# Patient Record
Sex: MALE | NOT HISPANIC OR LATINO | Employment: OTHER | URBAN - METROPOLITAN AREA
[De-identification: names, ages, dates, MRNs, and addresses within clinical notes are randomized per-mention and may not be internally consistent; named-entity substitution may affect disease eponyms.]

---

## 2017-01-02 ENCOUNTER — OFFICE VISIT (OUTPATIENT)
Dept: ORTHOPEDICS | Facility: OTHER | Age: 77
End: 2017-01-02
Payer: COMMERCIAL

## 2017-01-02 ENCOUNTER — RADIANT APPOINTMENT (OUTPATIENT)
Dept: GENERAL RADIOLOGY | Facility: OTHER | Age: 77
End: 2017-01-02
Attending: PHYSICAL MEDICINE & REHABILITATION
Payer: COMMERCIAL

## 2017-01-02 VITALS
SYSTOLIC BLOOD PRESSURE: 110 MMHG | RESPIRATION RATE: 20 BRPM | WEIGHT: 206 LBS | DIASTOLIC BLOOD PRESSURE: 60 MMHG | BODY MASS INDEX: 28.15 KG/M2

## 2017-01-02 DIAGNOSIS — M25.511 CHRONIC PAIN OF BOTH SHOULDERS: ICD-10-CM

## 2017-01-02 DIAGNOSIS — M25.512 CHRONIC PAIN OF BOTH SHOULDERS: ICD-10-CM

## 2017-01-02 DIAGNOSIS — M75.81 TENDONITIS OF BOTH ROTATOR CUFFS: Primary | ICD-10-CM

## 2017-01-02 DIAGNOSIS — G89.29 CHRONIC PAIN OF BOTH SHOULDERS: ICD-10-CM

## 2017-01-02 DIAGNOSIS — M19.012 PRIMARY OSTEOARTHRITIS OF BOTH SHOULDERS: ICD-10-CM

## 2017-01-02 DIAGNOSIS — M53.3 COCCYDYNIA: ICD-10-CM

## 2017-01-02 DIAGNOSIS — M19.011 PRIMARY OSTEOARTHRITIS OF BOTH SHOULDERS: ICD-10-CM

## 2017-01-02 DIAGNOSIS — M75.82 TENDONITIS OF BOTH ROTATOR CUFFS: Primary | ICD-10-CM

## 2017-01-02 PROCEDURE — 20610 DRAIN/INJ JOINT/BURSA W/O US: CPT | Mod: LT | Performed by: PHYSICAL MEDICINE & REHABILITATION

## 2017-01-02 PROCEDURE — 99204 OFFICE O/P NEW MOD 45 MIN: CPT | Mod: 25 | Performed by: PHYSICAL MEDICINE & REHABILITATION

## 2017-01-02 PROCEDURE — 73030 X-RAY EXAM OF SHOULDER: CPT | Mod: LT

## 2017-01-02 RX ORDER — TRIAMCINOLONE ACETONIDE 40 MG/ML
40 INJECTION, SUSPENSION INTRA-ARTICULAR; INTRAMUSCULAR ONCE
Qty: 1 ML | Refills: 0 | OUTPATIENT
Start: 2017-01-02 | End: 2017-01-02

## 2017-01-02 NOTE — PATIENT INSTRUCTIONS
Today's Plan of Care:  -Rehab: Physical Therapy: [facility] - [phone]. Please do 5-6 days of exercises per week between formal sessions and the home exercises they provide.  -Steroid injection of the left shoulder: subacromial space was performed today in clinic.  Take it easy over the next few days. Keep in mind that the steroid may take up to 3 days to start working and up to 2 weeks to reach maximal effect.  Ice 15-20 minutes as needed for soreness.  Ibuprofen and/or Tylenol as needed for pain relief.  Recommend donut cushion, can be obtained at the pharmacy.    -Follow Up: Schedule follow up  6 - 8 weeks or sooner if symptoms fail to improve or worsen

## 2017-01-02 NOTE — MR AVS SNAPSHOT
After Visit Summary   1/2/2017    Deandre Moore    MRN: [id number]           Patient Information     Date Of Birth          1940        Visit Information        Provider Department      1/2/2017 1:20 PM Mera Xiao MD [facility]        Today's Diagnoses     Tendonitis of both rotator cuffs    -  1     Chronic pain of both shoulders         Primary osteoarthritis of both shoulders         Coccydynia           Care Instructions    Today's Plan of Care:  -Rehab: Physical Therapy: [facility] - [phone]. Please do 5-6 days of exercises per week between formal sessions and the home exercises they provide.  -Steroid injection of the left shoulder: subacromial space was performed today in clinic.  Take it easy over the next few days. Keep in mind that the steroid may take up to 3 days to start working and up to 2 weeks to reach maximal effect.  Ice 15-20 minutes as needed for soreness.  Ibuprofen and/or Tylenol as needed for pain relief.  Recommend donut cushion, can be obtained at the pharmacy.    -Follow Up: Schedule follow up  6 - 8 weeks or sooner if symptoms fail to improve or worsen          Follow-ups after your visit        Additional Services     АННА PT, HAND, AND CHIROPRACTIC REFERRAL       **This order will print in the [facility] Scheduling Office**    Physical Therapy, Hand Therapy and Chiropractic Care are available through:    *[facility]  *[facility]  *[facility]    Call one number to schedule at any of the above locations: [phone].    Your provider has referred you to: Physical Therapy at [facility] or [facility]    Indication/Reason for Referral: Shoulder Pain  Onset of Illness: years  Therapy Orders: Evaluate and Treat  Special Programs: None  Special Request: None    Madelyn Joshi      Additional Comments for the Therapist or Chiropractor:     Please be aware that coverage of  "these services is subject to the terms and limitations of your health insurance plan.  Call member services at your health plan with any benefit or coverage questions.      Please bring the following to your appointment:    *Your personal calendar for scheduling future appointments  *Comfortable clothing                  Your next 10 appointments already scheduled     Jan 26, 2017  3:15 PM   (Arrive by 3:00 PM)   Return Visit with Miguel Gray MD   [facility] ([facility])    [address]  [address] [facility] [id number] [phone]            Jun 19, 2017 11:00 AM   Return Visit with Davis Whyte MD, [facility] NURSE   [facility]    [address] [place] [id number] [phone]              Who to contact     If you have questions or need follow up information about today's clinic visit or your schedule please contact [facility] directly at [phone].  Normal or non-critical lab and imaging results will be communicated to you by [employer], letter or phone within 4 business days after the clinic has received the results. If you do not hear from us within 7 days, please contact the clinic through [employer] or phone. If you have a critical or abnormal lab result, we will notify you by phone as soon as possible.  Submit refill requests through [employer] or call your pharmacy and they will forward the refill request to us. Please allow 3 business days for your refill to be completed.          Additional Information About Your Visit        [employer] Information     [employer] lets you send messages to your doctor, view your test results, renew your prescriptions, schedule appointments and more. To sign up, go to [email or web address] . Click on \"Log in\" on the left side of the screen, which will take you to the Welcome page. Then click on \"Sign up Now\" on the right side of the page.     You " will be asked to enter the access code listed below, as well as some personal information. Please follow the directions to create your username and password.     Your access code is: [email or web address]  Expires: 3/20/2017  2:26 PM     Your access code will  in 90 days. If you need help or a new code, please call your [facility] or [phone].        Your Vitals Were     Respirations                   20            Blood Pressure from Last 3 Encounters:   17 110/60   16 105/61   16 118/68    Weight from Last 3 Encounters:   17 206 lb (93.441 kg)   16 206 lb 12.8 oz (93.804 kg)   16 215 lb (97.523 kg)              We Performed the Following     АННА PT, HAND, AND CHIROPRACTIC REFERRAL        Primary Care Provider Office Phone # Fax #    Denilson Thomas [phone]       [address] [address]        Thank you!     Thank you for choosing [facility]  for your care. Our goal is always to provide you with excellent care. Hearing back from our patients is one way we can continue to improve our services. Please take a few minutes to complete the written survey that you may receive in the mail after your visit with us. Thank you!             Your Updated Medication List - Protect others around you: Learn how to safely use, store and throw away your medicines at www.disposemymeds.org.          This list is accurate as of: 17  2:16 PM.  Always use your most recent med list.                   Brand Name Dispense Instructions for use    ammonium lactate 12 % lotion    LAC-HYDRIN    500 g    Apply topically 2 times daily Apply twice daily to feet       aspirin 81 MG tablet      Take 81 mg by mouth daily.       ATIVAN PO      Take 2.5 mg by mouth At Bedtime.       AVAPRO PO      Take 150 mg by mouth daily.       Blood Glucose Monitor System W/DEVICE Kit     1 kit    Test 4 times daily with meter covered by  insurance        * blood glucose monitoring lancets     2 Box    Use to test blood sugar 2 times daily or as directed.       * blood glucose monitoring lancets     4 Box    Use to test blood sugar 4 times daily with meter/ strips/ lancets covered by insurance       blood glucose monitoring test strip    no brand specified    360 each    Use to test blood sugar 4 times daily  With meter/ strips/ lancets covered by insurance       capsaicin 0.075 % cream    ZOSTRIX    60 g    Apply topically 3 times daily To areas of itch on back.  OK to dilute with cetaphil cream if too burning.       * clobetasol 0.05 % ointment    TEMOVATE    60 g    For flaring spots around ankles, apply at bedtime and cover with saran wrap       * clobetasol 0.05 % ointment    TEMOVATE    60 g    Apply to areas of itch on back twice daily, if capsaicin not helping       COLCHICINE PO      Take 0.6 mg by mouth daily.       folic acid 1 MG tablet    FOLVITE    100 tablet    One pill daily except the day you take methotrexate       glimepiride 4 MG tablet    AMARYL    21 tablet    take 1 and 1/2 tabs daily (6 mg)       GNP GINGKO BILOBA EXTRACT PO      Take  by mouth.       indapamide 2.5 MG tablet    LOZOL     Take 2.5 mg by mouth every morning.       LIPITOR PO      Take 40 mg by mouth daily       metFORMIN 1000 MG tablet    GLUCOPHAGE    120 tablet    TAKE 1 TABLET TWICE DAILY WITH MEALS       methotrexate sodium 2.5 MG Tabs     40 tablet    Start 5 pills one day per week       mirtazapine 15 MG tablet    REMERON    30 tablet    Take 1 tablet (15 mg) by mouth At Bedtime       Multi-vitamin Tabs tablet      Take 1 tablet by mouth daily.       nitroglycerin 0.4 MG sublingual tablet    NITROSTAT     Place under the tongue as needed       PLAVIX PO      Take 75 mg by mouth daily.       potassium chloride SA 20 MEQ CR tablet    K-DUR/KLOR-CON M    270 tablet    TAKE 1 TABLET THREE TIMES DAILY       torsemide 10 MG tablet    DEMADEX     Take 10 mg by mouth daily        * triamcinolone 0.1 % ointment    KENALOG    80 g    Mix entire tube with moisturizing cream as directed, and apply at bedtime       * triamcinolone 0.1 % ointment    KENALOG    454 g    Apply topically 2 times daily       VITAMIN B12 PO      Take 1 tablet by mouth daily       vitamin D 2000 UNITS Caps      Take 1 tablet by mouth daily       * Notice:  This list has 6 medication(s) that are the same as other medications prescribed for you. Read the directions carefully, and ask your doctor or other care provider to review them with you.

## 2017-01-02 NOTE — PROGRESS NOTES
Sports Medicine Clinic Visit    PCP: Denilson Thomas    CC: Patient presents with:  Shoulder left  Shoulder right  Tailbone Pain      HPI:  Deandre Moore is a 76 year old male who is seen as a self referral.   He notes an injury 4 years ago when he fell.  Pain is located on the bilateral shoulders.  He rates the pain at a  8/10 at its worst and a 0/10 currently.  Symptoms are relieved with rest and keeping arms at side and worsened by raising arms, sleeping. He endorses weakness and denies swelling, bruising, popping, grinding, catching, locking, instability, numbness, tingling, pain in other joints and fever, chills.  He has not had any physical therapy for his shoulders. He notes difficulty with daily activities, including lifting, or raising arms up.      He notes a second issue with his tailbone. Notes pain began 3 months ago with sitting. No pain otherwise.      Review of Systems:  Musculoskeletal: as above  Remainder of review of systems is negative including constitutional, CV, pulmonary, GI, Skin and Neurologic except as noted in HPI or medical history.      Patient's current problem list, past medical and surgical history, and family history were reviewed.    Past Medical History   Diagnosis Date     Diabetes mellitus (H)      Hypertension      Gout attack      Heart attack (H) 1984     Past Surgical History   Procedure Laterality Date     Cholecystectomy       Colonoscopy       Cardiac surgery  1984     4 vessel bypass     Phacoemulsification with standard intraocular lens implant  1/21/2013     Procedure: PHACOEMULSIFICATION WITH STANDARD INTRAOCULAR LENS IMPLANT;  Phacoemulsification with standard intraocular lens implant, right eye;  Surgeon: Jay Rodriges MD;  Location: [facility] OR     No history of surgery  3/31/14     derm     Family History   Problem Relation Age of Onset     CANCER No family hx of      no skin cancer     Skin Cancer No family hx of      Social History     Social History      Marital Status:      Spouse Name: N/A     Number of Children: N/A     Years of Education: N/A     Occupational History     Not on file.     Social History Main Topics     Smoking status: Former Smoker     Quit date: 10/15/1984     Smokeless tobacco: Never Used     Alcohol Use: Yes      Comment: occasional-once a year     Drug Use: No     Sexual Activity: Not on file     Other Topics Concern     Not on file     Social History Narrative       Current Outpatient Prescriptions   Medication     potassium chloride SA (K-DUR/KLOR-CON M) 20 MEQ CR tablet     metFORMIN (GLUCOPHAGE) 1000 MG tablet     methotrexate sodium 2.5 MG TABS     folic acid (FOLVITE) 1 MG tablet     capsaicin (ZOSTRIX) 0.075 % topical cream     clobetasol (TEMOVATE) 0.05 % ointment     clobetasol (TEMOVATE) 0.05 % ointment     triamcinolone (KENALOG) 0.1 % ointment     triamcinolone (KENALOG) 0.1 % ointment     glimepiride (AMARYL) 4 MG tablet     Blood Glucose Monitoring Suppl (BLOOD GLUCOSE MONITOR SYSTEM) W/DEVICE KIT     blood glucose monitoring (NO BRAND SPECIFIED) test strip     blood glucose monitoring (ULTRA THIN 30G) lancets     ammonium lactate (LAC-HYDRIN) 12 % lotion     blood glucose monitoring (ACCU-CHEK FASTCLIX) lancets     mirtazapine (REMERON) 15 MG tablet     Cholecalciferol (VITAMIN D) 2000 UNITS CAPS     Cyanocobalamin (VITAMIN B12 PO)     nitroglycerin (NITROSTAT) 0.4 MG SL tablet     torsemide (DEMADEX) 10 MG tablet     aspirin 81 MG tablet     LORazepam (ATIVAN PO)     Irbesartan (AVAPRO PO)     Atorvastatin Calcium (LIPITOR PO)     Clopidogrel Bisulfate (PLAVIX PO)     indapamide (LOZOL) 2.5 MG tablet     COLCHICINE PO     multivitamin, therapeutic with minerals (MULTI-VITAMIN) TABS     Ginkgo Biloba (GNP GINGKO BILOBA EXTRACT PO)     No current facility-administered medications for this visit.     Allergies   Allergen Reactions     Beta Adrenergic Blockers Unknown     Latex Dermatitis     Tape [Adhesive Tape]  Dermatitis     Electrode patches         Objective:  /60 mmHg  Resp 20  Wt 206 lb (93.441 kg)    General: healthy, alert and in no distress    Head: Normocephalic, atraumatic  Eyes: no scleral icterus or conjunctival erythema   Oropharynx:  Mucous membranes moist  Skin: no erythema, ecchymosis, petechiae, or jaundice  CV: regular rhythm by palpation, 2+ distal pulses, no pedal edema    Resp: normal respiratory effort without conversational dyspnea   Psych: normal mood and affect    Gait: Non-antalgic, appropriate coordination and balance   Neuro: normal light touch sensory exam of the extremities. Motor strength as noted below    Musculoskeletal:    Bilateral Shoulder and Low back exam    Inspection and Posture:  Holding shoulders anteriorly     Skin:        no visible deformities    Palpation:  Right proximal biceps tendon origin tender.  Remainder of shoulder exam is nontender.  Coccyx tender to palpation.      ROM:   -Right shoulder abduction active 160, passive 170, flexion active 160, passive 170.  Left shoulder abduction active 90, passive 170, flexion active 160, passive 170.  Bilateral active internal/external rotation are also decreased (left worse than right), passive motion is maintained.      Painful motions:  Bilateral shoulder abduction, flexion, internal rotation, and external rotation are painful.      Strength:        abduction 4+/5 bilaterally       internal rotation 5-/5 bilaterally       external rotation 5-/5 bilaterally       elbow flexion 5/5 bilaterally       elbow extension 5/5 bilaterally       forearm pronation 5/5 bilaterally       forearm supination 5/5 bilaterally       wrist flexion 5/5 bilaterally       wrist extension 5/5 bilaterally        strength 5/5 bilaterally       finger abduction 5/5 bilaterally    Impingement testing:       positive (+) Neer left       positive (+) Oliveira left       positive (+) empty can bilaterally (weak bilaterally, painful left)       neg  (-) crossover bilaterally       neg (-) O'nolvia bilaterally    Stability testing:       neg (-) sulcus sign bilaterally    Sensation:        normal sensation over shoulder and upper extremity     Radiology:  Independent visualization of images performed.    Recent Results (from the past 744 hour(s))   XR Shoulder Bilateral G/E 2 Views    Narrative    SHOULDER BILATERAL TWO OR MORE VIEWS 1/2/2017 1:51 PM     HISTORY: Pain in left shoulder. Other chronic pain. Pain in right  shoulder.    COMPARISON: None.      Impression    IMPRESSION:     Right shoulder: Moderate hypertrophic degenerative change at the  acromioclavicular joint. Probable mild degenerative change at the  glenohumeral joint. No acute bony abnormalities. Distal acromial  morphology is type II. Glenohumeral distance appears narrowed which  could indicate rotator cuff disease.    Left shoulder: Mild-to-moderate hypertrophic degenerative change  acromioclavicular joint. No acute bony abnormalities. Distal acromial  morphology is type I or II and humeral acromial distance appears  adequate.     Procedure:  Discussed steroid injection, including risks, potential benefits, and alternatives.  The patient expressed understanding.  Obtained verbal and written consent and the patient elected to proceed.  Procedure: A steroid injection was performed under aseptic technique at the left subacromial bursa using 2 mL of 1% plain Lidocaine, 2 mL of 0.5% Marcaine, and 1 mL of 40 mg/mL Kenalog.  Bandage applied. This was well tolerated.    Assessment:  1. Tendonitis of both rotator cuffs    2. Chronic pain of both shoulders    3. Primary osteoarthritis of both shoulders    4. Coccydynia        Plan:  Discussed the assessment with the patient. We discussed the following treatment options: symptom treatment, activity modification/rest, imaging and rehab. Following discussion, plan:  -Will try conservative measures of physical therapy and steroid injection first.  May  ultimately need advanced imaging if these are ineffective.    -Rehab: Physical Therapy: [facility] - [phone]. Please do 5-6 days of exercises per week between formal sessions and the home exercises they provide.  -Steroid injection of the left shoulder: subacromial space was performed today in clinic.  Take it easy over the next few days. Keep in mind that the steroid may take up to 3 days to start working and up to 2 weeks to reach maximal effect.  Ice 15-20 minutes as needed for soreness.  Ibuprofen and/or Tylenol as needed for pain relief.  -Recommend donut cushion for coccygeal pain, can be obtained at the pharmacy.  -Follow Up: Schedule follow up  6 - 8 weeks or sooner if symptoms fail to improve or worsen      Adri Xiao MD, CAQ  [facility]

## 2017-01-11 ENCOUNTER — THERAPY VISIT (OUTPATIENT)
Dept: PHYSICAL THERAPY | Facility: CLINIC | Age: 77
End: 2017-01-11
Payer: COMMERCIAL

## 2017-01-11 DIAGNOSIS — M25.511 CHRONIC PAIN OF BOTH SHOULDERS: Primary | ICD-10-CM

## 2017-01-11 DIAGNOSIS — G89.29 CHRONIC PAIN OF BOTH SHOULDERS: Primary | ICD-10-CM

## 2017-01-11 DIAGNOSIS — M25.512 CHRONIC PAIN OF BOTH SHOULDERS: Primary | ICD-10-CM

## 2017-01-11 PROCEDURE — 97161 PT EVAL LOW COMPLEX 20 MIN: CPT | Mod: GP | Performed by: PHYSICAL THERAPIST

## 2017-01-11 PROCEDURE — 97110 THERAPEUTIC EXERCISES: CPT | Mod: GP | Performed by: PHYSICAL THERAPIST

## 2017-01-11 NOTE — PROGRESS NOTES
[facility] Initial Evaluation      Subjective:    Deandre Moore is a 76 year old male with a bilateral shoulders condition.  Condition occurred with:  A fall.  Condition occurred: at home.  This is a chronic condition  Fell about 3 years ago. 1/2/17 date of MD visit.    Patient reports pain:  In the joint.  Radiates to:  Upper arm.  Pain is described as sharp and is intermittent and reported as 5/10.  Associated symptoms:  Loss of strength and loss of motion/stiffness. Pain is the same all the time.  Symptoms are exacerbated by using arm overhead, using arm at shoulder level, using arm behind back and lifting and relieved by rest.  Since onset symptoms are unchanged.  Special tests:  X-ray (moderate degenerative changes).  Previous treatment: coritsone: so far no effect on symptoms.    General health as reported by patient is good.  Pertinent medical history includes:  Diabetes, high blood pressure, heart problems and sleep disorder/apnea.  Medical allergies: yes (latex).  Other surgeries include:  Heart surgery (bypass).  Medication history: cholesterol.  Current occupation is retired.  Patient is working in normal job without restrictions.  Primary job tasks include:  Driving and prolonged sitting (household tasks).    Barriers include:  None as reported by the patient.    Red flags:  None as reported by the patient.                      Objective:    System                   Shoulder Evaluation:  ROM:  AROM:    Flexion:  Left:  65    Right:  90    Abduction:  Left: 60 with pain   Right:  155 no pain      External Rotation:  Left:  60    Right:  76            Extension/Internal Rotation:  Left:  L5    Right:  S1    PROM:    Flexion:  Left:  165    Right: 165      Abduction:  Left:  170    Right:  133    Internal Rotation:  Left:  90    Right:  88                      Strength:    Flexion: Left:3+/5 Weak/painful    Pain:    Right: 5/5  Strong/pain free     Pain:   Extension:  Left: 4/5   Weak/painful    Pain:    Right: 5/5    Strong/pain free  Pain:  Abduction:  Left: 3+/5  Weak/painful  Pain:    Right: 4/5   Weak/painful    Pain:  Adduction:  Left: 5/5   Strong/pain free   Pain:    Right: 5/5   Strong/painful    Pain:  Internal Rotation:  Left:4/5   Weak/painful    Pain:    Right: 5/5   Strong/painful    Pain:  External Rotation:   Left:4/5   Weak/painful    Pain:   Right:5/5   Strong/pain free    Pain:        Elbow Flexion:  Left:5/5   Strong/pain free    Pain:    Right:5/5   Strong/pain free    Pain:  Elbow Extension:  Left:5/5   Strong/pain free    Pain:    Right:5/5   Strong/pain free    Pain:  Stability Testing:  normal      Special Tests:    Left shoulder positive for the following special tests:  Impingement and Rotator cuff tear  Right shoulder positive for the following special tests:Impingement  Right shoulder negative for the following special tests:Rotator cuff tear  Palpation:    Left shoulder tenderness present at:  Bicipital Groove  Left shoulder tenderness not present at: Supraspinatus; Infraspinatus or Subscapularis    Right shoulder tenderness not present at:Supraspinatus; Infraspinatus; Subscapularis or Bicipital Groove  Mobility Tests:  normal                                                   General     ROS    Assessment/Plan:      Patient is a 76 year old male with both sides shoulder complaints.    Patient has the following significant findings with corresponding treatment plan.                Diagnosis 1:  Bilateral shoulder pain / rotator cuff tendonpathy  Pain -  hot/cold therapy, manual therapy, self management, education and home program  Decreased ROM/flexibility - manual therapy, therapeutic exercise, therapeutic activity and home program  Decreased strength - therapeutic exercise, therapeutic activities and home program  Decreased function - therapeutic activities and home program  Impaired posture - neuro re-education, therapeutic activities and home  program    Therapy Evaluation Codes:   1) History comprised of:   Personal factors that impact the plan of care:      Age and Time since onset of symptoms.    Comorbidity factors that impact the plan of care are:      None.     Medications impacting care: None.  2) Examination of Body Systems comprised of:   Body structures and functions that impact the plan of care:      Shoulder.   Activity limitations that impact the plan of care are:      Lifting and reaching.  3) Clinical presentation characteristics are:   Stable/Uncomplicated.  4) Decision-Making    Moderate complexity using standardized patient assessment instrument and/or measureable assessment of functional outcome.  Cumulative Therapy Evaluation is: Low complexity.    Previous and current functional limitations:  (See Goal Flow Sheet for this information)    Short term and Long term goals: (See Goal Flow Sheet for this information)     Communication ability:  Patient appears to be able to clearly communicate and understand verbal and written communication and follow directions correctly.  Treatment Explanation - The following has been discussed with the patient:   RX ordered/plan of care  Anticipated outcomes  Possible risks and side effects  This patient would benefit from PT intervention to resume normal activities.   Rehab potential is good.    Frequency:  1 X week, once daily  Duration:  for 8 weeks  Discharge Plan:  Achieve all LTG.  Independent in home treatment program.  Reach maximal therapeutic benefit.    Please refer to the daily flowsheet for treatment today, total treatment time and time spent performing 1:1 timed codes.

## 2017-01-11 NOTE — Clinical Note
[facility]  [address]  [facility] [address] [phone]    2017    Re: Deandre Moore   :   1940  MRN:  [id number]   REFERRING PHYSICIAN:   Mera Xiao    [facility] ATHLETIC [facility]    Date of Initial Evaluation:  2017  Visits:  Rxs Used: 1  Reason for Referral:  Chronic pain of both shoulders    EVALUATION SUMMARY    [facility]    Subjective:  Deandre Moore is a 76 year old male with a bilateral shoulders condition.  Condition occurred with:  A fall.  Condition occurred: at home.  This is a chronic condition  Fell about 3 years ago. 17 date of MD visit.    Patient reports pain:  In the joint.  Radiates to:  Upper arm.  Pain is described as sharp and is intermittent and reported as 5/10.  Associated symptoms:  Loss of strength and loss of motion/stiffness. Pain is the same all the time.  Symptoms are exacerbated by using arm overhead, using arm at shoulder level, using arm behind back and lifting and relieved by rest.  Since onset symptoms are unchanged.  Special tests:  X-ray (moderate degenerative changes).  Previous treatment: coritsone: so far no effect on symptoms.    General health as reported by patient is good.  Pertinent medical history includes:  Diabetes, high blood pressure, heart problems and sleep disorder/apnea.  Medical allergies: yes (latex).  Other surgeries include:  Heart surgery (bypass).  Medication history: cholesterol.  Current occupation is retired.  Patient is working in normal job without restrictions.  Primary job tasks include:  Driving and prolonged sitting (household tasks). Barriers include:  None as reported by the patient.  Red flags:  None as reported by the patient.    Objective:  Shoulder Evaluation:  ROM:  AROM:    Flexion:  Left:  65    Right:  90  Abduction:  Left: 60 with pain    Right:  155 no pain  External Rotation:  Left:  60    Right:  76  Extension/Internal Rotation:  Left:  L5    Right:  S1    PROM:    Flexion:  Left:  165    Right: 165    Abduction:  Left:  170    Right:  133  Internal Rotation:  Left:  90    Right:  88  Strength:    Flexion: Left:3+/5 Weak/painful    Pain:    Right: 5/5  Strong/pain free     Pain:   Extension:  Left: 4/5  Weak/painful    Pain:    Right: 5/5    Strong/pain free  Pain:  Abduction:  Left: 3+/5  Weak/painful  Pain:    Right: 4/5   Weak/painful    Pain:  Adduction:  Left: 5/5   Strong/pain free   Pain:    Right: 5/5   Strong/painful    Pain:  Internal Rotation:  Left:4/5   Weak/painful    Pain:    Right: 5/5   Strong/painful    Pain:  External Rotation:   Left:4/5   Weak/painful    Pain:   Right:5/5   Strong/pain free    Pain:    Elbow Flexion:  Left:5/5   Strong/pain free    Pain:    Right:5/5   Strong/pain free    Pain:  Elbow Extension:  Left:5/5   Strong/pain free    Pain:    Right:5/5   Strong/pain free    Pain:  Stability Testing:  normal  Special Tests:    Left shoulder positive for the following special tests:  Impingement and Rotator cuff tear  Right shoulder positive for the following special tests:Impingement  Right shoulder negative for the following special tests:Rotator cuff tear  Palpation:    Left shoulder tenderness present at:  Bicipital Groove  Left shoulder tenderness not present at: Supraspinatus; Infraspinatus or Subscapularis  Right shoulder tenderness not present at:Supraspinatus; Infraspinatus; Subscapularis or Bicipital Groove  Mobility Tests:  normal    Assessment/Plan:    Patient is a 76 year old male with both sides shoulder complaints.    Patient has the following significant findings with corresponding treatment plan.                Diagnosis 1:  Bilateral shoulder pain / rotator cuff tendonpathy  Pain -  hot/cold therapy, manual therapy, self management, education and home program  Decreased ROM/flexibility - manual  therapy, therapeutic exercise, therapeutic activity and home program  Decreased strength - therapeutic exercise, therapeutic activities and home program  Decreased function - therapeutic activities and home program  Impaired posture - neuro re-education, therapeutic activities and home program  Therapy Evaluation Codes:   1) History comprised of:   Personal factors that impact the plan of care:      Age and Time since onset of symptoms.    Comorbidity factors that impact the plan of care are:      None.     Medications impacting care: None.  2) Examination of Body Systems comprised of:   Body structures and functions that impact the plan of care:      Shoulder.   Activity limitations that impact the plan of care are:      Lifting and reaching.  3) Clinical presentation characteristics are:   Stable/Uncomplicated.  4) Decision-Making    Moderate complexity using standardized patient assessment instrument and/or measureable assessment of functional outcome.  Cumulative Therapy Evaluation is: Low complexity.  Previous and current functional limitations:  (See Goal Flow Sheet for this information)    Short term and Long term goals: (See Goal Flow Sheet for this information)   Communication ability:  Patient appears to be able to clearly communicate and understand verbal and written communication and follow directions correctly.  Treatment Explanation - The following has been discussed with the patient:   RX ordered/plan of care  Anticipated outcomes  Possible risks and side effects  This patient would benefit from PT intervention to resume normal activities.   Rehab potential is good.  Frequency:  1 X week, once daily  Duration:  for 8 weeks  Discharge Plan:  Achieve all LTG.  Independent in home treatment program.  Reach maximal therapeutic benefit.    Thank you for your referral.      INQUIRIES  Therapist: Zachary Barreto, PT  [facility]  [address]  [place]  [place] [address]  Phone: [phone]  Fax: [phone]

## 2017-01-18 ENCOUNTER — THERAPY VISIT (OUTPATIENT)
Dept: PHYSICAL THERAPY | Facility: CLINIC | Age: 77
End: 2017-01-18
Payer: COMMERCIAL

## 2017-01-18 DIAGNOSIS — M25.512 CHRONIC PAIN OF BOTH SHOULDERS: Primary | ICD-10-CM

## 2017-01-18 DIAGNOSIS — G89.29 CHRONIC PAIN OF BOTH SHOULDERS: Primary | ICD-10-CM

## 2017-01-18 DIAGNOSIS — M25.511 CHRONIC PAIN OF BOTH SHOULDERS: Primary | ICD-10-CM

## 2017-01-18 PROCEDURE — 97110 THERAPEUTIC EXERCISES: CPT | Mod: GP | Performed by: PHYSICAL THERAPIST

## 2017-01-25 ENCOUNTER — THERAPY VISIT (OUTPATIENT)
Dept: PHYSICAL THERAPY | Facility: CLINIC | Age: 77
End: 2017-01-25
Payer: COMMERCIAL

## 2017-01-25 DIAGNOSIS — M25.511 CHRONIC PAIN OF BOTH SHOULDERS: Primary | ICD-10-CM

## 2017-01-25 DIAGNOSIS — M25.512 CHRONIC PAIN OF BOTH SHOULDERS: Primary | ICD-10-CM

## 2017-01-25 DIAGNOSIS — G89.29 CHRONIC PAIN OF BOTH SHOULDERS: Primary | ICD-10-CM

## 2017-01-25 PROCEDURE — 97110 THERAPEUTIC EXERCISES: CPT | Mod: GP | Performed by: PHYSICAL THERAPIST

## 2017-01-25 PROCEDURE — 97140 MANUAL THERAPY 1/> REGIONS: CPT | Mod: GP | Performed by: PHYSICAL THERAPIST

## 2017-01-31 DIAGNOSIS — L30.8 OTHER ECZEMA: Primary | ICD-10-CM

## 2017-01-31 RX ORDER — METHOTREXATE 2.5 MG/1
TABLET ORAL
Qty: 5 TABLET | Refills: 0 | Status: SHIPPED | OUTPATIENT
Start: 2017-01-31 | End: 2017-02-13

## 2017-01-31 NOTE — TELEPHONE ENCOUNTER
Last visit 10/2016     Expand All Collapse All    Finally was able to get ahold of patient today. He is still taking MTX 12.5mg weekly for his dermatitis, started about 1 month ago. Denies side effects, including oral ulcers, GI issues, infections. Reports skin is improved, but still has some itching.     I see labs in our system, which are baseline labs from October. Pt states he had labs performed 1 week after taking the first dose of the medication - these were drawn with his PCP, Dr. Pate. I do not see records of these in our system.    He has an appointment with Dr. Gray scheduled for 1/27/16. He missed his November appointment because he forgot about it. I think it is okay if he continues his MTX, but would like another set of labs for monitoring. He prefers to have Dr. Pate check these, rather than drive here. I asked him to have a CBC with diff and liver panel drawn. He will have Dr. Pate fax the records here. He does not currently need a refill of MTX (has a 1 month supply left).    I will also ask scheduling to send him a reminder in the mail about his next appointment, per his request. I have cc'ed Dr. Gray on this note.    Ana Carbajal MD MS  PGY4 Dermatology Resident  P: [phone]

## 2017-01-31 NOTE — TELEPHONE ENCOUNTER
Chart reviewed and called patient to discuss refill. Patient started on methotrexate in October 2016 for severe atopic dermatitis. Requested refill. He has not since had repeat lab testing and missed his appointment with Dr. Gray on 1/26/17. He has 2-weeks of remaining medication. I discussed that he must not miss appointments while on this medication given side effects to blood, liver, and kidneys. He has difficulty getting to the [facility] for appointment, but states his wife has a scheduled dentist appointment on 2/13/17 and he would be able to come in then. He can be added onto the [facility] then for a check-up, for lab monitoring, and adjust dose of methotrexate as needed. I gave him an additional 1-week refill of his methotrexate so he would have an adequate supply until that appointment. I also contacted our scheduling coordinators to arrange this. Patient expressed understanding and agreed to plan.     Edu Rivera MD   PGY-2 Dermatology Resident

## 2017-02-01 ENCOUNTER — THERAPY VISIT (OUTPATIENT)
Dept: PHYSICAL THERAPY | Facility: CLINIC | Age: 77
End: 2017-02-01
Payer: COMMERCIAL

## 2017-02-01 DIAGNOSIS — M25.511 CHRONIC PAIN OF BOTH SHOULDERS: Primary | ICD-10-CM

## 2017-02-01 DIAGNOSIS — G89.29 CHRONIC PAIN OF BOTH SHOULDERS: Primary | ICD-10-CM

## 2017-02-01 DIAGNOSIS — M25.512 CHRONIC PAIN OF BOTH SHOULDERS: Primary | ICD-10-CM

## 2017-02-01 PROCEDURE — 97110 THERAPEUTIC EXERCISES: CPT | Mod: GP | Performed by: PHYSICAL THERAPIST

## 2017-02-01 PROCEDURE — 97112 NEUROMUSCULAR REEDUCATION: CPT | Mod: GP | Performed by: PHYSICAL THERAPIST

## 2017-02-08 ENCOUNTER — THERAPY VISIT (OUTPATIENT)
Dept: PHYSICAL THERAPY | Facility: CLINIC | Age: 77
End: 2017-02-08
Payer: COMMERCIAL

## 2017-02-08 DIAGNOSIS — G89.29 CHRONIC PAIN OF BOTH SHOULDERS: Primary | ICD-10-CM

## 2017-02-08 DIAGNOSIS — M25.512 CHRONIC PAIN OF BOTH SHOULDERS: Primary | ICD-10-CM

## 2017-02-08 DIAGNOSIS — M25.511 CHRONIC PAIN OF BOTH SHOULDERS: Primary | ICD-10-CM

## 2017-02-08 PROCEDURE — 97110 THERAPEUTIC EXERCISES: CPT | Mod: GP | Performed by: PHYSICAL THERAPIST

## 2017-02-08 PROCEDURE — 97112 NEUROMUSCULAR REEDUCATION: CPT | Mod: GP | Performed by: PHYSICAL THERAPIST

## 2017-02-13 ENCOUNTER — OFFICE VISIT (OUTPATIENT)
Dept: DERMATOLOGY | Facility: CLINIC | Age: 77
End: 2017-02-13

## 2017-02-13 DIAGNOSIS — L30.9 DERMATITIS: ICD-10-CM

## 2017-02-13 DIAGNOSIS — L30.8 OTHER ECZEMA: ICD-10-CM

## 2017-02-13 DIAGNOSIS — L20.84 INTRINSIC ATOPIC DERMATITIS: Primary | ICD-10-CM

## 2017-02-13 DIAGNOSIS — Z79.899 ENCOUNTER FOR LONG-TERM (CURRENT) USE OF HIGH-RISK MEDICATION: ICD-10-CM

## 2017-02-13 DIAGNOSIS — R21 RASH: ICD-10-CM

## 2017-02-13 DIAGNOSIS — L20.84 INTRINSIC ATOPIC DERMATITIS: ICD-10-CM

## 2017-02-13 LAB
ALBUMIN SERPL-MCNC: 3.3 G/DL (ref 3.4–5)
ALP SERPL-CCNC: 85 U/L (ref 40–150)
ALT SERPL W P-5'-P-CCNC: 36 U/L (ref 0–70)
AST SERPL W P-5'-P-CCNC: 25 U/L (ref 0–45)
BILIRUB DIRECT SERPL-MCNC: 0.1 MG/DL (ref 0–0.2)
BILIRUB SERPL-MCNC: 0.6 MG/DL (ref 0.2–1.3)
PROT SERPL-MCNC: 7.3 G/DL (ref 6.8–8.8)

## 2017-02-13 RX ORDER — CLOBETASOL PROPIONATE 0.5 MG/G
OINTMENT TOPICAL
Qty: 60 G | Refills: 1 | Status: SHIPPED | OUTPATIENT
Start: 2017-02-13 | End: 2018-06-04

## 2017-02-13 RX ORDER — METHOTREXATE 2.5 MG/1
TABLET ORAL
Qty: 20 TABLET | Refills: 1 | Status: SHIPPED | OUTPATIENT
Start: 2017-02-13 | End: 2017-03-25

## 2017-02-13 NOTE — NURSING NOTE
"Dermatology Rooming Note    Deandre Moore's goals for this visit include:   Chief Complaint   Patient presents with     Derm Problem     Deandre is here today for dermatitis. He states \" I have been using Methotrexate and I think the medication is working but I still have active areas\"           Ирина Bailey, RMA    "

## 2017-02-13 NOTE — MR AVS SNAPSHOT
After Visit Summary   2/13/2017    Deandre Moore    MRN: [id number]           Patient Information     Date Of Birth          1940        Visit Information        Provider Department      2/13/2017 1:30 PM Jesusita Castro MD [facility]        Today's Diagnoses     Intrinsic atopic dermatitis    -  1    Encounter for long-term (current) use of high-risk medication        Other eczema        Dermatitis        Rash           Follow-ups after your visit        Follow-up notes from your care team     Return in about 6 weeks (around 3/27/2017).      Your next 10 appointments already scheduled     Feb 13, 2017  2:45 PM CST   LAB with  LAB   [facility] ([facility])    [address] [address] [phone]           Patient must bring picture ID.  Patient should be prepared to give a urine specimen  Please do not eat 10-12 hours before your appointment if you are coming in fasting for labs on lipids, cholesterol, or glucose (sugar).  Pregnant women should follow their Care Team instructions. Water with medications is okay. Do not drink coffee or other fluids.   If you have concerns about taking  your medications, please ask at office or if scheduling via [employer], send a message by clicking on Secure Messaging, Message Your Care Team.            Feb 22, 2017  2:00 PM CST   АННА Extremity with Zachary Barreto PT   [facility] ([facility]  )    [address]  [facility] [address]   [phone]            Mar 01, 2017  2:00 PM CST   АННА Extremity with Zachary Barreto PT   [facility] ([facility] [employer] [place]  )    [address]  [facility] [address]   [phone]            Mar 30, 2017 12:45 PM CDT   (Arrive by 12:30 PM)   Return Visit with Miguel Gray MD   [facility] ([facility])    [address] [facility]  [address] [facility] [id number] [phone]            2017 11:00 AM CDT   Return Visit with Davis Whyte MD, MG ENDO NURSE   [facility] ([facility])    [address] [place] [address] [phone]              Future tests that were ordered for you today     Open Future Orders        Priority Expected Expires Ordered    Hepatic panel Routine  2018            Who to contact     Please call your clinic at [phone] to:    Ask questions about your health    Make or cancel appointments    Discuss your medicines    Learn about your test results    Speak to your doctor   If you have compliments or concerns about an experience at your clinic, or if you wish to file a complaint, please contact [facility] Patient Relations at [phone] or email us at [email or web address]         Additional Information About Your Visit        [employer] Information     [employer] is an electronic gateway that provides easy, online access to your medical records. With [employer], you can request a clinic appointment, read your test results, renew a prescription or communicate with your care team.     To sign up for [employer] visit the website at [email or web address]   You will be asked to enter the access code listed below, as well as some personal information. Please follow the directions to create your username and password.     Your access code is: [email or web address]  Expires: 3/20/2017  2:26 PM     Your access code will  in 90 days. If you need help or a new code, please contact your [facility] or call [phone] for assistance.        Care EveryWhere ID     This is your Care EveryWhere ID. This could be used by other organizations to access your [facility] medical records  [id number]         Blood Pressure from Last 3 Encounters:    01/02/17 110/60   06/07/16 105/61   02/26/16 118/68    Weight from Last 3 Encounters:   01/02/17 93.4 kg (206 lb)   06/07/16 93.8 kg (206 lb 12.8 oz)   02/26/16 97.5 kg (215 lb)                 Today's Medication Changes          These changes are accurate as of: 2/13/17  2:26 PM.  If you have any questions, ask your nurse or doctor.               These medicines have changed or have updated prescriptions.        Dose/Directions    * clobetasol 0.05 % ointment   Commonly known as:  TEMOVATE   This may have changed:  Another medication with the same name was changed. Make sure you understand how and when to take each.   Used for:  Dermatitis   Changed by:  Miguel Gray MD        For flaring spots around ankles, apply at bedtime and cover with saran wrap   Quantity:  60 g   Refills:  2       * clobetasol 0.05 % ointment   Commonly known as:  TEMOVATE   This may have changed:  additional instructions   Used for:  Rash   Changed by:  Jesusita Castro MD        Apply to itchy areas twice daily as needed   Quantity:  60 g   Refills:  1       * Notice:  This list has 2 medication(s) that are the same as other medications prescribed for you. Read the directions carefully, and ask your doctor or other care provider to review them with you.         Where to get your medicines      These medications were sent to [facility] - [place], [place] - [address]  [address] [address]     Phone:  [phone]     clobetasol 0.05 % ointment    methotrexate sodium 2.5 MG Tabs                Primary Care Provider Office Phone # Fax #    Denilson Thomas [phone]       [address] [address]        Thank you!     Thank you for choosing [facility]  for your care. Our goal is always to provide you with excellent care. Hearing back from our patients is one way we can continue to improve our services. Please take a few  minutes to complete the written survey that you may receive in the mail after your visit with us. Thank you!             Your Updated Medication List - Protect others around you: Learn how to safely use, store and throw away your medicines at www.disposemymeds.org.          This list is accurate as of: 2/13/17  2:26 PM.  Always use your most recent med list.                   Brand Name Dispense Instructions for use    ammonium lactate 12 % lotion    LAC-HYDRIN    500 g    Apply topically 2 times daily Apply twice daily to feet       aspirin 81 MG tablet      Take 81 mg by mouth daily.       ATIVAN PO      Take 2.5 mg by mouth At Bedtime.       AVAPRO PO      Take 150 mg by mouth daily.       Blood Glucose Monitor System W/DEVICE Kit     1 kit    Test 4 times daily with meter covered by  insurance       * blood glucose monitoring lancets     2 Box    Use to test blood sugar 2 times daily or as directed.       * blood glucose monitoring lancets     4 Box    Use to test blood sugar 4 times daily with meter/ strips/ lancets covered by insurance       blood glucose monitoring test strip    no brand specified    360 each    Use to test blood sugar 4 times daily  With meter/ strips/ lancets covered by insurance       capsaicin 0.075 % cream    ZOSTRIX    60 g    Apply topically 3 times daily To areas of itch on back.  OK to dilute with cetaphil cream if too burning.       * clobetasol 0.05 % ointment    TEMOVATE    60 g    For flaring spots around ankles, apply at bedtime and cover with saran wrap       * clobetasol 0.05 % ointment    TEMOVATE    60 g    Apply to itchy areas twice daily as needed       COLCHICINE PO      Take 0.6 mg by mouth daily.       folic acid 1 MG tablet    FOLVITE    100 tablet    One pill daily except the day you take methotrexate       glimepiride 4 MG tablet    AMARYL    21 tablet    take 1 and 1/2 tabs daily (6 mg)       GNP GINGKO BILOBA EXTRACT PO      Take  by mouth.       indapamide 2.5 MG  tablet    LOZOL     Take 2.5 mg by mouth every morning.       LIPITOR PO      Take 40 mg by mouth daily       metFORMIN 1000 MG tablet    GLUCOPHAGE    120 tablet    TAKE 1 TABLET TWICE DAILY WITH MEALS       methotrexate sodium 2.5 MG Tabs     20 tablet    Start 5 pills one day per week       mirtazapine 15 MG tablet    REMERON    30 tablet    Take 1 tablet (15 mg) by mouth At Bedtime       Multi-vitamin Tabs tablet      Take 1 tablet by mouth daily.       nitroglycerin 0.4 MG sublingual tablet    NITROSTAT     Place under the tongue as needed       PLAVIX PO      Take 75 mg by mouth daily.       potassium chloride SA 20 MEQ CR tablet    K-DUR/KLOR-CON M    270 tablet    TAKE 1 TABLET THREE TIMES DAILY       torsemide 10 MG tablet    DEMADEX     Take 10 mg by mouth daily       * triamcinolone 0.1 % ointment    KENALOG    80 g    Mix entire tube with moisturizing cream as directed, and apply at bedtime       * triamcinolone 0.1 % ointment    KENALOG    454 g    Apply topically 2 times daily       VITAMIN B12 PO      Take 1 tablet by mouth daily       vitamin D 2000 UNITS Caps      Take 1 tablet by mouth daily       * Notice:  This list has 6 medication(s) that are the same as other medications prescribed for you. Read the directions carefully, and ask your doctor or other care provider to review them with you.

## 2017-02-13 NOTE — LETTER
2/13/2017       RE: Deandre Moore  [address] [facility] [address]     Dear Colleague,    Thank you for referring your patient, Deandre Moore, to the [facility] DERMATOLOGY at [facility]. Please see a copy of my visit note below.    .tabby  [facility] Dermatology Note      Dermatology Problem List:  1. Macular amyloidosis   2. Atopic dermatitis  3. Venous statis dermatitis    Encounter Date: Feb 13, 2017    CC:  No chief complaint on file.        History of Present Illness:  Mr. Deandre Moore is a 76 year old male who presents as a follow-up for atopic dermatitis. Last seen 10/2016, at which time he had failed NBUVB and topical steroids and he was started on MTX 12.5mg q week. He states he has been on this consistently since October. The itching is improved, and the rash on the R shoulder has improved. He uses Amlactin for residual itching on the shoulders but this is not helping. He has not tried clobetasol.  For the past week, he has felt feverish but not had an elevated temperature, and has had muscle aches but no cough or diarrhea.  Denies oral ulcers, infections, or GI upset.    Past Medical History:   Patient Active Problem List   Diagnosis     Itching     AD (atopic dermatitis)     Dermatitis     Hyperglycemia     Eczema, unspecified eczema     Intrinsic atopic dermatitis     Other eczema     Notalgia paresthetica     Rash     Pseudophakia of both eyes     Diabetes mellitus (H)     Presbyopia     Chronic pain of both shoulders     Past Medical History   Diagnosis Date     Diabetes mellitus (H)      Gout attack      Heart attack (H) 1984     Hypertension      Past Surgical History   Procedure Laterality Date     Cholecystectomy       Colonoscopy       Cardiac surgery  1984     4 vessel bypass     Phacoemulsification with standard intraocular lens implant  1/21/2013     Procedure: PHACOEMULSIFICATION WITH STANDARD INTRAOCULAR  LENS IMPLANT;  Phacoemulsification with standard intraocular lens implant, right eye;  Surgeon: Jay Rodriges MD;  Location: [facility] OR     No history of surgery  3/31/14     derm     Medications:  Current Outpatient Prescriptions   Medication Sig Dispense Refill     methotrexate sodium 2.5 MG TABS Start 5 pills one day per week 5 tablet 0     potassium chloride SA (K-DUR/KLOR-CON M) 20 MEQ CR tablet TAKE 1 TABLET THREE TIMES DAILY 270 tablet 1     metFORMIN (GLUCOPHAGE) 1000 MG tablet TAKE 1 TABLET TWICE DAILY WITH MEALS 120 tablet 3     folic acid (FOLVITE) 1 MG tablet One pill daily except the day you take methotrexate 100 tablet 3     capsaicin (ZOSTRIX) 0.075 % topical cream Apply topically 3 times daily To areas of itch on back.  OK to dilute with cetaphil cream if too burning. 60 g 3     clobetasol (TEMOVATE) 0.05 % ointment Apply to areas of itch on back twice daily, if capsaicin not helping 60 g 1     clobetasol (TEMOVATE) 0.05 % ointment For flaring spots around ankles, apply at bedtime and cover with saran wrap 60 g 2     triamcinolone (KENALOG) 0.1 % ointment Apply topically 2 times daily 454 g 0     triamcinolone (KENALOG) 0.1 % ointment Mix entire tube with moisturizing cream as directed, and apply at bedtime 80 g 3     glimepiride (AMARYL) 4 MG tablet take 1 and 1/2 tabs daily (6 mg) 21 tablet 1     Blood Glucose Monitoring Suppl (BLOOD GLUCOSE MONITOR SYSTEM) W/DEVICE KIT Test 4 times daily with meter covered by  insurance 1 kit 1     blood glucose monitoring (NO BRAND SPECIFIED) test strip Use to test blood sugar 4 times daily  With meter/ strips/ lancets covered by insurance 360 each 3     blood glucose monitoring (ULTRA THIN 30G) lancets Use to test blood sugar 4 times daily with meter/ strips/ lancets covered by insurance 4 Box 3     ammonium lactate (LAC-HYDRIN) 12 % lotion Apply topically 2 times daily Apply twice daily to feet 500 g 1     blood glucose monitoring (ACCU-CHEK FASTCLIX)  lancets Use to test blood sugar 2 times daily or as directed. 2 Box 1     mirtazapine (REMERON) 15 MG tablet Take 1 tablet (15 mg) by mouth At Bedtime 30 tablet 1     Cholecalciferol (VITAMIN D) 2000 UNITS CAPS Take 1 tablet by mouth daily       Cyanocobalamin (VITAMIN B12 PO) Take 1 tablet by mouth daily       nitroglycerin (NITROSTAT) 0.4 MG SL tablet Place under the tongue as needed       torsemide (DEMADEX) 10 MG tablet Take 10 mg by mouth daily       aspirin 81 MG tablet Take 81 mg by mouth daily.       LORazepam (ATIVAN PO) Take 2.5 mg by mouth At Bedtime.       Irbesartan (AVAPRO PO) Take 150 mg by mouth daily.       Atorvastatin Calcium (LIPITOR PO) Take 40 mg by mouth daily        Clopidogrel Bisulfate (PLAVIX PO) Take 75 mg by mouth daily.       indapamide (LOZOL) 2.5 MG tablet Take 2.5 mg by mouth every morning.       COLCHICINE PO Take 0.6 mg by mouth daily.       multivitamin, therapeutic with minerals (MULTI-VITAMIN) TABS Take 1 tablet by mouth daily.       Ginkgo Biloba (GNP GINGKO BILOBA EXTRACT PO) Take  by mouth.       Allergies   Allergen Reactions     Beta Adrenergic Blockers Unknown     Latex Dermatitis     Tape [Adhesive Tape] Dermatitis     Electrode patches         Review of Systems:  -See HPI.    Physical exam:  Vitals: There were no vitals taken for this visit.  GEN: This is a well developed, well-nourished male in no acute distress, in a pleasant mood.    SKIN: Focused exam of upper body and legs:  Hyperpigmented patches on back with rippled appearance  Erythematous hyperpigmented patches b/l lateral shoulders with overlying hyperpigmented 1mm papules  Xerosis, hyperpigmentation, and erythema of both legs, especially at the lateral aspects. There is some erythema of the L medial leg, and pitting edema b/l.    Impression/Plan:  1. Atopic dermatitis. Partial response to MTX 12.5mg weekly initiated 10/2017. Suboptimal topical therapy; reminded about use of Amlactin today. Reminded not to  donate blood or share medication.    Continue methotrexate 12.5 mg qw; CBC/kidney function checked 2/8/17 with PCP and normal; checking LFTs today.    Continue folic acid 1 mg on the 6 days when not taking methotrexate    Reminded to use clobetasol 0.05% ointment BID prn to residual areas of involvement    Defer any dose adjustments to Dr. Gray at next visit      Follow-up in 6 weeks with Dr. Gray, earlier for new or changing lesions.     Staff Involved:  Resident/Staff    .I was present for key portions of the history and exam.  See resident note for pertinent history, exam, and treatment plan.  Jesusita Castro MD    Again, thank you for allowing me to participate in the care of your patient.      Sincerely,    Jesusita Castro MD

## 2017-02-13 NOTE — PROGRESS NOTES
.[facility] Dermatology Note      Dermatology Problem List:  1. Macular amyloidosis   2. Atopic dermatitis  3. Venous statis dermatitis    Encounter Date: Feb 13, 2017    CC:  No chief complaint on file.        History of Present Illness:  Mr. Deandre Moore is a 76 year old male who presents as a follow-up for atopic dermatitis. Last seen 10/2016, at which time he had failed NBUVB and topical steroids and he was started on MTX 12.5mg q week. He states he has been on this consistently since October. The itching is improved, and the rash on the R shoulder has improved. He uses Amlactin for residual itching on the shoulders but this is not helping. He has not tried clobetasol.  For the past week, he has felt feverish but not had an elevated temperature, and has had muscle aches but no cough or diarrhea.  Denies oral ulcers, infections, or GI upset.    Past Medical History:   Patient Active Problem List   Diagnosis     Itching     AD (atopic dermatitis)     Dermatitis     Hyperglycemia     Eczema, unspecified eczema     Intrinsic atopic dermatitis     Other eczema     Notalgia paresthetica     Rash     Pseudophakia of both eyes     Diabetes mellitus (H)     Presbyopia     Chronic pain of both shoulders     Past Medical History   Diagnosis Date     Diabetes mellitus (H)      Gout attack      Heart attack (H) 1984     Hypertension      Past Surgical History   Procedure Laterality Date     Cholecystectomy       Colonoscopy       Cardiac surgery  1984     4 vessel bypass     Phacoemulsification with standard intraocular lens implant  1/21/2013     Procedure: PHACOEMULSIFICATION WITH STANDARD INTRAOCULAR LENS IMPLANT;  Phacoemulsification with standard intraocular lens implant, right eye;  Surgeon: Jay Rodriges MD;  Location: [facility] OR     No history of surgery  3/31/14     derm     Medications:  Current Outpatient Prescriptions   Medication Sig Dispense Refill     methotrexate sodium  2.5 MG TABS Start 5 pills one day per week 5 tablet 0     potassium chloride SA (K-DUR/KLOR-CON M) 20 MEQ CR tablet TAKE 1 TABLET THREE TIMES DAILY 270 tablet 1     metFORMIN (GLUCOPHAGE) 1000 MG tablet TAKE 1 TABLET TWICE DAILY WITH MEALS 120 tablet 3     folic acid (FOLVITE) 1 MG tablet One pill daily except the day you take methotrexate 100 tablet 3     capsaicin (ZOSTRIX) 0.075 % topical cream Apply topically 3 times daily To areas of itch on back.  OK to dilute with cetaphil cream if too burning. 60 g 3     clobetasol (TEMOVATE) 0.05 % ointment Apply to areas of itch on back twice daily, if capsaicin not helping 60 g 1     clobetasol (TEMOVATE) 0.05 % ointment For flaring spots around ankles, apply at bedtime and cover with saran wrap 60 g 2     triamcinolone (KENALOG) 0.1 % ointment Apply topically 2 times daily 454 g 0     triamcinolone (KENALOG) 0.1 % ointment Mix entire tube with moisturizing cream as directed, and apply at bedtime 80 g 3     glimepiride (AMARYL) 4 MG tablet take 1 and 1/2 tabs daily (6 mg) 21 tablet 1     Blood Glucose Monitoring Suppl (BLOOD GLUCOSE MONITOR SYSTEM) W/DEVICE KIT Test 4 times daily with meter covered by  insurance 1 kit 1     blood glucose monitoring (NO BRAND SPECIFIED) test strip Use to test blood sugar 4 times daily  With meter/ strips/ lancets covered by insurance 360 each 3     blood glucose monitoring (ULTRA THIN 30G) lancets Use to test blood sugar 4 times daily with meter/ strips/ lancets covered by insurance 4 Box 3     ammonium lactate (LAC-HYDRIN) 12 % lotion Apply topically 2 times daily Apply twice daily to feet 500 g 1     blood glucose monitoring (ACCU-CHEK FASTCLIX) lancets Use to test blood sugar 2 times daily or as directed. 2 Box 1     mirtazapine (REMERON) 15 MG tablet Take 1 tablet (15 mg) by mouth At Bedtime 30 tablet 1     Cholecalciferol (VITAMIN D) 2000 UNITS CAPS Take 1 tablet by mouth daily       Cyanocobalamin (VITAMIN B12 PO) Take 1 tablet by  mouth daily       nitroglycerin (NITROSTAT) 0.4 MG SL tablet Place under the tongue as needed       torsemide (DEMADEX) 10 MG tablet Take 10 mg by mouth daily       aspirin 81 MG tablet Take 81 mg by mouth daily.       LORazepam (ATIVAN PO) Take 2.5 mg by mouth At Bedtime.       Irbesartan (AVAPRO PO) Take 150 mg by mouth daily.       Atorvastatin Calcium (LIPITOR PO) Take 40 mg by mouth daily        Clopidogrel Bisulfate (PLAVIX PO) Take 75 mg by mouth daily.       indapamide (LOZOL) 2.5 MG tablet Take 2.5 mg by mouth every morning.       COLCHICINE PO Take 0.6 mg by mouth daily.       multivitamin, therapeutic with minerals (MULTI-VITAMIN) TABS Take 1 tablet by mouth daily.       Ginkgo Biloba (GNP GINGKO BILOBA EXTRACT PO) Take  by mouth.       Allergies   Allergen Reactions     Beta Adrenergic Blockers Unknown     Latex Dermatitis     Tape [Adhesive Tape] Dermatitis     Electrode patches         Review of Systems:  -See HPI.    Physical exam:  Vitals: There were no vitals taken for this visit.  GEN: This is a well developed, well-nourished male in no acute distress, in a pleasant mood.    SKIN: Focused exam of upper body and legs:  Hyperpigmented patches on back with rippled appearance  Erythematous hyperpigmented patches b/l lateral shoulders with overlying hyperpigmented 1mm papules  Xerosis, hyperpigmentation, and erythema of both legs, especially at the lateral aspects. There is some erythema of the L medial leg, and pitting edema b/l.    Impression/Plan:  1. Atopic dermatitis. Partial response to MTX 12.5mg weekly initiated 10/2017. Suboptimal topical therapy; reminded about use of Amlactin today. Reminded not to donate blood or share medication.    Continue methotrexate 12.5 mg qw; CBC/kidney function checked 2/8/17 with PCP and normal; checking LFTs today.    Continue folic acid 1 mg on the 6 days when not taking methotrexate    Reminded to use clobetasol 0.05% ointment BID prn to residual areas of  involvement    Defer any dose adjustments to Dr. Gray at next visit      Follow-up in 6 weeks with Dr. Gray, earlier for new or changing lesions.     Staff Involved:  Resident/Staff    .I was present for key portions of the history and exam.  See resident note for pertinent history, exam, and treatment plan.  Jesusita Castro MD

## 2017-02-22 ENCOUNTER — THERAPY VISIT (OUTPATIENT)
Dept: PHYSICAL THERAPY | Facility: CLINIC | Age: 77
End: 2017-02-22
Payer: COMMERCIAL

## 2017-02-22 DIAGNOSIS — M25.511 CHRONIC PAIN OF BOTH SHOULDERS: ICD-10-CM

## 2017-02-22 DIAGNOSIS — M25.512 CHRONIC PAIN OF BOTH SHOULDERS: ICD-10-CM

## 2017-02-22 DIAGNOSIS — G89.29 CHRONIC PAIN OF BOTH SHOULDERS: ICD-10-CM

## 2017-02-22 PROCEDURE — 97112 NEUROMUSCULAR REEDUCATION: CPT | Mod: GP | Performed by: PHYSICAL THERAPIST

## 2017-02-22 PROCEDURE — 97110 THERAPEUTIC EXERCISES: CPT | Mod: GP | Performed by: PHYSICAL THERAPIST

## 2017-02-22 NOTE — MR AVS SNAPSHOT
After Visit Summary   2/22/2017    Deandre Moore    MRN: [id number]           Patient Information     Date Of Birth          1940        Visit Information        Provider Department      2/22/2017 2:00 PM Zachary Barreto PT [facility]        Today's Diagnoses     Chronic pain of both shoulders           Follow-ups after your visit        Your next 10 appointments already scheduled     Mar 01, 2017  2:00 PM CST   АННА Extremity with Zachary Barreto PT   [facility] (АННА [place]  )    [address]  [facility] [address]   [phone]            Mar 30, 2017 12:45 PM CDT   (Arrive by 12:30 PM)   Return Visit with Miguel Gray MD   [facility] Dermatology ([facility])    [address] [place] [id number] [phone]            Jun 19, 2017 11:00 AM CDT   Return Visit with Davis Whyte MD, [facility] ENDO NURSE   [facility] ([facility])    [address] [place] [address] [phone]              Who to contact     If you have questions or need follow up information about today's clinic visit or your schedule please contact [facility] PHYSICAL THERAPY directly at [phone].  Normal or non-critical lab and imaging results will be communicated to you by MyChart, letter or phone within 4 business days after the clinic has received the results. If you do not hear from us within 7 days, please contact the clinic through MyChart or phone. If you have a critical or abnormal lab result, we will notify you by phone as soon as possible.  Submit refill requests through reMail or call your pharmacy and they will forward the refill request to us. Please allow 3 business days for your refill to be completed.          Additional Information About Your  "Visit        [employer] Information     [employer] lets you send messages to your doctor, view your test results, renew your prescriptions, schedule appointments and more. To sign up, go to [email or web address] . Click on \"Log in\" on the left side of the screen, which will take you to the Welcome page. Then click on \"Sign up Now\" on the right side of the page.     You will be asked to enter the access code listed below, as well as some personal information. Please follow the directions to create your username and password.     Your access code is: [email or web address]  Expires: 3/20/2017  2:26 PM     Your access code will  in 90 days. If you need help or a new code, please call your [facility] clinic or [phone].        Care EveryWhere ID     This is your Care EveryWhere ID. This could be used by other organizations to access your [facility] medical records  [id number]         Blood Pressure from Last 3 Encounters:   17 110/60   16 105/61   16 118/68    Weight from Last 3 Encounters:   17 93.4 kg (206 lb)   16 93.8 kg (206 lb 12.8 oz)   16 97.5 kg (215 lb)              We Performed the Following     АННА PROGRESS NOTES REPORT     NEUROMUSCULAR RE-EDUCATION     THERAPEUTIC EXERCISES        Primary Care Provider Office Phone # Fax #    Denilson Thomas [phone]       [address] [address]        Thank you!     Thank you for choosing [facility] PHYSICAL THERAP[facility]  for your care. Our goal is always to provide you with excellent care. Hearing back from our patients is one way we can continue to improve our services. Please take a few minutes to complete the written survey that you may receive in the mail after your visit with us. Thank you!             Your Updated Medication List - Protect others around you: Learn how to safely use, store and throw away your medicines at www.disposemymeds.org.        "   This list is accurate as of: 2/22/17  2:45 PM.  Always use your most recent med list.                   Brand Name Dispense Instructions for use    ammonium lactate 12 % lotion    LAC-HYDRIN    500 g    Apply topically 2 times daily Apply twice daily to feet       aspirin 81 MG tablet      Take 81 mg by mouth daily.       ATIVAN PO      Take 2.5 mg by mouth At Bedtime.       AVAPRO PO      Take 150 mg by mouth daily.       Blood Glucose Monitor System W/DEVICE Kit     1 kit    Test 4 times daily with meter covered by  insurance       * blood glucose monitoring lancets     2 Box    Use to test blood sugar 2 times daily or as directed.       * blood glucose monitoring lancets     4 Box    Use to test blood sugar 4 times daily with meter/ strips/ lancets covered by insurance       blood glucose monitoring test strip    no brand specified    360 each    Use to test blood sugar 4 times daily  With meter/ strips/ lancets covered by insurance       capsaicin 0.075 % cream    ZOSTRIX    60 g    Apply topically 3 times daily To areas of itch on back.  OK to dilute with cetaphil cream if too burning.       * clobetasol 0.05 % ointment    TEMOVATE    60 g    For flaring spots around ankles, apply at bedtime and cover with saran wrap       * clobetasol 0.05 % ointment    TEMOVATE    60 g    Apply to itchy areas twice daily as needed       COLCHICINE PO      Take 0.6 mg by mouth daily.       folic acid 1 MG tablet    FOLVITE    100 tablet    One pill daily except the day you take methotrexate       glimepiride 4 MG tablet    AMARYL    21 tablet    take 1 and 1/2 tabs daily (6 mg)       GNP GINGKO BILOBA EXTRACT PO      Take  by mouth.       indapamide 2.5 MG tablet    LOZOL     Take 2.5 mg by mouth every morning.       LIPITOR PO      Take 40 mg by mouth daily       metFORMIN 1000 MG tablet    GLUCOPHAGE    120 tablet    TAKE 1 TABLET TWICE DAILY WITH MEALS       methotrexate sodium 2.5 MG Tabs     20 tablet    Start 5 pills  one day per week       mirtazapine 15 MG tablet    REMERON    30 tablet    Take 1 tablet (15 mg) by mouth At Bedtime       Multi-vitamin Tabs tablet      Take 1 tablet by mouth daily.       nitroglycerin 0.4 MG sublingual tablet    NITROSTAT     Place under the tongue as needed       PLAVIX PO      Take 75 mg by mouth daily.       potassium chloride SA 20 MEQ CR tablet    K-DUR/KLOR-CON M    270 tablet    TAKE 1 TABLET THREE TIMES DAILY       torsemide 10 MG tablet    DEMADEX     Take 10 mg by mouth daily       * triamcinolone 0.1 % ointment    KENALOG    80 g    Mix entire tube with moisturizing cream as directed, and apply at bedtime       * triamcinolone 0.1 % ointment    KENALOG    454 g    Apply topically 2 times daily       VITAMIN B12 PO      Take 1 tablet by mouth daily       vitamin D 2000 UNITS Caps      Take 1 tablet by mouth daily       * Notice:  This list has 6 medication(s) that are the same as other medications prescribed for you. Read the directions carefully, and ask your doctor or other care provider to review them with you.

## 2017-02-22 NOTE — LETTER
[facility]  [address]  [facility] [id number] [phone]    2017    Re: Deandre Moore   :   1940  MRN:  [id number]   REFERRING PHYSICIAN:   Mera Xiao    [facility]  Date of Initial Evaluation:  17  Visits:  Rxs Used: 6  Reason for Referral:  Chronic pain of both shoulders    PROGRESS  REPORT  Progress reporting period is from 17 to 17.       SUBJECTIVE  Patient reports exercises are going well. going out to the side is the only exercise he really struggles with.     Current Pain level: 1/10.     Initial Pain level: /10.   Changes in function:  Yes (See Goal flowsheet attached for changes in current functional level)  Adverse reaction to treatment or activity: Yes, Patient lifting over 3lbs on the left irritates shoulder    OBJECTIVE  Right shoulder AROM WNL painfree, flexion WNL, abduction 110 deg loss ROM since last visit, PROM abduction full pain free, ER and Ext/IR WNL. Right shoulder strength 5/5 ER, IR . 4/5 flexion and abduction. Left shoulder strength IR 4/5, ER 4+/5 no pain, flexion 3+/5, abduction 3/5. Continued therpay for left shoulder strengthening.      ASSESSMENT/PLAN  Updated problem list and treatment plan: Diagnosis 1:  Bilateral shoulder pain / weakness  Pain -  manual therapy, education and home program  Decreased ROM/flexibility - manual therapy, therapeutic exercise, therapeutic activity and home program  Decreased strength - therapeutic exercise, therapeutic activities and home program  Decreased function - therapeutic activities and home program  STG/LTGs have been met or progress has been made towards goals:  Yes (See Goal flow sheet completed today.)  Assessment of Progress: The patient's condition is improving.  Self Management Plans:  Patient is independent in a home treatment program.  I have re-evaluated this  patient and find that the nature, scope, duration and intensity of the therapy is appropriate for the medical condition of the patient.  Deandre continues to require the following intervention to meet STG and LTG's:  PT    Recommendations:  This patient would benefit from continued therapy.     Frequency:  1 X week, once daily  Duration:  for 4 weeks    Thank you for your referral.    INQUIRIES  Therapist: Zachary Barreto DPT  [facility]  [address] [place] [address] [address]  [facility] [address]  Phone: [phone]  Fax: [phone]

## 2017-02-22 NOTE — PROGRESS NOTES
Subjective:    HPI                    Objective:    System    Physical Exam    General     ROS    Assessment/Plan:      PROGRESS  REPORT    Progress reporting period is from 1/11/17 to 2/22/17.       SUBJECTIVE  Patient reports exercises are going well. going out to the side is the only exercise he really struggles with.     Current Pain level: 1/10.     Initial Pain level: 5/10.   Changes in function:  Yes (See Goal flowsheet attached for changes in current functional level)  Adverse reaction to treatment or activity: Yes, Patient lifting over 3lbs on the left irritates shoulder    OBJECTIVE  Right shoulder AROM WNL painfree, flexion WNL, abduction 110 deg loss ROM since last visit, PROM abduction full pain free, ER and Ext/IR WNL. Right shoulder strength 5/5 ER, IR . 4/5 flexion and abduction. Left shoulder strength IR 4/5, ER 4+/5 no pain, flexion 3+/5, abduction 3/5. Continued therpay for left shoulder strengthening.      ASSESSMENT/PLAN  Updated problem list and treatment plan: Diagnosis 1:  Bilateral shoulder pain / weakness  Pain -  manual therapy, education and home program  Decreased ROM/flexibility - manual therapy, therapeutic exercise, therapeutic activity and home program  Decreased strength - therapeutic exercise, therapeutic activities and home program  Decreased function - therapeutic activities and home program  STG/LTGs have been met or progress has been made towards goals:  Yes (See Goal flow sheet completed today.)  Assessment of Progress: The patient's condition is improving.  Self Management Plans:  Patient is independent in a home treatment program.  I have re-evaluated this patient and find that the nature, scope, duration and intensity of the therapy is appropriate for the medical condition of the patient.  Deandre continues to require the following intervention to meet STG and LTG's:  PT    Recommendations:  This patient would benefit from continued therapy.     Frequency:  1 X week, once  daily  Duration:  for 4 weeks        Please refer to the daily flowsheet for treatment today, total treatment time and time spent performing 1:1 timed codes.

## 2017-03-01 ENCOUNTER — THERAPY VISIT (OUTPATIENT)
Dept: PHYSICAL THERAPY | Facility: CLINIC | Age: 77
End: 2017-03-01
Payer: COMMERCIAL

## 2017-03-01 DIAGNOSIS — G89.29 CHRONIC PAIN OF BOTH SHOULDERS: ICD-10-CM

## 2017-03-01 DIAGNOSIS — M25.512 CHRONIC PAIN OF BOTH SHOULDERS: ICD-10-CM

## 2017-03-01 DIAGNOSIS — M25.511 CHRONIC PAIN OF BOTH SHOULDERS: ICD-10-CM

## 2017-03-01 PROCEDURE — 97110 THERAPEUTIC EXERCISES: CPT | Mod: GP | Performed by: PHYSICAL THERAPIST

## 2017-03-01 PROCEDURE — 97140 MANUAL THERAPY 1/> REGIONS: CPT | Mod: GP | Performed by: PHYSICAL THERAPIST

## 2017-03-06 DIAGNOSIS — E10.9 DIABETES MELLITUS TYPE 1 (H): ICD-10-CM

## 2017-03-08 NOTE — TELEPHONE ENCOUNTER
metFORMIN (GLUCOPHAGE) 1000 MG tablet      Last Written Prescription Date:  10-26-16  Last Fill Quantity: 120,   # refills: 2  Last Office Visit : 6-7-16  Future Office visit:  6-19-17      Kathleen M Doege RN

## 2017-03-15 ENCOUNTER — THERAPY VISIT (OUTPATIENT)
Dept: PHYSICAL THERAPY | Facility: CLINIC | Age: 77
End: 2017-03-15
Payer: COMMERCIAL

## 2017-03-15 DIAGNOSIS — M25.511 CHRONIC PAIN OF BOTH SHOULDERS: ICD-10-CM

## 2017-03-15 DIAGNOSIS — G89.29 CHRONIC PAIN OF BOTH SHOULDERS: ICD-10-CM

## 2017-03-15 DIAGNOSIS — M25.512 CHRONIC PAIN OF BOTH SHOULDERS: ICD-10-CM

## 2017-03-15 PROCEDURE — 97110 THERAPEUTIC EXERCISES: CPT | Mod: GP | Performed by: PHYSICAL THERAPIST

## 2017-03-20 DIAGNOSIS — E11.9 TYPE 2 DIABETES MELLITUS WITHOUT COMPLICATION (H): ICD-10-CM

## 2017-03-21 RX ORDER — GLIMEPIRIDE 4 MG/1
TABLET ORAL
Qty: 135 TABLET | Refills: 0 | Status: SHIPPED | OUTPATIENT
Start: 2017-03-21 | End: 2017-05-31

## 2017-03-21 NOTE — TELEPHONE ENCOUNTER
glimepiride (AMARYL) 4 MG      Last Written Prescription Date:  6/21/16  Last Fill Quantity: 21,   # refills: 1  Last Office Visit : 2/26/16  Future Office visit:  9/19/17  RF UNTIL APPT.

## 2017-03-25 DIAGNOSIS — L30.8 OTHER ECZEMA: ICD-10-CM

## 2017-03-28 NOTE — TELEPHONE ENCOUNTER
Received a refill request for methotrexate as the resident on call. Patient was last seen 2/13/17 by Dr. Castro for intrinsic atopic dermatitis, at which time his liver function was normal. After reviewing the patient's chart and the assessment and plan, refill request was accepted.     Polly Richards MD  PGY-3 Dermatology  Pager: [phone]

## 2017-03-28 NOTE — TELEPHONE ENCOUNTER
Last seen 2/13/17: F/u on 3/30/17  1. Atopic dermatitis. Partial response to MTX 12.5mg weekly initiated 10/2017. Suboptimal topical therapy; reminded about use of Amlactin today. Reminded not to donate blood or share medication.    Continue methotrexate 12.5 mg qw; CBC/kidney function checked 2/8/17 with PCP and normal; checking LFTs today.    Continue folic acid 1 mg on the 6 days when not taking methotrexate    Reminded to use clobetasol 0.05% ointment BID prn to residual areas of involvement    Defer any dose adjustments to Dr. Gray at next visit        Follow-up in 6 weeks with Dr. Gray, earlier for new or changing lesions.

## 2017-04-05 ENCOUNTER — THERAPY VISIT (OUTPATIENT)
Dept: PHYSICAL THERAPY | Facility: CLINIC | Age: 77
End: 2017-04-05
Payer: COMMERCIAL

## 2017-04-05 DIAGNOSIS — M25.512 CHRONIC PAIN OF BOTH SHOULDERS: ICD-10-CM

## 2017-04-05 DIAGNOSIS — M25.511 CHRONIC PAIN OF BOTH SHOULDERS: ICD-10-CM

## 2017-04-05 DIAGNOSIS — G89.29 CHRONIC PAIN OF BOTH SHOULDERS: ICD-10-CM

## 2017-04-05 PROCEDURE — 97110 THERAPEUTIC EXERCISES: CPT | Mod: GP | Performed by: PHYSICAL THERAPIST

## 2017-04-05 PROCEDURE — 97112 NEUROMUSCULAR REEDUCATION: CPT | Mod: GP | Performed by: PHYSICAL THERAPIST

## 2017-04-05 NOTE — MR AVS SNAPSHOT
"              After Visit Summary   4/5/2017    Deandre Moore    MRN: [id number]           Patient Information     Date Of Birth          1940        Visit Information        Provider Department      4/5/2017 1:20 PM Zachary Barreto, ELBERT [facility] Physical Therapy        Today's Diagnoses     Chronic pain of both shoulders           Follow-ups after your visit        Your next 10 appointments already scheduled     Apr 19, 2017 12:10 PM CDT   АННА Extremity with Zachary Barreto PT   [facility] Physical Therapy (АННА [place]  )    [address]  [facility] [id number] [phone]            Jun 19, 2017 11:00 AM CDT   Return Visit with Davis Whyte MD, MG ENDO NURSE   [facility] ([facility])    [id number] [place] [id number] [phone]              Who to contact     If you have questions or need follow up information about today's clinic visit or your schedule please contact [facility] PHYSICAL THERAPY directly at [phone].  Normal or non-critical lab and imaging results will be communicated to you by MyChart, letter or phone within 4 business days after the clinic has received the results. If you do not hear from us within 7 days, please contact the clinic through [employer] or phone. If you have a critical or abnormal lab result, we will notify you by phone as soon as possible.  Submit refill requests through [email or web address] or call your pharmacy and they will forward the refill request to us. Please allow 3 business days for your refill to be completed.          Additional Information About Your Visit        MyChart Information     [email or web address] lets you send messages to your doctor, view your test results, renew your prescriptions, schedule appointments and more. To sign up, go to [email or web address] . Click on \"Log in\" on the left side " "of the screen, which will take you to the Welcome page. Then click on \"Sign up Now\" on the right side of the page.     You will be asked to enter the access code listed below, as well as some personal information. Please follow the directions to create your username and password.     Your access code is: [email or web address]  Expires: 2017  6:55 PM     Your access code will  in 90 days. If you need help or a new code, please call your [facility] clinic or [phone].        Care EveryWhere ID     This is your Care EveryWhere ID. This could be used by other organizations to access your [facility] medical records  [id number]         Blood Pressure from Last 3 Encounters:   17 110/60   16 105/61   16 118/68    Weight from Last 3 Encounters:   17 93.4 kg (206 lb)   16 93.8 kg (206 lb 12.8 oz)   16 97.5 kg (215 lb)              We Performed the Following     NEUROMUSCULAR RE-EDUCATION     THERAPEUTIC EXERCISES        Primary Care Provider Office Phone # Fax #    Denilson Thomas [phone]       [address] [address]        Thank you!     Thank you for [facility] PH[facility]ER[facility]  for your care. Our goal is always to provide you with excellent care. Hearing back from our patients is one way we can continue to improve our services. Please take a few minutes to complete the written survey that you may receive in the mail after your visit with us. Thank you!             Your Updated Medication List - Protect others around you: Learn how to safely use, store and throw away your medicines at www.disposemymeds.org.          This list is accurate as of: 17  6:55 PM.  Always use your most recent med list.                   Brand Name Dispense Instructions for use    ammonium lactate 12 % lotion    LAC-HYDRIN    500 g    Apply topically 2 times daily Apply twice daily to feet       aspirin 81 MG " tablet      Take 81 mg by mouth daily.       ATIVAN PO      Take 2.5 mg by mouth At Bedtime.       AVAPRO PO      Take 150 mg by mouth daily.       Blood Glucose Monitor System W/DEVICE Kit     1 kit    Test 4 times daily with meter covered by  insurance       * blood glucose monitoring lancets     2 Box    Use to test blood sugar 2 times daily or as directed.       * blood glucose monitoring lancets     4 Box    Use to test blood sugar 4 times daily with meter/ strips/ lancets covered by insurance       blood glucose monitoring test strip    no brand specified    360 each    Use to test blood sugar 4 times daily  With meter/ strips/ lancets covered by insurance       capsaicin 0.075 % cream    ZOSTRIX    60 g    Apply topically 3 times daily To areas of itch on back.  OK to dilute with cetaphil cream if too burning.       * clobetasol 0.05 % ointment    TEMOVATE    60 g    For flaring spots around ankles, apply at bedtime and cover with saran wrap       * clobetasol 0.05 % ointment    TEMOVATE    60 g    Apply to itchy areas twice daily as needed       COLCHICINE PO      Take 0.6 mg by mouth daily.       folic acid 1 MG tablet    FOLVITE    100 tablet    One pill daily except the day you take methotrexate       glimepiride 4 MG tablet    AMARYL    135 tablet    TAKE 1 AND 1/2 TABS/6MG  EVERY DAY *RFs WITH  6/19/17 MD APPT.       GNP GINGKO BILOBA EXTRACT PO      Take  by mouth.       indapamide 2.5 MG tablet    LOZOL     Take 2.5 mg by mouth every morning.       LIPITOR PO      Take 40 mg by mouth daily       metFORMIN 1000 MG tablet    GLUCOPHAGE    180 tablet    Take 1 tablet (1,000 mg) by mouth 2 times daily (with meals)       methotrexate 2.5 MG tablet CHEMO     20 tablet    TAKE 5 TABLETS ONE DAY PER WEEK       mirtazapine 15 MG tablet    REMERON    30 tablet    Take 1 tablet (15 mg) by mouth At Bedtime       Multi-vitamin Tabs tablet      Take 1 tablet by mouth daily.       nitroglycerin 0.4 MG sublingual  tablet    NITROSTAT     Place under the tongue as needed       PLAVIX PO      Take 75 mg by mouth daily.       potassium chloride SA 20 MEQ CR tablet    K-DUR/KLOR-CON M    270 tablet    TAKE 1 TABLET THREE TIMES DAILY       torsemide 10 MG tablet    DEMADEX     Take 10 mg by mouth daily       * triamcinolone 0.1 % ointment    KENALOG    80 g    Mix entire tube with moisturizing cream as directed, and apply at bedtime       * triamcinolone 0.1 % ointment    KENALOG    454 g    Apply topically 2 times daily       VITAMIN B12 PO      Take 1 tablet by mouth daily       vitamin D 2000 UNITS Caps      Take 1 tablet by mouth daily       * Notice:  This list has 6 medication(s) that are the same as other medications prescribed for you. Read the directions carefully, and ask your doctor or other care provider to review them with you.

## 2017-05-08 DIAGNOSIS — L30.8 OTHER ECZEMA: ICD-10-CM

## 2017-05-08 NOTE — TELEPHONE ENCOUNTER
Last seen:2/13/17  Next appt 6/6/17  1. Atopic dermatitis. Partial response to MTX 12.5mg weekly initiated 10/2017. Suboptimal topical therapy; reminded about use of Amlactin today. Reminded not to donate blood or share medication.    Continue methotrexate 12.5 mg qw; CBC/kidney function checked 2/8/17 with PCP and normal; checking LFTs today.    Continue folic acid 1 mg on the 6 days when not taking methotrexate    Reminded to use clobetasol 0.05% ointment BID prn to residual areas of involvement    Defer any dose adjustments to Dr. Gray at next visit

## 2017-05-08 NOTE — TELEPHONE ENCOUNTER
Approved methotrexate refill request (12.5 mg weekly) to last until 6/6/17 follow-up with Dr. Gray.

## 2017-05-22 DIAGNOSIS — E11.9 DIABETES MELLITUS, TYPE 2 (H): ICD-10-CM

## 2017-05-23 RX ORDER — POTASSIUM CHLORIDE 1500 MG/1
TABLET, EXTENDED RELEASE ORAL
Qty: 270 TABLET | Refills: 1 | Status: SHIPPED | OUTPATIENT
Start: 2017-05-23 | End: 2017-07-31

## 2017-05-23 NOTE — TELEPHONE ENCOUNTER
Wife notified. Encouraged to return clinic call with questions.    Milla Barnett LPN  Adult Endocrinology  [facility]      Per Isabell Message:      I have refilled, but should go to PCP in future. Not clear to me who started this medication   thanks

## 2017-05-23 NOTE — TELEPHONE ENCOUNTER
Last office visit: 6/7/16  Future: 6/19/17    Medication not on Endocrine Refill Protocol. Will route to Dr. Whyte for review.    Milla Barnett LPN  Adult Endocrinology  [facility]

## 2017-05-31 DIAGNOSIS — E11.9 TYPE 2 DIABETES MELLITUS WITHOUT COMPLICATION (H): ICD-10-CM

## 2017-06-01 RX ORDER — GLIMEPIRIDE 4 MG/1
TABLET ORAL
Qty: 135 TABLET | Refills: 0 | Status: SHIPPED | OUTPATIENT
Start: 2017-06-01 | End: 2017-07-21

## 2017-06-06 ENCOUNTER — OFFICE VISIT (OUTPATIENT)
Dept: DERMATOLOGY | Facility: CLINIC | Age: 77
End: 2017-06-06

## 2017-06-06 DIAGNOSIS — L20.89 OTHER ATOPIC DERMATITIS: Primary | ICD-10-CM

## 2017-06-06 DIAGNOSIS — Z79.899 MEDICATION MANAGEMENT: ICD-10-CM

## 2017-06-06 DIAGNOSIS — Z79.899 ENCOUNTER FOR LONG-TERM CURRENT USE OF HIGH RISK MEDICATION: ICD-10-CM

## 2017-06-06 DIAGNOSIS — L20.89 OTHER ATOPIC DERMATITIS: ICD-10-CM

## 2017-06-06 LAB
ALBUMIN SERPL-MCNC: 3.7 G/DL (ref 3.4–5)
ALP SERPL-CCNC: 82 U/L (ref 40–150)
ALT SERPL W P-5'-P-CCNC: 64 U/L (ref 0–70)
ANION GAP SERPL CALCULATED.3IONS-SCNC: 8 MMOL/L (ref 3–14)
AST SERPL W P-5'-P-CCNC: 39 U/L (ref 0–45)
BASOPHILS # BLD AUTO: 0.1 10E9/L (ref 0–0.2)
BASOPHILS NFR BLD AUTO: 0.8 %
BILIRUB SERPL-MCNC: 0.9 MG/DL (ref 0.2–1.3)
BUN SERPL-MCNC: 25 MG/DL (ref 7–30)
CALCIUM SERPL-MCNC: 9.4 MG/DL (ref 8.5–10.1)
CHLORIDE SERPL-SCNC: 101 MMOL/L (ref 94–109)
CO2 SERPL-SCNC: 30 MMOL/L (ref 20–32)
CREAT SERPL-MCNC: 1.36 MG/DL (ref 0.66–1.25)
DIFFERENTIAL METHOD BLD: NORMAL
EOSINOPHIL # BLD AUTO: 0.4 10E9/L (ref 0–0.7)
EOSINOPHIL NFR BLD AUTO: 5.6 %
ERYTHROCYTE [DISTWIDTH] IN BLOOD BY AUTOMATED COUNT: 13.7 % (ref 10–15)
GFR SERPL CREATININE-BSD FRML MDRD: 51 ML/MIN/1.7M2
GLUCOSE SERPL-MCNC: 118 MG/DL (ref 70–99)
HCT VFR BLD AUTO: 40.5 % (ref 40–53)
HGB BLD-MCNC: 14.4 G/DL (ref 13.3–17.7)
IMM GRANULOCYTES # BLD: 0 10E9/L (ref 0–0.4)
IMM GRANULOCYTES NFR BLD: 0.3 %
LYMPHOCYTES # BLD AUTO: 1.7 10E9/L (ref 0.8–5.3)
LYMPHOCYTES NFR BLD AUTO: 23.1 %
MCH RBC QN AUTO: 32.1 PG (ref 26.5–33)
MCHC RBC AUTO-ENTMCNC: 35.6 G/DL (ref 31.5–36.5)
MCV RBC AUTO: 90 FL (ref 78–100)
MONOCYTES # BLD AUTO: 0.6 10E9/L (ref 0–1.3)
MONOCYTES NFR BLD AUTO: 8 %
NEUTROPHILS # BLD AUTO: 4.5 10E9/L (ref 1.6–8.3)
NEUTROPHILS NFR BLD AUTO: 62.2 %
NRBC # BLD AUTO: 0 10*3/UL
NRBC BLD AUTO-RTO: 0 /100
PLATELET # BLD AUTO: 210 10E9/L (ref 150–450)
POTASSIUM SERPL-SCNC: 3.7 MMOL/L (ref 3.4–5.3)
PROT SERPL-MCNC: 7.6 G/DL (ref 6.8–8.8)
RBC # BLD AUTO: 4.49 10E12/L (ref 4.4–5.9)
SODIUM SERPL-SCNC: 139 MMOL/L (ref 133–144)
WBC # BLD AUTO: 7.3 10E9/L (ref 4–11)

## 2017-06-06 ASSESSMENT — PAIN SCALES - GENERAL: PAINLEVEL: NO PAIN (0)

## 2017-06-06 NOTE — MR AVS SNAPSHOT
After Visit Summary   6/6/2017    Deandre Moore    MRN: [id number]           Patient Information     Date Of Birth          1940        Visit Information        Provider Department      6/6/2017 11:15 AM Miguel Gray MD [facility] Dermatology        Today's Diagnoses     Other atopic dermatitis    -  1    Medication management          Care Instructions    Recommendations for dry skin and dermatitis   1. Bathe or shower daily in lukewarm water  2. Use a gentle non-soap detergent cleanser  - Soaps are alkaline (which can irritate sensitive skin) and remove natural moisturizing factors   - Recommended products, in no particular order, include:   - Bars:    - Aveeno Moisturizing Bar    - Cetaphil Gentle Cleansing Bar    - Dove Sensitive Skin Unscented Beauty Bar    - Olay Ultra Moisture Bar   - Liquid Cleansers:    - Aquanil Cleanser    - CeraVe Hydrating Cleanser    - Cetaphil Gentle Skin Cleanser  - Avoid scented soaps or bath additives unless your doctor tells you otherwise  - Focus on washing the face, underarms, and underwear areas; other sites usually do not need frequent washing  3. Rinse off thoroughly, then pat dry until skin is slightly damp  4. Apply moisturizer to damp skin within 3-5 minutes of exiting the bath/shower  - Recommended products, in no particular order, include:   - Lotions (thinner/lighter, but may be less effective)    - AmLactin Cerapeutic Restoring Body Lotion    - CeraVe Facial Moisturizing Lotion (AM and/or PM)    - Lubriderm Advanced Therapy Lotion   - Creams (thicker, likely the best balance of effectiveness and feel)    - AmLactin Ultra Hydrating Body Cream    - Aveeno Eczema Therapy Moisturizing Cream    - Aveeno Eczema Therapy Itch Relief Balm    - CeraVe Itch Relief Moisturizing Cream   - Ointments (thickest)    - Vaseline  5. If prescribed a topical steroid medication, this may be applied before or after the moisturizer (whichever order you prefer)  6.  Reapply moisturizer one or two additional times throughout the day when dry skin is present; once this improves, reduce to daily or every other day as needed to prevent recurrence  7. If dry skin or dermatitis is present on the hands, keep moisturizer near the sink and apply after washing and drying your hands  8. A humidifier may be helpful during the winter months (when ambient humidity is very low)            Follow-ups after your visit        Follow-up notes from your care team     Return in about 3 months (around 9/6/2017).      Your next 10 appointments already scheduled     Jun 06, 2017 12:45 PM CDT   LAB with  LAB   [facility] ([facility])    [address] [place] [id number] [phone]           Patient must bring picture ID.  Patient should be prepared to give a urine specimen  Please do not eat 10-12 hours before your appointment if you are coming in fasting for labs on lipids, cholesterol, or glucose (sugar).  Pregnant women should follow their Care Team instructions. Water with medications is okay. Do not drink coffee or other fluids.   If you have concerns about taking  your medications, please ask at office or if scheduling via [employer], send a message by clicking on Secure Messaging, Message Your Care Team.            Jun 19, 2017 11:00 AM CDT   Return Visit with Davis Whyte MD, MG ENDO NURSE   [facility] ([facility])    [address] [place] [id number]   [phone]            Sep 07, 2017  2:15 PM CDT   (Arrive by 2:00 PM)   Return Visit with Miguel Gray MD   [facility] Dermatology ([facility])    [address] [place] [id number] [phone]              Future tests that were ordered for you today     Open Future Orders        Priority Expected Expires Ordered    CBC with platelets differential Routine  6/6/2018 6/6/2017     Comprehensive metabolic panel Routine  6/6/2018 6/6/2017            Who to contact     Please call your clinic at [phone] to:    Ask questions about your health    Make or cancel appointments    Discuss your medicines    Learn about your test results    Speak to your doctor   If you have compliments or concerns about an experience at your clinic, or if you wish to file a complaint, please contact [facility] Patient Relations at [phone] or email us at [email or web address]         Additional Information About Your Visit        [employer] Information     [employer] gives you secure access to your electronic health record. If you see a primary care provider, you can also send messages to your care team and make appointments. If you have questions, please call your primary care clinic.  If you do not have a primary care provider, please call [phone] and they will assist you.      [employer] is an electronic gateway that provides easy, online access to your medical records. With [employer], you can request a clinic appointment, read your test results, renew a prescription or communicate with your care team.     To access your existing account, please contact your [facility] or call [phone] for assistance.        Care EveryWhere ID     This is your Care EveryWhere ID. This could be used by other organizations to access your [facility] medical records  [id number]         Blood Pressure from Last 3 Encounters:   01/02/17 110/60   06/07/16 105/61   02/26/16 118/68    Weight from Last 3 Encounters:   01/02/17 93.4 kg (206 lb)   06/07/16 93.8 kg (206 lb 12.8 oz)   02/26/16 97.5 kg (215 lb)               Primary Care Provider Office Phone # Fax #    Denilson William [phone]       [address] [address]        Thank you!     Thank you for choosing [facility]  for your care. Our goal is always  to provide you with excellent care. Hearing back from our patients is one way we can continue to improve our services. Please take a few minutes to complete the written survey that you may receive in the mail after your visit with us. Thank you!             Your Updated Medication List - Protect others around you: Learn how to safely use, store and throw away your medicines at www.disposemymeds.org.          This list is accurate as of: 6/6/17 12:35 PM.  Always use your most recent med list.                   Brand Name Dispense Instructions for use    ammonium lactate 12 % lotion    LAC-HYDRIN    500 g    Apply topically 2 times daily Apply twice daily to feet       aspirin 81 MG tablet      Take 81 mg by mouth daily.       ATIVAN PO      Take 2.5 mg by mouth At Bedtime.       AVAPRO PO      Take 150 mg by mouth daily.       Blood Glucose Monitor System W/DEVICE Kit     1 kit    Test 4 times daily with meter covered by  insurance       * blood glucose monitoring lancets     2 Box    Use to test blood sugar 2 times daily or as directed.       * blood glucose monitoring lancets     4 Box    Use to test blood sugar 4 times daily with meter/ strips/ lancets covered by insurance       blood glucose monitoring test strip    no brand specified    360 each    Use to test blood sugar 4 times daily  With meter/ strips/ lancets covered by insurance       capsaicin 0.075 % cream    ZOSTRIX    60 g    Apply topically 3 times daily To areas of itch on back.  OK to dilute with cetaphil cream if too burning.       * clobetasol 0.05 % ointment    TEMOVATE    60 g    For flaring spots around ankles, apply at bedtime and cover with saran wrap       * clobetasol 0.05 % ointment    TEMOVATE    60 g    Apply to itchy areas twice daily as needed       COLCHICINE PO      Take 0.6 mg by mouth daily.       folic acid 1 MG tablet    FOLVITE    100 tablet    One pill daily except the day you take methotrexate       glimepiride 4 MG tablet     AMARYL    135 tablet    TAKE 1 AND 1/2 TABLETS (6MG)  EVERY DAY (REFILLS WITH  6/19/17 MD APPOINTMENT)       GNP GINGKO BILOBA EXTRACT PO      Take  by mouth.       indapamide 2.5 MG tablet    LOZOL     Take 2.5 mg by mouth every morning.       LIPITOR PO      Take 40 mg by mouth daily       metFORMIN 1000 MG tablet    GLUCOPHAGE    180 tablet    Take 1 tablet (1,000 mg) by mouth 2 times daily (with meals)       methotrexate 2.5 MG tablet CHEMO     30 tablet    TAKE 5 TABLETS ONE TIME WEEKLY       mirtazapine 15 MG tablet    REMERON    30 tablet    Take 1 tablet (15 mg) by mouth At Bedtime       Multi-vitamin Tabs tablet      Take 1 tablet by mouth daily.       nitroglycerin 0.4 MG sublingual tablet    NITROSTAT     Place under the tongue as needed       PLAVIX PO      Take 75 mg by mouth daily.       potassium chloride SA 20 MEQ CR tablet    K-DUR/KLOR-CON M    270 tablet    TAKE 1 TABLET THREE TIMES DAILY       torsemide 10 MG tablet    DEMADEX     Take 10 mg by mouth daily       * triamcinolone 0.1 % ointment    KENALOG    80 g    Mix entire tube with moisturizing cream as directed, and apply at bedtime       * triamcinolone 0.1 % ointment    KENALOG    454 g    Apply topically 2 times daily       VITAMIN B12 PO      Take 1 tablet by mouth daily       vitamin D 2000 UNITS Caps      Take 1 tablet by mouth daily       * Notice:  This list has 6 medication(s) that are the same as other medications prescribed for you. Read the directions carefully, and ask your doctor or other care provider to review them with you.

## 2017-06-06 NOTE — LETTER
"6/6/2017       RE: Deandre Moore  [address] [facility] [address]     Dear Colleague,    Thank you for referring your patient, Deandre Moore, to the [facility] DERMATOLOGY at [facility]. Please see a copy of my visit note below.    [facility] Dermatology Note      Dermatology Problem List:  1. Macular amyloidosis   2. Atopic dermatitis  - on MTX 12.5 mg weekly since 10/16  - prior tx- nbUVB  3. Venous statis dermatitis    Encounter Date: Jun 6, 2017    CC:  Chief Complaint   Patient presents with     Derm Problem     Dermatitis, Deandre states \" itching is under control.\"         History of Present Illness:  Mr. Deandre Moore is a 76 year old male who presents as a follow-up for atopic dermatitis. Last seen 2/2017, at which time he was continued on MTX 12.5mg q week. He states he has been on this consistently since October. The itching is resolved and he only has residual dark spots. He uses Amlactin as needed for thick skin on his feet. He is using clobetasol ointment once a day on his back. Endorses some constipation. Itching is pretty much resolved. He is not taking folic acid due to side effects of giddiness and joint pain.   Denies oral ulcers, infections, or GI upset. Notes some issues with generalized sweating that he thinks contributes to his itching. Otherwise feeling well, denies other skin concerns.     Past Medical History:   Patient Active Problem List   Diagnosis     Itching     AD (atopic dermatitis)     Dermatitis     Hyperglycemia     Eczema, unspecified eczema     Intrinsic atopic dermatitis     Other eczema     Notalgia paresthetica     Rash     Pseudophakia of both eyes     Diabetes mellitus (H)     Presbyopia     Chronic pain of both shoulders     Past Medical History:   Diagnosis Date     Diabetes mellitus (H)      Gout attack      Heart attack (H) 1984     Hypertension      Past Surgical History:   Procedure " Laterality Date     CARDIAC SURGERY  1984    4 vessel bypass     CHOLECYSTECTOMY       COLONOSCOPY       NO HISTORY OF SURGERY  3/31/14    derm     PHACOEMULSIFICATION WITH STANDARD INTRAOCULAR LENS IMPLANT  1/21/2013    Procedure: PHACOEMULSIFICATION WITH STANDARD INTRAOCULAR LENS IMPLANT;  Phacoemulsification with standard intraocular lens implant, right eye;  Surgeon: Jay Rodriges MD;  Location: [facility] OR     Medications:  Current Outpatient Prescriptions   Medication Sig Dispense Refill     glimepiride (AMARYL) 4 MG tablet TAKE 1 AND 1/2 TABLETS (6MG)  EVERY DAY (REFILLS WITH  6/19/17 MD APPOINTMENT) 135 tablet 0     potassium chloride SA (K-DUR/KLOR-CON M) 20 MEQ CR tablet TAKE 1 TABLET THREE TIMES DAILY 270 tablet 1     methotrexate 2.5 MG tablet CHEMO TAKE 5 TABLETS ONE TIME WEEKLY 30 tablet 0     metFORMIN (GLUCOPHAGE) 1000 MG tablet Take 1 tablet (1,000 mg) by mouth 2 times daily (with meals) 180 tablet 1     clobetasol (TEMOVATE) 0.05 % ointment Apply to itchy areas twice daily as needed 60 g 1     folic acid (FOLVITE) 1 MG tablet One pill daily except the day you take methotrexate 100 tablet 3     capsaicin (ZOSTRIX) 0.075 % topical cream Apply topically 3 times daily To areas of itch on back.  OK to dilute with cetaphil cream if too burning. 60 g 3     clobetasol (TEMOVATE) 0.05 % ointment For flaring spots around ankles, apply at bedtime and cover with saran wrap 60 g 2     triamcinolone (KENALOG) 0.1 % ointment Apply topically 2 times daily 454 g 0     triamcinolone (KENALOG) 0.1 % ointment Mix entire tube with moisturizing cream as directed, and apply at bedtime 80 g 3     Blood Glucose Monitoring Suppl (BLOOD GLUCOSE MONITOR SYSTEM) W/DEVICE KIT Test 4 times daily with meter covered by  insurance 1 kit 1     blood glucose monitoring (NO BRAND SPECIFIED) test strip Use to test blood sugar 4 times daily  With meter/ strips/ lancets covered by insurance 360 each 3     blood glucose monitoring  (ULTRA THIN 30G) lancets Use to test blood sugar 4 times daily with meter/ strips/ lancets covered by insurance 4 Box 3     ammonium lactate (LAC-HYDRIN) 12 % lotion Apply topically 2 times daily Apply twice daily to feet 500 g 1     blood glucose monitoring (ACCU-CHEK FASTCLIX) lancets Use to test blood sugar 2 times daily or as directed. 2 Box 1     mirtazapine (REMERON) 15 MG tablet Take 1 tablet (15 mg) by mouth At Bedtime 30 tablet 1     Cholecalciferol (VITAMIN D) 2000 UNITS CAPS Take 1 tablet by mouth daily       Cyanocobalamin (VITAMIN B12 PO) Take 1 tablet by mouth daily       nitroglycerin (NITROSTAT) 0.4 MG SL tablet Place under the tongue as needed       torsemide (DEMADEX) 10 MG tablet Take 10 mg by mouth daily       aspirin 81 MG tablet Take 81 mg by mouth daily.       LORazepam (ATIVAN PO) Take 2.5 mg by mouth At Bedtime.       Irbesartan (AVAPRO PO) Take 150 mg by mouth daily.       Atorvastatin Calcium (LIPITOR PO) Take 40 mg by mouth daily        Clopidogrel Bisulfate (PLAVIX PO) Take 75 mg by mouth daily.       indapamide (LOZOL) 2.5 MG tablet Take 2.5 mg by mouth every morning.       COLCHICINE PO Take 0.6 mg by mouth daily.       multivitamin, therapeutic with minerals (MULTI-VITAMIN) TABS Take 1 tablet by mouth daily.       Ginkgo Biloba (GNP GINGKO BILOBA EXTRACT PO) Take  by mouth.       Allergies   Allergen Reactions     Beta Adrenergic Blockers Unknown     Latex Dermatitis     Tape [Adhesive Tape] Dermatitis     Electrode patches         Review of Systems:  -See HPI.  Otherwise 10 point ros negative.    Physical exam:  Vitals: There were no vitals taken for this visit.  GEN: This is a well developed, well-nourished male in no acute distress, in a pleasant mood.    SKIN: Focused exam of upper body and legs:  Hyperpigmented patches on back with rippled appearance  Erythematous hyperpigmented patches b/l lateral shoulders with overlying hyperpigmented 1mm papules  Xerosis, hyperpigmentation,  and erythema of both legs, especially at the lateral aspects.   No lesions of concern today    Impression/Plan:  1. Atopic dermatitis. Good response to MTX 12.5mg weekly initiated 10/2017. Current cumulative dose 400 mg. Suboptimal topical therapy; reminded about use of Amlactin today and general gentle skin care.  Reminded not to donate blood or share medication.    Continue methotrexate 12.5 mg qw; safety labs today inc CBC, CMP ordered today    Patient is not taking the folic acid due to side effects of giddiness and joint pain    Reminded to use clobetasol 0.05% ointment BID prn to residual areas of involvement    Encouraged gentle skin care, emollient use      Follow-up in 3 months with Dr. Gray, gato for new or changing lesions.     Staff Involved:  Resident (Ching Lal) /Staff (as above)    I have seen and examined this patient and agree with the assessment and plan as documented in the resident's note    Miguel Gray MD  Dermatology Attending

## 2017-06-06 NOTE — PROGRESS NOTES
"[facility] Dermatology Note      Dermatology Problem List:  1. Macular amyloidosis   2. Atopic dermatitis  - on MTX 12.5 mg weekly since 10/16  - prior tx- nbUVB  3. Venous statis dermatitis    Encounter Date: Jun 6, 2017    CC:  Chief Complaint   Patient presents with     Derm Problem     DermatitisDeandre states \" itching is under control.\"         History of Present Illness:  Mr. Deandre Moore is a 76 year old male who presents as a follow-up for atopic dermatitis. Last seen 2/2017, at which time he was continued on MTX 12.5mg q week. He states he has been on this consistently since October. The itching is resolved and he only has residual dark spots. He uses Amlactin as needed for thick skin on his feet. He is using clobetasol ointment once a day on his back. Endorses some constipation. Itching is pretty much resolved. He is not taking folic acid due to side effects of giddiness and joint pain.   Denies oral ulcers, infections, or GI upset. Notes some issues with generalized sweating that he thinks contributes to his itching. Otherwise feeling well, denies other skin concerns.     Past Medical History:   Patient Active Problem List   Diagnosis     Itching     AD (atopic dermatitis)     Dermatitis     Hyperglycemia     Eczema, unspecified eczema     Intrinsic atopic dermatitis     Other eczema     Notalgia paresthetica     Rash     Pseudophakia of both eyes     Diabetes mellitus (H)     Presbyopia     Chronic pain of both shoulders     Past Medical History:   Diagnosis Date     Diabetes mellitus (H)      Gout attack      Heart attack (H) 1984     Hypertension      Past Surgical History:   Procedure Laterality Date     CARDIAC SURGERY  1984    4 vessel bypass     CHOLECYSTECTOMY       COLONOSCOPY       NO HISTORY OF SURGERY  3/31/14    derm     PHACOEMULSIFICATION WITH STANDARD INTRAOCULAR LENS IMPLANT  1/21/2013    Procedure: PHACOEMULSIFICATION WITH STANDARD INTRAOCULAR LENS IMPLANT;  " Phacoemulsification with standard intraocular lens implant, right eye;  Surgeon: Jay Rodriges MD;  Location: [facility] OR     Medications:  Current Outpatient Prescriptions   Medication Sig Dispense Refill     glimepiride (AMARYL) 4 MG tablet TAKE 1 AND 1/2 TABLETS (6MG)  EVERY DAY (REFILLS WITH  6/19/17 MD APPOINTMENT) 135 tablet 0     potassium chloride SA (K-DUR/KLOR-CON M) 20 MEQ CR tablet TAKE 1 TABLET THREE TIMES DAILY 270 tablet 1     methotrexate 2.5 MG tablet CHEMO TAKE 5 TABLETS ONE TIME WEEKLY 30 tablet 0     metFORMIN (GLUCOPHAGE) 1000 MG tablet Take 1 tablet (1,000 mg) by mouth 2 times daily (with meals) 180 tablet 1     clobetasol (TEMOVATE) 0.05 % ointment Apply to itchy areas twice daily as needed 60 g 1     folic acid (FOLVITE) 1 MG tablet One pill daily except the day you take methotrexate 100 tablet 3     capsaicin (ZOSTRIX) 0.075 % topical cream Apply topically 3 times daily To areas of itch on back.  OK to dilute with cetaphil cream if too burning. 60 g 3     clobetasol (TEMOVATE) 0.05 % ointment For flaring spots around ankles, apply at bedtime and cover with saran wrap 60 g 2     triamcinolone (KENALOG) 0.1 % ointment Apply topically 2 times daily 454 g 0     triamcinolone (KENALOG) 0.1 % ointment Mix entire tube with moisturizing cream as directed, and apply at bedtime 80 g 3     Blood Glucose Monitoring Suppl (BLOOD GLUCOSE MONITOR SYSTEM) W/DEVICE KIT Test 4 times daily with meter covered by  insurance 1 kit 1     blood glucose monitoring (NO BRAND SPECIFIED) test strip Use to test blood sugar 4 times daily  With meter/ strips/ lancets covered by insurance 360 each 3     blood glucose monitoring (ULTRA THIN 30G) lancets Use to test blood sugar 4 times daily with meter/ strips/ lancets covered by insurance 4 Box 3     ammonium lactate (LAC-HYDRIN) 12 % lotion Apply topically 2 times daily Apply twice daily to feet 500 g 1     blood glucose monitoring (ACCU-CHEK FASTCLIX) lancets Use to  test blood sugar 2 times daily or as directed. 2 Box 1     mirtazapine (REMERON) 15 MG tablet Take 1 tablet (15 mg) by mouth At Bedtime 30 tablet 1     Cholecalciferol (VITAMIN D) 2000 UNITS CAPS Take 1 tablet by mouth daily       Cyanocobalamin (VITAMIN B12 PO) Take 1 tablet by mouth daily       nitroglycerin (NITROSTAT) 0.4 MG SL tablet Place under the tongue as needed       torsemide (DEMADEX) 10 MG tablet Take 10 mg by mouth daily       aspirin 81 MG tablet Take 81 mg by mouth daily.       LORazepam (ATIVAN PO) Take 2.5 mg by mouth At Bedtime.       Irbesartan (AVAPRO PO) Take 150 mg by mouth daily.       Atorvastatin Calcium (LIPITOR PO) Take 40 mg by mouth daily        Clopidogrel Bisulfate (PLAVIX PO) Take 75 mg by mouth daily.       indapamide (LOZOL) 2.5 MG tablet Take 2.5 mg by mouth every morning.       COLCHICINE PO Take 0.6 mg by mouth daily.       multivitamin, therapeutic with minerals (MULTI-VITAMIN) TABS Take 1 tablet by mouth daily.       Ginkgo Biloba (GNP GINGKO BILOBA EXTRACT PO) Take  by mouth.       Allergies   Allergen Reactions     Beta Adrenergic Blockers Unknown     Latex Dermatitis     Tape [Adhesive Tape] Dermatitis     Electrode patches         Review of Systems:  -See HPI.  Otherwise 10 point ros negative.    Physical exam:  Vitals: There were no vitals taken for this visit.  GEN: This is a well developed, well-nourished male in no acute distress, in a pleasant mood.    SKIN: Focused exam of upper body and legs:  Hyperpigmented patches on back with rippled appearance  Erythematous hyperpigmented patches b/l lateral shoulders with overlying hyperpigmented 1mm papules  Xerosis, hyperpigmentation, and erythema of both legs, especially at the lateral aspects.   No lesions of concern today    Impression/Plan:  1. Atopic dermatitis. Good response to MTX 12.5mg weekly initiated 10/2017. Current cumulative dose 400 mg. Suboptimal topical therapy; reminded about use of Amlactin today and  general gentle skin care.  Reminded not to donate blood or share medication.    Continue methotrexate 12.5 mg qw; safety labs today inc CBC, CMP ordered today    Patient is not taking the folic acid due to side effects of giddiness and joint pain    Reminded to use clobetasol 0.05% ointment BID prn to residual areas of involvement    Encouraged gentle skin care, emollient use      Follow-up in 3 months with Dr. Gray, gato for new or changing lesions.     Staff Involved:  Resident (Ching Lal) /Staff (as above)    I have seen and examined this patient and agree with the assessment and plan as documented in the resident's note    Miguel Gray MD  Dermatology Attending

## 2017-06-06 NOTE — NURSING NOTE
"Dermatology Rooming Note    Deandre Moore's goals for this visit include:   Chief Complaint   Patient presents with     Derm Problem     Dermatitis, Deandre states \" itching is under control.\"     Mercedes Solorzano LPN  "

## 2017-06-09 DIAGNOSIS — L30.8 OTHER ECZEMA: ICD-10-CM

## 2017-06-09 NOTE — TELEPHONE ENCOUNTER
Last seen 6/6/17:  1. Atopic dermatitis. Good response to MTX 12.5mg weekly initiated 10/2017. Current cumulative dose 400 mg. Suboptimal topical therapy; reminded about use of Amlactin today and general gentle skin care.  Reminded not to donate blood or share medication.    Continue methotrexate 12.5 mg qw; CBC, CMP ordered today    Patient is not taking the folic acid due to side effects of giddiness and joint pain    Reminded to use clobetasol 0.05% ointment BID prn to residual areas of involvement    Encouraged gentle skin care, emollient use        Follow-up in 3 months with Dr. Gray, earlier for new or changing lesions.

## 2017-06-10 ENCOUNTER — NURSE TRIAGE (OUTPATIENT)
Dept: NURSING | Facility: CLINIC | Age: 77
End: 2017-06-10

## 2017-06-10 NOTE — TELEPHONE ENCOUNTER
Clinic Action Needed:None  Reason for Call:Patient calling stating he had a message from the clinic. Reviewed notes from Dr Gray from 6/8/17 lab results. Patient verbalized understanding.  Stat ing he has clinic appointment scheduled for 6/19/17.    Ching Ibarra RN  [facility]

## 2017-06-12 DIAGNOSIS — L30.8 OTHER ECZEMA: ICD-10-CM

## 2017-06-19 ENCOUNTER — OFFICE VISIT (OUTPATIENT)
Dept: ENDOCRINOLOGY | Facility: CLINIC | Age: 77
End: 2017-06-19
Payer: COMMERCIAL

## 2017-06-19 VITALS
DIASTOLIC BLOOD PRESSURE: 73 MMHG | HEART RATE: 86 BPM | BODY MASS INDEX: 27.22 KG/M2 | HEIGHT: 72 IN | SYSTOLIC BLOOD PRESSURE: 121 MMHG | WEIGHT: 201 LBS

## 2017-06-19 DIAGNOSIS — N18.30 CKD (CHRONIC KIDNEY DISEASE) STAGE 3, GFR 30-59 ML/MIN (H): Primary | ICD-10-CM

## 2017-06-19 DIAGNOSIS — E11.9 TYPE 2 DIABETES MELLITUS WITHOUT COMPLICATION, WITHOUT LONG-TERM CURRENT USE OF INSULIN (H): ICD-10-CM

## 2017-06-19 LAB
ALBUMIN UR-MCNC: NEGATIVE MG/DL
ANION GAP SERPL CALCULATED.3IONS-SCNC: 8 MMOL/L (ref 3–14)
APPEARANCE UR: CLEAR
BILIRUB UR QL STRIP: NEGATIVE
BUN SERPL-MCNC: 13 MG/DL (ref 7–30)
CHLORIDE SERPL-SCNC: 103 MMOL/L (ref 94–109)
CHOLEST SERPL-MCNC: 105 MG/DL
CO2 SERPL-SCNC: 30 MMOL/L (ref 20–32)
COLOR UR AUTO: ABNORMAL
CREAT SERPL-MCNC: 1.23 MG/DL (ref 0.66–1.25)
CREAT UR-MCNC: 27 MG/DL
GFR SERPL CREATININE-BSD FRML MDRD: 57 ML/MIN/1.7M2
GLUCOSE UR STRIP-MCNC: 70 MG/DL
HBA1C MFR BLD: 5.9 % (ref 0–5.7)
HDLC SERPL-MCNC: 48 MG/DL
HGB UR QL STRIP: NEGATIVE
KETONES UR STRIP-MCNC: NEGATIVE MG/DL
LDLC SERPL CALC-MCNC: 40 MG/DL
LEUKOCYTE ESTERASE UR QL STRIP: NEGATIVE
MICROALBUMIN UR-MCNC: <5 MG/L
MICROALBUMIN/CREAT UR: NORMAL MG/G CR (ref 0–17)
NITRATE UR QL: NEGATIVE
NONHDLC SERPL-MCNC: 57 MG/DL
PH UR STRIP: 6 PH (ref 5–7)
POTASSIUM SERPL-SCNC: 3.5 MMOL/L (ref 3.4–5.3)
RBC #/AREA URNS AUTO: ABNORMAL /HPF (ref 0–2)
SODIUM SERPL-SCNC: 141 MMOL/L (ref 133–144)
SP GR UR STRIP: 1 (ref 1–1.03)
TRIGL SERPL-MCNC: 83 MG/DL
TSH SERPL DL<=0.05 MIU/L-ACNC: 1.39 MU/L (ref 0.4–4)
URN SPEC COLLECT METH UR: ABNORMAL
UROBILINOGEN UR STRIP-MCNC: NORMAL MG/DL (ref 0–2)
WBC #/AREA URNS AUTO: ABNORMAL /HPF (ref 0–2)

## 2017-06-19 PROCEDURE — 80061 LIPID PANEL: CPT | Performed by: INTERNAL MEDICINE

## 2017-06-19 PROCEDURE — 80051 ELECTROLYTE PANEL: CPT | Performed by: INTERNAL MEDICINE

## 2017-06-19 PROCEDURE — 81001 URINALYSIS AUTO W/SCOPE: CPT | Performed by: INTERNAL MEDICINE

## 2017-06-19 PROCEDURE — 36415 COLL VENOUS BLD VENIPUNCTURE: CPT | Performed by: INTERNAL MEDICINE

## 2017-06-19 PROCEDURE — 84443 ASSAY THYROID STIM HORMONE: CPT | Performed by: INTERNAL MEDICINE

## 2017-06-19 PROCEDURE — 99214 OFFICE O/P EST MOD 30 MIN: CPT | Performed by: INTERNAL MEDICINE

## 2017-06-19 PROCEDURE — 83036 HEMOGLOBIN GLYCOSYLATED A1C: CPT | Performed by: INTERNAL MEDICINE

## 2017-06-19 PROCEDURE — 82043 UR ALBUMIN QUANTITATIVE: CPT | Performed by: INTERNAL MEDICINE

## 2017-06-19 PROCEDURE — 84520 ASSAY OF UREA NITROGEN: CPT | Performed by: INTERNAL MEDICINE

## 2017-06-19 PROCEDURE — 82565 ASSAY OF CREATININE: CPT | Performed by: INTERNAL MEDICINE

## 2017-06-19 NOTE — LETTER
6/19/2017      RE: Deandre Moore  [address] [facility] [address]     Dear Colleague,    Thank you for referring your patient, Deandre Moore, to the [facility]. Please see a copy of my visit note below.    Deandre is a 76 year old male presents today for Diabetes    HPI    Deandre is here for follow up of type 2 diabetes    Diagnosed with T2DM around age 68 .  He has never been on insulin.  He has never been hospitalized for diabetes, or hyperglycemia. per patient report, he had a myocardial infarction roughly 30 years ago (prior to diagnosis of diabetes) disease.   He reports a very strong history of diabetes in his family.    Currently on metformin 1000 mg twice times a day and Amaryl 4 + 2 mg daily    ROS related to diabetes  CV:  History of bypass surgery with myocardial infarction roughly 30 years ago, currently on statins.  LDL in January 2016 was 48  Neuro: very intermittent tingling of his feet, no numbness   Neph:  Negative in January 2016  Eye: negative per patient report, last evaluation in 2015    Denies any low BG  BG data reviewed. Ave Bg 129 SD 41    HTN-on Avapro     CAD- follows with cardiology once a year     concerned about increased Cr. No urinary symptoms .  C/o LE edema on and off    Past Medical History  Past Medical History:   Diagnosis Date     Diabetes mellitus (H)      Gout attack      Heart attack (H) 1984     Hypertension    gout  Patient Active Problem List   Diagnosis     Itching     AD (atopic dermatitis)     Dermatitis     Hyperglycemia     Eczema, unspecified eczema     Intrinsic atopic dermatitis     Other eczema     Notalgia paresthetica     Rash     Pseudophakia of both eyes     Diabetes mellitus (H)     Presbyopia     Chronic pain of both shoulders     Allergies  Allergies   Allergen Reactions     Beta Adrenergic Blockers Unknown     Latex Dermatitis     Tape [Adhesive Tape] Dermatitis     Electrode patches     Medications  Current  Outpatient Prescriptions   Medication Sig Dispense Refill     methotrexate 2.5 MG tablet CHEMO TAKE 5 TABLETS ONE TIME WEEKLY 30 tablet 1     glimepiride (AMARYL) 4 MG tablet TAKE 1 AND 1/2 TABLETS (6MG)  EVERY DAY (REFILLS WITH  6/19/17 MD APPOINTMENT) 135 tablet 0     potassium chloride SA (K-DUR/KLOR-CON M) 20 MEQ CR tablet TAKE 1 TABLET THREE TIMES DAILY 270 tablet 1     metFORMIN (GLUCOPHAGE) 1000 MG tablet Take 1 tablet (1,000 mg) by mouth 2 times daily (with meals) 180 tablet 1     clobetasol (TEMOVATE) 0.05 % ointment Apply to itchy areas twice daily as needed 60 g 1     capsaicin (ZOSTRIX) 0.075 % topical cream Apply topically 3 times daily To areas of itch on back.  OK to dilute with cetaphil cream if too burning. 60 g 3     clobetasol (TEMOVATE) 0.05 % ointment For flaring spots around ankles, apply at bedtime and cover with saran wrap 60 g 2     Blood Glucose Monitoring Suppl (BLOOD GLUCOSE MONITOR SYSTEM) W/DEVICE KIT Test 4 times daily with meter covered by  insurance 1 kit 1     blood glucose monitoring (NO BRAND SPECIFIED) test strip Use to test blood sugar 4 times daily  With meter/ strips/ lancets covered by insurance 360 each 3     blood glucose monitoring (ULTRA THIN 30G) lancets Use to test blood sugar 4 times daily with meter/ strips/ lancets covered by insurance 4 Box 3     ammonium lactate (LAC-HYDRIN) 12 % lotion Apply topically 2 times daily Apply twice daily to feet 500 g 1     blood glucose monitoring (ACCU-CHEK FASTCLIX) lancets Use to test blood sugar 2 times daily or as directed. 2 Box 1     mirtazapine (REMERON) 15 MG tablet Take 1 tablet (15 mg) by mouth At Bedtime 30 tablet 1     Cholecalciferol (VITAMIN D) 2000 UNITS CAPS Take 1 tablet by mouth daily       Cyanocobalamin (VITAMIN B12 PO) Take 1 tablet by mouth daily       nitroglycerin (NITROSTAT) 0.4 MG SL tablet Place under the tongue as needed       torsemide (DEMADEX) 10 MG tablet Take 10 mg by mouth daily       aspirin 81 MG  tablet Take 81 mg by mouth daily.       LORazepam (ATIVAN PO) Take 2.5 mg by mouth At Bedtime.       Irbesartan (AVAPRO PO) Take 150 mg by mouth daily.       Atorvastatin Calcium (LIPITOR PO) Take 40 mg by mouth daily        indapamide (LOZOL) 2.5 MG tablet Take 2.5 mg by mouth every morning.       COLCHICINE PO Take 0.6 mg by mouth daily.       multivitamin, therapeutic with minerals (MULTI-VITAMIN) TABS Take 1 tablet by mouth daily.       Ginkgo Biloba (GNP GINGKO BILOBA EXTRACT PO) Take  by mouth.       triamcinolone (KENALOG) 0.1 % ointment Apply topically 2 times daily 454 g 0     triamcinolone (KENALOG) 0.1 % ointment Mix entire tube with moisturizing cream as directed, and apply at bedtime 80 g 3     Family History  Significant for diabetes  family history is negative for CANCER and Skin Cancer.  Social History  No history of smoking or alcohol use currently  Social History     Social History     Marital status:      Spouse name: N/A     Number of children: N/A     Years of education: N/A     Occupational History     Not on file.     Social History Main Topics     Smoking status: Former Smoker     Quit date: 10/15/1984     Smokeless tobacco: Never Used     Alcohol use Yes      Comment: occasional-once a year     Drug use: No     Sexual activity: Not on file     Other Topics Concern     Not on file     Social History Narrative   retired, . Lives in [place]      ROS: 8 point ROS neg other than the symptoms noted above in the HPI.    Physical Exam  Ht 1.829 m (6')  Wt 91.2 kg (201 lb)  BMI 27.26 kg/m2  Body mass index is 27.26 kg/(m^2).    Exam:  GENERAL APPEARANCE: Alert and no distress  Neck: no goiter  Lungs: CTA b/l  Heart:RRR  Ext: b/l edema, dressing on left leg      RESULTS  Lab Results   Component Value Date    A1C 5.9 06/19/2017    A1C 5.4 06/07/2016       No results found for: TSH, T4    Creatinine   Date Value Ref Range Status   06/06/2017 1.36 (H) 0.66 - 1.25 mg/dL Final    10/18/2016 1.15 0.66 - 1.25 mg/dL Final   ]    Albumin   Date Value Ref Range Status   06/06/2017 3.7 3.4 - 5.0 g/dL Final   ]    ALT   Date Value Ref Range Status   06/06/2017 64 0 - 70 U/L Final   02/13/2017 36 0 - 70 U/L Final   ]    Recent Labs   Lab Test  01/22/16   1353   CHOL  138   HDL  50   LDL  48   TRIG  200*       No results found for: B12]    outside labs reviewed     ASSESSMENT:    Type 2 diabetes  Well controlled, A1C is 5.9 %.   Discussed targets of glycemic control  Reviewed risk of hypoglycemia with DOMINGO. He has had symptomatic hypoglycemia but it's rare. No severe low's. Reviewed if BG < 70,  stop evening Amaryl dose. Offered to stop Amaryl and start Januvia but he would like to continue current regimen.   check lipids   Elevated Cr- no microabuminuria, BP ok, not likely related to diabetes. ? Due to diuretics  Repeat today, along with UA. If remains high consider nephrology consult   schdeule eye visit    Orders Placed This Encounter   Procedures     Urea nitrogen     Creatinine     Electrolyte panel     Albumin Random Urine Quantitative     UA with Microscopic     Lipid Profile     TSH       He would like to be seen once a year      I spent 25 minutes with this patient face to face and explained the conditions and plans (more than 50% of time was counseling/coordination of care, diabetes care, hypoglycemia) . The patient understood and is satisfied with today's visit    JUAN Schafer    Again, thank you for allowing me to participate in the care of your patient.      Sincerely,    Davis Whyte MD

## 2017-06-19 NOTE — PATIENT INSTRUCTIONS
Thank you for choosing the [facility], Department of Endocrinology. It has been a pleasure servicing you today. Please contact us at [phone] with any questions. If you need to speak to an Endocrinologist and it is after 5:00 pm or on a weekend, please call the On-Call Endocrinologist at [phone].      Please allow 7-10 business days for your lab results. You will be notified by phone, letter, or My Chart after the provider has reviewed them.  Thank you.

## 2017-06-19 NOTE — NURSING NOTE
Deandre Moore's goals for this visit include: Follow up Diabetes  He requests these members of his care team be copied on today's visit information: YES    PCP: Denilson Thomas    Referring Provider:  No referring provider defined for this encounter.    Chief Complaint   Patient presents with     Diabetes       Initial Ht 1.829 m (6')  Wt 91.2 kg (201 lb)  BMI 27.26 kg/m2 Estimated body mass index is 27.26 kg/(m^2) as calculated from the following:    Height as of this encounter: 1.829 m (6').    Weight as of this encounter: 91.2 kg (201 lb).  Medication Reconciliation: complete    Do you need any medication refills at today's visit? NO

## 2017-06-19 NOTE — LETTER
Patient:  Deandre Moore  :   1940  MRN:     [id number]        Mr.Richard LELAND Moore  [address] [facility] [address]        2017    Dear ,    We are writing to inform you of your test results.    Resulted Orders   Hemoglobin A1c POCT   Result Value Ref Range    Hemoglobin A1C 5.9 %   Urea nitrogen   Result Value Ref Range    Urea Nitrogen 13 7 - 30 mg/dL   Creatinine   Result Value Ref Range    Creatinine 1.23 0.66 - 1.25 mg/dL    GFR Estimate 57 (L) >60 mL/min/1.7m2      Comment:      Non  GFR Calc    GFR Estimate If Black 69 >60 mL/min/1.7m2      Comment:      African American GFR Calc   Electrolyte panel   Result Value Ref Range    Sodium 141 133 - 144 mmol/L    Potassium 3.5 3.4 - 5.3 mmol/L    Chloride 103 94 - 109 mmol/L    Carbon Dioxide 30 20 - 32 mmol/L    Anion Gap 8 3 - 14 mmol/L   Albumin Random Urine Quantitative   Result Value Ref Range    Creatinine Urine 27 mg/dL    Albumin Urine mg/L <5 mg/L    Albumin Urine mg/g Cr Unable to calculate due to low value 0 - 17 mg/g Cr   Lipid Profile   Result Value Ref Range    Cholesterol 105 <200 mg/dL    Triglycerides 83 <150 mg/dL      Comment:      Non Fasting    HDL Cholesterol 48 >39 mg/dL    LDL Cholesterol Calculated 40 <100 mg/dL      Comment:      Desirable:       <100 mg/dl    Non HDL Cholesterol 57 <130 mg/dL   TSH   Result Value Ref Range    TSH 1.39 0.40 - 4.00 mU/L   UA with Microscopic reflex to Culture ([place]; [facility])   Result Value Ref Range    Color Urine Light Yellow     Appearance Urine Clear     Glucose Urine 70 (A) NEG mg/dL    Bilirubin Urine Negative NEG    Ketones Urine Negative NEG mg/dL    Specific Gravity Urine 1.004 1.003 - 1.035    Blood Urine Negative NEG    pH Urine 6.0 5.0 - 7.0 pH    Protein Albumin Urine Negative NEG mg/dL    Urobilinogen mg/dL Normal 0.0 - 2.0 mg/dL    Nitrite Urine Negative NEG    Leukocyte Esterase Urine Negative NEG    Source  Midstream Urine     WBC Urine O - 2 0 - 2 /HPF    RBC Urine O - 2 0 - 2 /HPF       Your test results fall within the expected range(s) or remain unchanged from previous results.  Please continue with current treatment plan.    Labs look ok, Creatinine has improved     Dr Whyte  mh

## 2017-06-19 NOTE — PROGRESS NOTES
Deandre is a 76 year old male presents today for Diabetes    HPI    Deandre is here for follow up of type 2 diabetes    Diagnosed with T2DM around age 68 .  He has never been on insulin.  He has never been hospitalized for diabetes, or hyperglycemia. per patient report, he had a myocardial infarction roughly 30 years ago (prior to diagnosis of diabetes) disease.   He reports a very strong history of diabetes in his family.    Currently on metformin 1000 mg twice times a day and Amaryl 4 + 2 mg daily    ROS related to diabetes  CV:  History of bypass surgery with myocardial infarction roughly 30 years ago, currently on statins.  LDL in January 2016 was 48  Neuro: very intermittent tingling of his feet, no numbness   Neph:  Negative in January 2016  Eye: negative per patient report, last evaluation in 2015    Denies any low BG  BG data reviewed. Ave Bg 129 SD 41    HTN-on Avapro     CAD- follows with cardiology once a year     concerned about increased Cr. No urinary symptoms .  C/o LE edema on and off    Past Medical History  Past Medical History:   Diagnosis Date     Diabetes mellitus (H)      Gout attack      Heart attack (H) 1984     Hypertension    gout  Patient Active Problem List   Diagnosis     Itching     AD (atopic dermatitis)     Dermatitis     Hyperglycemia     Eczema, unspecified eczema     Intrinsic atopic dermatitis     Other eczema     Notalgia paresthetica     Rash     Pseudophakia of both eyes     Diabetes mellitus (H)     Presbyopia     Chronic pain of both shoulders     Allergies  Allergies   Allergen Reactions     Beta Adrenergic Blockers Unknown     Latex Dermatitis     Tape [Adhesive Tape] Dermatitis     Electrode patches     Medications  Current Outpatient Prescriptions   Medication Sig Dispense Refill     methotrexate 2.5 MG tablet CHEMO TAKE 5 TABLETS ONE TIME WEEKLY 30 tablet 1     glimepiride (AMARYL) 4 MG tablet TAKE 1 AND 1/2 TABLETS (6MG)  EVERY DAY (REFILLS WITH  6/19/17 MD APPOINTMENT)  135 tablet 0     potassium chloride SA (K-DUR/KLOR-CON M) 20 MEQ CR tablet TAKE 1 TABLET THREE TIMES DAILY 270 tablet 1     metFORMIN (GLUCOPHAGE) 1000 MG tablet Take 1 tablet (1,000 mg) by mouth 2 times daily (with meals) 180 tablet 1     clobetasol (TEMOVATE) 0.05 % ointment Apply to itchy areas twice daily as needed 60 g 1     capsaicin (ZOSTRIX) 0.075 % topical cream Apply topically 3 times daily To areas of itch on back.  OK to dilute with cetaphil cream if too burning. 60 g 3     clobetasol (TEMOVATE) 0.05 % ointment For flaring spots around ankles, apply at bedtime and cover with saran wrap 60 g 2     Blood Glucose Monitoring Suppl (BLOOD GLUCOSE MONITOR SYSTEM) W/DEVICE KIT Test 4 times daily with meter covered by  insurance 1 kit 1     blood glucose monitoring (NO BRAND SPECIFIED) test strip Use to test blood sugar 4 times daily  With meter/ strips/ lancets covered by insurance 360 each 3     blood glucose monitoring (ULTRA THIN 30G) lancets Use to test blood sugar 4 times daily with meter/ strips/ lancets covered by insurance 4 Box 3     ammonium lactate (LAC-HYDRIN) 12 % lotion Apply topically 2 times daily Apply twice daily to feet 500 g 1     blood glucose monitoring (ACCU-CHEK FASTCLIX) lancets Use to test blood sugar 2 times daily or as directed. 2 Box 1     mirtazapine (REMERON) 15 MG tablet Take 1 tablet (15 mg) by mouth At Bedtime 30 tablet 1     Cholecalciferol (VITAMIN D) 2000 UNITS CAPS Take 1 tablet by mouth daily       Cyanocobalamin (VITAMIN B12 PO) Take 1 tablet by mouth daily       nitroglycerin (NITROSTAT) 0.4 MG SL tablet Place under the tongue as needed       torsemide (DEMADEX) 10 MG tablet Take 10 mg by mouth daily       aspirin 81 MG tablet Take 81 mg by mouth daily.       LORazepam (ATIVAN PO) Take 2.5 mg by mouth At Bedtime.       Irbesartan (AVAPRO PO) Take 150 mg by mouth daily.       Atorvastatin Calcium (LIPITOR PO) Take 40 mg by mouth daily        indapamide (LOZOL) 2.5 MG  tablet Take 2.5 mg by mouth every morning.       COLCHICINE PO Take 0.6 mg by mouth daily.       multivitamin, therapeutic with minerals (MULTI-VITAMIN) TABS Take 1 tablet by mouth daily.       Ginkgo Biloba (GNP GINGKO BILOBA EXTRACT PO) Take  by mouth.       triamcinolone (KENALOG) 0.1 % ointment Apply topically 2 times daily 454 g 0     triamcinolone (KENALOG) 0.1 % ointment Mix entire tube with moisturizing cream as directed, and apply at bedtime 80 g 3     Family History  Significant for diabetes  family history is negative for CANCER and Skin Cancer.  Social History  No history of smoking or alcohol use currently  Social History     Social History     Marital status:      Spouse name: N/A     Number of children: N/A     Years of education: N/A     Occupational History     Not on file.     Social History Main Topics     Smoking status: Former Smoker     Quit date: 10/15/1984     Smokeless tobacco: Never Used     Alcohol use Yes      Comment: occasional-once a year     Drug use: No     Sexual activity: Not on file     Other Topics Concern     Not on file     Social History Narrative   retired, . Lives in [place]      ROS: 8 point ROS neg other than the symptoms noted above in the HPI.    Physical Exam  Ht 1.829 m (6')  Wt 91.2 kg (201 lb)  BMI 27.26 kg/m2  Body mass index is 27.26 kg/(m^2).    Exam:  GENERAL APPEARANCE: Alert and no distress  Neck: no goiter  Lungs: CTA b/l  Heart:RRR  Ext: b/l edema, dressing on left leg      RESULTS  Lab Results   Component Value Date    A1C 5.9 06/19/2017    A1C 5.4 06/07/2016       No results found for: TSH, T4    Creatinine   Date Value Ref Range Status   06/06/2017 1.36 (H) 0.66 - 1.25 mg/dL Final   10/18/2016 1.15 0.66 - 1.25 mg/dL Final   ]    Albumin   Date Value Ref Range Status   06/06/2017 3.7 3.4 - 5.0 g/dL Final   ]    ALT   Date Value Ref Range Status   06/06/2017 64 0 - 70 U/L Final   02/13/2017 36 0 - 70 U/L Final   ]    Recent Labs    Lab Test  01/22/16   1353   CHOL  138   HDL  50   LDL  48   TRIG  200*       No results found for: B12]    outside labs reviewed     ASSESSMENT:    Type 2 diabetes  Well controlled, A1C is 5.9 %.   Discussed targets of glycemic control  Reviewed risk of hypoglycemia with DOMINGO. He has had symptomatic hypoglycemia but it's rare. No severe low's. Reviewed if BG < 70,  stop evening Amaryl dose. Offered to stop Amaryl and start Januvia but he would like to continue current regimen.   check lipids   Elevated Cr- no microabuminuria, BP ok, not likely related to diabetes. ? Due to diuretics  Repeat today, along with UA. If remains high consider nephrology consult   [facility] eye visit    Orders Placed This Encounter   Procedures     Urea nitrogen     Creatinine     Electrolyte panel     Albumin Random Urine Quantitative     UA with Microscopic     Lipid Profile     TSH       He would like to be seen once a year      I spent 25 minutes with this patient face to face and explained the conditions and plans (more than 50% of time was counseling/coordination of care, diabetes care, hypoglycemia) . The patient understood and is satisfied with today's visit    JUAN Schafer

## 2017-06-19 NOTE — MR AVS SNAPSHOT
After Visit Summary   6/19/2017    Deandre Moore    MRN: [id number]           Patient Information     Date Of Birth          1940        Visit Information        Provider Department      6/19/2017 11:00 AM Davis Whyte MD; MG ENDO NURSE [facility]        Today's Diagnoses     CKD (chronic kidney disease) stage 3, GFR 30-59 ml/min    -  1    Type 2 diabetes mellitus without complication, without long-term current use of insulin (H)          Care Instructions    Thank you for choosing the [facility] Department of Endocrinology. It has been a pleasure servicing you today. Please contact us at [phone] with any questions. If you need to speak to an Endocrinologist and it is after 5:00 pm or on a weekend, please call the On-Call Endocrinologist at [phone].      Please allow 7-10 business days for your lab results. You will be notified by phone, letter, or My Chart after the provider has reviewed them.  Thank you.             Follow-ups after your visit        Your next 10 appointments already scheduled     Jul 06, 2017 12:00 PM CDT   LAB with  LAB   [facility] ([facility])    [address] [place] [address] [phone]           Patient must bring picture ID.  Patient should be prepared to give a urine specimen  Please do not eat 10-12 hours before your appointment if you are coming in fasting for labs on lipids, cholesterol, or glucose (sugar).  Pregnant women should follow their Care Team instructions. Water with medications is okay. Do not drink coffee or other fluids.   If you have concerns about taking  your medications, please ask at office or if scheduling via [employer], send a message by clicking on Secure Messaging, Message Your Care Team.            Sep 07, 2017  2:15 PM CDT   (Arrive by 2:00 PM)   Return Visit with Miguel Gray MD   [facility] Dermatology ([facility])    [address] [facility] [address] [facility]  [address] [facility] [place] [address] [phone]            Jun 11, 2018 10:45 AM CDT   Return Visit with Davis Whyte MD, MG ENDO NURSE   [facility] ([facility])    [address] [place] [address] [phone]              Who to contact     If you have questions or need follow up information about today's clinic visit or your schedule please contact [facility] directly at [phone].  Normal or non-critical lab and imaging results will be communicated to you by [employer], letter or phone within 4 business days after the clinic has received the results. If you do not hear from us within 7 days, please contact the clinic through [employer] or phone. If you have a critical or abnormal lab result, we will notify you by phone as soon as possible.  Submit refill requests through [employer] or call your pharmacy and they will forward the refill request to us. Please allow 3 business days for your refill to be completed.          Additional Information About Your Visit        [employer] Information     [employer] gives you secure access to your electronic health record. If you see a primary care provider, you can also send messages to your care team and make appointments. If you have questions, please call your primary care clinic.  If you do not have a primary care provider, please call [phone] and they will assist you.      [employer] is an electronic gateway that provides easy, online access to your medical records. With [employer], you can request a clinic appointment, read your test results, renew a prescription or communicate with your care team.     To access your existing account, please contact your [facility] or call [phone] for assistance.        Care EveryWhere ID     This is your Care EveryWhere ID. This could be used by other organizations to access your [place]  medical records  [id number]        Your Vitals Were     Pulse Height BMI (Body Mass Index)             86 1.829 m (6') 27.26 kg/m2          Blood Pressure from Last 3 Encounters:   06/19/17 121/73   01/02/17 110/60   06/07/16 105/61    Weight from Last 3 Encounters:   06/19/17 91.2 kg (201 lb)   01/02/17 93.4 kg (206 lb)   06/07/16 93.8 kg (206 lb 12.8 oz)              We Performed the Following     Albumin Random Urine Quantitative     Creatinine     Electrolyte panel     Hemoglobin A1c POCT     Lipid Profile     TSH     UA with Microscopic     Urea nitrogen        Primary Care Provider Office Phone # Fax #    Denilson Thomas [phone]       [address] [address]        Thank you!     Thank you for choosing [facility]  for your care. Our goal is always to provide you with excellent care. Hearing back from our patients is one way we can continue to improve our services. Please take a few minutes to complete the written survey that you may receive in the mail after your visit with us. Thank you!             Your Updated Medication List - Protect others around you: Learn how to safely use, store and throw away your medicines at www.disposemymeds.org.          This list is accurate as of: 6/19/17 12:41 PM.  Always use your most recent med list.                   Brand Name Dispense Instructions for use    ammonium lactate 12 % lotion    LAC-HYDRIN    500 g    Apply topically 2 times daily Apply twice daily to feet       aspirin 81 MG tablet      Take 81 mg by mouth daily.       ATIVAN PO      Take 2.5 mg by mouth At Bedtime.       AVAPRO PO      Take 150 mg by mouth daily.       Blood Glucose Monitor System W/DEVICE Kit     1 kit    Test 4 times daily with meter covered by  insurance       * blood glucose monitoring lancets     2 Box    Use to test blood sugar 2 times daily or as directed.       * blood glucose monitoring lancets     4 Box    Use  to test blood sugar 4 times daily with meter/ strips/ lancets covered by insurance       blood glucose monitoring test strip    no brand specified    360 each    Use to test blood sugar 4 times daily  With meter/ strips/ lancets covered by insurance       capsaicin 0.075 % cream    ZOSTRIX    60 g    Apply topically 3 times daily To areas of itch on back.  OK to dilute with cetaphil cream if too burning.       * clobetasol 0.05 % ointment    TEMOVATE    60 g    For flaring spots around ankles, apply at bedtime and cover with saran wrap       * clobetasol 0.05 % ointment    TEMOVATE    60 g    Apply to itchy areas twice daily as needed       COLCHICINE PO      Take 0.6 mg by mouth daily.       glimepiride 4 MG tablet    AMARYL    135 tablet    TAKE 1 AND 1/2 TABLETS (6MG)  EVERY DAY (REFILLS WITH  6/19/17 MD APPOINTMENT)       GNP GINGKO BILOBA EXTRACT PO      Take  by mouth.       indapamide 2.5 MG tablet    LOZOL     Take 2.5 mg by mouth every morning.       LIPITOR PO      Take 40 mg by mouth daily       metFORMIN 1000 MG tablet    GLUCOPHAGE    180 tablet    Take 1 tablet (1,000 mg) by mouth 2 times daily (with meals)       methotrexate 2.5 MG tablet CHEMO     30 tablet    TAKE 5 TABLETS ONE TIME WEEKLY       mirtazapine 15 MG tablet    REMERON    30 tablet    Take 1 tablet (15 mg) by mouth At Bedtime       Multi-vitamin Tabs tablet      Take 1 tablet by mouth daily.       nitroglycerin 0.4 MG sublingual tablet    NITROSTAT     Place under the tongue as needed       potassium chloride SA 20 MEQ CR tablet    K-DUR/KLOR-CON M    270 tablet    TAKE 1 TABLET THREE TIMES DAILY       torsemide 10 MG tablet    DEMADEX     Take 10 mg by mouth daily       triamcinolone 0.1 % ointment    KENALOG    454 g    Apply topically 2 times daily       VITAMIN B12 PO      Take 1 tablet by mouth daily       vitamin D 2000 UNITS Caps      Take 1 tablet by mouth daily       * Notice:  This list has 4 medication(s) that are the same as  other medications prescribed for you. Read the directions carefully, and ask your doctor or other care provider to review them with you.

## 2017-06-26 ENCOUNTER — TELEPHONE (OUTPATIENT)
Dept: DERMATOLOGY | Facility: CLINIC | Age: 77
End: 2017-06-26

## 2017-06-26 NOTE — TELEPHONE ENCOUNTER
Santa Pierre [phone]- Care coordinator for patient.     Patient and Santa called in together. Informed patient that [employer] mailed out his Methotrexate on 06/12/17. Patient want to make sure his labs were being followed. He has a lab draw appointment next week. He wanted to know the side effects of Methotrexate. Reviewed common and long term side effects. Patient would like the below information mailed to him.     Maria Eugenia Boyce RN         Methotrexate     What is it?   Methotrexate is in a class of medications known as antimetabolites. It was approved by the U.S. Food and Drug Administration in the 1970s for use in patients with psoriasis. It is usually sold as a generic (without a brand name).   How does it work?     Methotrexate binds to and inhibits an enzyme involved in the rapid growth of cells. In individuals with psoriasis, the drug slows down the rate of skin cell growth, and suppresses symptoms of inflammation in the joints for people with psoriatic arthritis.     Who can take it?   Side effects   The most common associated with methotrexate are:     Nausea     Tiredness, difficulty sleeping     Lightheadedness     Mouth ulcers     Vomiting     Headache     Fever, chills     Hair loss     Methotrexate is indicated for use in adults with severe psoriasis and/or psoriatic arthritis.     Who should not take it?     People with alcoholism, alcoholic liver disease or other   chronic liver diseases such as cirrhosis     People with immunodeficiency syndromes     Pregnant or nursing mothers     Men or women attempting a pregnancy     People with active peptic ulcers     People with significant liver or kidney abnormalities     People with active infectious disease     People with pre-existing blood problems such as underdevelopment of bone marrow, low white blood cell count, low platelets or significant anemia     How is it used?   Methotrexate is taken once a week, either by mouth (pill or liquid) or  by injection. The drug can be taken in a single dose or three doses taken at 12-hour intervals over a period of 24 hours. If doing well, a person may be taken off methotrexate until symptoms return. However, some individuals must continue a maintenance dose to stay clear.   Can it be used with other treatments?     Methotrexate can be used with PUVA (the light-sensitizing drug psoralen with ultraviolet light A or ultraviolet light B. In some cases, Soriatane (acitretin), cyclosporine or some biologics have been used with methotrexate. Talk with your health care provider about any other medications, vitamins or supplements you may be taking to prevent potential drug interactions.     Effectiveness   Improvement from methotrexate usually begins within three to six weeks of starting this drug. Individuals may not see maximal improvement for up to six months.     Risks   The main risk of long-term methotrexate treatment is the risk of liver damage. An estimated one out of 200 people will develop reversible liver scarring. Some patients can develop irreversible cirrhosis. The risk of liver damage can increase if a person drinks alcohol, has abnormal kidney function, is obese, has diabetes or has had prior liver disease.   Individuals taking methotrexate need to have regular blood tests. This is to ensure the drug is being safely processed by the body. This is also to make sure the liver, blood or bone marrow is not negatively affected. Methotrexate can cause a reduced white blood cell count. This can make a person more at risk for infection.     Pregnancy should be avoided if either partner is taking methotrexate. Men should be off methotrexate for at least three months before a couple tries to conceive. Women should wait at least four months after stopping methotrexate to become pregnant.     Side effects are generally manageable with careful monitoring and education. However, severe nausea or sores in the mouth could  mean that the dose is too high. Rarely, some side effects may occur years after the drug is used. This includes certain types of cancer, such as lymphoma, and bone marrow toxicity.     Folic acid supplements can decrease the side effects of methotrexate during treatment. However, folic acid should not be taken on the same days that methotrexate is taken. There is evidence that taking folic acid, even if only on days between methotrexate doses, can reduce the effectiveness of methotrexate. Talk with your health care provider about the best schedule for taking folic acid supplements.     Potential Interactions can occur when taking methotrexate. Your health care provider should always be informed of any other medications, therapies or dietary supplements you are using. Medications for inflammation or pain (including aspirin and ibuprofen) may increase the effects of methotrexate, which could be harmful. Some oral antibiotics can interfere with the absorption of methotrexate. Penicillin can make it more difficult for the kidneys to clear the drug out of the body. Sulfa drugs, especially those containing trimethoprim (brand names Septra or Bactrim), should not be taken while on methotrexate due to a potentially fatal interaction. Talk with your health care provider before taking any of these drugs with methotrexate.   Individuals are advised not to drink alcohol while on a course of methotrexate because it increases the chance of liver damage. On rare occasions, sensitivity to light can occur even when methotrexate is taken several days after exposure to ultraviolet light. This is called a  sunburn recall.      For detailed information on side effects and safety, talk with your doctor   Financial assistance information   Methotrexate Tablets 2.5mg methotrexate injection 25mg/ml (Rheumatrex)   Rx Outreach Medications methotrexate tablets 2.5mg (Rheumatrex)   [phone] [employer] Patient Assistance Program, [phone]    www.rxoutreach.com www.[employer]     [address]   [place] [address]   [phone]   education@psoriasis.org   www.psoriasis.org   National Psoriasis Foundation educational materials are medically reviewed and are not intended to replace the  of a physician. The Psoriasis Foundation does not endorse any medications, products or treatments for psoriasis or psoriatic arthritis and advises you to consult a physician before initiating any treatment.     National Psor[employer] August 2015 - [place]       Pt has been on Methotrexate 8-9 months.

## 2017-06-26 NOTE — TELEPHONE ENCOUNTER
Pt called with questions about his medication refill.  States his insurance company, that the provider refused to fill his Methotrexate medication.  After review of his chart it was noted this medication was filled on (6/9/2017) Recommended pt to call pharmacy and find the error or not filling is coming from the insurance company.

## 2017-06-28 PROBLEM — G89.29 CHRONIC PAIN OF BOTH SHOULDERS: Status: RESOLVED | Noted: 2017-01-11 | Resolved: 2017-06-28

## 2017-06-28 PROBLEM — M25.512 CHRONIC PAIN OF BOTH SHOULDERS: Status: RESOLVED | Noted: 2017-01-11 | Resolved: 2017-06-28

## 2017-06-28 PROBLEM — M25.511 CHRONIC PAIN OF BOTH SHOULDERS: Status: RESOLVED | Noted: 2017-01-11 | Resolved: 2017-06-28

## 2017-06-28 NOTE — PROGRESS NOTES
Subjective:    HPI                    Objective:    System    Physical Exam    General     ROS    Assessment/Plan:      Discharge Note    Progress reporting period is from Feb 22, 2017 to Apr 5, 2017.     Deandre failed to return for next follow up visit and current status is unknown.  Please see information below for last relevant information on current status.  Patient seen for Rxs Used: 9 visits.  SUBJECTIVE  Subjective changes noted by patient:  Subjective: Patient reports he was remodeling the house and yesterday he was helping his wife stack the shelve and now his right shoulder is shot. Left hurts a little bit more than it use to. Hurts more when her raises arm in scapular abduction.   .  Current pain level is Current Pain level: 3/10.     Previous pain level was  Initial Pain level: 5/10.   Changes in function:  Yes (See Goal flowsheet attached for changes in current functional level)  Adverse reaction to treatment or activity: None    OBJECTIVE  Changes noted in objective findings: Objective: AROM full and WNL. ER 5/5 right left 4-/5, IR right 5/5, 3+/5 left. HEP with no lifting away from body.      ASSESSMENT/PLAN  Diagnosis: bialteral shoulder pain   DIAGP:  The encounter diagnosis was Chronic pain of both shoulders.  Updated problem list and treatment plan:   Decreased function - HEP  Decreased strength - HEP  STG/LTGs have been met or progress has been made towards goals:  Yes, please see goal flowsheet for most current information  Assessment of Progress: current status is unknown.  Last current status: Assessment of progress: Pt has experienced exacerbation of symptoms   Self Management Plans:  HEP  I have re-evaluated this patient and find that the nature, scope, duration and intensity of the therapy is appropriate for the medical condition of the patient.  Deandre continues to require the following intervention to meet STG and LTG's:  HEP.    Recommendations:  Discharge with current home program.   Patient to follow up with MD as needed.    Please refer to the daily flowsheet for treatment today, total treatment time and time spent performing 1:1 timed codes.

## 2017-07-03 DIAGNOSIS — L20.89 OTHER ATOPIC DERMATITIS: Primary | ICD-10-CM

## 2017-07-05 DIAGNOSIS — Z79.899 MEDICATION MANAGEMENT: ICD-10-CM

## 2017-07-05 LAB
CREAT SERPL-MCNC: 1.12 MG/DL (ref 0.66–1.25)
GFR SERPL CREATININE-BSD FRML MDRD: 63 ML/MIN/1.7M2

## 2017-07-05 RX ORDER — CLOBETASOL PROPIONATE 0.5 MG/G
OINTMENT TOPICAL
Qty: 60 G | Refills: 1 | Status: SHIPPED | OUTPATIENT
Start: 2017-07-05 | End: 2018-06-04

## 2017-07-05 NOTE — TELEPHONE ENCOUNTER
Last seen 6/6/17: Future appt for 9/7/17  1. Atopic dermatitis. Good response to MTX 12.5mg weekly initiated 10/2017. Current cumulative dose 400 mg. Suboptimal topical therapy; reminded about use of Amlactin today and general gentle skin care.  Reminded not to donate blood or share medication.    Continue methotrexate 12.5 mg qw; safety labs today inc CBC, CMP ordered today    Patient is not taking the folic acid due to side effects of giddiness and joint pain    Reminded to use clobetasol 0.05% ointment BID prn to residual areas of involvement    Encouraged gentle skin care, emollient use        Follow-up in 3 months with Dr. Gray, earlier for new or changing lesions.

## 2017-07-05 NOTE — TELEPHONE ENCOUNTER
Received refill request for clobetasol ointment as [facility]. Dr. Gray's notes reviewed. Refill appropriate, and accepted.    Nica Moeller MD  Medicine/Dermatology, PGY-5  [facility]

## 2017-07-21 DIAGNOSIS — E11.9 TYPE 2 DIABETES MELLITUS WITHOUT COMPLICATION (H): ICD-10-CM

## 2017-07-24 RX ORDER — GLIMEPIRIDE 4 MG/1
TABLET ORAL
Qty: 90 TABLET | Refills: 1 | Status: SHIPPED | OUTPATIENT
Start: 2017-07-24 | End: 2018-01-08

## 2017-07-28 DIAGNOSIS — E10.9 DIABETES MELLITUS TYPE 1 (H): ICD-10-CM

## 2017-08-18 ENCOUNTER — TELEPHONE (OUTPATIENT)
Dept: GASTROENTEROLOGY | Facility: CLINIC | Age: 77
End: 2017-08-18

## 2017-08-18 NOTE — TELEPHONE ENCOUNTER
[facility] nurse coordinator calling on behalf of pt. States that records were sent in June for referral to GI for chronic constipation. States she has called to inquire about the records twice now and pt is upset that he is still waiting to schedule an appt. She would like a call back Monday to discuss, can be reached at [phone]. Marcela HAMEED LPN

## 2017-08-21 ENCOUNTER — TELEPHONE (OUTPATIENT)
Dept: GASTROENTEROLOGY | Facility: CLINIC | Age: 77
End: 2017-08-21

## 2017-09-07 ENCOUNTER — OFFICE VISIT (OUTPATIENT)
Dept: DERMATOLOGY | Facility: CLINIC | Age: 77
End: 2017-09-07

## 2017-09-07 DIAGNOSIS — Z79.899 ENCOUNTER FOR LONG-TERM CURRENT USE OF HIGH RISK MEDICATION: Primary | ICD-10-CM

## 2017-09-07 DIAGNOSIS — L30.8 OTHER ECZEMA: ICD-10-CM

## 2017-09-07 ASSESSMENT — PAIN SCALES - GENERAL: PAINLEVEL: NO PAIN (0)

## 2017-09-07 NOTE — LETTER
"9/7/2017       RE: Deandre Moore  [address] [facility] [address]     Dear Colleague,    Thank you for referring your patient, Deandre Moore, to the [facility] DERMATOLOGY at [facility]. Please see a copy of my visit note below.    [facility] Dermatology Note      Dermatology Problem List:  1. Macular amyloidosis  2. Atopic dermatitis  -previous tx: nbUVB  -current tx: methotrexate 12.5 mg q/week, clobetasol 0.05% BID, triamcinolone 0.1% PRN  3. Venous stasis dermatitis    Encounter Date: Sep 7, 2017    CC:  Chief Complaint   Patient presents with     Derm Problem     Deandre is here for a atopic dermatitis follow up.  States it's been \"much better\" since his last visit.           History of Present Illness:  Mr. Deandre Moore is a 76 year old male who presents as a follow-up for atopic dermatitis. The patient was last seen 6/6/17 for atopic dermatitis follow up at which time his methotrexate was continued. The patient reports significant improvement with only occasional itching at his flanks. Mr. Moore states he's been using triamcinolone BID instead of clobetasol because he thinks it's worked better. He denies itchiness of his LE.     Past Medical History:   Patient Active Problem List   Diagnosis     Itching     AD (atopic dermatitis)     Dermatitis     Hyperglycemia     Eczema, unspecified eczema     Intrinsic atopic dermatitis     Other eczema     Notalgia paresthetica     Rash     Pseudophakia of both eyes     Diabetes mellitus (H)     Presbyopia     Type 2 diabetes mellitus without complication, without long-term current use of insulin (H)     Encounter for long-term current use of high risk medication     Past Medical History:   Diagnosis Date     Diabetes mellitus (H)      Gout attack      Heart attack (H) 1984     Hypertension      Past Surgical History:   Procedure Laterality Date     CARDIAC SURGERY  1984    4 vessel " bypass     CHOLECYSTECTOMY       COLONOSCOPY       NO HISTORY OF SURGERY  3/31/14    derm     PHACOEMULSIFICATION WITH STANDARD INTRAOCULAR LENS IMPLANT  1/21/2013    Procedure: PHACOEMULSIFICATION WITH STANDARD INTRAOCULAR LENS IMPLANT;  Phacoemulsification with standard intraocular lens implant, right eye;  Surgeon: Jay Rodriges MD;  Location:  OR       Medications:  Current Outpatient Prescriptions   Medication Sig Dispense Refill     methotrexate 2.5 MG tablet CHEMO TAKE 5 TABLETS ONE TIME WEEKLY 30 tablet 1     metFORMIN (GLUCOPHAGE) 1000 MG tablet TAKE 1 TABLET TWICE DAILY (WITH MEALS) 180 tablet 1     potassium chloride SA (K-DUR/KLOR-CON M) 20 MEQ CR tablet TAKE 1 TABLET THREE TIMES DAILY 270 tablet 1     glimepiride (AMARYL) 4 MG tablet TAKE 1 AND 1/2 TABLETS EVERY DAY  (6MG  DAILY) 90 tablet 1     clobetasol (TEMOVATE) 0.05 % ointment Apply to areas of dermatitis on the body 60 g 1     clobetasol (TEMOVATE) 0.05 % ointment Apply to itchy areas twice daily as needed 60 g 1     capsaicin (ZOSTRIX) 0.075 % topical cream Apply topically 3 times daily To areas of itch on back.  OK to dilute with cetaphil cream if too burning. 60 g 3     clobetasol (TEMOVATE) 0.05 % ointment For flaring spots around ankles, apply at bedtime and cover with saran wrap 60 g 2     triamcinolone (KENALOG) 0.1 % ointment Apply topically 2 times daily 454 g 0     Blood Glucose Monitoring Suppl (BLOOD GLUCOSE MONITOR SYSTEM) W/DEVICE KIT Test 4 times daily with meter covered by  insurance 1 kit 1     blood glucose monitoring (NO BRAND SPECIFIED) test strip Use to test blood sugar 4 times daily  With meter/ strips/ lancets covered by insurance 360 each 3     blood glucose monitoring (ULTRA THIN 30G) lancets Use to test blood sugar 4 times daily with meter/ strips/ lancets covered by insurance 4 Box 3     ammonium lactate (LAC-HYDRIN) 12 % lotion Apply topically 2 times daily Apply twice daily to feet 500 g 1     blood  glucose monitoring (ACCU-CHEK FASTCLIX) lancets Use to test blood sugar 2 times daily or as directed. 2 Box 1     mirtazapine (REMERON) 15 MG tablet Take 1 tablet (15 mg) by mouth At Bedtime 30 tablet 1     Cholecalciferol (VITAMIN D) 2000 UNITS CAPS Take 1 tablet by mouth daily       Cyanocobalamin (VITAMIN B12 PO) Take 1 tablet by mouth daily       nitroglycerin (NITROSTAT) 0.4 MG SL tablet Place under the tongue as needed       torsemide (DEMADEX) 10 MG tablet Take 10 mg by mouth daily       aspirin 81 MG tablet Take 81 mg by mouth daily.       LORazepam (ATIVAN PO) Take 2.5 mg by mouth At Bedtime.       Irbesartan (AVAPRO PO) Take 150 mg by mouth daily.       Atorvastatin Calcium (LIPITOR PO) Take 40 mg by mouth daily        indapamide (LOZOL) 2.5 MG tablet Take 2.5 mg by mouth every morning.       COLCHICINE PO Take 0.6 mg by mouth daily.       multivitamin, therapeutic with minerals (MULTI-VITAMIN) TABS Take 1 tablet by mouth daily.       Ginkgo Biloba (GNP GINGKO BILOBA EXTRACT PO) Take  by mouth.       [DISCONTINUED] methotrexate 2.5 MG tablet CHEMO TAKE 5 TABLETS ONE TIME WEEKLY 30 tablet 1     Allergies   Allergen Reactions     Beta Adrenergic Blockers Unknown     Latex Dermatitis     Tape [Adhesive Tape] Dermatitis     Electrode patches         Review of Systems:  -Constitutional: The patient denies fatigue, fevers, chills, unintended weight loss, and night sweats.  -HEENT: Patient denies nonhealing oral sores.  -Skin: As above in HPI. No additional skin concerns.  Otherwise 10 point ROS negative    Physical exam:  Vitals: There were no vitals taken for this visit.  GEN: This is a well developed, well-nourished male in no acute distress, in a pleasant mood.    SKIN: Focused examination of the back and LE was performed.  -Hyperpigmented patches over scapulae and lower back  -hyperpigmentation of anterior shins and ankles  -No other lesions of concern on areas examined.     Impression/Plan:  1. Atopic  dermatitis. Patient reports improved symptoms with methotrexate. Patient using triamcinolone 0.1% BID and clobetasol 0.05% PRN. Labs on 6/6/2017 showed increased Cr at 1.36 however most recent labs on 7/5 showed improved value of 1.12. However patient should have CBC, kidney and liver studies rechecked by PCP (patient lives several hours away and lab is closed here today).     Continue methotrexate 12.5 mg q/week, clobetasol 0.05% BID, triamcinolone 0.1% PRN    Obtain labs (CBC, liver, kidney) with PCP      CC Dr. Denilson Thomas on close of this encounter.  Follow-up in 3 months, earlier for new or changing lesions.     Staff Involved:  Scribed by Raya Anaya MS4 for Dr. Gray.      The medical student acted as a scribe with respect to this patient.  I have performed all the key elements of the history and physical.    Miguel Gray MD  Dermatology Attending

## 2017-09-07 NOTE — MR AVS SNAPSHOT
After Visit Summary   9/7/2017    Deandre Moore    MRN: [id number]           Patient Information     Date Of Birth          1940        Visit Information        Provider Department      9/7/2017 2:15 PM Miguel Gray MD [facility] Dermatology        Today's Diagnoses     Other eczema          Care Instructions    Ask your family doctor to please recheck:    CBC with platelets  LFTs  Creatinine          Follow-ups after your visit        Your next 10 appointments already scheduled     Dec 07, 2017  2:15 PM CST   (Arrive by 2:00 PM)   Return Visit with Miguel Gray MD   [facility] Dermatology ([facility])    [address] [place] [address] [phone]            Jun 11, 2018 10:45 AM CDT   Return Visit with Davis Whyte MD, MG ENDO NURSE   [facility] ([facility]    [address] [place] [address] [phone]              Who to contact     Please call your clinic at [phone] to:    Ask questions about your health    Make or cancel appointments    Discuss your medicines    Learn about your test results    Speak to your doctor   If you have compliments or concerns about an experience at your clinic, or if you wish to file a complaint, please contact [facility] Patient Relations at [phone] or email us at [email or web address]         Additional Information About Your Visit        MyChart Information     "[employer]t gives you secure access to your electronic health record. If you see a primary care provider, you can also send messages to your care team and make appointments. If you have questions, please call your primary care clinic.  If you do not have a primary care provider, please call [phone] and they will assist you.      [employer] is an electronic gateway that provides easy, online access to your medical records. With [employer]Stay,  you can request a clinic appointment, read your test results, renew a prescription or communicate with your care team.     To access your existing account, please contact your [facility] or call [phone] for assistance.        Care EveryWhere ID     This is your Care EveryWhere ID. This could be used by other organizations to access your [place] medical records  [id number]         Blood Pressure from Last 3 Encounters:   06/19/17 121/73   01/02/17 110/60   06/07/16 105/61    Weight from Last 3 Encounters:   06/19/17 91.2 kg (201 lb)   01/02/17 93.4 kg (206 lb)   06/07/16 93.8 kg (206 lb 12.8 oz)              Today, you had the following     No orders found for display         Today's Medication Changes          These changes are accurate as of: 9/7/17  5:40 PM.  If you have any questions, ask your nurse or doctor.               These medicines have changed or have updated prescriptions.        Dose/Directions    methotrexate 2.5 MG tablet CHEMO   This may have changed:  See the new instructions.   Used for:  Other eczema   Changed by:  Miguel Gray MD        TAKE 5 TABLETS ONE TIME WEEKLY   Quantity:  30 tablet   Refills:  1            Where to get your medicines      These medications were sent to [facility] [address] [place] [address]     Phone:  [phone]     methotrexate 2.5 MG tablet CHEMO                Primary Care Provider Office Phone # Fax #    Denilson Thomas [phone]       [place] [id number]        Equal Access to Services     ALEXANDRA ROBERTSON : Hadii karin workman hadasho Sodonna, waaxda luqadaha, qaybta kaalmada benoit, mason foster. So [facility] [phone].    ATENCIÓN: Si habla español, tiene a velazco disposición servicios gratuitos de asistencia lingüística. Llame al [phone].    We comply with applicable federal civil  rights laws and [place] laws. We do not discriminate on the basis of race, color, national origin, age, disability sex, sexual orientation or gender identity.            Thank you!     Thank you for choosing [facility] DERMATOLOGY  for your care. Our goal is always to provide you with excellent care. Hearing back from our patients is one way we can continue to improve our services. Please take a few minutes to complete the written survey that you may receive in the mail after your visit with us. Thank you!             Your Updated Medication List - Protect others around you: Learn how to safely use, store and throw away your medicines at www.disposemymeds.org.          This list is accurate as of: 9/7/17  5:40 PM.  Always use your most recent med list.                   Brand Name Dispense Instructions for use Diagnosis    ammonium lactate 12 % lotion    LAC-HYDRIN    500 g    Apply topically 2 times daily Apply twice daily to feet    Hyperglycemia       aspirin 81 MG tablet      Take 81 mg by mouth daily.        ATIVAN PO      Take 2.5 mg by mouth At Bedtime.        AVAPRO PO      Take 150 mg by mouth daily.        Blood Glucose Monitor System W/DEVICE Kit     1 kit    Test 4 times daily with meter covered by  insurance    Type 2 diabetes mellitus (H)       * blood glucose monitoring lancets     2 Box    Use to test blood sugar 2 times daily or as directed.    Hyperglycemia       * blood glucose monitoring lancets     4 Box    Use to test blood sugar 4 times daily with meter/ strips/ lancets covered by insurance    Type 2 diabetes mellitus (H)       blood glucose monitoring test strip    no brand specified    360 each    Use to test blood sugar 4 times daily  With meter/ strips/ lancets covered by insurance    Type 2 diabetes mellitus (H)       capsaicin 0.075 % cream    ZOSTRIX    60 g    Apply topically 3 times daily To areas of itch on back.  OK to dilute with cetaphil cream if too burning.    Notalgia  paresthetica       * clobetasol 0.05 % ointment    TEMOVATE    60 g    For flaring spots around ankles, apply at bedtime and cover with saran wrap    Dermatitis       * clobetasol 0.05 % ointment    TEMOVATE    60 g    Apply to itchy areas twice daily as needed    Rash       * clobetasol 0.05 % ointment    TEMOVATE    60 g    Apply to areas of dermatitis on the body    Other atopic dermatitis       COLCHICINE PO      Take 0.6 mg by mouth daily.        glimepiride 4 MG tablet    AMARYL    90 tablet    TAKE 1 AND 1/2 TABLETS EVERY DAY  (6MG  DAILY)    Type 2 diabetes mellitus without complication (H)       GNP GINGKO BILOBA EXTRACT PO      Take  by mouth.        indapamide 2.5 MG tablet    LOZOL     Take 2.5 mg by mouth every morning.        LIPITOR PO      Take 40 mg by mouth daily        metFORMIN 1000 MG tablet    GLUCOPHAGE    180 tablet    TAKE 1 TABLET TWICE DAILY (WITH MEALS)    Diabetes mellitus type 1 (H)       methotrexate 2.5 MG tablet CHEMO     30 tablet    TAKE 5 TABLETS ONE TIME WEEKLY    Other eczema       mirtazapine 15 MG tablet    REMERON    30 tablet    Take 1 tablet (15 mg) by mouth At Bedtime    Pruritus       Multi-vitamin Tabs tablet      Take 1 tablet by mouth daily.        nitroGLYcerin 0.4 MG sublingual tablet    NITROSTAT     Place under the tongue as needed        potassium chloride SA 20 MEQ CR tablet    K-DUR/KLOR-CON M    270 tablet    TAKE 1 TABLET THREE TIMES DAILY    Type 2 diabetes mellitus with hyperglycemia, unspecified long term insulin use status (H)       torsemide 10 MG tablet    DEMADEX     Take 10 mg by mouth daily        triamcinolone 0.1 % ointment    KENALOG    454 g    Apply topically 2 times daily    Dermatitis       VITAMIN B12 PO      Take 1 tablet by mouth daily        vitamin D 2000 UNITS Caps      Take 1 tablet by mouth daily        * Notice:  This list has 5 medication(s) that are the same as other medications prescribed for you. Read the directions carefully, and  ask your doctor or other care provider to review them with you.

## 2017-09-07 NOTE — PROGRESS NOTES
"[facility] Dermatology Note      Dermatology Problem List:  1. Macular amyloidosis  2. Atopic dermatitis  -previous tx: nbUVB  -current tx: methotrexate 12.5 mg q/week, clobetasol 0.05% BID, triamcinolone 0.1% PRN  3. Venous stasis dermatitis    Encounter Date: Sep 7, 2017    CC:  Chief Complaint   Patient presents with     Derm Problem     Deandre is here for a atopic dermatitis follow up.  States it's been \"much better\" since his last visit.           History of Present Illness:  Mr. Deandre Moore is a 76 year old male who presents as a follow-up for atopic dermatitis. The patient was last seen 6/6/17 for atopic dermatitis follow up at which time his methotrexate was continued. The patient reports significant improvement with only occasional itching at his flanks. Mr. Moore states he's been using triamcinolone BID instead of clobetasol because he thinks it's worked better. He denies itchiness of his LE.     Past Medical History:   Patient Active Problem List   Diagnosis     Itching     AD (atopic dermatitis)     Dermatitis     Hyperglycemia     Eczema, unspecified eczema     Intrinsic atopic dermatitis     Other eczema     Notalgia paresthetica     Rash     Pseudophakia of both eyes     Diabetes mellitus (H)     Presbyopia     Type 2 diabetes mellitus without complication, without long-term current use of insulin (H)     Encounter for long-term current use of high risk medication     Past Medical History:   Diagnosis Date     Diabetes mellitus (H)      Gout attack      Heart attack (H) 1984     Hypertension      Past Surgical History:   Procedure Laterality Date     CARDIAC SURGERY  1984    4 vessel bypass     CHOLECYSTECTOMY       COLONOSCOPY       NO HISTORY OF SURGERY  3/31/14    derm     PHACOEMULSIFICATION WITH STANDARD INTRAOCULAR LENS IMPLANT  1/21/2013    Procedure: PHACOEMULSIFICATION WITH STANDARD INTRAOCULAR LENS IMPLANT;  Phacoemulsification with standard intraocular lens " implant, right eye;  Surgeon: Jay Rodriges MD;  Location:  OR       Medications:  Current Outpatient Prescriptions   Medication Sig Dispense Refill     methotrexate 2.5 MG tablet CHEMO TAKE 5 TABLETS ONE TIME WEEKLY 30 tablet 1     metFORMIN (GLUCOPHAGE) 1000 MG tablet TAKE 1 TABLET TWICE DAILY (WITH MEALS) 180 tablet 1     potassium chloride SA (K-DUR/KLOR-CON M) 20 MEQ CR tablet TAKE 1 TABLET THREE TIMES DAILY 270 tablet 1     glimepiride (AMARYL) 4 MG tablet TAKE 1 AND 1/2 TABLETS EVERY DAY  (6MG  DAILY) 90 tablet 1     clobetasol (TEMOVATE) 0.05 % ointment Apply to areas of dermatitis on the body 60 g 1     clobetasol (TEMOVATE) 0.05 % ointment Apply to itchy areas twice daily as needed 60 g 1     capsaicin (ZOSTRIX) 0.075 % topical cream Apply topically 3 times daily To areas of itch on back.  OK to dilute with cetaphil cream if too burning. 60 g 3     clobetasol (TEMOVATE) 0.05 % ointment For flaring spots around ankles, apply at bedtime and cover with saran wrap 60 g 2     triamcinolone (KENALOG) 0.1 % ointment Apply topically 2 times daily 454 g 0     Blood Glucose Monitoring Suppl (BLOOD GLUCOSE MONITOR SYSTEM) W/DEVICE KIT Test 4 times daily with meter covered by  insurance 1 kit 1     blood glucose monitoring (NO BRAND SPECIFIED) test strip Use to test blood sugar 4 times daily  With meter/ strips/ lancets covered by insurance 360 each 3     blood glucose monitoring (ULTRA THIN 30G) lancets Use to test blood sugar 4 times daily with meter/ strips/ lancets covered by insurance 4 Box 3     ammonium lactate (LAC-HYDRIN) 12 % lotion Apply topically 2 times daily Apply twice daily to feet 500 g 1     blood glucose monitoring (ACCU-CHEK FASTCLIX) lancets Use to test blood sugar 2 times daily or as directed. 2 Box 1     mirtazapine (REMERON) 15 MG tablet Take 1 tablet (15 mg) by mouth At Bedtime 30 tablet 1     Cholecalciferol (VITAMIN D) 2000 UNITS CAPS Take 1 tablet by mouth daily        Cyanocobalamin (VITAMIN B12 PO) Take 1 tablet by mouth daily       nitroglycerin (NITROSTAT) 0.4 MG SL tablet Place under the tongue as needed       torsemide (DEMADEX) 10 MG tablet Take 10 mg by mouth daily       aspirin 81 MG tablet Take 81 mg by mouth daily.       LORazepam (ATIVAN PO) Take 2.5 mg by mouth At Bedtime.       Irbesartan (AVAPRO PO) Take 150 mg by mouth daily.       Atorvastatin Calcium (LIPITOR PO) Take 40 mg by mouth daily        indapamide (LOZOL) 2.5 MG tablet Take 2.5 mg by mouth every morning.       COLCHICINE PO Take 0.6 mg by mouth daily.       multivitamin, therapeutic with minerals (MULTI-VITAMIN) TABS Take 1 tablet by mouth daily.       Ginkgo Biloba (GNP GINGKO BILOBA EXTRACT PO) Take  by mouth.       [DISCONTINUED] methotrexate 2.5 MG tablet CHEMO TAKE 5 TABLETS ONE TIME WEEKLY 30 tablet 1     Allergies   Allergen Reactions     Beta Adrenergic Blockers Unknown     Latex Dermatitis     Tape [Adhesive Tape] Dermatitis     Electrode patches         Review of Systems:  -Constitutional: The patient denies fatigue, fevers, chills, unintended weight loss, and night sweats.  -HEENT: Patient denies nonhealing oral sores.  -Skin: As above in HPI. No additional skin concerns.  Otherwise 10 point ROS negative    Physical exam:  Vitals: There were no vitals taken for this visit.  GEN: This is a well developed, well-nourished male in no acute distress, in a pleasant mood.    SKIN: Focused examination of the back and LE was performed.  -Hyperpigmented patches over scapulae and lower back  -hyperpigmentation of anterior shins and ankles  -No other lesions of concern on areas examined.     Impression/Plan:  1. Atopic dermatitis. Patient reports improved symptoms with methotrexate. Patient using triamcinolone 0.1% BID and clobetasol 0.05% PRN. Labs on 6/6/2017 showed increased Cr at 1.36 however most recent labs on 7/5 showed improved value of 1.12. However patient should have CBC, kidney and liver  studies rechecked by PCP (patient lives several hours away and lab is closed here today).     Continue methotrexate 12.5 mg q/week, clobetasol 0.05% BID, triamcinolone 0.1% PRN    Obtain labs (CBC, liver, kidney) with PCP      CC Dr. Denilson Thomas on close of this encounter.  Follow-up in 3 months, earlier for new or changing lesions.     Staff Involved:  Scribed by Raya Anaya, MS4 for Dr. Gray.      The medical student acted as a scribe with respect to this patient.  I have performed all the key elements of the history and physical.    Miguel Gray MD  Dermatology Attending

## 2017-09-07 NOTE — NURSING NOTE
"Dermatology Rooming Note    Deandre Moore's goals for this visit include:   Chief Complaint   Patient presents with     Derm Problem     Deandre is here for a atopic dermatitis follow up.  States it's been \"much better\" since his last visit.         Neelam Galaviz LPN     "

## 2017-10-09 ENCOUNTER — DOCUMENTATION ONLY (OUTPATIENT)
Dept: ENDOCRINOLOGY | Facility: CLINIC | Age: 77
End: 2017-10-09

## 2017-10-09 NOTE — PROGRESS NOTES
Prescription order completed and faxed to [phone].    Milla Barnett LPN  Adult Endocrinology  [facility]

## 2017-11-01 DIAGNOSIS — L30.8 OTHER ECZEMA: ICD-10-CM

## 2017-11-02 NOTE — TELEPHONE ENCOUNTER
Pt with a refractory eczematous dermatitis is requesting a  Refill of MTX 12.5mg Qweek. Pt will see Dr. Gray in mid-September, so I have approved a refill until then.

## 2017-11-02 NOTE — TELEPHONE ENCOUNTER
Last seen:9/7/17  Next appt:12/7/17    1. Atopic dermatitis. Patient reports improved symptoms with methotrexate. Patient using triamcinolone 0.1% BID and clobetasol 0.05% PRN. Labs on 6/6/2017 showed increased Cr at 1.36 however most recent labs on 7/5 showed improved value of 1.12. However patient should have CBC, kidney and liver studies rechecked by PCP (patient lives several hours away and lab is closed here today).     Continue methotrexate 12.5 mg q/week, clobetasol 0.05% BID, triamcinolone 0.1% PRN    Obtain labs (CBC, liver, kidney) with PCP        CC Dr. Denilson Thomas on close of this encounter.  Follow-up in 3 months, earlier for new or changing lesions.

## 2017-11-13 ENCOUNTER — OFFICE VISIT (OUTPATIENT)
Dept: OPHTHALMOLOGY | Facility: CLINIC | Age: 77
End: 2017-11-13
Payer: COMMERCIAL

## 2017-11-13 DIAGNOSIS — Z96.1 PSEUDOPHAKIA OF BOTH EYES: ICD-10-CM

## 2017-11-13 DIAGNOSIS — E11.9 TYPE 2 DIABETES MELLITUS WITHOUT COMPLICATION, UNSPECIFIED LONG TERM INSULIN USE STATUS: Primary | ICD-10-CM

## 2017-11-13 PROCEDURE — 92014 COMPRE OPH EXAM EST PT 1/>: CPT | Performed by: OPHTHALMOLOGY

## 2017-11-13 ASSESSMENT — SLIT LAMP EXAM - LIDS
COMMENTS: NORMAL
COMMENTS: NORMAL

## 2017-11-13 ASSESSMENT — CONF VISUAL FIELD
OS_NORMAL: 1
OD_NORMAL: 1

## 2017-11-13 ASSESSMENT — REFRACTION_WEARINGRX
OS_ADD: +2.25
OS_SPHERE: -0.25
OD_ADD: +2.25
OD_SPHERE: PLANO
OD_CYLINDER: +1.00
OD_AXIS: 005
OS_AXIS: 166
OS_CYLINDER: +1.00
SPECS_TYPE: PAL

## 2017-11-13 ASSESSMENT — TONOMETRY
OD_IOP_MMHG: 11
OS_IOP_MMHG: 11
IOP_METHOD: TONOPEN

## 2017-11-13 ASSESSMENT — VISUAL ACUITY
OD_CC+: -1
OS_CC+: -2
OD_CC: 20/20
OD_CC: 20/20
OS_CC: 20/15
OS_CC: 20/25-
METHOD: SNELLEN - LINEAR
CORRECTION_TYPE: GLASSES

## 2017-11-13 ASSESSMENT — EXTERNAL EXAM - RIGHT EYE: OD_EXAM: NORMAL

## 2017-11-13 ASSESSMENT — CUP TO DISC RATIO
OS_RATIO: 0.5
OD_RATIO: 0.5

## 2017-11-13 ASSESSMENT — EXTERNAL EXAM - LEFT EYE: OS_EXAM: NORMAL

## 2017-11-13 ASSESSMENT — ENCOUNTER SYMPTOMS: JOINT SWELLING: 1

## 2017-11-13 NOTE — MR AVS SNAPSHOT
After Visit Summary   11/13/2017    Deandre Moore    MRN: [id number]           Patient Information     Date Of Birth          1940        Visit Information        Provider Department      11/13/2017 2:45 PM Jay Rodriges MD; MG OPHTH NURSE ONLY [facility]        Today's Diagnoses     Type 2 diabetes mellitus without complication, unspecified long term insulin use status (H)    -  1    Pseudophakia of both eyes           Follow-ups after your visit        Follow-up notes from your care team     Return in about 1 year (around 11/13/2018) for Diabetic eye exam.      Your next 10 appointments already scheduled     Jun 04, 2018 10:45 AM CDT   Return Visit with Davis Whyte MD, MG ENDO NURSE   [facility] ([facility])    [id number] [place] [id number] [phone]              Who to contact     If you have questions or need follow up information about today's clinic visit or your schedule please contact [facility] directly at [phone].  Normal or non-critical lab and imaging results will be communicated to you by [employer], letter or phone within 4 business days after the clinic has received the results. If you do not hear from us within 7 days, please contact the clinic through [employer] or phone. If you have a critical or abnormal lab result, we will notify you by phone as soon as possible.  Submit refill requests through [employer] or call your pharmacy and they will forward the refill request to us. Please allow 3 business days for your refill to be completed.          Additional Information About Your Visit        MyChart Information     [employer] gives you secure access to your electronic health record. If you see a primary care provider, you can also send messages to your care team and make appointments. If you have questions, please call your primary care clinic.  If you do not have a  primary care provider, please call [phone] and they will assist you.      [employer] is an electronic gateway that provides easy, online access to your medical records. With [employer], you can request a clinic appointment, read your test results, renew a prescription or communicate with your care team.     To access your existing account, please contact your [facility] or call [phone] for assistance.        Care EveryWhere ID     This is your Care EveryWhere ID. This could be used by other organizations to access your Jud medical records  [id number]         Blood Pressure from Last 3 Encounters:   06/19/17 121/73   01/02/17 110/60   06/07/16 105/61    Weight from Last 3 Encounters:   06/19/17 91.2 kg (201 lb)   01/02/17 93.4 kg (206 lb)   06/07/16 93.8 kg (206 lb 12.8 oz)              We Performed the Following     EYE EXAM, EST PATIENT,COMPREHESV        Primary Care Provider Office Phone # Fax #    Denilson Thomas [phone]       [address] [address]        Equal Access to Services     [facility]A [facility] : Hadii aad ku hadasho Soomaali, waaxda luqadaha, qaybta kaalmada adeegyada, waxay idiin hayaan adeeg kharaderrick laregi ah. So [facility] [phone].    ATENCIÓN: Si habla español, tiene a velazco disposición servicios gratuitos de asistencia lingüística. Llame al [phone].    We comply with applicable federal civil rights laws and [place] laws. We do not discriminate on the basis of race, color, national origin, age, disability, sex, sexual orientation, or gender identity.            Thank you!     Thank you for choosing [facility]  for your care. Our goal is always to provide you with excellent care. Hearing back from our patients is one way we can continue to improve our services. Please take a few minutes to complete the written survey that you may receive in the mail after your visit with us. Thank you!              Your Updated Medication List - Protect others around you: Learn how to safely use, store and throw away your medicines at www.disposemymeds.org.          This list is accurate as of: 11/13/17 11:59 PM.  Always use your most recent med list.                   Brand Name Dispense Instructions for use Diagnosis    ammonium lactate 12 % lotion    LAC-HYDRIN    500 g    Apply topically 2 times daily Apply twice daily to feet    Hyperglycemia       aspirin 81 MG tablet      Take 81 mg by mouth daily.        ATIVAN PO      Take 2.5 mg by mouth At Bedtime.        AVAPRO PO      Take 150 mg by mouth daily.        Blood Glucose Monitor System W/DEVICE Kit     1 kit    Test 4 times daily with meter covered by  insurance    Type 2 diabetes mellitus (H)       * blood glucose monitoring lancets     2 Box    Use to test blood sugar 2 times daily or as directed.    Hyperglycemia       * blood glucose monitoring lancets     4 Box    Use to test blood sugar 4 times daily with meter/ strips/ lancets covered by insurance    Type 2 diabetes mellitus (H)       blood glucose monitoring test strip    no brand specified    360 each    Use to test blood sugar 4 times daily  With meter/ strips/ lancets covered by insurance    Type 2 diabetes mellitus (H)       capsaicin 0.075 % cream    ZOSTRIX    60 g    Apply topically 3 times daily To areas of itch on back.  OK to dilute with cetaphil cream if too burning.    Notalgia paresthetica       * clobetasol 0.05 % ointment    TEMOVATE    60 g    For flaring spots around ankles, apply at bedtime and cover with saran wrap    Dermatitis       * clobetasol 0.05 % ointment    TEMOVATE    60 g    Apply to itchy areas twice daily as needed    Rash       * clobetasol 0.05 % ointment    TEMOVATE    60 g    Apply to areas of dermatitis on the body    Other atopic dermatitis       COLCHICINE PO      Take 0.6 mg by mouth daily.        glimepiride 4 MG tablet    AMARYL    90 tablet    TAKE 1 AND 1/2  TABLETS EVERY DAY  (6MG  DAILY)    Type 2 diabetes mellitus without complication (H)       GNP GINGKO BILOBA EXTRACT PO      Take  by mouth.        indapamide 2.5 MG tablet    LOZOL     Take 2.5 mg by mouth every morning.        LIPITOR PO      Take 40 mg by mouth daily        metFORMIN 1000 MG tablet    GLUCOPHAGE    180 tablet    TAKE 1 TABLET TWICE DAILY (WITH MEALS)    Diabetes mellitus type 1 (H)       methotrexate 2.5 MG tablet CHEMO     40 tablet    TAKE 5 TABLETS ONE DAY PER WEEK    Other eczema       mirtazapine 15 MG tablet    REMERON    30 tablet    Take 1 tablet (15 mg) by mouth At Bedtime    Pruritus       Multi-vitamin Tabs tablet      Take 1 tablet by mouth daily.        nitroGLYcerin 0.4 MG sublingual tablet    NITROSTAT     Place under the tongue as needed        potassium chloride SA 20 MEQ CR tablet    K-DUR/KLOR-CON M    270 tablet    TAKE 1 TABLET THREE TIMES DAILY    Type 2 diabetes mellitus with hyperglycemia, unspecified long term insulin use status (H)       torsemide 10 MG tablet    DEMADEX     Take 10 mg by mouth daily        triamcinolone 0.1 % ointment    KENALOG    454 g    Apply topically 2 times daily    Dermatitis       VITAMIN B12 PO      Take 1 tablet by mouth daily        vitamin D 2000 UNITS Caps      Take 1 tablet by mouth daily        * Notice:  This list has 5 medication(s) that are the same as other medications prescribed for you. Read the directions carefully, and ask your doctor or other care provider to review them with you.

## 2017-11-13 NOTE — NURSING NOTE
Patient presents with:  Diabetic Eye Exam: Type II DM,  this am, A1c 6/19/17, 5.9  COMPREHENSIVE EYE EXAM      Referring Provider:  No referring provider defined for this encounter.    HPI    Last Eye Exam:  10/10/16   Informant(s):  EMR   Symptoms:           Do you have eye pain now?:  No      Comments:  No VA complaints.

## 2017-11-13 NOTE — PROGRESS NOTES
Assessment & Plan   Deandre Moore is a 76 year old male who presents with:   Review of systems for the eyes was negative other than the pertinent positives and negatives noted in the HPI.  History is obtained from the patient;    Type 2 diabetes mellitus without complication, unspecified long term insulin use status (H)  - No signs of retinopathy  - Continue good BS control    Pseudophakia of both eyes  - stable    Return in 1 year for annual exam    Documentation for today's encounter was performed by Estela ASHLEY. OSC. Acting as a scribe in my presence. I have reviewed and verified that it is an accurate recording of today's encounter.    Attending Physician Attestation:  Complete documentation of historical and exam elements from today's encounter can be found in the full encounter summary report (not reduplicated in this progress note).  I personally obtained the chief complaint(s) and history of present illness.  I confirmed and edited as necessary the review of systems, past medical/surgical history, family history, social history, and examination findings as documented by others; and I examined the patient myself.  I personally reviewed the relevant tests, images, and reports as documented above.  I formulated and edited as necessary the assessment and plan and discussed the findings and management plan with the patient and family. - Jay Rodriges MD

## 2017-12-05 ENCOUNTER — TELEPHONE (OUTPATIENT)
Dept: DERMATOLOGY | Facility: CLINIC | Age: 77
End: 2017-12-05

## 2017-12-05 NOTE — TELEPHONE ENCOUNTER
A Pharmacy representative called and requested a refill medication on behalf of the pt. (Candesartan)  Medication was not noted on pt med list.  Pharmacy was going to call pt back to verify with pt name of medication and provider.

## 2017-12-12 DIAGNOSIS — L20.89 OTHER ATOPIC DERMATITIS: Primary | ICD-10-CM

## 2017-12-12 NOTE — TELEPHONE ENCOUNTER
Okay to send new RX per Dr. Gray - clobetasol is no longer covered. Please send betamethasone    Last seen 9/9/17:  1. Atopic dermatitis. Patient reports improved symptoms with methotrexate. Patient using triamcinolone 0.1% BID and clobetasol 0.05% PRN. Labs on 6/6/2017 showed increased Cr at 1.36 however most recent labs on 7/5 showed improved value of 1.12. However patient should have CBC, kidney and liver studies rechecked by PCP (patient lives several hours away and lab is closed here today).     Continue methotrexate 12.5 mg q/week, clobetasol 0.05% BID, triamcinolone 0.1% PRN    Obtain labs (CBC, liver, kidney) with PCP        CC Dr. Denilson Thomas on close of this encounter.  Follow-up in 3 months, earlier for new or changing lesions.

## 2018-01-08 DIAGNOSIS — L30.8 OTHER ECZEMA: ICD-10-CM

## 2018-01-09 NOTE — TELEPHONE ENCOUNTER
Last seen;  Next appt:    1. Atopic dermatitis. Patient reports improved symptoms with methotrexate. Patient using triamcinolone 0.1% BID and clobetasol 0.05% PRN. Labs on 6/6/2017 showed increased Cr at 1.36 however most recent labs on 7/5 showed improved value of 1.12. However patient should have CBC, kidney and liver studies rechecked by PCP (patient lives several hours away and lab is closed here today).     Continue methotrexate 12.5 mg q/week, clobetasol 0.05% BID, triamcinolone 0.1% PRN    Obtain labs (CBC, liver, kidney) with PCP        CC Dr. Denilson Thomas on close of this encounter.  Follow-up in 3 months, earlier for new or changing lesions.

## 2018-01-12 NOTE — TELEPHONE ENCOUNTER
Patient has not followed up in clinic in timeline recommended and no recent labs for MTX. Please schedule next available with Dr. Gray, non-urgent.

## 2018-02-16 ENCOUNTER — APPOINTMENT (OUTPATIENT)
Dept: LAB | Facility: CLINIC | Age: 78
End: 2018-02-16
Payer: COMMERCIAL

## 2018-02-16 ENCOUNTER — OFFICE VISIT (OUTPATIENT)
Dept: DERMATOLOGY | Facility: CLINIC | Age: 78
End: 2018-02-16
Payer: COMMERCIAL

## 2018-02-16 DIAGNOSIS — L30.9 DERMATITIS: ICD-10-CM

## 2018-02-16 DIAGNOSIS — L30.8 OTHER ECZEMA: Primary | ICD-10-CM

## 2018-02-16 DIAGNOSIS — L20.84 INTRINSIC ATOPIC DERMATITIS: ICD-10-CM

## 2018-02-16 DIAGNOSIS — Z79.899 ENCOUNTER FOR LONG-TERM CURRENT USE OF HIGH RISK MEDICATION: ICD-10-CM

## 2018-02-16 LAB
ALBUMIN SERPL-MCNC: 4.1 G/DL (ref 3.4–5)
ALP SERPL-CCNC: 96 U/L (ref 40–150)
ALT SERPL W P-5'-P-CCNC: 36 U/L (ref 0–70)
ANION GAP SERPL CALCULATED.3IONS-SCNC: 9 MMOL/L (ref 3–14)
AST SERPL W P-5'-P-CCNC: 26 U/L (ref 0–45)
BASOPHILS # BLD AUTO: 0.1 10E9/L (ref 0–0.2)
BASOPHILS NFR BLD AUTO: 0.9 %
BILIRUB SERPL-MCNC: 0.9 MG/DL (ref 0.2–1.3)
BUN SERPL-MCNC: 20 MG/DL (ref 7–30)
CALCIUM SERPL-MCNC: 9.5 MG/DL (ref 8.5–10.1)
CHLORIDE SERPL-SCNC: 94 MMOL/L (ref 94–109)
CO2 SERPL-SCNC: 33 MMOL/L (ref 20–32)
CREAT SERPL-MCNC: 1.02 MG/DL (ref 0.66–1.25)
DIFFERENTIAL METHOD BLD: NORMAL
EOSINOPHIL # BLD AUTO: 0.3 10E9/L (ref 0–0.7)
EOSINOPHIL NFR BLD AUTO: 4.4 %
ERYTHROCYTE [DISTWIDTH] IN BLOOD BY AUTOMATED COUNT: 13.3 % (ref 10–15)
GFR SERPL CREATININE-BSD FRML MDRD: 70 ML/MIN/1.7M2
GLUCOSE SERPL-MCNC: 195 MG/DL (ref 70–99)
HCT VFR BLD AUTO: 42 % (ref 40–53)
HGB BLD-MCNC: 14.8 G/DL (ref 13.3–17.7)
IMM GRANULOCYTES # BLD: 0 10E9/L (ref 0–0.4)
IMM GRANULOCYTES NFR BLD: 0.3 %
LYMPHOCYTES # BLD AUTO: 1.4 10E9/L (ref 0.8–5.3)
LYMPHOCYTES NFR BLD AUTO: 19.8 %
MCH RBC QN AUTO: 31.5 PG (ref 26.5–33)
MCHC RBC AUTO-ENTMCNC: 35.2 G/DL (ref 31.5–36.5)
MCV RBC AUTO: 89 FL (ref 78–100)
MONOCYTES # BLD AUTO: 0.6 10E9/L (ref 0–1.3)
MONOCYTES NFR BLD AUTO: 8.8 %
NEUTROPHILS # BLD AUTO: 4.6 10E9/L (ref 1.6–8.3)
NEUTROPHILS NFR BLD AUTO: 65.8 %
NRBC # BLD AUTO: 0 10*3/UL
NRBC BLD AUTO-RTO: 0 /100
PLATELET # BLD AUTO: 219 10E9/L (ref 150–450)
POTASSIUM SERPL-SCNC: 2.7 MMOL/L (ref 3.4–5.3)
PROT SERPL-MCNC: 7.9 G/DL (ref 6.8–8.8)
RBC # BLD AUTO: 4.7 10E12/L (ref 4.4–5.9)
SODIUM SERPL-SCNC: 136 MMOL/L (ref 133–144)
WBC # BLD AUTO: 7 10E9/L (ref 4–11)

## 2018-02-16 PROCEDURE — 86480 TB TEST CELL IMMUN MEASURE: CPT | Performed by: DERMATOLOGY

## 2018-02-16 RX ORDER — TRIAMCINOLONE ACETONIDE 1 MG/G
OINTMENT TOPICAL
Qty: 454 G | Refills: 2 | Status: SHIPPED | OUTPATIENT
Start: 2018-02-16 | End: 2018-05-17

## 2018-02-16 ASSESSMENT — PAIN SCALES - GENERAL: PAINLEVEL: NO PAIN (0)

## 2018-02-16 NOTE — LETTER
"2/16/2018       RE: Deandre Moore  [address] [facility] [place] [address]     Dear Colleague,    Thank you for referring your patient, Deandre Moore, to the [facility] DERMATOLOGY at [facility]. Please see a copy of my visit note below.    [facility] Dermatology Note      Dermatology Problem List:  1. Macular amyloidosis  2. Atopic dermatitis  -previous tx: nbUVB  -current tx: methotrexate 12.5 mg q/week, clobetasol 0.05% BID, triamcinolone 0.1% PRN  - Methotrexate note: not taking his folic acid due to allergy (palpitations)  3. Venous stasis dermatitis    Encounter Date: Feb 16, 2018    CC:  Chief Complaint   Patient presents with     Contact Dermatitis Follow Up     Deandre is here for dermatitis, reports it is much better then it used to be         History of Present Illness:  Mr. Deandre Moore is a 77 year old male who presents for 5-month follow-up of atopic dermatitis well-controlled on methotrexate therapy. He was last seen on 9/7/2017 when his current dosing of methotrexate (12.5mg QW) was continued.    Today, he reports that his eczema is extremely well-controlled. Still has a few small spots that bother him on his upper back and R leg, but overall is much more comfortable than he used to be. Continues to take his methotrexate weekly; no issues with GI upset or oral ulcers. He does mention that he is using the triamcinolone 0.1% ointment aver \"most of my body daily.\" He does not use a daily emollient.    He does report that he has some \"itching and discoloration\" of his R medial ankle. Has been using triamcinolone on the area with improvement.     Past Medical History:   Patient Active Problem List   Diagnosis     Itching     AD (atopic dermatitis)     Dermatitis     Hyperglycemia     Eczema, unspecified eczema     Intrinsic atopic dermatitis     Other eczema     Notalgia paresthetica     Rash     Pseudophakia of both eyes     " Diabetes mellitus (H)     Presbyopia     Type 2 diabetes mellitus without complication, without long-term current use of insulin (H)     Encounter for long-term current use of high risk medication     Past Medical History:   Diagnosis Date     Diabetes mellitus (H)      Gout attack      Heart attack 1984     Hypertension      Past Surgical History:   Procedure Laterality Date     CARDIAC SURGERY  1984    4 vessel bypass     CHOLECYSTECTOMY       COLONOSCOPY       NO HISTORY OF SURGERY  3/31/14    derm     PHACOEMULSIFICATION WITH STANDARD INTRAOCULAR LENS IMPLANT  1/21/2013    Procedure: PHACOEMULSIFICATION WITH STANDARD INTRAOCULAR LENS IMPLANT;  Phacoemulsification with standard intraocular lens implant, right eye;  Surgeon: Jay Rodriges MD;  Location:  OR       Social History:  The patient is a retired ; waste water treatment.    Family History:  Strong family history of diabetes.     Medications:  Current Outpatient Prescriptions   Medication Sig Dispense Refill     potassium chloride SA (K-DUR/KLOR-CON M) 20 MEQ CR tablet TAKE 1 TABLET THREE TIMES DAILY 270 tablet 1     glimepiride (AMARYL) 4 MG tablet 1 tablet in AM with meal and 1/2 tablet in PM with meal 135 tablet 1     betamethasone valerate (VALISONE) 0.1 % ointment Apply to itchy areas twice daily as needed 45 g 3     methotrexate 2.5 MG tablet CHEMO TAKE 5 TABLETS ONE DAY PER WEEK 40 tablet 0     metFORMIN (GLUCOPHAGE) 1000 MG tablet TAKE 1 TABLET TWICE DAILY (WITH MEALS) 180 tablet 1     clobetasol (TEMOVATE) 0.05 % ointment Apply to areas of dermatitis on the body 60 g 1     clobetasol (TEMOVATE) 0.05 % ointment Apply to itchy areas twice daily as needed 60 g 1     capsaicin (ZOSTRIX) 0.075 % topical cream Apply topically 3 times daily To areas of itch on back.  OK to dilute with cetaphil cream if too burning. 60 g 3     clobetasol (TEMOVATE) 0.05 % ointment For flaring spots around ankles, apply at bedtime and cover with  saran wrap 60 g 2     triamcinolone (KENALOG) 0.1 % ointment Apply topically 2 times daily 454 g 0     Blood Glucose Monitoring Suppl (BLOOD GLUCOSE MONITOR SYSTEM) W/DEVICE KIT Test 4 times daily with meter covered by  insurance 1 kit 1     blood glucose monitoring (NO BRAND SPECIFIED) test strip Use to test blood sugar 4 times daily  With meter/ strips/ lancets covered by insurance 360 each 3     blood glucose monitoring (ULTRA THIN 30G) lancets Use to test blood sugar 4 times daily with meter/ strips/ lancets covered by insurance 4 Box 3     ammonium lactate (LAC-HYDRIN) 12 % lotion Apply topically 2 times daily Apply twice daily to feet 500 g 1     blood glucose monitoring (ACCU-CHEK FASTCLIX) lancets Use to test blood sugar 2 times daily or as directed. 2 Box 1     mirtazapine (REMERON) 15 MG tablet Take 1 tablet (15 mg) by mouth At Bedtime 30 tablet 1     Cholecalciferol (VITAMIN D) 2000 UNITS CAPS Take 1 tablet by mouth daily       Cyanocobalamin (VITAMIN B12 PO) Take 1 tablet by mouth daily       nitroglycerin (NITROSTAT) 0.4 MG SL tablet Place under the tongue as needed       torsemide (DEMADEX) 10 MG tablet Take 10 mg by mouth daily       aspirin 81 MG tablet Take 81 mg by mouth daily.       LORazepam (ATIVAN PO) Take 2.5 mg by mouth At Bedtime.       Irbesartan (AVAPRO PO) Take 150 mg by mouth daily.       Atorvastatin Calcium (LIPITOR PO) Take 40 mg by mouth daily        indapamide (LOZOL) 2.5 MG tablet Take 2.5 mg by mouth every morning.       COLCHICINE PO Take 0.6 mg by mouth daily.       multivitamin, therapeutic with minerals (MULTI-VITAMIN) TABS Take 1 tablet by mouth daily.       Ginkgo Biloba (GNP GINGKO BILOBA EXTRACT PO) Take  by mouth.       Allergies   Allergen Reactions     Beta Adrenergic Blockers Unknown     Latex Dermatitis     Tape [Adhesive Tape] Dermatitis     Electrode patches         Review of Systems:  -Skin Establ Pt: The patient denies any new rash, pruritus, or lesions that are  "symptomatic, changing or bleeding, except as per HPI.  -Constitutional: The patient denies fatigue, fevers, chills, unintended weight loss, and night sweats.  -HEENT: Patient denies nonhealing oral sores.  -Skin: As above in HPI. No additional skin concerns.    Physical exam:  Vitals: There were no vitals taken for this visit.  GEN: This is a well developed, well-nourished male in no acute distress, in a pleasant mood.    SKIN: Focused examination of the back and legs and below the knee was performed.  - Patchy areas of pink-brown hyperpigmentation without lichenification across the patient's upper back  - Rare hyperpigmented papules with central excoriation/erosion across upper pack  - Scattered hyperpigmented papules along R anterior leg and R medial ankle  - No areas of significant dermal atrophy are appreciated on today's exam  - Bilateral shins with brown pink patches   - Slightly raised hyperpigmented plaque on the instep of the R foot  - No other lesions of concern on areas examined.     Impression/Plan:  1. Atopic dermatitis: Overall, Deandre's atopic dermatitis remains fairly well-controlled with his current regimen, though there are still multiple areas of resistant eczema.    Given that Deandre continues to have some patches of resistant dermatiltis, we agreed to increase his methotrexate dosing from 12.5mg to 15mg weekly. He has tolerated the 12.5mg dosing for several months without side effect.     Methotrexate labs today (CBC/CMP); Will also check quantiferon gold today as it does not appear that he has had this tested in the past. Last labs were normal in 6/2016.    Deandre may apply his clobetasol 0.05% ointment to the active thick area of eczema on his R ankle and leg BID until resolved.     We addressed Deandre's over use of his triamcinolone on unaffected areas: \"I apply it all over my body.\" Deandre was counseled regarding the risk for skin atrophy with overuse and to apply it sparingly to only " areas of eczema activity.    Deandre was also instructed to start using a daily emollient such as Aquaphor or Vaseline    Follow up in 3 months    2. Stasis dermatitis, non-active: Strong history of vascular disease and risk factors. Has previously had moderate to severe stasis dermatitis, most prominent in his L leg. Today, has appropriate perfusion and no evidence of dermatitis (aside from minimal atopic dermatitis)    3. Medication monitoring: We reviewed risks and benefits of oral therapy, and side effects of methotrexate including but not limited to deleterious effects on the liver and kidneys, fatigue, depression of blood counts, oral ulcers, and gastrointestinal upset.       Follow-up in 3 months, earlier for new or changing lesions.     Staff Involved:  Scribed by Farzad Howard, MS4 for Dr. Escalante.      Farzad Howard acted as a scribe for me today and accurately reflected my words and actions in the  History, Review of Systems, Physical exam and Plan.  I have reviewed the content of the documentation and have edited it as needed. I have personally performed the services documented here and the documentation accurately represents those services and the decisions I have made.     Milla Escalante MD  Dermatology Staff

## 2018-02-16 NOTE — PROGRESS NOTES
"[facility] Dermatology Note      Dermatology Problem List:  1. Macular amyloidosis  2. Atopic dermatitis  -previous tx: nbUVB  -current tx: methotrexate 12.5 mg q/week, clobetasol 0.05% BID, triamcinolone 0.1% PRN  - Methotrexate note: not taking his folic acid due to allergy (palpitations)  3. Venous stasis dermatitis    Encounter Date: Feb 16, 2018    CC:  Chief Complaint   Patient presents with     Contact Dermatitis Follow Up     Deandre is here for dermatitis, reports it is much better then it used to be         History of Present Illness:  Mr. Deandre Moore is a 77 year old male who presents for 5-month follow-up of atopic dermatitis well-controlled on methotrexate therapy. He was last seen on 9/7/2017 when his current dosing of methotrexate (12.5mg QW) was continued.    Today, he reports that his eczema is extremely well-controlled. Still has a few small spots that bother him on his upper back and R leg, but overall is much more comfortable than he used to be. Continues to take his methotrexate weekly; no issues with GI upset or oral ulcers. He does mention that he is using the triamcinolone 0.1% ointment aver \"most of my body daily.\" He does not use a daily emollient.    He does report that he has some \"itching and discoloration\" of his R medial ankle. Has been using triamcinolone on the area with improvement.     Past Medical History:   Patient Active Problem List   Diagnosis     Itching     AD (atopic dermatitis)     Dermatitis     Hyperglycemia     Eczema, unspecified eczema     Intrinsic atopic dermatitis     Other eczema     Notalgia paresthetica     Rash     Pseudophakia of both eyes     Diabetes mellitus (H)     Presbyopia     Type 2 diabetes mellitus without complication, without long-term current use of insulin (H)     Encounter for long-term current use of high risk medication     Past Medical History:   Diagnosis Date     Diabetes mellitus (H)      Gout attack      Heart " attack 1984     Hypertension      Past Surgical History:   Procedure Laterality Date     CARDIAC SURGERY  1984    4 vessel bypass     CHOLECYSTECTOMY       COLONOSCOPY       NO HISTORY OF SURGERY  3/31/14    derm     PHACOEMULSIFICATION WITH STANDARD INTRAOCULAR LENS IMPLANT  1/21/2013    Procedure: PHACOEMULSIFICATION WITH STANDARD INTRAOCULAR LENS IMPLANT;  Phacoemulsification with standard intraocular lens implant, right eye;  Surgeon: Jay Rodriges MD;  Location:  OR       Social History:  The patient is a retired ; waste water treatment.    Family History:  Strong family history of diabetes.     Medications:  Current Outpatient Prescriptions   Medication Sig Dispense Refill     potassium chloride SA (K-DUR/KLOR-CON M) 20 MEQ CR tablet TAKE 1 TABLET THREE TIMES DAILY 270 tablet 1     glimepiride (AMARYL) 4 MG tablet 1 tablet in AM with meal and 1/2 tablet in PM with meal 135 tablet 1     betamethasone valerate (VALISONE) 0.1 % ointment Apply to itchy areas twice daily as needed 45 g 3     methotrexate 2.5 MG tablet CHEMO TAKE 5 TABLETS ONE DAY PER WEEK 40 tablet 0     metFORMIN (GLUCOPHAGE) 1000 MG tablet TAKE 1 TABLET TWICE DAILY (WITH MEALS) 180 tablet 1     clobetasol (TEMOVATE) 0.05 % ointment Apply to areas of dermatitis on the body 60 g 1     clobetasol (TEMOVATE) 0.05 % ointment Apply to itchy areas twice daily as needed 60 g 1     capsaicin (ZOSTRIX) 0.075 % topical cream Apply topically 3 times daily To areas of itch on back.  OK to dilute with cetaphil cream if too burning. 60 g 3     clobetasol (TEMOVATE) 0.05 % ointment For flaring spots around ankles, apply at bedtime and cover with saran wrap 60 g 2     triamcinolone (KENALOG) 0.1 % ointment Apply topically 2 times daily 454 g 0     Blood Glucose Monitoring Suppl (BLOOD GLUCOSE MONITOR SYSTEM) W/DEVICE KIT Test 4 times daily with meter covered by  insurance 1 kit 1     blood glucose monitoring (NO BRAND SPECIFIED) test  strip Use to test blood sugar 4 times daily  With meter/ strips/ lancets covered by insurance 360 each 3     blood glucose monitoring (ULTRA THIN 30G) lancets Use to test blood sugar 4 times daily with meter/ strips/ lancets covered by insurance 4 Box 3     ammonium lactate (LAC-HYDRIN) 12 % lotion Apply topically 2 times daily Apply twice daily to feet 500 g 1     blood glucose monitoring (ACCU-CHEK FASTCLIX) lancets Use to test blood sugar 2 times daily or as directed. 2 Box 1     mirtazapine (REMERON) 15 MG tablet Take 1 tablet (15 mg) by mouth At Bedtime 30 tablet 1     Cholecalciferol (VITAMIN D) 2000 UNITS CAPS Take 1 tablet by mouth daily       Cyanocobalamin (VITAMIN B12 PO) Take 1 tablet by mouth daily       nitroglycerin (NITROSTAT) 0.4 MG SL tablet Place under the tongue as needed       torsemide (DEMADEX) 10 MG tablet Take 10 mg by mouth daily       aspirin 81 MG tablet Take 81 mg by mouth daily.       LORazepam (ATIVAN PO) Take 2.5 mg by mouth At Bedtime.       Irbesartan (AVAPRO PO) Take 150 mg by mouth daily.       Atorvastatin Calcium (LIPITOR PO) Take 40 mg by mouth daily        indapamide (LOZOL) 2.5 MG tablet Take 2.5 mg by mouth every morning.       COLCHICINE PO Take 0.6 mg by mouth daily.       multivitamin, therapeutic with minerals (MULTI-VITAMIN) TABS Take 1 tablet by mouth daily.       Ginkgo Biloba (GNP GINGKO BILOBA EXTRACT PO) Take  by mouth.       Allergies   Allergen Reactions     Beta Adrenergic Blockers Unknown     Latex Dermatitis     Tape [Adhesive Tape] Dermatitis     Electrode patches         Review of Systems:  -Skin Establ Pt: The patient denies any new rash, pruritus, or lesions that are symptomatic, changing or bleeding, except as per HPI.  -Constitutional: The patient denies fatigue, fevers, chills, unintended weight loss, and night sweats.  -HEENT: Patient denies nonhealing oral sores.  -Skin: As above in HPI. No additional skin concerns.    Physical exam:  Vitals: There  "were no vitals taken for this visit.  GEN: This is a well developed, well-nourished male in no acute distress, in a pleasant mood.    SKIN: Focused examination of the back and legs and below the knee was performed.  - Patchy areas of pink-brown hyperpigmentation without lichenification across the patient's upper back  - Rare hyperpigmented papules with central excoriation/erosion across upper pack  - Scattered hyperpigmented papules along R anterior leg and R medial ankle  - No areas of significant dermal atrophy are appreciated on today's exam  - Bilateral shins with brown pink patches   - Slightly raised hyperpigmented plaque on the instep of the R foot  - No other lesions of concern on areas examined.     Impression/Plan:  1. Atopic dermatitis: Overall, Deandre's atopic dermatitis remains fairly well-controlled with his current regimen, though there are still multiple areas of resistant eczema.    Given that Deandre continues to have some patches of resistant dermatiltis, we agreed to increase his methotrexate dosing from 12.5mg to 15mg weekly. He has tolerated the 12.5mg dosing for several months without side effect.     Methotrexate labs today (CBC/CMP); Will also check quantiferon gold today as it does not appear that he has had this tested in the past. Last labs were normal in 6/2016.    Deandre may apply his clobetasol 0.05% ointment to the active thick area of eczema on his R ankle and leg BID until resolved.     We addressed Deandre's over use of his triamcinolone on unaffected areas: \"I apply it all over my body.\" Deandre was counseled regarding the risk for skin atrophy with overuse and to apply it sparingly to only areas of eczema activity.    Deandre was also instructed to start using a daily emollient such as Aquaphor or Vaseline    Follow up in 3 months    2. Stasis dermatitis, non-active: Strong history of vascular disease and risk factors. Has previously had moderate to severe stasis dermatitis, " most prominent in his L leg. Today, has appropriate perfusion and no evidence of dermatitis (aside from minimal atopic dermatitis)    3. Medication monitoring: We reviewed risks and benefits of oral therapy, and side effects of methotrexate including but not limited to deleterious effects on the liver and kidneys, fatigue, depression of blood counts, oral ulcers, and gastrointestinal upset.       Follow-up in 3 months, earlier for new or changing lesions.     Staff Involved:  Scribed by Farzad Howard, MS4 for Dr. Escalante.      Farzad Howard acted as a scribe for me today and accurately reflected my words and actions in the  History, Review of Systems, Physical exam and Plan.  I have reviewed the content of the documentation and have edited it as needed. I have personally performed the services documented here and the documentation accurately represents those services and the decisions I have made.     Milla Escalante MD  Dermatology Staff

## 2018-02-16 NOTE — MR AVS SNAPSHOT
After Visit Summary   2/16/2018    Deandre Moore    MRN: [id number]           Patient Information     Date Of Birth          1940        Visit Information        Provider Department      2/16/2018 11:00 AM Milla Escalante MD [facility] Dermatology        Today's Diagnoses     At risk for infection due to chemotherapy    -  1    Other eczema        Dermatitis          Care Instructions    - For the area on the R ankle, you can use the clobetasol ointment on the area twice per day  - With the triamcinolone, just use this once daily only on areas that are raised and very irritated  - We recommend that you use a thick moisturizer like Vaseline or Aquaphor daily all over the body  - We will increase the methotrexate dose to 15mg (6 pills) per week          Follow-ups after your visit        Follow-up notes from your care team     Return in about 3 months (around 5/16/2018) for follow-up atopic dermatitis and methotrexate monitoring.      Your next 10 appointments already scheduled     Feb 16, 2018 12:00 PM CST   LAB with  LAB   [facility] Lab ([facility])    [address] [place] [id number] [phone]           Please do not eat 10-12 hours before your appointment if you are coming in fasting for labs on lipids, cholesterol, or glucose (sugar). This does not apply to pregnant women. Water, hot tea and black coffee (with nothing added) are okay. Do not drink other fluids, diet soda or chew gum.            Mar 01, 2018   Procedure with Daryn Medrano MD   [facility] Endoscopy ([facility])    [facility] [address]  [place] [address]   [phone]           [facility] is located at [address] [place] [address] JESICA Danielson            May 17, 2018  1:45 PM CDT   (Arrive by 1:30 PM)   Return Visit with Miguel Gray MD   [facility] Dermatology ([facility])    [address] [place] [address]  [facility] [id number] [phone]            Jun 04, 2018 10:45 AM CDT   Return Visit with Davis Whyte MD, MG ENDO NURSE   [facility] ([facility])    [address] [address] [place] [id number] [phone]              Who to contact     Please call your clinic at [phone] to:    Ask questions about your health    Make or cancel appointments    Discuss your medicines    Learn about your test results    Speak to your doctor            Additional Information About Your Visit        x[employer]opsharS[employer]Gard Information     [employer] gives you secure access to your electronic health record. If you see a primary care provider, you can also send messages to your care team and make appointments. If you have questions, please call your primary care clinic.  If you do not have a primary care provider, please call [phone] and they will assist you.      [employer] is an electronic gateway that provides easy, online access to your medical records. With [employer], you can request a clinic appointment, read your test results, renew a prescription or communicate with your care team.     To access your existing account, please contact your [facility] or call [phone] for assistance.        Care EveryWhere ID     This is your Care EveryWhere ID. This could be used by other organizations to access your [facility] medical records  [id number]         Blood Pressure from Last 3 Encounters:   06/19/17 121/73   01/02/17 110/60   06/07/16 105/61    Weight from Last 3 Encounters:   06/19/17 91.2 kg (201 lb)   01/02/17 93.4 kg (206 lb)   06/07/16 93.8 kg (206 lb 12.8 oz)              We Performed the Following     CBC with platelets differential     Comprehensive metabolic panel     M Tuberculosis by Quantiferon          Today's Medication Changes          These changes are accurate as of 2/16/18 11:56 AM.  If you have any questions, ask your nurse or doctor.                These medicines have changed or have updated prescriptions.        Dose/Directions    methotrexate 2.5 MG tablet CHEMO   This may have changed:  See the new instructions.   Used for:  Other eczema   Changed by:  Milla Escalante MD        Dose:  15 mg   Take 6 tablets (15 mg) by mouth once a week   Quantity:  40 tablet   Refills:  3       triamcinolone 0.1 % ointment   Commonly known as:  KENALOG   This may have changed:    - how to take this  - when to take this  - additional instructions   Used for:  Dermatitis   Changed by:  Milla Escalante MD        Twice daily to raised rash areas on the arms, legs, body as needed.   Quantity:  454 g   Refills:  2            Where to get your medicines      These medications were sent to [place] [facility] [address]     Phone:  [phone]     methotrexate 2.5 MG tablet CHEMO    triamcinolone 0.1 % ointment                Primary Care Provider Office Phone # Fax #    Denilson Thomas [phone]       [address] [address]        Equal Access to Services     [facility] Center[facility] : Hadii karin ku hadasho Soomaali, waaxda luqadaha, qaybta kaalmada adeegyada, mason christopher hayjames harley . So [facility] [phone].    ATENCIÓN: Si habla español, tiene a velazco disposición servicios gratuitos de asistencia lingüística. [facility] [phone].    We comply with applicable federal civil rights laws and [place] laws. We do not discriminate on the basis of race, color, national origin, age, disability, sex, sexual orientation, or gender identity.            Thank you!     Thank you for choosing [facility] DERMATOLOGY  for your care. Our goal is always to provide you with excellent care. Hearing back from our patients is one way we can continue to improve our services. Please take a few minutes to complete the written survey that you may receive in  the mail after your visit with us. Thank you!             Your Updated Medication List - Protect others around you: Learn how to safely use, store and throw away your medicines at www.disposemymeds.org.          This list is accurate as of 2/16/18 11:56 AM.  Always use your most recent med list.                   Brand Name Dispense Instructions for use Diagnosis    ammonium lactate 12 % lotion    LAC-HYDRIN    500 g    Apply topically 2 times daily Apply twice daily to feet    Hyperglycemia       aspirin 81 MG tablet      Take 81 mg by mouth daily.        ATIVAN PO      Take 2.5 mg by mouth At Bedtime.        AVAPRO PO      Take 150 mg by mouth daily.        betamethasone valerate 0.1 % ointment    VALISONE    45 g    Apply to itchy areas twice daily as needed    Other atopic dermatitis       Blood Glucose Monitor System W/DEVICE Kit     1 kit    Test 4 times daily with meter covered by  insurance    Type 2 diabetes mellitus (H)       * blood glucose monitoring lancets     2 Box    Use to test blood sugar 2 times daily or as directed.    Hyperglycemia       * blood glucose monitoring lancets     4 Box    Use to test blood sugar 4 times daily with meter/ strips/ lancets covered by insurance    Type 2 diabetes mellitus (H)       blood glucose monitoring test strip    no brand specified    360 each    Use to test blood sugar 4 times daily  With meter/ strips/ lancets covered by insurance    Type 2 diabetes mellitus (H)       capsaicin 0.075 % cream    ZOSTRIX    60 g    Apply topically 3 times daily To areas of itch on back.  OK to dilute with cetaphil cream if too burning.    Notalgia paresthetica       * clobetasol 0.05 % ointment    TEMOVATE    60 g    For flaring spots around ankles, apply at bedtime and cover with saran wrap    Dermatitis       * clobetasol 0.05 % ointment    TEMOVATE    60 g    Apply to itchy areas twice daily as needed    Rash       * clobetasol 0.05 % ointment    TEMOVATE    60 g    Apply to  areas of dermatitis on the body    Other atopic dermatitis       COLCHICINE PO      Take 0.6 mg by mouth daily.        glimepiride 4 MG tablet    AMARYL    135 tablet    1 tablet in AM with meal and 1/2 tablet in PM with meal    Type 2 diabetes mellitus without complication (H)       GNP GINGKO BILOBA EXTRACT PO      Take  by mouth.        indapamide 2.5 MG tablet    LOZOL     Take 2.5 mg by mouth every morning.        LIPITOR PO      Take 40 mg by mouth daily        metFORMIN 1000 MG tablet    GLUCOPHAGE    180 tablet    TAKE 1 TABLET TWICE DAILY (WITH MEALS)    Diabetes mellitus type 1 (H)       methotrexate 2.5 MG tablet CHEMO     40 tablet    Take 6 tablets (15 mg) by mouth once a week    Other eczema       mirtazapine 15 MG tablet    REMERON    30 tablet    Take 1 tablet (15 mg) by mouth At Bedtime    Pruritus       Multi-vitamin Tabs tablet      Take 1 tablet by mouth daily.        nitroGLYcerin 0.4 MG sublingual tablet    NITROSTAT     Place under the tongue as needed        potassium chloride SA 20 MEQ CR tablet    K-DUR/KLOR-CON M    270 tablet    TAKE 1 TABLET THREE TIMES DAILY    Type 2 diabetes mellitus with hyperglycemia, unspecified long term insulin use status (H)       torsemide 10 MG tablet    DEMADEX     Take 10 mg by mouth daily        triamcinolone 0.1 % ointment    KENALOG    454 g    Twice daily to raised rash areas on the arms, legs, body as needed.    Dermatitis       VITAMIN B12 PO      Take 1 tablet by mouth daily        vitamin D 2000 UNITS Caps      Take 1 tablet by mouth daily        * Notice:  This list has 5 medication(s) that are the same as other medications prescribed for you. Read the directions carefully, and ask your doctor or other care provider to review them with you.

## 2018-02-16 NOTE — PATIENT INSTRUCTIONS
- For the area on the R ankle, you can use the clobetasol ointment on the area twice per day  - With the triamcinolone, just use this once daily only on areas that are raised and very irritated  - We recommend that you use a thick moisturizer like Vaseline or Aquaphor daily all over the body  - We will increase the methotrexate dose to 15mg (6 pills) per week

## 2018-02-16 NOTE — NURSING NOTE
Dermatology Rooming Note    Deandre Moore's goals for this visit include:   Chief Complaint   Patient presents with     Contact Dermatitis Follow Up     Deandre is here for dermatitis, reports it is much better then it used to be     Neelam Merrill LPN

## 2018-02-17 ASSESSMENT — PATIENT HEALTH QUESTIONNAIRE - PHQ9: SUM OF ALL RESPONSES TO PHQ QUESTIONS 1-9: 1

## 2018-02-19 LAB
M TB TUBERC IFN-G BLD QL: NEGATIVE
M TB TUBERC IFN-G/MITOGEN IGNF BLD: 0.01 IU/ML

## 2018-03-01 ENCOUNTER — HOSPITAL ENCOUNTER (OUTPATIENT)
Facility: CLINIC | Age: 78
Discharge: HOME | End: 2018-03-01
Attending: INTERNAL MEDICINE | Admitting: INTERNAL MEDICINE
Payer: COMMERCIAL

## 2018-03-01 ENCOUNTER — SURGERY (OUTPATIENT)
Age: 78
End: 2018-03-01

## 2018-03-01 VITALS
SYSTOLIC BLOOD PRESSURE: 123 MMHG | DIASTOLIC BLOOD PRESSURE: 74 MMHG | RESPIRATION RATE: 15 BRPM | OXYGEN SATURATION: 97 % | HEIGHT: 73 IN

## 2018-03-01 LAB — UPPER GI ENDOSCOPY: NORMAL

## 2018-03-01 PROCEDURE — 25000128 H RX IP 250 OP 636: Performed by: INTERNAL MEDICINE

## 2018-03-01 PROCEDURE — 88305 TISSUE EXAM BY PATHOLOGIST: CPT | Mod: 26 | Performed by: INTERNAL MEDICINE

## 2018-03-01 PROCEDURE — 43239 EGD BIOPSY SINGLE/MULTIPLE: CPT | Performed by: INTERNAL MEDICINE

## 2018-03-01 PROCEDURE — 43249 ESOPH EGD DILATION <30 MM: CPT | Performed by: INTERNAL MEDICINE

## 2018-03-01 PROCEDURE — G0500 MOD SEDAT ENDO SERVICE >5YRS: HCPCS | Performed by: INTERNAL MEDICINE

## 2018-03-01 PROCEDURE — 88305 TISSUE EXAM BY PATHOLOGIST: CPT | Performed by: INTERNAL MEDICINE

## 2018-03-01 RX ORDER — LIDOCAINE 40 MG/G
CREAM TOPICAL
Status: DISCONTINUED | OUTPATIENT
Start: 2018-03-01 | End: 2018-03-01 | Stop reason: HOSPADM

## 2018-03-01 RX ORDER — FENTANYL CITRATE 50 UG/ML
INJECTION, SOLUTION INTRAMUSCULAR; INTRAVENOUS PRN
Status: DISCONTINUED | OUTPATIENT
Start: 2018-03-01 | End: 2018-03-01 | Stop reason: HOSPADM

## 2018-03-01 RX ORDER — ONDANSETRON 2 MG/ML
4 INJECTION INTRAMUSCULAR; INTRAVENOUS
Status: DISCONTINUED | OUTPATIENT
Start: 2018-03-01 | End: 2018-03-01 | Stop reason: HOSPADM

## 2018-03-01 RX ADMIN — FENTANYL CITRATE 50 MCG: 50 INJECTION, SOLUTION INTRAMUSCULAR; INTRAVENOUS at 10:05

## 2018-03-01 RX ADMIN — MIDAZOLAM 2 MG: 1 INJECTION INTRAMUSCULAR; INTRAVENOUS at 10:06

## 2018-03-02 LAB — COPATH REPORT: NORMAL

## 2018-03-08 DIAGNOSIS — L30.8 OTHER ECZEMA: ICD-10-CM

## 2018-03-08 NOTE — TELEPHONE ENCOUNTER
Chart reviewed. Patient on methotrexate 12.5 mg PO qweekly for eczema. Recent screening labs wnl 2/18. Refill aproved with 2 month supply. Has follow-up planned in 2 months with Dr. Escalante.     Edu Rivera MD   PGY-3 Dermatology Resident  Pager [phone]

## 2018-05-01 DIAGNOSIS — E10.9 DIABETES MELLITUS TYPE 1 (H): ICD-10-CM

## 2018-05-17 ENCOUNTER — OFFICE VISIT (OUTPATIENT)
Dept: DERMATOLOGY | Facility: CLINIC | Age: 78
End: 2018-05-17
Payer: COMMERCIAL

## 2018-05-17 DIAGNOSIS — L20.89 OTHER ATOPIC DERMATITIS: ICD-10-CM

## 2018-05-17 DIAGNOSIS — L30.9 DERMATITIS: ICD-10-CM

## 2018-05-17 DIAGNOSIS — L30.8 OTHER ECZEMA: ICD-10-CM

## 2018-05-17 DIAGNOSIS — Z51.81 THERAPEUTIC DRUG MONITORING: ICD-10-CM

## 2018-05-17 DIAGNOSIS — E85.4 AMYLOIDOSIS CUTIS (H): ICD-10-CM

## 2018-05-17 DIAGNOSIS — L29.9 CHRONIC PRURITUS: ICD-10-CM

## 2018-05-17 DIAGNOSIS — R20.2 NOTALGIA PARESTHETICA: ICD-10-CM

## 2018-05-17 DIAGNOSIS — L99 AMYLOIDOSIS CUTIS (H): ICD-10-CM

## 2018-05-17 DIAGNOSIS — L20.89 OTHER ATOPIC DERMATITIS: Primary | ICD-10-CM

## 2018-05-17 LAB
ALBUMIN SERPL-MCNC: 3.8 G/DL (ref 3.4–5)
ALP SERPL-CCNC: 85 U/L (ref 40–150)
ALT SERPL W P-5'-P-CCNC: 32 U/L (ref 0–70)
ANION GAP SERPL CALCULATED.3IONS-SCNC: 9 MMOL/L (ref 3–14)
AST SERPL W P-5'-P-CCNC: 22 U/L (ref 0–45)
BASOPHILS # BLD AUTO: 0.1 10E9/L (ref 0–0.2)
BASOPHILS NFR BLD AUTO: 0.7 %
BILIRUB SERPL-MCNC: 0.8 MG/DL (ref 0.2–1.3)
BUN SERPL-MCNC: 14 MG/DL (ref 7–30)
CALCIUM SERPL-MCNC: 9.3 MG/DL (ref 8.5–10.1)
CHLORIDE SERPL-SCNC: 99 MMOL/L (ref 94–109)
CO2 SERPL-SCNC: 30 MMOL/L (ref 20–32)
CREAT SERPL-MCNC: 1.06 MG/DL (ref 0.66–1.25)
DIFFERENTIAL METHOD BLD: ABNORMAL
EOSINOPHIL # BLD AUTO: 0.3 10E9/L (ref 0–0.7)
EOSINOPHIL NFR BLD AUTO: 4 %
ERYTHROCYTE [DISTWIDTH] IN BLOOD BY AUTOMATED COUNT: 14.6 % (ref 10–15)
GFR SERPL CREATININE-BSD FRML MDRD: 67 ML/MIN/1.7M2
GLUCOSE SERPL-MCNC: 121 MG/DL (ref 70–99)
HCT VFR BLD AUTO: 38.9 % (ref 40–53)
HGB BLD-MCNC: 13.7 G/DL (ref 13.3–17.7)
IMM GRANULOCYTES # BLD: 0 10E9/L (ref 0–0.4)
IMM GRANULOCYTES NFR BLD: 0.2 %
LYMPHOCYTES # BLD AUTO: 1.6 10E9/L (ref 0.8–5.3)
LYMPHOCYTES NFR BLD AUTO: 19.8 %
MCH RBC QN AUTO: 32.3 PG (ref 26.5–33)
MCHC RBC AUTO-ENTMCNC: 35.2 G/DL (ref 31.5–36.5)
MCV RBC AUTO: 92 FL (ref 78–100)
MONOCYTES # BLD AUTO: 0.9 10E9/L (ref 0–1.3)
MONOCYTES NFR BLD AUTO: 10.9 %
NEUTROPHILS # BLD AUTO: 5.2 10E9/L (ref 1.6–8.3)
NEUTROPHILS NFR BLD AUTO: 64.4 %
NRBC # BLD AUTO: 0 10*3/UL
NRBC BLD AUTO-RTO: 0 /100
PLATELET # BLD AUTO: 209 10E9/L (ref 150–450)
POTASSIUM SERPL-SCNC: 2.7 MMOL/L (ref 3.4–5.3)
PROT SERPL-MCNC: 7.2 G/DL (ref 6.8–8.8)
RBC # BLD AUTO: 4.24 10E12/L (ref 4.4–5.9)
SODIUM SERPL-SCNC: 138 MMOL/L (ref 133–144)
WBC # BLD AUTO: 8.2 10E9/L (ref 4–11)

## 2018-05-17 RX ORDER — CAPSAICIN 0.75 MG/G
CREAM TOPICAL 3 TIMES DAILY
Qty: 60 G | Refills: 3 | Status: SHIPPED | OUTPATIENT
Start: 2018-05-17 | End: 2022-05-03

## 2018-05-17 RX ORDER — TRIAMCINOLONE ACETONIDE 1 MG/G
OINTMENT TOPICAL
Qty: 454 G | Refills: 2 | Status: SHIPPED | OUTPATIENT
Start: 2018-05-17 | End: 2019-06-06

## 2018-05-17 RX ORDER — BETAMETHASONE DIPROPIONATE 0.5 MG/G
OINTMENT, AUGMENTED TOPICAL 2 TIMES DAILY
Qty: 50 G | Refills: 3 | Status: SHIPPED | OUTPATIENT
Start: 2018-05-17 | End: 2018-08-23

## 2018-05-17 NOTE — PROGRESS NOTES
"[facility] Dermatology Note      Dermatology Problem List:  1. Macular amyloidosis, atopic dermatitis  -previous tx: nbUVB  -current tx: methotrexate 15 mg weekly, triamcinolone 0.1% ointment, Diprolene 0.05% ointment and capsaicin 0.075% cream to the back  -does not take folic acid due to palpitations  -cumulative dose MTX (started 10/2016): 1030mg  3. Venous stasis dermatitis    Encounter Date: May 17, 2018    CC:  Chief Complaint   Patient presents with     Derm Problem     Sobia is here today for a dermatitis follow up. sobia notes\"  I still have some itching and dry skin\"        History of Present Illness:  Mr. Sobai Moore is a 77 year old male who presents for follow up of atopic dermatitis and macular amyloidosis. At his last visit in 2/2018 with Dr. Escalante, his methotrexate was increased to 15 mg weekly and he was continued on topical triamcinolone ointment. Today, he reports that his dermatitis is overall improved on this regimen, however he has persistent pruritus of the back. He is applying triamcinolone daily to this area without much improvement. Also applies triamcinolone to the arms, which occasionally itch. He doesn't use a regular moisturizer.   He is tolerating methotrexate well without side effects; denies any GI distress.   He is feeling a little \"wobbly\" on his feet over the past few months which he attributes to his diuretic; he also has chronically low potassium levels associated with diuretic use, for which he is on supplementation.     Past Medical History:   Patient Active Problem List   Diagnosis     Itching     AD (atopic dermatitis)     Dermatitis     Hyperglycemia     Eczema, unspecified eczema     Intrinsic atopic dermatitis     Other eczema     Notalgia paresthetica     Rash     Pseudophakia of both eyes     Diabetes mellitus (H)     Presbyopia     Type 2 diabetes mellitus without complication, without long-term current use of insulin (H)     Encounter " for long-term current use of high risk medication     Past Medical History:   Diagnosis Date     Diabetes mellitus (H)      Gout attack      Heart attack 1984     Hypertension      Past Surgical History:   Procedure Laterality Date     CARDIAC SURGERY  1984    4 vessel bypass     CHOLECYSTECTOMY       COLONOSCOPY       ESOPHAGOSCOPY, GASTROSCOPY, DUODENOSCOPY (EGD), COMBINED N/A 3/1/2018    Procedure: COMBINED ESOPHAGOSCOPY, GASTROSCOPY, DUODENOSCOPY (EGD), BIOPSY SINGLE OR MULTIPLE;  ESOPHAGOGASTRODUODENOSCOPY ;  Surgeon: Daryn Medrano MD;  Location:  GI     NO HISTORY OF SURGERY  3/31/14    derm     PHACOEMULSIFICATION WITH STANDARD INTRAOCULAR LENS IMPLANT  1/21/2013    Procedure: PHACOEMULSIFICATION WITH STANDARD INTRAOCULAR LENS IMPLANT;  Phacoemulsification with standard intraocular lens implant, right eye;  Surgeon: Jay Rodriges MD;  Location:  OR       Social History:  The patient is a retired ; waste water treatment. His wife is currently hospitalized for complications related to hip replacement.     Family History:  Strong family history of diabetes.     Medications:  Current Outpatient Prescriptions   Medication Sig Dispense Refill     ammonium lactate (LAC-HYDRIN) 12 % lotion Apply topically 2 times daily Apply twice daily to feet 500 g 1     aspirin 81 MG tablet Take 81 mg by mouth daily.       Atorvastatin Calcium (LIPITOR PO) Take 40 mg by mouth daily        betamethasone valerate (VALISONE) 0.1 % ointment Apply to itchy areas twice daily as needed 45 g 3     blood glucose monitoring (ACCU-CHEK FASTCLIX) lancets Use to test blood sugar 2 times daily or as directed. 2 Box 1     blood glucose monitoring (NO BRAND SPECIFIED) test strip Use to test blood sugar 4 times daily  With meter/ strips/ lancets covered by insurance 360 each 3     blood glucose monitoring (ULTRA THIN 30G) lancets Use to test blood sugar 4 times daily with meter/ strips/ lancets covered by  insurance 4 Box 3     Blood Glucose Monitoring Suppl (BLOOD GLUCOSE MONITOR SYSTEM) W/DEVICE KIT Test 4 times daily with meter covered by  insurance 1 kit 1     capsaicin (ZOSTRIX) 0.075 % topical cream Apply topically 3 times daily To areas of itch on back.  OK to dilute with cetaphil cream if too burning. 60 g 3     Cholecalciferol (VITAMIN D) 2000 UNITS CAPS Take 1 tablet by mouth daily       clobetasol (TEMOVATE) 0.05 % ointment For flaring spots around ankles, apply at bedtime and cover with saran wrap 60 g 2     clobetasol (TEMOVATE) 0.05 % ointment Apply to itchy areas twice daily as needed 60 g 1     clobetasol (TEMOVATE) 0.05 % ointment Apply to areas of dermatitis on the body 60 g 1     COLCHICINE PO Take 0.6 mg by mouth daily.       Cyanocobalamin (VITAMIN B12 PO) Take 1 tablet by mouth daily       Ginkgo Biloba (GNP GINGKO BILOBA EXTRACT PO) Take  by mouth.       glimepiride (AMARYL) 4 MG tablet 1 tablet in AM with meal and 1/2 tablet in PM with meal 135 tablet 1     indapamide (LOZOL) 2.5 MG tablet Take 2.5 mg by mouth every morning.       Irbesartan (AVAPRO PO) Take 150 mg by mouth daily.       LORazepam (ATIVAN PO) Take 2.5 mg by mouth At Bedtime.       metFORMIN (GLUCOPHAGE) 1000 MG tablet TAKE 1 TABLET TWICE DAILY (WITH MEALS) 180 tablet 0     methotrexate 2.5 MG tablet CHEMO TAKE 5 TABLETS ONE DAY WEEKLY 40 tablet 0     mirtazapine (REMERON) 15 MG tablet Take 1 tablet (15 mg) by mouth At Bedtime 30 tablet 1     multivitamin, therapeutic with minerals (MULTI-VITAMIN) TABS Take 1 tablet by mouth daily.       nitroglycerin (NITROSTAT) 0.4 MG SL tablet Place under the tongue as needed       potassium chloride SA (K-DUR/KLOR-CON M) 20 MEQ CR tablet TAKE 1 TABLET THREE TIMES DAILY 270 tablet 1     torsemide (DEMADEX) 10 MG tablet Take 10 mg by mouth daily       triamcinolone (KENALOG) 0.1 % ointment Twice daily to raised rash areas on the arms, legs, body as needed. 454 g 2     Allergies   Allergen  Reactions     Beta Adrenergic Blockers Unknown     Latex Dermatitis     Tape [Adhesive Tape] Dermatitis     Electrode patches       Review of Systems:  -Skin Establ Pt: The patient denies any new rash, pruritus, or lesions that are symptomatic, changing or bleeding, except as per HPI.  -Constitutional: The patient denies fatigue, fevers, chills, unintended weight loss, and night sweats.  -Skin: As above in HPI. No additional skin concerns.    Physical exam:  Vitals: There were no vitals taken for this visit.  GEN: This is a well developed, well-nourished male in no acute distress, in a pleasant mood.    SKIN: Focused examination of the back and bilateral arms was performed.  - Skin is well-hydrated.   - Upper and mid back with small round hyperpigmented macules coalescing into larger patches in a rippled pattern.   - No other lesions of concern on areas examined.     Impression/Plan:  1. Atopic dermatitis, macular amyloidosis  - Overall, methotrexate has resulted in significant improvement of his dermatitis, but he continues to have persistent pruritus mainly on the back.   - Continue methotrexate 15 mg weekly. Reviewed potential side effects; safety labs (CBC, CMP) due today.   - Continue triamcinolone 0.1% ointment BID prn. For the persistent pruritus on the back, add Diprolene 0.05% ointment BID prn, as well as capsaicin 0.075% cream TID prn (OK to dilute with a bland emollient if needed).  - Dry skin care measures reviewed.     Follow-up in 3 months, earlier for new or changing lesions.     Dr. Gray staffed the patient.     Staff Involved: Resident (Tasha Buchanan)/Staff (as above)    I have seen and examined this patient and agree with the assessment and plan as documented in the resident's note.    Miguel Gray MD  Dermatology Attending

## 2018-05-17 NOTE — LETTER
"5/17/2018       RE: Sobia Moore  [address] [facility] [address]     Dear Colleague,    Thank you for referring your patient, Sobia Moore, to the [facility] DERMATOLOGY at [facility]. Please see a copy of my visit note below.    [facility] Dermatology Note      Dermatology Problem List:  1. Macular amyloidosis, atopic dermatitis  -previous tx: nbUVB  -current tx: methotrexate 15 mg weekly, triamcinolone 0.1% ointment, Diprolene 0.05% ointment and capsaicin 0.075% cream to the back  -does not take folic acid due to palpitations  -cumulative dose MTX (started 10/2016): 1030mg  3. Venous stasis dermatitis    Encounter Date: May 17, 2018    CC:  Chief Complaint   Patient presents with     Derm Problem     Sobia is here today for a dermatitis follow up. sobia notes\"  I still have some itching and dry skin\"        History of Present Illness:  Mr. Sobia Moore is a 77 year old male who presents for follow up of atopic dermatitis and macular amyloidosis. At his last visit in 2/2018 with Dr. Escalante, his methotrexate was increased to 15 mg weekly and he was continued on topical triamcinolone ointment. Today, he reports that his dermatitis is overall improved on this regimen, however he has persistent pruritus of the back. He is applying triamcinolone daily to this area without much improvement. Also applies triamcinolone to the arms, which occasionally itch. He doesn't use a regular moisturizer.   He is tolerating methotrexate well without side effects; denies any GI distress.   He is feeling a little \"wobbly\" on his feet over the past few months which he attributes to his diuretic; he also has chronically low potassium levels associated with diuretic use, for which he is on supplementation.     Past Medical History:   Patient Active Problem List   Diagnosis     Itching     AD (atopic dermatitis)     Dermatitis     Hyperglycemia     " Eczema, unspecified eczema     Intrinsic atopic dermatitis     Other eczema     Notalgia paresthetica     Rash     Pseudophakia of both eyes     Diabetes mellitus (H)     Presbyopia     Type 2 diabetes mellitus without complication, without long-term current use of insulin (H)     Encounter for long-term current use of high risk medication     Past Medical History:   Diagnosis Date     Diabetes mellitus (H)      Gout attack      Heart attack 1984     Hypertension      Past Surgical History:   Procedure Laterality Date     CARDIAC SURGERY  1984    4 vessel bypass     CHOLECYSTECTOMY       COLONOSCOPY       ESOPHAGOSCOPY, GASTROSCOPY, DUODENOSCOPY (EGD), COMBINED N/A 3/1/2018    Procedure: COMBINED ESOPHAGOSCOPY, GASTROSCOPY, DUODENOSCOPY (EGD), BIOPSY SINGLE OR MULTIPLE;  ESOPHAGOGASTRODUODENOSCOPY ;  Surgeon: Daryn Medrano MD;  Location: [facility]     NO HISTORY OF SURGERY  3/31/14    derm     PHACOEMULSIFICATION WITH STANDARD INTRAOCULAR LENS IMPLANT  1/21/2013    Procedure: PHACOEMULSIFICATION WITH STANDARD INTRAOCULAR LENS IMPLANT;  Phacoemulsification with standard intraocular lens implant, right eye;  Surgeon: Jay Rodriges MD;  Location:  OR       Social History:  The patient is a retired ; waste water treatment. His wife is currently hospitalized for complications related to hip replacement.     Family History:  Strong family history of diabetes.     Medications:  Current Outpatient Prescriptions   Medication Sig Dispense Refill     ammonium lactate (LAC-HYDRIN) 12 % lotion Apply topically 2 times daily Apply twice daily to feet 500 g 1     aspirin 81 MG tablet Take 81 mg by mouth daily.       Atorvastatin Calcium (LIPITOR PO) Take 40 mg by mouth daily        betamethasone valerate (VALISONE) 0.1 % ointment Apply to itchy areas twice daily as needed 45 g 3     blood glucose monitoring (ACCU-CHEK FASTCLIX) lancets Use to test blood sugar 2 times daily or as directed. 2 Box 1      blood glucose monitoring (NO BRAND SPECIFIED) test strip Use to test blood sugar 4 times daily  With meter/ strips/ lancets covered by insurance 360 each 3     blood glucose monitoring (ULTRA THIN 30G) lancets Use to test blood sugar 4 times daily with meter/ strips/ lancets covered by insurance 4 Box 3     Blood Glucose Monitoring Suppl (BLOOD GLUCOSE MONITOR SYSTEM) W/DEVICE KIT Test 4 times daily with meter covered by  insurance 1 kit 1     capsaicin (ZOSTRIX) 0.075 % topical cream Apply topically 3 times daily To areas of itch on back.  OK to dilute with cetaphil cream if too burning. 60 g 3     Cholecalciferol (VITAMIN D) 2000 UNITS CAPS Take 1 tablet by mouth daily       clobetasol (TEMOVATE) 0.05 % ointment For flaring spots around ankles, apply at bedtime and cover with saran wrap 60 g 2     clobetasol (TEMOVATE) 0.05 % ointment Apply to itchy areas twice daily as needed 60 g 1     clobetasol (TEMOVATE) 0.05 % ointment Apply to areas of dermatitis on the body 60 g 1     COLCHICINE PO Take 0.6 mg by mouth daily.       Cyanocobalamin (VITAMIN B12 PO) Take 1 tablet by mouth daily       Ginkgo Biloba (GNP GINGKO BILOBA EXTRACT PO) Take  by mouth.       glimepiride (AMARYL) 4 MG tablet 1 tablet in AM with meal and 1/2 tablet in PM with meal 135 tablet 1     indapamide (LOZOL) 2.5 MG tablet Take 2.5 mg by mouth every morning.       Irbesartan (AVAPRO PO) Take 150 mg by mouth daily.       LORazepam (ATIVAN PO) Take 2.5 mg by mouth At Bedtime.       metFORMIN (GLUCOPHAGE) 1000 MG tablet TAKE 1 TABLET TWICE DAILY (WITH MEALS) 180 tablet 0     methotrexate 2.5 MG tablet CHEMO TAKE 5 TABLETS ONE DAY WEEKLY 40 tablet 0     mirtazapine (REMERON) 15 MG tablet Take 1 tablet (15 mg) by mouth At Bedtime 30 tablet 1     multivitamin, therapeutic with minerals (MULTI-VITAMIN) TABS Take 1 tablet by mouth daily.       nitroglycerin (NITROSTAT) 0.4 MG SL tablet Place under the tongue as needed       potassium chloride SA  (K-DUR/KLOR-CON M) 20 MEQ CR tablet TAKE 1 TABLET THREE TIMES DAILY 270 tablet 1     torsemide (DEMADEX) 10 MG tablet Take 10 mg by mouth daily       triamcinolone (KENALOG) 0.1 % ointment Twice daily to raised rash areas on the arms, legs, body as needed. 454 g 2     Allergies   Allergen Reactions     Beta Adrenergic Blockers Unknown     Latex Dermatitis     Tape [Adhesive Tape] Dermatitis     Electrode patches     Review of Systems:  -Skin Establ Pt: The patient denies any new rash, pruritus, or lesions that are symptomatic, changing or bleeding, except as per HPI.  -Constitutional: The patient denies fatigue, fevers, chills, unintended weight loss, and night sweats.  -Skin: As above in HPI. No additional skin concerns.    Physical exam:  Vitals: There were no vitals taken for this visit.  GEN: This is a well developed, well-nourished male in no acute distress, in a pleasant mood.    SKIN: Focused examination of the back and bilateral arms was performed.  - Skin is well-hydrated.   - Upper and mid back with small round hyperpigmented macules coalescing into larger patches in a rippled pattern.   - No other lesions of concern on areas examined.     Impression/Plan:  1. Atopic dermatitis, macular amyloidosis  - Overall, methotrexate has resulted in significant improvement of his dermatitis, but he continues to have persistent pruritus mainly on the back.   - Continue methotrexate 15 mg weekly. Reviewed potential side effects; safety labs (CBC, CMP) due today.   - Continue triamcinolone 0.1% ointment BID prn. For the persistent pruritus on the back, add Diprolene 0.05% ointment BID prn, as well as capsaicin 0.075% cream TID prn (OK to dilute with a bland emollient if needed).  - Dry skin care measures reviewed.     Follow-up in 3 months, earlier for new or changing lesions.     Dr. Gray staffed the patient.     Staff Involved: Resident (Tasha Buchanan)/Staff (as above)    I have seen and examined this patient and  agree with the assessment and plan as documented in the resident's note.    Miguel Gray MD  Dermatology Attending

## 2018-05-17 NOTE — PATIENT INSTRUCTIONS
Blood today for methotrexate safety labs.  Continue methotrexate 15 mg weekly.     Continue triamcinolone ointment as needed. You can also use the stronger ointment (augmented betamethasone) as needed to very itchy areas such as the back. You can also try capsaicin (hot pepper cream) to the very itchy areas. Wear gloves when applying.

## 2018-05-17 NOTE — MR AVS SNAPSHOT
After Visit Summary   5/17/2018    Deandre Moore    MRN: [id number]           Patient Information     Date Of Birth          1940        Visit Information        Provider Department      5/17/2018 1:45 PM Miguel Gray MD [facility] Dermatology        Today's Diagnoses     Other atopic dermatitis    -  1    Amyloidosis cutis (H)        Chronic pruritus        Dermatitis        Notalgia paresthetica        Other eczema        Therapeutic drug monitoring          Care Instructions    Blood today for methotrexate safety labs.  Continue methotrexate 15 mg weekly.     Continue triamcinolone ointment as needed. You can also use the stronger ointment (augmented betamethasone) as needed to very itchy areas such as the back. You can also try capsaicin (hot pepper cream) to the very itchy areas. Wear gloves when applying.               Follow-ups after your visit        Follow-up notes from your care team     Return in about 3 months (around 8/17/2018).      Your next 10 appointments already scheduled     May 17, 2018  2:30 PM CDT   LAB with  LAB   [facility] ([facility])    [address] [place] [id number] [phone]           Please do not eat 10-12 hours before your appointment if you are coming in fasting for labs on lipids, cholesterol, or glucose (sugar). This does not apply to pregnant women. Water, hot tea and black coffee (with nothing added) are okay. Do not drink other fluids, diet soda or chew gum.            Jun 04, 2018 10:45 AM CDT   Return Visit with Davis Whyte MD, MG ENDO NURSE   [facility] ([facility])    [id number] [place] [id number]   [phone]            Aug 23, 2018 12:15 PM CDT   (Arrive by 12:00 PM)   Return Visit with Miguel Gray MD   [facility] Dermatology ([facility])    [address] [place] [address]  [id number] [phone]              Future tests that were ordered for you today     Open Future Orders        Priority Expected Expires Ordered    CBC with platelets differential Routine  5/17/2019 5/17/2018    Comprehensive metabolic panel Routine  5/17/2019 5/17/2018            Who to contact     Please call your clinic at [phone] to:    Ask questions about your health    Make or cancel appointments    Discuss your medicines    Learn about your test results    Speak to your doctor            Additional Information About Your Visit        [employer] Information     [employer] gives you secure access to your electronic health record. If you see a primary care provider, you can also send messages to your care team and make appointments. If you have questions, please call your primary care clinic.  If you do not have a primary care provider, please call [phone] and they will assist you.      [employer] is an electronic gateway that provides easy, online access to your medical records. With [employer], you can request a clinic appointment, read your test results, renew a prescription or communicate with your care team.     To access your existing account, please contact your [facility] or call [phone] for assistance.        Care EveryWhere ID     This is your Care EveryWhere ID. This could be used by other organizations to access your [facility] medical records  [id number]         Blood Pressure from Last 3 Encounters:   03/01/18 123/74   06/19/17 121/73   01/02/17 110/60    Weight from Last 3 Encounters:   06/19/17 91.2 kg (201 lb)   01/02/17 93.4 kg (206 lb)   06/07/16 93.8 kg (206 lb 12.8 oz)                 Today's Medication Changes          These changes are accurate as of 5/17/18  2:29 PM.  If you have any questions, ask your nurse or doctor.               Start taking these medicines.        Dose/Directions    augmented betamethasone dipropionate 0.05 % ointment   Commonly  known as:  DIPROLENE-AF   Used for:  Other atopic dermatitis   Started by:  Miguel Gray MD        Apply topically 2 times daily As needed to itchy areas on back   Quantity:  50 g   Refills:  3         These medicines have changed or have updated prescriptions.        Dose/Directions    methotrexate 2.5 MG tablet CHEMO   This may have changed:  See the new instructions.   Used for:  Other eczema   Changed by:  Miguel Gray MD        Dose:  15 mg   Take 6 tablets (15 mg) by mouth every 7 days   Quantity:  24 tablet   Refills:  3            Where to get your medicines      These medications were sent to [facility] - [place], [place] - [address]  [address] [address]     Phone:  [phone]     augmented betamethasone dipropionate 0.05 % ointment    capsaicin 0.075 % cream    methotrexate 2.5 MG tablet CHEMO    triamcinolone 0.1 % ointment                Primary Care Provider Office Phone # Fax #    Denilson Andersonrson [phone]       [address] [address]        Equal Access to Services     [facility]: Hadii aad ku hadasho Soomaali, waaxda luqadaha, qaybta kaalmada adeegyada, waxay ashwin harley . So [facility] [phone].    ATENCIÓN: Si habla español, tiene a velazco disposición servicios gratuitos de asistencia lingüística. [facility] [phone].    We comply with applicable federal civil rights laws and [place] laws. We do not discriminate on the basis of race, color, national origin, age, disability, sex, sexual orientation, or gender identity.            Thank you!     Thank you for choosing [facility] DERMATOLOGY  for your care. Our goal is always to provide you with excellent care. Hearing back from our patients is one way we can continue to improve our services. Please take a few minutes to complete the written survey that you may receive in the mail after your visit with us. Thank you!              Your Updated Medication List - Protect others around you: Learn how to safely use, store and throw away your medicines at www.disposemymeds.org.          This list is accurate as of 5/17/18  2:29 PM.  Always use your most recent med list.                   Brand Name Dispense Instructions for use Diagnosis    ammonium lactate 12 % lotion    LAC-HYDRIN    500 g    Apply topically 2 times daily Apply twice daily to feet    Hyperglycemia       aspirin 81 MG tablet      Take 81 mg by mouth daily.        ATIVAN PO      Take 2.5 mg by mouth At Bedtime.        augmented betamethasone dipropionate 0.05 % ointment    DIPROLENE-AF    50 g    Apply topically 2 times daily As needed to itchy areas on back    Other atopic dermatitis       AVAPRO PO      Take 150 mg by mouth daily.        betamethasone valerate 0.1 % ointment    VALISONE    45 g    Apply to itchy areas twice daily as needed    Other atopic dermatitis       Blood Glucose Monitor System w/Device Kit     1 kit    Test 4 times daily with meter covered by  insurance    Type 2 diabetes mellitus (H)       * blood glucose monitoring lancets     2 Box    Use to test blood sugar 2 times daily or as directed.    Hyperglycemia       * blood glucose monitoring lancets     4 Box    Use to test blood sugar 4 times daily with meter/ strips/ lancets covered by insurance    Type 2 diabetes mellitus (H)       blood glucose monitoring test strip    no brand specified    360 each    Use to test blood sugar 4 times daily  With meter/ strips/ lancets covered by insurance    Type 2 diabetes mellitus (H)       capsaicin 0.075 % cream    ZOSTRIX    60 g    Apply topically 3 times daily To areas of itch on back.  OK to dilute with cetaphil cream if too burning.    Notalgia paresthetica       * clobetasol 0.05 % ointment    TEMOVATE    60 g    For flaring spots around ankles, apply at bedtime and cover with saran wrap    Dermatitis       * clobetasol 0.05 % ointment    TEMOVATE    60  g    Apply to itchy areas twice daily as needed    Rash       * clobetasol 0.05 % ointment    TEMOVATE    60 g    Apply to areas of dermatitis on the body    Other atopic dermatitis       COLCHICINE PO      Take 0.6 mg by mouth daily.        glimepiride 4 MG tablet    AMARYL    135 tablet    1 tablet in AM with meal and 1/2 tablet in PM with meal    Type 2 diabetes mellitus without complication (H)       GNP GINGKO BILOBA EXTRACT PO      Take  by mouth.        indapamide 2.5 MG tablet    LOZOL     Take 2.5 mg by mouth every morning.        LIPITOR PO      Take 40 mg by mouth daily        metFORMIN 1000 MG tablet    GLUCOPHAGE    180 tablet    TAKE 1 TABLET TWICE DAILY (WITH MEALS)    Diabetes mellitus type 1 (H)       methotrexate 2.5 MG tablet CHEMO     24 tablet    Take 6 tablets (15 mg) by mouth every 7 days    Other eczema       mirtazapine 15 MG tablet    REMERON    30 tablet    Take 1 tablet (15 mg) by mouth At Bedtime    Pruritus       Multi-vitamin Tabs tablet      Take 1 tablet by mouth daily.        nitroGLYcerin 0.4 MG sublingual tablet    NITROSTAT     Place under the tongue as needed        potassium chloride SA 20 MEQ CR tablet    K-DUR/KLOR-CON M    270 tablet    TAKE 1 TABLET THREE TIMES DAILY    Type 2 diabetes mellitus with hyperglycemia, unspecified long term insulin use status (H)       torsemide 10 MG tablet    DEMADEX     Take 10 mg by mouth daily        triamcinolone 0.1 % ointment    KENALOG    454 g    Twice daily to raised rash areas on the arms, legs, body as needed.    Dermatitis       VITAMIN B12 PO      Take 1 tablet by mouth daily        vitamin D 2000 units Caps      Take 1 tablet by mouth daily        * Notice:  This list has 5 medication(s) that are the same as other medications prescribed for you. Read the directions carefully, and ask your doctor or other care provider to review them with you.

## 2018-06-04 ENCOUNTER — OFFICE VISIT (OUTPATIENT)
Dept: ENDOCRINOLOGY | Facility: CLINIC | Age: 78
End: 2018-06-04
Payer: COMMERCIAL

## 2018-06-04 VITALS
SYSTOLIC BLOOD PRESSURE: 111 MMHG | HEIGHT: 72 IN | WEIGHT: 203.48 LBS | OXYGEN SATURATION: 94 % | BODY MASS INDEX: 27.56 KG/M2 | HEART RATE: 68 BPM | DIASTOLIC BLOOD PRESSURE: 68 MMHG

## 2018-06-04 DIAGNOSIS — E11.9 TYPE 2 DIABETES MELLITUS WITHOUT COMPLICATION, WITHOUT LONG-TERM CURRENT USE OF INSULIN (H): Primary | ICD-10-CM

## 2018-06-04 LAB
CREAT UR-MCNC: 22 MG/DL
HBA1C MFR BLD: 5.5 % (ref 0–5.7)
MICROALBUMIN UR-MCNC: <5 MG/L
MICROALBUMIN/CREAT UR: NORMAL MG/G CR (ref 0–17)

## 2018-06-04 PROCEDURE — 99214 OFFICE O/P EST MOD 30 MIN: CPT | Performed by: INTERNAL MEDICINE

## 2018-06-04 PROCEDURE — 36415 COLL VENOUS BLD VENIPUNCTURE: CPT | Performed by: INTERNAL MEDICINE

## 2018-06-04 PROCEDURE — 83036 HEMOGLOBIN GLYCOSYLATED A1C: CPT | Performed by: INTERNAL MEDICINE

## 2018-06-04 PROCEDURE — 82043 UR ALBUMIN QUANTITATIVE: CPT | Performed by: INTERNAL MEDICINE

## 2018-06-04 RX ORDER — IRBESARTAN 150 MG/1
150 TABLET ORAL DAILY
Qty: 90 TABLET | Refills: 3 | Status: SHIPPED | OUTPATIENT
Start: 2018-06-04 | End: 2018-06-04

## 2018-06-04 RX ORDER — IRBESARTAN 150 MG/1
150 TABLET ORAL AT BEDTIME
Qty: 90 TABLET | Refills: 3 | Status: SHIPPED | OUTPATIENT
Start: 2018-06-04 | End: 2019-02-15

## 2018-06-04 RX ORDER — GLIMEPIRIDE 4 MG/1
TABLET ORAL
Qty: 135 TABLET | Refills: 1 | Status: CANCELLED | OUTPATIENT
Start: 2018-06-04

## 2018-06-04 NOTE — NURSING NOTE
Deandre Moore's goals for this visit include: Follow up Diabetes  He requests these members of his care team be copied on today's visit information: YES    PCP: Denilson Thomas    Referring Provider:  No referring provider defined for this encounter.    Ht 1.829 m (6')  Wt 92.3 kg (203 lb 7.8 oz)  BMI 27.6 kg/m2    Do you need any medication refills at today's visit? YES

## 2018-06-04 NOTE — LETTER
6/4/2018         RE: Deandre Moore  [address] [facility] [id number]        Dear Colleague,    Thank you for referring your patient, Deandre Moore, to the [facility]. Please see a copy of my visit note below.    Deandre is a 77 year old male presents today for Diabetes    HPI    Deandre is here for follow up of type 2 diabetes    Diagnosed with T2DM around age 68 .  He has never been on insulin.  He has never been hospitalized for diabetes, or hyperglycemia. per patient report, he had a myocardial infarction roughly 30 years ago (prior to diagnosis of diabetes).   He reports a very strong history of diabetes in his family.      Currently on metformin 1000 mg twice times a day and Amaryl 4 + 2 mg daily    last eye exam Nov 2017    A1c 5.5%    History of bypass surgery with myocardial infarction roughly 30 years ago, currently on statin.    LDL in January 2017 was 40    BG data reviewed. Ave Bg 110 SD 37, has had symptomatic hypoglycemia.  About once a week, usually at bedtime.  No history of severe hypoglycemia    HTN-on Avapro     CAD- follows with cardiology once a year      C/o LE edema on and off, concerns that it will get worse if stops diuretics   C/o low appetite  very intermittent tingling of his feet, c/o numbness in toes  Complains of balance issue therefore limits outside activity, has has had falls while doing yard work.    Also has had hypokalemia related to diuretics, on potassium supplements.  Working with primary care for potassium replacement and diuretic adjustment.    Past Medical History  Past Medical History:   Diagnosis Date     Diabetes mellitus (H)      Gout attack      Heart attack 1984     Hypertension    gout  Patient Active Problem List   Diagnosis     Itching     AD (atopic dermatitis)     Dermatitis     Hyperglycemia     Eczema, unspecified eczema     Intrinsic atopic dermatitis     Other eczema     Notalgia paresthetica     Rash      Pseudophakia of both eyes     Diabetes mellitus (H)     Presbyopia     Type 2 diabetes mellitus without complication, without long-term current use of insulin (H)     Encounter for long-term current use of high risk medication     Allergies  Allergies   Allergen Reactions     Beta Adrenergic Blockers Unknown     Latex Dermatitis     Tape [Adhesive Tape] Dermatitis     Electrode patches     Medications  Current Outpatient Prescriptions   Medication Sig Dispense Refill     ammonium lactate (LAC-HYDRIN) 12 % lotion Apply topically 2 times daily Apply twice daily to feet 500 g 1     aspirin 81 MG tablet Take 81 mg by mouth daily.       Atorvastatin Calcium (LIPITOR PO) Take 40 mg by mouth daily        augmented betamethasone dipropionate (DIPROLENE-AF) 0.05 % ointment Apply topically 2 times daily As needed to itchy areas on back 50 g 3     blood glucose monitoring (ACCU-CHEK FASTCLIX) lancets Use to test blood sugar 2 times daily or as directed. 2 Box 1     blood glucose monitoring (NO BRAND SPECIFIED) test strip Use to test blood sugar 4 times daily  With meter/ strips/ lancets covered by insurance 360 each 3     blood glucose monitoring (ULTRA THIN 30G) lancets Use to test blood sugar 4 times daily with meter/ strips/ lancets covered by insurance 4 Box 3     Blood Glucose Monitoring Suppl (BLOOD GLUCOSE MONITOR SYSTEM) W/DEVICE KIT Test 4 times daily with meter covered by  insurance 1 kit 1     capsaicin (ZOSTRIX) 0.075 % cream Apply topically 3 times daily To areas of itch on back.  OK to dilute with cetaphil cream if too burning. 60 g 3     Cholecalciferol (VITAMIN D) 2000 UNITS CAPS Take 1 tablet by mouth daily       clobetasol (TEMOVATE) 0.05 % ointment Apply to areas of dermatitis on the body 60 g 1     clobetasol (TEMOVATE) 0.05 % ointment Apply to itchy areas twice daily as needed 60 g 1     clobetasol (TEMOVATE) 0.05 % ointment For flaring spots around ankles, apply at bedtime and cover with saran wrap 60 g 2      COLCHICINE PO Take 0.6 mg by mouth daily.       Cyanocobalamin (VITAMIN B12 PO) Take 1 tablet by mouth daily       Ginkgo Biloba (GNP GINGKO BILOBA EXTRACT PO) Take  by mouth.       glimepiride (AMARYL) 4 MG tablet 1 tablet in AM with meal and 1/2 tablet in PM with meal 135 tablet 1     indapamide (LOZOL) 2.5 MG tablet Take 2.5 mg by mouth every morning.       Irbesartan (AVAPRO PO) Take 150 mg by mouth daily.       LORazepam (ATIVAN PO) Take 2.5 mg by mouth At Bedtime.       metFORMIN (GLUCOPHAGE) 1000 MG tablet TAKE 1 TABLET TWICE DAILY (WITH MEALS) 180 tablet 0     methotrexate 2.5 MG tablet CHEMO Take 6 tablets (15 mg) by mouth every 7 days 24 tablet 3     mirtazapine (REMERON) 15 MG tablet Take 1 tablet (15 mg) by mouth At Bedtime 30 tablet 1     multivitamin, therapeutic with minerals (MULTI-VITAMIN) TABS Take 1 tablet by mouth daily.       nitroglycerin (NITROSTAT) 0.4 MG SL tablet Place under the tongue as needed       potassium chloride SA (K-DUR/KLOR-CON M) 20 MEQ CR tablet TAKE 1 TABLET THREE TIMES DAILY 270 tablet 1     torsemide (DEMADEX) 10 MG tablet Take 10 mg by mouth daily       triamcinolone (KENALOG) 0.1 % ointment Twice daily to raised rash areas on the arms, legs, body as needed. 454 g 2     Family History  Significant for diabetes  family history is negative for CANCER and Skin Cancer.  Social History  No history of smoking or alcohol use currently  Social History     Social History     Marital status:      Spouse name: N/A     Number of children: N/A     Years of education: N/A     Occupational History     Not on file.     Social History Main Topics     Smoking status: Former Smoker     Quit date: 10/15/1984     Smokeless tobacco: Never Used     Alcohol use Yes      Comment: occasional-once a year     Drug use: No     Sexual activity: Not on file     Other Topics Concern     Not on file     Social History Narrative   retired, . Lives in [place]      ROS: 10 point  ROS neg other than the symptoms noted above in the HPI.    Physical Exam  /68  Pulse 68  Ht 1.829 m (6')  Wt 92.3 kg (203 lb 7.8 oz)  SpO2 94%  BMI 27.6 kg/m2  Body mass index is 27.6 kg/(m^2).    Exam:  GENERAL APPEARANCE: Alert and no distress  Neck: no goiter  Lungs: CTA b/l  Heart:RRR  Ext: b/l edema  Neuro: Mild tremors of the outstretched hand      RESULTS  Lab Results   Component Value Date    A1C 5.9 06/19/2017    A1C 5.4 06/07/2016       TSH   Date Value Ref Range Status   06/19/2017 1.39 0.40 - 4.00 mU/L Final       Creatinine   Date Value Ref Range Status   05/17/2018 1.06 0.66 - 1.25 mg/dL Final   02/16/2018 1.02 0.66 - 1.25 mg/dL Final   ]    Albumin   Date Value Ref Range Status   05/17/2018 3.8 3.4 - 5.0 g/dL Final   ]    ALT   Date Value Ref Range Status   05/17/2018 32 0 - 70 U/L Final   02/16/2018 36 0 - 70 U/L Final   ]    Recent Labs   Lab Test  01/22/16   1353   CHOL  138   HDL  50   LDL  48   TRIG  200*       No results found for: B12]    outside labs reviewed     ASSESSMENT AND PLAN:      Type 2 diabetes, tight control: A1c 5.5% with hypoglycemia  Discussed targets of glycemic control  Reviewed risk of hypoglycemia with DOMINGO especially in elderly.  Discussed stopping Amaryl and start Januvia, he is agreeable.  Stop Amaryl and start Januvia 50 mg daily.  If unable to fill Januvia due to cost/insurance issues, counseled to stop the evening dose of Amaryl.   Also discussed option of stopping Amaryl  and just monitor on metformin alone.  However he is concerned about high blood sugars and would like to take another medication along with metformin.    Orders Placed This Encounter   Procedures     Albumin Random Urine Quantitative with Creat Ratio     Return to clinic in about six months.     I spent 25 minutes with this patient face to face and explained the conditions and plans (more than 50% of time was counseling/coordination of care, diabetes care, hypoglycemia) . The patient  understood and is satisfied with today's visit    JUAN Schafer    Note: Chart documentation done in part with Dragon Voice Recognition software. Although reviewed after completion, some word and grammatical errors may remain. Please consider this when interpreting information in this chart    Again, thank you for allowing me to participate in the care of your patient.        Sincerely,        Davis Whyte MD

## 2018-06-04 NOTE — PATIENT INSTRUCTIONS
60 min follow up with Dr. Gayle per .    [facility]-Department of Endocrinology  Noy Jaffe RN, Diabetes Educator: [phone]  Clinic Nurses Caitlyn Loyola: [phone]  Clinic Fax: [phone]  On-Call Endocrine at the [facility] (after hours/weekends): [phone] option 4  Scheduling Line: [phone]    Appointment Reminders:  * Please bring meter with for staff to download  * If you are due ONLY for an A1C, it is scheduled with the nurse and will be done in clinic. You do not need to schedule a lab appointment. Fasting is not required for an A1C.  * Refill request should be submitted to your pharmacy. They will contact clinic for approval.

## 2018-06-04 NOTE — PROGRESS NOTES
Deandre is a 77 year old male presents today for Diabetes    HPI    Deandre is here for follow up of type 2 diabetes    Diagnosed with T2DM around age 68 .  He has never been on insulin.  He has never been hospitalized for diabetes, or hyperglycemia. per patient report, he had a myocardial infarction roughly 30 years ago (prior to diagnosis of diabetes).   He reports a very strong history of diabetes in his family.      Currently on metformin 1000 mg twice times a day and Amaryl 4 + 2 mg daily    last eye exam Nov 2017    A1c 5.5%    History of bypass surgery with myocardial infarction roughly 30 years ago, currently on statin.    LDL in January 2017 was 40    BG data reviewed. Ave Bg 110 SD 37, has had symptomatic hypoglycemia.  About once a week, usually at bedtime.  No history of severe hypoglycemia    HTN-on Avapro     CAD- follows with cardiology once a year      C/o LE edema on and off, concerns that it will get worse if stops diuretics   C/o low appetite  very intermittent tingling of his feet, c/o numbness in toes  Complains of balance issue therefore limits outside activity, has has had falls while doing yard work.    Also has had hypokalemia related to diuretics, on potassium supplements.  Working with primary care for potassium replacement and diuretic adjustment.    Past Medical History  Past Medical History:   Diagnosis Date     Diabetes mellitus (H)      Gout attack      Heart attack 1984     Hypertension    gout  Patient Active Problem List   Diagnosis     Itching     AD (atopic dermatitis)     Dermatitis     Hyperglycemia     Eczema, unspecified eczema     Intrinsic atopic dermatitis     Other eczema     Notalgia paresthetica     Rash     Pseudophakia of both eyes     Diabetes mellitus (H)     Presbyopia     Type 2 diabetes mellitus without complication, without long-term current use of insulin (H)     Encounter for long-term current use of high risk medication     Allergies  Allergies   Allergen  Reactions     Beta Adrenergic Blockers Unknown     Latex Dermatitis     Tape [Adhesive Tape] Dermatitis     Electrode patches     Medications  Current Outpatient Prescriptions   Medication Sig Dispense Refill     ammonium lactate (LAC-HYDRIN) 12 % lotion Apply topically 2 times daily Apply twice daily to feet 500 g 1     aspirin 81 MG tablet Take 81 mg by mouth daily.       Atorvastatin Calcium (LIPITOR PO) Take 40 mg by mouth daily        augmented betamethasone dipropionate (DIPROLENE-AF) 0.05 % ointment Apply topically 2 times daily As needed to itchy areas on back 50 g 3     blood glucose monitoring (ACCU-CHEK FASTCLIX) lancets Use to test blood sugar 2 times daily or as directed. 2 Box 1     blood glucose monitoring (NO BRAND SPECIFIED) test strip Use to test blood sugar 4 times daily  With meter/ strips/ lancets covered by insurance 360 each 3     blood glucose monitoring (ULTRA THIN 30G) lancets Use to test blood sugar 4 times daily with meter/ strips/ lancets covered by insurance 4 Box 3     Blood Glucose Monitoring Suppl (BLOOD GLUCOSE MONITOR SYSTEM) W/DEVICE KIT Test 4 times daily with meter covered by  insurance 1 kit 1     capsaicin (ZOSTRIX) 0.075 % cream Apply topically 3 times daily To areas of itch on back.  OK to dilute with cetaphil cream if too burning. 60 g 3     Cholecalciferol (VITAMIN D) 2000 UNITS CAPS Take 1 tablet by mouth daily       clobetasol (TEMOVATE) 0.05 % ointment Apply to areas of dermatitis on the body 60 g 1     clobetasol (TEMOVATE) 0.05 % ointment Apply to itchy areas twice daily as needed 60 g 1     clobetasol (TEMOVATE) 0.05 % ointment For flaring spots around ankles, apply at bedtime and cover with saran wrap 60 g 2     COLCHICINE PO Take 0.6 mg by mouth daily.       Cyanocobalamin (VITAMIN B12 PO) Take 1 tablet by mouth daily       Ginkgo Biloba (GNP GINGKO BILOBA EXTRACT PO) Take  by mouth.       glimepiride (AMARYL) 4 MG tablet 1 tablet in AM with meal and 1/2 tablet in  PM with meal 135 tablet 1     indapamide (LOZOL) 2.5 MG tablet Take 2.5 mg by mouth every morning.       Irbesartan (AVAPRO PO) Take 150 mg by mouth daily.       LORazepam (ATIVAN PO) Take 2.5 mg by mouth At Bedtime.       metFORMIN (GLUCOPHAGE) 1000 MG tablet TAKE 1 TABLET TWICE DAILY (WITH MEALS) 180 tablet 0     methotrexate 2.5 MG tablet CHEMO Take 6 tablets (15 mg) by mouth every 7 days 24 tablet 3     mirtazapine (REMERON) 15 MG tablet Take 1 tablet (15 mg) by mouth At Bedtime 30 tablet 1     multivitamin, therapeutic with minerals (MULTI-VITAMIN) TABS Take 1 tablet by mouth daily.       nitroglycerin (NITROSTAT) 0.4 MG SL tablet Place under the tongue as needed       potassium chloride SA (K-DUR/KLOR-CON M) 20 MEQ CR tablet TAKE 1 TABLET THREE TIMES DAILY 270 tablet 1     torsemide (DEMADEX) 10 MG tablet Take 10 mg by mouth daily       triamcinolone (KENALOG) 0.1 % ointment Twice daily to raised rash areas on the arms, legs, body as needed. 454 g 2     Family History  Significant for diabetes  family history is negative for CANCER and Skin Cancer.  Social History  No history of smoking or alcohol use currently  Social History     Social History     Marital status:      Spouse name: N/A     Number of children: N/A     Years of education: N/A     Occupational History     Not on file.     Social History Main Topics     Smoking status: Former Smoker     Quit date: 10/15/1984     Smokeless tobacco: Never Used     Alcohol use Yes      Comment: occasional-once a year     Drug use: No     Sexual activity: Not on file     Other Topics Concern     Not on file     Social History Narrative   retired, . Lives in [place]      ROS: 10 point ROS neg other than the symptoms noted above in the HPI.    Physical Exam  /68  Pulse 68  Ht 1.829 m (6')  Wt 92.3 kg (203 lb 7.8 oz)  SpO2 94%  BMI 27.6 kg/m2  Body mass index is 27.6 kg/(m^2).    Exam:  GENERAL APPEARANCE: Alert and no distress  Neck:  no goiter  Lungs: CTA b/l  Heart:RRR  Ext: b/l edema  Neuro: Mild tremors of the outstretched hand      RESULTS  Lab Results   Component Value Date    A1C 5.9 06/19/2017    A1C 5.4 06/07/2016       TSH   Date Value Ref Range Status   06/19/2017 1.39 0.40 - 4.00 mU/L Final       Creatinine   Date Value Ref Range Status   05/17/2018 1.06 0.66 - 1.25 mg/dL Final   02/16/2018 1.02 0.66 - 1.25 mg/dL Final   ]    Albumin   Date Value Ref Range Status   05/17/2018 3.8 3.4 - 5.0 g/dL Final   ]    ALT   Date Value Ref Range Status   05/17/2018 32 0 - 70 U/L Final   02/16/2018 36 0 - 70 U/L Final   ]    Recent Labs   Lab Test  01/22/16   1353   CHOL  138   HDL  50   LDL  48   TRIG  200*       No results found for: B12]    outside labs reviewed     ASSESSMENT AND PLAN:      Type 2 diabetes, tight control: A1c 5.5% with hypoglycemia  Discussed targets of glycemic control  Reviewed risk of hypoglycemia with DOMINGO especially in elderly.  Discussed stopping Amaryl and start Januvia, he is agreeable.  Stop Amaryl and start Januvia 50 mg daily.  If unable to fill Januvia due to cost/insurance issues, counseled to stop the evening dose of Amaryl.   Also discussed option of stopping Amaryl  and just monitor on metformin alone.  However he is concerned about high blood sugars and would like to take another medication along with metformin.    Orders Placed This Encounter   Procedures     Albumin Random Urine Quantitative with Creat Ratio     Return to clinic in about six months.     I spent 25 minutes with this patient face to face and explained the conditions and plans (more than 50% of time was counseling/coordination of care, diabetes care, hypoglycemia) . The patient understood and is satisfied with today's visit    JUAN Schafer    Note: Chart documentation done in part with Dragon Voice Recognition software. Although reviewed after completion, some word and grammatical errors may remain. Please consider this when interpreting  information in this chart

## 2018-06-04 NOTE — MR AVS SNAPSHOT
After Visit Summary   6/4/2018    Deandre Moore    MRN: [id number]           Patient Information     Date Of Birth          1940        Visit Information        Provider Department      6/4/2018 10:45 AM Davis Whyte MD; MG ENDO NURSE [facility]        Today's Diagnoses     Type 2 diabetes mellitus without complication, without long-term current use of insulin (H)    -  1    Type 2 diabetes mellitus without complication (H)        Diabetes mellitus type 1 (H)          Care Instructions    60 min follow up with Dr. Gayle per .    [facility]-Department of Endocrinology  Noy Jaffe RN, Diabetes Educator: [phone]  Clinic Nurses Caitlyn Loyola: [phone]  Clinic Fax: [phone]  On-Call Endocrine at [facility] (after hours/weekends): [phone] option [phone]  Scheduling Line: [phone]    Appointment Reminders:  * Please bring meter with for staff to download  * If you are due ONLY for an A1C, it is scheduled with the nurse and will be done in clinic. You do not need to schedule a lab appointment. Fasting is not required for an A1C.  * Refill request should be submitted to your pharmacy. They will contact clinic for approval.                Follow-ups after your visit        Follow-up notes from your care team     Return in about 6 months (around 12/4/2018).      Your next 10 appointments already scheduled     Aug 23, 2018 12:15 PM CDT   (Arrive by 12:00 PM)   Return Visit with Miguel Gray MD   [facility] ([facility])    [address] [place] [id number] [phone]            Dec 07, 2018  1:15 PM CST   Return Visit with Nick Gayle MD, MG ENDO NURSE   [facility] ([facility])    [id number] [place] [id number] [phone]              Future tests that were ordered for you today      Open Future Orders        Priority Expected Expires Ordered    Albumin Random Urine Quantitative with Creat Ratio Routine 6/4/2018 6/4/2019 6/4/2018            Who to contact     If you have questions or need follow up information about today's clinic visit or your schedule please contact [facility] directly at [phone].  Normal or non-critical lab and imaging results will be communicated to you by [email or web address], letter or phone within 4 business days after the clinic has received the results. If you do not hear from us within 7 days, please contact the clinic through [email or web address] or phone. If you have a critical or abnormal lab result, we will notify you by phone as soon as possible.  Submit refill requests through [employer] or call your pharmacy and they will forward the refill request to us. Please allow 3 business days for your refill to be completed.          Additional Information About Your Visit        [email or web address] Information     [employer] gives you secure access to your electronic health record. If you see a primary care provider, you can also send messages to your care team and make appointments. If you have questions, please call your primary care clinic.  If you do not have a primary care provider, please call [phone] and they will assist you.      [employer] is an electronic gateway that provides easy, online access to your medical records. With [employer], you can request a clinic appointment, read your test results, renew a prescription or communicate with your care team.     To access your existing account, please contact your [facility] or call [phone] for assistance.        Care EveryWhere ID     This is your Care EveryWhere ID. This could be used by other organizations to access your [facility] medical records  [id number]        Your Vitals Were     Pulse Height Pulse Oximetry BMI (Body Mass Index)          68 1.829 m (6') 94% 27.6 kg/m2         Blood  Pressure from Last 3 Encounters:   06/04/18 111/68   03/01/18 123/74   06/19/17 121/73    Weight from Last 3 Encounters:   06/04/18 92.3 kg (203 lb 7.8 oz)   06/19/17 91.2 kg (201 lb)   01/02/17 93.4 kg (206 lb)              We Performed the Following     Hemoglobin A1c POCT          Today's Medication Changes          These changes are accurate as of 6/4/18 11:31 AM.  If you have any questions, ask your nurse or doctor.               Start taking these medicines.        Dose/Directions    sitagliptin 50 MG tablet   Commonly known as:  JANUVIA   Used for:  Type 2 diabetes mellitus without complication (H), Diabetes mellitus type 1 (H), Type 2 diabetes mellitus without complication, without long-term current use of insulin (H)   Started by:  Davis Whyte MD        Dose:  50 mg   Take 1 tablet (50 mg) by mouth daily   Quantity:  90 tablet   Refills:  3         These medicines have changed or have updated prescriptions.        Dose/Directions    irbesartan 150 MG tablet   Commonly known as:  AVAPRO   This may have changed:    - medication strength  - when to take this   Used for:  Type 2 diabetes mellitus without complication (H), Diabetes mellitus type 1 (H), Type 2 diabetes mellitus without complication, without long-term current use of insulin (H)   Changed by:  Davis Whyte MD        Dose:  150 mg   Take 1 tablet (150 mg) by mouth At Bedtime   Quantity:  90 tablet   Refills:  3         Stop taking these medicines if you haven't already. Please contact your care team if you have questions.     betamethasone valerate 0.1 % ointment   Commonly known as:  VALISONE   Stopped by:  Davis Whyte MD                Where to get your medicines      These medications were sent to [facility] - [place], [place] - [address]  [address] [place], [place] [address]     Phone:  [phone]     irbesartan 150 MG tablet    metFORMIN 1000 MG tablet    sitagliptin 50 MG tablet                 Primary Care Provider Office Phone # Fax #    Denilson Thomas [phone]       [address] [address]        Equal Access to Services     MARLO ROBERTSON : Hadii aad ku hadtysono Soomaali, waaxda luqadaha, qaybta kaalmada adeegyada, waxfabiana iversonn osmel marcos laCarlosjames foster. So [facility] [phone].    ATENCIÓN: Si habla español, tiene a velazco disposición servicios gratuitos de asistencia lingüística. Llame al [phone].    We comply with applicable federal civil rights laws and [place] laws. We do not discriminate on the basis of race, color, national origin, age, disability, sex, sexual orientation, or gender identity.            Thank you!     Thank you for choosing [facility]  for your care. Our goal is always to provide you with excellent care. Hearing back from our patients is one way we can continue to improve our services. Please take a few minutes to complete the written survey that you may receive in the mail after your visit with us. Thank you!             Your Updated Medication List - Protect others around you: Learn how to safely use, store and throw away your medicines at www.disposemymeds.org.          This list is accurate as of 6/4/18 11:31 AM.  Always use your most recent med list.                   Brand Name Dispense Instructions for use Diagnosis    ammonium lactate 12 % lotion    LAC-HYDRIN    500 g    Apply topically 2 times daily Apply twice daily to feet    Hyperglycemia       aspirin 81 MG tablet      Take 81 mg by mouth daily.        augmented betamethasone dipropionate 0.05 % ointment    DIPROLENE-AF    50 g    Apply topically 2 times daily As needed to itchy areas on back    Other atopic dermatitis       Blood Glucose Monitor System w/Device Kit     1 kit    Test 4 times daily with meter covered by  insurance    Type 2 diabetes mellitus (H)       blood glucose monitoring lancets     2 Box    Use to test blood sugar 2 times  daily or as directed.    Hyperglycemia       blood glucose monitoring test strip    no brand specified    360 each    Use to test blood sugar 4 times daily  With meter/ strips/ lancets covered by insurance    Type 2 diabetes mellitus (H)       capsaicin 0.075 % cream    ZOSTRIX    60 g    Apply topically 3 times daily To areas of itch on back.  OK to dilute with cetaphil cream if too burning.    Notalgia paresthetica       COLCHICINE PO      Take 0.6 mg by mouth daily.        glimepiride 4 MG tablet    AMARYL    135 tablet    1 tablet in AM with meal and 1/2 tablet in PM with meal    Type 2 diabetes mellitus without complication (H)       GNP GINGKO BILOBA EXTRACT PO      Take  by mouth.        indapamide 2.5 MG tablet    LOZOL     Take 2.5 mg by mouth every morning.        irbesartan 150 MG tablet    AVAPRO    90 tablet    Take 1 tablet (150 mg) by mouth At Bedtime    Type 2 diabetes mellitus without complication (H), Diabetes mellitus type 1 (H), Type 2 diabetes mellitus without complication, without long-term current use of insulin (H)       LIPITOR PO      Take 40 mg by mouth daily        metFORMIN 1000 MG tablet    GLUCOPHAGE    180 tablet    TAKE 1 TABLET TWICE DAILY (WITH MEALS)    Diabetes mellitus type 1 (H), Type 2 diabetes mellitus without complication (H), Type 2 diabetes mellitus without complication, without long-term current use of insulin (H)       methotrexate 2.5 MG tablet CHEMO     24 tablet    Take 6 tablets (15 mg) by mouth every 7 days    Other eczema       Multi-vitamin Tabs tablet      Take 1 tablet by mouth daily.        nitroGLYcerin 0.4 MG sublingual tablet    NITROSTAT     Place under the tongue as needed        potassium chloride SA 20 MEQ CR tablet    K-DUR/KLOR-CON M    270 tablet    TAKE 1 TABLET THREE TIMES DAILY    Type 2 diabetes mellitus with hyperglycemia, unspecified long term insulin use status (H)       sitagliptin 50 MG tablet    JANUVIA    90 tablet    Take 1 tablet (50 mg)  by mouth daily    Type 2 diabetes mellitus without complication (H), Diabetes mellitus type 1 (H), Type 2 diabetes mellitus without complication, without long-term current use of insulin (H)       torsemide 10 MG tablet    DEMADEX     Take 10 mg by mouth daily        triamcinolone 0.1 % ointment    KENALOG    454 g    Twice daily to raised rash areas on the arms, legs, body as needed.    Dermatitis       VITAMIN B12 PO      Take 1 tablet by mouth daily        vitamin D 2000 units Caps      Take 1 tablet by mouth daily

## 2018-06-13 ENCOUNTER — TELEPHONE (OUTPATIENT)
Dept: ENDOCRINOLOGY | Facility: CLINIC | Age: 78
End: 2018-06-13

## 2018-06-13 NOTE — TELEPHONE ENCOUNTER
Adela, Care Coordinator [phone] to LM) for Dr. Halvorson calling to update clinic. She spoke with Lorenzo who advised that Januvia will cost patient $200 per month. Lorenzo is recommending pioglitazone.    Will forward to Dr. Whyte to review.    Milla Barnett LPN  Adult Endocrinology  [facility]

## 2018-06-15 NOTE — TELEPHONE ENCOUNTER
Patient notified of recommendations. He is agreeable with plan. He reports that his blood sugar yesterday and this morning were in the high 80's. He did treat with a candy bar before bed. He will stop evening dose of glimepiride and contact clinic next week if BG's remain low.     Per Isabell Message:  He has LE edema and pioglitazone may make it worse.   We had discussed that if Januvia is not approved, he can stop the evening dose of Amaryl and just keep the morning dose.   He should contact clinic if he experiencing any lows on  Amaryl 4 mg daily.   thanks     Milla Barnett LPN  Adult Endocrinology  [facility]

## 2018-08-23 ENCOUNTER — OFFICE VISIT (OUTPATIENT)
Dept: DERMATOLOGY | Facility: CLINIC | Age: 78
End: 2018-08-23
Payer: COMMERCIAL

## 2018-08-23 ENCOUNTER — APPOINTMENT (OUTPATIENT)
Dept: LAB | Facility: CLINIC | Age: 78
End: 2018-08-23
Payer: COMMERCIAL

## 2018-08-23 DIAGNOSIS — L30.8 OTHER ECZEMA: ICD-10-CM

## 2018-08-23 DIAGNOSIS — L20.89 OTHER ATOPIC DERMATITIS: ICD-10-CM

## 2018-08-23 DIAGNOSIS — L99 AMYLOIDOSIS CUTIS (H): Primary | ICD-10-CM

## 2018-08-23 DIAGNOSIS — Z79.899 ENCOUNTER FOR LONG-TERM CURRENT USE OF HIGH RISK MEDICATION: ICD-10-CM

## 2018-08-23 DIAGNOSIS — E85.4 AMYLOIDOSIS CUTIS (H): Primary | ICD-10-CM

## 2018-08-23 LAB
ALBUMIN SERPL-MCNC: 3.8 G/DL (ref 3.4–5)
ALP SERPL-CCNC: 72 U/L (ref 40–150)
ALT SERPL W P-5'-P-CCNC: 40 U/L (ref 0–70)
ANION GAP SERPL CALCULATED.3IONS-SCNC: 7 MMOL/L (ref 3–14)
AST SERPL W P-5'-P-CCNC: 30 U/L (ref 0–45)
BASOPHILS # BLD AUTO: 0.1 10E9/L (ref 0–0.2)
BASOPHILS NFR BLD AUTO: 0.9 %
BILIRUB SERPL-MCNC: 0.6 MG/DL (ref 0.2–1.3)
BUN SERPL-MCNC: 12 MG/DL (ref 7–30)
CALCIUM SERPL-MCNC: 9.3 MG/DL (ref 8.5–10.1)
CHLORIDE SERPL-SCNC: 100 MMOL/L (ref 94–109)
CO2 SERPL-SCNC: 32 MMOL/L (ref 20–32)
CREAT SERPL-MCNC: 1.1 MG/DL (ref 0.66–1.25)
DIFFERENTIAL METHOD BLD: ABNORMAL
EOSINOPHIL # BLD AUTO: 0.4 10E9/L (ref 0–0.7)
EOSINOPHIL NFR BLD AUTO: 6.2 %
ERYTHROCYTE [DISTWIDTH] IN BLOOD BY AUTOMATED COUNT: 14.7 % (ref 10–15)
GFR SERPL CREATININE-BSD FRML MDRD: 64 ML/MIN/1.7M2
GLUCOSE SERPL-MCNC: 108 MG/DL (ref 70–99)
HCT VFR BLD AUTO: 39.1 % (ref 40–53)
HGB BLD-MCNC: 13.3 G/DL (ref 13.3–17.7)
IMM GRANULOCYTES # BLD: 0 10E9/L (ref 0–0.4)
IMM GRANULOCYTES NFR BLD: 0.4 %
LYMPHOCYTES # BLD AUTO: 1.8 10E9/L (ref 0.8–5.3)
LYMPHOCYTES NFR BLD AUTO: 26.5 %
MCH RBC QN AUTO: 31.5 PG (ref 26.5–33)
MCHC RBC AUTO-ENTMCNC: 34 G/DL (ref 31.5–36.5)
MCV RBC AUTO: 93 FL (ref 78–100)
MONOCYTES # BLD AUTO: 0.6 10E9/L (ref 0–1.3)
MONOCYTES NFR BLD AUTO: 8.5 %
NEUTROPHILS # BLD AUTO: 3.9 10E9/L (ref 1.6–8.3)
NEUTROPHILS NFR BLD AUTO: 57.5 %
NRBC # BLD AUTO: 0 10*3/UL
NRBC BLD AUTO-RTO: 0 /100
PLATELET # BLD AUTO: 205 10E9/L (ref 150–450)
POTASSIUM SERPL-SCNC: 3.2 MMOL/L (ref 3.4–5.3)
PROT SERPL-MCNC: 7.4 G/DL (ref 6.8–8.8)
RBC # BLD AUTO: 4.22 10E12/L (ref 4.4–5.9)
SODIUM SERPL-SCNC: 138 MMOL/L (ref 133–144)
WBC # BLD AUTO: 6.8 10E9/L (ref 4–11)

## 2018-08-23 RX ORDER — BETAMETHASONE DIPROPIONATE 0.5 MG/G
OINTMENT, AUGMENTED TOPICAL 2 TIMES DAILY
Qty: 50 G | Refills: 3 | Status: SHIPPED | OUTPATIENT
Start: 2018-08-23 | End: 2022-05-03

## 2018-08-23 ASSESSMENT — PAIN SCALES - GENERAL: PAINLEVEL: NO PAIN (0)

## 2018-08-23 NOTE — NURSING NOTE
Chief Complaint   Patient presents with     Derm Problem     3 Month dermatitis recheck on back, getting better.      Polly Aldana CMA

## 2018-08-23 NOTE — PROGRESS NOTES
[facility] Dermatology Note      Dermatology Problem List:  1. Macular amyloidosis, atopic dermatitis  -previous tx: nbUVB, capsaicin 0.075% cream   -current tx: methotrexate 15 mg weekly, triamcinolone 0.1% ointment daily, betamethasone 0.05% ointment daily to the back  -safety labs on 2018  -does not take folic acid due to palpitations  -methotrexate dosin.5 mg weekly from 10/18/2016 to 2018, 15 mg weekly from 2018 to present  3. Venous stasis dermatitis    Encounter Date: Aug 23, 2018    CC:  Chief Complaint   Patient presents with     Derm Problem     3 Month dermatitis recheck on back, getting better.          History of Present Illness:  Mr. Deandre Moore is a 77 year old male who presents for follow up of atopic dermatitis and macular amyloidosis. At his last visit in 2018, he was continued on methotrexate 15 mg weekly, triamcinolone 0.1% ointment daily, and betamethasone 0.05% ointment daily. He was not able to use the capsaicin cream as his wife did not like the feeling on her hands when she applied it to his back. Today, he reports that his dermatitis is improved on this regimen. The rash is located on the back, and the patient feels that this is exacerbated by sleeping on his back and sweating in his sleep. He is tolerating methotrexate well without side effects; denies any GI distress. He has chronically low potassium levels associated with diuretic use, for which he is on supplementation.     Past Medical History:   Patient Active Problem List   Diagnosis     Itching     AD (atopic dermatitis)     Dermatitis     Hyperglycemia     Eczema, unspecified eczema     Intrinsic atopic dermatitis     Other eczema     Notalgia paresthetica     Rash     Pseudophakia of both eyes     Diabetes mellitus (H)     Presbyopia     Type 2 diabetes mellitus without complication, without long-term current use of insulin (H)     Encounter for long-term current use of high risk  medication     Past Medical History:   Diagnosis Date     Diabetes mellitus (H)      Gout attack      Heart attack 1984     Hypertension      Past Surgical History:   Procedure Laterality Date     CARDIAC SURGERY  1984    4 vessel bypass     CHOLECYSTECTOMY       COLONOSCOPY       ESOPHAGOSCOPY, GASTROSCOPY, DUODENOSCOPY (EGD), COMBINED N/A 3/1/2018    Procedure: COMBINED ESOPHAGOSCOPY, GASTROSCOPY, DUODENOSCOPY (EGD), BIOPSY SINGLE OR MULTIPLE;  ESOPHAGOGASTRODUODENOSCOPY ;  Surgeon: Daryn Medrano MD;  Location:  GI     NO HISTORY OF SURGERY  3/31/14    derm     PHACOEMULSIFICATION WITH STANDARD INTRAOCULAR LENS IMPLANT  1/21/2013    Procedure: PHACOEMULSIFICATION WITH STANDARD INTRAOCULAR LENS IMPLANT;  Phacoemulsification with standard intraocular lens implant, right eye;  Surgeon: Jay Rodriges MD;  Location:  OR       Social History:  Social History     Social History     Marital status:      Spouse name: N/A     Number of children: N/A     Years of education: N/A     Occupational History     Not on file.     Social History Main Topics     Smoking status: Former Smoker     Quit date: 10/15/1984     Smokeless tobacco: Never Used     Alcohol use Yes      Comment: occasional-once a year     Drug use: No     Sexual activity: Not on file     Other Topics Concern     Not on file     Social History Narrative     No current cigarette smoking, minimal etoh use      Family History:  Family History   Problem Relation Age of Onset     Cancer No family hx of      no skin cancer     Skin Cancer No family hx of        Medications:  Current Outpatient Prescriptions   Medication Sig Dispense Refill     ammonium lactate (LAC-HYDRIN) 12 % lotion Apply topically 2 times daily Apply twice daily to feet 500 g 1     aspirin 81 MG tablet Take 81 mg by mouth daily.       Atorvastatin Calcium (LIPITOR PO) Take 40 mg by mouth daily        augmented betamethasone dipropionate (DIPROLENE-AF) 0.05 % ointment  Apply topically 2 times daily As needed to itchy areas on back 50 g 3     blood glucose monitoring (ACCU-CHEK FASTCLIX) lancets Use to test blood sugar 2 times daily or as directed. 2 Box 1     blood glucose monitoring (NO BRAND SPECIFIED) test strip Use to test blood sugar 4 times daily  With meter/ strips/ lancets covered by insurance 360 each 3     Blood Glucose Monitoring Suppl (BLOOD GLUCOSE MONITOR SYSTEM) W/DEVICE KIT Test 4 times daily with meter covered by  insurance 1 kit 1     Cholecalciferol (VITAMIN D) 2000 UNITS CAPS Take 1 tablet by mouth daily       COLCHICINE PO Take 0.6 mg by mouth daily.       Cyanocobalamin (VITAMIN B12 PO) Take 1 tablet by mouth daily       Ginkgo Biloba (GNP GINGKO BILOBA EXTRACT PO) Take  by mouth.       glimepiride (AMARYL) 4 MG tablet 1 tablet in AM with meal and 1/2 tablet in PM with meal 135 tablet 1     indapamide (LOZOL) 2.5 MG tablet Take 2.5 mg by mouth every morning.       metFORMIN (GLUCOPHAGE) 1000 MG tablet TAKE 1 TABLET TWICE DAILY (WITH MEALS) 180 tablet 3     methotrexate 2.5 MG tablet CHEMO Take 6 tablets (15 mg) by mouth every 7 days 24 tablet 3     multivitamin, therapeutic with minerals (MULTI-VITAMIN) TABS Take 1 tablet by mouth daily.       nitroglycerin (NITROSTAT) 0.4 MG SL tablet Place under the tongue as needed       potassium chloride SA (K-DUR/KLOR-CON M) 20 MEQ CR tablet TAKE 1 TABLET THREE TIMES DAILY 270 tablet 1     torsemide (DEMADEX) 10 MG tablet Take 10 mg by mouth daily       triamcinolone (KENALOG) 0.1 % ointment Twice daily to raised rash areas on the arms, legs, body as needed. 454 g 2     capsaicin (ZOSTRIX) 0.075 % cream Apply topically 3 times daily To areas of itch on back.  OK to dilute with cetaphil cream if too burning. (Patient not taking: Reported on 8/23/2018) 60 g 3     irbesartan (AVAPRO) 150 MG tablet Take 1 tablet (150 mg) by mouth At Bedtime (Patient not taking: Reported on 8/23/2018) 90 tablet 3     sitagliptin (JANUVIA) 50  MG tablet Take 1 tablet (50 mg) by mouth daily (Patient not taking: Reported on 8/23/2018) 90 tablet 3     Allergies   Allergen Reactions     Beta Adrenergic Blockers Unknown     Latex Dermatitis     Latex Rash     Tape [Adhesive Tape] Dermatitis and Rash     Electrode patches         Review of Systems:  -As per HPI  -Constitutional: The patient denies fatigue, fevers, chills, unintended weight loss, and night sweats.  -HEENT: Patient denies nonhealing oral sores.  -Skin: As above in HPI. No additional skin concerns.    Physical exam:  Vitals: There were no vitals taken for this visit.  GEN: This is a well developed, well-nourished male in no acute distress, in a pleasant mood.    SKIN: Focused examination of the back was performed.  -Many 1-2 mm hyperpigmented macules coalescing into patches on the back with some scattered on the abdomen  -No other lesions of concern on areas examined.     Impression/Plan:  1. Atopic dermatitis, macular amyloidosis   - Overall, methotrexate has resulted in significant improvement of his dermatitis   - Continue methotrexate 15 mg weekly. Reviewed potential side effects; safety labs (CBC, CMP) due today.   - Continue triamcinolone 0.1% ointment BID prn. For pruritus on the back, continue Diprolene 0.05% ointment BID prn   - Discussed that he can try a hypoallergenic laundry soap, avoid bleaching his night shirt, and turn the thermostat lower at night to help with any irritant dermatitis that may be contributing      Follow-up in 3 months, earlier for new or changing lesions.     Staff Involved:  I, Vidya Hoover, MS4, saw and examined the patient in the presence of Dr. Gray.    The medical student acted as a scribe with respect to this patient.  I have performed all the key elements of the history and physical.    Miguel Gray MD  Dermatology Attending

## 2018-08-23 NOTE — LETTER
2018       RE: Deandre Moore  [address] [facility] [address]     Dear Colleague,    Thank you for referring your patient, Deandre Moore, to the [facility] DERMATOLOGY at [facility]. Please see a copy of my visit note below.    [facility] Dermatology Note      Dermatology Problem List:  1. Macular amyloidosis, atopic dermatitis  -previous tx: nbUVB, capsaicin 0.075% cream   -current tx: methotrexate 15 mg weekly, triamcinolone 0.1% ointment daily, betamethasone 0.05% ointment daily to the back  -safety labs on 2018  -does not take folic acid due to palpitations  -methotrexate dosin.5 mg weekly from 10/18/2016 to 2018, 15 mg weekly from 2018 to present  3. Venous stasis dermatitis    Encounter Date: Aug 23, 2018    CC:  Chief Complaint   Patient presents with     Derm Problem     3 Month dermatitis recheck on back, getting better.          History of Present Illness:  Mr. Deandre Moore is a 77 year old male who presents for follow up of atopic dermatitis and macular amyloidosis. At his last visit in 2018, he was continued on methotrexate 15 mg weekly, triamcinolone 0.1% ointment daily, and betamethasone 0.05% ointment daily. He was not able to use the capsaicin cream as his wife did not like the feeling on her hands when she applied it to his back. Today, he reports that his dermatitis is improved on this regimen. The rash is located on the back, and the patient feels that this is exacerbated by sleeping on his back and sweating in his sleep. He is tolerating methotrexate well without side effects; denies any GI distress. He has chronically low potassium levels associated with diuretic use, for which he is on supplementation.     Past Medical History:   Patient Active Problem List   Diagnosis     Itching     AD (atopic dermatitis)     Dermatitis     Hyperglycemia     Eczema, unspecified eczema     Intrinsic  atopic dermatitis     Other eczema     Notalgia paresthetica     Rash     Pseudophakia of both eyes     Diabetes mellitus (H)     Presbyopia     Type 2 diabetes mellitus without complication, without long-term current use of insulin (H)     Encounter for long-term current use of high risk medication     Past Medical History:   Diagnosis Date     Diabetes mellitus (H)      Gout attack      Heart attack 1984     Hypertension      Past Surgical History:   Procedure Laterality Date     CARDIAC SURGERY  1984    4 vessel bypass     CHOLECYSTECTOMY       COLONOSCOPY       ESOPHAGOSCOPY, GASTROSCOPY, DUODENOSCOPY (EGD), COMBINED N/A 3/1/2018    Procedure: COMBINED ESOPHAGOSCOPY, GASTROSCOPY, DUODENOSCOPY (EGD), BIOPSY SINGLE OR MULTIPLE;  ESOPHAGOGASTRODUODENOSCOPY ;  Surgeon: Daryn Medrano MD;  Location:  GI     NO HISTORY OF SURGERY  3/31/14    derm     PHACOEMULSIFICATION WITH STANDARD INTRAOCULAR LENS IMPLANT  1/21/2013    Procedure: PHACOEMULSIFICATION WITH STANDARD INTRAOCULAR LENS IMPLANT;  Phacoemulsification with standard intraocular lens implant, right eye;  Surgeon: Jay Rodriges MD;  Location:  OR       Social History:  Social History     Social History     Marital status:      Spouse name: N/A     Number of children: N/A     Years of education: N/A     Occupational History     Not on file.     Social History Main Topics     Smoking status: Former Smoker     Quit date: 10/15/1984     Smokeless tobacco: Never Used     Alcohol use Yes      Comment: occasional-once a year     Drug use: No     Sexual activity: Not on file     Other Topics Concern     Not on file     Social History Narrative     No current cigarette smoking, minimal etoh use      Family History:  Family History   Problem Relation Age of Onset     Cancer No family hx of      no skin cancer     Skin Cancer No family hx of        Medications:  Current Outpatient Prescriptions   Medication Sig Dispense Refill     ammonium  lactate (LAC-HYDRIN) 12 % lotion Apply topically 2 times daily Apply twice daily to feet 500 g 1     aspirin 81 MG tablet Take 81 mg by mouth daily.       Atorvastatin Calcium (LIPITOR PO) Take 40 mg by mouth daily        augmented betamethasone dipropionate (DIPROLENE-AF) 0.05 % ointment Apply topically 2 times daily As needed to itchy areas on back 50 g 3     blood glucose monitoring (ACCU-CHEK FASTCLIX) lancets Use to test blood sugar 2 times daily or as directed. 2 Box 1     blood glucose monitoring (NO BRAND SPECIFIED) test strip Use to test blood sugar 4 times daily  With meter/ strips/ lancets covered by insurance 360 each 3     Blood Glucose Monitoring Suppl (BLOOD GLUCOSE MONITOR SYSTEM) W/DEVICE KIT Test 4 times daily with meter covered by  insurance 1 kit 1     Cholecalciferol (VITAMIN D) 2000 UNITS CAPS Take 1 tablet by mouth daily       COLCHICINE PO Take 0.6 mg by mouth daily.       Cyanocobalamin (VITAMIN B12 PO) Take 1 tablet by mouth daily       Ginkgo Biloba (GNP GINGKO BILOBA EXTRACT PO) Take  by mouth.       glimepiride (AMARYL) 4 MG tablet 1 tablet in AM with meal and 1/2 tablet in PM with meal 135 tablet 1     indapamide (LOZOL) 2.5 MG tablet Take 2.5 mg by mouth every morning.       metFORMIN (GLUCOPHAGE) 1000 MG tablet TAKE 1 TABLET TWICE DAILY (WITH MEALS) 180 tablet 3     methotrexate 2.5 MG tablet CHEMO Take 6 tablets (15 mg) by mouth every 7 days 24 tablet 3     multivitamin, therapeutic with minerals (MULTI-VITAMIN) TABS Take 1 tablet by mouth daily.       nitroglycerin (NITROSTAT) 0.4 MG SL tablet Place under the tongue as needed       potassium chloride SA (K-DUR/KLOR-CON M) 20 MEQ CR tablet TAKE 1 TABLET THREE TIMES DAILY 270 tablet 1     torsemide (DEMADEX) 10 MG tablet Take 10 mg by mouth daily       triamcinolone (KENALOG) 0.1 % ointment Twice daily to raised rash areas on the arms, legs, body as needed. 454 g 2     capsaicin (ZOSTRIX) 0.075 % cream Apply topically 3 times daily To  areas of itch on back.  OK to dilute with cetaphil cream if too burning. (Patient not taking: Reported on 8/23/2018) 60 g 3     irbesartan (AVAPRO) 150 MG tablet Take 1 tablet (150 mg) by mouth At Bedtime (Patient not taking: Reported on 8/23/2018) 90 tablet 3     sitagliptin (JANUVIA) 50 MG tablet Take 1 tablet (50 mg) by mouth daily (Patient not taking: Reported on 8/23/2018) 90 tablet 3     Allergies   Allergen Reactions     Beta Adrenergic Blockers Unknown     Latex Dermatitis     Latex Rash     Tape [Adhesive Tape] Dermatitis and Rash     Electrode patches     Physical exam:  Vitals: There were no vitals taken for this visit.  GEN: This is a well developed, well-nourished male in no acute distress, in a pleasant mood.    SKIN: Focused examination of the back was performed.  -Many 1-2 mm hyperpigmented macules coalescing into patches on the back with some scattered on the abdomen  -No other lesions of concern on areas examined.     Impression/Plan:  1. Atopic dermatitis, macular amyloidosis   - Overall, methotrexate has resulted in significant improvement of his dermatitis   - Continue methotrexate 15 mg weekly. Reviewed potential side effects; safety labs (CBC, CMP) due today.   - Continue triamcinolone 0.1% ointment BID prn. For pruritus on the back, continue Diprolene 0.05% ointment BID prn   - Discussed that he can try a hypoallergenic laundry soap, avoid bleaching his night shirt, and turn the thermostat lower at night to help with any irritant dermatitis that may be contributing      Follow-up in 3 months, earlier for new or changing lesions.     Staff Involved:  I, Vidya Hoover, MS4, saw and examined the patient in the presence of Dr. Gray.    The medical student acted as a scribe with respect to this patient.  I have performed all the key elements of the history and physical.    Miguel Gray MD  Dermatology Attending

## 2018-08-23 NOTE — MR AVS SNAPSHOT
After Visit Summary   8/23/2018    Deandre Moore    MRN: [id number]           Patient Information     Date Of Birth          1940        Visit Information        Provider Department      8/23/2018 12:15 PM Miguel Gray MD [facility] Dermatology        Today's Diagnoses     Amyloidosis cutis (H)    -  1    Encounter for long-term current use of high risk medication        Other atopic dermatitis        Other eczema           Follow-ups after your visit        Your next 10 appointments already scheduled     Aug 23, 2018 12:45 PM CDT   LAB with  LAB   [facility])    [address] [place] [address] [phone]           Please do not eat 10-12 hours before your appointment if you are coming in fasting for labs on lipids, cholesterol, or glucose (sugar). This does not apply to pregnant women. Water, hot tea and black coffee (with nothing added) are okay. Do not drink other fluids, diet soda or chew gum.            Nov 15, 2018 12:45 PM CST   (Arrive by 12:30 PM)   Return Visit with Miguel Gray MD   [facility]at[facility] ([facility])    [address] [place] [address] [phone]            Dec 07, 2018  1:15 PM CST   Return Visit with Nick Gayle MD, MG ENDO NURSE   [facility] ([facility])    [address] [place] [address] [phone]              Who to contact     Please call your clinic at [phone] to:    Ask questions about your health    Make or cancel appointments    Discuss your medicines    Learn about your test results    Speak to your doctor            Additional Information About Your Visit        MyChart Information     [employer] gives you secure access to your electronic health record. If you see a primary care provider, you can also send messages to your care team and make  appointments. If you have questions, please call your primary care clinic.  If you do not have a primary care provider, please call [phone] and they will assist you.      [employer] is an electronic gateway that provides easy, online access to your medical records. With [employer], you can request a clinic appointment, read your test results, renew a prescription or communicate with your care team.     To access your existing account, please contact your [facility] or call [phone] for assistance.        Care EveryWhere ID     This is your Care EveryWhere ID. This could be used by other organizations to access your [place] medical records  [id number]         Blood Pressure from Last 3 Encounters:   06/04/18 111/68   03/01/18 123/74   06/19/17 121/73    Weight from Last 3 Encounters:   06/04/18 92.3 kg (203 lb 7.8 oz)   06/19/17 91.2 kg (201 lb)   01/02/17 93.4 kg (206 lb)              We Performed the Following     CBC with platelets differential     Comprehensive metabolic panel          Where to get your medicines      These medications were sent to [facility] [address] [place] [address]     Phone:  [phone]     augmented betamethasone dipropionate 0.05 % ointment    methotrexate 2.5 MG tablet CHEMO          Primary Care Provider Office Phone # Fax #    Denilson Thomas [phone]       [address] [address]        Equal Access to Services     MARLO ROBERTSON AH: Hadii aad ku hadasho Soomaali, waaxda luqadaha, qaybta kaalmada adeegyada, waxay ashwin foster. So [facility] [phone].    ATENCIÓN: Si habla español, tiene a velazco disposición servicios gratuitos de asistencia lingüística. Llame al [phone].    We comply with applicable federal civil rights laws and [place] laws. We do not discriminate on the basis of race, color, national origin, age,  disability, sex, sexual orientation, or gender identity.            Thank you!     Thank you for choosing [facility] DERMATOLOGY  for your care. Our goal is always to provide you with excellent care. Hearing back from our patients is one way we can continue to improve our services. Please take a few minutes to complete the written survey that you may receive in the mail after your visit with us. Thank you!             Your Updated Medication List - Protect others around you: Learn how to safely use, store and throw away your medicines at www.disposemymeds.org.          This list is accurate as of 8/23/18 12:38 PM.  Always use your most recent med list.                   Brand Name Dispense Instructions for use Diagnosis    ammonium lactate 12 % lotion    LAC-HYDRIN    500 g    Apply topically 2 times daily Apply twice daily to feet    Hyperglycemia       aspirin 81 MG tablet      Take 81 mg by mouth daily.        augmented betamethasone dipropionate 0.05 % ointment    DIPROLENE-AF    50 g    Apply topically 2 times daily As needed to itchy areas on back    Other atopic dermatitis       Blood Glucose Monitor System w/Device Kit     1 kit    Test 4 times daily with meter covered by  insurance    Type 2 diabetes mellitus (H)       blood glucose monitoring lancets     2 Box    Use to test blood sugar 2 times daily or as directed.    Hyperglycemia       blood glucose monitoring test strip    no brand specified    360 each    Use to test blood sugar 4 times daily  With meter/ strips/ lancets covered by insurance    Type 2 diabetes mellitus (H)       capsaicin 0.075 % cream    ZOSTRIX    60 g    Apply topically 3 times daily To areas of itch on back.  OK to dilute with cetaphil cream if too burning.    Notalgia paresthetica       COLCHICINE PO      Take 0.6 mg by mouth daily.        glimepiride 4 MG tablet    AMARYL    135 tablet    1 tablet in AM with meal and 1/2 tablet in PM with meal    Type 2 diabetes mellitus without  complication (H)       GNP GINGKO BILOBA EXTRACT PO      Take  by mouth.        indapamide 2.5 MG tablet    LOZOL     Take 2.5 mg by mouth every morning.        irbesartan 150 MG tablet    AVAPRO    90 tablet    Take 1 tablet (150 mg) by mouth At Bedtime    Type 2 diabetes mellitus without complication, without long-term current use of insulin (H)       LIPITOR PO      Take 40 mg by mouth daily        metFORMIN 1000 MG tablet    GLUCOPHAGE    180 tablet    TAKE 1 TABLET TWICE DAILY (WITH MEALS)    Type 2 diabetes mellitus without complication, without long-term current use of insulin (H)       methotrexate 2.5 MG tablet CHEMO     24 tablet    Take 6 tablets (15 mg) by mouth every 7 days    Other eczema       Multi-vitamin Tabs tablet      Take 1 tablet by mouth daily.        nitroGLYcerin 0.4 MG sublingual tablet    NITROSTAT     Place under the tongue as needed        potassium chloride SA 20 MEQ CR tablet    K-DUR/KLOR-CON M    270 tablet    TAKE 1 TABLET THREE TIMES DAILY    Type 2 diabetes mellitus with hyperglycemia, unspecified long term insulin use status (H)       sitagliptin 50 MG tablet    JANUVIA    90 tablet    Take 1 tablet (50 mg) by mouth daily    Type 2 diabetes mellitus without complication, without long-term current use of insulin (H)       torsemide 10 MG tablet    DEMADEX     Take 10 mg by mouth daily        triamcinolone 0.1 % ointment    KENALOG    454 g    Twice daily to raised rash areas on the arms, legs, body as needed.    Dermatitis       VITAMIN B12 PO      Take 1 tablet by mouth daily        vitamin D 2000 units Caps      Take 1 tablet by mouth daily

## 2018-10-29 DIAGNOSIS — L30.8 OTHER ECZEMA: ICD-10-CM

## 2018-11-08 ENCOUNTER — PATIENT OUTREACH (OUTPATIENT)
Dept: CARE COORDINATION | Facility: CLINIC | Age: 78
End: 2018-11-08

## 2018-11-09 DIAGNOSIS — L30.8 OTHER ECZEMA: ICD-10-CM

## 2018-11-09 NOTE — TELEPHONE ENCOUNTER
"  methotrexate 2.5 MG tablet:   Fax received from [employer] that reads, \"Previously denied new Rx to follow response received, but no new Rx for Methotrexate 2.5 mg tabs have been received. Please send new Rx    rx sent 10/30/18. Re faxed today.  "

## 2018-11-16 DIAGNOSIS — E11.9 TYPE 2 DIABETES MELLITUS WITHOUT COMPLICATION, WITHOUT LONG-TERM CURRENT USE OF INSULIN (H): ICD-10-CM

## 2018-11-16 DIAGNOSIS — E11.9 TYPE 2 DIABETES MELLITUS WITHOUT COMPLICATION (H): ICD-10-CM

## 2018-11-19 RX ORDER — GLIMEPIRIDE 4 MG/1
4 TABLET ORAL
Qty: 90 TABLET | Refills: 0 | Status: SHIPPED | OUTPATIENT
Start: 2018-11-19 | End: 2019-02-15

## 2019-01-28 DIAGNOSIS — L30.8 OTHER ECZEMA: ICD-10-CM

## 2019-01-29 NOTE — TELEPHONE ENCOUNTER
Received refill request for MTX as the resident on call. Reviewed patient's chart and attached communication. Patient last seen 8/2018 for atopic dermatitis, macular amyloidosis. After reviewing the assessment and plan from last visit, the refill request was denied, as patient was to follow up in 3 months and no-showed his last appointment. He also does not have any follow up visits scheduled. Methotrexate is a medication that requires close monitoring and labs. Patient will need to be seen in clinic prior to receiving additional methotrexate.   Will alert derm schedulers to make appointment with Dr. Gray next available. .      Tiarra Braun MD  Internal Medicine - Dermatology Resident, PGY-4

## 2019-01-29 NOTE — TELEPHONE ENCOUNTER
Medication requested: methotrexate 2.5 MG   Last refilled: 10/30/18  Qty: 24:1  *Continue methotrexate 15 mg weekly.    Last seen: 8/23/18  RTC: 3 MOS  Next appt: NONE  Routing refill request to provider for review/approval because:  Drug not on the refill protocol    NO PENDING APPT. DO YOU WANT TO RF WITHOUT APPT. OR DENY   Scheduling has been notified to contact the pt for appointment.

## 2019-01-31 ENCOUNTER — OFFICE VISIT (OUTPATIENT)
Dept: DERMATOLOGY | Facility: CLINIC | Age: 79
End: 2019-01-31
Payer: COMMERCIAL

## 2019-01-31 DIAGNOSIS — L20.89 OTHER ATOPIC DERMATITIS: ICD-10-CM

## 2019-01-31 DIAGNOSIS — Z79.899 ENCOUNTER FOR LONG-TERM CURRENT USE OF HIGH RISK MEDICATION: ICD-10-CM

## 2019-01-31 DIAGNOSIS — L30.8 OTHER ECZEMA: ICD-10-CM

## 2019-01-31 DIAGNOSIS — Z79.899 MEDICATION MANAGEMENT: Primary | ICD-10-CM

## 2019-01-31 DIAGNOSIS — Z79.899 MEDICATION MANAGEMENT: ICD-10-CM

## 2019-01-31 LAB
ALBUMIN SERPL-MCNC: 3.7 G/DL (ref 3.4–5)
ALP SERPL-CCNC: 78 U/L (ref 40–150)
ALT SERPL W P-5'-P-CCNC: 35 U/L (ref 0–70)
ANION GAP SERPL CALCULATED.3IONS-SCNC: 9 MMOL/L (ref 3–14)
AST SERPL W P-5'-P-CCNC: 21 U/L (ref 0–45)
BASOPHILS # BLD AUTO: 0.1 10E9/L (ref 0–0.2)
BASOPHILS NFR BLD AUTO: 0.6 %
BILIRUB SERPL-MCNC: 0.6 MG/DL (ref 0.2–1.3)
BUN SERPL-MCNC: 20 MG/DL (ref 7–30)
CALCIUM SERPL-MCNC: 9.2 MG/DL (ref 8.5–10.1)
CHLORIDE SERPL-SCNC: 97 MMOL/L (ref 94–109)
CO2 SERPL-SCNC: 30 MMOL/L (ref 20–32)
CREAT SERPL-MCNC: 1.33 MG/DL (ref 0.66–1.25)
DIFFERENTIAL METHOD BLD: ABNORMAL
EOSINOPHIL # BLD AUTO: 0.2 10E9/L (ref 0–0.7)
EOSINOPHIL NFR BLD AUTO: 2.3 %
ERYTHROCYTE [DISTWIDTH] IN BLOOD BY AUTOMATED COUNT: 15.1 % (ref 10–15)
GFR SERPL CREATININE-BSD FRML MDRD: 50 ML/MIN/{1.73_M2}
GLUCOSE SERPL-MCNC: 180 MG/DL (ref 70–99)
HCT VFR BLD AUTO: 38.5 % (ref 40–53)
HGB BLD-MCNC: 12.8 G/DL (ref 13.3–17.7)
IMM GRANULOCYTES # BLD: 0.1 10E9/L (ref 0–0.4)
IMM GRANULOCYTES NFR BLD: 1 %
LYMPHOCYTES # BLD AUTO: 1.2 10E9/L (ref 0.8–5.3)
LYMPHOCYTES NFR BLD AUTO: 12.9 %
MCH RBC QN AUTO: 30.5 PG (ref 26.5–33)
MCHC RBC AUTO-ENTMCNC: 33.2 G/DL (ref 31.5–36.5)
MCV RBC AUTO: 92 FL (ref 78–100)
MONOCYTES # BLD AUTO: 0.7 10E9/L (ref 0–1.3)
MONOCYTES NFR BLD AUTO: 7.6 %
NEUTROPHILS # BLD AUTO: 7.3 10E9/L (ref 1.6–8.3)
NEUTROPHILS NFR BLD AUTO: 75.6 %
NRBC # BLD AUTO: 0 10*3/UL
NRBC BLD AUTO-RTO: 0 /100
PLATELET # BLD AUTO: 208 10E9/L (ref 150–450)
POTASSIUM SERPL-SCNC: 3 MMOL/L (ref 3.4–5.3)
PROT SERPL-MCNC: 7.3 G/DL (ref 6.8–8.8)
RBC # BLD AUTO: 4.2 10E12/L (ref 4.4–5.9)
SODIUM SERPL-SCNC: 136 MMOL/L (ref 133–144)
WBC # BLD AUTO: 9.6 10E9/L (ref 4–11)

## 2019-01-31 RX ORDER — CLOPIDOGREL BISULFATE 75 MG/1
75 TABLET ORAL DAILY
COMMUNITY
End: 2022-01-10

## 2019-01-31 RX ORDER — ALLOPURINOL 300 MG/1
300 TABLET ORAL DAILY
COMMUNITY
End: 2022-01-10

## 2019-01-31 ASSESSMENT — PAIN SCALES - GENERAL: PAINLEVEL: NO PAIN (0)

## 2019-01-31 NOTE — PROGRESS NOTES
[facility] Dermatology Note      Dermatology Problem List:  1. Macular amyloidosis, atopic dermatitis  -previous tx: nbUVB, capsaicin 0.075% cream   -current tx: methotrexate 15 mg weekly, triamcinolone 0.1% ointment daily, betamethasone 0.05% ointment daily to the back  -safety labs on 19  -does not take folic acid due to palpitations  -methotrexate dosin.5 mg weekly from 10/18/2016 to 2018, 15 mg weekly from 2018 to present  3. Venous stasis dermatitis    Encounter Date: 2019    CC:  Chief Complaint   Patient presents with     Derm Problem     Deandre has an area on his foot checked.     Derm Problem     Deandre would like to discuss his methotrexate.          History of Present Illness:  Mr. Deandre Moore is a 78 year old male who presents for follow up of atopic dermatitis and macular amyloidosis. At his last visit in 2018, he was continued on methotrexate 15 mg weekly, triamcinolone 0.1% ointment daily, and betamethasone 0.05% ointment daily.  He is using triamcinolone ointment 0.1% daily on most of his body (applied by his wife). He also uses betamethasone ointment on his legs. He states his rash is located on the back, and the patient feels that this is exacerbated by sleeping on his back and sweating in his sleep. He states the back is not bothersome and he is noticing more rash on the right abdomen as he has been sleeping on this side recently. He is tolerating methotrexate well without side effects; denies any GI distress. He has chronically low potassium levels associated with diuretic use, for which he is on supplementation.   He notes a spot on his foot that started as a callus. He put a medicated corn pad on it and states he developed some skin breakdown in the area as a result. He has since stopped using this and is using a bandaid. Denies pain there but does have peripheral neuropathy. He has had it evaluated by his PCP as well.       Past  Medical History:   Patient Active Problem List   Diagnosis     Itching     AD (atopic dermatitis)     Dermatitis     Hyperglycemia     Eczema, unspecified eczema     Intrinsic atopic dermatitis     Other eczema     Notalgia paresthetica     Rash     Pseudophakia of both eyes     Diabetes mellitus (H)     Presbyopia     Type 2 diabetes mellitus without complication, without long-term current use of insulin (H)     Encounter for long-term current use of high risk medication     Past Medical History:   Diagnosis Date     Diabetes mellitus (H)      Gout attack      Heart attack 1984     Hypertension      Past Surgical History:   Procedure Laterality Date     CARDIAC SURGERY  1984    4 vessel bypass     CHOLECYSTECTOMY       COLONOSCOPY       ESOPHAGOSCOPY, GASTROSCOPY, DUODENOSCOPY (EGD), COMBINED N/A 3/1/2018    Procedure: COMBINED ESOPHAGOSCOPY, GASTROSCOPY, DUODENOSCOPY (EGD), BIOPSY SINGLE OR MULTIPLE;  ESOPHAGOGASTRODUODENOSCOPY ;  Surgeon: Daryn Medrano MD;  Location: [facility]     NO HISTORY OF SURGERY  3/31/14    derm     PHACOEMULSIFICATION WITH STANDARD INTRAOCULAR LENS IMPLANT  1/21/2013    Procedure: PHACOEMULSIFICATION WITH STANDARD INTRAOCULAR LENS IMPLANT;  Phacoemulsification with standard intraocular lens implant, right eye;  Surgeon: Jay Rodriges MD;  Location:  OR       Social History:  Social History     Socioeconomic History     Marital status:      Spouse name: Not on file     Number of children: Not on file     Years of education: Not on file     Highest education level: Not on file   Social Needs     Financial resource strain: Not on file     Food insecurity - worry: Not on file     Food insecurity - inability: Not on file     Transportation needs - medical: Not on file     Transportation needs - non-medical: Not on file   Occupational History     Not on file   Tobacco Use     Smoking status: Former Smoker     Last attempt to quit: 10/15/1984     Years since quitting:  34.3     Smokeless tobacco: Never Used   Substance and Sexual Activity     Alcohol use: Yes     Comment: occasional-once a year     Drug use: No     Sexual activity: Not on file   Other Topics Concern     Parent/sibling w/ CABG, MI or angioplasty before 65F 55M? Not Asked   Social History Narrative     Not on file     No current cigarette smoking, minimal etoh use      Family History:  Family History   Problem Relation Age of Onset     Cancer No family hx of         no skin cancer     Skin Cancer No family hx of        Medications:  Current Outpatient Medications   Medication Sig Dispense Refill     allopurinol (ZYLOPRIM) 300 MG tablet Take 300 mg by mouth daily       ammonium lactate (LAC-HYDRIN) 12 % lotion Apply topically 2 times daily Apply twice daily to feet 500 g 1     aspirin 81 MG tablet Take 81 mg by mouth daily.       Atorvastatin Calcium (LIPITOR PO) Take 40 mg by mouth daily        augmented betamethasone dipropionate (DIPROLENE-AF) 0.05 % ointment Apply topically 2 times daily As needed to itchy areas on back 50 g 3     Cholecalciferol (VITAMIN D) 2000 UNITS CAPS Take 1 tablet by mouth daily       clopidogrel (PLAVIX) 75 MG tablet Take 75 mg by mouth daily       COLCHICINE PO Take 0.6 mg by mouth daily.       Cyanocobalamin (VITAMIN B12 PO) Take 1 tablet by mouth daily       Ginkgo Biloba (GNP GINGKO BILOBA EXTRACT PO) Take  by mouth.       irbesartan (AVAPRO) 150 MG tablet Take 1 tablet (150 mg) by mouth At Bedtime 90 tablet 3     metFORMIN (GLUCOPHAGE) 1000 MG tablet TAKE 1 TABLET TWICE DAILY (WITH MEALS) 180 tablet 0     methotrexate 2.5 MG tablet CHEMO TAKE 6 TABLETS EVERY 7 DAYS* MONITORING LABS DUE EVERY 8 WKS 24 tablet 1     potassium chloride SA (K-DUR/KLOR-CON M) 20 MEQ CR tablet TAKE 1 TABLET THREE TIMES DAILY 270 tablet 1     torsemide (DEMADEX) 10 MG tablet Take 10 mg by mouth daily       triamcinolone (KENALOG) 0.1 % ointment Twice daily to raised rash areas on the arms, legs, body as  needed. 454 g 2     blood glucose monitoring (ACCU-CHEK FASTCLIX) lancets Use to test blood sugar 2 times daily or as directed. 2 Box 1     blood glucose monitoring (NO BRAND SPECIFIED) test strip Use to test blood sugar 4 times daily  With meter/ strips/ lancets covered by insurance 360 each 3     Blood Glucose Monitoring Suppl (BLOOD GLUCOSE MONITOR SYSTEM) W/DEVICE KIT Test 4 times daily with meter covered by  insurance 1 kit 1     capsaicin (ZOSTRIX) 0.075 % cream Apply topically 3 times daily To areas of itch on back.  OK to dilute with cetaphil cream if too burning. (Patient not taking: Reported on 8/23/2018) 60 g 3     glimepiride (AMARYL) 4 MG tablet Take 1 tablet (4 mg) by mouth every morning (before breakfast) (Patient not taking: Reported on 1/31/2019) 90 tablet 0     indapamide (LOZOL) 2.5 MG tablet Take 2.5 mg by mouth every morning.       multivitamin, therapeutic with minerals (MULTI-VITAMIN) TABS Take 1 tablet by mouth daily.       nitroglycerin (NITROSTAT) 0.4 MG SL tablet Place under the tongue as needed       sitagliptin (JANUVIA) 50 MG tablet Take 1 tablet (50 mg) by mouth daily (Patient not taking: Reported on 8/23/2018) 90 tablet 3     Allergies   Allergen Reactions     Beta Adrenergic Blockers Unknown     Latex Dermatitis     Latex Rash     Tape [Adhesive Tape] Dermatitis and Rash     Electrode patches         Review of Systems:  -As per HPI  -Constitutional: The patient denies fatigue, fevers, chills, unintended weight loss, and night sweats.  -HEENT: Patient denies nonhealing oral sores.  -Skin: As above in HPI. No additional skin concerns.  10 point ROS otherwise negative    Physical exam:  Vitals: There were no vitals taken for this visit.  GEN: This is a well developed, well-nourished male in no acute distress, in a pleasant mood.    SKIN: Focused examination of the back was performed.  -Many 1-2 mm hyperpigmented macules coalescing into patches on the back with some scattered on the  right abdomen  - moderate xerosis of abdomen  - 1+ edema of the left lower leg from mid shin down with hyperpigmentation and superficial erosion on medial aspect  - superficial erosion of the skin on the left plantar MTP joint, surrounding callus--no surrounding scale or erythema  -No other lesions of concern on areas examined.     Impression/Plan:  1. Atopic dermatitis, macular amyloidosis   - Overall, methotrexate has resulted in significant improvement of his dermatitis   - Continue methotrexate 15 mg weekly. Reviewed potential side effects; safety labs (CBC, CMP) due today.   - Continue triamcinolone 0.1% ointment BID prn. Discussed that this should not be used as an emollient but for areas of rash/itch    - start daily emollient use for xerosis and incision is    2. Superficial erosion on L plantar foot. Discussed high risk nature of wounds in this area given his neuropathy and diabetes. Encouraged him to make sure he has a shoe wear.  Encouraged him to check his foot regularly.  Advised him to apply Vaseline and bandage change daily.  Discussed the risk for deeper ulcers and infection and symptoms to monitor for.    3. Venous stasis. Encouraged daily use of compression stockings. Gentle skin care. Avoidance of topical steroids in this area.     Follow-up in 3 months, earlier for new or changing lesions.  Dr. Gray staffed the patient     Staff Involved:  Resident (Ching Lal), staff as above    I have seen and examined this patient and agree with the assessment and plan as documented in the resident's note.    Miguel Gray MD  Dermatology Attending

## 2019-01-31 NOTE — NURSING NOTE
Dermatology Rooming Note    Deandre Moore's goals for this visit include:   Chief Complaint   Patient presents with     Derm Problem     Deandre has an area on his foot checked.     Derm Problem     Deandre would like to discuss his methotrexate.      Mercedes Solorzano LPN

## 2019-01-31 NOTE — LETTER
2019       RE: Deandre Moore  [address] [facility] [address]     Dear Colleague,    Thank you for referring your patient, Deandre Moore, to the [facility] DERMATOLOGY at [facility]. Please see a copy of my visit note below.    [facility] Dermatology Note      Dermatology Problem List:  1. Macular amyloidosis, atopic dermatitis  -previous tx: nbUVB, capsaicin 0.075% cream   -current tx: methotrexate 15 mg weekly, triamcinolone 0.1% ointment daily, betamethasone 0.05% ointment daily to the back  -safety labs on 19  -does not take folic acid due to palpitations  -methotrexate dosin.5 mg weekly from 10/18/2016 to 2018, 15 mg weekly from 2018 to present  3. Venous stasis dermatitis    Encounter Date: 2019    CC:  Chief Complaint   Patient presents with     Derm Problem     Deandre has an area on his foot checked.     Derm Problem     Deandre would like to discuss his methotrexate.          History of Present Illness:  Mr. Deandre Moore is a 78 year old male who presents for follow up of atopic dermatitis and macular amyloidosis. At his last visit in 2018, he was continued on methotrexate 15 mg weekly, triamcinolone 0.1% ointment daily, and betamethasone 0.05% ointment daily.  He is using triamcinolone ointment 0.1% daily on most of his body (applied by his wife). He also uses betamethasone ointment on his legs. He states his rash is located on the back, and the patient feels that this is exacerbated by sleeping on his back and sweating in his sleep. He states the back is not bothersome and he is noticing more rash on the right abdomen as he has been sleeping on this side recently. He is tolerating methotrexate well without side effects; denies any GI distress. He has chronically low potassium levels associated with diuretic use, for which he is on supplementation.   He notes a spot on his foot that  started as a callus. He put a medicated corn pad on it and states he developed some skin breakdown in the area as a result. He has since stopped using this and is using a bandaid. Denies pain there but does have peripheral neuropathy. He has had it evaluated by his PCP as well.       Past Medical History:   Patient Active Problem List   Diagnosis     Itching     AD (atopic dermatitis)     Dermatitis     Hyperglycemia     Eczema, unspecified eczema     Intrinsic atopic dermatitis     Other eczema     Notalgia paresthetica     Rash     Pseudophakia of both eyes     Diabetes mellitus (H)     Presbyopia     Type 2 diabetes mellitus without complication, without long-term current use of insulin (H)     Encounter for long-term current use of high risk medication     Past Medical History:   Diagnosis Date     Diabetes mellitus (H)      Gout attack      Heart attack 1984     Hypertension      Past Surgical History:   Procedure Laterality Date     CARDIAC SURGERY  1984    4 vessel bypass     CHOLECYSTECTOMY       COLONOSCOPY       ESOPHAGOSCOPY, GASTROSCOPY, DUODENOSCOPY (EGD), COMBINED N/A 3/1/2018    Procedure: COMBINED ESOPHAGOSCOPY, GASTROSCOPY, DUODENOSCOPY (EGD), BIOPSY SINGLE OR MULTIPLE;  ESOPHAGOGASTRODUODENOSCOPY ;  Surgeon: Daryn Medrano MD;  Location:  GI     NO HISTORY OF SURGERY  3/31/14    derm     PHACOEMULSIFICATION WITH STANDARD INTRAOCULAR LENS IMPLANT  1/21/2013    Procedure: PHACOEMULSIFICATION WITH STANDARD INTRAOCULAR LENS IMPLANT;  Phacoemulsification with standard intraocular lens implant, right eye;  Surgeon: Jay Rodriges MD;  Location:  OR       Social History:  Social History     Socioeconomic History     Marital status:      Spouse name: Not on file     Number of children: Not on file     Years of education: Not on file     Highest education level: Not on file   Social Needs     Financial resource strain: Not on file     Food insecurity - worry: Not on file      Food insecurity - inability: Not on file     Transportation needs - medical: Not on file     Transportation needs - non-medical: Not on file   Occupational History     Not on file   Tobacco Use     Smoking status: Former Smoker     Last attempt to quit: 10/15/1984     Years since quittin.3     Smokeless tobacco: Never Used   Substance and Sexual Activity     Alcohol use: Yes     Comment: occasional-once a year     Drug use: No     Sexual activity: Not on file   Other Topics Concern     Parent/sibling w/ CABG, MI or angioplasty before 65F 55M? Not Asked   Social History Narrative     Not on file     No current cigarette smoking, minimal etoh use      Family History:  Family History   Problem Relation Age of Onset     Cancer No family hx of         no skin cancer     Skin Cancer No family hx of        Medications:  Current Outpatient Medications   Medication Sig Dispense Refill     allopurinol (ZYLOPRIM) 300 MG tablet Take 300 mg by mouth daily       ammonium lactate (LAC-HYDRIN) 12 % lotion Apply topically 2 times daily Apply twice daily to feet 500 g 1     aspirin 81 MG tablet Take 81 mg by mouth daily.       Atorvastatin Calcium (LIPITOR PO) Take 40 mg by mouth daily        augmented betamethasone dipropionate (DIPROLENE-AF) 0.05 % ointment Apply topically 2 times daily As needed to itchy areas on back 50 g 3     Cholecalciferol (VITAMIN D) 2000 UNITS CAPS Take 1 tablet by mouth daily       clopidogrel (PLAVIX) 75 MG tablet Take 75 mg by mouth daily       COLCHICINE PO Take 0.6 mg by mouth daily.       Cyanocobalamin (VITAMIN B12 PO) Take 1 tablet by mouth daily       Ginkgo Biloba (GNP GINGKO BILOBA EXTRACT PO) Take  by mouth.       irbesartan (AVAPRO) 150 MG tablet Take 1 tablet (150 mg) by mouth At Bedtime 90 tablet 3     metFORMIN (GLUCOPHAGE) 1000 MG tablet TAKE 1 TABLET TWICE DAILY (WITH MEALS) 180 tablet 0     methotrexate 2.5 MG tablet CHEMO TAKE 6 TABLETS EVERY 7 DAYS* MONITORING LABS DUE EVERY 8 WKS  24 tablet 1     potassium chloride SA (K-DUR/KLOR-CON M) 20 MEQ CR tablet TAKE 1 TABLET THREE TIMES DAILY 270 tablet 1     torsemide (DEMADEX) 10 MG tablet Take 10 mg by mouth daily       triamcinolone (KENALOG) 0.1 % ointment Twice daily to raised rash areas on the arms, legs, body as needed. 454 g 2     blood glucose monitoring (ACCU-CHEK FASTCLIX) lancets Use to test blood sugar 2 times daily or as directed. 2 Box 1     blood glucose monitoring (NO BRAND SPECIFIED) test strip Use to test blood sugar 4 times daily  With meter/ strips/ lancets covered by insurance 360 each 3     Blood Glucose Monitoring Suppl (BLOOD GLUCOSE MONITOR SYSTEM) W/DEVICE KIT Test 4 times daily with meter covered by  insurance 1 kit 1     capsaicin (ZOSTRIX) 0.075 % cream Apply topically 3 times daily To areas of itch on back.  OK to dilute with cetaphil cream if too burning. (Patient not taking: Reported on 8/23/2018) 60 g 3     glimepiride (AMARYL) 4 MG tablet Take 1 tablet (4 mg) by mouth every morning (before breakfast) (Patient not taking: Reported on 1/31/2019) 90 tablet 0     indapamide (LOZOL) 2.5 MG tablet Take 2.5 mg by mouth every morning.       multivitamin, therapeutic with minerals (MULTI-VITAMIN) TABS Take 1 tablet by mouth daily.       nitroglycerin (NITROSTAT) 0.4 MG SL tablet Place under the tongue as needed       sitagliptin (JANUVIA) 50 MG tablet Take 1 tablet (50 mg) by mouth daily (Patient not taking: Reported on 8/23/2018) 90 tablet 3     Allergies   Allergen Reactions     Beta Adrenergic Blockers Unknown     Latex Dermatitis     Latex Rash     Tape [Adhesive Tape] Dermatitis and Rash     Electrode patches         Review of Systems:  -As per HPI  -Constitutional: The patient denies fatigue, fevers, chills, unintended weight loss, and night sweats.  -HEENT: Patient denies nonhealing oral sores.  -Skin: As above in HPI. No additional skin concerns.  10 point ROS otherwise negative    Physical exam:  Vitals: There were  no vitals taken for this visit.  GEN: This is a well developed, well-nourished male in no acute distress, in a pleasant mood.    SKIN: Focused examination of the back was performed.  -Many 1-2 mm hyperpigmented macules coalescing into patches on the back with some scattered on the right abdomen  - moderate xerosis of abdomen  - 1+ edema of the left lower leg from mid shin down with hyperpigmentation and superficial erosion on medial aspect  - superficial erosion of the skin on the left plantar MTP joint, surrounding callus--no surrounding scale or erythema  -No other lesions of concern on areas examined.     Impression/Plan:  1. Atopic dermatitis, macular amyloidosis   - Overall, methotrexate has resulted in significant improvement of his dermatitis   - Continue methotrexate 15 mg weekly. Reviewed potential side effects; safety labs (CBC, CMP) due today.   - Continue triamcinolone 0.1% ointment BID prn. Discussed that this should not be used as an emollient but for areas of rash/itch    - start daily emollient use for xerosis and incision is    2. Superficial erosion on L plantar foot. Discussed high risk nature of wounds in this area given his neuropathy and diabetes. Encouraged him to make sure he has a shoe wear.  Encouraged him to check his foot regularly.  Advised him to apply Vaseline and bandage change daily.  Discussed the risk for deeper ulcers and infection and symptoms to monitor for.    3. Venous stasis. Encouraged daily use of compression stockings. Gentle skin care. Avoidance of topical steroids in this area.     Follow-up in 3 months, earlier for new or changing lesions.  Dr. Gray staffed the patient     Staff Involved:  Resident (Ching Lal), staff as above    I have seen and examined this patient and agree with the assessment and plan as documented in the resident's note.    Miguel Gray MD  Dermatology Attending

## 2019-01-31 NOTE — PATIENT INSTRUCTIONS
Continue methotrexate 15 mg weekly. Get your labs done today, we will inform you of any abnormal results.   Try using a moisturizer daily like ceraVe, vanicream or other moisturizer. Triamcinolone should only be used on areas of rash. Stop using betamethasone unless you have itchy rash.     Take care of the left foot. Vaseline and bandaid to this area changed daily. Monitor closely for signs of infection.

## 2019-02-07 DIAGNOSIS — L30.8 OTHER ECZEMA: ICD-10-CM

## 2019-02-15 ENCOUNTER — OFFICE VISIT (OUTPATIENT)
Dept: ENDOCRINOLOGY | Facility: CLINIC | Age: 79
End: 2019-02-15
Payer: COMMERCIAL

## 2019-02-15 VITALS
BODY MASS INDEX: 28.69 KG/M2 | WEIGHT: 211.8 LBS | SYSTOLIC BLOOD PRESSURE: 119 MMHG | HEIGHT: 72 IN | DIASTOLIC BLOOD PRESSURE: 65 MMHG | HEART RATE: 91 BPM

## 2019-02-15 DIAGNOSIS — E11.9 TYPE 2 DIABETES MELLITUS WITHOUT COMPLICATION, WITHOUT LONG-TERM CURRENT USE OF INSULIN (H): ICD-10-CM

## 2019-02-15 DIAGNOSIS — R73.9 HYPERGLYCEMIA: ICD-10-CM

## 2019-02-15 RX ORDER — AMMONIUM LACTATE 12 G/100G
LOTION TOPICAL 2 TIMES DAILY
Qty: 500 G | Refills: 1 | Status: SHIPPED | OUTPATIENT
Start: 2019-02-15 | End: 2022-03-22

## 2019-02-15 RX ORDER — IRBESARTAN 150 MG/1
150 TABLET ORAL AT BEDTIME
Qty: 90 TABLET | Refills: 3 | Status: SHIPPED | OUTPATIENT
Start: 2019-02-15 | End: 2020-10-06

## 2019-02-15 ASSESSMENT — MIFFLIN-ST. JEOR: SCORE: 1708.72

## 2019-02-15 NOTE — PROGRESS NOTES
[facility]  Endocrinology  Trudy Campa MD  02/15/2019      Chief Complaint:   Diabetes    History of Present Illness:   Deandre Moore is a 78 year old male who presents for follow up of diabetes.    #1 Type 2 diabetes   Per patient, he was diagnosed with diabetes at age 68.  He as initially on Metformin for many years.  In 2015 Amaryl was added when he was on Prednisone intermittently for itching.  His blood sugars worsened when he was started on Remeron for sleep in December 2015 therefore his Amaryl was increased to 8 mg daily.  This was later discontinued due to hypoglycemia.  Januvia was cost prohibitive for him.  August 2018 Hemoglobin A1c was 5.8%.    Interval history:  Currently he is on Metformin 1000 mg twice daily only.  His fasting blood sugar have been around 100.  Before bed his sugar are usually 115 - 120.  Hemoglobin A1c done at his PCP's office on 2/13/19 was 6.3%.  Again, he has stopped the Januvia because it was too expensive for him.    #2 left foot ulcer  Two months ago he developed a spot on the ball of his toe.  His wife noted this was a callus therefore he put on a corn remover patch and ammonium lactate cream.  He saw a podiatrist who unroofed the callus ad noted an ulcer underneath.  He was placed on Keflex.  He is very concerned as his father had a leg amputation at about his age due to a diabetic foot ulcer.  January 2019 x-ray was done which did not show any definitive evidence of osteomyelitis.  He does have some numbness and tingling in his feet.  He has a follow up with podiatry next week.      Blood Glucose Monitoring:  Patient did not bring his glucometer today.    Diabetes monitoring and complications:  CAD: Yes: previous history of myocardial infarction   Last eye exam results: last month at [facility], negative for diabetic disease per patient   Microalbuminuria: negative 2018  HTN: No  On Statin: Yes  On Aspirin: Yes  Depression: No    #3concern about skin rash  The patient  is concerned about multiple rashes on his lower extremities.  He just saw dermatology in January 2019 who felt that he had atopic dermatitis, as well as venous stasis dermatitis.    Review of Systems:   Pertinent items are noted in HPI.  All other systems are negative.    Active Medications:      allopurinol (ZYLOPRIM) 300 MG tablet, Take 300 mg by mouth daily, Disp: , Rfl:      ammonium lactate (LAC-HYDRIN) 12 % lotion, Apply topically 2 times daily Apply twice daily to feet, Disp: 500 g, Rfl: 1     aspirin 81 MG tablet, Take 81 mg by mouth daily., Disp: , Rfl:      Atorvastatin Calcium (LIPITOR PO), Take 40 mg by mouth daily , Disp: , Rfl:      augmented betamethasone dipropionate (DIPROLENE-AF) 0.05 % ointment, Apply topically 2 times daily As needed to itchy areas on back, Disp: 50 g, Rfl: 3     Cholecalciferol (VITAMIN D) 2000 UNITS CAPS, Take 1 tablet by mouth daily, Disp: , Rfl:      clopidogrel (PLAVIX) 75 MG tablet, Take 75 mg by mouth daily, Disp: , Rfl:      COLCHICINE PO, Take 0.6 mg by mouth daily., Disp: , Rfl:      Cyanocobalamin (VITAMIN B12 PO), Take 1 tablet by mouth daily, Disp: , Rfl:      Ginkgo Biloba (GNP GINGKO BILOBA EXTRACT PO), Take  by mouth., Disp: , Rfl:      indapamide (LOZOL) 2.5 MG tablet, Take 2.5 mg by mouth every morning., Disp: , Rfl:      irbesartan (AVAPRO) 150 MG tablet, Take 1 tablet (150 mg) by mouth At Bedtime, Disp: 90 tablet, Rfl: 3     metFORMIN (GLUCOPHAGE) 1000 MG tablet, TAKE 1 TABLET TWICE DAILY (WITH MEALS), Disp: 180 tablet, Rfl: 0     methotrexate 2.5 MG tablet, TAKE 6 TABLETS EVERY 7 DAYS* MONITORING LABS DUE EVERY 8 WKS, Disp: 24 tablet, Rfl: 1     multivitamin, therapeutic with minerals (MULTI-VITAMIN) TABS, Take 1 tablet by mouth daily., Disp: , Rfl:      potassium chloride SA (K-DUR/KLOR-CON M) 20 MEQ CR tablet, TAKE 1 TABLET THREE TIMES DAILY, Disp: 270 tablet, Rfl: 1     torsemide (DEMADEX) 10 MG tablet, Take 10 mg by mouth daily, Disp: , Rfl:       triamcinolone (KENALOG) 0.1 % ointment, Twice daily to raised rash areas on the arms, legs, body as needed., Disp: 454 g, Rfl: 2     capsaicin (ZOSTRIX) 0.075 % cream, Apply topically 3 times daily To areas of itch on back.  OK to dilute with cetaphil cream if too burning. (Patient not taking: Reported on 8/23/2018), Disp: 60 g, Rfl: 3     glimepiride (AMARYL) 4 MG tablet, Take 1 tablet (4 mg) by mouth every morning (before breakfast) (Patient not taking: Reported on 1/31/2019), Disp: 90 tablet, Rfl: 0     nitroglycerin (NITROSTAT) 0.4 MG SL tablet, Place under the tongue as needed, Disp: , Rfl:      sitagliptin (JANUVIA) 50 MG tablet, Take 1 tablet (50 mg) by mouth daily (Patient not taking: Reported on 8/23/2018), Disp: 90 tablet, Rfl: 3      Allergies:   Beta adrenergic blockers; Latex; Latex; and Tape [adhesive tape]      Past Medical History:  Type 2 diabetes mellitus  Myocardial infarction   Hypertension   Gout   Presbyopia  Pseudophakia, bilateral  Notalgia paresthetica  Intrinsic atopic dermatitis      Past Surgical History:  Phacoemulsification clear cornea with intraocular lens implantation, right eye 1/21/13  Coronary artery bypass graft, 4 vessel 1984  Cholecystectomy    Family History:   Diabetes - multiple family members     Social History:   Presents to clinic with his wife  Tobacco Use: Former smoker, quit 1984.   Alcohol Use: Rare  PCP: MARCIA MOTA      Physical Exam:   /65   Pulse 91   Ht 1.829 m (6')   Wt 96.1 kg (211 lb 12.8 oz)   BMI 28.73 kg/m       Wt Readings from Last 4 Encounters:   02/15/19 96.1 kg (211 lb 12.8 oz)   06/04/18 92.3 kg (203 lb 7.8 oz)   06/19/17 91.2 kg (201 lb)   01/02/17 93.4 kg (206 lb)       GENERAL APPEARANCE: Alert and no distress  NECK: No lymphadenopathy appreciated  Thyroid: No obvious nodules palpated   CV: RRR without M/R/G  Lungs: CTA bilaterally  Neuro: no focal deficits  Skin: Circumscribed black area on the bottom of his left foot.  Delayed  capillary refill.  Good DP/PT pulses.  Right foot with good DP/PT pulses.  Mood: Normal   Lymph: neg in neck and supraclavicular area       Data:  Lab Results   Component Value Date     01/31/2019    POTASSIUM 3.0 (L) 01/31/2019    CHLORIDE 97 01/31/2019    CO2 30 01/31/2019    ANIONGAP 9 01/31/2019     (H) 01/31/2019    BUN 20 01/31/2019    CR 1.33 (H) 01/31/2019    ADDI 9.2 01/31/2019     Lab Results   Component Value Date    GFRESTIMATED 50 (L) 01/31/2019    GFRESTIMATED 64 08/23/2018    GFRESTIMATED 67 05/17/2018    GFRESTBLACK 58 (L) 01/31/2019    GFRESTBLACK 78 08/23/2018    GFRESTBLACK 81 05/17/2018      Lab Results   Component Value Date    MICROL <5 06/04/2018    UMALCR Unable to calculate due to low value 06/04/2018      Lab Results   Component Value Date    A1C 5.5 06/04/2018    A1C 5.9 06/19/2017    A1C 5.4 06/07/2016    HEMOGLOBINA1 8.0 (A) 01/22/2016     Lab Results   Component Value Date    CHOL 105 06/19/2017    CHOL 138 01/22/2016    TRIG 83 06/19/2017    TRIG 200 (H) 01/22/2016    HDL 48 06/19/2017    HDL 50 01/22/2016    LDL 40 06/19/2017    LDL 48 01/22/2016    NHDL 57 06/19/2017    NHDL 88 01/22/2016     Assessment and Plan:  #1 Type 2 diabetes mellitus (H)  Very weill controlled with Metformin 1000 mg twice daily and therefore we will continue with his current program.  If additional glycemic control is indicated (blood sugars consistently greater than 150, hemoglobin A1c greater than 7) low-dose glimepiride at 0.5 mg daily could be considered.  - blood glucose (NO BRAND SPECIFIED) test strip  Dispense: 360 each; Refill: 3  - metFORMIN (GLUCOPHAGE) 1000 MG tablet  Dispense: 180 tablet; Refill: 4  - irbesartan (AVAPRO) 150 MG tablet  Dispense: 90 tablet; Refill: 3    #2 Diabetic foot ulcer  Very small erosive area on plantar surface of left foot.  This does not appear to be acutely infected.  No evidence of infection on exam today.  January 2019 x-ray did not show any evidence for  infection.  Extensive discussion with patient.  Encouraged him to minimize weightbearing on the foot and follow-up with his podiatrist.  He does have good peripheral pulses.  Discussed with the patient the importance of debridement followed by careful monitoring to ensure healing.  Patient has podiatry follow-up next week.    #3 concern about skin rash  The patient is concerned about multiple rashes on his legs which appear to be related to excoriation as well as possible venous stasis ulcers. Generally, they recommended use of compression stockings for his venous stasis dermatitis.  They recommended podiatry evaluation.  Encourage patient to keep his dermatology appointment.      6 months for follow-up of his diabetes.    History obtained from patient and wife.     Follow-up: Return in about 6 months (around 8/15/2019).     >50% of 30 minute visit spent in face to face counseling, education and coordination of care related to options for better glycemic control as well as preventing, detecting, and treating hypoglycemia.         Scribe Disclosure:   I, Marcela Soto, am serving as a scribe to document services personally performed by Trudy Campa MD at this visit, based upon the provider's statements to me. All documentation has been reviewed by the aforementioned provider prior to being entered into the official medical record.     Portions of this medical record were completed by a scribe. UPON MY REVIEW AND AUTHENTICATION BY ELECTRONIC SIGNATURE, this confirms (a) I performed the applicable clinical services, and (b) the record is accurate.

## 2019-02-15 NOTE — LETTER
2/15/2019     RE: Deandre Moore  [address] [facility] [address]     Dear Colleague,    Thank you for referring your patient, Deandre Moore, to the [facility] ENDOCRINOLOGY at [facility]. Please see a copy of my visit note below.    [facility]  Endocrinology  Trudy Campa MD  02/15/2019      Chief Complaint:   Diabetes    History of Present Illness:   Deandre Moore is a 78 year old male who presents for follow up of diabetes.    #1 Type 2 diabetes   Per patient, he was diagnosed with diabetes at age 68.  He as initially on Metformin for many years.  In 2015 Amaryl was added when he was on Prednisone intermittently for itching.  His blood sugars worsened when he was started on Remeron for sleep in December 2015 therefore his Amaryl was increased to 8 mg daily.  This was later discontinued due to hypoglycemia.  Januvia was cost prohibitive for him.  August 2018 Hemoglobin A1c was 5.8%.    Interval history:  Currently he is on Metformin 1000 mg twice daily only.  His fasting blood sugar have been around 100.  Before bed his sugar are usually 115 - 120.  Hemoglobin A1c done at his PCP's office on 2/13/19 was 6.3%.  Again, he has stopped the Januvia because it was too expensive for him.    #2 left foot ulcer  Two months ago he developed a spot on the ball of his toe.  His wife noted this was a callus therefore he put on a corn remover patch and ammonium lactate cream.  He saw a podiatrist who unroofed the callus ad noted an ulcer underneath.  He was placed on Keflex.  He is very concerned as his father had a leg amputation at about his age due to a diabetic foot ulcer.  January 2019 x-ray was done which did not show any definitive evidence of osteomyelitis.  He does have some numbness and tingling in his feet.  He has a follow up with podiatry next week.      Blood Glucose Monitoring:  Patient did not bring his glucometer today.    Diabetes monitoring and  complications:  CAD: Yes: previous history of myocardial infarction   Last eye exam results: last month at [facility], negative for diabetic disease per patient   Microalbuminuria: negative 2018  HTN: No  On Statin: Yes  On Aspirin: Yes  Depression: No    #3concern about skin rash  The patient is concerned about multiple rashes on his lower extremities.  He just saw dermatology in January 2019 who felt that he had atopic dermatitis, as well as venous stasis dermatitis.    Review of Systems:   Pertinent items are noted in HPI.  All other systems are negative.    Active Medications:      allopurinol (ZYLOPRIM) 300 MG tablet, Take 300 mg by mouth daily, Disp: , Rfl:      ammonium lactate (LAC-HYDRIN) 12 % lotion, Apply topically 2 times daily Apply twice daily to feet, Disp: 500 g, Rfl: 1     aspirin 81 MG tablet, Take 81 mg by mouth daily., Disp: , Rfl:      Atorvastatin Calcium (LIPITOR PO), Take 40 mg by mouth daily , Disp: , Rfl:      augmented betamethasone dipropionate (DIPROLENE-AF) 0.05 % ointment, Apply topically 2 times daily As needed to itchy areas on back, Disp: 50 g, Rfl: 3     Cholecalciferol (VITAMIN D) 2000 UNITS CAPS, Take 1 tablet by mouth daily, Disp: , Rfl:      clopidogrel (PLAVIX) 75 MG tablet, Take 75 mg by mouth daily, Disp: , Rfl:      COLCHICINE PO, Take 0.6 mg by mouth daily., Disp: , Rfl:      Cyanocobalamin (VITAMIN B12 PO), Take 1 tablet by mouth daily, Disp: , Rfl:      Ginkgo Biloba (GNP GINGKO BILOBA EXTRACT PO), Take  by mouth., Disp: , Rfl:      indapamide (LOZOL) 2.5 MG tablet, Take 2.5 mg by mouth every morning., Disp: , Rfl:      irbesartan (AVAPRO) 150 MG tablet, Take 1 tablet (150 mg) by mouth At Bedtime, Disp: 90 tablet, Rfl: 3     metFORMIN (GLUCOPHAGE) 1000 MG tablet, TAKE 1 TABLET TWICE DAILY (WITH MEALS), Disp: 180 tablet, Rfl: 0     methotrexate 2.5 MG tablet, TAKE 6 TABLETS EVERY 7 DAYS* MONITORING LABS DUE EVERY 8 WKS, Disp: 24 tablet, Rfl: 1     multivitamin, therapeutic  with minerals (MULTI-VITAMIN) TABS, Take 1 tablet by mouth daily., Disp: , Rfl:      potassium chloride SA (K-DUR/KLOR-CON M) 20 MEQ CR tablet, TAKE 1 TABLET THREE TIMES DAILY, Disp: 270 tablet, Rfl: 1     torsemide (DEMADEX) 10 MG tablet, Take 10 mg by mouth daily, Disp: , Rfl:      triamcinolone (KENALOG) 0.1 % ointment, Twice daily to raised rash areas on the arms, legs, body as needed., Disp: 454 g, Rfl: 2     capsaicin (ZOSTRIX) 0.075 % cream, Apply topically 3 times daily To areas of itch on back.  OK to dilute with cetaphil cream if too burning. (Patient not taking: Reported on 8/23/2018), Disp: 60 g, Rfl: 3     glimepiride (AMARYL) 4 MG tablet, Take 1 tablet (4 mg) by mouth every morning (before breakfast) (Patient not taking: Reported on 1/31/2019), Disp: 90 tablet, Rfl: 0     nitroglycerin (NITROSTAT) 0.4 MG SL tablet, Place under the tongue as needed, Disp: , Rfl:      sitagliptin (JANUVIA) 50 MG tablet, Take 1 tablet (50 mg) by mouth daily (Patient not taking: Reported on 8/23/2018), Disp: 90 tablet, Rfl: 3      Allergies:   Beta adrenergic blockers; Latex; Latex; and Tape [adhesive tape]      Past Medical History:  Type 2 diabetes mellitus  Myocardial infarction   Hypertension   Gout   Presbyopia  Pseudophakia, bilateral  Notalgia paresthetica  Intrinsic atopic dermatitis      Past Surgical History:  Phacoemulsification clear cornea with intraocular lens implantation, right eye 1/21/13  Coronary artery bypass graft, 4 vessel 1984  Cholecystectomy    Family History:   Diabetes - multiple family members     Social History:   Presents to clinic with his wife  Tobacco Use: Former smoker, quit 1984.   Alcohol Use: Rare  PCP: MARCIA MOTA      Physical Exam:   /65   Pulse 91   Ht 1.829 m (6')   Wt 96.1 kg (211 lb 12.8 oz)   BMI 28.73 kg/m        Wt Readings from Last 4 Encounters:   02/15/19 96.1 kg (211 lb 12.8 oz)   06/04/18 92.3 kg (203 lb 7.8 oz)   06/19/17 91.2 kg (201 lb)   01/02/17 93.4  kg (206 lb)       GENERAL APPEARANCE: Alert and no distress  NECK: No lymphadenopathy appreciated  Thyroid: No obvious nodules palpated   CV: RRR without M/R/G  Lungs: CTA bilaterally  Neuro: no focal deficits  Skin: Circumscribed black area on the bottom of his left foot.  Delayed capillary refill.  Good DP/PT pulses.  Right foot with good DP/PT pulses.  Mood: Normal   Lymph: neg in neck and supraclavicular area       Data:  Lab Results   Component Value Date     01/31/2019    POTASSIUM 3.0 (L) 01/31/2019    CHLORIDE 97 01/31/2019    CO2 30 01/31/2019    ANIONGAP 9 01/31/2019     (H) 01/31/2019    BUN 20 01/31/2019    CR 1.33 (H) 01/31/2019    ADDI 9.2 01/31/2019     Lab Results   Component Value Date    GFRESTIMATED 50 (L) 01/31/2019    GFRESTIMATED 64 08/23/2018    GFRESTIMATED 67 05/17/2018    GFRESTBLACK 58 (L) 01/31/2019    GFRESTBLACK 78 08/23/2018    GFRESTBLACK 81 05/17/2018      Lab Results   Component Value Date    MICROL <5 06/04/2018    UMALCR Unable to calculate due to low value 06/04/2018      Lab Results   Component Value Date    A1C 5.5 06/04/2018    A1C 5.9 06/19/2017    A1C 5.4 06/07/2016    HEMOGLOBINA1 8.0 (A) 01/22/2016     Lab Results   Component Value Date    CHOL 105 06/19/2017    CHOL 138 01/22/2016    TRIG 83 06/19/2017    TRIG 200 (H) 01/22/2016    HDL 48 06/19/2017    HDL 50 01/22/2016    LDL 40 06/19/2017    LDL 48 01/22/2016    NHDL 57 06/19/2017    NHDL 88 01/22/2016     Assessment and Plan:  #1 Type 2 diabetes mellitus (H)  Very weill controlled with Metformin 1000 mg twice daily and therefore we will continue with his current program.  If additional glycemic control is indicated (blood sugars consistently greater than 150, hemoglobin A1c greater than 7) low-dose glimepiride at 0.5 mg daily could be considered.  - blood glucose (NO BRAND SPECIFIED) test strip  Dispense: 360 each; Refill: 3  - metFORMIN (GLUCOPHAGE) 1000 MG tablet  Dispense: 180 tablet; Refill: 4  -  irbesartan (AVAPRO) 150 MG tablet  Dispense: 90 tablet; Refill: 3    #2 Diabetic foot ulcer  Very small erosive area on plantar surface of left foot.  This does not appear to be acutely infected.  No evidence of infection on exam today.  January 2019 x-ray did not show any evidence for infection.  Extensive discussion with patient.  Encouraged him to minimize weightbearing on the foot and follow-up with his podiatrist.  He does have good peripheral pulses.  Discussed with the patient the importance of debridement followed by careful monitoring to ensure healing.  Patient has podiatry follow-up next week.    #3 concern about skin rash  The patient is concerned about multiple rashes on his legs which appear to be related to excoriation as well as possible venous stasis ulcers. Generally, they recommended use of compression stockings for his venous stasis dermatitis.  They recommended podiatry evaluation.  Encourage patient to keep his dermatology appointment.      6 months for follow-up of his diabetes.    History obtained from patient and wife.     Follow-up: Return in about 6 months (around 8/15/2019).     >50% of 30 minute visit spent in face to face counseling, education and coordination of care related to options for better glycemic control as well as preventing, detecting, and treating hypoglycemia.       Scribe Disclosure:   I, Marcela Soto, am serving as a scribe to document services personally performed by Trudy Campa MD at this visit, based upon the provider's statements to me. All documentation has been reviewed by the aforementioned provider prior to being entered into the official medical record.     Portions of this medical record were completed by a scribe. UPON MY REVIEW AND AUTHENTICATION BY ELECTRONIC SIGNATURE, this confirms (a) I performed the applicable clinical services, and (b) the record is accurate.       Again, thank you for allowing me to participate in the care of your patient.       Sincerely,    Trudy Campa MD

## 2019-02-18 ENCOUNTER — TELEPHONE (OUTPATIENT)
Dept: ENDOCRINOLOGY | Facility: CLINIC | Age: 79
End: 2019-02-18

## 2019-02-18 NOTE — TELEPHONE ENCOUNTER
[facility]    Phone Message    May a detailed message be left on voicemail: yes    Reason for Call: Other: Patient would like a call back from a nurse regarding his creatinine level.  It was 1.22 at his local provider  on 2/13 but he says we did labs on himi 2/15 and it was 1/33. Please contact patient.       Action Taken: Message routed to:  [facility] ([facility] Endocrinology

## 2019-02-18 NOTE — TELEPHONE ENCOUNTER
I don't see any labs ordered 2/15/19 by Dr Campa . Last labs done  2/13/19 elsewhere had Creatinine 1.22 which was  Improved from 1/31/19  Of 1.33. He only saw Dr Campa as office visit  2/15/19 . I discussed this with him.

## 2019-03-26 ENCOUNTER — TELEPHONE (OUTPATIENT)
Dept: ENDOCRINOLOGY | Facility: CLINIC | Age: 79
End: 2019-03-26

## 2019-03-26 NOTE — TELEPHONE ENCOUNTER
----- Message from Trudy Campa MD sent at 2/15/2019  5:30 PM CST -----  How is his foot ulcer doing?

## 2019-03-26 NOTE — TELEPHONE ENCOUNTER
Spoke w/ P: states:not much different, not open, not oozing, hurts once in a while, callused, hurts with pressure, not worried about it.     ----- Message from Trudy Campa MD sent at 2/15/2019  5:30 PM CST -----  How is his foot ulcer doing?

## 2019-05-08 DIAGNOSIS — E11.9 TYPE 2 DIABETES MELLITUS (H): Primary | ICD-10-CM

## 2019-05-09 RX ORDER — GLIMEPIRIDE 4 MG/1
TABLET ORAL
Qty: 90 TABLET | Refills: 0 | Status: SHIPPED | OUTPATIENT
Start: 2019-05-09 | End: 2019-05-17

## 2019-05-09 NOTE — TELEPHONE ENCOUNTER
Patient last seen by Dr. Campa.    [place] forward [place] [facility]    Milla Barnett LPN  Diabetes Clinic Coordinator   Adult Endocrinology and Pediatric Specialty Clinics  [facility]

## 2019-05-17 ENCOUNTER — OFFICE VISIT (OUTPATIENT)
Dept: ENDOCRINOLOGY | Facility: CLINIC | Age: 79
End: 2019-05-17
Payer: COMMERCIAL

## 2019-05-17 VITALS
DIASTOLIC BLOOD PRESSURE: 64 MMHG | HEART RATE: 65 BPM | HEIGHT: 72 IN | BODY MASS INDEX: 28.42 KG/M2 | WEIGHT: 209.8 LBS | SYSTOLIC BLOOD PRESSURE: 105 MMHG

## 2019-05-17 DIAGNOSIS — E11.9 TYPE 2 DIABETES MELLITUS WITHOUT COMPLICATION, WITHOUT LONG-TERM CURRENT USE OF INSULIN (H): Primary | ICD-10-CM

## 2019-05-17 LAB — HBA1C MFR BLD: 5.4 % (ref 4.3–6)

## 2019-05-17 ASSESSMENT — PAIN SCALES - GENERAL: PAINLEVEL: NO PAIN (0)

## 2019-05-17 ASSESSMENT — MIFFLIN-ST. JEOR: SCORE: 1699.78

## 2019-05-17 NOTE — LETTER
5/17/2019     RE: Deandre Moore  [address] [facility] [place] [address]     Dear Colleague,    Thank you for referring your patient, Deandre Moore, to the [facility] ENDOCRINOLOGY at [facility]. Please see a copy of my visit note below.    [facility]  Endocrinology  Trudy Campa MD  05/17/2019      Chief Complaint:   Diabetes    History of Present Illness:   Deandre Moore is a 78 year old male who presents for follow up of diabetes.    #1 Type 2 diabetes   Per patient, he was diagnosed with diabetes at age 68.  He as initially on Metformin for many years.  In 2015 Amaryl was added when he was on Prednisone intermittently for itching.  His blood sugars worsened when he was started on Remeron for sleep in December 2015 therefore his Amaryl was increased to 8 mg daily.  This was later decreased to 4 mg due to hypoglycemia.  Januvia was cost prohibitive for him.  August 2018 Hemoglobin A1c was 5.8%.     Interval history:  May 2019 hemoglobin A1c is 5.4%  He is on Metformin 1000 mg twice daily and Amaryl 4 mg daily.  I am uncertain why he is on the Amaryl 4 mg daily as I thought I had stopped this medication previously.  He had fall a couple weeks ago while on a walk before dinner when he had a low blood sugar reaction.  Bystander called 911 and his blood sugar was in the 50s on EMS arrival.  He was evaluated at the ER and had a lip laceration which was repaired.  His blood sugars are in the 80s in the morning and 110s - 120s before bed.      Diabetes monitoring and complications:  CAD: Yes: previous history of myocardial infarction   Last eye exam results: January 2019, negative per patient  Microalbuminuria: negative 2018  HTN: No  On Statin: Yes  On Aspirin: Yes  Depression: No    #2 Left foot ulcer  Two months ago he developed a callus on the ball of his toe.  He put on a corn remover patch and ammonium lactate cream.  He saw a podiatrist who unroofed the  callus ad noted an ulcer underneath.  He was placed on Keflex.  He is very concerned as his father had a leg amputation at about his age due to a diabetic foot ulcer.  January 2019 x-ray was done which did not show any definitive evidence of osteomyelitis.  He does have some numbness and tingling in his feet.      Interval history:  The ulcer has healed over however he has been having some swelling of his left leg.  The skin of his left calf itches and he gets blistering of the skin after scratching.  He did see dermatology regarding this however was not given a clear answer.  He does not have any current blisters however has scars from the previous blisters.  This occurs only on his left leg.  He does note some improvement of the blistering with topical iodine.  dermatology gave him a steroid cream.  He just saw his cardiologist yesterday who did increase his Torsemide dose temporarily to 3 pills for this weekend.      #3 venous stasis dermatitis  The patient has venous stasis dermatitis.  He has lower extremity swelling with associated itching.  He is pending dermatology evaluation.  He reports blistering on the skin from scratching.      Review of Systems:   Pertinent items are noted in HPI.  All other systems are negative.    Active Medications:      allopurinol (ZYLOPRIM) 300 MG tablet, Take 300 mg by mouth daily, Disp: , Rfl:      ammonium lactate (LAC-HYDRIN) 12 % external lotion, Apply topically 2 times daily Apply twice daily to feet, Disp: 500 g, Rfl: 1     aspirin 81 MG tablet, Take 81 mg by mouth daily., Disp: , Rfl:      Atorvastatin Calcium (LIPITOR PO), Take 40 mg by mouth daily , Disp: , Rfl:      augmented betamethasone dipropionate (DIPROLENE-AF) 0.05 % ointment, Apply topically 2 times daily As needed to itchy areas on back, Disp: 50 g, Rfl: 3     capsaicin (ZOSTRIX) 0.075 % cream, Apply topically 3 times daily To areas of itch on back.  OK to dilute with Cetaphil cream if too burning., Disp: 60  g, Rfl: 3     Cholecalciferol (VITAMIN D) 2000 UNITS CAPS, Take 1 tablet by mouth daily, Disp: , Rfl:      clopidogrel (PLAVIX) 75 MG tablet, Take 75 mg by mouth daily, Disp: , Rfl:      COLCHICINE PO, Take 0.6 mg by mouth daily., Disp: , Rfl:      Cyanocobalamin (VITAMIN B12 PO), Take 1 tablet by mouth daily, Disp: , Rfl:      Ginkgo Biloba (GNP GINGKO BILOBA EXTRACT PO), Take  by mouth., Disp: , Rfl:      glimepiride (AMARYL) 4 MG tablet, TAKE 1 TABLET EVERY MORNING BEFORE BREAKFAST, Disp: 90 tablet, Rfl: 0     indapamide (LOZOL) 2.5 MG tablet, Take 2.5 mg by mouth every morning., Disp: , Rfl:      irbesartan (AVAPRO) 150 MG tablet, Take 1 tablet (150 mg) by mouth At Bedtime, Disp: 90 tablet, Rfl: 3     metFORMIN (GLUCOPHAGE) 1000 MG tablet, TAKE 1 TABLET TWICE DAILY (WITH MEALS), Disp: 180 tablet, Rfl: 4     methotrexate 2.5 MG tablet, TAKE 6 TABLETS EVERY 7 DAYS* MONITORING LABS DUE EVERY 8 WKS, Disp: 24 tablet, Rfl: 1     multivitamin, therapeutic with minerals (MULTI-VITAMIN) TABS, Take 1 tablet by mouth daily., Disp: , Rfl:      nitroglycerin (NITROSTAT) 0.4 MG SL tablet, Place under the tongue as needed, Disp: , Rfl:      potassium chloride SA (K-DUR/KLOR-CON M) 20 MEQ CR tablet, TAKE 1 TABLET THREE TIMES DAILY, Disp: 270 tablet, Rfl: 1     torsemide (DEMADEX) 10 MG tablet, Take 10 mg by mouth daily, Disp: , Rfl:      triamcinolone (KENALOG) 0.1 % ointment, Twice daily to raised rash areas on the arms, legs, body as needed., Disp: 454 g, Rfl: 2      Allergies:   Beta adrenergic blockers; Latex; Latex; and Tape [adhesive tape]      Past Medical History:  Diabetes mellitus   Gout  Myocardial infarction  Hypertension  Eczema   Atopic dermatitis   Notalgia paresthetica      Past Surgical History:  Phacoemulsification clear cornea with intraocular lens implantation, right 1/21/13  Coronary artery bypass graft 1984  Cholecystectomy     Family History:   History reviewed. No pertinent family history.      Social  "History:   Presents to clinic with his wife  Tobacco Use: Former smoker  Alcohol Use: Occasional alcohol use.   PCP: MARCIA MOTA      Physical Exam:   /64   Pulse 65   Ht 1.829 m (6' 0.01\")   Wt 95.2 kg (209 lb 12.8 oz)   BMI 28.45 kg/m        Wt Readings from Last 4 Encounters:   05/17/19 95.2 kg (209 lb 12.8 oz)   02/15/19 96.1 kg (211 lb 12.8 oz)   06/04/18 92.3 kg (203 lb 7.8 oz)   06/19/17 91.2 kg (201 lb)       GENERAL APPEARANCE: Alert and no distress  NECK: No lymphadenopathy appreciated  Thyroid: No obvious nodules palpated   CV: RRR without M/R/G  Lungs: CTA bilaterally  Abdomen: Soft, Nontender, non distended, positive bowel sounds   Neuro: No focal deficits  Skin: No infection in feet.  Skin colored callus on the bottom of his left foot.  No redness or infection.  Mood: Normal   Lymph: neg in neck and supraclavicular area      Data:  Lab Results   Component Value Date     01/31/2019    POTASSIUM 3.0 (L) 01/31/2019    CHLORIDE 97 01/31/2019    CO2 30 01/31/2019    ANIONGAP 9 01/31/2019     (H) 01/31/2019    BUN 20 01/31/2019    CR 1.33 (H) 01/31/2019    ADDI 9.2 01/31/2019     Lab Results   Component Value Date    GFRESTIMATED 50 (L) 01/31/2019    GFRESTIMATED 64 08/23/2018    GFRESTIMATED 67 05/17/2018    GFRESTBLACK 58 (L) 01/31/2019    GFRESTBLACK 78 08/23/2018    GFRESTBLACK 81 05/17/2018      Lab Results   Component Value Date    MICROL <5 06/04/2018    UMALCR Unable to calculate due to low value 06/04/2018      Lab Results   Component Value Date    A1C 5.5 06/04/2018    A1C 5.9 06/19/2017    A1C 5.4 06/07/2016    HEMOGLOBINA1 8.0 (A) 01/22/2016     Lab Results   Component Value Date    CHOL 105 06/19/2017    CHOL 138 01/22/2016    TRIG 83 06/19/2017    TRIG 200 (H) 01/22/2016    HDL 48 06/19/2017    HDL 50 01/22/2016    LDL 40 06/19/2017    LDL 48 01/22/2016    NHDL 57 06/19/2017    NHDL 88 01/22/2016     Assessment and Plan:  #1 Type 2 diabetes mellitus without " complication, without long-term current use of insulin (H)  Very tightly controlled and he is having hypoglycemic episodes, one of which resulted in a fall and facial trauma.  Will discontinue his Amaryl entirely.  Continue Metformin 1000 mg twice daily.  He will continue checking his blood sugar regularly and contact us should he start having persistent hyperglycemia.  He is on a statin and ARB.    #2  Left foot callus  This has significantly improved.  There is no evidence for infection.  We will continue observe for now.  Patient will work with podiatry for appropriate shoes.    #3 venous stasis dermatitis  Patient to see dermatology.  Recommended patient consider Sarna cream to reduce itching.           Follow-up: Return in about 5 months (around 10/17/2019).     >50% of 30 minute visit spent in face to face counseling, education and coordination of care related to options for better glycemic control as well as preventing, detecting, and treating hypoglycemia.         Scribe Disclosure:  I, Marcela Soto, am serving as a scribe to document services personally performed by Trudy Campa MD at this visit, based upon the provider's statements to me. All documentation has been reviewed by the aforementioned provider prior to being entered into the official medical record.     Portions of this medical record were completed by a scribe. UPON MY REVIEW AND AUTHENTICATION BY ELECTRONIC SIGNATURE, this confirms (a) I performed the applicable clinical services, and (b) the record is accurate.       Again, thank you for allowing me to participate in the care of your patient.      Sincerely,    Trudy Campa MD

## 2019-05-17 NOTE — PATIENT INSTRUCTIONS
Use sarna to legs to reduce itching - ok to use the clobestasol     Stop the  Glimepiride    Call us if your sugars are higher - should only be on metfomin 1000 mg twice adily

## 2019-05-17 NOTE — PROGRESS NOTES
[facility]  Endocrinology  Trudy Campa MD  05/17/2019      Chief Complaint:   Diabetes    History of Present Illness:   Deandre Moore is a 78 year old male who presents for follow up of diabetes.    #1 Type 2 diabetes   Per patient, he was diagnosed with diabetes at age 68.  He as initially on Metformin for many years.  In 2015 Amaryl was added when he was on Prednisone intermittently for itching.  His blood sugars worsened when he was started on Remeron for sleep in December 2015 therefore his Amaryl was increased to 8 mg daily.  This was later decreased to 4 mg due to hypoglycemia.  Januvia was cost prohibitive for him.  August 2018 Hemoglobin A1c was 5.8%.     Interval history:  May 2019 hemoglobin A1c is 5.4%  He is on Metformin 1000 mg twice daily and Amaryl 4 mg daily.  I am uncertain why he is on the Amaryl 4 mg daily as I thought I had stopped this medication previously.  He had fall a couple weeks ago while on a walk before dinner when he had a low blood sugar reaction.  Bystander called 911 and his blood sugar was in the 50s on EMS arrival.  He was evaluated at the ER and had a lip laceration which was repaired.  His blood sugars are in the 80s in the morning and 110s - 120s before bed.      Diabetes monitoring and complications:  CAD: Yes: previous history of myocardial infarction   Last eye exam results: January 2019, negative per patient  Microalbuminuria: negative 2018  HTN: No  On Statin: Yes  On Aspirin: Yes  Depression: No    #2 Left foot ulcer  Two months ago he developed a callus on the ball of his toe.  He put on a corn remover patch and ammonium lactate cream.  He saw a podiatrist who unroofed the callus ad noted an ulcer underneath.  He was placed on Keflex.  He is very concerned as his father had a leg amputation at about his age due to a diabetic foot ulcer.  January 2019 x-ray was done which did not show any definitive evidence of osteomyelitis.  He does have some numbness and  tingling in his feet.      Interval history:  The ulcer has healed over however he has been having some swelling of his left leg.  The skin of his left calf itches and he gets blistering of the skin after scratching.  He did see dermatology regarding this however was not given a clear answer.  He does not have any current blisters however has scars from the previous blisters.  This occurs only on his left leg.  He does note some improvement of the blistering with topical iodine.  dermatology gave him a steroid cream.  He just saw his cardiologist yesterday who did increase his Torsemide dose temporarily to 3 pills for this weekend.      #3 venous stasis dermatitis  The patient has venous stasis dermatitis.  He has lower extremity swelling with associated itching.  He is pending dermatology evaluation.  He reports blistering on the skin from scratching.      Review of Systems:   Pertinent items are noted in HPI.  All other systems are negative.    Active Medications:      allopurinol (ZYLOPRIM) 300 MG tablet, Take 300 mg by mouth daily, Disp: , Rfl:      ammonium lactate (LAC-HYDRIN) 12 % external lotion, Apply topically 2 times daily Apply twice daily to feet, Disp: 500 g, Rfl: 1     aspirin 81 MG tablet, Take 81 mg by mouth daily., Disp: , Rfl:      Atorvastatin Calcium (LIPITOR PO), Take 40 mg by mouth daily , Disp: , Rfl:      augmented betamethasone dipropionate (DIPROLENE-AF) 0.05 % ointment, Apply topically 2 times daily As needed to itchy areas on back, Disp: 50 g, Rfl: 3     capsaicin (ZOSTRIX) 0.075 % cream, Apply topically 3 times daily To areas of itch on back.  OK to dilute with Cetaphil cream if too burning., Disp: 60 g, Rfl: 3     Cholecalciferol (VITAMIN D) 2000 UNITS CAPS, Take 1 tablet by mouth daily, Disp: , Rfl:      clopidogrel (PLAVIX) 75 MG tablet, Take 75 mg by mouth daily, Disp: , Rfl:      COLCHICINE PO, Take 0.6 mg by mouth daily., Disp: , Rfl:      Cyanocobalamin (VITAMIN B12 PO), Take 1  "tablet by mouth daily, Disp: , Rfl:      Ginkgo Biloba (GNP GINGKO BILOBA EXTRACT PO), Take  by mouth., Disp: , Rfl:      glimepiride (AMARYL) 4 MG tablet, TAKE 1 TABLET EVERY MORNING BEFORE BREAKFAST, Disp: 90 tablet, Rfl: 0     indapamide (LOZOL) 2.5 MG tablet, Take 2.5 mg by mouth every morning., Disp: , Rfl:      irbesartan (AVAPRO) 150 MG tablet, Take 1 tablet (150 mg) by mouth At Bedtime, Disp: 90 tablet, Rfl: 3     metFORMIN (GLUCOPHAGE) 1000 MG tablet, TAKE 1 TABLET TWICE DAILY (WITH MEALS), Disp: 180 tablet, Rfl: 4     methotrexate 2.5 MG tablet, TAKE 6 TABLETS EVERY 7 DAYS* MONITORING LABS DUE EVERY 8 WKS, Disp: 24 tablet, Rfl: 1     multivitamin, therapeutic with minerals (MULTI-VITAMIN) TABS, Take 1 tablet by mouth daily., Disp: , Rfl:      nitroglycerin (NITROSTAT) 0.4 MG SL tablet, Place under the tongue as needed, Disp: , Rfl:      potassium chloride SA (K-DUR/KLOR-CON M) 20 MEQ CR tablet, TAKE 1 TABLET THREE TIMES DAILY, Disp: 270 tablet, Rfl: 1     torsemide (DEMADEX) 10 MG tablet, Take 10 mg by mouth daily, Disp: , Rfl:      triamcinolone (KENALOG) 0.1 % ointment, Twice daily to raised rash areas on the arms, legs, body as needed., Disp: 454 g, Rfl: 2      Allergies:   Beta adrenergic blockers; Latex; Latex; and Tape [adhesive tape]      Past Medical History:  Diabetes mellitus   Gout  Myocardial infarction  Hypertension  Eczema   Atopic dermatitis   Notalgia paresthetica      Past Surgical History:  Phacoemulsification clear cornea with intraocular lens implantation, right 1/21/13  Coronary artery bypass graft 1984  Cholecystectomy     Family History:   History reviewed. No pertinent family history.      Social History:   Presents to clinic with his wife  Tobacco Use: Former smoker  Alcohol Use: Occasional alcohol use.   PCP: MARCIA MOTA      Physical Exam:   /64   Pulse 65   Ht 1.829 m (6' 0.01\")   Wt 95.2 kg (209 lb 12.8 oz)   BMI 28.45 kg/m       Wt Readings from Last 4 " Encounters:   05/17/19 95.2 kg (209 lb 12.8 oz)   02/15/19 96.1 kg (211 lb 12.8 oz)   06/04/18 92.3 kg (203 lb 7.8 oz)   06/19/17 91.2 kg (201 lb)       GENERAL APPEARANCE: Alert and no distress  NECK: No lymphadenopathy appreciated  Thyroid: No obvious nodules palpated   CV: RRR without M/R/G  Lungs: CTA bilaterally  Abdomen: Soft, Nontender, non distended, positive bowel sounds   Neuro: No focal deficits  Skin: No infection in feet.  Skin colored callus on the bottom of his left foot.  No redness or infection.  Mood: Normal   Lymph: neg in neck and supraclavicular area      Data:  Lab Results   Component Value Date     01/31/2019    POTASSIUM 3.0 (L) 01/31/2019    CHLORIDE 97 01/31/2019    CO2 30 01/31/2019    ANIONGAP 9 01/31/2019     (H) 01/31/2019    BUN 20 01/31/2019    CR 1.33 (H) 01/31/2019    ADDI 9.2 01/31/2019     Lab Results   Component Value Date    GFRESTIMATED 50 (L) 01/31/2019    GFRESTIMATED 64 08/23/2018    GFRESTIMATED 67 05/17/2018    GFRESTBLACK 58 (L) 01/31/2019    GFRESTBLACK 78 08/23/2018    GFRESTBLACK 81 05/17/2018      Lab Results   Component Value Date    MICROL <5 06/04/2018    UMALCR Unable to calculate due to low value 06/04/2018      Lab Results   Component Value Date    A1C 5.5 06/04/2018    A1C 5.9 06/19/2017    A1C 5.4 06/07/2016    HEMOGLOBINA1 8.0 (A) 01/22/2016     Lab Results   Component Value Date    CHOL 105 06/19/2017    CHOL 138 01/22/2016    TRIG 83 06/19/2017    TRIG 200 (H) 01/22/2016    HDL 48 06/19/2017    HDL 50 01/22/2016    LDL 40 06/19/2017    LDL 48 01/22/2016    NHDL 57 06/19/2017    NHDL 88 01/22/2016     Assessment and Plan:  #1 Type 2 diabetes mellitus without complication, without long-term current use of insulin (H)  Very tightly controlled and he is having hypoglycemic episodes, one of which resulted in a fall and facial trauma.  Will discontinue his Amaryl entirely.  Continue Metformin 1000 mg twice daily.  He will continue checking his blood  sugar regularly and contact us should he start having persistent hyperglycemia.  He is on a statin and ARB.    #2  Left foot callus  This has significantly improved.  There is no evidence for infection.  We will continue observe for now.  Patient will work with podiatry for appropriate shoes.    #3 venous stasis dermatitis  Patient to see dermatology.  Recommended patient consider Sarna cream to reduce itching.           Follow-up: Return in about 5 months (around 10/17/2019).     >50% of 30 minute visit spent in face to face counseling, education and coordination of care related to options for better glycemic control as well as preventing, detecting, and treating hypoglycemia.         Scribe Disclosure:  I, Marcela Soto, am serving as a scribe to document services personally performed by Trudy Campa MD at this visit, based upon the provider's statements to me. All documentation has been reviewed by the aforementioned provider prior to being entered into the official medical record.     Portions of this medical record were completed by a scribe. UPON MY REVIEW AND AUTHENTICATION BY ELECTRONIC SIGNATURE, this confirms (a) I performed the applicable clinical services, and (b) the record is accurate.

## 2019-05-28 ENCOUNTER — NURSE TRIAGE (OUTPATIENT)
Dept: CALL CENTER | Age: 79
End: 2019-05-28

## 2019-05-28 NOTE — TELEPHONE ENCOUNTER
Patient calls today to report on Blood Sugar levels since the Amaryl was discontinued on 5-17-19,    He is now running 240-250. He would like to know what he is to do now that his BS is high again.    Message will be sent to clinic nurses and Dr Campa.  Return call number is [phone].      Kathleen M Doege RN

## 2019-05-29 ENCOUNTER — TELEPHONE (OUTPATIENT)
Dept: ENDOCRINOLOGY | Facility: CLINIC | Age: 79
End: 2019-05-29

## 2019-05-29 DIAGNOSIS — R73.9 HYPERGLYCEMIA: Primary | ICD-10-CM

## 2019-05-29 RX ORDER — GLIMEPIRIDE 2 MG/1
2 TABLET ORAL
Qty: 90 TABLET | Refills: 3 | Status: SHIPPED | OUTPATIENT
Start: 2019-05-29 | End: 2019-07-15

## 2019-05-29 NOTE — TELEPHONE ENCOUNTER
Called pt - pt to glimepiride at 2 mg daily.  Patient can use up current supply.  Prescription sent to pharmacy.  Will call patient in a few weeks to assess progress.

## 2019-05-31 DIAGNOSIS — L30.8 OTHER ECZEMA: ICD-10-CM

## 2019-06-04 NOTE — TELEPHONE ENCOUNTER
Received refill request for MTX as the resident on call. Reviewed patient's chart and attached communication. Patient last seen 01/2019 for dermatitis. RTC 3 months. Patient somehow received a refill through another clinic without an appointment and now is requesting refill through [facility]. Patient has appointment later this week (in 2 days- 6/6/19), would recommend addressing at that visit.    Cipriano Moscoso MD  Dermatology Resident, PGY3

## 2019-06-04 NOTE — TELEPHONE ENCOUNTER
METHOTREXATE 2.5 MG Tablet  : TAKE 6 TABLETS EVERY 7 DAYS* MONITORING LABS DUE EVERY 8 WKS    Last Written Prescription Date:  1/31/2019  Last Fill Quantity: 24,   # refills: 1    Last Office Visit: 1/31/2019  Future Office visit:  6/6/2019    Recent outside labs/ Care Everywhere in chart:  5/16/2019=CBCP, Cr and ALT  WBC=7.8  RBC=3.84  HGB=12.4  Hematocrit=35.2  MCV=92  MCH=32.3  MCHC=35.2  RDW=13.9  GMX=454     Cr=1.35, ALT=22  CBC RESULTS:   Recent Labs   Lab Test 01/31/19  1413   WBC 9.6   RBC 4.20*   HGB 12.8*   HCT 38.5*   MCV 92   MCH 30.5   MCHC 33.2   RDW 15.1*          Creatinine   Date Value Ref Range Status   01/31/2019 1.33 (H) 0.66 - 1.25 mg/dL Final   ]    Liver Function Studies -   Recent Labs   Lab Test 01/31/19  1413   PROTTOTAL 7.3   ALBUMIN 3.7   BILITOTAL 0.6   ALKPHOS 78   AST 21   ALT 35       Routing refill request to provider for review/approval because:  DMARD, labs due( appt 6/6/2019)  Note Refill request via [facility] appt 4/18/2019. Gap in medication use? Appt 6/6/2019

## 2019-06-06 ENCOUNTER — OFFICE VISIT (OUTPATIENT)
Dept: DERMATOLOGY | Facility: CLINIC | Age: 79
End: 2019-06-06
Attending: INTERNAL MEDICINE
Payer: COMMERCIAL

## 2019-06-06 DIAGNOSIS — L30.8 OTHER ECZEMA: ICD-10-CM

## 2019-06-06 DIAGNOSIS — L30.9 DERMATITIS: ICD-10-CM

## 2019-06-06 DIAGNOSIS — Z79.899 ENCOUNTER FOR LONG-TERM CURRENT USE OF HIGH RISK MEDICATION: Primary | ICD-10-CM

## 2019-06-06 RX ORDER — TRIAMCINOLONE ACETONIDE 1 MG/G
OINTMENT TOPICAL
Qty: 454 G | Refills: 2 | Status: SHIPPED | OUTPATIENT
Start: 2019-06-06 | End: 2019-12-10

## 2019-06-06 ASSESSMENT — PAIN SCALES - GENERAL: PAINLEVEL: NO PAIN (0)

## 2019-06-06 NOTE — LETTER
6/6/2019       RE: Deandre Moore  [address] [facility] [id number]     Dear Colleague,    Thank you for referring your patient, Deandre Moore, to the [facility] DERMATOLOGY at [facility]. Please see a copy of my visit note below.    CHIEF COMPLAINT:  Followup on atopic dermatitis and itch.      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  He was last seen in our clinic on 01/31/2019, at which time he was doing well, and the plan was for him to continue methotrexate 15 mg once weekly and triamcinolone 0.1% ointment twice daily as needed.  Today he reports that overall he has done fairly well since his last visit, but has had slowly progressive itch over the past month and a recent flare of itchy water bumps on his bilateral shins over the past several weeks.  He is wondering what can be done to treat these.  Of note, he recently had safety labs at an outside clinic, which included a creatinine of 1.35 (this is approximately at his baseline), hemoglobin of 12.4, white blood cell count of 7.8 and a normal ALT.      REVIEW OF SYSTEMS:  No recent fevers or chills.  No oral sores.  Otherwise, a 10 point review of systems is negative.      PHYSICAL EXAMINATION:   GENERAL:  This is a well-appearing, well-nourished older gentleman with a normal mood and affect who is oriented x3.   SKIN:  A cutaneous exam of the head, neck, bilateral upper and lower extremities was performed.  On the bilateral lower extremities, predominantly on the shins, there are scattered, hyperpigmented, 2-3 mm papules as well as crusted, 3 mm, eroded papules.  There is also mild lichenification and diffuse hyperpigmentation from the midcalf to the ankle on the left medial leg.      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  Today's plan is as follows:   1.  Given that he has had slowly progressive itch over the past month, we elected to increase his methotrexate to 17.5 mg once weekly.  He had safety labs performed within the last 3 weeks, and these were all within normal limits.  (His creatinine has been borderline high for the past 1-2 years, but is stable over time at 1.3 most recently)   2.  Previously, he has taken folic acid on the days he does not take methotrexate, but recently this has upset his stomach significantly.  He takes a multivitamin daily, and thus I feel it is reasonable for him to discontinue his folic acid.     3.  He will continue triamcinolone ointment 0.1% twice daily as needed.  We discussed that for the itchiest areas on his lower extremities, he may apply this at bedtime and occlude with Saran wrap overnight.   4.  I encouraged him to elevate his legs and use compression stockings whenever possible for his chronic venous insufficiency and stasis dermatitis.   5.  He will follow up in our clinic in 4 months' time.       Miguel Gray MD  Dermatology Attending

## 2019-06-06 NOTE — NURSING NOTE
Dermatology Rooming Note    Deandre Moore's goals for this visit include:   Chief Complaint   Patient presents with     Derm Problem     Atopic dermatitis, macular amyloidosis - Deandre notes a recent flare that is now dying down.     Ashley Moctezuma, CMA

## 2019-06-06 NOTE — PROGRESS NOTES
CHIEF COMPLAINT:  Followup on atopic dermatitis and itch.      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  He was last seen in our clinic on 01/31/2019, at which time he was doing well, and the plan was for him to continue methotrexate 15 mg once weekly and triamcinolone 0.1% ointment twice daily as needed.  Today he reports that overall he has done fairly well since his last visit, but has had slowly progressive itch over the past month and a recent flare of itchy water bumps on his bilateral shins over the past several weeks.  He is wondering what can be done to treat these.  Of note, he recently had safety labs at an outside clinic, which included a creatinine of 1.35 (this is approximately at his baseline), hemoglobin of 12.4, white blood cell count of 7.8 and a normal ALT.      REVIEW OF SYSTEMS:  No recent fevers or chills.  No oral sores.  Otherwise, a 10 point review of systems is negative.      PHYSICAL EXAMINATION:   GENERAL:  This is a well-appearing, well-nourished older gentleman with a normal mood and affect who is oriented x3.   SKIN:  A cutaneous exam of the head, neck, bilateral upper and lower extremities was performed.  On the bilateral lower extremities, predominantly on the shins, there are scattered, hyperpigmented, 2-3 mm papules as well as crusted, 3 mm, eroded papules.  There is also mild lichenification and diffuse hyperpigmentation from the midcalf to the ankle on the left medial leg.      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  Today's plan is as follows:   1.  Given that he has had slowly progressive itch over the past month, we elected to increase his methotrexate to 17.5 mg once weekly.  He had safety  labs performed within the last 3 weeks, and these were all within normal limits.  (His creatinine has been borderline high for the past 1-2 years, but is stable over time at 1.3 most recently)   2.  Previously, he has taken folic acid on the days he does not take methotrexate, but recently this has upset his stomach significantly.  He takes a multivitamin daily, and thus I feel it is reasonable for him to discontinue his folic acid.     3.  He will continue triamcinolone ointment 0.1% twice daily as needed.  We discussed that for the itchiest areas on his lower extremities, he may apply this at bedtime and occlude with Saran wrap overnight.   4.  I encouraged him to elevate his legs and use compression stockings whenever possible for his chronic venous insufficiency and stasis dermatitis.   5.  He will follow up in our clinic in 4 months' time.       Miguel Gray MD  Dermatology Attending

## 2019-06-06 NOTE — PATIENT INSTRUCTIONS
For flare ups on legs:    At bedtime apply a thick layer of triamcinolone and cover with saran wrap overnight while sleeping

## 2019-06-13 ENCOUNTER — TELEPHONE (OUTPATIENT)
Dept: ENDOCRINOLOGY | Facility: CLINIC | Age: 79
End: 2019-06-13

## 2019-06-13 NOTE — TELEPHONE ENCOUNTER
Patient gave me a few blood sugar reading for the past couple days:    6/13:229  6/12:98,172  6/11:110  6/10:108,113  6/9:159,210

## 2019-06-13 NOTE — TELEPHONE ENCOUNTER
----- Message from Trudy Campa MD sent at 5/17/2019 12:56 PM CDT -----  How are blood sugars doing?

## 2019-06-19 ENCOUNTER — TELEPHONE (OUTPATIENT)
Dept: ENDOCRINOLOGY | Facility: CLINIC | Age: 79
End: 2019-06-19

## 2019-06-19 NOTE — TELEPHONE ENCOUNTER
----- Message from Jannie Gray RN sent at 6/19/2019  2:35 PM CDT -----      ----- Message -----  From: Trudy Campa MD  Sent: 6/19/2019  To: [facility]    How her blood sugars doing?

## 2019-07-12 ENCOUNTER — TELEPHONE (OUTPATIENT)
Dept: ENDOCRINOLOGY | Facility: CLINIC | Age: 79
End: 2019-07-12

## 2019-07-12 NOTE — TELEPHONE ENCOUNTER
----- Message from Trudy Campa MD sent at 5/17/2019  2:01 PM CDT -----  How are blood sugars doing?

## 2019-07-12 NOTE — TELEPHONE ENCOUNTER
Deandre is currently taking Glimepiride the  2 mg tablet at   1/2 tablet BID to equal 2 mg daily  , Metformin 1000 mg  BID .  AM blood sugars are great 80-90's but HS blood sugars are  130-152-188. He eats dinner at  6:30 - 7 PM and then checks BS at 11 pm just before bed.  He is concerned with the higher  numbers at this time Brenda Mccarty RN on 7/12/2019 at 9:22 AM

## 2019-07-15 ENCOUNTER — TELEPHONE (OUTPATIENT)
Dept: ENDOCRINOLOGY | Facility: CLINIC | Age: 79
End: 2019-07-15

## 2019-07-15 DIAGNOSIS — E11.9 TYPE 2 DIABETES MELLITUS WITHOUT COMPLICATION, WITHOUT LONG-TERM CURRENT USE OF INSULIN (H): Primary | ICD-10-CM

## 2019-07-15 RX ORDER — GLIMEPIRIDE 2 MG/1
TABLET ORAL
Qty: 90 TABLET | Refills: 1
Start: 2019-07-15 | End: 2019-08-16

## 2019-07-15 RX ORDER — GLIMEPIRIDE 1 MG/1
TABLET ORAL
Qty: 270 TABLET | Refills: 1
Start: 2019-07-15 | End: 2019-07-15

## 2019-08-15 NOTE — PROGRESS NOTES
[facility]  Endocrinology  Trudy Campa MD  08/16/2019      Chief Complaint:   Diabetes    History of Present Illness:   Deandre Moore is a 78 year old male with a history of diabetes mellitus type 2, gout, osteoarthritis, coronary artery disease, hypertension and hyperlipidemia who presents for follow up on diabetes.     #1 Type 2 diabetes   Per patient, he was diagnosed with diabetes at age 68.  He as initially on Metformin for many years.  In 2015 Amaryl was added when he was on Prednisone intermittently for itching.  His blood sugars worsened when he was started on Remeron for sleep in December 2015 therefore his Amaryl was increased to 8 mg daily.  This was later decreased to 4 mg due to hypoglycemia. Januvia was cost prohibitive for him. August 2018 Hemoglobin A1c was 5.8%.  May 2019 hemoglobin A1c is 5.4%      Interval history: He is on Metformin 1000 mg twice daily and Amaryl 2 mg daily.     Blood Glucose Monitoring:  Patient currently wearing a ACCU-CHEK 360    # of tests- 37   Average- 120 mg/dL   SD- 38.8 mg/dL   Highest- 193   Lowest- 70     Diabetes monitoring and complications:  CAD: Yes: previous history of myocardial infarction   Last eye exam results: July 2019, negative per patient  Microalbuminuria: negative February 2019 with PCP.   HTN: No  On Statin: Yes  On Aspirin: Yes  Depression: No    Patient Supplied Answers To Diabetic Questionnaire  including 8/16/2019   1. Since your last visit to our clinic (or if this your first visit, since you last saw your primary care provider), have you experienced any of the following symptoms that may be related to low blood sugars? Weakness, Shaking or trembling   2. Since your last visit to our clinic (or if this your first visit, since you last saw your primary care provider), have you experienced any of the following symptoms that may be related to prolonged high blood sugars? No, I have not experienced any of these symptoms   3. Have you had any  concerns that you would like to discuss today? Other   4. Do you have any female family members who have had heart attacks or strokes before age 60 or male relatives who have had heart attacks or strokes before age 50? Yes   5. Do you have any family members who have had complications from diabetes such as kidney disease, heart disease or strokes, retinopathy (a vision problem), or amputations? No   Little interest or pleasure in doing things? Not at all   Feeling down, depressed, or hopeless? Not at all      #2 Left foot ulcer  Five months ago he developed a callus on the ball of his toe.  He put on a corn remover patch and ammonium lactate cream.  He saw a podiatrist who unroofed the callus ad noted an ulcer underneath.  He was placed on Keflex.  He is very concerned as his father had a leg amputation at about his age due to a diabetic foot ulcer.  January 2019 x-ray was done which did not show any definitive evidence of osteomyelitis. Reported May 2019 that the ulcer had healed over but he was continuing to have swelling of his left leg. The skin of his left calf was itchy and blistered. Although he did notice some improvement with topical iodine.     Interval history: His ulcer is healed (08/2019).     #3 Venous stasis dermatitis  The patient has venous stasis dermatitis.  He has lower extremity swelling with associated itching.  He reports blistering on the skin from scratching.     Interval history: He visited Dr. Gray in June 2019 in dermatology. At that time he recommended to increase his Methotrexate to 17.5 mg once a week, Triamcinolone ointment, and compression stockings/elevation. August 2019 he reported completely discontinuing Triamcinolone ointment on his legs as it was only working for his back pain. Reports improvement with Ammonium Lactate.     #4 Generalized muscle fatigue  If he walks half a block then he gets generalized muscle fatigue resulting in him needing to sit. He can climb a flight of  stairs without issues but once he reaches the top he has to sit. While he was in the clinic, he had an issue with getting up out of his chair. Denies chest pain or shortness of breath.     Review of Systems:   Pertinent items are noted in HPI.  All other systems are negative.    Active Medications:       allopurinol (ZYLOPRIM) 300 MG tablet, Take 300 mg by mouth daily, Disp: , Rfl:      ammonium lactate (LAC-HYDRIN) 12 % external lotion, Apply topically 2 times daily Apply twice daily to feet, Disp: 500 g, Rfl: 1     aspirin 81 MG tablet, Take 81 mg by mouth daily., Disp: , Rfl:      Atorvastatin Calcium (LIPITOR PO), Take 40 mg by mouth daily , Disp: , Rfl:      augmented betamethasone dipropionate (DIPROLENE-AF) 0.05 % ointment, Apply topically 2 times daily As needed to itchy areas on back, Disp: 50 g, Rfl: 3     Cholecalciferol (VITAMIN D) 2000 UNITS CAPS, Take 1 tablet by mouth daily, Disp: , Rfl:      clopidogrel (PLAVIX) 75 MG tablet, Take 75 mg by mouth daily, Disp: , Rfl:      COLCHICINE PO, Take 0.6 mg by mouth daily., Disp: , Rfl:      Cyanocobalamin (VITAMIN B12 PO), Take 1 tablet by mouth daily, Disp: , Rfl:      Ginkgo Biloba (GNP GINGKO BILOBA EXTRACT PO), Take  by mouth., Disp: , Rfl:      glimepiride (AMARYL) 2 MG tablet, Take the 1/2 tablet twice daily, Disp: 90 tablet, Rfl: 1     indapamide (LOZOL) 2.5 MG tablet, Take 2.5 mg by mouth every morning., Disp: , Rfl:      irbesartan (AVAPRO) 150 MG tablet, Take 1 tablet (150 mg) by mouth At Bedtime, Disp: 90 tablet, Rfl: 3     metFORMIN (GLUCOPHAGE) 1000 MG tablet, TAKE 1 TABLET TWICE DAILY (WITH MEALS), Disp: 180 tablet, Rfl: 4     methotrexate 2.5 MG tablet, TAKE 7 TABLETS EVERY 7 DAYS, Disp: 28 tablet, Rfl: 4     multivitamin, therapeutic with minerals (MULTI-VITAMIN) TABS, Take 1 tablet by mouth daily., Disp: , Rfl:      potassium chloride SA (K-DUR/KLOR-CON M) 20 MEQ CR tablet, TAKE 1 TABLET THREE TIMES DAILY, Disp: 270 tablet, Rfl: 1      torsemide (DEMADEX) 10 MG tablet, Take 10 mg by mouth daily, Disp: , Rfl:      capsaicin (ZOSTRIX) 0.075 % cream, Apply topically 3 times daily To areas of itch on back.  OK to dilute with cetaphil cream if too burning. (Patient not taking: Reported on 8/16/2019), Disp: 60 g, Rfl: 3     nitroglycerin (NITROSTAT) 0.4 MG SL tablet, Place under the tongue as needed, Disp: , Rfl:      triamcinolone (KENALOG) 0.1 % external ointment, Twice daily to raised rash areas on the arms, legs, body as needed. (Patient not taking: Reported on 8/16/2019), Disp: 454 g, Rfl: 2      Allergies:   Beta adrenergic blockers; Latex; Latex; and Tape [adhesive tape]      Past Medical History:  Diabetes mellitus type 2  Coronary artery disease   Myocardial infarction   Hypertension   Hyperlipidemia   Gout   Renal insufficiency   Osteoarthritis   Vitamin D deficiency   Eczema   Benign prostatic hypertrophy   Erectile dysfunction      Past Surgical History:  Phacoemulsification clear cornea with intraocular lens implantation, right 1/21/13  Coronary artery bypass graft 1984  Cholecystectomy     Family History:   Other: ischemic heart disease       Social History:   The patient was accompanied to the appointment by: wife   Smoking Status: former; 35 years since quitting   Smokeless Tobacco: never    Alcohol Use: yes; one drink per year    PCP: MARCIA MOTA      Physical Exam:   /58   Pulse 93   Wt 96.5 kg (212 lb 12.8 oz)   BMI 28.85 kg/m       Wt Readings from Last 10 Encounters:   08/16/19 96.5 kg (212 lb 12.8 oz)   05/17/19 95.2 kg (209 lb 12.8 oz)   02/15/19 96.1 kg (211 lb 12.8 oz)   06/04/18 92.3 kg (203 lb 7.8 oz)   06/19/17 91.2 kg (201 lb)   01/02/17 93.4 kg (206 lb)   06/07/16 93.8 kg (206 lb 12.8 oz)   02/26/16 97.5 kg (215 lb)   01/22/16 98.5 kg (217 lb 3.2 oz)   08/29/13 96.7 kg (213 lb 3.2 oz)        GENERAL APPEARANCE: Alert and no distress  NECK: No lymphadenopathy appreciated  Thyroid: No obvious nodules palpated    CV: RRR without M/R/G  Lungs: CTA bilaterally  Abdomen: Soft, Nontender, non distended, positive bowel sounds   Neuro: normal reflexes, no focal deficits  Skin: No infection in feet  Mood: Normal   Lymph: neg in neck and supraclavicular area  Musculoskeletal: Noted proximal muscle weakness.  Patient has difficulty standing up from a sitting position       Data:  Lab Results   Component Value Date     01/31/2019    POTASSIUM 3.0 (L) 01/31/2019    CHLORIDE 97 01/31/2019    CO2 30 01/31/2019    ANIONGAP 9 01/31/2019     (H) 01/31/2019    BUN 20 01/31/2019    CR 1.33 (H) 01/31/2019    ADDI 9.2 01/31/2019     Lab Results   Component Value Date    GFRESTIMATED 50 (L) 01/31/2019    GFRESTIMATED 64 08/23/2018    GFRESTIMATED 67 05/17/2018    GFRESTBLACK 58 (L) 01/31/2019    GFRESTBLACK 78 08/23/2018    GFRESTBLACK 81 05/17/2018      Lab Results   Component Value Date    MICROL <5 06/04/2018    UMALCR Unable to calculate due to low value 06/04/2018        Lab Results   Component Value Date    A1C 5.5 06/04/2018    A1C 5.9 06/19/2017    A1C 5.4 06/07/2016    HEMOGLOBINA1 5.4 05/17/2019    HEMOGLOBINA1 8.0 (A) 01/22/2016       Lab Results   Component Value Date    CHOL 105 06/19/2017    CHOL 138 01/22/2016    TRIG 83 06/19/2017    TRIG 200 (H) 01/22/2016    HDL 48 06/19/2017    HDL 50 01/22/2016    LDL 40 06/19/2017    LDL 48 01/22/2016    NHDL 57 06/19/2017    NHDL 88 01/22/2016       Assessment and Plan:  #1 Type 2 diabetes mellitus without complication, without long-term current use of insulin (H)   Well controlled with A1c at 5.4% in May 2019. He is currently cutting his Amaryl in half BID to take 2 mg daily. Therefore,  I gave him a new prescription for glimepiride 1 mg to be taken twice daily.  This will simplify his program.  Continue Metformin 1000 mg twice daily.    Current diabetes program: Glimepiride 1 mg twice daily, metformin at 1000 milligrams twice daily  - Hemoglobin A1c POCT  - glimepiride  (AMARYL) 1 MG tablet  Dispense: 180 tablet; Refill: 1    #2  Left foot callus  Reports it has resolved.     #3 Venous stasis dermatitis  Significant improvement with Ammonium Lactate.     #4 Generalized muscle fatigue  Concern with proximal muscle weakness with difficulty getting up out of a chair. Will have lab work completed today.  If the hormonal evaluation is otherwise unremarkable, I will have the patient follow-up with his primary care provider.  - 25 Hydroxyvitamin D2 and D3  - TSH with free T4 reflex  - CK total  - Testosterone Free and Total  - Basic metabolic panel    ADDENDUM: Creatinine slightly higher at 1.48, CK normal.  TSH normal.     Follow-up: Return in about 6 months (around 2/16/2020).     >50% of 30 minute visit spent in face to face counseling, education and coordination of care related to options for better glycemic control as well as preventing, detecting, and treating hypoglycemia.         Scribe Disclosure:  I, Elena Acevedo, am serving as a scribe to document services personally performed by Trudy Campa MD at this visit, based upon the provider's statements to me. All documentation has been reviewed by the aforementioned provider prior to being entered into the official medical record.     Portions of this medical record were completed by a scribe. UPON MY REVIEW AND AUTHENTICATION BY ELECTRONIC SIGNATURE, this confirms (a) I performed the applicable clinical services, and (b) the record is accurate.

## 2019-08-16 ENCOUNTER — OFFICE VISIT (OUTPATIENT)
Dept: ENDOCRINOLOGY | Facility: CLINIC | Age: 79
End: 2019-08-16
Payer: COMMERCIAL

## 2019-08-16 VITALS
HEART RATE: 93 BPM | BODY MASS INDEX: 28.85 KG/M2 | DIASTOLIC BLOOD PRESSURE: 58 MMHG | SYSTOLIC BLOOD PRESSURE: 108 MMHG | WEIGHT: 212.8 LBS

## 2019-08-16 DIAGNOSIS — E11.9 TYPE 2 DIABETES MELLITUS WITHOUT COMPLICATION, WITHOUT LONG-TERM CURRENT USE OF INSULIN (H): Primary | ICD-10-CM

## 2019-08-16 DIAGNOSIS — E55.9 VITAMIN D DEFICIENCY: ICD-10-CM

## 2019-08-16 DIAGNOSIS — E11.9 TYPE 2 DIABETES MELLITUS WITHOUT COMPLICATION, WITHOUT LONG-TERM CURRENT USE OF INSULIN (H): ICD-10-CM

## 2019-08-16 LAB
ANION GAP SERPL CALCULATED.3IONS-SCNC: 4 MMOL/L (ref 3–14)
BUN SERPL-MCNC: 17 MG/DL (ref 7–30)
CALCIUM SERPL-MCNC: 9 MG/DL (ref 8.5–10.1)
CHLORIDE SERPL-SCNC: 102 MMOL/L (ref 94–109)
CK SERPL-CCNC: 95 U/L (ref 30–300)
CO2 SERPL-SCNC: 32 MMOL/L (ref 20–32)
CREAT SERPL-MCNC: 1.48 MG/DL (ref 0.66–1.25)
GFR SERPL CREATININE-BSD FRML MDRD: 44 ML/MIN/{1.73_M2}
GLUCOSE SERPL-MCNC: 84 MG/DL (ref 70–99)
POTASSIUM SERPL-SCNC: 3.5 MMOL/L (ref 3.4–5.3)
SODIUM SERPL-SCNC: 137 MMOL/L (ref 133–144)
TSH SERPL DL<=0.005 MIU/L-ACNC: 1.85 MU/L (ref 0.4–4)

## 2019-08-16 PROCEDURE — 84270 ASSAY OF SEX HORMONE GLOBUL: CPT | Performed by: INTERNAL MEDICINE

## 2019-08-16 PROCEDURE — 84403 ASSAY OF TOTAL TESTOSTERONE: CPT | Performed by: INTERNAL MEDICINE

## 2019-08-16 PROCEDURE — 82306 VITAMIN D 25 HYDROXY: CPT | Performed by: INTERNAL MEDICINE

## 2019-08-16 RX ORDER — GLIMEPIRIDE 1 MG/1
TABLET ORAL
Qty: 180 TABLET | Refills: 1 | Status: SHIPPED | OUTPATIENT
Start: 2019-08-16 | End: 2020-01-03

## 2019-08-16 ASSESSMENT — PATIENT HEALTH QUESTIONNAIRE - PHQ9: SUM OF ALL RESPONSES TO PHQ QUESTIONS 1-9: 1

## 2019-08-16 ASSESSMENT — PAIN SCALES - GENERAL: PAINLEVEL: NO PAIN (0)

## 2019-08-16 NOTE — LETTER
8/16/2019       RE: Deandre Moore  [address] [facility] [address]     Dear Colleague,    Thank you for referring your patient, Deandre Moore, to the [facility] [place] ENDOCRINOLOGY at [facility]. Please see a copy of my visit note below.    [facility]  Endocrinology  Trudy Campa MD  08/16/2019      Chief Complaint:   Diabetes    History of Present Illness:   Deandre Moore is a 78 year old male with a history of diabetes mellitus type 2, gout, osteoarthritis, coronary artery disease, hypertension and hyperlipidemia who presents for follow up on diabetes.     #1 Type 2 diabetes   Per patient, he was diagnosed with diabetes at age 68.  He as initially on Metformin for many years.  In 2015 Amaryl was added when he was on Prednisone intermittently for itching.  His blood sugars worsened when he was started on Remeron for sleep in December 2015 therefore his Amaryl was increased to 8 mg daily.  This was later decreased to 4 mg due to hypoglycemia. Januvia was cost prohibitive for him. August 2018 Hemoglobin A1c was 5.8%.  May 2019 hemoglobin A1c is 5.4%      Interval history: He is on Metformin 1000 mg twice daily and Amaryl 2 mg daily.     Blood Glucose Monitoring:  Patient currently wearing a ACCU-CHEK 360    # of tests- 37   Average- 120 mg/dL   SD- 38.8 mg/dL   Highest- 193   Lowest- 70     Diabetes monitoring and complications:  CAD: Yes: previous history of myocardial infarction   Last eye exam results: July 2019, negative per patient  Microalbuminuria: negativ e February 2019 with PCP.   HTN: No  On Statin: Yes  On Aspirin: Yes  Depression: No    Patient Supplied Answers To Diabetic Questionnaire  including 8/16/2019   1. Since your last visit to our clinic (or if this your first visit, since you last saw your primary care provider), have you experienced any of the following symptoms that may be related to low blood sugars? Weakness, Shaking or trembling    2. Since your last visit to our clinic (or if this your first visit, since you last saw your primary care provider), have you experienced any of the following symptoms that may be related to prolonged high blood sugars? No, I have not experienced any of these symptoms   3. Have you had any concerns that you would like to discuss today? Other   4. Do you have any female family members who have had heart attacks or strokes before age 60 or male relatives who have had heart attacks or strokes before age 50? Yes   5. Do you have any family members who have had complications from diabetes such as kidney disease, heart disease or strokes, retinopathy (a vision problem), or amputations? No   Little interest or pleasure in doing things? Not at all   Feeling down, depressed, or hopeless? Not at all      #2 Left foot ulcer  Five months ago he developed a callus on the ball of his toe.  He put on a corn remover patch and ammonium lactate cream.  He saw a podiatrist who unroofed the callus ad noted an ulcer underneath.  He was placed on Keflex.  He is very concerned as his father had a leg amputation at about his age due to a diabetic foot ulcer.  January 2019 x-ray was done which did not show any definitive evidence of osteomyelitis. Reported May 2019 that the ulcer had healed over but he was continuing to have swelling of his left leg. The skin of his left calf was itchy and blistered. Although he did notice some improvement with topical iodine.     Interval history: His ulcer is healed (08/2019).     #3 Venous stasis dermatitis  The patient has venous stasis dermatitis.  He has lower extremity swelling with associated itching.  He reports blistering on the skin from scratching.     Interval history: He visited Dr. Gray in June 2019 in dermatology. At that time he recommended to increase his Methotrexate to 17.5 mg once a week, Triamcinolone ointment, and compression stockings/elevation. August 2019 he reported completely  discontinuing Triamcinolone ointment on his legs as it was only working for his back pain. Reports improvement with Ammonium Lactate.     #4 Generalized muscle fatigue  If he walks half a block then he gets generalized muscle fatigue resulting in him needing to sit. He can climb a flight of stairs without issues but once he reaches the top he has to sit. While he was in the clinic, he had an issue with getting up out of his chair. Denies chest pain or shortness of breath.     Review of Systems:   Pertinent items are noted in HPI.  All other systems are negative.    Active Medications:       allopurinol (ZYLOPRIM) 300 MG tablet, Take 300 mg by mouth daily, Disp: , Rfl:      ammonium lactate (LAC-HYDRIN) 12 % external lotion, Apply topically 2 times daily Apply twice daily to feet, Disp: 500 g, Rfl: 1     aspirin 81 MG tablet, Take 81 mg by mouth daily., Disp: , Rfl:      Atorvastatin Calcium (LIPITOR PO), Take 40 mg by mouth daily , Disp: , Rfl:      augmented betamethasone dipropionate (DIPROLENE-AF) 0.05 % ointment, Apply topically 2 times daily As needed to itchy areas on back, Disp: 50 g, Rfl: 3     Cholecalciferol (VITAMIN D) 2000 UNITS CAPS, Take 1 tablet by mouth daily, Disp: , Rfl:      clopidogrel (PLAVIX) 75 MG tablet, Take 75 mg by mouth daily, Disp: , Rfl:      COLCHICINE PO, Take 0.6 mg by mouth daily., Disp: , Rfl:      Cyanocobalamin (VITAMIN B12 PO), Take 1 tablet by mouth daily, Disp: , Rfl:      Ginkgo Biloba (GNP GINGKO BILOBA EXTRACT PO), Take  by mouth., Disp: , Rfl:      glimepiride (AMARYL) 2 MG tablet, Take the 1/2 tablet twice daily, Disp: 90 tablet, Rfl: 1     indapamide (LOZOL) 2.5 MG tablet, Take 2.5 mg by mouth every morning., Disp: , Rfl:      irbesartan (AVAPRO) 150 MG tablet, Take 1 tablet (150 mg) by mouth At Bedtime, Disp: 90 tablet, Rfl: 3     metFORMIN (GLUCOPHAGE) 1000 MG tablet, TAKE 1 TABLET TWICE DAILY (WITH MEALS), Disp: 180 tablet, Rfl: 4     methotrexate 2.5 MG tablet, TAKE  7 TABLETS EVERY 7 DAYS, Disp: 28 tablet, Rfl: 4     multivitamin, therapeutic with minerals (MULTI-VITAMIN) TABS, Take 1 tablet by mouth daily., Disp: , Rfl:      potassium chloride SA (K-DUR/KLOR-CON M) 20 MEQ CR tablet, TAKE 1 TABLET THREE TIMES DAILY, Disp: 270 tablet, Rfl: 1     torsemide (DEMADEX) 10 MG tablet, Take 10 mg by mouth daily, Disp: , Rfl:      capsaicin (ZOSTRIX) 0.075 % cream, Apply topically 3 times daily To areas of itch on back.  OK to dilute with cetaphil cream if too burning. (Patient not taking: Reported on 8/16/2019), Disp: 60 g, Rfl: 3     nitroglycerin (NITROSTAT) 0.4 MG SL tablet, Place under the tongue as needed, Disp: , Rfl:      triamcinolone (KENALOG) 0.1 % external ointment, Twice daily to raised rash areas on the arms, legs, body as needed. (Patient not taking: Reported on 8/16/2019), Disp: 454 g, Rfl: 2      Allergies:   Beta adrenergic blockers; Latex; Latex; and Tape [adhesive tape]      Past Medical History:  Diabetes mellitus type 2  Coronary artery disease   Myocardial infarction   Hypertension   Hyperlipidemia   Gout   Renal insufficiency   Osteoarthritis   Vitamin D deficiency   Eczema   Benign prostatic hypertrophy   Erectile dysfunction      Past Surgical History:  Phacoemulsification clear cornea with intraocular lens implantation, right 1/21/13  Coronary artery bypass graft 1984  Cholecystectomy     Family History:   Other: ischemic heart disease       Social History:   The patient was accompanied to the appointment by: wife   Smoking Status: former; 35 years since quitting   Smokeless Tobacco: never    Alcohol Use: yes; one drink per year    PCP: MARCIA MOTA      Physical Exam:   /58   Pulse 93   Wt 96.5 kg (212 lb 12.8 oz)   BMI 28.85 kg/m        Wt Readings from Last 10 Encounters:   08/16/19 96.5 kg (212 lb 12.8 oz)   05/17/19 95.2 kg (209 lb 12.8 oz)   02/15/19 96.1 kg (211 lb 12.8 oz)   06/04/18 92.3 kg (203 lb 7.8 oz)   06/19/17 91.2 kg (201 lb)    01/02/17 93.4 kg (206 lb)   06/07/16 93.8 kg (206 lb 12.8 oz)   02/26/16 97.5 kg (215 lb)   01/22/16 98.5 kg (217 lb 3.2 oz)   08/29/13 96.7 kg (213 lb 3.2 oz)        GENERAL APPEARANCE: Alert and no distress  NECK: No lymphadenopathy appreciated  Thyroid: No obvious nodules palpated   CV: RRR without M/R/G  Lungs: CTA bilaterally  Abdomen: Soft, Nontender, non distended, positive bowel sounds   Neuro: normal reflexes, no focal deficits  Skin: No infection in feet  Mood: Normal   Lymph: neg in neck and supraclavicular area  Musculoskeletal: Noted proximal muscle weakness.  Patient has difficulty standing up from a sitting position       Data:  Lab Results   Component Value Date     01/31/2019    POTASSIUM 3.0 (L) 01/31/2019    CHLORIDE 97 01/31/2019    CO2 30 01/31/2019    ANIONGAP 9 01/31/2019     (H) 01/31/2019    BUN 20 01/31/2019    CR 1.33 (H) 01/31/2019    ADDI 9.2 01/31/2019     Lab Results   Component Value Date    GFRESTIMATED 50 (L) 01/31/2019    GFRESTIMATED 64 08/23/2018    GFRESTIMATED 67 05/17/2018    GFRESTBLACK 58 (L) 01/31/2019    GFRESTBLACK 78 08/23/2018    GFRESTBLACK 81 05/17/2018      Lab Results   Component Value Date    MICROL <5 06/04/2018    UMALCR Unable to calculate due to low value 06/04/2018        Lab Results   Component Value Date    A1C 5.5 06/04/2018    A1C 5.9 06/19/2017    A1C 5.4 06/07/2016    HEMOGLOBINA1 5.4 05/17/2019    HEMOGLOBINA1 8.0 (A) 01/22/2016       Lab Results   Component Value Date    CHOL 105 06/19/2017    CHOL 138 01/22/2016    TRIG 83 06/19/2017    TRIG 200 (H) 01/22/2016    HDL 48 06/19/2017    HDL 50 01/22/2016    LDL 40 06/19/2017    LDL 48 01/22/2016    NHDL 57 06/19/2017    NHDL 88 01/22/2016       Assessment and Plan:  #1 Type 2 diabetes mellitus without complication, without long-term current use of insulin (H)   Well controlled with A1c at 5.4% in May 2019. He is currently cutting his Amaryl in half BID to take 2 mg daily. Therefore,  I gave  him a new prescription for glimepiride 1 mg to be taken twice daily.  This will simplify his program.  Continue Metformin 1000 mg twice daily.    Current diabetes program: Glimepiride 1 mg twice daily, metformin at 1000 milligrams twice daily  - Hemoglobin A1c POCT  - glimepiride (AMARYL) 1 MG tablet  Dispense: 180 tablet; Refill: 1    #2  Left foot callus  Reports it has resolved.     #3 Venous stasis dermatitis  Significant improvement with Ammonium Lactate.     #4 Generalized muscle fatigue  Concern with proximal muscle weakness with difficulty getting up out of a chair. Will have lab work completed today.  If the hormonal evaluation is otherwise unremarkable, I will have the patient follow-up with his primary care provider.  - 25 Hydroxyvitamin D2 and D3  - TSH with free T4 reflex  - CK total  - Testosterone Free and Total  - Basic metabolic panel    ADDENDUM: Creatinine slightly higher at 1.48, CK normal.  TSH normal.     Follow-up: Return in about 6 months (around 2/16/2020).     >50% of 30 minute visit spent in face to face counseling, education and coordination of care related to options for better glycemic control as well as preventing, detecting, and treating hypoglycemia.         Scribe Disclosure:  I, Elena Acevedo, am serving as a scribe to document services personally performed by Trudy Campa MD at this visit, based upon the provider's statements to me. All documentation has been reviewed by the aforementioned provider prior to being entered into the official medical record.     Portions of this medical record were completed by a scribe. UPON MY REVIEW AND AUTHENTICATION BY ELECTRONIC SIGNATURE, this confirms (a) I performed the applicable clinical services, and (b) the record is accurate.          Again, thank you for allowing me to participate in the care of your patient.      Sincerely,    Trudy Campa MD

## 2019-08-20 LAB
DEPRECATED CALCIDIOL+CALCIFEROL SERPL-MC: <44 UG/L (ref 20–75)
SHBG SERPL-SCNC: 103 NMOL/L (ref 11–80)
TESTOST FREE SERPL-MCNC: 4.74 NG/DL (ref 4.7–24.4)
TESTOST SERPL-MCNC: 533 NG/DL (ref 240–950)
VITAMIN D2 SERPL-MCNC: <5 UG/L
VITAMIN D3 SERPL-MCNC: 39 UG/L

## 2019-08-21 DIAGNOSIS — L30.8 OTHER ECZEMA: ICD-10-CM

## 2019-08-22 ENCOUNTER — TELEPHONE (OUTPATIENT)
Dept: ENDOCRINOLOGY | Facility: CLINIC | Age: 79
End: 2019-08-22

## 2019-08-22 NOTE — LETTER
Patient:  Deandre Moore  :   1940  MRN:     [id number]        Mr.Richard LELAND Moore  [address] [facility] [address]        2019    Dear Deandre    Here are your labs which show a normal vitamin D, thyroid, muscle enzymes, and testosterone. Your kidney function is slightly worse. I would recommend that you speak with your primary provider about further evaluation for your weakness and your higher kidney function as I cannot identify a clear hormonal reason for your symptoms.    If you have any questions, please feel free to contact my nurse at [phone] select option #3 for triage nurse  or  option #1 for scheduling related questions.    Regards    Trudy Campa MD    --    Orders Only on 2019   Component Date Value Ref Range Status     Sodium 2019 137  133 - 144 mmol/L Final     Potassium 2019 3.5  3.4 - 5.3 mmol/L Final     Chloride 2019 102  94 - 109 mmol/L Final     Carbon Dioxide 2019 32  20 - 32 mmol/L Final     Anion Gap 2019 4  3 - 14 mmol/L Final     Glucose 2019 84  70 - 99 mg/dL Final     Urea Nitrogen 2019 17  7 - 30 mg/dL Final     Creatinine 2019 1.48* 0.66 - 1.25 mg/dL Final     GFR Estimate 2019 44* >60 mL/min/[1.73_m2] Final    Comment: Non  GFR Calc  Starting 2018, serum creatinine based estimated GFR (eGFR) will be   calculated using the Chronic Kidney Disease Epidemiology Collaboration   (CKD-EPI) equation.       GFR Estimate If Black 2019 51* >60 mL/min/[1.73_m2] Final    Comment:  GFR Calc  Starting 2018, serum creatinine based estimated GFR (eGFR) will be   calculated using the Chronic Kidney Disease Epidemiology Collaboration   (CKD-EPI) equation.       Calcium 2019 9.0  8.5 - 10.1 mg/dL Final     Testosterone Total 2019 533  240 - 950 ng/dL Final    Comment: This test was developed and its performance characteristics determined by the    [facility],  Special [facility] Laboratory. It has   not been cleared or approved by the FDA. The laboratory is regulated under   CLIA as qualified to perform high-complexity testing. This test is used for   clinical purposes. It should not be regarded as investigational or for   research.       Sex Hormone Binding Globulin 08/16/2019 103* 11 - 80 nmol/L Final     Free Testosterone Calculated 08/16/2019 4.74  4.7 - 24.4 ng/dL Final     CK Total 08/16/2019 95  30 - 300 U/L Final     TSH 08/16/2019 1.85  0.40 - 4.00 mU/L Final     25 OH Vit D2 08/16/2019 <5  ug/L Final     25 OH Vit D3 08/16/2019 39  ug/L Final     25 OH Vit D total 08/16/2019 <44  20 - 75 ug/L Final    Comment: Season, race, dietary intake, and treatment affect the concentration of   25-hydroxy-Vitamin D. Values may decrease during winter months and increase   during summer months. Values 20-29 ug/L may indicate Vitamin D insufficiency   and values <20 ug/L may indicate Vitamin D deficiency.  This test was developed and its performance characteristics determined by the   [facility],  [facility] Laboratory. It has   not been cleared or approved by the FDA. The laboratory is regulated under   CLIA as qualified to perform high-complexity testing. This test is used for   clinical purposes. It should not be regarded as investigational or for   research.

## 2019-08-22 NOTE — TELEPHONE ENCOUNTER
methotrexate 2.5 MG tablet      Last Written Prescription Date:  6/6/19  Last Fill Quantity: 28,   # refills: 4  Last Office Visit : 6/6/19  Future Office visit:  None. Recommended follow up in 4 months from last visit.    Routing refill request to provider for review/approval because:  Drug not on the [facility], P or [facility] refill protocol.

## 2019-08-22 NOTE — TELEPHONE ENCOUNTER
Pt has higher Cr - will have pt follow up Cr with primary provider. Letter sent.     --  Dear Deandre    Here are your labs which show a normal vitamin D, thyroid, muscle enzymes, and testosterone. Your kidney function is slightly worse. I would recommend that you speak with your primary provider about further evaluation for your weakness and your higher kidney function as I cannot identify a clear hormonal reason for your symptoms.    If you have any questions, please feel free to contact my nurse at [phone] select option #3 for triage nurse  or  option #1 for scheduling related questions.    Regards    Trudy Campa MD

## 2019-08-22 NOTE — TELEPHONE ENCOUNTER
"Received refill request for \"methotrexate 17.5mg weekly\" as the resident on call. Reviewed patient's chart and attached communication. Patient last seen 6/6/19 for eczema. RTC is not yet scheduled but was tentatively scheduled for 4 months (ideally 10/2019). Has recent safety labs done 5/2019    After reviewing the medication list and assessment and plan from last visit, the refill request was accepted. Will provide 12 weeks worth to ensure coverage. Will CC pool to set up appt with patient as none is set up yet    CC'ing Dr. Gray as ERICKA Duvall MD  Med/Derm PGY-2  P: [phone]    "

## 2019-08-23 ENCOUNTER — TELEPHONE (OUTPATIENT)
Dept: ENDOCRINOLOGY | Facility: CLINIC | Age: 79
End: 2019-08-23

## 2019-08-23 NOTE — TELEPHONE ENCOUNTER
----- Message from Trudy Campa MD sent at 8/22/2019  4:06 PM CDT -----  Please review letter with pt

## 2019-08-23 NOTE — TELEPHONE ENCOUNTER
Spoke w/ Pt Review of Letter from Dr Campa of 08/22/2019. Pt confirms understanding of lab review and recommendation and is Asking if she feels he is dehydrated. Pt states he has appt with PCP 09/07/2019 for follow up. Ok to leave message on [facility] MyChart. Sent to provider for review.   Jannie Gray RN on 8/23/2019 at 1:39 PM

## 2019-10-04 ENCOUNTER — HEALTH MAINTENANCE LETTER (OUTPATIENT)
Age: 79
End: 2019-10-04

## 2019-12-10 ENCOUNTER — OFFICE VISIT (OUTPATIENT)
Dept: DERMATOLOGY | Facility: CLINIC | Age: 79
End: 2019-12-10
Payer: COMMERCIAL

## 2019-12-10 DIAGNOSIS — L30.9 DERMATITIS: ICD-10-CM

## 2019-12-10 DIAGNOSIS — Z79.899 ENCOUNTER FOR LONG-TERM (CURRENT) USE OF HIGH-RISK MEDICATION: ICD-10-CM

## 2019-12-10 DIAGNOSIS — L30.8 OTHER ECZEMA: ICD-10-CM

## 2019-12-10 DIAGNOSIS — I87.2 VENOUS STASIS DERMATITIS OF LEFT LOWER EXTREMITY: Primary | ICD-10-CM

## 2019-12-10 LAB
BASOPHILS # BLD AUTO: 0.1 10E9/L (ref 0–0.2)
BASOPHILS NFR BLD AUTO: 0.8 %
DIFFERENTIAL METHOD BLD: ABNORMAL
EOSINOPHIL # BLD AUTO: 0.4 10E9/L (ref 0–0.7)
EOSINOPHIL NFR BLD AUTO: 4.4 %
ERYTHROCYTE [DISTWIDTH] IN BLOOD BY AUTOMATED COUNT: 15.4 % (ref 10–15)
HCT VFR BLD AUTO: 36.4 % (ref 40–53)
HGB BLD-MCNC: 12.1 G/DL (ref 13.3–17.7)
IMM GRANULOCYTES # BLD: 0.1 10E9/L (ref 0–0.4)
IMM GRANULOCYTES NFR BLD: 0.6 %
LYMPHOCYTES # BLD AUTO: 1.6 10E9/L (ref 0.8–5.3)
LYMPHOCYTES NFR BLD AUTO: 18.1 %
MCH RBC QN AUTO: 31.6 PG (ref 26.5–33)
MCHC RBC AUTO-ENTMCNC: 33.2 G/DL (ref 31.5–36.5)
MCV RBC AUTO: 95 FL (ref 78–100)
MONOCYTES # BLD AUTO: 0.7 10E9/L (ref 0–1.3)
MONOCYTES NFR BLD AUTO: 8.1 %
NEUTROPHILS # BLD AUTO: 6.1 10E9/L (ref 1.6–8.3)
NEUTROPHILS NFR BLD AUTO: 68 %
NRBC # BLD AUTO: 0 10*3/UL
NRBC BLD AUTO-RTO: 0 /100
PLATELET # BLD AUTO: 229 10E9/L (ref 150–450)
RBC # BLD AUTO: 3.83 10E12/L (ref 4.4–5.9)
WBC # BLD AUTO: 9 10E9/L (ref 4–11)

## 2019-12-10 RX ORDER — TRIAMCINOLONE ACETONIDE 1 MG/G
OINTMENT TOPICAL
Qty: 454 G | Refills: 2 | Status: SHIPPED | OUTPATIENT
Start: 2019-12-10 | End: 2020-03-10

## 2019-12-10 RX ORDER — BETAMETHASONE DIPROPIONATE 0.05 %
OINTMENT (GRAM) TOPICAL
Qty: 50 G | Refills: 1 | Status: SHIPPED | OUTPATIENT
Start: 2019-12-10 | End: 2020-03-10

## 2019-12-10 ASSESSMENT — PAIN SCALES - GENERAL: PAINLEVEL: NO PAIN (0)

## 2019-12-10 NOTE — LETTER
12/10/2019       RE: Deandre Moore  [address] [facility] [address]     Dear Colleague,    Thank you for referring your patient, Deandre Moore, to the [facility] DERMATOLOGY at [facility]. Please see a copy of my visit note below.    CHIEF COMPLAINT:  Followup on atopic dermatitis and itch.      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  He was last seen in our clinic on 6/6/2019, at which time he was doing well but with slowly progressive itch, and the plan was for him to increase methotrexate to 17.5 mg once weekly and continue triamcinolone 0.1% ointment twice daily as needed.  Today he reports that overall he has done well with this increased dose.  He has intermittent itch on his back, but this is controlled with triamcinolone.  However over the past month he has had frequent flares of stasis dermatitis with occasional blistering on his left medial ankle.  Has had to stop wearing compression socks due to irritated skin in this area.  Also he is noticing increased edema at the ankles as his diuretic dose was decreased due to renal insufficiency.     REVIEW OF SYSTEMS:  No recent fevers or chills.  No oral sores.  Otherwise, a 10 point review of systems is negative.      PHYSICAL EXAMINATION:   GENERAL:  This is a well-appearing, well-nourished older gentleman with a normal mood and affect who is oriented x3.   SKIN:  A cutaneous exam of the head, neck, bilateral upper and lower extremities was performed.  On the bilateral lower extremities, predominantly on the shins, there are scattered, hyperpigmented, 2-3 mm papules as well as crusted, 3 mm, eroded papules.  There is also mild lichenification and diffuse hyperpigmentation from the midcalf to the ankle on the left medial leg, with scattered punctate erosions.      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  Overall stable, but with flares of stasis dermatitis.  Today's plan is as follows:   1.   He will continue methotrexate to 17.5 mg once weekly.  He had a creatinine check 2 weeks ago, but is due for CBC with differential.  We will repeat this today.  2.  Previously, he has taken folic acid on the days he does not take methotrexate, but recently this has upset his stomach significantly.  He takes a multivitamin daily, and thus I feel it is reasonable for him to discontinue his folic acid.     3.  He will continue triamcinolone ointment 0.1% twice daily as needed.    4.  I encouraged him to elevate his legs and use compression stockings whenever possible for his chronic venous insufficiency and stasis dermatitis.   5. We gave him a prescription for betamethasone ointment to be applied at bedtime to his left ankle and occluded with saran wrap for stasis derm flares.  5.  He will follow up in our clinic in 4 months' time.         Miguel Gray MD  Dermatology Attending

## 2019-12-10 NOTE — PROGRESS NOTES
CHIEF COMPLAINT:  Followup on atopic dermatitis and itch.      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  He was last seen in our clinic on 6/6/2019, at which time he was doing well but with slowly progressive itch, and the plan was for him to increase methotrexate to 17.5 mg once weekly and continue triamcinolone 0.1% ointment twice daily as needed.  Today he reports that overall he has done well with this increased dose.  He has intermittent itch on his back, but this is controlled with triamcinolone.  However over the past month he has had frequent flares of stasis dermatitis with occasional blistering on his left medial ankle.  Has had to stop wearing compression socks due to irritated skin in this area.  Also he is noticing increased edema at the ankles as his diuretic dose was decreased due to renal insufficiency.     REVIEW OF SYSTEMS:  No recent fevers or chills.  No oral sores.  Otherwise, a 10 point review of systems is negative.      PHYSICAL EXAMINATION:   GENERAL:  This is a well-appearing, well-nourished older gentleman with a normal mood and affect who is oriented x3.   SKIN:  A cutaneous exam of the head, neck, bilateral upper and lower extremities was performed.  On the bilateral lower extremities, predominantly on the shins, there are scattered, hyperpigmented, 2-3 mm papules as well as crusted, 3 mm, eroded papules.  There is also mild lichenification and diffuse hyperpigmentation from the midcalf to the ankle on the left medial leg, with scattered punctate erosions.      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  Overall stable, but with flares of stasis dermatitis.  Today's plan is as follows:   1.   He will  continue methotrexate to 17.5 mg once weekly.  He had a creatinine check 2 weeks ago, but is due for CBC with differential.  We will repeat this today.  2.  Previously, he has taken folic acid on the days he does not take methotrexate, but recently this has upset his stomach significantly.  He takes a multivitamin daily, and thus I feel it is reasonable for him to discontinue his folic acid.     3.  He will continue triamcinolone ointment 0.1% twice daily as needed.    4.  I encouraged him to elevate his legs and use compression stockings whenever possible for his chronic venous insufficiency and stasis dermatitis.   5. We gave him a prescription for betamethasone ointment to be applied at bedtime to his left ankle and occluded with saran wrap for stasis derm flares.  5.  He will follow up in our clinic in 4 months' time.         Miguel Gray MD  Dermatology Attending

## 2019-12-10 NOTE — NURSING NOTE
Dermatology Rooming Note    Deandre Moore's goals for this visit include:   Chief Complaint   Patient presents with     Derm Problem     Atopic dermatitis and macular amyloidosis f/u - Deandre states that he has flare-ups that come and go.     Laura Adam, EMT

## 2020-01-03 ENCOUNTER — OFFICE VISIT (OUTPATIENT)
Dept: ENDOCRINOLOGY | Facility: CLINIC | Age: 80
End: 2020-01-03
Payer: COMMERCIAL

## 2020-01-03 VITALS
SYSTOLIC BLOOD PRESSURE: 119 MMHG | WEIGHT: 217.7 LBS | BODY MASS INDEX: 28.85 KG/M2 | HEART RATE: 79 BPM | DIASTOLIC BLOOD PRESSURE: 61 MMHG | HEIGHT: 73 IN

## 2020-01-03 DIAGNOSIS — K11.7 DISTURBANCE OF SALIVARY SECRETION: ICD-10-CM

## 2020-01-03 DIAGNOSIS — E11.9 TYPE 2 DIABETES MELLITUS WITHOUT COMPLICATION, WITHOUT LONG-TERM CURRENT USE OF INSULIN (H): Primary | ICD-10-CM

## 2020-01-03 LAB — HBA1C MFR BLD: 5.9 % (ref 4.3–6)

## 2020-01-03 RX ORDER — GLIMEPIRIDE 1 MG/1
TABLET ORAL
Qty: 180 TABLET | Refills: 1
Start: 2020-01-03 | End: 2020-01-21

## 2020-01-03 RX ORDER — MUPIROCIN 20 MG/G
OINTMENT TOPICAL 2 TIMES DAILY
Qty: 30 G | Refills: 0 | Status: SHIPPED | OUTPATIENT
Start: 2020-01-03 | End: 2023-01-01

## 2020-01-03 ASSESSMENT — PAIN SCALES - GENERAL: PAINLEVEL: NO PAIN (0)

## 2020-01-03 ASSESSMENT — MIFFLIN-ST. JEOR: SCORE: 1738.42

## 2020-01-03 NOTE — NURSING NOTE
Chief Complaint   Patient presents with     RECHECK     Type 2 Diabetes     Capillary puncture performed for Hemoglobin A1C test. Patient tolerated well.    Evette Holder MA

## 2020-01-03 NOTE — PATIENT INSTRUCTIONS
Pt to take the amaryl (glimepiride) at 1 mg in the AM    Use the bactroban ointment twice daily for 1 week    See ENT about the saliva    Consider chewing gum to reduce the saliva

## 2020-01-03 NOTE — LETTER
1/3/2020       RE: Deandre Moore  [address] [facility] [address]     Dear Colleague,    Thank you for referring your patient, Deandre Moore, to the [facility] ENDOCRINOLOGY at [facility]. Please see a copy of my visit note below.    [facility]  Endocrinology  Trudy Campa MD  01/03/2020      Chief Complaint:   Diabetes    History of Present Illness:   Deandre Moore is a 79 year old male with a history of diabetes mellitus type II, gout, osteoarthritis, coronary artery disease, hypertension and hyperlipidemia who presents for follow up on diabetes.     #1 Type II diabetes  Per patient, he was diagnosed with diabetes at age 68.  He as initially on Metformin for many years.  In 2015 Amaryl was added when he was on Prednisone intermittently for itching.  His blood sugars worsened when he was started on Remeron for sleep in December 2015 therefore his Amaryl was increased to 8 mg daily.  This was later decreased to 4 mg due to hypoglycemia. Januvia was cost prohibitive for him. August 2018 Hemoglobin A1c was 5.8%. May 2019 hemoglobin A1c is 5.4%.  Amaryl was decreased to 1 mg twice daily.    Interval History: Today, hemoglobin A1c is 5.9%.  She notes daily lows in the mornings, as lows in the 60s.  He regularly checks his blood sugars in the morning when he gets up and before bedtime.  His highest blood sugar was 200 and he, on average, runs around 150 before bedtime.  He does not notice any weight change.  He notes occasional tingling in his toes and fingers which goes away quickly.  His recent diabetic eye exam revealed no abnormalities.  However, he has been noticing it is difficult to read small print in the dark.  He remains on metformin 1000 mg twice daily and Amaryl 1 mg twice daily.    Blood Glucose Monitoring:  Patient currently wearing at Accu-Chek 360  Number of tests - 36  Average- 109 mg/dL  SD - 38.8 mg/dL  Highest - 200  Lowest -  60    Diabetes monitoring and complications:  CAD: Yes, hx of MI  Last eye exam results: negative per patient (7/2019)  Microalbuminuria: negative (6/4/2019)  Neuropathy: Some tingling sensation  HTN: No  On Statin: Yes  On Aspirin: Yes  Depression: No    #2 Leg Ulcers  Patient has noticed non-painful ulcers on the medial aspect of his lower left leg which come and go.  They usually resolve within a couple weeks.  Recently, he noticed that 1 ulcer opened and was oozing a close fluid.  He applied an antibiotic tetracycline and noticed that it healed.  He is concerned that it might be an infection.  These ulcers have come and gone this region the past 2 years.  He notes that this is where they took a vein for his bypass surgery.  It is difficult to assess whether this affects his function, as he is nervous about falling due to balance issues already and has increasingly painful soles of his feet.    #3 Increased salivation for the past year  About a year ago, he fell on his face and cut open his lip.  Since that time, he has noticed a slight jaw tremor without any other systemic trauma, accompanied by increased salivation.  This has remained unchanged since it began and he has to actively swallow saliva throughout the day.    Review of Systems:   Pertinent items are noted in HPI.  All other systems are negative.    Active Medications:     Current Outpatient Medications:      allopurinol (ZYLOPRIM) 300 MG tablet, Take 300 mg by mouth daily, Disp: , Rfl:      ammonium lactate (LAC-HYDRIN) 12 % external lotion, Apply topically 2 times daily Apply twice daily to feet, Disp: 500 g, Rfl: 1     aspirin 81 MG tablet, Take 81 mg by mouth daily., Disp: , Rfl:      Atorvastatin Calcium (LIPITOR PO), Take 40 mg by mouth daily , Disp: , Rfl:      augmented betamethasone dipropionate (DIPROLENE-AF) 0.05 % ointment, Apply topically 2 times daily As needed to itchy areas on back, Disp: 50 g, Rfl: 3     betamethasone dipropionate  (DIPROSONE) 0.05 % external ointment, At bedtime apply to ankle and cover with saran wrap (or sock) overnight, wash off in morning, Disp: 50 g, Rfl: 1     blood glucose (NO BRAND SPECIFIED) test strip, Use to test blood sugar 4 times daily  With meter/ strips/ lancets covered by insurance pt using accuchek, Disp: 360 each, Rfl: 3     Blood Glucose Monitoring Suppl (BLOOD GLUCOSE MONITOR SYSTEM) W/DEVICE KIT, Test 4 times daily with meter covered by  insurance, Disp: 1 kit, Rfl: 1     capsaicin (ZOSTRIX) 0.075 % cream, Apply topically 3 times daily To areas of itch on back.  OK to dilute with cetaphil cream if too burning., Disp: 60 g, Rfl: 3     Cholecalciferol (VITAMIN D) 2000 UNITS CAPS, Take 1 tablet by mouth daily, Disp: , Rfl:      clopidogrel (PLAVIX) 75 MG tablet, Take 75 mg by mouth daily, Disp: , Rfl:      COLCHICINE PO, Take 0.6 mg by mouth daily., Disp: , Rfl:      Cyanocobalamin (VITAMIN B12 PO), Take 1 tablet by mouth daily, Disp: , Rfl:      Ginkgo Biloba (GNP GINGKO BILOBA EXTRACT PO), Take  by mouth., Disp: , Rfl:      glimepiride (AMARYL) 1 MG tablet, Take 1 tablet twice daily, Disp: 180 tablet, Rfl: 1     indapamide (LOZOL) 2.5 MG tablet, Take 2.5 mg by mouth every morning., Disp: , Rfl:      irbesartan (AVAPRO) 150 MG tablet, Take 1 tablet (150 mg) by mouth At Bedtime, Disp: 90 tablet, Rfl: 3     metFORMIN (GLUCOPHAGE) 1000 MG tablet, TAKE 1 TABLET TWICE DAILY (WITH MEALS), Disp: 180 tablet, Rfl: 4     methotrexate 2.5 MG tablet, TAKE 7 TABLETS EVERY 7 DAYS, Disp: 70 tablet, Rfl: 2     multivitamin, therapeutic with minerals (MULTI-VITAMIN) TABS, Take 1 tablet by mouth daily., Disp: , Rfl:      nitroglycerin (NITROSTAT) 0.4 MG SL tablet, Place under the tongue as needed, Disp: , Rfl:      potassium chloride SA (K-DUR/KLOR-CON M) 20 MEQ CR tablet, TAKE 1 TABLET THREE TIMES DAILY, Disp: 270 tablet, Rfl: 1     torsemide (DEMADEX) 10 MG tablet, Take 10 mg by mouth daily, Disp: , Rfl:       "triamcinolone (KENALOG) 0.1 % external ointment, Twice daily to raised rash areas on the arms, legs, body as needed., Disp: 454 g, Rfl: 2      Allergies:   Beta adrenergic blockers; Latex; Latex; and Tape [adhesive tape]      Past Medical History:  Atopic dermatitis   Diabetes mellitus, type II  Eczema  Gout   Heart attack  Hyperglycemia  Hypertension  Notalgia paresthetica  Pseudophakia of both eyes    Past Surgical History:  Cardiac surgery, four vessel bypass (1984)  Cholecystectomy  Phacoemulsification with standard intraocular lens implant (1/21/2013)    Family History:   No family history of skin cancer.      Social History:   Tobacco Use: former smoker, quit 1984  Alcohol Use: occasionally, once a year  Drug Use: none   PCP: MARCIA MOTA      Physical Exam:   /61   Pulse 79   Ht 1.842 m (6' 0.5\")   Wt 98.7 kg (217 lb 11.2 oz)   BMI 29.12 kg/m       GENERAL APPEARANCE: Alert and no distress  NECK: No lymphadenopathy appreciated  CV: RRR without M/R/G  Lungs: CTA bilaterally  Skin: No sores on right leg, left leg shows 1 cm healing sore with two 0.5 cm blisters distal, no inflammation or redness  Mood: Normal   Lymph: neg in neck and supraclavicular area  Musculoskeletal: Noted proximal muscle weakness.  Patient has difficulty standing up from a sitting position     Data:  Lab Results   Component Value Date     08/16/2019    POTASSIUM 3.5 08/16/2019    CHLORIDE 102 08/16/2019    CO2 32 08/16/2019    ANIONGAP 4 08/16/2019    GLC 84 08/16/2019    BUN 17 08/16/2019    CR 1.48 (H) 08/16/2019    ADDI 9.0 08/16/2019     Lab Results   Component Value Date    GFRESTIMATED 44 (L) 08/16/2019    GFRESTIMATED 50 (L) 01/31/2019    GFRESTIMATED 64 08/23/2018    GFRESTBLACK 51 (L) 08/16/2019    GFRESTBLACK 58 (L) 01/31/2019    GFRESTBLACK 78 08/23/2018      Lab Results   Component Value Date    MICROL <5 06/04/2018    UMALCR Unable to calculate due to low value 06/04/2018        Lab Results   Component " Value Date    A1C 5.5 06/04/2018    A1C 5.9 06/19/2017    A1C 5.4 06/07/2016    HEMOGLOBINA1 5.4 05/17/2019    HEMOGLOBINA1 8.0 (A) 01/22/2016     No results found for: CPEPT, GADAB, ISCAB    Lab Results   Component Value Date    CHOL 105 06/19/2017    CHOL 138 01/22/2016    TRIG 83 06/19/2017    TRIG 200 (H) 01/22/2016    HDL 48 06/19/2017    HDL 50 01/22/2016    LDL 40 06/19/2017    LDL 48 01/22/2016    NHDL 57 06/19/2017    NHDL 88 01/22/2016       Assessment and Plan:    #1 Type II diabetes  Although hemoglobin A1c is up to 5.9 from May 2019 value of 5.4, this value is still within target range.  What is most concerning is the daily lows he experiences in the morning.  We believe this to be medication related.  We recommended changing the plan for taking Amaryl to 1 mg once daily in the morning from the the current regimen of 1 mg twice daily.  He is to continue on metformin. We will call him in a few weeks to assess his tolerance of this program.     -Amaryl 1 mg daily    #2 Leg Ulcers  It is possible the leg ulcers he has been experiencing bacterial in nature.  We recommend using Bactroban twice daily for 1 week.  We will call within a month to see if this treatment helped resolve his symptoms. If this is isn't helpful, pt to speak with his dermatologist.    -Bactroban cream     #3 Increased salivation  Due to jaw tremor and increased salivation, we recommend he see ENT to evaluate these symptoms.  If the tremor increases or is present in other parts of the body, if his salivation increases further, or if he experiences cognitive decline, a neurology consult may be necessary.  Recommended chewing gum to alleviate his symptoms.    -ENT consult     Follow-up: We will call within a month to assess status of the leg ulcers as well as how he is tolerating the new medication plan.  He is to follow-up in clinic in 6 months for management of diabetes.    >50% of 30 minute visit spent in face to face counseling,  education and coordination of care related to options for better glycemic control as well as preventing, detecting, and treating hypoglycemia.       Scribe Preparation Attestation:  I, Cintia Garza, a scribe, prepared the chart for today's encounter.     Portions of this medical record were completed by a scribe. UPON MY REVIEW AND AUTHENTICATION BY ELECTRONIC SIGNATURE, this confirms (a) I performed the applicable clinical services, and (b) the record is accurate.      I, Ever Gee MS3, scribed for Dr. Trudy Campa. I, Dr. Trudy Campa reviewed, edited the aforementioned note and personally performed the entire clinical encounter documented in this note.    I was present with the medical student who participated in the service and in the documentation of the note.  I have verified the history and personally performed a physical exam and medical decision making.  I agree with assessment and plan of care as documented in the note.  Signed January 3, 2020    Trudy Campa MD, MS    [phone]

## 2020-01-03 NOTE — PROGRESS NOTES
[facility]  Endocrinology  Trudy Campa MD  01/03/2020      Chief Complaint:   Diabetes    History of Present Illness:   Deandre Moore is a 79 year old male with a history of diabetes mellitus type II, gout, osteoarthritis, coronary artery disease, hypertension and hyperlipidemia who presents for follow up on diabetes.     #1 Type II diabetes  Per patient, he was diagnosed with diabetes at age 68.  He as initially on Metformin for many years.  In 2015 Amaryl was added when he was on Prednisone intermittently for itching.  His blood sugars worsened when he was started on Remeron for sleep in December 2015 therefore his Amaryl was increased to 8 mg daily.  This was later decreased to 4 mg due to hypoglycemia. Januvia was cost prohibitive for him. August 2018 Hemoglobin A1c was 5.8%. May 2019 hemoglobin A1c is 5.4%.  Amaryl was decreased to 1 mg twice daily.    Interval History: Today, hemoglobin A1c is 5.9%.  She notes daily lows in the mornings, as lows in the 60s.  He regularly checks his blood sugars in the morning when he gets up and before bedtime.  His highest blood sugar was 200 and he, on average, runs around 150 before bedtime.  He does not notice any weight change.  He notes occasional tingling in his toes and fingers which goes away quickly.  His recent diabetic eye exam revealed no abnormalities.  However, he has been noticing it is difficult to read small print in the dark.  He remains on metformin 1000 mg twice daily and Amaryl 1 mg twice daily.    Blood Glucose Monitoring:  Patient currently wearing at Accu-Chek 360  Number of tests - 36  Average- 109 mg/dL  SD - 38.8 mg/dL  Highest - 200  Lowest - 60    Diabetes monitoring and complications:  CAD: Yes, hx of MI  Last eye exam results: negative per patient (7/2019)  Microalbuminuria: negative (6/4/2019)  Neuropathy: Some tingling sensation  HTN: No  On Statin: Yes  On Aspirin: Yes  Depression: No    #2 Leg Ulcers  Patient has noticed non-painful  ulcers on the medial aspect of his lower left leg which come and go.  They usually resolve within a couple weeks.  Recently, he noticed that 1 ulcer opened and was oozing a close fluid.  He applied an antibiotic tetracycline and noticed that it healed.  He is concerned that it might be an infection.  These ulcers have come and gone this region the past 2 years.  He notes that this is where they took a vein for his bypass surgery.  It is difficult to assess whether this affects his function, as he is nervous about falling due to balance issues already and has increasingly painful soles of his feet.    #3 Increased salivation for the past year  About a year ago, he fell on his face and cut open his lip.  Since that time, he has noticed a slight jaw tremor without any other systemic trauma, accompanied by increased salivation.  This has remained unchanged since it began and he has to actively swallow saliva throughout the day.    Review of Systems:   Pertinent items are noted in HPI.  All other systems are negative.    Active Medications:     Current Outpatient Medications:      allopurinol (ZYLOPRIM) 300 MG tablet, Take 300 mg by mouth daily, Disp: , Rfl:      ammonium lactate (LAC-HYDRIN) 12 % external lotion, Apply topically 2 times daily Apply twice daily to feet, Disp: 500 g, Rfl: 1     aspirin 81 MG tablet, Take 81 mg by mouth daily., Disp: , Rfl:      Atorvastatin Calcium (LIPITOR PO), Take 40 mg by mouth daily , Disp: , Rfl:      augmented betamethasone dipropionate (DIPROLENE-AF) 0.05 % ointment, Apply topically 2 times daily As needed to itchy areas on back, Disp: 50 g, Rfl: 3     betamethasone dipropionate (DIPROSONE) 0.05 % external ointment, At bedtime apply to ankle and cover with saran wrap (or sock) overnight, wash off in morning, Disp: 50 g, Rfl: 1     blood glucose (NO BRAND SPECIFIED) test strip, Use to test blood sugar 4 times daily  With meter/ strips/ lancets covered by insurance pt using  accuchek, Disp: 360 each, Rfl: 3     Blood Glucose Monitoring Suppl (BLOOD GLUCOSE MONITOR SYSTEM) W/DEVICE KIT, Test 4 times daily with meter covered by  insurance, Disp: 1 kit, Rfl: 1     capsaicin (ZOSTRIX) 0.075 % cream, Apply topically 3 times daily To areas of itch on back.  OK to dilute with cetaphil cream if too burning., Disp: 60 g, Rfl: 3     Cholecalciferol (VITAMIN D) 2000 UNITS CAPS, Take 1 tablet by mouth daily, Disp: , Rfl:      clopidogrel (PLAVIX) 75 MG tablet, Take 75 mg by mouth daily, Disp: , Rfl:      COLCHICINE PO, Take 0.6 mg by mouth daily., Disp: , Rfl:      Cyanocobalamin (VITAMIN B12 PO), Take 1 tablet by mouth daily, Disp: , Rfl:      Ginkgo Biloba (GNP GINGKO BILOBA EXTRACT PO), Take  by mouth., Disp: , Rfl:      glimepiride (AMARYL) 1 MG tablet, Take 1 tablet twice daily, Disp: 180 tablet, Rfl: 1     indapamide (LOZOL) 2.5 MG tablet, Take 2.5 mg by mouth every morning., Disp: , Rfl:      irbesartan (AVAPRO) 150 MG tablet, Take 1 tablet (150 mg) by mouth At Bedtime, Disp: 90 tablet, Rfl: 3     metFORMIN (GLUCOPHAGE) 1000 MG tablet, TAKE 1 TABLET TWICE DAILY (WITH MEALS), Disp: 180 tablet, Rfl: 4     methotrexate 2.5 MG tablet, TAKE 7 TABLETS EVERY 7 DAYS, Disp: 70 tablet, Rfl: 2     multivitamin, therapeutic with minerals (MULTI-VITAMIN) TABS, Take 1 tablet by mouth daily., Disp: , Rfl:      nitroglycerin (NITROSTAT) 0.4 MG SL tablet, Place under the tongue as needed, Disp: , Rfl:      potassium chloride SA (K-DUR/KLOR-CON M) 20 MEQ CR tablet, TAKE 1 TABLET THREE TIMES DAILY, Disp: 270 tablet, Rfl: 1     torsemide (DEMADEX) 10 MG tablet, Take 10 mg by mouth daily, Disp: , Rfl:      triamcinolone (KENALOG) 0.1 % external ointment, Twice daily to raised rash areas on the arms, legs, body as needed., Disp: 454 g, Rfl: 2      Allergies:   Beta adrenergic blockers; Latex; Latex; and Tape [adhesive tape]      Past Medical History:  Atopic dermatitis   Diabetes mellitus, type II  Eczema  Gout  "  Heart attack  Hyperglycemia  Hypertension  Notalgia paresthetica  Pseudophakia of both eyes    Past Surgical History:  Cardiac surgery, four vessel bypass (1984)  Cholecystectomy  Phacoemulsification with standard intraocular lens implant (1/21/2013)    Family History:   No family history of skin cancer.      Social History:   Tobacco Use: former smoker, quit 1984  Alcohol Use: occasionally, once a year  Drug Use: none   PCP: MARCIA MOTA      Physical Exam:   /61   Pulse 79   Ht 1.842 m (6' 0.5\")   Wt 98.7 kg (217 lb 11.2 oz)   BMI 29.12 kg/m      GENERAL APPEARANCE: Alert and no distress  NECK: No lymphadenopathy appreciated  CV: RRR without M/R/G  Lungs: CTA bilaterally  Skin: No sores on right leg, left leg shows 1 cm healing sore with two 0.5 cm blisters distal, no inflammation or redness  Mood: Normal   Lymph: neg in neck and supraclavicular area  Musculoskeletal: Noted proximal muscle weakness.  Patient has difficulty standing up from a sitting position     Data:  Lab Results   Component Value Date     08/16/2019    POTASSIUM 3.5 08/16/2019    CHLORIDE 102 08/16/2019    CO2 32 08/16/2019    ANIONGAP 4 08/16/2019    GLC 84 08/16/2019    BUN 17 08/16/2019    CR 1.48 (H) 08/16/2019    ADDI 9.0 08/16/2019     Lab Results   Component Value Date    GFRESTIMATED 44 (L) 08/16/2019    GFRESTIMATED 50 (L) 01/31/2019    GFRESTIMATED 64 08/23/2018    GFRESTBLACK 51 (L) 08/16/2019    GFRESTBLACK 58 (L) 01/31/2019    GFRESTBLACK 78 08/23/2018      Lab Results   Component Value Date    MICROL <5 06/04/2018    UMALCR Unable to calculate due to low value 06/04/2018        Lab Results   Component Value Date    A1C 5.5 06/04/2018    A1C 5.9 06/19/2017    A1C 5.4 06/07/2016    HEMOGLOBINA1 5.4 05/17/2019    HEMOGLOBINA1 8.0 (A) 01/22/2016     No results found for: CPEPT, GADAB, ISCAB    Lab Results   Component Value Date    CHOL 105 06/19/2017    CHOL 138 01/22/2016    TRIG 83 06/19/2017    TRIG 200 (H) " 01/22/2016    HDL 48 06/19/2017    HDL 50 01/22/2016    LDL 40 06/19/2017    LDL 48 01/22/2016    NHDL 57 06/19/2017    NHDL 88 01/22/2016       Assessment and Plan:    #1 Type II diabetes  Although hemoglobin A1c is up to 5.9 from May 2019 value of 5.4, this value is still within target range.  What is most concerning is the daily lows he experiences in the morning.  We believe this to be medication related.  We recommended changing the plan for taking Amaryl to 1 mg once daily in the morning from the the current regimen of 1 mg twice daily.  He is to continue on metformin. We will call him in a few weeks to assess his tolerance of this program.     -Amaryl 1 mg daily    #2 Leg Ulcers  It is possible the leg ulcers he has been experiencing bacterial in nature.  We recommend using Bactroban twice daily for 1 week.  We will call within a month to see if this treatment helped resolve his symptoms. If this is isn't helpful, pt to speak with his dermatologist.    -Bactroban cream     #3 Increased salivation  Due to jaw tremor and increased salivation, we recommend he see ENT to evaluate these symptoms.  If the tremor increases or is present in other parts of the body, if his salivation increases further, or if he experiences cognitive decline, a neurology consult may be necessary.  Recommended chewing gum to alleviate his symptoms.    -ENT consult     Follow-up: We will call within a month to assess status of the leg ulcers as well as how he is tolerating the new medication plan.  He is to follow-up in clinic in 6 months for management of diabetes.    >50% of 30 minute visit spent in face to face counseling, education and coordination of care related to options for better glycemic control as well as preventing, detecting, and treating hypoglycemia.       Scribe Preparation Attestation:  Cintia DIAZ, a scribe, prepared the chart for today's encounter.     Portions of this medical record were completed by a scribe.  UPON MY REVIEW AND AUTHENTICATION BY ELECTRONIC SIGNATURE, this confirms (a) I performed the applicable clinical services, and (b) the record is accurate.      I, Ever Gee MS3, scribed for Dr. Trudy Campa. I, Dr. Trudy Campa reviewed, edited the aforementioned note and personally performed the entire clinical encounter documented in this note.    I was present with the medical student who participated in the service and in the documentation of the note.  I have verified the history and personally performed a physical exam and medical decision making.  I agree with assessment and plan of care as documented in the note.  Signed January 3, 2020    Trudy Campa MD, MS    [phone]

## 2020-01-09 ENCOUNTER — TELEPHONE (OUTPATIENT)
Dept: ENDOCRINOLOGY | Facility: CLINIC | Age: 80
End: 2020-01-09

## 2020-01-09 NOTE — TELEPHONE ENCOUNTER
----- Message from Brenda Mccarty RN sent at 1/8/2020  6:14 PM CST -----  Regarding: call  Please call  to see how  his  leg ulcers are doing Are they healing ?   ----- Message -----  From: Trudy Campa MD  Sent: 1/8/2020  To: [facility]    How are leg ulcers doing?

## 2020-01-14 ENCOUNTER — TELEPHONE (OUTPATIENT)
Dept: ENDOCRINOLOGY | Facility: CLINIC | Age: 80
End: 2020-01-14

## 2020-01-14 NOTE — TELEPHONE ENCOUNTER
Patient is doing well on the glimepiride (AMARYL) states his sugars have been in the 90s and he has not had a low under 80 since starting the medication.

## 2020-01-20 DIAGNOSIS — E11.9 TYPE 2 DIABETES MELLITUS WITHOUT COMPLICATION, WITHOUT LONG-TERM CURRENT USE OF INSULIN (H): ICD-10-CM

## 2020-01-21 RX ORDER — GLIMEPIRIDE 1 MG/1
TABLET ORAL
Qty: 180 TABLET | Refills: 1 | Status: SHIPPED | OUTPATIENT
Start: 2020-01-21 | End: 2020-07-10

## 2020-01-21 NOTE — TELEPHONE ENCOUNTER
Last Clinic Visit: 1/3/20, [place] 7/10/20  Prescription from 1/3/20 from Dr Campa was a no print out, did not reach pharmacy

## 2020-02-10 ENCOUNTER — HEALTH MAINTENANCE LETTER (OUTPATIENT)
Age: 80
End: 2020-02-10

## 2020-02-17 ENCOUNTER — OFFICE VISIT (OUTPATIENT)
Dept: OTOLARYNGOLOGY | Facility: CLINIC | Age: 80
End: 2020-02-17
Attending: INTERNAL MEDICINE
Payer: COMMERCIAL

## 2020-02-17 VITALS — OXYGEN SATURATION: 99 % | SYSTOLIC BLOOD PRESSURE: 122 MMHG | HEART RATE: 78 BPM | DIASTOLIC BLOOD PRESSURE: 72 MMHG

## 2020-02-17 DIAGNOSIS — R49.0 HOARSENESS: ICD-10-CM

## 2020-02-17 DIAGNOSIS — R25.1 TREMOR: Primary | ICD-10-CM

## 2020-02-17 PROCEDURE — 99203 OFFICE O/P NEW LOW 30 MIN: CPT | Mod: 25 | Performed by: OTOLARYNGOLOGY

## 2020-02-17 PROCEDURE — 31575 DIAGNOSTIC LARYNGOSCOPY: CPT | Performed by: OTOLARYNGOLOGY

## 2020-02-17 ASSESSMENT — PAIN SCALES - GENERAL: PAINLEVEL: NO PAIN (0)

## 2020-02-17 NOTE — LETTER
"    2020         RE: Deandre Moore  [address] [facility] [address]        Dear Colleague,    Thank you for referring your patient, Deandre Moore, to the [facility]. Please see a copy of my visit note below.    Otolaryngology Adult Consultation    Patient: Deandre Moore  : 1940        HPI:  Deandre Moore is a 79 year old male seen today in the Otolaryngology Clinic for increased salivation.  According to his primary care physician's note, about a year ago he fell on his face and cut open his lip.  \"Since that time he is noticed a slight jaw tremor without any other systemic trauma.  This is been accompanied by increased salivation and has remained unchanged since it began.  He feels like he is actively swallowing saliva throughout the day.\"      Today the patient reports that his chief complaint is actually the tremor in his lower face/jaw.  This also started around the time of his accident where he fell on his face.  His second complaint is his voice.  He feels like his voice has not been very robust and has had a little bit more difficulty with talking and projection.  He often feels like he gets short of breath when talking.    While he does have increased salivation he is able to swallow his secretions without difficulty he reports.    Medications:  Current Outpatient Rx   Medication Sig Dispense Refill     allopurinol (ZYLOPRIM) 300 MG tablet Take 300 mg by mouth daily       ammonium lactate (LAC-HYDRIN) 12 % external lotion Apply topically 2 times daily Apply twice daily to feet 500 g 1     aspirin 81 MG tablet Take 81 mg by mouth daily.       Atorvastatin Calcium (LIPITOR PO) Take 40 mg by mouth daily        augmented betamethasone dipropionate (DIPROLENE-AF) 0.05 % ointment Apply topically 2 times daily As needed to itchy areas on back 50 g 3     betamethasone dipropionate (DIPROSONE) 0.05 % external ointment At bedtime apply to ankle and " cover with saran wrap (or sock) overnight, wash off in morning 50 g 1     blood glucose (NO BRAND SPECIFIED) test strip Use to test blood sugar 4 times daily  With meter/ strips/ lancets covered by insurance pt using accuchek 360 each 3     Blood Glucose Monitoring Suppl (BLOOD GLUCOSE MONITOR SYSTEM) W/DEVICE KIT Test 4 times daily with meter covered by  insurance 1 kit 1     capsaicin (ZOSTRIX) 0.075 % cream Apply topically 3 times daily To areas of itch on back.  OK to dilute with cetaphil cream if too burning. 60 g 3     Cholecalciferol (VITAMIN D) 2000 UNITS CAPS Take 1 tablet by mouth daily       clopidogrel (PLAVIX) 75 MG tablet Take 75 mg by mouth daily       COLCHICINE PO Take 0.6 mg by mouth daily.       Cyanocobalamin (VITAMIN B12 PO) Take 1 tablet by mouth daily       Ginkgo Biloba (GNP GINGKO BILOBA EXTRACT PO) Take  by mouth.       glimepiride (AMARYL) 1 MG tablet TAKE 1 TABLET TWICE DAILY 180 tablet 1     indapamide (LOZOL) 2.5 MG tablet Take 2.5 mg by mouth every morning.       irbesartan (AVAPRO) 150 MG tablet Take 1 tablet (150 mg) by mouth At Bedtime 90 tablet 3     metFORMIN (GLUCOPHAGE) 1000 MG tablet TAKE 1 TABLET TWICE DAILY (WITH MEALS) 180 tablet 4     methotrexate 2.5 MG tablet TAKE 7 TABLETS EVERY 7 DAYS 70 tablet 2     multivitamin, therapeutic with minerals (MULTI-VITAMIN) TABS Take 1 tablet by mouth daily.       mupirocin (BACTROBAN) 2 % external ointment Apply topically 2 times daily 30 g 0     nitroglycerin (NITROSTAT) 0.4 MG SL tablet Place under the tongue as needed       potassium chloride SA (K-DUR/KLOR-CON M) 20 MEQ CR tablet TAKE 1 TABLET THREE TIMES DAILY 270 tablet 1     torsemide (DEMADEX) 10 MG tablet Take 10 mg by mouth daily       triamcinolone (KENALOG) 0.1 % external ointment Twice daily to raised rash areas on the arms, legs, body as needed. 454 g 2       Allergies: Beta adrenergic blockers; Latex; Latex; and Tape [adhesive tape]     PMH:  Past Medical History:    Diagnosis Date     Diabetes mellitus (H)      Gout attack      Heart attack (H)      Hypertension        PSH:  Past Surgical History:   Procedure Laterality Date     CARDIAC SURGERY      4 vessel bypass     CHOLECYSTECTOMY       COLONOSCOPY       ESOPHAGOSCOPY, GASTROSCOPY, DUODENOSCOPY (EGD), COMBINED N/A 3/1/2018    Procedure: COMBINED ESOPHAGOSCOPY, GASTROSCOPY, DUODENOSCOPY (EGD), BIOPSY SINGLE OR MULTIPLE;  ESOPHAGOGASTRODUODENOSCOPY ;  Surgeon: Daryn Medrano MD;  Location: [facility]     NO HISTORY OF SURGERY  3/31/14    derm     PHACOEMULSIFICATION WITH STANDARD INTRAOCULAR LENS IMPLANT  2013    Procedure: PHACOEMULSIFICATION WITH STANDARD INTRAOCULAR LENS IMPLANT;  Phacoemulsification with standard intraocular lens implant, right eye;  Surgeon: Jay Rodriges MD;  Location: [facility] OR       FH:  Family History   Problem Relation Age of Onset     Cancer No family hx of         no skin cancer     Skin Cancer No family hx of         SH:  Social History     Tobacco Use     Smoking status: Former Smoker     Last attempt to quit: 10/15/1984     Years since quittin.3     Smokeless tobacco: Never Used   Substance Use Topics     Alcohol use: Yes     Comment: occasional-once a year     Drug use: No       Review of Systems  No flowsheet data found.    Physical Exam:    GEN:  The patient is alert, oriented and in no acute distress.  HEAD:  Head, face scalp is grossly normal.  Patient does have a tremor of his chin.  NOSE:  External nose is straight, skin is normal.                Intranasal exam (anterior rhinoscopy) reveals normal moist mucosa, turbinate                  tissue without edema, erythema or crusting.  Septum mainly in the midline.  ORAL:  Oral cavity shows healthy mucosa with out ulceration, masses or other lesions                involving the tongue, palate, buccal mucosa, floor of mouth or gingiva.  He does have slightly increased salivation but there is no pooling of  secretions.  His voice is not wet sounding.  It is on the softer side.  When he sticks out his tongue there does seem to be a little bit of a tremor of his tongue though it is very subtle  PUL: normal work of breathing; no stridor    Laryngoscopy is performed to examine the upper airway for pathology, functional or anatomic abnormality.  After application of topical anesthetic/decongestant solution the flexible endoscope was passed into each nasal cavity separately.  Findings are as follows:   Nasal passages without abnormality.  He does have a deviated septum both to the right and left.  I was able to pass the scope to the right side   Nasopharynx:  Clear, no lesions, crusting or inflammation   Base of tongue, vallecula, epiglottis, aryepiglottic folds, false vocal folds without mucosal lesions, masses or anatomic abnormality.  There is no pooling of secretions.  Once again there is serious to be a slight tremor of the epiglottis and/or tongue base.   Glottis with normal mucosa.  He does have what appears to be some rotation of the larynx.  He has movement of the both vocal folds but his opening of his vocal fold seems to be relatively limited.   Pyriform sinuses, post-cricoid area, and posterior pharyngeal wall without lesions    Assessment/Plan: Patient's main complaint today is the jaw tremor as well as his voice.  I do not believe that the jaw tremor as a result of his injury.  My recommendation would be to have him evaluated for neurology for this tremor.  I am also seeing subtle shaking of the tongue as well as potentially the epiglottis.  Certainly there would be concern for a central nervous system disorder such as ALS.  In terms of his voice I think he would best be served by seeing our laryngologist .  She could better evaluate to see if he has presbylaryngeus, or other vocal fold/phonation issues that could be addressed and managed.    I spent a total of 35 minutes face-to-face with Deandre HA  Oscar during today's office visit.  Over 50% of this time was spent counseling the patient on and/or coordinating care as documented in my assessment and plan.        Again, thank you for allowing me to participate in the care of your patient.        Sincerely,        Jennyfer Goodwin MD

## 2020-02-17 NOTE — PROGRESS NOTES
"Otolaryngology Adult Consultation    Patient: Deandre Moore  : 1940        HPI:  Deandre Moore is a 79 year old male seen today in the Otolaryngology Clinic for increased salivation.  According to his primary care physician's note, about a year ago he fell on his face and cut open his lip.  \"Since that time he is noticed a slight jaw tremor without any other systemic trauma.  This is been accompanied by increased salivation and has remained unchanged since it began.  He feels like he is actively swallowing saliva throughout the day.\"      Today the patient reports that his chief complaint is actually the tremor in his lower face/jaw.  This also started around the time of his accident where he fell on his face.  His second complaint is his voice.  He feels like his voice has not been very robust and has had a little bit more difficulty with talking and projection.  He often feels like he gets short of breath when talking.    While he does have increased salivation he is able to swallow his secretions without difficulty he reports.    Medications:  Current Outpatient Rx   Medication Sig Dispense Refill     allopurinol (ZYLOPRIM) 300 MG tablet Take 300 mg by mouth daily       ammonium lactate (LAC-HYDRIN) 12 % external lotion Apply topically 2 times daily Apply twice daily to feet 500 g 1     aspirin 81 MG tablet Take 81 mg by mouth daily.       Atorvastatin Calcium (LIPITOR PO) Take 40 mg by mouth daily        augmented betamethasone dipropionate (DIPROLENE-AF) 0.05 % ointment Apply topically 2 times daily As needed to itchy areas on back 50 g 3     betamethasone dipropionate (DIPROSONE) 0.05 % external ointment At bedtime apply to ankle and cover with saran wrap (or sock) overnight, wash off in morning 50 g 1     blood glucose (NO BRAND SPECIFIED) test strip Use to test blood sugar 4 times daily  With meter/ strips/ lancets covered by insurance pt using accuchek 360 each 3     Blood Glucose " Monitoring Suppl (BLOOD GLUCOSE MONITOR SYSTEM) W/DEVICE KIT Test 4 times daily with meter covered by  insurance 1 kit 1     capsaicin (ZOSTRIX) 0.075 % cream Apply topically 3 times daily To areas of itch on back.  OK to dilute with cetaphil cream if too burning. 60 g 3     Cholecalciferol (VITAMIN D) 2000 UNITS CAPS Take 1 tablet by mouth daily       clopidogrel (PLAVIX) 75 MG tablet Take 75 mg by mouth daily       COLCHICINE PO Take 0.6 mg by mouth daily.       Cyanocobalamin (VITAMIN B12 PO) Take 1 tablet by mouth daily       Ginkgo Biloba (GNP GINGKO BILOBA EXTRACT PO) Take  by mouth.       glimepiride (AMARYL) 1 MG tablet TAKE 1 TABLET TWICE DAILY 180 tablet 1     indapamide (LOZOL) 2.5 MG tablet Take 2.5 mg by mouth every morning.       irbesartan (AVAPRO) 150 MG tablet Take 1 tablet (150 mg) by mouth At Bedtime 90 tablet 3     metFORMIN (GLUCOPHAGE) 1000 MG tablet TAKE 1 TABLET TWICE DAILY (WITH MEALS) 180 tablet 4     methotrexate 2.5 MG tablet TAKE 7 TABLETS EVERY 7 DAYS 70 tablet 2     multivitamin, therapeutic with minerals (MULTI-VITAMIN) TABS Take 1 tablet by mouth daily.       mupirocin (BACTROBAN) 2 % external ointment Apply topically 2 times daily 30 g 0     nitroglycerin (NITROSTAT) 0.4 MG SL tablet Place under the tongue as needed       potassium chloride SA (K-DUR/KLOR-CON M) 20 MEQ CR tablet TAKE 1 TABLET THREE TIMES DAILY 270 tablet 1     torsemide (DEMADEX) 10 MG tablet Take 10 mg by mouth daily       triamcinolone (KENALOG) 0.1 % external ointment Twice daily to raised rash areas on the arms, legs, body as needed. 454 g 2       Allergies: Beta adrenergic blockers; Latex; Latex; and Tape [adhesive tape]     PMH:  Past Medical History:   Diagnosis Date     Diabetes mellitus (H)      Gout attack      Heart attack (H) 1984     Hypertension        PSH:  Past Surgical History:   Procedure Laterality Date     CARDIAC SURGERY  1984    4 vessel bypass     CHOLECYSTECTOMY       COLONOSCOPY        ESOPHAGOSCOPY, GASTROSCOPY, DUODENOSCOPY (EGD), COMBINED N/A 3/1/2018    Procedure: COMBINED ESOPHAGOSCOPY, GASTROSCOPY, DUODENOSCOPY (EGD), BIOPSY SINGLE OR MULTIPLE;  ESOPHAGOGASTRODUODENOSCOPY ;  Surgeon: Daryn Medrano MD;  Location:  GI     NO HISTORY OF SURGERY  3/31/14    derm     PHACOEMULSIFICATION WITH STANDARD INTRAOCULAR LENS IMPLANT  2013    Procedure: PHACOEMULSIFICATION WITH STANDARD INTRAOCULAR LENS IMPLANT;  Phacoemulsification with standard intraocular lens implant, right eye;  Surgeon: Jay Rodriges MD;  Location: [facility] OR       FH:  Family History   Problem Relation Age of Onset     Cancer No family hx of         no skin cancer     Skin Cancer No family hx of         SH:  Social History     Tobacco Use     Smoking status: Former Smoker     Last attempt to quit: 10/15/1984     Years since quittin.3     Smokeless tobacco: Never Used   Substance Use Topics     Alcohol use: Yes     Comment: occasional-once a year     Drug use: No       Review of Systems  No flowsheet data found.    Physical Exam:    GEN:  The patient is alert, oriented and in no acute distress.  HEAD:  Head, face scalp is grossly normal.  Patient does have a tremor of his chin.  NOSE:  External nose is straight, skin is normal.                Intranasal exam (anterior rhinoscopy) reveals normal moist mucosa, turbinate                  tissue without edema, erythema or crusting.  Septum mainly in the midline.  ORAL:  Oral cavity shows healthy mucosa with out ulceration, masses or other lesions                involving the tongue, palate, buccal mucosa, floor of mouth or gingiva.  He does have slightly increased salivation but there is no pooling of secretions.  His voice is not wet sounding.  It is on the softer side.  When he sticks out his tongue there does seem to be a little bit of a tremor of his tongue though it is very subtle  PUL: normal work of breathing; no stridor    Laryngoscopy is performed to  examine the upper airway for pathology, functional or anatomic abnormality.  After application of topical anesthetic/decongestant solution the flexible endoscope was passed into each nasal cavity separately.  Findings are as follows:   Nasal passages without abnormality.  He does have a deviated septum both to the right and left.  I was able to pass the scope to the right side   Nasopharynx:  Clear, no lesions, crusting or inflammation   Base of tongue, vallecula, epiglottis, aryepiglottic folds, false vocal folds without mucosal lesions, masses or anatomic abnormality.  There is no pooling of secretions.  Once again there is serious to be a slight tremor of the epiglottis and/or tongue base.   Glottis with normal mucosa.  He does have what appears to be some rotation of the larynx.  He has movement of the both vocal folds but his opening of his vocal fold seems to be relatively limited.   Pyriform sinuses, post-cricoid area, and posterior pharyngeal wall without lesions    Assessment/Plan: Patient's main complaint today is the jaw tremor as well as his voice.  I do not believe that the jaw tremor as a result of his injury.  My recommendation would be to have him evaluated for neurology for this tremor.  I am also seeing subtle shaking of the tongue as well as potentially the epiglottis.  Certainly there would be concern for a central nervous system disorder such as ALS.  In terms of his voice I think he would best be served by seeing our laryngologist .  She could better evaluate to see if he has presbylaryngeus, or other vocal fold/phonation issues that could be addressed and managed.    I spent a total of 35 minutes face-to-face with Deandre Moore during today's office visit.  Over 50% of this time was spent counseling the patient on and/or coordinating care as documented in my assessment and plan.

## 2020-02-17 NOTE — NURSING NOTE
Deandre Moore's goals for this visit include:   Chief Complaint   Patient presents with     Consult     Disturbance of salivary secretion     He requests these members of his care team be copied on today's visit information: Yes    PCP: Denilson Thomas    Referring Provider:  Trudy Campa MD  [address] [place] [address] [address]  [place] [address]    /72 (BP Location: Right arm, Patient Position: Sitting, Cuff Size: Adult Large)   Pulse 78   SpO2 99%     Do you need any medication refills at today's visit? No    Mandy Barajas LPN

## 2020-02-26 ENCOUNTER — OFFICE VISIT (OUTPATIENT)
Dept: OTOLARYNGOLOGY | Facility: CLINIC | Age: 80
End: 2020-02-26
Payer: COMMERCIAL

## 2020-02-26 VITALS — OXYGEN SATURATION: 100 % | HEART RATE: 80 BPM

## 2020-02-26 DIAGNOSIS — R09.82 POSTNASAL DRIP: ICD-10-CM

## 2020-02-26 DIAGNOSIS — R13.10 ODYNOPHAGIA: Primary | ICD-10-CM

## 2020-02-26 DIAGNOSIS — R25.1 TREMOR: ICD-10-CM

## 2020-02-26 DIAGNOSIS — R13.10 DYSPHAGIA, UNSPECIFIED TYPE: ICD-10-CM

## 2020-02-26 PROCEDURE — 92612 ENDOSCOPY SWALLOW (FEES) VID: CPT | Mod: GN | Performed by: OTOLARYNGOLOGY

## 2020-02-26 PROCEDURE — 99214 OFFICE O/P EST MOD 30 MIN: CPT | Mod: 25 | Performed by: OTOLARYNGOLOGY

## 2020-02-26 RX ORDER — FLUTICASONE PROPIONATE 50 MCG
1 SPRAY, SUSPENSION (ML) NASAL 2 TIMES DAILY
Qty: 9.9 ML | Refills: 1 | Status: SHIPPED | OUTPATIENT
Start: 2020-02-26 | End: 2020-03-27

## 2020-02-26 ASSESSMENT — PAIN SCALES - GENERAL: PAINLEVEL: NO PAIN (0)

## 2020-02-26 NOTE — PATIENT INSTRUCTIONS
"[phone] - Direct ENT line - please call if your runny nose does not get better    NettiPot twice daily followed by nasal spray (Flonase)     Rinsing out your nose with salt water     Why should I rinse my nose with salt water?   -- Rinsing out your nose with salt water can wash dirt and mucus from your nose. It also helps wash away things that trigger allergies, such as pollen, mold spores, and dust.  Rinsing out your nose with salt water is also called \"nasal irrigation.\"  Your doctor might recommend that you rinse out your nose with salt water when you have:  ?A stuffy or runny nose from a cold or allergies  ?Post-nasal drip - This happens when mucus from your nose drips down the back of your throat.  ?Sinusitis - This condition causes mucus, a stuffy nose, and pain in the face.    How do I make the salt water?   -- To make the salt water solution, follow these steps:  1) Find a clean, 1-quart glass jar with a lid. Fill it with distilled water or tap water that has been boiled and cooled. This is important because a few people have gotten serious infections from using tap water that was not clean. These infections are very rare, but it's better to be absolutely safe.  2) You can buy premixed packets for making the solution at a drug store or you can make your own. To make your own, use 1 teaspoon of baking soda and 1 to 1.5 teaspoons of salt. Use pickling or duane salt, which is very pure and dissolves easily. Do not use regular table salt because it contains other chemicals besides salt.  3) Mix and store at room temperature for up to 1 week. Throw out any salt water that you don't use within a week.    How do I get the solution inside my nose?   -- There are several products you can use to squirt the solution into your nose. These are made for this purpose. Some examples include:  ?A squeeze bottle (sample brand name: Tommie Med Sinus Rinse)  ?A neti pot (sample brand name: [employer] Med NasaFlo Neti Pot) - This " "is a small pot with a long spout, similar to a teapot.   ?A nasal irrigation syringe (sample brand name: Nasaline) - Use a 60 cc (2 ounce) syringe, not a bulb syringe for a baby.  ?A pulsating irrigation device (sample brand names: Grossan HydroPulse, Waterpi"[employer] Sinusense Water Pulsator) - These are battery-powered devices that send a gentle pulse of water into the nose. Make sure to get one with a nasal irrigation tip.  If you are using a syringe, pour the amount of fluid you plan to use into a clean bowl, or pour it directly into the squeeze bottle or neti pot. Do not put your used syringe back into the storage container. If you like, you can warm the solution slightly in the microwave. This might make the rinsing process more comfortable. But be sure that the solution is not hot.  Bend over the sink with your head turned slightly to one side and squirt the solution into the nostril that is higher. You can also do this in the shower. Aim the stream toward the back of your head, not the top of your head. Keep your mouth open. The solution should flow into one nostril and out the other. It's fine if you swallow a small amount. You might feel a little burning sensation the first few times you rinse out your nose. This usually goes away after you get used to it.  Once all the solution has run out of your nose, blow your nose gently. Some salt water might drain out of your nose over the next few minutes if you bend over. If some salt water seems to get trapped up in your sinuses, you can bend forward and look upwards toward one side, as if you are \"looking under the sink.\" Do this looking in one direction, then stand up straight, then in the other direction. When you stand up, some extra salt water might drain out.  Clean your device after each use, either with boiling water or as instructed by the makers of the device. Let it air-dry or dry it with a clean towel.    How often should I rinse out my nose with salt water? "   -- Some people rinse out their nose every day. Others rinse only when they have symptoms. You can safely rinse out your nose a few times per day.  If you use nasal sprays to treat your symptoms, use them after your rinse out your nose.

## 2020-02-26 NOTE — PROGRESS NOTES
Otolaryngology   Laryngology - Voice, Swallow, Breathing      Patient: Deandre Moore    MRN: [id number]    : 1940    Age/Gender: 79 year old male  Date of Service: 2020  Rendering Provider:   Nat Torres MD       Referring Provider   PCP: Denilson Thomas  Referring Physician:  Noelle Goodwin  Reason for Consultation   Excessive salivation  Jaw tremor  History   HISTORY OF PRESENT ILLNESS: I was asked to consult on Deandre Moore, by Dr Noelle Goodwin for evaluation of excessive salivation and jaw tremor. Mr. Moore is a 79 year old male who presents to us today with these complaints for the past year. These started after an accident. He underwent imaging after that fall without any fractures. He reports he now has jaw tremor. He reports it is only limited to his jaw and it is not progressive. He also reports that he has excessive dripping from his face. On questioning him exactly what this means he describes that when he cooks in the evening for his wife and himself, he has excessive drainage from his nose. He denies drooling or pooling of saliva. He denies frequent sinus infections. He denies dysphonia, odynophagia and dysphagia. He denies metallic taste in the back of his throat. He denies changes in his weight.     The patient is here today accompanied by his wife.     PAST MEDICAL HISTORY:   Past Medical History:   Diagnosis Date     Diabetes mellitus (H)      Gout attack      Heart attack (H)      Hypertension          PAST SURGICAL HISTORY:   Past Surgical History:   Procedure Laterality Date     CARDIAC SURGERY      4 vessel bypass     CHOLECYSTECTOMY       COLONOSCOPY       ESOPHAGOSCOPY, GASTROSCOPY, DUODENOSCOPY (EGD), COMBINED N/A 3/1/2018    Procedure: COMBINED ESOPHAGOSCOPY, GASTROSCOPY, DUODENOSCOPY (EGD), BIOPSY SINGLE OR MULTIPLE;  ESOPHAGOGASTRODUODENOSCOPY ;  Surgeon: Daryn Medrano MD;  Location:  [facility]     NO HISTORY OF SURGERY  3/31/14    derm      PHACOEMULSIFICATION WITH STANDARD INTRAOCULAR LENS IMPLANT  1/21/2013    Procedure: PHACOEMULSIFICATION WITH STANDARD INTRAOCULAR LENS IMPLANT;  Phacoemulsification with standard intraocular lens implant, right eye;  Surgeon: Jay Rodriges MD;  Location: [facility] OR         CURRENT MEDICATIONS:   Current Outpatient Medications:      allopurinol (ZYLOPRIM) 300 MG tablet, Take 300 mg by mouth daily, Disp: , Rfl:      ammonium lactate (LAC-HYDRIN) 12 % external lotion, Apply topically 2 times daily Apply twice daily to feet, Disp: 500 g, Rfl: 1     aspirin 81 MG tablet, Take 81 mg by mouth daily., Disp: , Rfl:      Atorvastatin Calcium (LIPITOR PO), Take 40 mg by mouth daily , Disp: , Rfl:      augmented betamethasone dipropionate (DIPROLENE-AF) 0.05 % ointment, Apply topically 2 times daily As needed to itchy areas on back, Disp: 50 g, Rfl: 3     betamethasone dipropionate (DIPROSONE) 0.05 % external ointment, At bedtime apply to ankle and cover with saran wrap (or sock) overnight, wash off in morning, Disp: 50 g, Rfl: 1     blood glucose (NO BRAND SPECIFIED) test strip, Use to test blood sugar 4 times daily  With meter/ strips/ lancets covered by insurance pt using accuchek, Disp: 360 each, Rfl: 3     Blood Glucose Monitoring Suppl (BLOOD GLUCOSE MONITOR SYSTEM) W/DEVICE KIT, Test 4 times daily with meter covered by  insurance, Disp: 1 kit, Rfl: 1     capsaicin (ZOSTRIX) 0.075 % cream, Apply topically 3 times daily To areas of itch on back.  OK to dilute with cetaphil cream if too burning., Disp: 60 g, Rfl: 3     Cholecalciferol (VITAMIN D) 2000 UNITS CAPS, Take 1 tablet by mouth daily, Disp: , Rfl:      clopidogrel (PLAVIX) 75 MG tablet, Take 75 mg by mouth daily, Disp: , Rfl:      COLCHICINE PO, Take 0.6 mg by mouth daily., Disp: , Rfl:      Cyanocobalamin (VITAMIN B12 PO), Take 1 tablet by mouth daily, Disp: , Rfl:      Ginkgo Biloba (GNP GINGKO BILOBA EXTRACT PO), Take  by mouth., Disp: , Rfl:       glimepiride (AMARYL) 1 MG tablet, TAKE 1 TABLET TWICE DAILY, Disp: 180 tablet, Rfl: 1     indapamide (LOZOL) 2.5 MG tablet, Take 2.5 mg by mouth every morning., Disp: , Rfl:      irbesartan (AVAPRO) 150 MG tablet, Take 1 tablet (150 mg) by mouth At Bedtime, Disp: 90 tablet, Rfl: 3     metFORMIN (GLUCOPHAGE) 1000 MG tablet, TAKE 1 TABLET TWICE DAILY (WITH MEALS), Disp: 180 tablet, Rfl: 4     methotrexate 2.5 MG tablet, TAKE 7 TABLETS EVERY 7 DAYS, Disp: 70 tablet, Rfl: 2     multivitamin, therapeutic with minerals (MULTI-VITAMIN) TABS, Take 1 tablet by mouth daily., Disp: , Rfl:      mupirocin (BACTROBAN) 2 % external ointment, Apply topically 2 times daily, Disp: 30 g, Rfl: 0     nitroglycerin (NITROSTAT) 0.4 MG SL tablet, Place under the tongue as needed, Disp: , Rfl:      potassium chloride SA (K-DUR/KLOR-CON M) 20 MEQ CR tablet, TAKE 1 TABLET THREE TIMES DAILY, Disp: 270 tablet, Rfl: 1     torsemide (DEMADEX) 10 MG tablet, Take 10 mg by mouth daily, Disp: , Rfl:      triamcinolone (KENALOG) 0.1 % external ointment, Twice daily to raised rash areas on the arms, legs, body as needed., Disp: 454 g, Rfl: 2      ALLERGIES: Beta adrenergic blockers; Latex; Latex; and Tape [adhesive tape]      SOCIAL HISTORY:    Social History     Socioeconomic History     Marital status:      Spouse name: Not on file     Number of children: Not on file     Years of education: Not on file     Highest education level: Not on file   Occupational History     Not on file   Social Needs     Financial resource strain: Not on file     Food insecurity:     Worry: Not on file     Inability: Not on file     Transportation needs:     Medical: Not on file     Non-medical: Not on file   Tobacco Use     Smoking status: Former Smoker     Last attempt to quit: 10/15/1984     Years since quittin.3     Smokeless tobacco: Never Used   Substance and Sexual Activity     Alcohol use: Yes     Comment: occasional-once a year     Drug use: No      Sexual activity: Not on file   Lifestyle     Physical activity:     Days per week: Not on file     Minutes per session: Not on file     Stress: Not on file   Relationships     Social connections:     Talks on phone: Not on file     Gets together: Not on file     Attends Oriental orthodox service: Not on file     Active member of club or organization: Not on file     Attends meetings of clubs or organizations: Not on file     Relationship status: Not on file     Intimate partner violence:     Fear of current or ex partner: Not on file     Emotionally abused: Not on file     Physically abused: Not on file     Forced sexual activity: Not on file   Other Topics Concern     Parent/sibling w/ CABG, MI or angioplasty before 65F 55M? Not Asked   Social History Narrative     Not on file           FAMILY HISTORY:   Family History   Problem Relation Age of Onset     Cancer No family hx of         no skin cancer     Skin Cancer No family hx of       Non-contributory for problems with anesthesia      REVIEW OF SYSTEMS:   The patient was asked a 14 point review of systems regarding constitutional symptoms, eye symptoms, ears, nose, mouth, throat symptoms, cardiovascular symptoms, respiratory symptoms, gastrointestinal symptoms, genitourinary symptoms, musculoskeletal symptoms, integumentary symptoms, neurological symptoms, psychiatric symptoms, endocrine symptoms, hematologic/lymphatic symptoms, and allergic/ immunologic symptoms.   The pertinent factors have been included in the HPI.    Physical Examination     The patient underwent a physical examination as described below. The pertinent positive and negative findings are summarized after the description of the examination.    Constitutional: The patient's developmental and nutritional status was assessed. The patient's voice quality was assessed.  Head and Face: The head and face were inspected for deformities. The sinuses were palpated. The salivary glands were palpated. Facial  muscle strength was assessed bilaterally.  Eyes: Extraocular movements and primary gaze alignment were assessed.  Ears, Nose, Mouth and Throat: The ears and nose were examined for deformities. The nasal septum, mucosa, and turbinates were inspected by anterior rhinoscopy. The lips, teeth, and gums were examined for abnormalities. The oral mucosa, tongue, palate, tonsils, lateral and posterior pharynx were inspected for the presence of asymmetry or mucosal lesions.    Neck: The tracheal position was noted, and the neck mass palpated to determine if there were any asymmetries, abnormal neck masses, thyromegally, or thyroid nodules.  Respiratory: The nature of the breathing and chest expansion/symmetry was observed.  Cardiovascular: The patient was examined to determine the presence of any edema or jugular venous distension.  Abdomen: The contour of the abdomen was noted.  Lymphatic: The patient was examined for infraclavicular lymphadenopathy.  Musculoskeletal: The patient was inspected for the presence of skeletal deformities.  Extremities: The extremities were examined for any clubbing or cyanosis.  Skin: The skin was examined for inflammatory or neoplastic conditions.  Neurologic: The patient's orientation, mood, and affect were noted. The cranial nerve  functions were examined.    Other pertinent positive and negative findings on physical examination:   each ear: EAC clear, TM intact, no effusion  Anterior rhinoscopy: no lesions - head bent without anterior nasal drip to collect, right septal deviation  OC/OP: no lesions, uvula midline, soft palate elevates symmetrically, FOM/BOT soft, jaw with horizontal tremor, tongue and soft palate with mild tremor, no pooling of saliva, 1+ tonsils  Neck: no lesions, no TH tenderness to palpation  All other physical examination findings were within normal limits and noncontributory.    Procedures   Flexible laryngoscopy (CPT 97188)      Pre-procedure diagnosis:  Post-procedure  diagnosis:  Indication for procedure: Mr. Moore is a 79 year old male with tongue   Procedure(s): Fiberoptic Laryngoscopy    Details of Procedure: After informed consent was obtained, the patient was seated in the examination chair.  The areas of the nasopharynx as well as the hypopharynx were anesthetized with topical 4% lidocaine with 0.25% phenylephrine atomizer.  Examination of the base of tongue was performed first.  Attention was directed to any evidence of masses in the area or evidence of leukoplakia or candidal infection.  Attention was directed to the epiglottis where its size and position was determined and its movement on phonation of the vowel  e .  The piriform sinuses were then inspected for any mass lesions or pooling of secretions.  Attention was then directed to the larynx. The vocal folds were inspected for infection or any areas of leukoplakia, for masses, polypoid degeneration, or hemorrhage.  Having done this, the arytenoids and vocal processes were inspected for erythema or evidence of granuloma formation.  The posterior commissure was then inspected for evidence of inflammatory changes in the mucosa and heaping up of mucosal tissue. The patient was then instructed to say the vowel  e .  Adduction of vocal folds to the midline was observed for any evidence of paresis or paralysis of the larynx or asymmetry in rotation of the larynx to the left or right. The patient was asked to breathe and the degree of abduction was noted bilaterally.  Subglottic view of the larynx was obtained for any additional mass lesions or mucosal changes.  Finally the post cricoid was examined for evidence of pooling of secretions, as well as the pharyngeal wall mucosa.   Anesthesia type: 0.25% phenylephrine    Findings:  Anatomic/physiological deviations: pharyngeal tremor, soft palate tremor, BOT tremor, no VPI, supraglottic hyperfunction, presbylarynx, no pooling of secretions   Right vocal process: No  restriction of mobility   Left vocal process: No restriction of mobility  Glottal gap: Complete glottal closure  Supraglottic structures: Normal  Hypopharynx: Normal     Estimated Blood Loss: minimal  Complications: None  Disposition: Patient tolerated the procedure well        Fiberoptic Endoscopic Evaluation of Swallowing (CPT 72685)  and Interpretation of Swallowing (CPT 86320)    Indications: See above notes for complete history and physical. There is suggestion on history and endoscopic exam of the presence of dysphagia causing medical complaints.  Swallowing evaluation is being performed to assess the presence and degree of dysphagia, and to recommend a safe diet.     Pulmonary Status:  No PNA   Current Diet:              regular                                        thin liquids      Consistency Amounts:  Thin Liquid: sip   Puree: 1 tsp  Solid: cookies            Positioning: upright in a chair  Oral Peripheral Exam: See physical exam section.  Anatomic Notes: See Videostroboscopy report for assessment of anatomy and laryngeal functioning  Pharyngeal secretions prior to administration of liquid or food: No  Oral Phase Abnormal Findings: No abnormal behavior observed  Behavioral Adaptations: No abnormal behavior observed  Pharyngeal Phase Abnormal Findings: no penetration, no aspiration and no residue    Recommended Diet:  regular                                        thin liquids     Review of Relevant Clinical Data   Notes: Noelle Goodwin 2/17/20; Denilson Thomas 2/13/20  Radiology: CT facial bones - 4/24/19  Pathology: none  Procedures: none  Labs:  Lab Results   Component Value Date    TSH 1.85 08/16/2019     Lab Results   Component Value Date     08/16/2019    CO2 32 08/16/2019    BUN 17 08/16/2019     Lab Results   Component Value Date    WBC 9.0 12/10/2019    HGB 12.1 (L) 12/10/2019    HCT 36.4 (L) 12/10/2019    MCV 95 12/10/2019     12/10/2019     No results found for: PT, PTT, INR  No  results found for: CARMEN  No components found for: RHEUMATOIDFACTOR,  RF  No results found for: CRP  No components found for: CKTOT, URICACID  No components found for: C3, C4, DSDNAAB, NDNAABIFA  No results found for: MPOAB    Patient reported Quality of Life (QOL) Measures     Voice Handicap Index - 10  0 = never; 1 = almost never; 2 = sometimes; 3 = almost always; 4 = always  My voice makes it difficult for people to hear me 0   People have difficulty understanding me in a noisy room 0   My voice difficulties restrict personal and social life 0   I feel left out of conversations because of my voice 0   My voice problem causes me to lose income 0   I feel as though I have to strain to produce voice 0   The clarity of my voice is unpredictable 0   My voice problem upsets me 0   My voice makes me feel handicapped 0   People ask  What s wrong with your voice?  0         Total: 0      Eating Assessment Tool - 10  To what extent are the following scenarios problematic for the patient?  0 = No Problem; 4= Severe Problem   My swallowing problem has caused me to lose weight 0   My swallowing problem interferes with my ability to go out for meals  0   Swallowing liquids takes extra effort 0   Swallowing solids takes extra effort 0   Swallowing pills takes extra effort 0   Swallowing is painful 0   The pleasure of eating is affected by my swallowing 0   When I swallow food sticks in my throat 0   I cough when I eat 2   Swallowing is stressful 0         Total: 2     Reflux Symptom Index  Within the last month, how did the following problems affect the patient?  0 = no problem; 5= severe problem  Hoarseness or a problem with your voice 0   Clearing your throat  2   Excess throat mucous or postnasal drip 0   Difficulty swallowing food, liquid or pills 0   Coughing after you ate or after lying down  0   Breathing difficulties or choking episodes 0   Troublesome or annoying cough 1   Sensations of something sticking in your throat  or a lump in your throat  0   Heartburn, chest pain, indigestion, or stomach acid coming up 0         Total: 3    Impression & Plan     IMPRESSION: Mr. Moore is a 79 year old male who is being seen for the followin. Postnasal drip  -  today he reports nasal drip not excessive salivation  - he denies drooling  - he does report that when he cooks he gets drainage from his nose especially on the right   - continue flonase  - the patient also has a history of head trauma, outside scans imported and reviewed - no evidence of fracture - however will review this again with radiology  - no dripping with head bend during evaluation today    2. Tremor  - does have jaw, tongue, pharynx, soft palate tremor  - discussed that it is extensive and therefore cannot be fixed with botox  - do recommend consult with neurology    RETURN VISIT: follow up as needed, or earlier as needed.     Thank you for the kind referral and for allowing me to share in the care of Mr. Moore. If you have any questions, please do not hesitate to contact me.    I spent a total of 25minutes face to face with Deandre Moore during today's office visit. Over 50% of this time was spent counseling the patient and/or coordinating care regarding his tremor and nasal drip.      Nat Torres MD    of Otolaryngology - Head and Neck Surgery   Voice, Airway, and Swallowing Disorders   [facility] at [facility]    [facility]  [address] [place] [address]  Phone: [phone]  Fax: [phone]    [place] [address]  Phone: [phone]  Fax: [phone]     CC: Trudy Campa MD; Noelle Goodwin MD

## 2020-02-26 NOTE — LETTER
2020         RE: Deandre Moore  [address] [facility] [address]        Dear Colleague,    Thank you for referring your patient, Deandre Moore, to the [facility]. Please see a copy of my visit note below.        Otolaryngology   Laryngology - Voice, Swallow, Breathing      Patient: Deandre Moore    MRN: [id number]    : 1940    Age/Gender: 79 year old male  Date of Service: 2020  Rendering Provider:   Nat Torres MD       Referring Provider   PCP: Denilson Thomas  Referring Physician:  Noelle Goodwin  Reason for Consultation   Excessive salivation  Jaw tremor  History   HISTORY OF PRESENT ILLNESS: I was asked to consult on Deandre Moore, by Dr Noelle Goodwin for evaluation of excessive salivation and jaw tremor. Mr. Moore is a 79 year old male who presents to us today with these complaints for the past year. These started after an accident. He underwent imaging after that fall without any fractures. He reports he now has jaw tremor. He reports it is only limited to his jaw and it is not progressive. He also reports that he has excessive dripping from his face. On questioning him exactly what this means he describes that when he cooks in the evening for his wife and himself, he has excessive drainage from his nose. He denies drooling or pooling of saliva. He denies frequent sinus infections. He denies dysphonia, odynophagia and dysphagia. He denies metallic taste in the back of his throat. He denies changes in his weight.     The patient is here today accompanied by his wife.     PAST MEDICAL HISTORY:   Past Medical History:   Diagnosis Date     Diabetes mellitus (H)      Gout attack      Heart attack (H)      Hypertension          PAST SURGICAL HISTORY:   Past Surgical History:   Procedure Laterality Date     CARDIAC SURGERY      4 vessel bypass     CHOLECYSTECTOMY       COLONOSCOPY       ESOPHAGOSCOPY, GASTROSCOPY, DUODENOSCOPY (EGD),  COMBINED N/A 3/1/2018    Procedure: COMBINED ESOPHAGOSCOPY, GASTROSCOPY, DUODENOSCOPY (EGD), BIOPSY SINGLE OR MULTIPLE;  ESOPHAGOGASTRODUODENOSCOPY ;  Surgeon: Daryn Medrano MD;  Location:  GI     NO HISTORY OF SURGERY  3/31/14    derm     PHACOEMULSIFICATION WITH STANDARD INTRAOCULAR LENS IMPLANT  1/21/2013    Procedure: PHACOEMULSIFICATION WITH STANDARD INTRAOCULAR LENS IMPLANT;  Phacoemulsification with standard intraocular lens implant, right eye;  Surgeon: Jay Rodriges MD;  Location: [facility] OR         CURRENT MEDICATIONS:   Current Outpatient Medications:      allopurinol (ZYLOPRIM) 300 MG tablet, Take 300 mg by mouth daily, Disp: , Rfl:      ammonium lactate (LAC-HYDRIN) 12 % external lotion, Apply topically 2 times daily Apply twice daily to feet, Disp: 500 g, Rfl: 1     aspirin 81 MG tablet, Take 81 mg by mouth daily., Disp: , Rfl:      Atorvastatin Calcium (LIPITOR PO), Take 40 mg by mouth daily , Disp: , Rfl:      augmented betamethasone dipropionate (DIPROLENE-AF) 0.05 % ointment, Apply topically 2 times daily As needed to itchy areas on back, Disp: 50 g, Rfl: 3     betamethasone dipropionate (DIPROSONE) 0.05 % external ointment, At bedtime apply to ankle and cover with saran wrap (or sock) overnight, wash off in morning, Disp: 50 g, Rfl: 1     blood glucose (NO BRAND SPECIFIED) test strip, Use to test blood sugar 4 times daily  With meter/ strips/ lancets covered by insurance pt using accuchek, Disp: 360 each, Rfl: 3     Blood Glucose Monitoring Suppl (BLOOD GLUCOSE MONITOR SYSTEM) W/DEVICE KIT, Test 4 times daily with meter covered by  insurance, Disp: 1 kit, Rfl: 1     capsaicin (ZOSTRIX) 0.075 % cream, Apply topically 3 times daily To areas of itch on back.  OK to dilute with cetaphil cream if too burning., Disp: 60 g, Rfl: 3     Cholecalciferol (VITAMIN D) 2000 UNITS CAPS, Take 1 tablet by mouth daily, Disp: , Rfl:      clopidogrel (PLAVIX) 75 MG tablet, Take 75 mg by mouth daily,  Disp: , Rfl:      COLCHICINE PO, Take 0.6 mg by mouth daily., Disp: , Rfl:      Cyanocobalamin (VITAMIN B12 PO), Take 1 tablet by mouth daily, Disp: , Rfl:      Ginkgo Biloba (GNP GINGKO BILOBA EXTRACT PO), Take  by mouth., Disp: , Rfl:      glimepiride (AMARYL) 1 MG tablet, TAKE 1 TABLET TWICE DAILY, Disp: 180 tablet, Rfl: 1     indapamide (LOZOL) 2.5 MG tablet, Take 2.5 mg by mouth every morning., Disp: , Rfl:      irbesartan (AVAPRO) 150 MG tablet, Take 1 tablet (150 mg) by mouth At Bedtime, Disp: 90 tablet, Rfl: 3     metFORMIN (GLUCOPHAGE) 1000 MG tablet, TAKE 1 TABLET TWICE DAILY (WITH MEALS), Disp: 180 tablet, Rfl: 4     methotrexate 2.5 MG tablet, TAKE 7 TABLETS EVERY 7 DAYS, Disp: 70 tablet, Rfl: 2     multivitamin, therapeutic with minerals (MULTI-VITAMIN) TABS, Take 1 tablet by mouth daily., Disp: , Rfl:      mupirocin (BACTROBAN) 2 % external ointment, Apply topically 2 times daily, Disp: 30 g, Rfl: 0     nitroglycerin (NITROSTAT) 0.4 MG SL tablet, Place under the tongue as needed, Disp: , Rfl:      potassium chloride SA (K-DUR/KLOR-CON M) 20 MEQ CR tablet, TAKE 1 TABLET THREE TIMES DAILY, Disp: 270 tablet, Rfl: 1     torsemide (DEMADEX) 10 MG tablet, Take 10 mg by mouth daily, Disp: , Rfl:      triamcinolone (KENALOG) 0.1 % external ointment, Twice daily to raised rash areas on the arms, legs, body as needed., Disp: 454 g, Rfl: 2      ALLERGIES: Beta adrenergic blockers; Latex; Latex; and Tape [adhesive tape]      SOCIAL HISTORY:    Social History     Socioeconomic History     Marital status:      Spouse name: Not on file     Number of children: Not on file     Years of education: Not on file     Highest education level: Not on file   Occupational History     Not on file   Social Needs     Financial resource strain: Not on file     Food insecurity:     Worry: Not on file     Inability: Not on file     Transportation needs:     Medical: Not on file     Non-medical: Not on file   Tobacco Use      Smoking status: Former Smoker     Last attempt to quit: 10/15/1984     Years since quittin.3     Smokeless tobacco: Never Used   Substance and Sexual Activity     Alcohol use: Yes     Comment: occasional-once a year     Drug use: No     Sexual activity: Not on file   Lifestyle     Physical activity:     Days per week: Not on file     Minutes per session: Not on file     Stress: Not on file   Relationships     Social connections:     Talks on phone: Not on file     Gets together: Not on file     Attends [employer] service: Not on file     Active member of club or organization: Not on file     Attends meetings of clubs or organizations: Not on file     Relationship status: Not on file     Intimate partner violence:     Fear of current or ex partner: Not on file     Emotionally abused: Not on file     Physically abused: Not on file     Forced sexual activity: Not on file   Other Topics Concern     Parent/sibling w/ CABG, MI or angioplasty before 65F 55M? Not Asked   Social History Narrative     Not on file           FAMILY HISTORY:   Family History   Problem Relation Age of Onset     Cancer No family hx of         no skin cancer     Skin Cancer No family hx of       Non-contributory for problems with anesthesia      REVIEW OF SYSTEMS:   The patient was asked a 14 point review of systems regarding constitutional symptoms, eye symptoms, ears, nose, mouth, throat symptoms, cardiovascular symptoms, respiratory symptoms, gastrointestinal symptoms, genitourinary symptoms, musculoskeletal symptoms, integumentary symptoms, neurological symptoms, psychiatric symptoms, endocrine symptoms, hematologic/lymphatic symptoms, and allergic/ immunologic symptoms.   The pertinent factors have been included in the HPI.    Physical Examination     The patient underwent a physical examination as described below. The pertinent positive and negative findings are summarized after the description of the examination.    Constitutional: The  patient's developmental and nutritional status was assessed. The patient's voice quality was assessed.  Head and Face: The head and face were inspected for deformities. The sinuses were palpated. The salivary glands were palpated. Facial muscle strength was assessed bilaterally.  Eyes: Extraocular movements and primary gaze alignment were assessed.  Ears, Nose, Mouth and Throat: The ears and nose were examined for deformities. The nasal septum, mucosa, and turbinates were inspected by anterior rhinoscopy. The lips, teeth, and gums were examined for abnormalities. The oral mucosa, tongue, palate, tonsils, lateral and posterior pharynx were inspected for the presence of asymmetry or mucosal lesions.    Neck: The tracheal position was noted, and the neck mass palpated to determine if there were any asymmetries, abnormal neck masses, thyromegally, or thyroid nodules.  Respiratory: The nature of the breathing and chest expansion/symmetry was observed.  Cardiovascular: The patient was examined to determine the presence of any edema or jugular venous distension.  Abdomen: The contour of the abdomen was noted.  Lymphatic: The patient was examined for infraclavicular lymphadenopathy.  Musculoskeletal: The patient was inspected for the presence of skeletal deformities.  Extremities: The extremities were examined for any clubbing or cyanosis.  Skin: The skin was examined for inflammatory or neoplastic conditions.  Neurologic: The patient's orientation, mood, and affect were noted. The cranial nerve  functions were examined.    Other pertinent positive and negative findings on physical examination:   each ear: EAC clear, TM intact, no effusion  Anterior rhinoscopy: no lesions - head bent without anterior nasal drip to collect, right septal deviation  OC/OP: no lesions, uvula midline, soft palate elevates symmetrically, FOM/BOT soft, jaw with horizontal tremor, tongue and soft palate with mild tremor, no pooling of saliva, 1+  tonsils  Neck: no lesions, no TH tenderness to palpation  All other physical examination findings were within normal limits and noncontributory.    Procedures   Flexible laryngoscopy (CPT 25730)      Pre-procedure diagnosis:  Post-procedure diagnosis:  Indication for procedure: Mr. Moore is a 79 year old male with tongue   Procedure(s): Fiberoptic Laryngoscopy    Details of Procedure: After informed consent was obtained, the patient was seated in the examination chair.  The areas of the nasopharynx as well as the hypopharynx were anesthetized with topical 4% lidocaine with 0.25% phenylephrine atomizer.  Examination of the base of tongue was performed first.  Attention was directed to any evidence of masses in the area or evidence of leukoplakia or candidal infection.  Attention was directed to the epiglottis where its size and position was determined and its movement on phonation of the vowel  e .  The piriform sinuses were then inspected for any mass lesions or pooling of secretions.  Attention was then directed to the larynx. The vocal folds were inspected for infection or any areas of leukoplakia, for masses, polypoid degeneration, or hemorrhage.  Having done this, the arytenoids and vocal processes were inspected for erythema or evidence of granuloma formation.  The posterior commissure was then inspected for evidence of inflammatory changes in the mucosa and heaping up of mucosal tissue. The patient was then instructed to say the vowel  e .  Adduction of vocal folds to the midline was observed for any evidence of paresis or paralysis of the larynx or asymmetry in rotation of the larynx to the left or right. The patient was asked to breathe and the degree of abduction was noted bilaterally.  Subglottic view of the larynx was obtained for any additional mass lesions or mucosal changes.  Finally the post cricoid was examined for evidence of pooling of secretions, as well as the pharyngeal wall mucosa.    Anesthesia type: 0.25% phenylephrine    Findings:  Anatomic/physiological deviations: pharyngeal tremor, soft palate tremor, BOT tremor, no VPI, supraglottic hyperfunction, presbylarynx, no pooling of secretions   Right vocal process: No restriction of mobility   Left vocal process: No restriction of mobility  Glottal gap: Complete glottal closure  Supraglottic structures: Normal  Hypopharynx: Normal     Estimated Blood Loss: minimal  Complications: None  Disposition: Patient tolerated the procedure well        Fiberoptic Endoscopic Evaluation of Swallowing (CPT 90420)  and Interpretation of Swallowing (CPT 34759)    Indications: See above notes for complete history and physical. There is suggestion on history and endoscopic exam of the presence of dysphagia causing medical complaints.  Swallowing evaluation is being performed to assess the presence and degree of dysphagia, and to recommend a safe diet.     Pulmonary Status:  No PNA   Current Diet:              regular                                        thin liquids      Consistency Amounts:  Thin Liquid: sip   Puree: 1 tsp  Solid: cookies            Positioning: upright in a chair  Oral Peripheral Exam: See physical exam section.  Anatomic Notes: See Videostroboscopy report for assessment of anatomy and laryngeal functioning  Pharyngeal secretions prior to administration of liquid or food: No  Oral Phase Abnormal Findings: No abnormal behavior observed  Behavioral Adaptations: No abnormal behavior observed  Pharyngeal Phase Abnormal Findings: no penetration, no aspiration and no residue    Recommended Diet:  regular                                        thin liquids     Review of Relevant Clinical Data   Notes: Noelle Goodwin 2/17/20; Denilson Thomas 2/13/20  Radiology: CT facial bones - 4/24/19  Pathology: none  Procedures: none  Labs:  Lab Results   Component Value Date    TSH 1.85 08/16/2019     Lab Results   Component Value Date     08/16/2019    CO2  32 08/16/2019    BUN 17 08/16/2019     Lab Results   Component Value Date    WBC 9.0 12/10/2019    HGB 12.1 (L) 12/10/2019    HCT 36.4 (L) 12/10/2019    MCV 95 12/10/2019     12/10/2019     No results found for: PT, PTT, INR  No results found for: CARMEN  No components found for: RHEUMATOIDFACTOR,  RF  No results found for: CRP  No components found for: CKTOT, URICACID  No components found for: C3, C4, DSDNAAB, NDNAABIFA  No results found for: MPOAB    Patient reported Quality of Life (QOL) Measures     Voice Handicap Index - 10  0 = never; 1 = almost never; 2 = sometimes; 3 = almost always; 4 = always  My voice makes it difficult for people to hear me 0   People have difficulty understanding me in a noisy room 0   My voice difficulties restrict personal and social life 0   I feel left out of conversations because of my voice 0   My voice problem causes me to lose income 0   I feel as though I have to strain to produce voice 0   The clarity of my voice is unpredictable 0   My voice problem upsets me 0   My voice makes me feel handicapped 0   People ask  What s wrong with your voice?  0         Total: 0      Eating Assessment Tool - 10  To what extent are the following scenarios problematic for the patient?  0 = No Problem; 4= Severe Problem   My swallowing problem has caused me to lose weight 0   My swallowing problem interferes with my ability to go out for meals  0   Swallowing liquids takes extra effort 0   Swallowing solids takes extra effort 0   Swallowing pills takes extra effort 0   Swallowing is painful 0   The pleasure of eating is affected by my swallowing 0   When I swallow food sticks in my throat 0   I cough when I eat 2   Swallowing is stressful 0         Total: 2     Reflux Symptom Index  Within the last month, how did the following problems affect the patient?  0 = no problem; 5= severe problem  Hoarseness or a problem with your voice 0   Clearing your throat  2   Excess throat mucous or  postnasal drip 0   Difficulty swallowing food, liquid or pills 0   Coughing after you ate or after lying down  0   Breathing difficulties or choking episodes 0   Troublesome or annoying cough 1   Sensations of something sticking in your throat or a lump in your throat  0   Heartburn, chest pain, indigestion, or stomach acid coming up 0         Total: 3    Impression & Plan     IMPRESSION: Mr. Moore is a 79 year old male who is being seen for the followin. Postnasal drip  -  today he reports nasal drip not excessive salivation  - he denies drooling  - he does report that when he cooks he gets drainage from his nose especially on the right   - continue flonase  - the patient also has a history of head trauma, outside scans imported and reviewed - no evidence of fracture - however will review this again with radiology  - no dripping with head bend during evaluation today    2. Tremor  - does have jaw, tongue, pharynx, soft palate tremor  - discussed that it is extensive and therefore cannot be fixed with botox  - do recommend consult with neurology    RETURN VISIT: follow up as needed, or earlier as needed.     Thank you for the kind referral and for allowing me to share in the care of Mr. Moore. If you have any questions, please do not hesitate to contact me.    I spent a total of 25minutes face to face with Deandre Moore during today's office visit. Over 50% of this time was spent counseling the patient and/or coordinating care regarding his tremor and nasal drip.      Nat Torres MD    of Otolaryngology - Head and Neck Surgery   Voice, Airway, and Swallowing Disorders   [facility] at the [facility] [place] [address]  Phone: [phone]  Fax: [phone]    [place] [address]  Phone: [phone]  Fax: [phone]     CC: Trudy Campa MD;  Noelle Goodwin MD    Again, thank you for allowing me to participate in the care of your patient.        Sincerely,        Nat Torres MD

## 2020-02-26 NOTE — NURSING NOTE
Deandre Moore's goals for this visit include:   Chief Complaint   Patient presents with     Consult     Excessive salivation, lower jaw tremor, difficulty swallowing     He requests these members of his care team be copied on today's visit information: Yes    PCP: Denilson Thomas    Referring Provider:  No referring provider defined for this encounter.    Pulse 80   SpO2 100%     Do you need any medication refills at today's visit? No    Mandy Barajas LPN

## 2020-02-28 ENCOUNTER — MYC MEDICAL ADVICE (OUTPATIENT)
Dept: OTOLARYNGOLOGY | Facility: CLINIC | Age: 80
End: 2020-02-28

## 2020-02-28 DIAGNOSIS — R09.82 POSTNASAL DRIP: Primary | ICD-10-CM

## 2020-02-28 RX ORDER — IPRATROPIUM BROMIDE 21 UG/1
2 SPRAY, METERED NASAL PRN
Qty: 30 ML | Refills: 1 | Status: SHIPPED | OUTPATIENT
Start: 2020-02-28 | End: 2022-05-16

## 2020-03-09 DIAGNOSIS — E11.9 TYPE 2 DIABETES MELLITUS WITHOUT COMPLICATION, WITHOUT LONG-TERM CURRENT USE OF INSULIN (H): ICD-10-CM

## 2020-03-10 ENCOUNTER — OFFICE VISIT (OUTPATIENT)
Dept: DERMATOLOGY | Facility: CLINIC | Age: 80
End: 2020-03-10
Payer: COMMERCIAL

## 2020-03-10 ENCOUNTER — TELEPHONE (OUTPATIENT)
Dept: DERMATOLOGY | Facility: CLINIC | Age: 80
End: 2020-03-10

## 2020-03-10 DIAGNOSIS — L30.9 DERMATITIS: ICD-10-CM

## 2020-03-10 DIAGNOSIS — L30.8 OTHER ECZEMA: ICD-10-CM

## 2020-03-10 DIAGNOSIS — I87.2 VENOUS STASIS DERMATITIS OF LEFT LOWER EXTREMITY: ICD-10-CM

## 2020-03-10 DIAGNOSIS — Z79.899 ENCOUNTER FOR LONG-TERM (CURRENT) USE OF HIGH-RISK MEDICATION: Primary | ICD-10-CM

## 2020-03-10 DIAGNOSIS — Z79.899 ENCOUNTER FOR LONG-TERM (CURRENT) USE OF HIGH-RISK MEDICATION: ICD-10-CM

## 2020-03-10 DIAGNOSIS — L82.0 SEBORRHEIC KERATOSES, INFLAMED: ICD-10-CM

## 2020-03-10 DIAGNOSIS — L20.89 OTHER ATOPIC DERMATITIS: ICD-10-CM

## 2020-03-10 LAB
ALBUMIN SERPL-MCNC: 3.5 G/DL (ref 3.4–5)
ALP SERPL-CCNC: 60 U/L (ref 40–150)
ALT SERPL W P-5'-P-CCNC: 34 U/L (ref 0–70)
ANION GAP SERPL CALCULATED.3IONS-SCNC: 5 MMOL/L (ref 3–14)
AST SERPL W P-5'-P-CCNC: 24 U/L (ref 0–45)
BASOPHILS # BLD AUTO: 0.1 10E9/L (ref 0–0.2)
BASOPHILS NFR BLD AUTO: 0.7 %
BILIRUB SERPL-MCNC: 0.6 MG/DL (ref 0.2–1.3)
BUN SERPL-MCNC: 10 MG/DL (ref 7–30)
CALCIUM SERPL-MCNC: 9.3 MG/DL (ref 8.5–10.1)
CHLORIDE SERPL-SCNC: 104 MMOL/L (ref 94–109)
CO2 SERPL-SCNC: 29 MMOL/L (ref 20–32)
CREAT SERPL-MCNC: 1.14 MG/DL (ref 0.66–1.25)
DIFFERENTIAL METHOD BLD: ABNORMAL
EOSINOPHIL # BLD AUTO: 0.3 10E9/L (ref 0–0.7)
EOSINOPHIL NFR BLD AUTO: 3.8 %
ERYTHROCYTE [DISTWIDTH] IN BLOOD BY AUTOMATED COUNT: 14.9 % (ref 10–15)
GFR SERPL CREATININE-BSD FRML MDRD: 60 ML/MIN/{1.73_M2}
GLUCOSE SERPL-MCNC: 146 MG/DL (ref 70–99)
HCT VFR BLD AUTO: 37.4 % (ref 40–53)
HGB BLD-MCNC: 12.8 G/DL (ref 13.3–17.7)
IMM GRANULOCYTES # BLD: 0 10E9/L (ref 0–0.4)
IMM GRANULOCYTES NFR BLD: 0.4 %
LYMPHOCYTES # BLD AUTO: 1.2 10E9/L (ref 0.8–5.3)
LYMPHOCYTES NFR BLD AUTO: 18 %
MCH RBC QN AUTO: 32.7 PG (ref 26.5–33)
MCHC RBC AUTO-ENTMCNC: 34.2 G/DL (ref 31.5–36.5)
MCV RBC AUTO: 95 FL (ref 78–100)
MONOCYTES # BLD AUTO: 0.8 10E9/L (ref 0–1.3)
MONOCYTES NFR BLD AUTO: 11 %
NEUTROPHILS # BLD AUTO: 4.5 10E9/L (ref 1.6–8.3)
NEUTROPHILS NFR BLD AUTO: 66.1 %
NRBC # BLD AUTO: 0 10*3/UL
NRBC BLD AUTO-RTO: 0 /100
PLATELET # BLD AUTO: 211 10E9/L (ref 150–450)
POTASSIUM SERPL-SCNC: 3.8 MMOL/L (ref 3.4–5.3)
PROT SERPL-MCNC: 7.1 G/DL (ref 6.8–8.8)
RBC # BLD AUTO: 3.92 10E12/L (ref 4.4–5.9)
SODIUM SERPL-SCNC: 138 MMOL/L (ref 133–144)
WBC # BLD AUTO: 6.8 10E9/L (ref 4–11)

## 2020-03-10 RX ORDER — BETAMETHASONE DIPROPIONATE 0.05 %
OINTMENT (GRAM) TOPICAL
Qty: 50 G | Refills: 1 | Status: SHIPPED | OUTPATIENT
Start: 2020-03-10 | End: 2022-05-03

## 2020-03-10 RX ORDER — TRIAMCINOLONE ACETONIDE 1 MG/G
OINTMENT TOPICAL
Qty: 454 G | Refills: 2 | Status: SHIPPED | OUTPATIENT
Start: 2020-03-10 | End: 2020-07-29

## 2020-03-10 NOTE — PROGRESS NOTES
CHIEF COMPLAINT:  Followup on chronic eczema.      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  He was last seen in our clinic on 12/10/2019, at which time his itch and rash were generally stable, though he was having flares of venous stasis dermatitis.  At that point, our plan was to continue methotrexate 17.5 mg once weekly and triamcinolone ointment mixed with Vanicream for flare-up areas.  Today Deandre reports that he is under generally fair control, but continues to have occasional flare ups of rashes on his legs and other areas of itch, such as on his back.  He notices that despite continuing methotrexate 17.5 mg once weekly, if he stops using triamcinolone for 1-2 days, his itch tends to recur.  Today he also notes an irritated, slightly sore spot on his left medial calf, which has been present for several weeks, which he would like treated if possible.  Finally, he is wondering about methotrexate and the risk of coronavirus exposures today and if he should be lowering his dose.  He is generally feeling well and has no symptoms of fever, cough or shortness of breath.      REVIEW OF SYSTEMS:  See HPI.  Otherwise, a 10 point review of systems is negative.      PHYSICAL EXAMINATION:   GENERAL:  This is a well-appearing, well-nourished male with a normal mood and affect who is oriented x3.   SKIN:  A cutaneous exam of the head, neck, bilateral upper and lower extremities and groin was performed.  On the bilateral ankles, there are erythematous, eczematous, scaly plaques with slight weeping accentuated around the medial ankles consistent with stasis dermatitis.  On the shins, there is a pattern of somewhat rippled hyperpigmentation consistent with macular amyloidosis as well as occasional excoriated papules.  On the left medial proximal thigh, there is a keratotic, 3 mm, hyperpigmented, well-circumscribed  papule with features of an inflamed keratosis on dermoscopy.      ASSESSMENT AND PLAN:   1.  Chronic eczematous dermatitis consistent with atopic dermatitis with chronic pruritus.  At this time, we will tentatively have him continue methotrexate 17.5 mg once weekly.  We rechecked his CBC with differential and a complete metabolic panel as safety labs today.  We discussed that the relative risk of immunosuppression with methotrexate with respect to viruses such as the coronavirus is not well defined, though he likely does have a slightly increased risk of severity if he were exposed.  For this reason, we would like to decrease this medication if possible.  Unfortunately, he is notably dependent on methotrexate and triamcinolone to manage his chronic itch.  We ordered a prescription today for him to start dupilumab 600 mg injected for the first dose and then 300 mg every other week thereafter.  If dupilumab is covered by his insurance, we will then have him attempt to reduce his methotrexate by 1-2 pills each week.  As long as his itch and rash do not flare significantly, he may continue tapering his methotrexate as tolerated.  We also refilled his prescriptions for triamcinolone 0.1% ointment to be applied once to twice daily as needed as well as betamethasone ointment for flare ups of stasis dermatitis on the ankles.   2.  Inflamed seborrheic keratosis of the left medial thigh.  After informed verbal consent, this was treated with liquid nitrogen x10 seconds x2 cycles.  The patient tolerated this without complication.   3.  He will follow up in our clinic in 3 months' time.       Miguel Gray MD  Dermatology Attending

## 2020-03-10 NOTE — TELEPHONE ENCOUNTER
PA Initiation    Medication: Dupixent 300MG/2ML syringes - [place] SUBMITTED  Insurance Company: [employer] - Phone [phone] Fax [phone]  Pharmacy Filling the Rx: [facility] - [place], [place] - [address]  Start Date: 3/10/2020

## 2020-03-10 NOTE — PATIENT INSTRUCTIONS
If Dupixent (dupilumab) is covered by insurance,  Once you start taking it, then decrease the methotrexate by one or two pills a week, as long as your itch and rash are not flaring

## 2020-03-10 NOTE — NURSING NOTE
"Dermatology Rooming Note    Deandre Moore's goals for this visit include:   Chief Complaint   Patient presents with     Derm Problem     Dermatitis follow up, Deandre states his back is itchy and still has \" patches \" on his leg.      Mercedes Solorzano LPN  "

## 2020-03-10 NOTE — TELEPHONE ENCOUNTER
Attempted to call pt regarding results, atrovent prescription.  Unable to leave voicemail.  Ching Martel RN

## 2020-03-10 NOTE — LETTER
3/10/2020       RE: Deandre Moore  [address] [facility] [address]     Dear Colleague,    Thank you for referring your patient, Deandre Moore, to the [facility] DERMATOLOGY at [facility]. Please see a copy of my visit note below.    CHIEF COMPLAINT:  Followup on chronic eczema.      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  He was last seen in our clinic on 12/10/2019, at which time his itch and rash were generally stable, though he was having flares of venous stasis dermatitis.  At that point, our plan was to continue methotrexate 17.5 mg once weekly and triamcinolone ointment mixed with Vanicream for flare-up areas.  Today Deandre reports that he is under generally fair control, but continues to have occasional flare ups of rashes on his legs and other areas of itch, such as on his back.  He notices that despite continuing methotrexate 17.5 mg once weekly, if he stops using triamcinolone for 1-2 days, his itch tends to recur.  Today he also notes an irritated, slightly sore spot on his left medial calf, which has been present for several weeks, which he would like treated if possible.  Finally, he is wondering about methotrexate and the risk of coronavirus exposures today and if he should be lowering his dose.  He is generally feeling well and has no symptoms of fever, cough or shortness of breath.      REVIEW OF SYSTEMS:  See HPI.  Otherwise, a 10 point review of systems is negative.      PHYSICAL EXAMINATION:   GENERAL:  This is a well-appearing, well-nourished male with a normal mood and affect who is oriented x3.   SKIN:  A cutaneous exam of the head, neck, bilateral upper and lower extremities and groin was performed.  On the bilateral ankles, there are erythematous, eczematous, scaly plaques with slight weeping accentuated around the medial  ankles consistent with stasis dermatitis.  On the shins, there is a pattern of somewhat rippled hyperpigmentation consistent with macular amyloidosis as well as occasional excoriated papules.  On the left medial proximal thigh, there is a keratotic, 3 mm, hyperpigmented, well-circumscribed papule with features of an inflamed keratosis on dermoscopy.      ASSESSMENT AND PLAN:   1.  Chronic eczematous dermatitis consistent with atopic dermatitis with chronic pruritus.  At this time, we will tentatively have him continue methotrexate 17.5 mg once weekly.  We rechecked his CBC with differential and a complete metabolic panel as safety labs today.  We discussed that the relative risk of immunosuppression with methotrexate with respect to viruses such as the coronavirus is not well defined, though he likely does have a slightly increased risk of severity if he were exposed.  For this reason, we would like to decrease this medication if possible.  Unfortunately, he is notably dependent on methotrexate and triamcinolone to manage his chronic itch.  We ordered a prescription today for him to start dupilumab 600 mg injected for the first dose and then 300 mg every other week thereafter.  If dupilumab is covered by his insurance, we will then have him attempt to reduce his methotrexate by 1-2 pills each week.  As long as his itch and rash do not flare significantly, he may continue tapering his methotrexate as tolerated.  We also refilled his prescriptions for triamcinolone 0.1% ointment to be applied once to twice daily as needed as well as betamethasone ointment for flare ups of stasis dermatitis on the ankles.   2.  Inflamed seborrheic keratosis of the left medial thigh.  After informed verbal consent, this was treated with liquid nitrogen x10 seconds x2 cycles.  The patient tolerated this without complication.   3.  He will follow up in our clinic in 3 months' time.       Miguel Gray MD  Dermatology Attending

## 2020-03-11 NOTE — TELEPHONE ENCOUNTER
PRIOR AUTHORIZATION DENIED    Medication: Dupixent 300MG/2ML syringes - [place] DENIED    Denial Date: 3/10/2020    Denial Rational: NON-Formulary medication    Appeal Information: See denial letter below.

## 2020-03-15 PROBLEM — L82.0 SEBORRHEIC KERATOSES, INFLAMED: Status: ACTIVE | Noted: 2020-03-15

## 2020-03-16 NOTE — TELEPHONE ENCOUNTER
METFORMIN HYDROCHLORIDE 1000 MG Tablet       Last Written Prescription Date:  2.15.19  Last Fill Quantity: 180,   # refills: 4  Last Office Visit : 1/3/20  Future Office visit:  7/10/20    Routing refill request to provider for review/approval because:  Over due for microalbumin  Last LDL outside lab care every where 5/16/19  Last GFR  Lab Test 03/10/20  1320   GFRESTIMATED 60*   GFRESTBLACK 69

## 2020-03-18 NOTE — TELEPHONE ENCOUNTER
Phone call to pt, discussed CT results and Dr Torres's recommendations to try atrovent nasal spray.  Reviewed neurology appointment scheduled for 4/1 with Dr Bruno. Pt verbalized understanding, will call back if further questions or concerns arise.  Ching Martel RN

## 2020-03-25 ENCOUNTER — TELEPHONE (OUTPATIENT)
Dept: NEUROLOGY | Facility: CLINIC | Age: 80
End: 2020-03-25

## 2020-03-25 NOTE — TELEPHONE ENCOUNTER
I called patient to reschedule New Patient appointment to 7/1/2020 pt will need a physical exam in person per Dr. Bruno.    Doris Saldaña LPN

## 2020-04-17 DIAGNOSIS — L20.89 OTHER ATOPIC DERMATITIS: ICD-10-CM

## 2020-06-02 ENCOUNTER — TELEPHONE (OUTPATIENT)
Dept: DERMATOLOGY | Facility: CLINIC | Age: 80
End: 2020-06-02

## 2020-06-02 DIAGNOSIS — L20.89 OTHER ATOPIC DERMATITIS: ICD-10-CM

## 2020-06-02 DIAGNOSIS — L30.9 DERMATITIS: ICD-10-CM

## 2020-06-02 DIAGNOSIS — L30.8 OTHER ECZEMA: Primary | ICD-10-CM

## 2020-06-02 NOTE — TELEPHONE ENCOUNTER
[facility] Call Center    Phone Message    May a detailed message be left on voicemail: yes     Reason for Call: Medication Question or concern regarding medication   Prescription Clarification  Name of Medication: Dupilumab (DUPIXENT) 300 MG/2ML syringe   Prescribing Provider: Dr Gray   Ph[facility]acy: [facility] [address]   What on the order needs clarification? Pharmacy needing to know if Pt would need a loading dose and if so would need a new prescription or if he was getting prescription from another Pharmacy  [phone] Ext [phone]            Action Taken: Message routed to:  [facility] derm    Travel Screening: Not Applicable

## 2020-06-02 NOTE — TELEPHONE ENCOUNTER
Received request for Dupixent loading dose as original order did not include loading dose. New order for loading dose approved.

## 2020-06-02 NOTE — TELEPHONE ENCOUNTER
Per Deandre, he has not started the Dupixent.  Loading dose is needed.  Routing to [facility] to send loading dose.

## 2020-06-05 ENCOUNTER — TELEPHONE (OUTPATIENT)
Dept: DERMATOLOGY | Facility: CLINIC | Age: 80
End: 2020-06-05

## 2020-06-05 NOTE — TELEPHONE ENCOUNTER
[facility] Call Center    Phone Message    May a detailed message be left on voicemail: yes     Reason for Call: Other: Pt requesting a call from care team to discuss a training session in how to administer the dupixent medication. Please advise.    Action Taken: Message routed to:  [facility] Derm    Travel Screening: Not Applicable

## 2020-06-05 NOTE — TELEPHONE ENCOUNTER
[facility] Call Center    Phone Message    May a detailed message be left on voicemail: yes     Reason for Call: Other: Mandy from [employer] was called by Pt to teach Pt's wife how to use Dupixent (Dupilumab (DUPIXENT) 300 MG/2ML syringe ) she wanted a call back also to discuss how to get this done  Thank you,    Action Taken: Message routed to:  [facility] derm    Travel Screening: Not Applicable

## 2020-06-05 NOTE — TELEPHONE ENCOUNTER
Contacted patient to arrange virtual visit for injection training. Unfortunately patient does not know how to use his computer and does not have someone who can help him. Will discuss with provider if patient can be seen in clinic for this.

## 2020-06-10 ENCOUNTER — ALLIED HEALTH/NURSE VISIT (OUTPATIENT)
Dept: DERMATOLOGY | Facility: CLINIC | Age: 80
End: 2020-06-10
Payer: COMMERCIAL

## 2020-06-10 DIAGNOSIS — Z71.9 HEALTH EDUCATION: Primary | ICD-10-CM

## 2020-06-10 NOTE — PROGRESS NOTES
Deandre Moore comes into clinic today at the request of Dr. Gray Ordering Provider for Dupixent Injection Training.    This service provided today was under the supervising provider of the day Dr. Ojeda, who was available if needed.    Indigo Clark RN

## 2020-06-10 NOTE — PROGRESS NOTES
Relevant Diagnosis:  Dermatitis    Teaching Topic:  Self injection of Dupixent    Person(s) involved in teaching:  Patient and Spouse    Motivation Level:   Asks Questions:   Yes  Eager to Learn:  Yes  Cooperative:  Yes  Receptive (willing/able to accept information):  Yes    Patient and spouse demonstrates understanding of the following:  Reason for the appointment, diagnosis and treatment plan:  Yes  Knowledge of proper use of medications and conditions for which they are ordered (with special attention to potential side effects or drug interactions):  Yes  Which situations necessitate calling provider and whom to contact:  Yes (signs of infection, temp > 101, recurrent bouts of illness or infection or if patient has been put on an antibiotic)    Teaching Concerns:  No.  Patient completed self injection of Dupixent using good technique with minimal coaching using his own supply of medication.     Proper use and care of Medication:  Yes, Syringes:  Yes and Sharps container disposal:  Yes  Nutritional needs and diet plan:  N/A  Pain management techniques:  N/A  Patient instructed on hand hygiene:  Yes  How and/when to access community resources:  Yes      Infection Prevention:  Patient and spouse demonstrates understanding of the following:  Signs and symptoms of infection taught:  Yes      Instructional Materials Used/Given: Dupixent patient starter kit      Time spent teaching with patient:  Spent 35 minutes with patient teaching proper technique and observation of patient performing self injection. Answered all of the patient s questions to his satisfaction.

## 2020-06-17 ENCOUNTER — TELEPHONE (OUTPATIENT)
Dept: DERMATOLOGY | Facility: CLINIC | Age: 80
End: 2020-06-17

## 2020-06-25 ENCOUNTER — OFFICE VISIT (OUTPATIENT)
Dept: DERMATOLOGY | Facility: CLINIC | Age: 80
End: 2020-06-25
Payer: COMMERCIAL

## 2020-06-25 ENCOUNTER — TELEPHONE (OUTPATIENT)
Dept: NEUROLOGY | Facility: CLINIC | Age: 80
End: 2020-06-25

## 2020-06-25 DIAGNOSIS — L20.89 OTHER ATOPIC DERMATITIS: ICD-10-CM

## 2020-06-25 DIAGNOSIS — Z79.899 ENCOUNTER FOR LONG-TERM CURRENT USE OF HIGH RISK MEDICATION: Primary | ICD-10-CM

## 2020-06-25 ASSESSMENT — PAIN SCALES - GENERAL: PAINLEVEL: NO PAIN (0)

## 2020-06-25 NOTE — TELEPHONE ENCOUNTER
I called patient and LM to CB and R/S to video appt-telephone appt not an option for tremor pt.    Doris Saldaña LPN

## 2020-06-25 NOTE — PROGRESS NOTES
Allergy Rooming Note    Deandre Moore's goals for this visit include:   Chief Complaint   Patient presents with     Derm Problem     Deandre is here for a medication follow up.      Teri Mart LPN

## 2020-06-25 NOTE — LETTER
6/25/2020       RE: Deandre Moore  [address] [facility] [address]     Dear Colleague,    Thank you for referring your patient, Deandre Moore, to the [facility] DERMATOLOGY at [facility]. Please see a copy of my visit note below.    Allergy Rooming Note    Deandre Moore's goals for this visit include:   Chief Complaint   Patient presents with     Derm Problem     Deandre is here for a medication follow up.      Teri Mart LPN      CHIEF COMPLAINT:  Followup on chronic eczema.      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  He was last seen in our clinic on 3/10/20, at which time our plan was to start Dupixent and stop methotrexate.      He received his first injections of Dupixent on 6/10/20, and his second yesterday 6/24, and has now discontinued methotrexate for several weeks.  He reports his itch and rashes are stable over the past few weeks.  He thinks Dupixent may be starting to help with itching, and he has not noted any significant flares.  Ongoing skin textural changes and papules involving the back and legs.  He continues to use triam mixed with moisturizer for breakthrough itch on the legs.  No recent fevers or cough.     REVIEW OF SYSTEMS:  See HPI.  Otherwise, a 10 point review of systems is negative.      PHYSICAL EXAMINATION:   GENERAL:  This is a well-appearing, well-nourished male with a normal mood and affect who is oriented x3.   SKIN:  A cutaneous exam of the head, neck, bilateral upper and lower extremities and groin was performed.  On the bilateral ankles, there are erythematous, eczematous, scaly plaques around the medial ankles consistent with stasis dermatitis, improved vs last exam.    On the shins and upper back, there is a pattern of somewhat rippled hyperpigmentation consistent with macular amyloidosis as well as  occasional excoriated papules.       ASSESSMENT AND PLAN:   1.  Chronic eczematous dermatitis consistent with atopic dermatitis with chronic pruritus, with prurigo nodules macular and lichen amyloidosis.  Itch is improved so far after 3 weeks of Dupilumab, now off methotrexate.  - Continue Dupixent injected 300mg q 2 weeks  - Continue triamcinolone 0.1% ointment to be applied once to twice daily as needed as well as betamethasone ointment for flare ups of stasis dermatitis on the ankles.   - May consider adding back small dose of methotrexate if incompletely controlled on Dupixent and topicals   2.  Inflamed seborrheic keratosis of the left medial thigh.  Treated at last visit with LN2; modestly improved.  Additional treatment deferred today.      Followup virtual visit in 6 weeks        Miguel Gray MD  Dermatology Attending       Again, thank you for allowing me to participate in the care of your patient.      Sincerely,    Miguel Gray MD

## 2020-06-25 NOTE — PROGRESS NOTES
CHIEF COMPLAINT:  Followup on chronic eczema.      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  He was last seen in our clinic on 3/10/20, at which time our plan was to start Dupixent and stop methotrexate.      He received his first injections of Dupixent on 6/10/20, and his second yesterday 6/24, and has now discontinued methotrexate for several weeks.  He reports his itch and rashes are stable over the past few weeks.  He thinks Dupixent may be starting to help with itching, and he has not noted any significant flares.  Ongoing skin textural changes and papules involving the back and legs.  He continues to use triam mixed with moisturizer for breakthrough itch on the legs.  No recent fevers or cough.     REVIEW OF SYSTEMS:  See HPI.  Otherwise, a 10 point review of systems is negative.      PHYSICAL EXAMINATION:   GENERAL:  This is a well-appearing, well-nourished male with a normal mood and affect who is oriented x3.   SKIN:  A cutaneous exam of the head, neck, bilateral upper and lower extremities and groin was performed.  On the bilateral ankles, there are erythematous, eczematous, scaly plaques around the medial ankles consistent with stasis dermatitis, improved vs last exam.    On the shins and upper back, there is a pattern of somewhat rippled hyperpigmentation consistent with macular amyloidosis as well as occasional excoriated papules.       ASSESSMENT AND PLAN:   1.  Chronic eczematous dermatitis consistent with atopic dermatitis with chronic pruritus, with prurigo nodules macular and lichen amyloidosis.  Itch is improved so far after 3 weeks of Dupilumab, now off methotrexate.  - Continue Dupixent injected 300mg q 2 weeks  - Continue triamcinolone 0.1% ointment to be applied once to twice daily as needed as well as betamethasone ointment for flare ups of stasis dermatitis on the ankles.   - May  consider adding back small dose of methotrexate if incompletely controlled on Dupixent and topicals   2.  Inflamed seborrheic keratosis of the left medial thigh.  Treated at last visit with LN2; modestly improved.  Additional treatment deferred today.      Followup virtual visit in 6 weeks        Miguel Gray MD  Dermatology Attending

## 2020-07-08 NOTE — PROGRESS NOTES
"Patients Glucose Data was noted below    Week of__/__/__ Date  _7_/_8_  Date  _7_/_7_ Date  _7_/_6_ Date  _7_/5__ Date  _7_/_3_ Date  _7_/_1_ Date  _6_/30__    Time BG Time BG Time BG Time BG Time BG Time BG Time BG     83  136  96  112  143    102                                  135       110    147    149  175     Week of__/__/__ Date  _6_/_29_  Date  _6_/_28_ Date  _6/_27_ Date  6__/26__ Date  _6_/25__ Date  _6_/24__ Date  _6_/_22_    Time BG Time BG Time BG Time BG Time BG Time BG Time BG     103  194  99  158  114  101  99                                       259  151    153             Deandre Moore is a 79 year old male who is being evaluated via a billable telephone visit.      The patient has been notified of following:     \"This telephone visit will be conducted via a call between you and your physician/provider. We have found that certain health care needs can be provided without the need for a physical exam.  This service lets us provide the care you need with a short phone conversation.  If a prescription is necessary we can send it directly to your pharmacy.  If lab work is needed we can place an order for that and you can then stop by our lab to have the test done at a later time.    Telephone visits are billed at different rates depending on your insurance coverage. During this emergency period, for some insurers they may be billed the same as an in-person visit.  Please reach out to your insurance provider with any questions.    If during the course of the call the physician/provider feels a telephone visit is not appropriate, you will not be charged for this service.\"    Patient has given verbal consent for Telephone visit?  Yes    What phone number would you like to be contacted at? [phone]    How would you like to obtain your AVS? MyChart          "

## 2020-07-10 ENCOUNTER — VIRTUAL VISIT (OUTPATIENT)
Dept: ENDOCRINOLOGY | Facility: CLINIC | Age: 80
End: 2020-07-10
Payer: COMMERCIAL

## 2020-07-10 DIAGNOSIS — E11.9 TYPE 2 DIABETES MELLITUS WITHOUT COMPLICATION, WITHOUT LONG-TERM CURRENT USE OF INSULIN (H): Primary | ICD-10-CM

## 2020-07-10 RX ORDER — ATORVASTATIN CALCIUM 40 MG/1
40 TABLET, FILM COATED ORAL DAILY
Qty: 90 TABLET | Refills: 4 | Status: SHIPPED | OUTPATIENT
Start: 2020-07-10 | End: 2022-01-13

## 2020-07-10 RX ORDER — GLIMEPIRIDE 1 MG/1
TABLET ORAL
Qty: 180 TABLET | Refills: 3 | Status: SHIPPED | OUTPATIENT
Start: 2020-07-10 | End: 2021-01-14

## 2020-07-10 NOTE — PROGRESS NOTES
[facility]  Endocrinology  Trudy Campa MD  07/10/2020      Chief Complaint:   Diabetes    Due to the COVID 19 pandemic this visit was converted to a telephone/virtual  visit in order to help prevent spread of infection in this high risk patient and the general population .      Start time: 11:14, stop time 1131, total time 17 minutes    History of Present Illness:   Deandre Moore is a 79 year old male with a history of diabetes mellitus type II, gout, osteoarthritis, coronary artery disease, hypertension and hyperlipidemia who presents for follow up on diabetes.     #1 Type II diabetes  Per patient, he was diagnosed with diabetes at age 68.  He as initially on Metformin for many years.  In 2015 Amaryl was added when he was on Prednisone intermittently for itching.  His blood sugars worsened when he was started on Remeron for sleep in December 2015 therefore his Amaryl was increased to 8 mg daily.  This was later decreased to 4 mg due to hypoglycemia. Januvia was cost prohibitive for him. August 2018 Hemoglobin A1c was 5.8%. May 2019 hemoglobin A1c is 5.4%.  Amaryl was decreased to 1 mg twice daily. January 2020, hemoglobin A1c is 5.9%    Interval History: Pt reports doing well. He reports blood sugars at , blood sugars by report about 110-200 (in the evening)  He remains on metformin 1000 mg twice daily and Amaryl 1 mg twice daily.    Blood Glucose Monitoring:  Patient currently wearing at Accu-Chek 360  Number of tests - 36  Average- 109 mg/dL  SD - 38.8 mg/dL  Highest - 200  Lowest - 60    Diabetes monitoring and complications:  CAD: Yes, hx of MI  Last eye exam results: negative per patient (7/2019)  Microalbuminuria: negative (6/4/2019)  Neuropathy: Some tingling sensation  HTN: No  On Statin: Yes  On Aspirin: Yes  Depression: No    #2 Leg Ulcers  Patient has noticed non-painful ulcers on the medial aspect of his lower left leg which come and go.  They usually resolve within a couple weeks.   Recently, he noticed that 1 ulcer opened and was oozing a clear fluid.  He applied an antibiotic tetracycline and noticed that it healed.  He is concerned that it might be an infection.      Interval hx: Working with dermatology - dupilmab every 2 weeks. Methotrexate stopped.    #3 Increased salivation for the past year  About a year ago, he fell on his face and cut open his lip.  Since that time, he has noticed a slight jaw tremor without any other systemic trauma, accompanied by increased salivation.  This has remained unchanged since it began and he has to actively swallow saliva throughout the day.    Interval hx: saw ENT -Per patient report, this increased elevation has improved.  This was thought to be due to postnasal drip.    #4 tremor  Pt continues to report persistent tremor. Just in the jaw area.  Pending neurology appt.     Review of Systems:   Pertinent items are noted in HPI.  All other systems are negative.    Active Medications:     Current Outpatient Medications:      allopurinol (ZYLOPRIM) 300 MG tablet, Take 300 mg by mouth daily, Disp: , Rfl:      ammonium lactate (LAC-HYDRIN) 12 % external lotion, Apply topically 2 times daily Apply twice daily to feet, Disp: 500 g, Rfl: 1     aspirin 81 MG tablet, Take 81 mg by mouth daily., Disp: , Rfl:      Atorvastatin Calcium (LIPITOR PO), Take 40 mg by mouth daily , Disp: , Rfl:      augmented betamethasone dipropionate (DIPROLENE-AF) 0.05 % ointment, Apply topically 2 times daily As needed to itchy areas on back, Disp: 50 g, Rfl: 3     betamethasone dipropionate (DIPROSONE) 0.05 % external ointment, At bedtime apply to ankle and cover with saran wrap (or sock) overnight, wash off in morning, Disp: 50 g, Rfl: 1     blood glucose (NO BRAND SPECIFIED) test strip, Use to test blood sugar 4 times daily  With meter/ strips/ lancets covered by insurance pt using accuchek, Disp: 360 each, Rfl: 3     Blood Glucose Monitoring Suppl (BLOOD GLUCOSE MONITOR SYSTEM)  W/DEVICE KIT, Test 4 times daily with meter covered by  insurance, Disp: 1 kit, Rfl: 1     capsaicin (ZOSTRIX) 0.075 % cream, Apply topically 3 times daily To areas of itch on back.  OK to dilute with cetaphil cream if too burning., Disp: 60 g, Rfl: 3     Cholecalciferol (VITAMIN D) 2000 UNITS CAPS, Take 1 tablet by mouth daily, Disp: , Rfl:      clopidogrel (PLAVIX) 75 MG tablet, Take 75 mg by mouth daily, Disp: , Rfl:      COLCHICINE PO, Take 0.6 mg by mouth daily., Disp: , Rfl:      Cyanocobalamin (VITAMIN B12 PO), Take 1 tablet by mouth daily, Disp: , Rfl:      Dupilumab (DUPIXENT) 300 MG/2ML syringe, Inject 2 mLs (300 mg) Subcutaneous every 14 days 300mg every two weeks, Disp: 4 mL, Rfl: 4     Ginkgo Biloba (GNP GINGKO BILOBA EXTRACT PO), Take  by mouth., Disp: , Rfl:      glimepiride (AMARYL) 1 MG tablet, TAKE 1 TABLET TWICE DAILY, Disp: 180 tablet, Rfl: 1     indapamide (LOZOL) 2.5 MG tablet, Take 2.5 mg by mouth every morning., Disp: , Rfl:      ipratropium (ATROVENT) 0.03 % nasal spray, Spray 2 sprays into both nostrils as needed for rhinitis 30min before cooking, Disp: 30 mL, Rfl: 1     irbesartan (AVAPRO) 150 MG tablet, Take 1 tablet (150 mg) by mouth At Bedtime, Disp: 90 tablet, Rfl: 3     metFORMIN (GLUCOPHAGE) 1000 MG tablet, TAKE 1 TABLET TWICE DAILY  WITH  MEALS, Disp: 180 tablet, Rfl: 4     methotrexate 2.5 MG tablet, TAKE 7 TABLETS EVERY 7 DAYS, Disp: 70 tablet, Rfl: 2     multivitamin, therapeutic with minerals (MULTI-VITAMIN) TABS, Take 1 tablet by mouth daily., Disp: , Rfl:      mupirocin (BACTROBAN) 2 % external ointment, Apply topically 2 times daily, Disp: 30 g, Rfl: 0     nitroglycerin (NITROSTAT) 0.4 MG SL tablet, Place under the tongue as needed, Disp: , Rfl:      potassium chloride SA (K-DUR/KLOR-CON M) 20 MEQ CR tablet, TAKE 1 TABLET THREE TIMES DAILY, Disp: 270 tablet, Rfl: 1     torsemide (DEMADEX) 10 MG tablet, Take 10 mg by mouth daily, Disp: , Rfl:      triamcinolone (KENALOG) 0.1 %  external ointment, Twice daily to raised rash areas on the arms, legs, body as needed., Disp: 454 g, Rfl: 2      Allergies:   Beta adrenergic blockers; Latex; Latex; and Tape [adhesive tape]      Past Medical History:  Atopic dermatitis   Diabetes mellitus, type II  Eczema  Gout   Heart attack  Hyperglycemia  Hypertension  Notalgia paresthetica  Pseudophakia of both eyes    Past Surgical History:  Cardiac surgery, four vessel bypass (1984)  Cholecystectomy  Phacoemulsification with standard intraocular lens implant (1/21/2013)    Family History:   No family history of skin cancer.      Social History:   Tobacco Use: former smoker, quit 1984  Alcohol Use: occasionally, once a year  Drug Use: none   PCP: MARCIA MOTA      Physical Exam:   There were no vitals taken for this visit.  Vital signs and physical exam not available.  However the patient reports feeling well. Psych: Alert and oriented times 3; coherent speech, normal  rate and volume, able to articulate logical thoughts, able to abstract reason, no tangential thoughts, no hallucinations or delusions    Data:    Sodium 03/10/2020 138  133 - 144 mmol/L Final     Potassium 03/10/2020 3.8  3.4 - 5.3 mmol/L Final     Chloride 03/10/2020 104  94 - 109 mmol/L Final     Carbon Dioxide 03/10/2020 29  20 - 32 mmol/L Final     Anion Gap 03/10/2020 5  3 - 14 mmol/L Final     Glucose 03/10/2020 146* 70 - 99 mg/dL Final     Urea Nitrogen 03/10/2020 10  7 - 30 mg/dL Final     Creatinine 03/10/2020 1.14  0.66 - 1.25 mg/dL Final     GFR Estimate 03/10/2020 60* >60 mL/min/[1.73_m2] Final    Comment: Non  GFR Calc  Starting 12/18/2018, serum creatinine based estimated GFR (eGFR) will be   calculated using the Chronic Kidney Disease Epidemiology Collaboration   (CKD-EPI) equation.       GFR Estimate If Black 03/10/2020 69  >60 mL/min/[1.73_m2] Final    Comment:  GFR Calc  Starting 12/18/2018, serum creatinine based estimated GFR (eGFR) will be    calculated using the Chronic Kidney Disease Epidemiology Collaboration   (CKD-EPI) equation.       Calcium 03/10/2020 9.3  8.5 - 10.1 mg/dL Final     Bilirubin Total 03/10/2020 0.6  0.2 - 1.3 mg/dL Final     Albumin 03/10/2020 3.5  3.4 - 5.0 g/dL Final     Protein Total 03/10/2020 7.1  6.8 - 8.8 g/dL Final     Alkaline Phosphatase 03/10/2020 60  40 - 150 U/L Final     ALT 03/10/2020 34  0 - 70 U/L Final     AST 03/10/2020 24  0 - 45 U/L Final     Assessment and Plan:    #1 Type II diabetes  Per patient, he was diagnosed with diabetes at age 68.  He as initially on Metformin for many years.  In 2015 Amaryl was added when he was on Prednisone intermittently for itching.  His blood sugars worsened when he was started on Remeron for sleep in December 2015 therefore his Amaryl was increased to 8 mg daily.  This was later decreased to 4 mg due to hypoglycemia. Januvia was cost prohibitive for him.  Recent creatinine in 2020 was negative.    Patient is doing very well at metformin at 1000 mg twice daily and Amaryl 1 mg twice daily.  Refill sent to local pharmacy.  We will give patient lab slip to check hemoglobin A1c locally.  Refills also given for Lipitor.    #2 Leg Ulcers   Working with dermatology - dupilmab every 2 weeks.  Improved per patient report.    #3 Increased salivation for the past year  Improved per patient report.    #4 tremor  Patient pending neurology appointment.    We will plan for return visit in 6 months.  Patient to check hemoglobin A1c and lipid profile locally.    Due to the COVID 19 pandemic this visit was converted to a telephone/virtual  visit in order to help prevent spread of infection in this high risk patient and the general population .      Telephone call start time: 11:14, stop time 1131, total time 17 minutes

## 2020-07-10 NOTE — LETTER
"7/10/2020       RE: Deandre Moore  [address] [facility] [address]     Dear Colleague,    Thank you for referring your patient, Deandre Moore, to the [facility] ENDOCRINOLOGY at [facility]. Please see a copy of my visit note below.    Patients Glucose Data was noted below    Week of__/__/__ Date  _7_/_8_  Date  _7_/_7_ Date  _7_/_6_ Date  _7_/5__ Date  _7_/_3_ Date  _7_/_1_ Date  _6_/30__    Time BG Time BG Time BG Time BG Time BG Time BG Time BG     83  136  96  112  143    102                                  135       110    147    149  175     Week of__/__/__ Date  _6_/_29_  Date  _6_/_28_ Date  _6/_27_ Date  6__/26__ Date  _6_/25__ Date  _6_/24__ Date  _6_/_22_    Time BG Time BG Time BG Time BG Time BG Time BG Time BG     103  194  99  158  114  101  99                                       259  151    153             Deandre Moore is a 79 year old male who is being evaluated via a billable telephone visit.      The patient has been notified of following:     \"This telephone visit will be conducted via a call between you and your physician/provider. We have found that certain health care needs can be provided without the need for a physical exam.  This service lets us provide the care you need with a short phone conversation.  If a prescription is necessary we can send it directly to your pharmacy.  If lab work is needed we can place an order for that and you can then stop by our lab to have the test done at a later time.    Telephone visits are billed at different rates depending on your insurance coverage. During this emergency period, for some insurers they may be billed the same as an in-person visit.  Please reach out to your insurance provider with any questions.    If during the course of the call the physician/provider feels a telephone visit is not appropriate, you will not be charged for this service.\"    Patient has given verbal consent " for Telephone visit?  Yes    What phone number would you like to be contacted at? [phone]    How would you like to obtain your AVS? [facility]  Endocrinology  Trudy Campa MD  07/10/2020      Chief Complaint:   Diabetes    Due to the COVID 19 pandemic this visit was converted to a telephone/virtual  visit in order to help prevent spread of infection in this high risk patient and the general population .      Start time: 11:14, stop time 1131, total time 17 minutes    History of Present Illness:   Deandre Moore is a 79 year old male with a history of diabetes mellitus type II, gout, osteoarthritis, coronary artery disease, hypertension and hyperlipidemia who presents for follow up on diabetes.     #1 Type II diabetes  Per patient, he was diagnosed with diabetes at age 68.  He as initially on Metformin for many years.  In 2015 Amaryl was added when he was on Prednisone intermittently for itching.  His blood sugars worsened when he was started on Remeron for sleep in December 2015 therefore his Amaryl was increased to 8 mg daily.  This was later decreased to 4 mg due to hypoglycemia. Januvia was cost prohibitive for him. August 2018 Hemoglobin A1c was 5.8%. May 2019 hemoglobin A1c is 5.4%.  Amaryl was decreased to 1 mg twice daily. January 2020, hemoglobin A1c is 5.9%    Interval History: Pt reports doing well. He reports blood sugars at , blood sugars by report about 110-200 (in the evening)  He remains on metformin 1000 mg twice daily and Amaryl 1 mg twice daily.    Blood Glucose Monitoring:  Patient currently wearing at Accu-Chek 360  Number of tests - 36  Average- 109 mg/dL  SD - 38.8 mg/dL  Highest - 200  Lowest - 60    Diabetes monitoring and complications:  CAD: Yes, hx of MI  Last eye exam results: negative per patient (7/2019)  Microalbuminuria: negative (6/4/2019)  Neuropathy: Some tingling sensation  HTN: No  On Statin: Yes  On Aspirin: Yes  Depression: No    #2 Leg  Ulcers  Patient has noticed non-painful ulcers on the medial aspect of his lower left leg which come and go.  They usually resolve within a couple weeks.  Recently, he noticed that 1 ulcer opened and was oozing a clear fluid.  He applied an antibiotic tetracycline and noticed that it healed.  He is concerned that it might be an infection.      Interval hx: Working with dermatology - dupilmab every 2 weeks. Methotrexate stopped.    #3 Increased salivation for the past year  About a year ago, he fell on his face and cut open his lip.  Since that time, he has noticed a slight jaw tremor without any other systemic trauma, accompanied by increased salivation.  This has remained unchanged since it began and he has to actively swallow saliva throughout the day.    Interval hx: saw ENT -Per patient report, this increased elevation has improved.  This was thought to be due to postnasal drip.    #4 tremor  Pt continues to report persistent tremor. Just in the jaw area.  Pending neurology appt.     Review of Systems:   Pertinent items are noted in HPI.  All other systems are negative.    Active Medications:     Current Outpatient Medications:      allopurinol (ZYLOPRIM) 300 MG tablet, Take 300 mg by mouth daily, Disp: , Rfl:      ammonium lactate (LAC-HYDRIN) 12 % external lotion, Apply topically 2 times daily Apply twice daily to feet, Disp: 500 g, Rfl: 1     aspirin 81 MG tablet, Take 81 mg by mouth daily., Disp: , Rfl:      Atorvastatin Calcium (LIPITOR PO), Take 40 mg by mouth daily , Disp: , Rfl:      augmented betamethasone dipropionate (DIPROLENE-AF) 0.05 % ointment, Apply topically 2 times daily As needed to itchy areas on back, Disp: 50 g, Rfl: 3     betamethasone dipropionate (DIPROSONE) 0.05 % external ointment, At bedtime apply to ankle and cover with saran wrap (or sock) overnight, wash off in morning, Disp: 50 g, Rfl: 1     blood glucose (NO BRAND SPECIFIED) test strip, Use to test blood sugar 4 times daily  With  meter/ strips/ lancets covered by insurance pt using accuchek, Disp: 360 each, Rfl: 3     Blood Glucose Monitoring Suppl (BLOOD GLUCOSE MONITOR SYSTEM) W/DEVICE KIT, Test 4 times daily with meter covered by  insurance, Disp: 1 kit, Rfl: 1     capsaicin (ZOSTRIX) 0.075 % cream, Apply topically 3 times daily To areas of itch on back.  OK to dilute with cetaphil cream if too burning., Disp: 60 g, Rfl: 3     Cholecalciferol (VITAMIN D) 2000 UNITS CAPS, Take 1 tablet by mouth daily, Disp: , Rfl:      clopidogrel (PLAVIX) 75 MG tablet, Take 75 mg by mouth daily, Disp: , Rfl:      COLCHICINE PO, Take 0.6 mg by mouth daily., Disp: , Rfl:      Cyanocobalamin (VITAMIN B12 PO), Take 1 tablet by mouth daily, Disp: , Rfl:      Dupilumab (DUPIXENT) 300 MG/2ML syringe, Inject 2 mLs (300 mg) Subcutaneous every 14 days 300mg every two weeks, Disp: 4 mL, Rfl: 4     Ginkgo Biloba (GNP GINGKO BILOBA EXTRACT PO), Take  by mouth., Disp: , Rfl:      glimepiride (AMARYL) 1 MG tablet, TAKE 1 TABLET TWICE DAILY, Disp: 180 tablet, Rfl: 1     indapamide (LOZOL) 2.5 MG tablet, Take 2.5 mg by mouth every morning., Disp: , Rfl:      ipratropium (ATROVENT) 0.03 % nasal spray, Spray 2 sprays into both nostrils as needed for rhinitis 30min before cooking, Disp: 30 mL, Rfl: 1     irbesartan (AVAPRO) 150 MG tablet, Take 1 tablet (150 mg) by mouth At Bedtime, Disp: 90 tablet, Rfl: 3     metFORMIN (GLUCOPHAGE) 1000 MG tablet, TAKE 1 TABLET TWICE DAILY  WITH  MEALS, Disp: 180 tablet, Rfl: 4     methotrexate 2.5 MG tablet, TAKE 7 TABLETS EVERY 7 DAYS, Disp: 70 tablet, Rfl: 2     multivitamin, therapeutic with minerals (MULTI-VITAMIN) TABS, Take 1 tablet by mouth daily., Disp: , Rfl:      mupirocin (BACTROBAN) 2 % external ointment, Apply topically 2 times daily, Disp: 30 g, Rfl: 0     nitroglycerin (NITROSTAT) 0.4 MG SL tablet, Place under the tongue as needed, Disp: , Rfl:      potassium chloride SA (K-DUR/KLOR-CON M) 20 MEQ CR tablet, TAKE 1 TABLET  THREE TIMES DAILY, Disp: 270 tablet, Rfl: 1     torsemide (DEMADEX) 10 MG tablet, Take 10 mg by mouth daily, Disp: , Rfl:      triamcinolone (KENALOG) 0.1 % external ointment, Twice daily to raised rash areas on the arms, legs, body as needed., Disp: 454 g, Rfl: 2      Allergies:   Beta adrenergic blockers; Latex; Latex; and Tape [adhesive tape]      Past Medical History:  Atopic dermatitis   Diabetes mellitus, type II  Eczema  Gout   Heart attack  Hyperglycemia  Hypertension  Notalgia paresthetica  Pseudophakia of both eyes    Past Surgical History:  Cardiac surgery, four vessel bypass (1984)  Cholecystectomy  Phacoemulsification with standard intraocular lens implant (1/21/2013)    Family History:   No family history of skin cancer.      Social History:   Tobacco Use: former smoker, quit 1984  Alcohol Use: occasionally, once a year  Drug Use: none   PCP: MARCIA MOTA      Physical Exam:   There were no vitals taken for this visit.  Vital signs and physical exam not available.  However the patient reports feeling well. Psych: Alert and oriented times 3; coherent speech, normal  rate and volume, able to articulate logical thoughts, able to abstract reason, no tangential thoughts, no hallucinations or delusions    Data:    Sodium 03/10/2020 138  133 - 144 mmol/L Final     Potassium 03/10/2020 3.8  3.4 - 5.3 mmol/L Final     Chloride 03/10/2020 104  94 - 109 mmol/L Final     Carbon Dioxide 03/10/2020 29  20 - 32 mmol/L Final     Anion Gap 03/10/2020 5  3 - 14 mmol/L Final     Glucose 03/10/2020 146* 70 - 99 mg/dL Final     Urea Nitrogen 03/10/2020 10  7 - 30 mg/dL Final     Creatinine 03/10/2020 1.14  0.66 - 1.25 mg/dL Final     GFR Estimate 03/10/2020 60* >60 mL/min/[1.73_m2] Final    Comment: Non  GFR Calc  Starting 12/18/2018, serum creatinine based estimated GFR (eGFR) will be   calculated using the Chronic Kidney Disease Epidemiology Collaboration   (CKD-EPI) equation.       GFR Estimate If  Black 03/10/2020 69  >60 mL/min/[1.73_m2] Final    Comment:  GFR Calc  Starting 12/18/2018, serum creatinine based estimated GFR (eGFR) will be   calculated using the Chronic Kidney Disease Epidemiology Collaboration   (CKD-EPI) equation.       Calcium 03/10/2020 9.3  8.5 - 10.1 mg/dL Final     Bilirubin Total 03/10/2020 0.6  0.2 - 1.3 mg/dL Final     Albumin 03/10/2020 3.5  3.4 - 5.0 g/dL Final     Protein Total 03/10/2020 7.1  6.8 - 8.8 g/dL Final     Alkaline Phosphatase 03/10/2020 60  40 - 150 U/L Final     ALT 03/10/2020 34  0 - 70 U/L Final     AST 03/10/2020 24  0 - 45 U/L Final     Assessment and Plan:    #1 Type II diabetes  Per patient, he was diagnosed with diabetes at age 68.  He as initially on Metformin for many years.  In 2015 Amaryl was added when he was on Prednisone intermittently for itching.  His blood sugars worsened when he was started on Remeron for sleep in December 2015 therefore his Amaryl was increased to 8 mg daily.  This was later decreased to 4 mg due to hypoglycemia. Januvia was cost prohibitive for him.  Recent creatinine in 2020 was negative.    Patient is doing very well at metformin at 1000 mg twice daily and Amaryl 1 mg twice daily.  Refill sent to local pharmacy.  We will give patient lab slip to check hemoglobin A1c locally.  Refills also given for Lipitor.    #2 Leg Ulcers   Working with dermatology - dupilmab every 2 weeks.  Improved per patient report.    #3 Increased salivation for the past year  Improved per patient report.    #4 tremor  Patient pending neurology appointment.    We will plan for return visit in 6 months.  Patient to check hemoglobin A1c and lipid profile locally.    Due to the COVID 19 pandemic this visit was converted to a telephone/virtual  visit in order to help prevent spread of infection in this high risk patient and the general population .      Telephone call start time: 11:14, stop time 1131, total time 17 minutes      Again, thank  you for allowing me to participate in the care of your patient.      Sincerely,    Trudy Campa MD

## 2020-07-15 ENCOUNTER — TELEPHONE (OUTPATIENT)
Dept: ENDOCRINOLOGY | Facility: CLINIC | Age: 80
End: 2020-07-15

## 2020-07-15 NOTE — TELEPHONE ENCOUNTER
Per Dr. Campa:    Pt would like to do labs at [facility] - please give a lab slip for pt to do labs locally and send to me

## 2020-07-15 NOTE — LETTER
[facility]  [address]  [address] [facility] [address]  [phone]  [phone] FAX  FACSIMILE TRANSMITTAL SHEET           17 July 2020 11:17AM    TO: LAB    COMPANY: [employer] [facility]  FAX NUMBER: [phone]  PHONE NUMBER:      FROM:   PHONE:   DATE: 07/17/20  NUMBER OF PAGES:     _____URGENT _____REVIEW ONLY _____PLEASE COMMENT____PLEASE REPLY    NOTES/COMMENTS: Forward results via CareEverywhere/Fax    Attn: MD Oscar Wiseman, Deandre HA.  Male, 79 year old, 1940  DEANDRE BURDICK  [address] [place] [address]  Phone: [phone]                IF YOU DID NOT RECEIVE THE CORRECT NUMBER OF PAGES OR THE FAX DID NOT COME THROUGH CLEARLY, PLEASE CALL THE SENDER     CONFIDENTIALITY STATEMENT: Confidential information that may accompany this transmission contains protected health information under state and federal law and is legally privileged. This information is intended only for the use of the individual or entity named above and may be used only for carrying out treatment, payment or other healthcare operations. The recipient or person responsible for delivering this information is prohibited by law from disclosing this information without proper authorization to any other party, unless required to do so by law or regulation. If you are not the intended recipient, you are hereby notified that any review, dissemination, distribution, or copying of this message is strictly prohibited. If you have received this communication in error, please destroy the materials and contact us immediately by calling the number listed above. No response indicates that the information was received by the appropriate authorized party

## 2020-07-17 ENCOUNTER — TELEPHONE (OUTPATIENT)
Dept: ENDOCRINOLOGY | Facility: CLINIC | Age: 80
End: 2020-07-17

## 2020-07-17 NOTE — TELEPHONE ENCOUNTER
Spoke w/ Pt: [facility]  Phone: [phone]  Lab Fax     Lab orders faxed per Pt request.   Jannie Gray RN on 7/17/2020 at 11:15 AM

## 2020-07-24 DIAGNOSIS — E11.9 TYPE 2 DIABETES MELLITUS WITHOUT COMPLICATION, WITHOUT LONG-TERM CURRENT USE OF INSULIN (H): ICD-10-CM

## 2020-07-25 DIAGNOSIS — L30.9 DERMATITIS: ICD-10-CM

## 2020-07-27 DIAGNOSIS — E11.9 TYPE 2 DIABETES MELLITUS (H): ICD-10-CM

## 2020-07-29 RX ORDER — BLOOD PRESSURE TEST KIT
1 KIT MISCELLANEOUS 4 TIMES DAILY
Qty: 100 EACH | Refills: 6 | Status: SHIPPED | OUTPATIENT
Start: 2020-07-29

## 2020-07-29 RX ORDER — TRIAMCINOLONE ACETONIDE 1 MG/G
OINTMENT TOPICAL
Qty: 454 G | Refills: 0 | Status: SHIPPED | OUTPATIENT
Start: 2020-07-29 | End: 2020-10-08

## 2020-07-29 RX ORDER — DIPHENHYDRAMINE HYDROCHLORIDE 25 MG/1
CAPSULE, LIQUID FILLED ORAL
Qty: 1 KIT | Refills: 1 | Status: SHIPPED | OUTPATIENT
Start: 2020-07-29

## 2020-07-29 RX ORDER — OMEGA-3-ACID ETHYL ESTERS 1 G/1
2 CAPSULE, LIQUID FILLED ORAL 2 TIMES DAILY
Qty: 180 CAPSULE | Refills: 3 | Status: SHIPPED | OUTPATIENT
Start: 2020-07-29 | End: 2022-05-16

## 2020-07-29 NOTE — TELEPHONE ENCOUNTER
Last Clinic Visit: 6/25/20, [place] 8/6/20, refilled per derm protocol process #4.  Last clinic note: Continue triamcinolone 0.1% ointment to be applied once to twice daily as needed

## 2020-07-29 NOTE — TELEPHONE ENCOUNTER
omega-3 acid ethyl esters (LOVAZA) 1 g capsule    Last Written Prescription Date:  NA  Last Fill Quantity: NA,   # refills: NA  Last Office Visit : 7/10/20 Dr Campa  Future Office visit:    None    Routing refill request to provider for review/approval because:  Drug not active on patient's medication list

## 2020-08-06 ENCOUNTER — VIRTUAL VISIT (OUTPATIENT)
Dept: DERMATOLOGY | Facility: CLINIC | Age: 80
End: 2020-08-06
Payer: COMMERCIAL

## 2020-08-06 DIAGNOSIS — L20.84 INTRINSIC ATOPIC DERMATITIS: Primary | ICD-10-CM

## 2020-08-06 NOTE — NURSING NOTE
Dermatology Rooming Note    Deandre Moore's goals for this visit include:   Chief Complaint   Patient presents with     Derm Problem     Deandre is being seen today for a dermatitis follow up - oozing fluid on his legs started today      ANAYELI Galicia

## 2020-08-06 NOTE — LETTER
8/6/2020       RE: Deandre Moore  [address] [facility] [address]     Dear Colleague,    Thank you for referring your patient, Deandre Moore, to the [facility] DERMATOLOGY at [facility]. Please see a copy of my visit note below.    Dermatology Phone Visit: Phone Number: [phone]     Dermatology Problem List:  Chronic eczematous dermatitis and chronic itch    Patient opted to conduct today's return visit via telephone vs an in person visit to the clinic.    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  We last saw him via virtual visit 6/25/20.  At that time, he had started Dupixent 2 weeks earlier and starting to note some improvement in his diffuse itching.  He had stopped methotrexate when he began Dupixent.  Today he reports ongoing improvement and tolerable low level of itch.  He still has some areas of eczema including stasis dermatitis on the lower legs, and uses topical steroids for this.    No symptoms of conjunctivitis.  No recent fevers or chills.  No cough.    Assessment/Advice/instructions given to patient/guardian including prescriptions, follow up appointment or orders for diagnostic testing:  Chronic eczematous dermatitis consistent with atopic dermatitis with chronic pruritus, with prurigo nodules macular and lichen amyloidosis.  Itch is improved so far after 8 weeks of Dupilumab, now off methotrexate.  - Continue Dupixent injected 300mg q 2 weeks  - Continue triamcinolone 0.1% ointment to be  applied once to twice daily as needed as well as betamethasone ointment for flare ups of stasis dermatitis on the ankles.   - In future may consider adding back small dose of methotrexate if incompletely controlled on Dupixent and topicals       RTC in 3 months    Phone call contact time:  Call Started at: 11:24  Call Ended at: 11:36    Miguel Gray MD  Dermatology Attending

## 2020-08-06 NOTE — PROGRESS NOTES
Dermatology Phone Visit: Phone Number: [phone]     Dermatology Problem List:  Chronic eczematous dermatitis and chronic itch    Patient opted to conduct today's return visit via telephone vs an in person visit to the clinic.    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  We last saw him via virtual visit 6/25/20.  At that time, he had started Dupixent 2 weeks earlier and starting to note some improvement in his diffuse itching.  He had stopped methotrexate when he began Dupixent.  Today he reports ongoing improvement and tolerable low level of itch.  He still has some areas of eczema including stasis dermatitis on the lower legs, and uses topical steroids for this.    No symptoms of conjunctivitis.  No recent fevers or chills.  No cough.    Assessment/Advice/instructions given to patient/guardian including prescriptions, follow up appointment or orders for diagnostic testing:  Chronic eczematous dermatitis consistent with atopic dermatitis with chronic pruritus, with prurigo nodules macular and lichen amyloidosis.  Itch is improved so far after 8 weeks of Dupilumab, now off methotrexate.  - Continue Dupixent injected 300mg q 2 weeks  - Continue triamcinolone 0.1% ointment to be applied once to twice daily as needed as well as betamethasone ointment for flare ups of stasis dermatitis on the ankles.   - In future may consider adding back small dose of methotrexate if incompletely controlled on Dupixent and topicals       RTC in 3 months    Phone call contact  time:  Call Started at: 11:24  Call Ended at: 11:36    Miguel Gray MD  Dermatology Attending

## 2020-08-06 NOTE — PATIENT INSTRUCTIONS
[facility] Dermatology Visit    Thank you for allowing us to participate in your care.     When should I call my doctor?    If you are worsening or not improving, please, contact us or seek urgent care as noted below.     Who should I call with questions (adults)?    [facility] (adult and pediatric): [phone]     [facility] (adult): [phone]    For urgent needs outside of business hours call the [facility] at [phone] and ask for the dermatology resident on call    If this is a medical emergency and you are unable to reach an ER, Call 911      Who should I call with questions (pediatric)?  [facility]- Pediatric Dermatology  Dr. Shanta Allan, Dr. Roc Jordan, Dr. Milla Escalante, Lizbet Crow, THADDEUS Chan, Dr. Ирина Cm & Dr. William Burroughs  Non Urgent  Nurse Triage Line; [phone]- Kelly and Niurka SHELDON Care Coordinatorayesha Bah (/Complex ) [phone]    If you need a prescription refill, please contact your pharmacy. Refills are approved or denied by our Physicians during normal business hours, Monday through Fridays  Per office policy, refills will not be granted if you have not been seen within the past year (or sooner depending on your child's condition)    Scheduling Information:  Pediatric Appointment Scheduling and Call Center [phone]  Radiology Scheduling- [phone]  Sedation Unit Scheduling- [phone]  [place] Scheduling- General [phone]; Pediatric Dermatology [phone]  Main  Services: [phone]  Mohawk: [phone]  Chilean: [phone]  Hmong/Solomon/Algerian: [phone]  Preadmission Nursing Department Fax Number: [phone] (Fax all pre-operative paperwork to this number)    For urgent matters arising during evenings, weekends, or holidays that cannot wait for normal business hours  please call [phone] and ask for the Dermatology Resident On-Call to be paged.

## 2020-08-26 ENCOUNTER — TELEPHONE (OUTPATIENT)
Dept: ENDOCRINOLOGY | Facility: CLINIC | Age: 80
End: 2020-08-26

## 2020-08-26 NOTE — TELEPHONE ENCOUNTER
----- Message from Evette Holder MA sent at 8/24/2020  8:10 AM CDT -----    ----- Message -----  From: Trudy Campa MD  Sent: 8/21/2020   5:01 PM CDT  To: [facility] Endocrinology Adult [facility]    Did pt get labs done? Can he see me in follow up in about 6 months?   ----- Message -----  From: Trudy Campa MD  Sent: 8/17/2020  To: Trudy Campa MD    Pt do labs? Pt have follow up in 6 months?

## 2020-08-26 NOTE — TELEPHONE ENCOUNTER
Patient will get labs completed at [place] local [facility] I was unable to schedule a 6 month follow up will forward to clinic coordinators as well.

## 2020-08-31 ENCOUNTER — TELEPHONE (OUTPATIENT)
Dept: NEUROLOGY | Facility: CLINIC | Age: 80
End: 2020-08-31

## 2020-08-31 NOTE — TELEPHONE ENCOUNTER
[facility] CLINICAL DOCUMENTATION    Pre-Visit Planning   PREVISIT INFORMATION                                                    Deandre HA Nallelyjessica scheduled for future visit at [facility] specialty clinics.    Patient is scheduled to see Kiana (provider) on 9/2/2020 (date)  Reason for visit: tremors  Referring provider Christa  Has patient seen previous specialist? EPIC-asked to call back if seen any other neurologist regarding this issue  Medical Records:  Available in chart.  Patient was previously seen at a [facility] or [facility] facility.    REVIEW                                                      New patient packet mailed to patient: No  Medication reconciliation complete: No      Current Outpatient Medications   Medication Sig Dispense Refill     Alcohol Swabs PADS 1 pad 4 times daily 100 each 6     allopurinol (ZYLOPRIM) 300 MG tablet Take 300 mg by mouth daily       ammonium lactate (LAC-HYDRIN) 12 % external lotion Apply topically 2 times daily Apply twice daily to feet 500 g 1     aspirin 81 MG tablet Take 81 mg by mouth daily.       atorvastatin (LIPITOR) 40 MG tablet Take 1 tablet (40 mg) by mouth daily 90 tablet 4     augmented betamethasone dipropionate (DIPROLENE-AF) 0.05 % ointment Apply topically 2 times daily As needed to itchy areas on back 50 g 3     betamethasone dipropionate (DIPROSONE) 0.05 % external ointment At bedtime apply to ankle and cover with saran wrap (or sock) overnight, wash off in morning 50 g 1     blood glucose (NO BRAND SPECIFIED) lancets standard Use to test blood sugar 3 times daily or as directed. 100 each 11     blood glucose (NO BRAND SPECIFIED) test strip Use to test blood sugar 4 times daily  With meter/ strips/ lancets covered by insurance pt using accuchek 360 each 3     Blood Glucose Monitoring Suppl (BLOOD GLUCOSE MONITOR SYSTEM) w/Device KIT Test 4 times daily with meter covered by  insurance 1 kit 1     capsaicin (ZOSTRIX)  0.075 % cream Apply topically 3 times daily To areas of itch on back.  OK to dilute with cetaphil cream if too burning. 60 g 3     Cholecalciferol (VITAMIN D) 2000 UNITS CAPS Take 1 tablet by mouth daily       clopidogrel (PLAVIX) 75 MG tablet Take 75 mg by mouth daily       COLCHICINE PO Take 0.6 mg by mouth daily.       Cyanocobalamin (VITAMIN B12 PO) Take 1 tablet by mouth daily       Dupilumab (DUPIXENT) 300 MG/2ML syringe Inject 2 mLs (300 mg) Subcutaneous every 14 days 300mg every two weeks 4 mL 4     Ginkgo Biloba (GNP GINGKO BILOBA EXTRACT PO) Take  by mouth.       glimepiride (AMARYL) 1 MG tablet TAKE 1 TABLET TWICE DAILY 180 tablet 3     indapamide (LOZOL) 2.5 MG tablet Take 2.5 mg by mouth every morning.       ipratropium (ATROVENT) 0.03 % nasal spray Spray 2 sprays into both nostrils as needed for rhinitis 30min before cooking 30 mL 1     irbesartan (AVAPRO) 150 MG tablet Take 1 tablet (150 mg) by mouth At Bedtime 90 tablet 3     metFORMIN (GLUCOPHAGE) 1000 MG tablet Take 1 tablet (1,000 mg) by mouth 2 times daily (with meals) 180 tablet 2     multivitamin, therapeutic with minerals (MULTI-VITAMIN) TABS Take 1 tablet by mouth daily.       mupirocin (BACTROBAN) 2 % external ointment Apply topically 2 times daily 30 g 0     nitroglycerin (NITROSTAT) 0.4 MG SL tablet Place under the tongue as needed       omega-3 acid ethyl esters (LOVAZA) 1 g capsule Take 2 capsules (2 g) by mouth 2 times daily 180 capsule 3     potassium chloride SA (K-DUR/KLOR-CON M) 20 MEQ CR tablet TAKE 1 TABLET THREE TIMES DAILY 270 tablet 1     torsemide (DEMADEX) 10 MG tablet Take 10 mg by mouth daily       triamcinolone (KENALOG) 0.1 % external ointment APPLY TWICE DAILY TO RAISED RASH AREAS ON THE ARMS, LEGS, BODY AS NEEDED. 454 g 0       Allergies: Beta adrenergic blockers; Latex; Latex; and Tape [adhesive tape]    (insert provider dot-phrase for provider specific visit requirements)    PLAN/FOLLOW-UP NEEDED                                                       EPIC-asked to call back if seen any other neurologist regarding this issue    Patient Reminders Given:  Please, make sure you bring an updated list of your medications.   If you are having a procedure, please, present 15 minutes early.  If you need to cancel or reschedule,please call [phone].    Darla Severin-Brown, LPN

## 2020-09-02 ENCOUNTER — OFFICE VISIT (OUTPATIENT)
Dept: NEUROLOGY | Facility: CLINIC | Age: 80
End: 2020-09-02
Attending: OTOLARYNGOLOGY
Payer: COMMERCIAL

## 2020-09-02 VITALS
WEIGHT: 215 LBS | BODY MASS INDEX: 28.49 KG/M2 | DIASTOLIC BLOOD PRESSURE: 57 MMHG | HEIGHT: 73 IN | HEART RATE: 75 BPM | SYSTOLIC BLOOD PRESSURE: 112 MMHG | OXYGEN SATURATION: 98 %

## 2020-09-02 DIAGNOSIS — R25.1 TREMOR: Primary | ICD-10-CM

## 2020-09-02 DIAGNOSIS — R26.89 IMBALANCE: ICD-10-CM

## 2020-09-02 PROCEDURE — 99203 OFFICE O/P NEW LOW 30 MIN: CPT | Performed by: PSYCHIATRY & NEUROLOGY

## 2020-09-02 RX ORDER — ISOSORBIDE MONONITRATE 30 MG/1
30 TABLET, EXTENDED RELEASE ORAL DAILY
COMMUNITY
End: 2022-01-10

## 2020-09-02 ASSESSMENT — PAIN SCALES - GENERAL: PAINLEVEL: NO PAIN (0)

## 2020-09-02 ASSESSMENT — MIFFLIN-ST. JEOR: SCORE: 1734.11

## 2020-09-02 NOTE — LETTER
"    9/2/2020         RE: Deandre Moore  [address] [facility] [address]        Dear Colleague,    Thank you for referring your patient, Deandre Moore, to the [facility]. Please see a copy of my visit note below.    Visit Date:   09/02/2020      HISTORY OF PRESENT ILLNESS:  Mr. Moore is an 79-year-old male here for evaluation of tremor.  He is accompanied by his wife.      The patient reports in 04/2019 he was walking.  He felt very unbalanced and knew he was going to fall.  He decided that it would be safer to fall forward, but unfortunately he did not catch himself, and struck his face.  He did go to the emergency room.  He had a head CAT scan that showed atrophy.  He had a cervical spine CAT scan that revealed no acute changes such as a fracture.      Subsequent to this event, he started experiencing a jaw tremor.  He has not appreciated a tremor in his limbs.  He could stop the tremor if he \"thinks about it.\"  He also reports that he has developed difficulty with his gait.  He describes himself as swaying when he walks.  He has not had any falls since a year and a half ago.  He does tell me he lost his taste and smell 15 or 20 years ago.  His wife, who was present, indicates there has been no dream enactment that she has noted.  They do sleep together.      He has not been on any psychiatric medications or antiemetic medications.      Laboratory work in 05/2020 was notable for a basic metabolic panel that was normal aside from a GFR of 53.  His liver functions were normal in 03/2020.  He had a normal TSH last year.      The patient does have a number of chronic health problems, which include type 2 diabetes and diastolic heart failure.  He has coronary artery disease with stents.  He has a history of gout, hypertension and eczema.      CURRENT MEDICATIONS:   1.  Allopurinol.   2.  Lac-Hydrin lotion   3.  Aspirin 81 mg.   4.  Atorvastatin.   5.  Capsaicin for areas of " itching on his back.   6.  Clopidogrel.   7.  Colchicine.   8.  Oral B12 supplement of uncertain dose.   9.  Bupixent for eczema.   10.  Ginkgo.   11.  Amaryl.   12.  Lozol.     13.  Atrovent.   14.  Avapro.   15.  Isosorbide.   16.  Metformin.   17.  Nitrostat p.r.n.   18.  Lovaza.   19.  Triamcinolone ointment.      ALLERGIES:  HE IS ALLERGIC TO ADHESIVE TAPE AND LATEX.  HE HAS BEEN ADVISED NOT TO USE BETA BLOCKERS.      FAMILY HISTORY:  Notable for his brother having a tremor of his hands.  He is not certain if there is any family history of Parkinson disease.      SOCIAL HISTORY:  He is a retired .  He does not smoke or use alcohol.      PHYSICAL EXAMINATION:   VITAL SIGNS:  The patient's heart rate is 75.  Blood pressure 112/57.      He has a reduced blink rate.  He has an intermittent tremor of the jaw.  No rest tremor of the extremities is appreciated.  He has a mild postural tremor.  He does have some postural instability just sitting.      He has cogwheeling in the left arm more than the right.        Pupils are small but reactive and equal.  He has full extraocular motility, both horizontally and vertically, and otherwise cranial nerves II-XII are intact.  Motor examination reveals normal strength.  Sensory  examination is notable for an intact position sense and pinprick, but reduced vibratory appreciation in the fingers and absent vibratory sense in the toes.  Finger-to-nose is done accurately.      His gait is notable for initially taking short steps and then lengthening his stride.  He does swing his arms.  He utilizes a cane for added balance.  He turns in 3 steps.  Reflexes are 1+ in the upper extremities, 2+ at the knees, and absent at the ankles.  Plantar responses are flexor.      IMPRESSION:   1.  Jaw tremor.   2.  Gait imbalance.      PLAN:  It is possible he has an underlying extrapyramidal disorder.  I do want to make certain we are not dealing with any serious structural  intracranial process, given his significant imbalance.      He is going to have a brain MRI scan.      I am also going to check a B12 and methylmalonic acid level.      I would not put him on any medication to treat the jaw tremor, and I am a bit reluctant about putting him on any medication for Parkinson disease given his other health problems.      I will be contacting him with the results of the imaging and blood tests.  Otherwise, I will continue to follow him and see him back in a few months.         JUAN BRUNO MD             D: 2020   T: 2020   MT: CARMELLA      Name:     LUIS F BURDICK   MRN:      [id number]        Account:      [id number]   :      1940           Visit Date:   2020      Document: [id number]        Again, thank you for allowing me to participate in the care of your patient.        Sincerely,        Juan Bruno MD

## 2020-09-02 NOTE — NURSING NOTE
"Deandre Moore's goals for this visit include: consult  He requests these members of his care team be copied on today's visit information:     PCP: Denilson Thomas    Referring Provider:  Jennyfer Goodwin MD  [address] [place] [address] [address]  [place] [address]    /57   Pulse 75   Ht 1.854 m (6' 1\")   Wt 97.5 kg (215 lb)   SpO2 98%   BMI 28.37 kg/m      Do you need any medication refills at today's visit? n  "

## 2020-09-03 NOTE — PROGRESS NOTES
"Visit Date:   09/02/2020      HISTORY OF PRESENT ILLNESS:  Mr. Moore is an 79-year-old male here for evaluation of tremor.  He is accompanied by his wife.      The patient reports in 04/2019 he was walking.  He felt very unbalanced and knew he was going to fall.  He decided that it would be safer to fall forward, but unfortunately he did not catch himself, and struck his face.  He did go to the emergency room.  He had a head CAT scan that showed atrophy.  He had a cervical spine CAT scan that revealed no acute changes such as a fracture.      Subsequent to this event, he started experiencing a jaw tremor.  He has not appreciated a tremor in his limbs.  He could stop the tremor if he \"thinks about it.\"  He also reports that he has developed difficulty with his gait.  He describes himself as swaying when he walks.  He has not had any falls since a year and a half ago.  He does tell me he lost his taste and smell 15 or 20 years ago.  His wife, who was present, indicates there has been no dream enactment that she has noted.  They do sleep together.      He has not been on any psychiatric medications or antiemetic medications.      Laboratory work in 05/2020 was notable for a basic metabolic panel that was normal aside from a GFR of 53.  His liver functions were normal in 03/2020.  He had a normal TSH last year.      The patient does have a number of chronic health problems, which include type 2 diabetes and diastolic heart failure.  He has coronary artery disease with stents.  He has a history of gout, hypertension and eczema.      CURRENT MEDICATIONS:   1.  Allopurinol.   2.  Lac-Hydrin lotion   3.  Aspirin 81 mg.   4.  Atorvastatin.   5.  Capsaicin for areas of itching on his back.   6.  Clopidogrel.   7.  Colchicine.   8.  Oral B12 supplement of uncertain dose.   9.  Bupixent for eczema.   10.  Ginkgo.   11.  Amaryl.   12.  Lozol.     13.  Atrovent.   14.  Avapro.   15.  Isosorbide.   16.  Metformin.   17.  " Nitrostat p.r.n.   18.  Lovaza.   19.  Triamcinolone ointment.      ALLERGIES:  HE IS ALLERGIC TO ADHESIVE TAPE AND LATEX.  HE HAS BEEN ADVISED NOT TO USE BETA BLOCKERS.      FAMILY HISTORY:  Notable for his brother having a tremor of his hands.  He is not certain if there is any family history of Parkinson disease.      SOCIAL HISTORY:  He is a retired .  He does not smoke or use alcohol.      PHYSICAL EXAMINATION:   VITAL SIGNS:  The patient's heart rate is 75.  Blood pressure 112/57.      He has a reduced blink rate.  He has an intermittent tremor of the jaw.  No rest tremor of the extremities is appreciated.  He has a mild postural tremor.  He does have some postural instability just sitting.      He has cogwheeling in the left arm more than the right.        Pupils are small but reactive and equal.  He has full extraocular motility, both horizontally and vertically, and otherwise cranial nerves II-XII are intact.  Motor examination reveals normal strength.  Sensory  examination is notable for an intact position sense and pinprick, but reduced vibratory appreciation in the fingers and absent vibratory sense in the toes.  Finger-to-nose is done accurately.      His gait is notable for initially taking short steps and then lengthening his stride.  He does swing his arms.  He utilizes a cane for added balance.  He turns in 3 steps.  Reflexes are 1+ in the upper extremities, 2+ at the knees, and absent at the ankles.  Plantar responses are flexor.      IMPRESSION:   1.  Jaw tremor.   2.  Gait imbalance.      PLAN:  It is possible he has an underlying extrapyramidal disorder.  I do want to make certain we are not dealing with any serious structural intracranial process, given his significant imbalance.      He is going to have a brain MRI scan.      I am also going to check a B12 and methylmalonic acid level.      I would not put him on any medication to treat the jaw tremor, and I am a bit reluctant  about putting him on any medication for Parkinson disease given his other health problems.      I will be contacting him with the results of the imaging and blood tests.  Otherwise, I will continue to follow him and see him back in a few months.     ADDENDUM 20: MRI reviewed and discussed with patient, Mild-moderate small vessel changes and atrophy. Increased ventricles most likely secondary to atrophy rather than NPH. He has not had B12 or MMA done but will do so. He has F/U with me in January.        JUAN ASHLEY MD             D: 2020   T: 2020   MT: CARMELLA      Name:     LUIS F BURDICK   MRN:      [id number]        Account:      [id number]   :      1940           Visit Date:   2020      Document: [id number]

## 2020-09-04 ENCOUNTER — TRANSFERRED RECORDS (OUTPATIENT)
Dept: HEALTH INFORMATION MANAGEMENT | Facility: CLINIC | Age: 80
End: 2020-09-04

## 2020-09-29 ENCOUNTER — ANCILLARY PROCEDURE (OUTPATIENT)
Dept: MRI IMAGING | Facility: CLINIC | Age: 80
End: 2020-09-29
Attending: PSYCHIATRY & NEUROLOGY
Payer: COMMERCIAL

## 2020-09-29 DIAGNOSIS — R26.89 IMBALANCE: ICD-10-CM

## 2020-09-29 PROCEDURE — 70551 MRI BRAIN STEM W/O DYE: CPT | Performed by: STUDENT IN AN ORGANIZED HEALTH CARE EDUCATION/TRAINING PROGRAM

## 2020-10-05 DIAGNOSIS — L30.9 DERMATITIS: ICD-10-CM

## 2020-10-06 DIAGNOSIS — E11.9 TYPE 2 DIABETES MELLITUS WITHOUT COMPLICATION, WITHOUT LONG-TERM CURRENT USE OF INSULIN (H): ICD-10-CM

## 2020-10-06 RX ORDER — IRBESARTAN 150 MG/1
150 TABLET ORAL AT BEDTIME
Qty: 90 TABLET | Refills: 2 | Status: SHIPPED | OUTPATIENT
Start: 2020-10-06 | End: 2022-02-07

## 2020-10-08 RX ORDER — TRIAMCINOLONE ACETONIDE 1 MG/G
OINTMENT TOPICAL
Qty: 454 G | Refills: 0 | Status: SHIPPED | OUTPATIENT
Start: 2020-10-08 | End: 2020-12-17

## 2020-10-08 NOTE — TELEPHONE ENCOUNTER
Last Clinic Visit: 8/6/2020  [facility] Dermatology  Process #4   Next appt 11/5/20..

## 2020-10-14 ENCOUNTER — TELEPHONE (OUTPATIENT)
Dept: DERMATOLOGY | Facility: CLINIC | Age: 80
End: 2020-10-14

## 2020-10-14 NOTE — TELEPHONE ENCOUNTER
Free Drug Application Approved  Effective Dates: 10/14/2020 to 12/31/2020  Patient notified? No had to LM   Additional Information: I am not sure who started the luciana because it wasn't me but pt was approved

## 2020-11-05 ENCOUNTER — VIRTUAL VISIT (OUTPATIENT)
Dept: DERMATOLOGY | Facility: CLINIC | Age: 80
End: 2020-11-05
Payer: COMMERCIAL

## 2020-11-05 DIAGNOSIS — L20.89 OTHER ATOPIC DERMATITIS: Primary | ICD-10-CM

## 2020-11-05 PROCEDURE — 99212 OFFICE O/P EST SF 10 MIN: CPT | Mod: GC | Performed by: DERMATOLOGY

## 2020-11-05 ASSESSMENT — PAIN SCALES - GENERAL: PAINLEVEL: NO PAIN (0)

## 2020-11-05 NOTE — PATIENT INSTRUCTIONS
[facility] Dermatology Visit    Thank you for allowing us to participate in your care. Your findings, instructions and follow-up plan are as follows:    1. Chronic eczematous rash  - Really happy that the rash continues to improve!  - We'll continue with the dupixent  - Sent refills today to [facility]   - please let us know if we need to send it to the [place] specialty pharmacy  - Please let us know if you need any topical steroids or other refills  - We'll see you in 4 months    When should I call my doctor?    If you are worsening or not improving, please, contact us or seek urgent care as noted below.     Who should I call with questions (adults)?    [facility] (adult and pediatric): [phone]     [facility] (adult): [phone]    For urgent needs outside of business hours call the [facility] at [phone] and ask for the dermatology resident on call    If this is a medical emergency and you are unable to reach an ER, Call [phone]      Who should I call with questions (pediatric)?  [facility]- Pediatric Dermatology  Dr. Shanta Allan, Dr. Roc Jordan, Dr. Milla Escalante, Lizbet Crow, PA  Dr. Diana Chan, Dr. Ирина Cm & Dr. William Burroughs  Non Urgent  Nurse Triage Line; [phone]- Kelly and Niurka SHELDON Care Coordinators   Niurka (/Complex ) [phone]    If you need a prescription refill, please contact your pharmacy. Refills are approved or denied by our Physicians during normal business hours, Monday through Fridays  Per office policy, refills will not be granted if you have not been seen within the past year (or sooner depending on your child's condition)    Scheduling Information:  Pediatric Appointment Scheduling and Call Center [phone]  Radiology Scheduling- [phone]  Sedation Unit Scheduling- [phone]  [place]  Scheduling- General [phone]; Pediatric Dermatology [phone]  Main  Services: [phone]  [place]er: [phone]  Finnish: [phone]  Hmong/Gibraltarian/Jeff: [phone]  Preadmission Nursing Department Fax Number: [phone] (Fax all pre-operative paperwork to this number)    For urgent matters arising during evenings, weekends, or holidays that cannot wait for normal business hours please call [phone] and ask for the Dermatology Resident On-Call to be paged.

## 2020-11-05 NOTE — PROGRESS NOTES
Dermatology Phone Visit: Phone Number:[phone]    Dermatology Problem List:  1. Chronic eczematous dermatitis with pruritus  - Current Tx: dupilumab 300mg q14d   - Started 6/10/20  - Previous Tx: topical steroids, methotrexate    Patient opted to conduct today's return visit via telephone vs an in person visit to the clinic.    Encounter Date: Nov 5, 2020    Chief complaint:   Chief Complaint   Patient presents with     Derm Problem     Intrinsic atopic dermatitis - Dupixent has cleared up his skin       I spoke with: Deandre Moore    The reason for the telephone visit was:   Eczematous dermatitis, chronic atopic    Pertinent history and review of systems:  - continues to do well on Dupixent. Denies needing to use any topical steroids.  Itching is generally well controlled, with no major flare ups since last visit 8/6/20  - denies fever/chills, conjunctivitis, or sore throat  - denies any hospitalizations or major illnesses    Assessment/Advice/instructions given to patient/guardian including prescriptions, follow up appointment or orders for diagnostic testing:  Chronic eczematous dermatitis consistent with atopic dermatitis with chronic pruritus, with prurigo nodules macular and lichen amyloidosis. Currently doing well.  - Continue Dupixent injected 300mg q 2 weeks  - Continue triamcinolone 0.1% ointment to be applied once to twice daily as needed as well as betamethasone ointment for flare ups of stasis dermatitis on the ankles    RTC in 4 months    Phone call contact time:  Call Started at: 11:03  Call Ended at: 11:15    Staff and resident:    Patient was seen and staffed with attending physician, Dr. Gray, who was present the entire telephone visit    Cesar Duvall MD  Med/Derm PGY-3  P: [phone]    Staff attestation:    I participated in the entirety of the interview,  and agree with the assessment and plan as documented in the resident's note.    Miguel Gray MD  Dermatology  Attending

## 2020-11-05 NOTE — LETTER
11/5/2020       RE: Deandre Moore  [address] [facility] [address]     Dear Colleague,    Thank you for referring your patient, Deandre Moore, to the [facility] [place] at [facility]. Please see a copy of my visit note below.    Dermatology Phone Visit: Phone Number:[phone]    Dermatology Problem List:  1. Chronic eczematous dermatitis with pruritus  - Current Tx: dupilumab 300mg q14d   - Started 6/10/20  - Previous Tx: topical steroids, methotrexate    Patient opted to conduct today's return visit via telephone vs an in person visit to the clinic.    Encounter Date: Nov 5, 2020    Chief complaint:   Chief Complaint   Patient presents with     Derm Problem     Intrinsic atopic dermatitis - Dupixent has cleared up his skin       I spoke with: Deandre HA Oscar    The reason for the telephone visit was:   Eczematous dermatitis, chronic atopic    Pertinent history and review of systems:  - continues to do well on Dupixent. Denies needing to use any topical steroids.  Itching is generally well controlled, with no major flare ups since last visit 8/6/20  - denies fever/chills, conjunctivitis, or sore throat  - denies any hospitalizations or major illnesses    Assessment/Advice/instructions given to patient/guardian including prescriptions, follow up appointment or orders for diagnostic testing:  Chronic eczematous dermatitis consistent with atopic dermatitis with chronic pruritus, with prurigo nodules macular and lichen amyloidosis. Currently doing well.  - Continue Dupixent injected 300mg q 2 weeks  - Continue triamcinolone 0.1% ointment to be applied once to twice daily as needed as well as betamethasone ointment for flare ups of stasis dermatitis on the ankles    RTC in 4 months    Phone call contact time:  Call Started at: 11:03  Call Ended at: 11:15    Staff and resident:    Patient was seen and staffed with attending  physician, Dr. Gray, who was present the entire telephone visit    Cesar Duvall MD  Med/Derm PGY-3  P: [phone]    Staff attestation:    I participated in the entirety of the interview,  and agree with the assessment and plan as documented in the resident's note.    Miguel Gray MD  Dermatology Attending

## 2020-11-05 NOTE — NURSING NOTE
Dermatology Rooming Note    Deandre Moore's goals for this visit include:   Chief Complaint   Patient presents with     Derm Problem     Intrinsic atopic dermatitis - Dupixent has cleared up his skin     Yvonne Ambrose CMA

## 2020-11-14 ENCOUNTER — HEALTH MAINTENANCE LETTER (OUTPATIENT)
Age: 80
End: 2020-11-14

## 2020-11-18 NOTE — TELEPHONE ENCOUNTER
FUTURE VISIT INFORMATION      FUTURE VISIT INFORMATION:    Date: 11/24/20    Time: 1:40pm    [place] [facility] [place]:    Referring provider:  self    Referring providers clinic:  N/A    Reason for visit/diagnosis  eye exam    RECORDS REQUESTED FROM:       Clinic name Comments Records Status Imaging Status   [facility]/notes 6/17/2008-11/13/17 EPIC

## 2020-11-20 DIAGNOSIS — L20.89 OTHER ATOPIC DERMATITIS: ICD-10-CM

## 2020-11-24 ENCOUNTER — TELEPHONE (OUTPATIENT)
Dept: OPHTHALMOLOGY | Facility: CLINIC | Age: 80
End: 2020-11-24

## 2020-11-24 ENCOUNTER — PRE VISIT (OUTPATIENT)
Dept: OPHTHALMOLOGY | Facility: CLINIC | Age: 80
End: 2020-11-24

## 2020-11-24 RX ORDER — DUPILUMAB 300 MG/2ML
300 INJECTION, SOLUTION SUBCUTANEOUS
Qty: 4 ML | Refills: 4 | Status: SHIPPED | OUTPATIENT
Start: 2020-11-24 | End: 2021-05-30

## 2020-11-25 NOTE — TELEPHONE ENCOUNTER
FUTURE VISIT INFORMATION      FUTURE VISIT INFORMATION:    Date: 12/1/20    Time: 12:20pm    [place] [facility] [place]:    Referring provider:  self    Referring providers clinic:  N/A    Reason for visit/diagnosis  eye exam     RECORDS REQUESTED FROM:         Clinic name Comments Records Status Imaging Status   [facility]/notes 6/17/2008-11/13/17 EPIC

## 2020-12-01 ENCOUNTER — OFFICE VISIT (OUTPATIENT)
Dept: OPHTHALMOLOGY | Facility: CLINIC | Age: 80
End: 2020-12-01
Payer: COMMERCIAL

## 2020-12-01 ENCOUNTER — PRE VISIT (OUTPATIENT)
Dept: OPHTHALMOLOGY | Facility: CLINIC | Age: 80
End: 2020-12-01

## 2020-12-01 DIAGNOSIS — H52.223 REGULAR ASTIGMATISM OF BOTH EYES: ICD-10-CM

## 2020-12-01 DIAGNOSIS — H52.4 PRESBYOPIA OF BOTH EYES: ICD-10-CM

## 2020-12-01 DIAGNOSIS — E11.9 TYPE 2 DIABETES MELLITUS WITHOUT OPHTHALMIC MANIFESTATIONS (H): Primary | ICD-10-CM

## 2020-12-01 DIAGNOSIS — Z96.1 PSEUDOPHAKIA, BOTH EYES: ICD-10-CM

## 2020-12-01 PROCEDURE — 92015 DETERMINE REFRACTIVE STATE: CPT | Performed by: OPHTHALMOLOGY

## 2020-12-01 PROCEDURE — 92004 COMPRE OPH EXAM NEW PT 1/>: CPT | Performed by: OPHTHALMOLOGY

## 2020-12-01 ASSESSMENT — CONF VISUAL FIELD
OD_NORMAL: 1
METHOD: COUNTING FINGERS
OS_NORMAL: 1

## 2020-12-01 ASSESSMENT — VISUAL ACUITY
CORRECTION_TYPE: GLASSES
OS_CC: 20/20
OD_CC: 20/20
METHOD: SNELLEN - LINEAR
OD_CC+: -1

## 2020-12-01 ASSESSMENT — SLIT LAMP EXAM - LIDS
COMMENTS: NORMAL
COMMENTS: NORMAL

## 2020-12-01 ASSESSMENT — CUP TO DISC RATIO
OS_RATIO: 0.5
OD_RATIO: 0.5

## 2020-12-01 ASSESSMENT — REFRACTION_WEARINGRX
OD_SPHERE: PLANO
OS_AXIS: 166
SPECS_TYPE: PAL
OS_CYLINDER: +1.00
OD_AXIS: 005
OS_ADD: +2.25
OD_ADD: +2.25
OD_CYLINDER: +1.00
OS_SPHERE: -0.25

## 2020-12-01 ASSESSMENT — TONOMETRY
IOP_METHOD: ICARE
OS_IOP_MMHG: 13
OD_IOP_MMHG: 13

## 2020-12-01 ASSESSMENT — REFRACTION_MANIFEST
OS_CYLINDER: +1.00
OD_AXIS: 005
OS_AXIS: 166
OS_SPHERE: -0.25
OD_CYLINDER: +1.00
OD_ADD: +2.50
OD_SPHERE: PLANO
OS_ADD: +2.50

## 2020-12-01 ASSESSMENT — EXTERNAL EXAM - RIGHT EYE: OD_EXAM: NORMAL

## 2020-12-01 ASSESSMENT — EXTERNAL EXAM - LEFT EYE: OS_EXAM: NORMAL

## 2020-12-01 NOTE — PROGRESS NOTES
HPI  Deandre Moore is a 80 year old male here for comprehensive eye exam.  Has good vision both eyes with current glasses but does need more light to read, occasionally problems focusing up close with current glasses.  Denies eye pain or irritation. Wondering if any eye problems due to Dupixent, which he has been on for about a year for eczema.  Reports blood glucose under good control, unsure of recent a1c.  Hemoglobin A1C   Date Value Ref Range Status   06/04/2018 5.5 % Final        PMH:   Past Medical History:   Diagnosis Date     Diabetes mellitus (H)      Gout attack      Heart attack (H) 1984     Hypertension       POH: Glasses for presbyopia, s/p cataract extraction/ intraocular lens implant both eyes   Oc Meds: occasional visine     Assessment & Plan   (E11.9) Type 2 diabetes mellitus without ophthalmic manifestations (H) (primary encounter diagnosis)  Comment: Diabetes Mellitus 2 w/o retinopathy unsure of recent a1c     Discussed the importance of tight blood glucose, blood pressure, and cholesterol control in the prevention of diabetic retinopathy.   Plan:  Recommend yearly dilated eye exam.     (H52.223) Regular astigmatism of both eyes - Both Eyes   (H52.4) Presbyopia of both eyes - Both Eyes  Comment: Mild change in prescription.    Plan: Refraction done and prescription for glasses given.  Slightly increase add.  Recommend good lighting for reading, this is normal to need more light, even with healthy aging eyes.    (Z96.1) Pseudophakia, both eyes  Comment: clear media  Plan: follow     Told patient there were no ocular signs of side effects from Dupixent, he will call if any symptoms occur.  -----------------------------------------------------------------------------------       Patient disposition:   Return in about 1 year (around 12/1/2021) for Comprehensive Exam.      Complete documentation of historical and exam elements from today's encounter can be found in the full encounter summary  report (not reduplicated in this progress note). I personally obtained the chief complaint(s) and history of present illness.  I have confirmed and edited as necessary the CC, HPI, PMH/PSH, social history, FMH, ROS, and exam/neuro findings as obtained by the technician or others. I have examined this patient myself and I personally viewed the image(s) and studies listed above and the documentation reflects my findings and interpretation.  I formulated and edited as necessary the assessment and plan and discussed the findings and management plan with the patient and family.     Santa Car MD

## 2020-12-01 NOTE — NURSING NOTE
Chief Complaints and History of Present Illnesses   Patient presents with     Diabetic Eye Exam     COMPREHENSIVE EYE EXAM     Chief Complaint(s) and History of Present Illness(es)     Diabetic Eye Exam     Vision: is stable    Diabetes Type: Type 2    Duration: years    Blood Sugars: is controlled    Pain scale: 0/10              COMPREHENSIVE EYE EXAM     Laterality: both eyes    Onset: years ago    Quality: difficulty focusing    Frequency: constantly    Context: reading    Course: gradually worsening    Treatments tried: no treatments    Pain scale: 0/10              Comments     Annual exam. Pt states vision seems more dim, needing more light to see. No pain. No drops.    Lab Results       Component                Value               Date                       A1C                      5.5                 06/04/2018                 A1C                      5.9                 06/19/2017                 A1C                      5.4                 06/07/2016              PIERCE Roman COT 12:10 PM December 1, 2020

## 2020-12-04 ENCOUNTER — TELEPHONE (OUTPATIENT)
Dept: ENDOCRINOLOGY | Facility: CLINIC | Age: 80
End: 2020-12-04

## 2020-12-04 NOTE — TELEPHONE ENCOUNTER
[facility] Call Center    Phone Message    May a detailed message be left on voicemail: yes     Reason for Call: Other: Kaleon with [facility]  called in regards to rx orders they received - the directions aren't clear/ don't match. Please follow up at [phone]  - Their office closes at 4 pm our time. Please advise      Action Taken: Message routed to:  [facility] Endo    Travel Screening: Not Applicable

## 2020-12-08 NOTE — TELEPHONE ENCOUNTER
Called pharmacy and left a message to please give us a call back to clarify directions [phone].

## 2020-12-10 ENCOUNTER — TELEPHONE (OUTPATIENT)
Dept: DERMATOLOGY | Facility: CLINIC | Age: 80
End: 2020-12-10

## 2020-12-10 NOTE — TELEPHONE ENCOUNTER
PA Initiation    Medication: Dupixent Pending  Insurance Company: [employer] - Phone [phone] Fax [phone]  Pharmacy Filling the Rx:    Filling Pharmacy Phone:    Filling Pharmacy Fax:    Start Date: 12/10/2020

## 2020-12-14 DIAGNOSIS — L30.9 DERMATITIS: ICD-10-CM

## 2020-12-14 NOTE — TELEPHONE ENCOUNTER
Prior Authorization Approval    Authorization Effective Date:    Authorization Expiration Date: 12/31/2021  Medication: Dupixent - Approved  Approved Dose/Quantity: Inject 2 mLs (300 mg) Subcutaneous every 14 days  Reference #:     Insurance Company: [employer] - [email or web address] [phone] Fax [phone]  Expected CoPay:       CoPay Card Available:      Foundation Assistance Needed:    Which Pharmacy is filling the prescription (Not needed for infusion/clinic administered): [facility] [address] [address]  Pharmacy Notified: Yes  Patient Notified: Yes

## 2020-12-14 NOTE — ADDENDUM NOTE
Addended by: DINO SHIPMAN on: 1/2/2017 03:32 PM     Modules accepted: Orders    
Addended by: SHANITA GAY on: 1/2/2017 03:28 PM     Modules accepted: Diann Kwon    
How Many Skin Cancers Have You Had?: more than one
What Is The Reason For Today's Visit?: History of Non-Melanoma Skin Cancer
When Was Your Last Cancer Diagnosed?: 6 months

## 2020-12-17 RX ORDER — TRIAMCINOLONE ACETONIDE 1 MG/G
OINTMENT TOPICAL
Qty: 454 G | Refills: 1 | Status: SHIPPED | OUTPATIENT
Start: 2020-12-17 | End: 2023-01-01

## 2020-12-17 NOTE — TELEPHONE ENCOUNTER
Last Clinic Visit: 11/5/2020  [facility] [place] ( RTC 4 M)  NCV: 3-11-21  Last Clinic note;  - Continue triamcinolone 0.1% ointment to be applied once to twice daily as needed

## 2021-01-14 ENCOUNTER — TELEPHONE (OUTPATIENT)
Dept: ENDOCRINOLOGY | Facility: CLINIC | Age: 81
End: 2021-01-14

## 2021-01-14 DIAGNOSIS — E11.9 TYPE 2 DIABETES MELLITUS WITHOUT COMPLICATION, WITHOUT LONG-TERM CURRENT USE OF INSULIN (H): ICD-10-CM

## 2021-01-14 RX ORDER — GLIMEPIRIDE 1 MG/1
TABLET ORAL
Qty: 180 TABLET | Refills: 1 | Status: SHIPPED | OUTPATIENT
Start: 2021-01-14 | End: 2021-08-26

## 2021-01-14 NOTE — TELEPHONE ENCOUNTER
Called Dupixent [employer] and spoke to Freida -    Need enrollment form spoke with patient on 1/8, mailed out application and foundation waiting on patient to mail back application

## 2021-01-14 NOTE — TELEPHONE ENCOUNTER
"Order sent per notes.   Jannie Gray, RN on 1/14/2021 at 4:13 PM       Maggie Mccoy MA  Med Specialties Endo Triage-[facility] 4 minutes ago (4:06 PM)     I dont see no notes on patient starting this medication     Message text       Clinic notes of 07/10/2020:\"Patient is doing very well at metformin at 1000 mg twice daily and Amaryl 1 mg twice daily.  Refill sent to local pharmacy.  We will give patient lab slip to check hemoglobin A1c locally.  Refills also given for Lipitor.\"  "

## 2021-01-14 NOTE — TELEPHONE ENCOUNTER
Health Call Center    Phone Message    May a detailed message be left on voicemail: yes     Reason for Call: Medication Question or concern regarding medication   Prescription Clarification  Name of Medication: glimepiride (AMARYL) 1 MG tablet  Prescribing Provider: Dr. Campa   Pharmacy: [facility] in [place], [address] [place] [address], Phone: [phone]   What on the order needs clarification? Santa Johnson called in for patient from [facility] in [place]. Patient wondering if he should still be taking this medication. If so, a new prescription needs to be sent to the [facility] in [place]. Please call patient to clarify.    Action Taken: Message routed to:  [facility]O    Travel Screening: Not Applicable

## 2021-02-05 ENCOUNTER — TELEPHONE (OUTPATIENT)
Dept: DERMATOLOGY | Facility: CLINIC | Age: 81
End: 2021-02-05

## 2021-02-05 NOTE — TELEPHONE ENCOUNTER
Spoke to [employer] rep and was informed that they just need to comolete the final verification. She states it could take 48-72 hours (not counting the week end days) Patient was called and given this information.      Kathleen M Doege RN

## 2021-02-05 NOTE — TELEPHONE ENCOUNTER
[facility] Call Center    Phone Message    May a detailed message be left on voicemail: yes     Reason for Call: Medication Refill Request    Has the patient contacted the pharmacy for the refill? Yes     Name of medication being requested:   Dupilumab (DUPIXENT) 300 MG/2ML syringe     Provider who prescribed the medication:   Dr Gray    Pharmacy:   [facility] [address]    Date medication is needed: Soon      Patient advised of 72-business hour turn around time on refill requests.      Action Taken: Message routed to:  [facility] Derm    Travel Screening: Not Applicable     Pt states he needs this prescription refilled. Pt states he completed and returned application sent to him in Nov 2020.    Please call Pt to discuss refill.    Thanks!

## 2021-03-03 ENCOUNTER — TELEPHONE (OUTPATIENT)
Dept: ENDOCRINOLOGY | Facility: CLINIC | Age: 81
End: 2021-03-03

## 2021-03-03 ENCOUNTER — TELEPHONE (OUTPATIENT)
Dept: NURSING | Facility: CLINIC | Age: 81
End: 2021-03-03

## 2021-03-03 NOTE — TELEPHONE ENCOUNTER
.  [facility]: Nurse Triage Note  SITUATION/BACKGROUND                                                      Deandre Moore is a 80 year old male who calls with concern about metformin.   Patient states that over the past 6 months he has been feeling weak and tired in his legs.  In addition, he has developed bilateral leg pain - mostly in his right knee and feels that metformin is the culprit. Rates intermittent right knee pain at 8/10.   He takes tylenol for relief. No swelling or change in skin integrity noted.  Patient reports that had physical therapy and nothing really helps with fatigue.   He walks only short distances and has to slowly  sit down due to leg weakness.   Home care instructions reviewed per fatigue and leg pain.     Routed to Endocrinology as High Priority to review and follow up call to patient at [phone]

## 2021-03-03 NOTE — TELEPHONE ENCOUNTER
"Spoke w/ Pt: States he has \"been getting text messages with bad things about metformin and leg pain\", He doesn't know who the messages are from.   His PCP has taken X-rays,states his knee is arthritic.   Informed Pt metformin isn't typically associated with loss of knee cartilage and he should notify PCP regarding possible assessment.   Provider notified.   Jannie Gray RN on 3/3/2021 at 10:54 AM     "

## 2021-03-04 NOTE — TELEPHONE ENCOUNTER
Called [employer] to check the status of this, they have been trying to get a hold of the patient to get images of his insurance card [employer].  I gave them a customer service number they can call to get information from.    Elizabeth

## 2021-03-11 ENCOUNTER — OFFICE VISIT (OUTPATIENT)
Dept: DERMATOLOGY | Facility: CLINIC | Age: 81
End: 2021-03-11
Payer: COMMERCIAL

## 2021-03-11 ENCOUNTER — APPOINTMENT (OUTPATIENT)
Dept: LAB | Facility: CLINIC | Age: 81
End: 2021-03-11
Payer: COMMERCIAL

## 2021-03-11 DIAGNOSIS — D48.5 NEOPLASM OF UNCERTAIN BEHAVIOR OF SKIN: Primary | ICD-10-CM

## 2021-03-11 DIAGNOSIS — Z79.899 ENCOUNTER FOR LONG-TERM (CURRENT) USE OF HIGH-RISK MEDICATION: ICD-10-CM

## 2021-03-11 LAB
ALBUMIN SERPL-MCNC: 3.4 G/DL (ref 3.4–5)
ALP SERPL-CCNC: 88 U/L (ref 40–150)
ALT SERPL W P-5'-P-CCNC: 26 U/L (ref 0–70)
ANION GAP SERPL CALCULATED.3IONS-SCNC: 4 MMOL/L (ref 3–14)
AST SERPL W P-5'-P-CCNC: 17 U/L (ref 0–45)
BASOPHILS # BLD AUTO: 0.1 10E9/L (ref 0–0.2)
BASOPHILS NFR BLD AUTO: 0.9 %
BILIRUB SERPL-MCNC: 0.5 MG/DL (ref 0.2–1.3)
BUN SERPL-MCNC: 10 MG/DL (ref 7–30)
CALCIUM SERPL-MCNC: 9.3 MG/DL (ref 8.5–10.1)
CHLORIDE SERPL-SCNC: 107 MMOL/L (ref 94–109)
CO2 SERPL-SCNC: 28 MMOL/L (ref 20–32)
CREAT SERPL-MCNC: 1.27 MG/DL (ref 0.66–1.25)
DIFFERENTIAL METHOD BLD: ABNORMAL
EOSINOPHIL # BLD AUTO: 0.6 10E9/L (ref 0–0.7)
EOSINOPHIL NFR BLD AUTO: 5.9 %
ERYTHROCYTE [DISTWIDTH] IN BLOOD BY AUTOMATED COUNT: 13.2 % (ref 10–15)
GFR SERPL CREATININE-BSD FRML MDRD: 52 ML/MIN/{1.73_M2}
GLUCOSE SERPL-MCNC: 102 MG/DL (ref 70–99)
HCT VFR BLD AUTO: 39.7 % (ref 40–53)
HGB BLD-MCNC: 13.9 G/DL (ref 13.3–17.7)
IMM GRANULOCYTES # BLD: 0 10E9/L (ref 0–0.4)
IMM GRANULOCYTES NFR BLD: 0.3 %
LYMPHOCYTES # BLD AUTO: 2 10E9/L (ref 0.8–5.3)
LYMPHOCYTES NFR BLD AUTO: 19.9 %
MCH RBC QN AUTO: 31 PG (ref 26.5–33)
MCHC RBC AUTO-ENTMCNC: 35 G/DL (ref 31.5–36.5)
MCV RBC AUTO: 89 FL (ref 78–100)
MONOCYTES # BLD AUTO: 1.1 10E9/L (ref 0–1.3)
MONOCYTES NFR BLD AUTO: 10.9 %
NEUTROPHILS # BLD AUTO: 6.1 10E9/L (ref 1.6–8.3)
NEUTROPHILS NFR BLD AUTO: 62.1 %
NRBC # BLD AUTO: 0 10*3/UL
NRBC BLD AUTO-RTO: 0 /100
PLATELET # BLD AUTO: 239 10E9/L (ref 150–450)
POTASSIUM SERPL-SCNC: 4.6 MMOL/L (ref 3.4–5.3)
PROT SERPL-MCNC: 7.4 G/DL (ref 6.8–8.8)
RBC # BLD AUTO: 4.48 10E12/L (ref 4.4–5.9)
SODIUM SERPL-SCNC: 140 MMOL/L (ref 133–144)
WBC # BLD AUTO: 9.9 10E9/L (ref 4–11)

## 2021-03-11 PROCEDURE — 85025 COMPLETE CBC W/AUTO DIFF WBC: CPT | Performed by: PATHOLOGY

## 2021-03-11 PROCEDURE — 88305 TISSUE EXAM BY PATHOLOGIST: CPT | Performed by: DERMATOLOGY

## 2021-03-11 PROCEDURE — 11102 TANGNTL BX SKIN SINGLE LES: CPT | Performed by: DERMATOLOGY

## 2021-03-11 PROCEDURE — 36415 COLL VENOUS BLD VENIPUNCTURE: CPT | Performed by: PATHOLOGY

## 2021-03-11 PROCEDURE — 80053 COMPREHEN METABOLIC PANEL: CPT | Performed by: PATHOLOGY

## 2021-03-11 PROCEDURE — 99213 OFFICE O/P EST LOW 20 MIN: CPT | Mod: 25 | Performed by: DERMATOLOGY

## 2021-03-11 ASSESSMENT — PAIN SCALES - GENERAL: PAINLEVEL: NO PAIN (0)

## 2021-03-11 NOTE — PATIENT INSTRUCTIONS

## 2021-03-11 NOTE — NURSING NOTE
"Chief Complaint   Patient presents with     Derm Problem     Rich is here today for dermatitis. He states \" my skin is doing ok.\"     Ирина Jorge, RMJUAN  "

## 2021-03-11 NOTE — LETTER
3/11/2021       RE: Deandre Moore  [address]  [facility] [address]     Dear Colleague,    Thank you for referring your patient, Deandre Moore, to the [facility] DERMATOLOGY [facility] [place] at [facility]. Please see a copy of my visit note below.    [facility] Dermatology Note   Encounter Date: Mar 11, 2021  Office Visit     Dermatology Problem List:  # Chronic eczematous dermatitis with chronic pruritis  - Current tx: dupilumab 300mg q14d (has not taken in past month d/t insurance re-evaluating prescription), triamcinolone 0.1% ointment once daily, betamethasone ointment for flare ups.  - Ordered home UVB unit.   - Monitoring labs (CBC, CMP) ordered for methotrexate use.     # Neoplasm of uncertain behavior of skin  - ddx keratoacanthoma vs cyst  - Obtained shave biopsy today to check for keratoacanthoma    ___________________________________________    Assessment & Plan:    # Chronic eczematous dermatitis with pruritis, currently doing well  - Patient has been unable to take his dupilumab for the past month due to issues with insurance. On exam the skin of his bilateral legs appears scaly and dry.   - Patient should continue with dupilumab if insurance will allow. Ordered a UVB unit for patient to use if this medication needs to be discontinued.   - Continue to use triamcinolone cream and betamethasone cream as needed.  - Patient reports he is continuing to take methotrexate once weekly. Will obtain monitoring labs (CBC, CMP).     #. Neoplasm of uncertain behavior of skin  - ddx keratoacanthoma vs cyst vs prurigo nodularis. Given appearance concerning for possible keratoacanthoma, will perform a shave biopsy today.   - SHAVE BIOPSY PROCEDURE NOTE: After written informed consent was obtained, a time out was taken to identify the patient and the correct site for biopsy. The lesion on the left elbow was cleansed with a  "70% isopropyl alcohol wipe, and then injected with 1cc of lidocaine 1% with epinephrine 1:100,000. Once anesthesia was ensured, the visible surface of the lesion was biopsied using a [place] blade in standard technique. Hemostasis was obtained with pressure and aluminum chloride 20% solution. The specimen was placed in a labeled formalin container and sent to pathology for sectioning and analysis. The wound was dressed with white petrolatum and an adhesive bandage. The patient tolerated the procedure well. Post-procedure instructions and recommendations were provided both verbally and in writing.       Procedures Performed:  Shave biopsy of lesion on left elbow. See procedure note above.     Follow-up in 3 months or sooner with any other concerns.    Radha Kelley, MS4  [facility]  Patient was seen and examined with Dr. Gray.    I was present with the medical student who participated in the service and in the documentation.  I have verified the history and personally performed the physical exam and medical decision making.  I agree with the assessment and plan of care as documented in the note.  I personally performed all procedures.    Miguel Gray MD  Dermatology Attending    ___________________________________________      CC: Derm Problem (Rich is here today for dermatitis. He states \" my skin is doing ok.\")      HPI:  Mr. Deandre Moore is a(n) 80 year old male who presents to clinic today as a return patient for longstanding eczematous dermatitis and a new lesion on his left elbow. He reports that a month ago his insurance said they were \"re-evaluating\" his dupilumab prescription, and he was unable to get a refill. He has noticed some increased scaling on his bilateral legs. He has been applying triamcinolone cream once a day which seems to help. He is also continuing to take methotrexate once a week.     He reports approximately two months ago he noticed a new \"bump\" on his left " forearm near the elbow. He has not noticed any bleeding, discharge, burning, or itching. He has never had a lesion like this before. He has no family or personal history of skin cancer.     He is otherwise feeling well and in his usual state of health with no recent illnesses or hospitalizations.    Physical exam:  General: Pleasant, well-developed, well-nourished man in no acute distress  Eyes: Conjunctivae clear  Resp: Breathing comfortably in no distress  CV: Well-perfused, no cyanosis  Extremities: No deformity, no edema  Skin: Focused examination of the bilateral legs and left elbow was performed.  - Soto skin type: IV  - Dome-shaped firm purple/flesh colored papule with central white area that may represent a keratin plug,on extensor forearm just distal to elbow.  - There is scaling and dry skin on bilateral legs with some scattered erythematous, eczematous plaques.   - No other lesions of concern on areas examined.     Medications:  Current Outpatient Medications   Medication     Alcohol Swabs PADS     allopurinol (ZYLOPRIM) 300 MG tablet     ammonium lactate (LAC-HYDRIN) 12 % external lotion     aspirin 81 MG tablet     atorvastatin (LIPITOR) 40 MG tablet     augmented betamethasone dipropionate (DIPROLENE-AF) 0.05 % ointment     betamethasone dipropionate (DIPROSONE) 0.05 % external ointment     blood glucose (NO BRAND SPECIFIED) lancets standard     blood glucose (NO BRAND SPECIFIED) test strip     Blood Glucose Monitoring Suppl (BLOOD GLUCOSE MONITOR SYSTEM) w/Device KIT     capsaicin (ZOSTRIX) 0.075 % cream     Cholecalciferol (VITAMIN D) 2000 UNITS CAPS     clopidogrel (PLAVIX) 75 MG tablet     COLCHICINE PO     Cyanocobalamin (VITAMIN B12 PO)     Ginkgo Biloba (GNP GINGKO BILOBA EXTRACT PO)     glimepiride (AMARYL) 1 MG tablet     indapamide (LOZOL) 2.5 MG tablet     ipratropium (ATROVENT) 0.03 % nasal spray     irbesartan (AVAPRO) 150 MG tablet     isosorbide mononitrate (IMDUR) 30 MG 24 hr  tablet     metFORMIN (GLUCOPHAGE) 1000 MG tablet     multivitamin, therapeutic with minerals (MULTI-VITAMIN) TABS     mupirocin (BACTROBAN) 2 % external ointment     nitroglycerin (NITROSTAT) 0.4 MG SL tablet     omega-3 acid ethyl esters (LOVAZA) 1 g capsule     potassium chloride SA (K-DUR/KLOR-CON M) 20 MEQ CR tablet     triamcinolone (KENALOG) 0.1 % external ointment     Dupilumab (DUPIXENT) 300 MG/2ML syringe     torsemide (DEMADEX) 10 MG tablet     No current facility-administered medications for this visit.       Past Medical History:   Patient Active Problem List   Diagnosis     Itching     AD (atopic dermatitis)     Dermatitis     Hyperglycemia     Eczema, unspecified eczema     Intrinsic atopic dermatitis     Other eczema     Notalgia paresthetica     Rash     Pseudophakia of both eyes     Diabetes mellitus (H)     Presbyopia     Type 2 diabetes mellitus without complication, without long-term current use of insulin (H)     Encounter for long-term current use of high risk medication     Seborrheic keratoses, inflamed     Past Medical History:   Diagnosis Date     Diabetes mellitus (H)      Gout attack      Heart attack (H) 1984     Hypertension        CC Referred Self, MD  No address on file on close of this encounter.

## 2021-03-11 NOTE — PROGRESS NOTES
[facility] Dermatology Note   Encounter Date: Mar 11, 2021  Office Visit     Dermatology Problem List:  # Chronic eczematous dermatitis with chronic pruritis  - Current tx: dupilumab 300mg q14d (has not taken in past month d/t insurance re-evaluating prescription), triamcinolone 0.1% ointment once daily, betamethasone ointment for flare ups.  - Ordered home UVB unit.   - Monitoring labs (CBC, CMP) ordered for methotrexate use.     # Neoplasm of uncertain behavior of skin  - ddx keratoacanthoma vs cyst  - Obtained shave biopsy today to check for keratoacanthoma    ___________________________________________    Assessment & Plan:    # Chronic eczematous dermatitis with pruritis, currently doing well  - Patient has been unable to take his dupilumab for the past month due to issues with insurance. On exam the skin of his bilateral legs appears scaly and dry.   - Patient should continue with dupilumab if insurance will allow. Ordered a UVB unit for patient to use if this medication needs to be discontinued.   - Continue to use triamcinolone cream and betamethasone cream as needed.  - Patient reports he is continuing to take methotrexate once weekly. Will obtain monitoring labs (CBC, CMP).     #. Neoplasm of uncertain behavior of skin  - ddx keratoacanthoma vs cyst vs prurigo nodularis. Given appearance concerning for possible keratoacanthoma, will perform a shave biopsy today.   - SHAVE BIOPSY PROCEDURE NOTE: After written informed consent was obtained, a time out was taken to identify the patient and the correct site for biopsy. The lesion on the left elbow was cleansed with a 70% isopropyl alcohol wipe, and then injected with 1cc of lidocaine 1% with epinephrine 1:100,000. Once anesthesia was ensured, the visible surface of the lesion was biopsied using a [place] blade in standard technique. Hemostasis was obtained with pressure and aluminum chloride 20% solution. The specimen was placed in a  "labeled formalin container and sent to pathology for sectioning and analysis. The wound was dressed with white petrolatum and an adhesive bandage. The patient tolerated the procedure well. Post-procedure instructions and recommendations were provided both verbally and in writing.       Procedures Performed:  Shave biopsy of lesion on left elbow. See procedure note above.     Follow-up in 3 months or sooner with any other concerns.    Radha Kelley, MS4  [facility]  Patient was seen and examined with Dr. Gray.    I was present with the medical student who participated in the service and in the documentation.  I have verified the history and personally performed the physical exam and medical decision making.  I agree with the assessment and plan of care as documented in the note.  I personally performed all procedures.    Miguel Gray MD  Dermatology Attending    ___________________________________________      CC: Derm Problem (Rich is here today for dermatitis. He states \" my skin is doing ok.\")      HPI:  Mr. Deandre Moore is a(n) 80 year old male who presents to clinic today as a return patient for longstanding eczematous dermatitis and a new lesion on his left elbow. He reports that a month ago his insurance said they were \"re-evaluating\" his dupilumab prescription, and he was unable to get a refill. He has noticed some increased scaling on his bilateral legs. He has been applying triamcinolone cream once a day which seems to help. He is also continuing to take methotrexate once a week.     He reports approximately two months ago he noticed a new \"bump\" on his left forearm near the elbow. He has not noticed any bleeding, discharge, burning, or itching. He has never had a lesion like this before. He has no family or personal history of skin cancer.     He is otherwise feeling well and in his usual state of health with no recent illnesses or hospitalizations.    Physical exam:  General: " Pleasant, well-developed, well-nourished man in no acute distress  Eyes: Conjunctivae clear  Resp: Breathing comfortably in no distress  CV: Well-perfused, no cyanosis  Extremities: No deformity, no edema  Skin: Focused examination of the bilateral legs and left elbow was performed.  - Soto skin type: IV  - Dome-shaped firm purple/flesh colored papule with central white area that may represent a keratin plug,on extensor forearm just distal to elbow.  - There is scaling and dry skin on bilateral legs with some scattered erythematous, eczematous plaques.   - No other lesions of concern on areas examined.     Medications:  Current Outpatient Medications   Medication     Alcohol Swabs PADS     allopurinol (ZYLOPRIM) 300 MG tablet     ammonium lactate (LAC-HYDRIN) 12 % external lotion     aspirin 81 MG tablet     atorvastatin (LIPITOR) 40 MG tablet     augmented betamethasone dipropionate (DIPROLENE-AF) 0.05 % ointment     betamethasone dipropionate (DIPROSONE) 0.05 % external ointment     blood glucose (NO BRAND SPECIFIED) lancets standard     blood glucose (NO BRAND SPECIFIED) test strip     Blood Glucose Monitoring Suppl (BLOOD GLUCOSE MONITOR SYSTEM) w/Device KIT     capsaicin (ZOSTRIX) 0.075 % cream     Cholecalciferol (VITAMIN D) 2000 UNITS CAPS     clopidogrel (PLAVIX) 75 MG tablet     COLCHICINE PO     Cyanocobalamin (VITAMIN B12 PO)     Ginkgo Biloba (GNP GINGKO BILOBA EXTRACT PO)     glimepiride (AMARYL) 1 MG tablet     indapamide (LOZOL) 2.5 MG tablet     ipratropium (ATROVENT) 0.03 % nasal spray     irbesartan (AVAPRO) 150 MG tablet     isosorbide mononitrate (IMDUR) 30 MG 24 hr tablet     metFORMIN (GLUCOPHAGE) 1000 MG tablet     multivitamin, therapeutic with minerals (MULTI-VITAMIN) TABS     mupirocin (BACTROBAN) 2 % external ointment     nitroglycerin (NITROSTAT) 0.4 MG SL tablet     omega-3 acid ethyl esters (LOVAZA) 1 g capsule     potassium chloride SA (K-DUR/KLOR-CON M) 20 MEQ CR tablet      triamcinolone (KENALOG) 0.1 % external ointment     Dupilumab (DUPIXENT) 300 MG/2ML syringe     torsemide (DEMADEX) 10 MG tablet     No current facility-administered medications for this visit.       Past Medical History:   Patient Active Problem List   Diagnosis     Itching     AD (atopic dermatitis)     Dermatitis     Hyperglycemia     Eczema, unspecified eczema     Intrinsic atopic dermatitis     Other eczema     Notalgia paresthetica     Rash     Pseudophakia of both eyes     Diabetes mellitus (H)     Presbyopia     Type 2 diabetes mellitus without complication, without long-term current use of insulin (H)     Encounter for long-term current use of high risk medication     Seborrheic keratoses, inflamed     Past Medical History:   Diagnosis Date     Diabetes mellitus (H)      Gout attack      Heart attack (H) 1984     Hypertension        CC Referred Self, MD  No address on file on close of this encounter.

## 2021-03-15 LAB — COPATH REPORT: NORMAL

## 2021-03-15 NOTE — TELEPHONE ENCOUNTER
Called patient and talked to him about sending a picture of the front and back of his insurance card to [employer].  He said he would do that.    Elizabeth

## 2021-03-19 NOTE — TELEPHONE ENCOUNTER
Prior Authorization Approval    Authorization Effective Date:    Authorization Expiration Date: 12/31/2021  Medication: [employer] - Free Drug pending  Approved Dose/Quantity:   Reference #:     Insurance Company: [employer] - Phone [phone] Fax [phone]  Expected CoPay:       CoPay Card Available:      Foundation Assistance Needed:    Which Pharmacy is filling the prescription (Not needed for infusion/clinic administered): [facility] [address]  Pharmacy Notified: Yes  Patient Notified: Yes

## 2021-03-24 NOTE — TELEPHONE ENCOUNTER
Free Drug Application Approved  Effective Dates through 12/31/2021  Patient notified? yes  Additional Information

## 2021-03-26 ENCOUNTER — PRE VISIT (OUTPATIENT)
Dept: ORTHOPEDICS | Facility: CLINIC | Age: 81
End: 2021-03-26

## 2021-03-26 DIAGNOSIS — M25.551 RIGHT HIP PAIN: Primary | ICD-10-CM

## 2021-03-26 NOTE — TELEPHONE ENCOUNTER
PREVISIT INFORMATION                                                    Deandre Moore scheduled for future visit at [facility] specialty clinics.    Patient is scheduled to see Dr. Mittla on 3/26/21  Reason for visit: Right hip pain  Referring provider none.   Has patient seen previous specialist? Possible fusion done 10 years ago.   Medical Records:  Available in chart.  Patient was previously seen at a [facility] or [facility] facility.    REVIEW                                                      New patient packet mailed to patient: Yes  Medication reconciliation complete: Yes      Current Outpatient Medications   Medication Sig Dispense Refill     Alcohol Swabs PADS 1 pad 4 times daily 100 each 6     allopurinol (ZYLOPRIM) 300 MG tablet Take 300 mg by mouth daily       ammonium lactate (LAC-HYDRIN) 12 % external lotion Apply topically 2 times daily Apply twice daily to feet 500 g 1     aspirin 81 MG tablet Take 81 mg by mouth daily.       atorvastatin (LIPITOR) 40 MG tablet Take 1 tablet (40 mg) by mouth daily 90 tablet 4     augmented betamethasone dipropionate (DIPROLENE-AF) 0.05 % ointment Apply topically 2 times daily As needed to itchy areas on back 50 g 3     betamethasone dipropionate (DIPROSONE) 0.05 % external ointment At bedtime apply to ankle and cover with saran wrap (or sock) overnight, wash off in morning 50 g 1     blood glucose (NO BRAND SPECIFIED) lancets standard Use to test blood sugar 3 times daily or as directed. 100 each 11     blood glucose (NO BRAND SPECIFIED) test strip Use to test blood sugar 4 times daily  With meter/ strips/ lancets covered by insurance pt using accuchek 360 each 3     Blood Glucose Monitoring Suppl (BLOOD GLUCOSE MONITOR SYSTEM) w/Device KIT Test 4 times daily with meter covered by  insurance 1 kit 1     capsaicin (ZOSTRIX) 0.075 % cream Apply topically 3 times daily To areas of itch on back.  OK to dilute with cetaphil cream if too  burning. 60 g 3     Cholecalciferol (VITAMIN D) 2000 UNITS CAPS Take 1 tablet by mouth daily       clopidogrel (PLAVIX) 75 MG tablet Take 75 mg by mouth daily       COLCHICINE PO Take 0.6 mg by mouth daily.       Cyanocobalamin (VITAMIN B12 PO) Take 1 tablet by mouth daily       Dupilumab (DUPIXENT) 300 MG/2ML syringe Inject 2 mLs (300 mg) Subcutaneous every 14 days (Patient not taking: Reported on 3/11/2021) 4 mL 4     Ginkgo Biloba (GNP GINGKO BILOBA EXTRACT PO) Take 1 tablet by mouth daily        glimepiride (AMARYL) 1 MG tablet TAKE 1 TABLET TWICE DAILY 180 tablet 1     indapamide (LOZOL) 2.5 MG tablet Take 2.5 mg by mouth every morning.       ipratropium (ATROVENT) 0.03 % nasal spray Spray 2 sprays into both nostrils as needed for rhinitis 30min before cooking 30 mL 1     irbesartan (AVAPRO) 150 MG tablet Take 1 tablet (150 mg) by mouth At Bedtime 90 tablet 2     isosorbide mononitrate (IMDUR) 30 MG 24 hr tablet Take 30 mg by mouth daily       metFORMIN (GLUCOPHAGE) 1000 MG tablet Take 1 tablet (1,000 mg) by mouth 2 times daily (with meals) 180 tablet 2     multivitamin, therapeutic with minerals (MULTI-VITAMIN) TABS Take 1 tablet by mouth daily.       mupirocin (BACTROBAN) 2 % external ointment Apply topically 2 times daily 30 g 0     nitroglycerin (NITROSTAT) 0.4 MG SL tablet Place under the tongue as needed       omega-3 acid ethyl esters (LOVAZA) 1 g capsule Take 2 capsules (2 g) by mouth 2 times daily 180 capsule 3     potassium chloride SA (K-DUR/KLOR-CON M) 20 MEQ CR tablet TAKE 1 TABLET THREE TIMES DAILY 270 tablet 1     torsemide (DEMADEX) 10 MG tablet Take 10 mg by mouth daily       triamcinolone (KENALOG) 0.1 % external ointment APPLY TWICE DAILY TO RAISED RASH AREAS ON THE ARMS, LEGS, BODY AS NEEDED. 454 g 1       Allergies: Beta adrenergic blockers, Latex, Latex, and Tape [adhesive tape]    Will need XR.     PLAN/FOLLOW-UP NEEDED                                                      Previsit review  complete.  Patient will see provider at future scheduled appointment.     Patient Reminders Given:  Please, make sure you bring an updated list of your medications.   If you are having a procedure, please, present 15 minutes early.  If you need to cancel or reschedule,please call [phone].    Christina Fernando RN

## 2021-03-28 ENCOUNTER — HEALTH MAINTENANCE LETTER (OUTPATIENT)
Age: 81
End: 2021-03-28

## 2021-03-30 ENCOUNTER — OFFICE VISIT (OUTPATIENT)
Dept: ORTHOPEDICS | Facility: CLINIC | Age: 81
End: 2021-03-30
Payer: COMMERCIAL

## 2021-03-30 ENCOUNTER — ANCILLARY PROCEDURE (OUTPATIENT)
Dept: GENERAL RADIOLOGY | Facility: CLINIC | Age: 81
End: 2021-03-30
Attending: ORTHOPAEDIC SURGERY
Payer: COMMERCIAL

## 2021-03-30 VITALS
HEIGHT: 73 IN | WEIGHT: 217.7 LBS | BODY MASS INDEX: 28.85 KG/M2 | SYSTOLIC BLOOD PRESSURE: 113 MMHG | DIASTOLIC BLOOD PRESSURE: 68 MMHG

## 2021-03-30 DIAGNOSIS — M25.551 RIGHT HIP PAIN: ICD-10-CM

## 2021-03-30 DIAGNOSIS — G89.29 CHRONIC BILATERAL LOW BACK PAIN WITHOUT SCIATICA: Primary | ICD-10-CM

## 2021-03-30 DIAGNOSIS — M54.50 LOW BACK PAIN: ICD-10-CM

## 2021-03-30 DIAGNOSIS — M54.50 LOW BACK PAIN: Primary | ICD-10-CM

## 2021-03-30 DIAGNOSIS — M54.50 CHRONIC BILATERAL LOW BACK PAIN WITHOUT SCIATICA: Primary | ICD-10-CM

## 2021-03-30 PROCEDURE — 72100 X-RAY EXAM L-S SPINE 2/3 VWS: CPT | Performed by: RADIOLOGY

## 2021-03-30 PROCEDURE — 99203 OFFICE O/P NEW LOW 30 MIN: CPT | Performed by: ORTHOPAEDIC SURGERY

## 2021-03-30 PROCEDURE — 73521 X-RAY EXAM HIPS BI 2 VIEWS: CPT | Performed by: RADIOLOGY

## 2021-03-30 ASSESSMENT — MIFFLIN-ST. JEOR: SCORE: 1741.36

## 2021-03-30 NOTE — LETTER
3/30/2021         RE: Deandre Moore  [address] [facility] [address]        Dear Colleague,    Thank you for referring your patient, Deandre Moore, to the [facility]. Please see a copy of my visit note below.    Assessment: This is a 80 year old with history of lumbar spine fusion and increasingly severe low back pain. Associated with adjacent segment degeneration. Normal hip.  He reports that he has had several LESI with minimal mid term relief. He would like a second opinion.     Plan:  Referral to [facility] spine     Chief Complaint: Consult (Right hip)      Physician:  No ref. provider found    HPI: Deandre Moore is a 80 year old male who presents today for evaluation of his hips.     Symptom Profile  Location of symptoms:  Low back. Denies groin pain.   Onset: insidious  Trend: getting worse  Duration of symptoms: > 2 years   Quality of symptoms: aching, sharp/stabbing  Severity: moderate to severe   Alleviate:activity modification,   Exacerbating: activities, waling   Previous Treatments: Previous treatments include activity modification, oral pain medication, single level lumbar spine fusion, Abbott       MEDICAL HISTORY:   Past Medical History:   Diagnosis Date     Diabetes mellitus (H)      Gout attack      Heart attack (H) 1984     Hypertension        Medications:     Current Outpatient Medications:      Alcohol Swabs PADS, 1 pad 4 times daily, Disp: 100 each, Rfl: 6     allopurinol (ZYLOPRIM) 300 MG tablet, Take 300 mg by mouth daily, Disp: , Rfl:      ammonium lactate (LAC-HYDRIN) 12 % external lotion, Apply topically 2 times daily Apply twice daily to feet, Disp: 500 g, Rfl: 1     aspirin 81 MG tablet, Take 81 mg by mouth daily., Disp: , Rfl:      atorvastatin (LIPITOR) 40 MG tablet, Take 1 tablet (40 mg) by mouth daily, Disp: 90 tablet, Rfl: 4     augmented betamethasone dipropionate (DIPROLENE-AF) 0.05 % ointment, Apply topically 2 times daily As  needed to itchy areas on back, Disp: 50 g, Rfl: 3     betamethasone dipropionate (DIPROSONE) 0.05 % external ointment, At bedtime apply to ankle and cover with saran wrap (or sock) overnight, wash off in morning, Disp: 50 g, Rfl: 1     blood glucose (NO BRAND SPECIFIED) lancets standard, Use to test blood sugar 3 times daily or as directed., Disp: 100 each, Rfl: 11     blood glucose (NO BRAND SPECIFIED) test strip, Use to test blood sugar 4 times daily  With meter/ strips/ lancets covered by insurance pt using accuchek, Disp: 360 each, Rfl: 3     Blood Glucose Monitoring Suppl (BLOOD GLUCOSE MONITOR SYSTEM) w/Device KIT, Test 4 times daily with meter covered by  insurance, Disp: 1 kit, Rfl: 1     capsaicin (ZOSTRIX) 0.075 % cream, Apply topically 3 times daily To areas of itch on back.  OK to dilute with cetaphil cream if too burning., Disp: 60 g, Rfl: 3     Cholecalciferol (VITAMIN D) 2000 UNITS CAPS, Take 1 tablet by mouth daily, Disp: , Rfl:      clopidogrel (PLAVIX) 75 MG tablet, Take 75 mg by mouth daily, Disp: , Rfl:      COLCHICINE PO, Take 0.6 mg by mouth daily., Disp: , Rfl:      Cyanocobalamin (VITAMIN B12 PO), Take 1 tablet by mouth daily, Disp: , Rfl:      Ginkgo Biloba (GNP GINGKO BILOBA EXTRACT PO), Take 1 tablet by mouth daily , Disp: , Rfl:      glimepiride (AMARYL) 1 MG tablet, TAKE 1 TABLET TWICE DAILY, Disp: 180 tablet, Rfl: 1     indapamide (LOZOL) 2.5 MG tablet, Take 2.5 mg by mouth every morning., Disp: , Rfl:      ipratropium (ATROVENT) 0.03 % nasal spray, Spray 2 sprays into both nostrils as needed for rhinitis 30min before cooking, Disp: 30 mL, Rfl: 1     irbesartan (AVAPRO) 150 MG tablet, Take 1 tablet (150 mg) by mouth At Bedtime, Disp: 90 tablet, Rfl: 2     isosorbide mononitrate (IMDUR) 30 MG 24 hr tablet, Take 30 mg by mouth daily, Disp: , Rfl:      metFORMIN (GLUCOPHAGE) 1000 MG tablet, Take 1 tablet (1,000 mg) by mouth 2 times daily (with meals), Disp: 180 tablet, Rfl: 2      multivitamin, therapeutic with minerals (MULTI-VITAMIN) TABS, Take 1 tablet by mouth daily., Disp: , Rfl:      mupirocin (BACTROBAN) 2 % external ointment, Apply topically 2 times daily, Disp: 30 g, Rfl: 0     nitroglycerin (NITROSTAT) 0.4 MG SL tablet, Place under the tongue as needed, Disp: , Rfl:      omega-3 acid ethyl esters (LOVAZA) 1 g capsule, Take 2 capsules (2 g) by mouth 2 times daily, Disp: 180 capsule, Rfl: 3     potassium chloride SA (K-DUR/KLOR-CON M) 20 MEQ CR tablet, TAKE 1 TABLET THREE TIMES DAILY, Disp: 270 tablet, Rfl: 1     torsemide (DEMADEX) 10 MG tablet, Take 10 mg by mouth daily, Disp: , Rfl:      triamcinolone (KENALOG) 0.1 % external ointment, APPLY TWICE DAILY TO RAISED RASH AREAS ON THE ARMS, LEGS, BODY AS NEEDED., Disp: 454 g, Rfl: 1     Dupilumab (DUPIXENT) 300 MG/2ML syringe, Inject 2 mLs (300 mg) Subcutaneous every 14 days (Patient not taking: Reported on 3/11/2021), Disp: 4 mL, Rfl: 4    Allergies: Beta adrenergic blockers, Latex, Latex, and Tape [adhesive tape]    SURGICAL HISTORY:   Past Surgical History:   Procedure Laterality Date     CARDIAC SURGERY  1984    4 vessel bypass     CHOLECYSTECTOMY       COLONOSCOPY       ESOPHAGOSCOPY, GASTROSCOPY, DUODENOSCOPY (EGD), COMBINED N/A 3/1/2018    Procedure: COMBINED ESOPHAGOSCOPY, GASTROSCOPY, DUODENOSCOPY (EGD), BIOPSY SINGLE OR MULTIPLE;  ESOPHAGOGASTRODUODENOSCOPY ;  Surgeon: Daryn Medrano MD;  Location:  GI     NO HISTORY OF SURGERY  3/31/14    derm     PHACOEMULSIFICATION WITH STANDARD INTRAOCULAR LENS IMPLANT  1/21/2013    Procedure: PHACOEMULSIFICATION WITH STANDARD INTRAOCULAR LENS IMPLANT;  Phacoemulsification with standard intraocular lens implant, right eye;  Surgeon: Jay Rodriges MD;  Location:  OR       FAMILY HISTORY:   Family History   Problem Relation Age of Onset     Cancer No family hx of         no skin cancer     Skin Cancer No family hx of        SOCIAL HISTORY:   Social History     Tobacco  "Use     Smoking status: Former Smoker     Quit date: 10/15/1984     Years since quittin.4     Smokeless tobacco: Never Used   Substance Use Topics     Alcohol use: Yes     Comment: occasional-once a year       REVIEW OF SYSTEMS:  The comprehensive review of systems from the intake form was reviewed with the patient.  No fever, weight change or fatigue. No dry eyes. No oral ulcers, sore throat or voice change. No palpitations, syncope, angina or edema.  No chest pain, excessive sleepiness, shortness of breath or hemoptysis.   No abdominal pain, nausea, vomiting, diarrhea or heartburn.  No skin rash. No focal weakness or numbness. No bleeding or lymphadenopathy. No rhinitis or hives.     Exam:  On physical examination the patient appears the stated age, is in no acute distress, affectThe is appropriate, and breathing is non-labored.  Vitals are documented in the EMR and have been reviewed:    /68   Ht 1.854 m (6' 1\")   Wt 98.7 kg (217 lb 11.2 oz)   BMI 28.72 kg/m    6' 1\"  Body mass index is 28.72 kg/m .    Rises from chair:slwoly  Gait: slooped, slow, non-antalgic    RIGHTand left hip examination symmetric    hip subjective: not irritated   Abd:20-  Add:10  PFC:  Flexion: 100  IRF:0  ERF:25  Impingement test:-    Distally, the circulatory, motor, and sensation exam is intact with 5/5 EHL, gastroc-soleus, and tibialis anterior.  Sensation to light touch is intact.  Dorsalis pedis and posterior tibialis pulses are not palpable in this warm, well perfused feet..  There are no sores on the feet but evidence of multiple previous, healed skin ulcers. and 2+ lymphedema.    X-rays:   Study Result    Exam: 2 views of the lumbar spine dated 3/30/2021.     COMPARISON: None.     CLINICAL HISTORY: Low back pain.     FINDINGS: AP and lateral views of the lumbar spine were obtained.  There are 5 lumbar type vertebral bodies for the purposes of this  dictation. Postsurgical changes of posterior fusion instrumentation " in  the lumbar spine with hardware noted posteriorly at L4-L5. Multilevel  disc space narrowing throughout the lumbar spine. Trace retrolisthesis  of L2 in relation to L3. Calcification in the distal aorta and iliac  vessels. Degenerative changes of the hip joints.                                                                      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       Study Result    Exam: Single frontal view both hips and a cross table lateral view of  the right hip dated 3/30/2021.     COMPARISON: None.     CLINICAL HISTORY: Right hip pain.     FINDINGS: Single frontal view of both hips and a cross table lateral  view of the right hip was obtained. Crosstable lateral view is  suboptimal. Mild osteoarthrosis in both hip joints with spurring at  the acetabulum of both hips, mild joint space narrowing, and mild  spurring at the femoral head and neck junction. No femoral head  collapse. No displaced fractures. Vascular calcifications are noted.                                                                      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             Again, thank you for allowing me to participate in the care of your patient.        Sincerely,        Edson Mittal MD

## 2021-03-30 NOTE — PROGRESS NOTES
Assessment: This is a 80 year old with history of lumbar spine fusion and increasingly severe low back pain. Associated with adjacent segment degeneration. Normal hip.  He reports that he has had several LESI with minimal mid term relief. He would like a second opinion.     Plan:  Referral to [facility] spine     Chief Complaint: Consult (Right hip)      Physician:  No ref. provider found    HPI: Deandre Moore is a 80 year old male who presents today for evaluation of his hips.     Symptom Profile  Location of symptoms:  Low back. Denies groin pain.   Onset: insidious  Trend: getting worse  Duration of symptoms: > 2 years   Quality of symptoms: aching, sharp/stabbing  Severity: moderate to severe   Alleviate:activity modification,   Exacerbating: activities, waling   Previous Treatments: Previous treatments include activity modification, oral pain medication, single level lumbar spine fusion, [facility]       MEDICAL HISTORY:   Past Medical History:   Diagnosis Date     Diabetes mellitus (H)      Gout attack      Heart attack (H) 1984     Hypertension        Medications:     Current Outpatient Medications:      Alcohol Swabs PADS, 1 pad 4 times daily, Disp: 100 each, Rfl: 6     allopurinol (ZYLOPRIM) 300 MG tablet, Take 300 mg by mouth daily, Disp: , Rfl:      ammonium lactate (LAC-HYDRIN) 12 % external lotion, Apply topically 2 times daily Apply twice daily to feet, Disp: 500 g, Rfl: 1     aspirin 81 MG tablet, Take 81 mg by mouth daily., Disp: , Rfl:      atorvastatin (LIPITOR) 40 MG tablet, Take 1 tablet (40 mg) by mouth daily, Disp: 90 tablet, Rfl: 4     augmented betamethasone dipropionate (DIPROLENE-AF) 0.05 % ointment, Apply topically 2 times daily As needed to itchy areas on back, Disp: 50 g, Rfl: 3     betamethasone dipropionate (DIPROSONE) 0.05 % external ointment, At bedtime apply to ankle and cover with saran wrap (or sock) overnight, wash off in morning, Disp: 50 g, Rfl: 1     blood glucose (NO BRAND  SPECIFIED) lancets standard, Use to test blood sugar 3 times daily or as directed., Disp: 100 each, Rfl: 11     blood glucose (NO BRAND SPECIFIED) test strip, Use to test blood sugar 4 times daily  With meter/ strips/ lancets covered by insurance pt using accuchek, Disp: 360 each, Rfl: 3     Blood Glucose Monitoring Suppl (BLOOD GLUCOSE MONITOR SYSTEM) w/Device KIT, Test 4 times daily with meter covered by  insurance, Disp: 1 kit, Rfl: 1     capsaicin (ZOSTRIX) 0.075 % cream, Apply topically 3 times daily To areas of itch on back.  OK to dilute with cetaphil cream if too burning., Disp: 60 g, Rfl: 3     Cholecalciferol (VITAMIN D) 2000 UNITS CAPS, Take 1 tablet by mouth daily, Disp: , Rfl:      clopidogrel (PLAVIX) 75 MG tablet, Take 75 mg by mouth daily, Disp: , Rfl:      COLCHICINE PO, Take 0.6 mg by mouth daily., Disp: , Rfl:      Cyanocobalamin (VITAMIN B12 PO), Take 1 tablet by mouth daily, Disp: , Rfl:      Ginkgo Biloba (GNP GINGKO BILOBA EXTRACT PO), Take 1 tablet by mouth daily , Disp: , Rfl:      glimepiride (AMARYL) 1 MG tablet, TAKE 1 TABLET TWICE DAILY, Disp: 180 tablet, Rfl: 1     indapamide (LOZOL) 2.5 MG tablet, Take 2.5 mg by mouth every morning., Disp: , Rfl:      ipratropium (ATROVENT) 0.03 % nasal spray, Spray 2 sprays into both nostrils as needed for rhinitis 30min before cooking, Disp: 30 mL, Rfl: 1     irbesartan (AVAPRO) 150 MG tablet, Take 1 tablet (150 mg) by mouth At Bedtime, Disp: 90 tablet, Rfl: 2     isosorbide mononitrate (IMDUR) 30 MG 24 hr tablet, Take 30 mg by mouth daily, Disp: , Rfl:      metFORMIN (GLUCOPHAGE) 1000 MG tablet, Take 1 tablet (1,000 mg) by mouth 2 times daily (with meals), Disp: 180 tablet, Rfl: 2     multivitamin, therapeutic with minerals (MULTI-VITAMIN) TABS, Take 1 tablet by mouth daily., Disp: , Rfl:      mupirocin (BACTROBAN) 2 % external ointment, Apply topically 2 times daily, Disp: 30 g, Rfl: 0     nitroglycerin (NITROSTAT) 0.4 MG SL tablet, Place under the  tongue as needed, Disp: , Rfl:      omega-3 acid ethyl esters (LOVAZA) 1 g capsule, Take 2 capsules (2 g) by mouth 2 times daily, Disp: 180 capsule, Rfl: 3     potassium chloride SA (K-DUR/KLOR-CON M) 20 MEQ CR tablet, TAKE 1 TABLET THREE TIMES DAILY, Disp: 270 tablet, Rfl: 1     torsemide (DEMADEX) 10 MG tablet, Take 10 mg by mouth daily, Disp: , Rfl:      triamcinolone (KENALOG) 0.1 % external ointment, APPLY TWICE DAILY TO RAISED RASH AREAS ON THE ARMS, LEGS, BODY AS NEEDED., Disp: 454 g, Rfl: 1     Dupilumab (DUPIXENT) 300 MG/2ML syringe, Inject 2 mLs (300 mg) Subcutaneous every 14 days (Patient not taking: Reported on 3/11/2021), Disp: 4 mL, Rfl: 4    Allergies: Beta adrenergic blockers, Latex, Latex, and Tape [adhesive tape]    SURGICAL HISTORY:   Past Surgical History:   Procedure Laterality Date     CARDIAC SURGERY      4 vessel bypass     CHOLECYSTECTOMY       COLONOSCOPY       ESOPHAGOSCOPY, GASTROSCOPY, DUODENOSCOPY (EGD), COMBINED N/A 3/1/2018    Procedure: COMBINED ESOPHAGOSCOPY, GASTROSCOPY, DUODENOSCOPY (EGD), BIOPSY SINGLE OR MULTIPLE;  ESOPHAGOGASTRODUODENOSCOPY ;  Surgeon: Daryn Medrano MD;  Location: [facility]     NO HISTORY OF SURGERY  3/31/14    derm     PHACOEMULSIFICATION WITH STANDARD INTRAOCULAR LENS IMPLANT  2013    Procedure: PHACOEMULSIFICATION WITH STANDARD INTRAOCULAR LENS IMPLANT;  Phacoemulsification with standard intraocular lens implant, right eye;  Surgeon: Jay Rodriges MD;  Location:  OR       FAMILY HISTORY:   Family History   Problem Relation Age of Onset     Cancer No family hx of         no skin cancer     Skin Cancer No family hx of        SOCIAL HISTORY:   Social History     Tobacco Use     Smoking status: Former Smoker     Quit date: 10/15/1984     Years since quittin.4     Smokeless tobacco: Never Used   Substance Use Topics     Alcohol use: Yes     Comment: occasional-once a year       REVIEW OF SYSTEMS:  The comprehensive review of  "systems from the intake form was reviewed with the patient.  No fever, weight change or fatigue. No dry eyes. No oral ulcers, sore throat or voice change. No palpitations, syncope, angina or edema.  No chest pain, excessive sleepiness, shortness of breath or hemoptysis.   No abdominal pain, nausea, vomiting, diarrhea or heartburn.  No skin rash. No focal weakness or numbness. No bleeding or lymphadenopathy. No rhinitis or hives.     Exam:  On physical examination the patient appears the stated age, is in no acute distress, affectThe is appropriate, and breathing is non-labored.  Vitals are documented in the EMR and have been reviewed:    /68   Ht 1.854 m (6' 1\")   Wt 98.7 kg (217 lb 11.2 oz)   BMI 28.72 kg/m    6' 1\"  Body mass index is 28.72 kg/m .    Rises from chair:slwoly  Gait: slooped, slow, non-antalgic    RIGHTand left hip examination symmetric    hip subjective: not irritated   Abd:20-  Add:10  PFC:  Flexion: 100  IRF:0  ERF:25  Impingement test:-    Distally, the circulatory, motor, and sensation exam is intact with 5/5 EHL, gastroc-soleus, and tibialis anterior.  Sensation to light touch is intact.  Dorsalis pedis and posterior tibialis pulses are not palpable in this warm, well perfused feet..  There are no sores on the feet but evidence of multiple previous, healed skin ulcers. and 2+ lymphedema.    X-rays:   Study Result    Exam: 2 views of the lumbar spine dated 3/30/2021.     COMPARISON: None.     CLINICAL HISTORY: Low back pain.     FINDINGS: AP and lateral views of the lumbar spine were obtained.  There are 5 lumbar type vertebral bodies for the purposes of this  dictation. Postsurgical changes of posterior fusion instrumentation in  the lumbar spine with hardware noted posteriorly at L4-L5. Multilevel  disc space narrowing throughout the lumbar spine. Trace retrolisthesis  of L2 in relation to L3. Calcification in the distal aorta and iliac  vessels. Degenerative changes of the hip " joints.                                                                      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       Study Result    Exam: Single frontal view both hips and a cross table lateral view of  the right hip dated 3/30/2021.     COMPARISON: None.     CLINICAL HISTORY: Right hip pain.     FINDINGS: Single frontal view of both hips and a cross table lateral  view of the right hip was obtained. Crosstable lateral view is  suboptimal. Mild osteoarthrosis in both hip joints with spurring at  the acetabulum of both hips, mild joint space narrowing, and mild  spurring at the femoral head and neck junction. No femoral head  collapse. No displaced fractures. Vascular calcifications are noted.                                                                      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

## 2021-03-31 NOTE — TELEPHONE ENCOUNTER
RECORDS RECEIVED FROM: Low back pain [M54.5]   DATE RECEIVED: Apr 6, 2021     NOTES STATUS DETAILS   OFFICE NOTE from referring provider Internal  Edson Mittal MD        OFFICE NOTE from other specialist N/A    DISCHARGE SUMMARY from hospital N/A    DISCHARGE REPORT from the ER N/A    OPERATIVE REPORT Care Everywhere 5/21/09 Violette Verma, [facility]     MEDICATION LIST Internal    IMPLANT RECORD/STICKER Care Everywhere    LABS     CBC/DIFF N/A    CULTURES N/A    INJECTIONS DONE IN RADIOLOGY N/A    MRI In process    CT SCAN In process    XRAYS (IMAGES & REPORTS) In process    TUMOR     PATHOLOGY  Slides & report N/A    04/05/21   4:32 PM   RESOLVED [facility] IMAGES IN PACS  COMPLETE  Genevieve Swartz CMA    04/05/21   9:50 AM   [facility] AND [facility] IMAGES IN PACS  2ND REQUEST SENT TO URIAH Swartz CMA    03/31/21   2:13 PM   REQUEST SENT TO [facility] FOR LUMBAR/HIP/PELVIS IMAGES [phone]  CALLED [facility] FOR IMAGES  CALLED [facility] FOR IMAGES  Genevieve Swartz CMA

## 2021-04-05 PROBLEM — D48.5 NEOPLASM OF UNCERTAIN BEHAVIOR OF SKIN: Status: ACTIVE | Noted: 2021-04-05

## 2021-04-05 PROBLEM — Z79.899 ENCOUNTER FOR LONG-TERM (CURRENT) USE OF HIGH-RISK MEDICATION: Status: ACTIVE | Noted: 2017-06-28

## 2021-04-06 ENCOUNTER — PRE VISIT (OUTPATIENT)
Dept: ORTHOPEDICS | Facility: CLINIC | Age: 81
End: 2021-04-06

## 2021-04-13 ENCOUNTER — TELEPHONE (OUTPATIENT)
Dept: ENDOCRINOLOGY | Facility: CLINIC | Age: 81
End: 2021-04-13

## 2021-04-13 NOTE — LETTER
Patient:  Deandre Moore  :   1940  MRN:     [id number]        Mr.Richard LELAND Moore  [address] [facility] [address]        2021    Dear ,    I see that you do not have a follow-up appointment in endocrinology. I would recommend that you be evaluated by an endocrinologist to minimize complications. If you like to be seen at the [facility], please call [phone] select option #1 for an appointment.    Regards    Trudy Campa MD

## 2021-04-19 ENCOUNTER — TELEPHONE (OUTPATIENT)
Dept: DERMATOLOGY | Facility: CLINIC | Age: 81
End: 2021-04-19

## 2021-04-19 NOTE — TELEPHONE ENCOUNTER
[facility] Call Center    Phone Message    May a detailed message be left on voicemail: yes     Reason for Call: Symptoms or Concerns     If patient has red-flag symptoms, warm transfer to triage line    Current symptom or concern: Pain in the eye.    Symptoms have been present for:  2 week(s)    Has patient previously been seen for this? No    By : NA    Date: NA    Are there any new or worsening symptoms? No - comes and goes, saw on TV to confirm with of side affects with pain in the eye. If having side affects from this Rx.  Please call Pt back about side affects. Thank you      Action Taken: Message routed to:  [facility] ([facility] Derm    Travel Screening: Not Applicable

## 2021-04-21 NOTE — TELEPHONE ENCOUNTER
Patient states for the past 2 months he will get a pain in his eye that last a few days after his injection of Dupixent. He would like Dr. Gray's thoughts on this.    Will discuss with Dr. Gray tomorrow in clinic.    ANAYELI Sunshine

## 2021-04-29 NOTE — TELEPHONE ENCOUNTER
Writer called and let Charles know to use eye drops. Patient states he has a black spot that he is concerned about. Appointment made with Dr. Rivera on 5/6/21 at 1PM.    ANAYELI Sunshine

## 2021-05-03 ENCOUNTER — TRANSFERRED RECORDS (OUTPATIENT)
Dept: HEALTH INFORMATION MANAGEMENT | Facility: CLINIC | Age: 81
End: 2021-05-03

## 2021-05-06 ENCOUNTER — OFFICE VISIT (OUTPATIENT)
Dept: DERMATOLOGY | Facility: CLINIC | Age: 81
End: 2021-05-06
Payer: COMMERCIAL

## 2021-05-06 DIAGNOSIS — L20.84 INTRINSIC ATOPIC DERMATITIS: ICD-10-CM

## 2021-05-06 DIAGNOSIS — D48.5 NEOPLASM OF UNCERTAIN BEHAVIOR OF SKIN: ICD-10-CM

## 2021-05-06 DIAGNOSIS — L28.1 PRURIGO NODULARIS: Primary | ICD-10-CM

## 2021-05-06 PROCEDURE — 99213 OFFICE O/P EST LOW 20 MIN: CPT | Mod: 25 | Performed by: DERMATOLOGY

## 2021-05-06 PROCEDURE — 11900 INJECT SKIN LESIONS </W 7: CPT | Mod: XS | Performed by: DERMATOLOGY

## 2021-05-06 PROCEDURE — 88305 TISSUE EXAM BY PATHOLOGIST: CPT | Mod: 26 | Performed by: DERMATOLOGY

## 2021-05-06 PROCEDURE — 11102 TANGNTL BX SKIN SINGLE LES: CPT | Performed by: DERMATOLOGY

## 2021-05-06 RX ORDER — TRIAMCINOLONE ACETONIDE 40 MG/ML
20 INJECTION, SUSPENSION INTRA-ARTICULAR; INTRAMUSCULAR ONCE
Status: DISCONTINUED | OUTPATIENT
Start: 2021-05-06 | End: 2023-01-01

## 2021-05-06 ASSESSMENT — PAIN SCALES - GENERAL: PAINLEVEL: NO PAIN (0)

## 2021-05-06 NOTE — NURSING NOTE
Dermatology Rooming Note    Deandre Moore's goals for this visit include:   Chief Complaint   Patient presents with     Skin Check     Rich is here today in order to have a spot of concern examined. He states that he first started noticing the spot about a month ago. He has not noticed any change.     Hasrhad Mujica, EMT

## 2021-05-06 NOTE — PROGRESS NOTES
[facility] Dermatology Note  Encounter Date: May 6, 2021  Office Visit     Dermatology Problem List:  # Chronic eczematous dermatitis with chronic pruritis  - Current tx: dupilumab 300mg q14d (has not taken in past month d/t insurance re-evaluating prescription), triamcinolone 0.1% ointment once daily, betamethasone ointment for flare ups.  - Ordered home UVB unit.   - Monitoring labs (CBC, CMP) ordered for methotrexate use.   # Prurigo nodularis, left forearm. S/p bx.   - ILK 20 mg/cc on 5/6/21.   #NUB, R axilla. Ddx flat SK, nevus, r/o atypia. S/p shave bx 5/6/21    ____________________________________________    Assessment & Plan:     # Chronic eczematous dermatitis / pruritus. Improved.   - Continue dupixent  - Continue triam 0.1% ointment BID PRN   - Continue betamethasone 0.05% ointment BID PRN flares    # Neoplasm of uncertain behavior of skin, right axilla. The differential diagnosis includes nevus, flat SK, r/o atypia.   - Shave biopsy as below    # Prurigo nodularis. Biopsy proven. In setting of history of eczematous dermatitis.  - Continue dupixent  - Can use betamethasone 0.05% ointment BID PRN flares  - ILK as below for persistent lesion    Procedures Performed:   - Shave biopsy procedure note, location(s): right axilla. After discussion of benefits and risks including but not limited to bleeding, infection, scar, incomplete removal, recurrence, and non-diagnostic biopsy, written consent and photographs were obtained. The area was cleaned with isopropyl alcohol. 0.5mL of 1% lidocaine with epinephrine was injected to obtain adequate anesthesia of lesion(s). Shave biopsy at site(s) performed. Hemostasis was achieved with aluminium chloride. Petrolatum ointment and a sterile dressing were applied. The patient tolerated the procedure and no complications were noted. The patient was provided with verbal and written post care instructions.   - Intra-lesional triamcinolone procedure note.  After positioning and cleansing with isopropyl alcohol, 0.4 total mL of triamcinolone 20 mg/mL was injected into 1 lesion(s) on the left forearm/elbow. The patient tolerated the procedure well and left the dermatology clinic in good condition.    Follow-up: pending path results    Staff:     Edu Rivera MD  Pronouns: he/him/his    Department of Dermatology  [facility]: Phone: [phone], Fax:[phone]  [facility]: Phone: [phone] Fax: [phone]    ____________________________________________    CC: Skin Check (Rich is here today in order to have a spot of concern examined. He states that he first started noticing the spot about a month ago. He has not noticed any change.)    HPI:  Mr. Deandre Moore is a(n) 80 year old male who presents today as a return patient for evaluation of a lesion of concern. Last seen by Dr. Gray on 3/11/21 when he has biopsy of a spot on his left forearm biopsied c/w prurigo nodularis.     Today, he returns for evaluation of a few areas of concern:    (1) Spot on the left forearm at prior biopsy site is still raised, hard and itchy. He is interested in treatment options for it. He is currently not using an topical therapies.     (2) Patient reports a new brown spot in his right axilla. He states first noticed about 2-3 months ago and the spot has grown in size. No similar spot in that area. He reports no personal or family history of skin cancer.     (3) States eczema is improved from prior. He is using dupixent and topical therapies PRN.     Patient is otherwise feeling well, without additional skin concerns.     Labs Reviewed:  N/A    Physical Exam:  Vitals: There were no vitals taken for this visit.  SKIN: Focused examination of upper extremities, axilla was performed.  - On the left forearm, there is a firm, excoriated crusted nodule.  - On  the right axilla, there is a 7-8 mm well-demarcated, slightly annular appearing brown patch with normal reticular pigment network appreciated under dermoscopy with slightly darkening on periphery.   - Mild xerosis cutis elsewhere  - No other lesions of concern on areas examined.           Medications:  Current Outpatient Medications   Medication     Alcohol Swabs PADS     allopurinol (ZYLOPRIM) 300 MG tablet     ammonium lactate (LAC-HYDRIN) 12 % external lotion     aspirin 81 MG tablet     atorvastatin (LIPITOR) 40 MG tablet     augmented betamethasone dipropionate (DIPROLENE-AF) 0.05 % ointment     betamethasone dipropionate (DIPROSONE) 0.05 % external ointment     blood glucose (NO BRAND SPECIFIED) lancets standard     blood glucose (NO BRAND SPECIFIED) test strip     Blood Glucose Monitoring Suppl (BLOOD GLUCOSE MONITOR SYSTEM) w/Device KIT     capsaicin (ZOSTRIX) 0.075 % cream     Cholecalciferol (VITAMIN D) 2000 UNITS CAPS     clopidogrel (PLAVIX) 75 MG tablet     COLCHICINE PO     Cyanocobalamin (VITAMIN B12 PO)     Ginkgo Biloba (GNP GINGKO BILOBA EXTRACT PO)     glimepiride (AMARYL) 1 MG tablet     indapamide (LOZOL) 2.5 MG tablet     ipratropium (ATROVENT) 0.03 % nasal spray     irbesartan (AVAPRO) 150 MG tablet     isosorbide mononitrate (IMDUR) 30 MG 24 hr tablet     metFORMIN (GLUCOPHAGE) 1000 MG tablet     multivitamin, therapeutic with minerals (MULTI-VITAMIN) TABS     mupirocin (BACTROBAN) 2 % external ointment     nitroglycerin (NITROSTAT) 0.4 MG SL tablet     omega-3 acid ethyl esters (LOVAZA) 1 g capsule     potassium chloride SA (K-DUR/KLOR-CON M) 20 MEQ CR tablet     torsemide (DEMADEX) 10 MG tablet     triamcinolone (KENALOG) 0.1 % external ointment     Dupilumab (DUPIXENT) 300 MG/2ML syringe     No current facility-administered medications for this visit.       Past Medical History:   Patient Active Problem List   Diagnosis     Itching     AD (atopic dermatitis)     Dermatitis      Hyperglycemia     Eczema, unspecified eczema     Intrinsic atopic dermatitis     Other eczema     Notalgia paresthetica     Rash     Pseudophakia of both eyes     Diabetes mellitus (H)     Presbyopia     Type 2 diabetes mellitus without complication, without long-term current use of insulin (H)     Encounter for long-term (current) use of high-risk medication     Seborrheic keratoses, inflamed     Neoplasm of uncertain behavior of skin     Past Medical History:   Diagnosis Date     Diabetes mellitus (H)      Gout attack      Heart attack (H) 1984     Hypertension

## 2021-05-06 NOTE — PATIENT INSTRUCTIONS

## 2021-05-06 NOTE — LETTER
5/6/2021       RE: Deandre Moore  [address] [facility] [address]     Dear Colleague,    Thank you for referring your patient, Deandre Moore, to the [facility]MAT[facility] [place] [facility]. Please see a copy of my visit note below.    [facility] Dermatology Note  Encounter Date: May 6, 2021  Office Visit     Dermatology Problem List:  # Chronic eczematous dermatitis with chronic pruritis  - Current tx: dupilumab 300mg q14d (has not taken in past month d/t insurance re-evaluating prescription), triamcinolone 0.1% ointment once daily, betamethasone ointment for flare ups.  - Ordered home UVB unit.   - Monitoring labs (CBC, CMP) ordered for methotrexate use.   # Prurigo nodularis, left forearm. S/p bx.   - ILK 20 mg/cc on 5/6/21.   #NUB, R axilla. Ddx flat SK, nevus, r/o atypia. S/p shave bx 5/6/21    ____________________________________________    Assessment & Plan:     # Chronic eczematous dermatitis / pruritus. Improved.   - Continue dupixent  - Continue triam 0.1% ointment BID PRN   - Continue betamethasone 0.05% ointment BID PRN flares    # Neoplasm of uncertain behavior of skin, right axilla. The differential diagnosis includes nevus, flat SK, r/o atypia.   - Shave biopsy as below    # Prurigo nodularis. Biopsy proven. In setting of history of eczematous dermatitis.  - Continue dupixent  - Can use betamethasone 0.05% ointment BID PRN flares  - ILK as below for persistent lesion    Procedures Performed:   - Shave biopsy procedure note, location(s): right axilla. After discussion of benefits and risks including but not limited to bleeding, infection, scar, incomplete removal, recurrence, and non-diagnostic biopsy, written consent and photographs were obtained. The area was cleaned with isopropyl alcohol. 0.5mL of 1% lidocaine with epinephrine was injected to obtain adequate anesthesia of lesion(s). Shave  biopsy at site(s) performed. Hemostasis was achieved with aluminium chloride. Petrolatum ointment and a sterile dressing were applied. The patient tolerated the procedure and no complications were noted. The patient was provided with verbal and written post care instructions.   - Intra-lesional triamcinolone procedure note. After positioning and cleansing with isopropyl alcohol, 0.4 total mL of triamcinolone 20 mg/mL was injected into 1 lesion(s) on the left forearm/elbow. The patient tolerated the procedure well and left the dermatology clinic in good condition.    Follow-up: pending path results    Staff:     Edu Rivera MD  Pronouns: he/him/his    Department of Dermatology  [facility]: Phone: [phone], Fax:[phone]  [facility]: Phone: [phone] Fax: [phone]    ____________________________________________    CC: Skin Check (Rich is here today in order to have a spot of concern examined. He states that he first started noticing the spot about a month ago. He has not noticed any change.)    HPI:  Mr. Deandre Moore is a(n) 80 year old male who presents today as a return patient for evaluation of a lesion of concern. Last seen by Dr. Gray on 3/11/21 when he has biopsy of a spot on his left forearm biopsied c/w prurigo nodularis.     Today, he returns for evaluation of a few areas of concern:    (1) Spot on the left forearm at prior biopsy site is still raised, hard and itchy. He is interested in treatment options for it. He is currently not using an topical therapies.     (2) Patient reports a new brown spot in his right axilla. He states first noticed about 2-3 months ago and the spot has grown in size. No similar spot in that area. He reports no personal or family history of skin cancer.     (3) States eczema is improved from prior. He is using dupixent and topical  therapies PRN.     Patient is otherwise feeling well, without additional skin concerns.     Labs Reviewed:  N/A    Physical Exam:  Vitals: There were no vitals taken for this visit.  SKIN: Focused examination of upper extremities, axilla was performed.  - On the left forearm, there is a firm, excoriated crusted nodule.  - On the right axilla, there is a 7-8 mm well-demarcated, slightly annular appearing brown patch with normal reticular pigment network appreciated under dermoscopy with slightly darkening on periphery.   - Mild xerosis cutis elsewhere  - No other lesions of concern on areas examined.           Medications:  Current Outpatient Medications   Medication     Alcohol Swabs PADS     allopurinol (ZYLOPRIM) 300 MG tablet     ammonium lactate (LAC-HYDRIN) 12 % external lotion     aspirin 81 MG tablet     atorvastatin (LIPITOR) 40 MG tablet     augmented betamethasone dipropionate (DIPROLENE-AF) 0.05 % ointment     betamethasone dipropionate (DIPROSONE) 0.05 % external ointment     blood glucose (NO BRAND SPECIFIED) lancets standard     blood glucose (NO BRAND SPECIFIED) test strip     Blood Glucose Monitoring Suppl (BLOOD GLUCOSE MONITOR SYSTEM) w/Device KIT     capsaicin (ZOSTRIX) 0.075 % cream     Cholecalciferol (VITAMIN D) 2000 UNITS CAPS     clopidogrel (PLAVIX) 75 MG tablet     COLCHICINE PO     Cyanocobalamin (VITAMIN B12 PO)     Ginkgo Biloba (GNP GINGKO BILOBA EXTRACT PO)     glimepiride (AMARYL) 1 MG tablet     indapamide (LOZOL) 2.5 MG tablet     ipratropium (ATROVENT) 0.03 % nasal spray     irbesartan (AVAPRO) 150 MG tablet     isosorbide mononitrate (IMDUR) 30 MG 24 hr tablet     metFORMIN (GLUCOPHAGE) 1000 MG tablet     multivitamin, therapeutic with minerals (MULTI-VITAMIN) TABS     mupirocin (BACTROBAN) 2 % external ointment     nitroglycerin (NITROSTAT) 0.4 MG SL tablet     omega-3 acid ethyl esters (LOVAZA) 1 g capsule     potassium chloride SA (K-DUR/KLOR-CON M) 20 MEQ CR tablet      torsemide (DEMADEX) 10 MG tablet     triamcinolone (KENALOG) 0.1 % external ointment     Dupilumab (DUPIXENT) 300 MG/2ML syringe     No current facility-administered medications for this visit.       Past Medical History:   Patient Active Problem List   Diagnosis     Itching     AD (atopic dermatitis)     Dermatitis     Hyperglycemia     Eczema, unspecified eczema     Intrinsic atopic dermatitis     Other eczema     Notalgia paresthetica     Rash     Pseudophakia of both eyes     Diabetes mellitus (H)     Presbyopia     Type 2 diabetes mellitus without complication, without long-term current use of insulin (H)     Encounter for long-term (current) use of high-risk medication     Seborrheic keratoses, inflamed     Neoplasm of uncertain behavior of skin     Past Medical History:   Diagnosis Date     Diabetes mellitus (H)      Gout attack      Heart attack (H) 1984     Hypertension

## 2021-05-10 ENCOUNTER — TELEPHONE (OUTPATIENT)
Dept: ENDOCRINOLOGY | Facility: CLINIC | Age: 81
End: 2021-05-10

## 2021-05-10 ENCOUNTER — TELEPHONE (OUTPATIENT)
Dept: DERMATOLOGY | Facility: CLINIC | Age: 81
End: 2021-05-10

## 2021-05-10 DIAGNOSIS — E11.9 TYPE 2 DIABETES MELLITUS WITHOUT COMPLICATION, WITHOUT LONG-TERM CURRENT USE OF INSULIN (H): Primary | ICD-10-CM

## 2021-05-10 LAB — COPATH REPORT: NORMAL

## 2021-05-10 NOTE — TELEPHONE ENCOUNTER
[facility]    Phone Message    May a detailed message be left on voicemail: yes     Reason for Call: Requesting Results   Name/type of test:   Shave Biopsy    Date of test: 5/6/21    Was test done at a location other than [facility] (Please fill in the location if not [facility])?: No      Action Taken: Message routed to:  [facility] Derm    Travel Screening: Not Applicable     Pt called to ask for results of his 5/6/21 biopsy. Please call Pt to advise.    Thanks!

## 2021-05-10 NOTE — TELEPHONE ENCOUNTER
Informed the patient that the results have not been completed yet.    Yvonne Ambrose, [facility]

## 2021-05-11 NOTE — RESULT ENCOUNTER NOTE
Can you make follow-up with Dr. Gray (or myself) in the next 3 months for his prurigo nodularis/ eczema? Thanks!    Edu Rivera MD  Pronouns: he/him/his    Department of Dermatology  [facility]: Phone: [phone], Fax:[phone]  [facility]: Phone: [phone] Fax: [phone]

## 2021-05-18 ENCOUNTER — OFFICE VISIT (OUTPATIENT)
Dept: ENDOCRINOLOGY | Facility: CLINIC | Age: 81
End: 2021-05-18
Payer: COMMERCIAL

## 2021-05-18 VITALS
DIASTOLIC BLOOD PRESSURE: 57 MMHG | WEIGHT: 222 LBS | HEART RATE: 40 BPM | SYSTOLIC BLOOD PRESSURE: 104 MMHG | HEIGHT: 73 IN | BODY MASS INDEX: 29.42 KG/M2

## 2021-05-18 DIAGNOSIS — N18.31 STAGE 3A CHRONIC KIDNEY DISEASE (H): ICD-10-CM

## 2021-05-18 DIAGNOSIS — E11.9 TYPE 2 DIABETES MELLITUS WITHOUT COMPLICATION, WITHOUT LONG-TERM CURRENT USE OF INSULIN (H): Primary | ICD-10-CM

## 2021-05-18 LAB — HBA1C MFR BLD: 6 % (ref 4.3–6)

## 2021-05-18 PROCEDURE — 83036 HEMOGLOBIN GLYCOSYLATED A1C: CPT | Performed by: PHYSICIAN ASSISTANT

## 2021-05-18 PROCEDURE — 99215 OFFICE O/P EST HI 40 MIN: CPT | Performed by: PHYSICIAN ASSISTANT

## 2021-05-18 ASSESSMENT — MIFFLIN-ST. JEOR: SCORE: 1760.87

## 2021-05-18 NOTE — LETTER
5/18/2021       RE: Deandre Moore  [address] [facility] [address]     Dear Colleague,    Thank you for referring your patient, Deandre Moore, to the [facility]OCRINOLOG[facility] [place] at [facility]. Please see a copy of my visit note below.    [facility]  Diabetes Clinic  Bessy Ulloa PA-C, MPAS  05/18/2021      Chief Complaint:   Diabetes        History of Present Illness:   Deandre Moore is a 80 year old male with a history of diabetes mellitus type II, gout, osteoarthritis, coronary artery disease, hypertension and hyperlipidemia who presents for follow up on diabetes. He quit smoking decades ago.      #1 Type II diabetes  Per patient, he was diagnosed with diabetes at age 68.  He as initially on Metformin for many years.  Chart reviewed with patient.  I summary:  -In 2015 Amaryl was added when he was on Prednisone intermittently for itching.   -December 2015 therefore his Amaryl was increased to 8 mg daily due to high Bg on Remeron for sleep.  This was later decreased to 4 mg due to hypoglycemia. Januvia was cost prohibitive for him.   -August 2018 Hemoglobin A1c was 5.8%.   -May 2019 hemoglobin A1c is 5.4%.  Amaryl was decreased to 1 mg twice daily.   -January 2020, hemoglobin A1c is 5.9%   -July 2020: metformin at 1000 mg twice daily and Amaryl 1 mg twice daily continued.  Referred to neurology for tremor.      Interim:   Eye exam no DMR 12/2020  Saw cardiology 1/21 for SOB, shoulder and jaw pain with walking 220 feet.  NTG use advised and arginine 500 mg PO BID was started.  Imdur 30 mg daily continued.    Jan 21 reduced walking due to plantar fascitis.  Conservative measures advised.  Same 2 weeks alter.    Also seen for hip pain.  Knee DJD noted.  Tylenol advised initially - referred to ortho then to spine.    Seeing derm  Saw FP again for angina./ chest pain 4/21 - EKG stable -0 referred to f/u with  cardiology.    He has only been taking Amaryl 1 mg just at night for over a year as had low BG 75, 85 in the mornings.  Now in the mornings 120 - 130.  At night he sees  - 250 ~4 hours after dinner.  He notes that he does not have much diet. He has been advised to have a snack at night by his physician nephew but if he does this puts on weight. Also Metformin was reduced to one tablet daily as well.         He stopped Arginine and palpitations and angina imporved.  He was having angina just walking in living room.       He reports that had palpitations when hungry - he had been skipping meals, breakfast and lunch most days - then stopped doing this and palpitations and resolved.  He stopped Arginine and fasting at same time, so not entirely clear what was causing but resolved and he doesn't want to resume.  He has not checked BG when feels palpitatios, but has eaten and snack when feels and palpitations resolve.      Here with wife Tamiko Henderson.    Blood Glucose Monitoring:   mornings 120 - 130.   - 250 ~4 hours after dinner.    Diabetes monitoring and complications:  CAD: Yes, hx of MI  Last eye exam results: negative per patient (7/2019)  Microalbuminuria: negative (6/4/2019)  Neuropathy: Some tingling sensation  HTN: No  On Statin: Yes  On Aspirin: Yes  Depression: No    #2 Palpitations: See above.   Of note he has low Hgb.  He has not yet followed up with cardiology      #3 Edema  Noted on exam.  He reports at time worse, oozing, but is uncomfortable.  He asks about iof sticking might help.  He is not wearing any, he tried ine th past and they were too tight.          Review of Systems:   Pertinent items are noted in HPI.  All other systems are negative.    Active Medications:     Current Outpatient Medications:      Alcohol Swabs PADS, 1 pad 4 times daily, Disp: 100 each, Rfl: 6     allopurinol (ZYLOPRIM) 300 MG tablet, Take 300 mg by mouth daily, Disp: , Rfl:      ammonium lactate (LAC-HYDRIN) 12 %  external lotion, Apply topically 2 times daily Apply twice daily to feet, Disp: 500 g, Rfl: 1     aspirin 81 MG tablet, Take 81 mg by mouth daily., Disp: , Rfl:      atorvastatin (LIPITOR) 40 MG tablet, Take 1 tablet (40 mg) by mouth daily, Disp: 90 tablet, Rfl: 4     augmented betamethasone dipropionate (DIPROLENE-AF) 0.05 % ointment, Apply topically 2 times daily As needed to itchy areas on back, Disp: 50 g, Rfl: 3     betamethasone dipropionate (DIPROSONE) 0.05 % external ointment, At bedtime apply to ankle and cover with saran wrap (or sock) overnight, wash off in morning, Disp: 50 g, Rfl: 1     blood glucose (NO BRAND SPECIFIED) lancets standard, Use to test blood sugar 3 times daily or as directed., Disp: 100 each, Rfl: 11     blood glucose (NO BRAND SPECIFIED) test strip, Use to test blood sugar 4 times daily  With meter/ strips/ lancets covered by insurance pt using accuchek, Disp: 360 each, Rfl: 3     Blood Glucose Monitoring Suppl (BLOOD GLUCOSE MONITOR SYSTEM) w/Device KIT, Test 4 times daily with meter covered by  insurance, Disp: 1 kit, Rfl: 1     capsaicin (ZOSTRIX) 0.075 % cream, Apply topically 3 times daily To areas of itch on back.  OK to dilute with cetaphil cream if too burning., Disp: 60 g, Rfl: 3     Cholecalciferol (VITAMIN D) 2000 UNITS CAPS, Take 1 tablet by mouth daily, Disp: , Rfl:      clopidogrel (PLAVIX) 75 MG tablet, Take 75 mg by mouth daily, Disp: , Rfl:      COLCHICINE PO, Take 0.6 mg by mouth daily., Disp: , Rfl:      Cyanocobalamin (VITAMIN B12 PO), Take 1 tablet by mouth daily, Disp: , Rfl:      Dupilumab (DUPIXENT) 300 MG/2ML syringe, Inject 2 mLs (300 mg) Subcutaneous every 14 days (Patient not taking: Reported on 3/11/2021), Disp: 4 mL, Rfl: 4     Ginkgo Biloba (GNP GINGKO BILOBA EXTRACT PO), Take 1 tablet by mouth daily , Disp: , Rfl:      glimepiride (AMARYL) 1 MG tablet, TAKE 1 TABLET TWICE DAILY, Disp: 180 tablet, Rfl: 1     indapamide (LOZOL) 2.5 MG tablet, Take 2.5 mg by  mouth every morning., Disp: , Rfl:      ipratropium (ATROVENT) 0.03 % nasal spray, Spray 2 sprays into both nostrils as needed for rhinitis 30min before cooking, Disp: 30 mL, Rfl: 1     irbesartan (AVAPRO) 150 MG tablet, Take 1 tablet (150 mg) by mouth At Bedtime, Disp: 90 tablet, Rfl: 2     isosorbide mononitrate (IMDUR) 30 MG 24 hr tablet, Take 30 mg by mouth daily, Disp: , Rfl:      metFORMIN (GLUCOPHAGE) 1000 MG tablet, Take 1 tablet (1,000 mg) by mouth 2 times daily (with meals), Disp: 180 tablet, Rfl: 2     multivitamin, therapeutic with minerals (MULTI-VITAMIN) TABS, Take 1 tablet by mouth daily., Disp: , Rfl:      mupirocin (BACTROBAN) 2 % external ointment, Apply topically 2 times daily, Disp: 30 g, Rfl: 0     nitroglycerin (NITROSTAT) 0.4 MG SL tablet, Place under the tongue as needed, Disp: , Rfl:      omega-3 acid ethyl esters (LOVAZA) 1 g capsule, Take 2 capsules (2 g) by mouth 2 times daily, Disp: 180 capsule, Rfl: 3     potassium chloride SA (K-DUR/KLOR-CON M) 20 MEQ CR tablet, TAKE 1 TABLET THREE TIMES DAILY, Disp: 270 tablet, Rfl: 1     torsemide (DEMADEX) 10 MG tablet, Take 10 mg by mouth daily, Disp: , Rfl:      triamcinolone (KENALOG) 0.1 % external ointment, APPLY TWICE DAILY TO RAISED RASH AREAS ON THE ARMS, LEGS, BODY AS NEEDED., Disp: 454 g, Rfl: 1    Current Facility-Administered Medications:      triamcinolone (KENALOG-40) injection 20 mg, 20 mg, Intra-Lesional, Once, Edu Rivera MD      Allergies:   Beta adrenergic blockers, Latex, Latex, and Tape [adhesive tape]      Past Medical History:  Atopic dermatitis   Diabetes mellitus, type II  Eczema  Gout   Heart attack  Hyperglycemia  Hypertension  Notalgia paresthetica  Pseudophakia of both eyes    Past Surgical History:  Cardiac surgery, four vessel bypass (1984)  Cholecystectomy  Phacoemulsification with standard intraocular lens implant (1/21/2013)    Family History:   No family history of skin cancer.      Social History:    Tobacco Use: former smoker, quit 1984  Alcohol Use: occasionally, once a year  Drug Use: none   PCP: MARCIA MOTA      Brother had heart problem bypass in his 50s, no known kidney problems.  Everyone diabetic.      Physical Exam:   There were no vitals taken for this visit.  Vital signs and physical exam not available.  However the patient reports feeling well. Psych: Alert and oriented times 3; coherent speech, normal  rate and volume, able to articulate logical thoughts, able to abstract reason, no tangential thoughts, no hallucinations or delusions    Data:    Sodium 03/10/2020 138  133 - 144 mmol/L Final     Potassium 03/10/2020 3.8  3.4 - 5.3 mmol/L Final     Chloride 03/10/2020 104  94 - 109 mmol/L Final     Carbon Dioxide 03/10/2020 29  20 - 32 mmol/L Final     Anion Gap 03/10/2020 5  3 - 14 mmol/L Final     Glucose 03/10/2020 146* 70 - 99 mg/dL Final     Urea Nitrogen 03/10/2020 10  7 - 30 mg/dL Final     Creatinine 03/10/2020 1.14  0.66 - 1.25 mg/dL Final     GFR Estimate 03/10/2020 60* >60 mL/min/[1.73_m2] Final    Comment: Non  GFR Calc  Starting 12/18/2018, serum creatinine based estimated GFR (eGFR) will be   calculated using the Chronic Kidney Disease Epidemiology Collaboration   (CKD-EPI) equation.       GFR Estimate If Black 03/10/2020 69  >60 mL/min/[1.73_m2] Final    Comment:  GFR Calc  Starting 12/18/2018, serum creatinine based estimated GFR (eGFR) will be   calculated using the Chronic Kidney Disease Epidemiology Collaboration   (CKD-EPI) equation.       Calcium 03/10/2020 9.3  8.5 - 10.1 mg/dL Final     Bilirubin Total 03/10/2020 0.6  0.2 - 1.3 mg/dL Final     Albumin 03/10/2020 3.5  3.4 - 5.0 g/dL Final     Protein Total 03/10/2020 7.1  6.8 - 8.8 g/dL Final     Alkaline Phosphatase 03/10/2020 60  40 - 150 U/L Final     ALT 03/10/2020 34  0 - 70 U/L Final     AST 03/10/2020 24  0 - 45 U/L Final     Assessment and Plan:    #1 Type II diabetes, now with CKD  and edema.  Morning Bg at goal, evenings above goal.     - We discussed possibly starting Empagliflozin and /or Ace I. He will see if he can afford Empagliflozin and start this. He guerrero advised to reschedule Cardiology follow-up as previously directed and see if Ace I / or ARB might be incorporated into his med regimen.  He should take this in place of Amaryl to avoid further possible hypoglycemia.     - I Urged to check BG when feels palpitations to evaluate this as possible cause.     - His Metformin can be further increased again if needed, but continue just one tablet daily unless need to hold due to hypoglycemia.    - Call office any BG <70 , frequent <90.    - Check BG fasting, before lunch or dinner and at bedtime.      Palpitations:  Advised to reschedule with cardiology.  He has stopped Arginine.  Has ongoing angina, palpitations, edema though improved.  Considering CKD, Ace I /ARB would be advsied in his regimen if tolerated.    Edema:  Ed re legs up intermittently, lighter compression stocking daily, trial Empagliflozin and again, follow-up with cardiology.    45 minutes in preparation for visit reviewing chart, labs and documentation, visiting with patient gathering history and in exam, education and counseling, as well as coordination of care, further chart review and documentation following visit on this date of service and as alluded to documented above.        It is my privilege to be involved in the care of the above patient.     Bessy Ulloa PA-C, MPAS  [facility]  Diabetes, Endocrinology, and Metabolism  [phone] Appointments/Nurse  [phone] Fax  [phone] pager  [phone] nurse line

## 2021-05-18 NOTE — PROGRESS NOTES
[facility]  Diabetes Clinic  Bessy Ulloa PA-C, MPAS  05/18/2021      Chief Complaint:   Diabetes        History of Present Illness:   Deandre Moore is a 80 year old male with a history of diabetes mellitus type II, gout, osteoarthritis, coronary artery disease, hypertension and hyperlipidemia who presents for follow up on diabetes. He quit smoking decades ago.      #1 Type II diabetes  Per patient, he was diagnosed with diabetes at age 68.  He as initially on Metformin for many years.  Chart reviewed with patient.  I summary:  -In 2015 Amaryl was added when he was on Prednisone intermittently for itching.   -December 2015 therefore his Amaryl was increased to 8 mg daily due to high Bg on Remeron for sleep.  This was later decreased to 4 mg due to hypoglycemia. Januvia was cost prohibitive for him.   -August 2018 Hemoglobin A1c was 5.8%.   -May 2019 hemoglobin A1c is 5.4%.  Amaryl was decreased to 1 mg twice daily.   -January 2020, hemoglobin A1c is 5.9%   -July 2020: metformin at 1000 mg twice daily and Amaryl 1 mg twice daily continued.  Referred to neurology for tremor.      Interim:   Eye exam no DMR 12/2020  Saw cardiology 1/21 for SOB, shoulder and jaw pain with walking 220 feet.  NTG use advised and arginine 500 mg PO BID was started.  Imdur 30 mg daily continued.    Jan 21 reduced walking due to plantar fascitis.  Conservative measures advised.  Same 2 weeks alter.    Also seen for hip pain.  Knee DJD noted.  Tylenol advised initially - referred to ortho then to spine.    Seeing derm  Saw FP again for angina./ chest pain 4/21 - EKG stable -0 referred to f/u with cardiology.    He has only been taking Amaryl 1 mg just at night for over a year as had low BG 75, 85 in the mornings.  Now in the mornings 120 - 130.  At night he sees  - 250 ~4 hours after dinner.  He notes that he does not have much diet. He has been advised to have a snack at night by his physician nephew but if he does this puts  on weight. Also Metformin was reduced to one tablet daily as well.         He stopped Arginine and palpitations and angina imporved.  He was having angina just walking in living room.       He reports that had palpitations when hungry - he had been skipping meals, breakfast and lunch most days - then stopped doing this and palpitations and resolved.  He stopped Arginine and fasting at same time, so not entirely clear what was causing but resolved and he doesn't want to resume.  He has not checked BG when feels palpitatios, but has eaten and snack when feels and palpitations resolve.      Here with wife Tamiko Henderson.    Blood Glucose Monitoring:   mornings 120 - 130.   - 250 ~4 hours after dinner.    Diabetes monitoring and complications:  CAD: Yes, hx of MI  Last eye exam results: negative per patient (7/2019)  Microalbuminuria: negative (6/4/2019)  Neuropathy: Some tingling sensation  HTN: No  On Statin: Yes  On Aspirin: Yes  Depression: No    #2 Palpitations: See above.   Of note he has low Hgb.  He has not yet followed up with cardiology      #3 Edema  Noted on exam.  He reports at time worse, oozing, but is uncomfortable.  He asks about iof sticking might help.  He is not wearing any, he tried ine th past and they were too tight.          Review of Systems:   Pertinent items are noted in HPI.  All other systems are negative.    Active Medications:     Current Outpatient Medications:      Alcohol Swabs PADS, 1 pad 4 times daily, Disp: 100 each, Rfl: 6     allopurinol (ZYLOPRIM) 300 MG tablet, Take 300 mg by mouth daily, Disp: , Rfl:      ammonium lactate (LAC-HYDRIN) 12 % external lotion, Apply topically 2 times daily Apply twice daily to feet, Disp: 500 g, Rfl: 1     aspirin 81 MG tablet, Take 81 mg by mouth daily., Disp: , Rfl:      atorvastatin (LIPITOR) 40 MG tablet, Take 1 tablet (40 mg) by mouth daily, Disp: 90 tablet, Rfl: 4     augmented betamethasone dipropionate (DIPROLENE-AF) 0.05 % ointment, Apply  topically 2 times daily As needed to itchy areas on back, Disp: 50 g, Rfl: 3     betamethasone dipropionate (DIPROSONE) 0.05 % external ointment, At bedtime apply to ankle and cover with saran wrap (or sock) overnight, wash off in morning, Disp: 50 g, Rfl: 1     blood glucose (NO BRAND SPECIFIED) lancets standard, Use to test blood sugar 3 times daily or as directed., Disp: 100 each, Rfl: 11     blood glucose (NO BRAND SPECIFIED) test strip, Use to test blood sugar 4 times daily  With meter/ strips/ lancets covered by insurance pt using accuchek, Disp: 360 each, Rfl: 3     Blood Glucose Monitoring Suppl (BLOOD GLUCOSE MONITOR SYSTEM) w/Device KIT, Test 4 times daily with meter covered by  insurance, Disp: 1 kit, Rfl: 1     capsaicin (ZOSTRIX) 0.075 % cream, Apply topically 3 times daily To areas of itch on back.  OK to dilute with cetaphil cream if too burning., Disp: 60 g, Rfl: 3     Cholecalciferol (VITAMIN D) 2000 UNITS CAPS, Take 1 tablet by mouth daily, Disp: , Rfl:      clopidogrel (PLAVIX) 75 MG tablet, Take 75 mg by mouth daily, Disp: , Rfl:      COLCHICINE PO, Take 0.6 mg by mouth daily., Disp: , Rfl:      Cyanocobalamin (VITAMIN B12 PO), Take 1 tablet by mouth daily, Disp: , Rfl:      Dupilumab (DUPIXENT) 300 MG/2ML syringe, Inject 2 mLs (300 mg) Subcutaneous every 14 days (Patient not taking: Reported on 3/11/2021), Disp: 4 mL, Rfl: 4     Ginkgo Biloba (GNP GINGKO BILOBA EXTRACT PO), Take 1 tablet by mouth daily , Disp: , Rfl:      glimepiride (AMARYL) 1 MG tablet, TAKE 1 TABLET TWICE DAILY, Disp: 180 tablet, Rfl: 1     indapamide (LOZOL) 2.5 MG tablet, Take 2.5 mg by mouth every morning., Disp: , Rfl:      ipratropium (ATROVENT) 0.03 % nasal spray, Spray 2 sprays into both nostrils as needed for rhinitis 30min before cooking, Disp: 30 mL, Rfl: 1     irbesartan (AVAPRO) 150 MG tablet, Take 1 tablet (150 mg) by mouth At Bedtime, Disp: 90 tablet, Rfl: 2     isosorbide mononitrate (IMDUR) 30 MG 24 hr tablet,  Take 30 mg by mouth daily, Disp: , Rfl:      metFORMIN (GLUCOPHAGE) 1000 MG tablet, Take 1 tablet (1,000 mg) by mouth 2 times daily (with meals), Disp: 180 tablet, Rfl: 2     multivitamin, therapeutic with minerals (MULTI-VITAMIN) TABS, Take 1 tablet by mouth daily., Disp: , Rfl:      mupirocin (BACTROBAN) 2 % external ointment, Apply topically 2 times daily, Disp: 30 g, Rfl: 0     nitroglycerin (NITROSTAT) 0.4 MG SL tablet, Place under the tongue as needed, Disp: , Rfl:      omega-3 acid ethyl esters (LOVAZA) 1 g capsule, Take 2 capsules (2 g) by mouth 2 times daily, Disp: 180 capsule, Rfl: 3     potassium chloride SA (K-DUR/KLOR-CON M) 20 MEQ CR tablet, TAKE 1 TABLET THREE TIMES DAILY, Disp: 270 tablet, Rfl: 1     torsemide (DEMADEX) 10 MG tablet, Take 10 mg by mouth daily, Disp: , Rfl:      triamcinolone (KENALOG) 0.1 % external ointment, APPLY TWICE DAILY TO RAISED RASH AREAS ON THE ARMS, LEGS, BODY AS NEEDED., Disp: 454 g, Rfl: 1    Current Facility-Administered Medications:      triamcinolone (KENALOG-40) injection 20 mg, 20 mg, Intra-Lesional, Once, Edu Rivera MD      Allergies:   Beta adrenergic blockers, Latex, Latex, and Tape [adhesive tape]      Past Medical History:  Atopic dermatitis   Diabetes mellitus, type II  Eczema  Gout   Heart attack  Hyperglycemia  Hypertension  Notalgia paresthetica  Pseudophakia of both eyes    Past Surgical History:  Cardiac surgery, four vessel bypass (1984)  Cholecystectomy  Phacoemulsification with standard intraocular lens implant (1/21/2013)    Family History:   No family history of skin cancer.      Social History:   Tobacco Use: former smoker, quit 1984  Alcohol Use: occasionally, once a year  Drug Use: none   PCP: MARCIA MOTA      Brother had heart problem bypass in his 50s, no known kidney problems.  Everyone diabetic.      Physical Exam:   There were no vitals taken for this visit.  Vital signs and physical exam not available.  However the patient  reports feeling well. Psych: Alert and oriented times 3; coherent speech, normal  rate and volume, able to articulate logical thoughts, able to abstract reason, no tangential thoughts, no hallucinations or delusions    Data:    Sodium 03/10/2020 138  133 - 144 mmol/L Final     Potassium 03/10/2020 3.8  3.4 - 5.3 mmol/L Final     Chloride 03/10/2020 104  94 - 109 mmol/L Final     Carbon Dioxide 03/10/2020 29  20 - 32 mmol/L Final     Anion Gap 03/10/2020 5  3 - 14 mmol/L Final     Glucose 03/10/2020 146* 70 - 99 mg/dL Final     Urea Nitrogen 03/10/2020 10  7 - 30 mg/dL Final     Creatinine 03/10/2020 1.14  0.66 - 1.25 mg/dL Final     GFR Estimate 03/10/2020 60* >60 mL/min/[1.73_m2] Final    Comment: Non  GFR Calc  Starting 12/18/2018, serum creatinine based estimated GFR (eGFR) will be   calculated using the Chronic Kidney Disease Epidemiology Collaboration   (CKD-EPI) equation.       GFR Estimate If Black 03/10/2020 69  >60 mL/min/[1.73_m2] Final    Comment:  GFR Calc  Starting 12/18/2018, serum creatinine based estimated GFR (eGFR) will be   calculated using the Chronic Kidney Disease Epidemiology Collaboration   (CKD-EPI) equation.       Calcium 03/10/2020 9.3  8.5 - 10.1 mg/dL Final     Bilirubin Total 03/10/2020 0.6  0.2 - 1.3 mg/dL Final     Albumin 03/10/2020 3.5  3.4 - 5.0 g/dL Final     Protein Total 03/10/2020 7.1  6.8 - 8.8 g/dL Final     Alkaline Phosphatase 03/10/2020 60  40 - 150 U/L Final     ALT 03/10/2020 34  0 - 70 U/L Final     AST 03/10/2020 24  0 - 45 U/L Final     Assessment and Plan:    #1 Type II diabetes, now with CKD and edema.  Morning Bg at goal, evenings above goal.     - We discussed possibly starting Empagliflozin and /or Ace I. He will see if he can afford Empagliflozin and start this. He guerrero advised to reschedule Cardiology follow-up as previously directed and see if Ace I / or ARB might be incorporated into his med regimen.  He should take this in  place of Amaryl to avoid further possible hypoglycemia.     - I Urged to check BG when feels palpitations to evaluate this as possible cause.     - His Metformin can be further increased again if needed, but continue just one tablet daily unless need to hold due to hypoglycemia.    - Call office any BG <70 , frequent <90.    - Check BG fasting, before lunch or dinner and at bedtime.      Palpitations:  Advised to reschedule with cardiology.  He has stopped Arginine.  Has ongoing angina, palpitations, edema though improved.  Considering CKD, Ace I /ARB would be advsied in his regimen if tolerated.    Edema:  Ed re legs up intermittently, lighter compression stocking daily, trial Empagliflozin and again, follow-up with cardiology.    45 minutes in preparation for visit reviewing chart, labs and documentation, visiting with patient gathering history and in exam, education and counseling, as well as coordination of care, further chart review and documentation following visit on this date of service and as alluded to documented above.        It is my privilege to be involved in the care of the above patient.     Bessy Ulloa PA-C, MPAS  [facility]  Diabetes, Endocrinology, and Metabolism  [phone] Appointments/Nurse  [phone] Fax  [phone] pager  [phone] nurse line

## 2021-05-18 NOTE — PATIENT INSTRUCTIONS
Dear Charles,     - Please take Amaryl once daily with dinner only.     Please check your blood sugar some days before dinner and bring your glucometer to your your follow-up appointment.    IF YOU CAN AFFORD IT HOWEVER, I want you take Empagliflozin once daily INSTEAD of Amaryl as it will help your heart, kidneys and the swelling in your legs and feet.  You will take Empagliflozin /Jardiance once daily if you can afford it instead of Amaryl.       - Please schedule follow-up your cardiologist.  They may now elect to do a stress test.  Also please see if they think they might accommodate a Ace inhibitor or ARB agent into your antihypertensive regimen as you are having some early chronic kidney disease.    Dr. Thurston:  [facility]    [address] [place] [address] [place] [id number]    [phone]       - Also please see your primary care provide about lower hemoglobin and palpitations. Make sure they are following your kidney function and addressing the swelling in your legs.  Please buy socks with some compression, ideally knee high.    [place] [id number] [address] [id number] [phone]   Fritz De Oliveira, DP    [address] [id number] [phone] (Fax)            - Keep legs clean and wear some compression stockings   If get warm and tender you need to bee seen right away for infection.      We appreciate your assistance in coordinating your healthcare.     Please bring your glucometer to follow-up appointment.    My best wishes,    Bessy Ulloa PA-C, MPAS  [facility]  Diabetes, Endocrinology, and Metabolism  [phone] Appointments/Nurse  [phone] Fax  [phone] URGENTafter hours/weekend Endocrinologist on call               81 y.o F hx aflutter/afib, HF, ITP on chronic prednisone admitted for acute resp. failure. 89 y.o F hx afib/aflutter, HF, ITP admitted for acute respiratory failure. 81 y.o F hx aflutter/afib, HF, ITP on chronic prednisone admitted for acute resp. failure. 89 y.o F hx afib/aflutter, HF, ITP admitted for acute respiratory failure 89 y.o F hx of Afib/aflutter, HF, ITP on chronic prednisone admitted for acute resp. failure Admitted for acute on Chronic respiratory failure Dx: Acute Respiratory failure with hypoxia and hypercapnia         Acute pulmonary edema PNA  decompensated HF        Hyperkalemia /CKD        Elevated Troponins pt admitted with sepsis

## 2021-05-27 DIAGNOSIS — L20.89 OTHER ATOPIC DERMATITIS: ICD-10-CM

## 2021-05-30 RX ORDER — DUPILUMAB 300 MG/2ML
300 INJECTION, SOLUTION SUBCUTANEOUS
Qty: 4 ML | Refills: 4 | Status: SHIPPED | OUTPATIENT
Start: 2021-05-30 | End: 2021-10-28

## 2021-05-30 NOTE — TELEPHONE ENCOUNTER
" Dupilumab (DUPIXENT) 300 MG/2ML syringe     Last Written Prescription Date:  11/24/20  Last Fill Quantity: 4ml,   # refills: 4  Last Office Visit : 5/6/21  Future Office visit: 8/24/21    \"  Continue dupixent\"    Routing refill request to provider for review/approval because: not on protocol  "

## 2021-05-30 NOTE — TELEPHONE ENCOUNTER
Received refill request for dupixent as the resident on call. Reviewed patient's chart and attached communication. Patient last seen 5/6/21  For chronic eczematous dermatitis. RTC scheduled with Dr. Rivera 8/24/21. After reviewing the medication list and assessment and plan from last visit, the refill request was accepted.    Rosemary Gloria DO (PGY-2)  Dermatology Resident  [facility]

## 2021-06-11 ENCOUNTER — OFFICE VISIT (OUTPATIENT)
Dept: ORTHOPEDICS | Facility: CLINIC | Age: 81
End: 2021-06-11
Payer: COMMERCIAL

## 2021-06-11 VITALS — SYSTOLIC BLOOD PRESSURE: 139 MMHG | OXYGEN SATURATION: 97 % | HEART RATE: 42 BPM | DIASTOLIC BLOOD PRESSURE: 70 MMHG

## 2021-06-11 DIAGNOSIS — G89.29 CHRONIC BILATERAL LOW BACK PAIN WITHOUT SCIATICA: Primary | ICD-10-CM

## 2021-06-11 DIAGNOSIS — M54.50 CHRONIC BILATERAL LOW BACK PAIN WITHOUT SCIATICA: Primary | ICD-10-CM

## 2021-06-11 PROCEDURE — 99212 OFFICE O/P EST SF 10 MIN: CPT | Performed by: ORTHOPAEDIC SURGERY

## 2021-06-11 NOTE — NURSING NOTE
Deandre Moore's chief complaint for this visit includes:  Chief Complaint   Patient presents with     Right Hip - Pain     PCP: Denilson Thomas    Referring Provider:  No referring provider defined for this encounter.    /70   Pulse (!) 42   SpO2 97%   Data Unavailable     Do you need any medication refills at today's visit? No    Marlin Sanford CMA

## 2021-06-11 NOTE — PATIENT INSTRUCTIONS
Physical Medicine and Rehab clinic referral    Thanks for coming today.  Ortho/Sports Medicine Clinic  [address] [place] [address]    To schedule future appointments in Ortho Clinic, you may call [phone].    To schedule ordered imaging by your provider:   Call Central Imaging [phone]    To schedule an injection ordered by your provider:  Call Central Imaging Injection scheduling line: [phone]  MyChart available online at:  [email or web address]    Please call if any further questions or concerns ([phone]).  Clinic hours 8 am to 5 pm.    Return to clinic (call) if symptoms worsen or fail to improve.

## 2021-06-11 NOTE — LETTER
6/11/2021         RE: Deandre Moore  [address] [facility] [address]        Dear Colleague,    Thank you for referring your patient, Deandre Moore, to the [facility]. Please see a copy of my visit note below.    Chief Complaint: follow-up low back    HPI: Deandre Moore returns today in follow-up for his low back and deconditioning. He saw his spine surgeon. Noted deconditioning and sent to PT which he has started and to have an epidural which he has not done. Patient not sure about next steps.       Medications and allergies are documented in the EMR and have been reviewed.    Current Outpatient Medications:      allopurinol (ZYLOPRIM) 300 MG tablet, Take 300 mg by mouth daily, Disp: , Rfl:      ammonium lactate (LAC-HYDRIN) 12 % external lotion, Apply topically 2 times daily Apply twice daily to feet, Disp: 500 g, Rfl: 1     aspirin 81 MG tablet, Take 81 mg by mouth daily., Disp: , Rfl:      atorvastatin (LIPITOR) 40 MG tablet, Take 1 tablet (40 mg) by mouth daily, Disp: 90 tablet, Rfl: 4     augmented betamethasone dipropionate (DIPROLENE-AF) 0.05 % ointment, Apply topically 2 times daily As needed to itchy areas on back, Disp: 50 g, Rfl: 3     betamethasone dipropionate (DIPROSONE) 0.05 % external ointment, At bedtime apply to ankle and cover with saran wrap (or sock) overnight, wash off in morning, Disp: 50 g, Rfl: 1     Cholecalciferol (VITAMIN D) 2000 UNITS CAPS, Take 1 tablet by mouth daily, Disp: , Rfl:      clopidogrel (PLAVIX) 75 MG tablet, Take 75 mg by mouth daily, Disp: , Rfl:      COLCHICINE PO, Take 0.6 mg by mouth daily., Disp: , Rfl:      Cyanocobalamin (VITAMIN B12 PO), Take 1 tablet by mouth daily, Disp: , Rfl:      Dupilumab (DUPIXENT) 300 MG/2ML syringe, Inject 2 mLs (300 mg) Subcutaneous every 14 days, Disp: 4 mL, Rfl: 4     Ginkgo Biloba (GNP GINGKO BILOBA EXTRACT PO), Take 1 tablet by mouth daily , Disp: , Rfl:      glimepiride  (AMARYL) 1 MG tablet, TAKE 1 TABLET TWICE DAILY, Disp: 180 tablet, Rfl: 1     indapamide (LOZOL) 2.5 MG tablet, Take 2.5 mg by mouth every morning., Disp: , Rfl:      ipratropium (ATROVENT) 0.03 % nasal spray, Spray 2 sprays into both nostrils as needed for rhinitis 30min before cooking, Disp: 30 mL, Rfl: 1     irbesartan (AVAPRO) 150 MG tablet, Take 1 tablet (150 mg) by mouth At Bedtime, Disp: 90 tablet, Rfl: 2     isosorbide mononitrate (IMDUR) 30 MG 24 hr tablet, Take 30 mg by mouth daily, Disp: , Rfl:      metFORMIN (GLUCOPHAGE) 1000 MG tablet, Take 1 tablet (1,000 mg) by mouth daily (with dinner), Disp: 90 tablet, Rfl: 3     multivitamin, therapeutic with minerals (MULTI-VITAMIN) TABS, Take 1 tablet by mouth daily., Disp: , Rfl:      mupirocin (BACTROBAN) 2 % external ointment, Apply topically 2 times daily, Disp: 30 g, Rfl: 0     nitroglycerin (NITROSTAT) 0.4 MG SL tablet, Place under the tongue as needed, Disp: , Rfl:      omega-3 acid ethyl esters (LOVAZA) 1 g capsule, Take 2 capsules (2 g) by mouth 2 times daily, Disp: 180 capsule, Rfl: 3     potassium chloride SA (K-DUR/KLOR-CON M) 20 MEQ CR tablet, TAKE 1 TABLET THREE TIMES DAILY, Disp: 270 tablet, Rfl: 1     triamcinolone (KENALOG) 0.1 % external ointment, APPLY TWICE DAILY TO RAISED RASH AREAS ON THE ARMS, LEGS, BODY AS NEEDED., Disp: 454 g, Rfl: 1     Alcohol Swabs PADS, 1 pad 4 times daily, Disp: 100 each, Rfl: 6     blood glucose (NO BRAND SPECIFIED) lancets standard, Use to test blood sugar 3 times daily or as directed., Disp: 100 each, Rfl: 11     blood glucose (NO BRAND SPECIFIED) test strip, Use to test blood sugar 4 times daily  With meter/ strips/ lancets covered by insurance pt using accuchek, Disp: 360 each, Rfl: 3     Blood Glucose Monitoring Suppl (BLOOD GLUCOSE MONITOR SYSTEM) w/Device KIT, Test 4 times daily with meter covered by  insurance, Disp: 1 kit, Rfl: 1     capsaicin (ZOSTRIX) 0.075 % cream, Apply topically 3 times daily To areas  of itch on back.  OK to dilute with cetaphil cream if too burning. (Patient not taking: Reported on 6/11/2021), Disp: 60 g, Rfl: 3     empagliflozin (JARDIANCE) 10 MG TABS tablet, Take 1 tablet (10 mg) by mouth daily INSTEAD OF AMARYL (Patient not taking: Reported on 6/11/2021), Disp: 30 tablet, Rfl: 3     torsemide (DEMADEX) 10 MG tablet, Take 10 mg by mouth daily, Disp: , Rfl:     Current Facility-Administered Medications:      triamcinolone (KENALOG-40) injection 20 mg, 20 mg, Intra-Lesional, Once, Edu Rivera MD  Allergies: Beta adrenergic blockers, Latex, Latex, and Tape [adhesive tape]    Physical Exam:  On physical examination the patient appears the stated age, is in no acute distress, affect is appropriate, and breathing is non-labored.  /70   Pulse (!) 42   SpO2 97%   There is no height or weight on file to calculate BMI.    Assessment: deconditioning and severesymptoms from lumbar spine.   Plan: I have referred Mr Moore to [facility] for coordination of his care.     No ref. provider found        Again, thank you for allowing me to participate in the care of your patient.        Sincerely,        Edson Mittal MD

## 2021-06-11 NOTE — PROGRESS NOTES
Chief Complaint: follow-up low back    HPI: Deandre Moore returns today in follow-up for his low back and deconditioning. He saw his spine surgeon. Noted deconditioning and sent to PT which he has started and to have an epidural which he has not done. Patient not sure about next steps.       Medications and allergies are documented in the EMR and have been reviewed.    Current Outpatient Medications:      allopurinol (ZYLOPRIM) 300 MG tablet, Take 300 mg by mouth daily, Disp: , Rfl:      ammonium lactate (LAC-HYDRIN) 12 % external lotion, Apply topically 2 times daily Apply twice daily to feet, Disp: 500 g, Rfl: 1     aspirin 81 MG tablet, Take 81 mg by mouth daily., Disp: , Rfl:      atorvastatin (LIPITOR) 40 MG tablet, Take 1 tablet (40 mg) by mouth daily, Disp: 90 tablet, Rfl: 4     augmented betamethasone dipropionate (DIPROLENE-AF) 0.05 % ointment, Apply topically 2 times daily As needed to itchy areas on back, Disp: 50 g, Rfl: 3     betamethasone dipropionate (DIPROSONE) 0.05 % external ointment, At bedtime apply to ankle and cover with saran wrap (or sock) overnight, wash off in morning, Disp: 50 g, Rfl: 1     Cholecalciferol (VITAMIN D) 2000 UNITS CAPS, Take 1 tablet by mouth daily, Disp: , Rfl:      clopidogrel (PLAVIX) 75 MG tablet, Take 75 mg by mouth daily, Disp: , Rfl:      COLCHICINE PO, Take 0.6 mg by mouth daily., Disp: , Rfl:      Cyanocobalamin (VITAMIN B12 PO), Take 1 tablet by mouth daily, Disp: , Rfl:      Dupilumab (DUPIXENT) 300 MG/2ML syringe, Inject 2 mLs (300 mg) Subcutaneous every 14 days, Disp: 4 mL, Rfl: 4     Ginkgo Biloba (GNP GINGKO BILOBA EXTRACT PO), Take 1 tablet by mouth daily , Disp: , Rfl:      glimepiride (AMARYL) 1 MG tablet, TAKE 1 TABLET TWICE DAILY, Disp: 180 tablet, Rfl: 1     indapamide (LOZOL) 2.5 MG tablet, Take 2.5 mg by mouth every morning., Disp: , Rfl:      ipratropium (ATROVENT) 0.03 % nasal spray, Spray 2 sprays into both nostrils as needed for rhinitis  30min before cooking, Disp: 30 mL, Rfl: 1     irbesartan (AVAPRO) 150 MG tablet, Take 1 tablet (150 mg) by mouth At Bedtime, Disp: 90 tablet, Rfl: 2     isosorbide mononitrate (IMDUR) 30 MG 24 hr tablet, Take 30 mg by mouth daily, Disp: , Rfl:      metFORMIN (GLUCOPHAGE) 1000 MG tablet, Take 1 tablet (1,000 mg) by mouth daily (with dinner), Disp: 90 tablet, Rfl: 3     multivitamin, therapeutic with minerals (MULTI-VITAMIN) TABS, Take 1 tablet by mouth daily., Disp: , Rfl:      mupirocin (BACTROBAN) 2 % external ointment, Apply topically 2 times daily, Disp: 30 g, Rfl: 0     nitroglycerin (NITROSTAT) 0.4 MG SL tablet, Place under the tongue as needed, Disp: , Rfl:      omega-3 acid ethyl esters (LOVAZA) 1 g capsule, Take 2 capsules (2 g) by mouth 2 times daily, Disp: 180 capsule, Rfl: 3     potassium chloride SA (K-DUR/KLOR-CON M) 20 MEQ CR tablet, TAKE 1 TABLET THREE TIMES DAILY, Disp: 270 tablet, Rfl: 1     triamcinolone (KENALOG) 0.1 % external ointment, APPLY TWICE DAILY TO RAISED RASH AREAS ON THE ARMS, LEGS, BODY AS NEEDED., Disp: 454 g, Rfl: 1     Alcohol Swabs PADS, 1 pad 4 times daily, Disp: 100 each, Rfl: 6     blood glucose (NO BRAND SPECIFIED) lancets standard, Use to test blood sugar 3 times daily or as directed., Disp: 100 each, Rfl: 11     blood glucose (NO BRAND SPECIFIED) test strip, Use to test blood sugar 4 times daily  With meter/ strips/ lancets covered by insurance pt using accuchek, Disp: 360 each, Rfl: 3     Blood Glucose Monitoring Suppl (BLOOD GLUCOSE MONITOR SYSTEM) w/Device KIT, Test 4 times daily with meter covered by  insurance, Disp: 1 kit, Rfl: 1     capsaicin (ZOSTRIX) 0.075 % cream, Apply topically 3 times daily To areas of itch on back.  OK to dilute with cetaphil cream if too burning. (Patient not taking: Reported on 6/11/2021), Disp: 60 g, Rfl: 3     empagliflozin (JARDIANCE) 10 MG TABS tablet, Take 1 tablet (10 mg) by mouth daily INSTEAD OF AMARYL (Patient not taking: Reported on  6/11/2021), Disp: 30 tablet, Rfl: 3     torsemide (DEMADEX) 10 MG tablet, Take 10 mg by mouth daily, Disp: , Rfl:     Current Facility-Administered Medications:      triamcinolone (KENALOG-40) injection 20 mg, 20 mg, Intra-Lesional, Once, Edu Rivera MD  Allergies: Beta adrenergic blockers, Latex, Latex, and Tape [adhesive tape]    Physical Exam:  On physical examination the patient appears the stated age, is in no acute distress, affect is appropriate, and breathing is non-labored.  /70   Pulse (!) 42   SpO2 97%   There is no height or weight on file to calculate BMI.    Assessment: deconditioning and severesymptoms from lumbar spine.   Plan: I have referred Mr Moore to [facility] for coordination of his care.     No ref. provider found

## 2021-06-14 PROBLEM — N18.30 CHRONIC KIDNEY DISEASE, STAGE 3 (H): Status: ACTIVE | Noted: 2021-06-14

## 2021-06-17 ENCOUNTER — TELEPHONE (OUTPATIENT)
Dept: ENDOCRINOLOGY | Facility: CLINIC | Age: 81
End: 2021-06-17

## 2021-06-17 NOTE — TELEPHONE ENCOUNTER
Spoke w/ Pt: Explained that the symptoms he is reporting are usually not associated with medication. Confirms he will reach out to PCP for assessment.   Provider notified for review and recommendation.    Jannie Gray RN on 6/17/2021 at 11:07 AM     RE    Reason for Call: Medication Question or concern regarding medication   Prescription Clarification     Name of Medication:   * metFORMIN (GLUCOPHAGE) 1000 MG tablet     Prescribing Provider: Chow                 Pharmacy: [address]                 What on the order needs clarification? Per Patient states have been taking the medication and is having problems with his legs. Patient states can barely walk, Patient states had received some messages by text that the medication would kill his legs. Patient is wanting to know what he should do, please advise.         Action Taken: Message routed to:  [facility] Endo     Travel Screening: Not Applicable

## 2021-06-17 NOTE — TELEPHONE ENCOUNTER
[facility] Call Center    Phone Message    May a detailed message be left on voicemail: yes     Reason for Call: Medication Question or concern regarding medication   Prescription Clarification    Name of Medication:   * metFORMIN (GLUCOPHAGE) 1000 MG tablet    Prescribing Provider: Chow     Pharmacy: [facility] [address] [place] [address] [address] [facility]     What on the order needs clarification? Per Patient states have been taking the medication and is having problems with his legs. Patient states can barely walk, Patient states had received some messages by text that the medication would kill his legs. Patient is wanting to know what he should do, please advise.       Action Taken: Message routed to:  [facility] Endo    Travel Screening: Not Applicable

## 2021-06-18 NOTE — TELEPHONE ENCOUNTER
Metformin and leg pain  Received: Today  Message Contents   Bessy Ulloa, Jannie Lyn RN   Cc: Trudy Campa MD   Caller: Unspecified (Yesterday, 10:07 AM)             Indeed he should see primary care .  I do not expect leg pain with the Metformin, but agree that he certainly needs to be seen.

## 2021-06-28 ENCOUNTER — TELEPHONE (OUTPATIENT)
Dept: ORTHOPEDICS | Facility: CLINIC | Age: 81
End: 2021-06-28

## 2021-06-28 NOTE — TELEPHONE ENCOUNTER
[facility] Call Center    Phone Message    May a detailed message be left on voicemail: yes     Reason for Call: Patient called stating that he was supposed to get referred to Physical Therapy but hasn't heard anything. Please advise. Thank you.    Action Taken: Message routed to:  Adult Clinics: Orthopedics p [id number]    Travel Screening: Not Applicable

## 2021-06-29 NOTE — TELEPHONE ENCOUNTER
6/29 Patient is currently scheduled for this appointment.,     [facility] Procedure   Orthopedics, Podiatry, Sports Medicine, ENT/Eye Specialties  [facility]   [phone]

## 2021-08-03 ENCOUNTER — OFFICE VISIT (OUTPATIENT)
Dept: PHYSICAL MEDICINE AND REHAB | Facility: CLINIC | Age: 81
End: 2021-08-03
Attending: ORTHOPAEDIC SURGERY
Payer: COMMERCIAL

## 2021-08-03 VITALS
RESPIRATION RATE: 16 BRPM | OXYGEN SATURATION: 95 % | HEART RATE: 70 BPM | DIASTOLIC BLOOD PRESSURE: 68 MMHG | SYSTOLIC BLOOD PRESSURE: 119 MMHG

## 2021-08-03 DIAGNOSIS — G89.29 OTHER CHRONIC PAIN: ICD-10-CM

## 2021-08-03 DIAGNOSIS — M79.18 MYALGIA, OTHER SITE: ICD-10-CM

## 2021-08-03 DIAGNOSIS — M47.817 LUMBOSACRAL SPONDYLOSIS WITHOUT MYELOPATHY: Primary | ICD-10-CM

## 2021-08-03 DIAGNOSIS — Z11.59 ENCOUNTER FOR SCREENING FOR OTHER VIRAL DISEASES: ICD-10-CM

## 2021-08-03 PROCEDURE — 99204 OFFICE O/P NEW MOD 45 MIN: CPT | Performed by: PHYSICAL MEDICINE & REHABILITATION

## 2021-08-03 ASSESSMENT — PAIN SCALES - GENERAL: PAINLEVEL: SEVERE PAIN (7)

## 2021-08-03 NOTE — PROGRESS NOTES
I am seeing this patient at the request of Dr. Mittal for evaluation and treatment of chronic low back and hip pain.  History of present illness patient is a very pleasant 80-year-old gentleman, retired , who reports having had severe back pain with spondylolisthesis and lumbar spine spinal stenosis.  He has undergone previously decompression laminectomy and fusion at L4-5.  This was in 2020.  He continues to have pain in the lower back and hip region.  He has had multiple epidural steroid injections without benefit.  He has gone through physical therapy and does not feel that is helpful.  He has taken medications including ibuprofen and Tylenol without benefit.  He walks with a cane.  He is able to walk up to a block.  Standing for 5 to 10 minutes increases his pain.  Pain could be is versus an 8 out of 10.  It is 0 out of 10 when he is sitting.  He also notices some weakness in his feet.  He has lower extremity edema for which she has compression stockings.    He does not report any numbness or tingling and does not report any radicular symptoms.  He does not report any bowel bladder disturbance.    Functionally he is still able to manage his own cares and has not had any falls.    Past Medical History:   Diagnosis Date     Diabetes mellitus (H)      Gout attack      Heart attack (H) 1984     Hypertension        Past Surgical History:   Procedure Laterality Date     CARDIAC SURGERY  1984    4 vessel bypass     CHOLECYSTECTOMY       COLONOSCOPY       ESOPHAGOSCOPY, GASTROSCOPY, DUODENOSCOPY (EGD), COMBINED N/A 3/1/2018    Procedure: COMBINED ESOPHAGOSCOPY, GASTROSCOPY, DUODENOSCOPY (EGD), BIOPSY SINGLE OR MULTIPLE;  ESOPHAGOGASTRODUODENOSCOPY ;  Surgeon: Daryn Medrano MD;  Location:  GI     NO HISTORY OF SURGERY  3/31/14    derm     PHACOEMULSIFICATION WITH STANDARD INTRAOCULAR LENS IMPLANT  1/21/2013    Procedure: PHACOEMULSIFICATION WITH STANDARD INTRAOCULAR LENS IMPLANT;   Phacoemulsification with standard intraocular lens implant, right eye;  Surgeon: Jay Rodriges MD;  Location:  OR       Social History     Social History Narrative     Not on file       Family History        Negative family history of: Cancer, Skin Cancer        Current Outpatient Medications   Medication Sig Dispense Refill     Alcohol Swabs PADS 1 pad 4 times daily 100 each 6     allopurinol (ZYLOPRIM) 300 MG tablet Take 300 mg by mouth daily       ammonium lactate (LAC-HYDRIN) 12 % external lotion Apply topically 2 times daily Apply twice daily to feet 500 g 1     aspirin 81 MG tablet Take 81 mg by mouth daily.       atorvastatin (LIPITOR) 40 MG tablet Take 1 tablet (40 mg) by mouth daily 90 tablet 4     augmented betamethasone dipropionate (DIPROLENE-AF) 0.05 % ointment Apply topically 2 times daily As needed to itchy areas on back 50 g 3     betamethasone dipropionate (DIPROSONE) 0.05 % external ointment At bedtime apply to ankle and cover with saran wrap (or sock) overnight, wash off in morning 50 g 1     blood glucose (NO BRAND SPECIFIED) lancets standard Use to test blood sugar 3 times daily or as directed. 100 each 11     blood glucose (NO BRAND SPECIFIED) test strip Use to test blood sugar 4 times daily  With meter/ strips/ lancets covered by insurance pt using accuchek 360 each 3     Blood Glucose Monitoring Suppl (BLOOD GLUCOSE MONITOR SYSTEM) w/Device KIT Test 4 times daily with meter covered by  insurance 1 kit 1     Cholecalciferol (VITAMIN D) 2000 UNITS CAPS Take 1 tablet by mouth daily       clopidogrel (PLAVIX) 75 MG tablet Take 75 mg by mouth daily       COLCHICINE PO Take 0.6 mg by mouth daily.       Cyanocobalamin (VITAMIN B12 PO) Take 1 tablet by mouth daily       Dupilumab (DUPIXENT) 300 MG/2ML syringe Inject 2 mLs (300 mg) Subcutaneous every 14 days 4 mL 4     Ginkgo Biloba (GNP GINGKO BILOBA EXTRACT PO) Take 1 tablet by mouth daily        glimepiride (AMARYL) 1 MG tablet TAKE 1  TABLET TWICE DAILY 180 tablet 1     indapamide (LOZOL) 2.5 MG tablet Take 2.5 mg by mouth every morning.       ipratropium (ATROVENT) 0.03 % nasal spray Spray 2 sprays into both nostrils as needed for rhinitis 30min before cooking 30 mL 1     irbesartan (AVAPRO) 150 MG tablet Take 1 tablet (150 mg) by mouth At Bedtime 90 tablet 2     isosorbide mononitrate (IMDUR) 30 MG 24 hr tablet Take 30 mg by mouth daily       metFORMIN (GLUCOPHAGE) 1000 MG tablet Take 1 tablet (1,000 mg) by mouth daily (with dinner) 90 tablet 3     multivitamin, therapeutic with minerals (MULTI-VITAMIN) TABS Take 1 tablet by mouth daily.       mupirocin (BACTROBAN) 2 % external ointment Apply topically 2 times daily 30 g 0     nitroglycerin (NITROSTAT) 0.4 MG SL tablet Place under the tongue as needed       omega-3 acid ethyl esters (LOVAZA) 1 g capsule Take 2 capsules (2 g) by mouth 2 times daily 180 capsule 3     potassium chloride SA (K-DUR/KLOR-CON M) 20 MEQ CR tablet TAKE 1 TABLET THREE TIMES DAILY 270 tablet 1     torsemide (DEMADEX) 10 MG tablet Take 10 mg by mouth daily       triamcinolone (KENALOG) 0.1 % external ointment APPLY TWICE DAILY TO RAISED RASH AREAS ON THE ARMS, LEGS, BODY AS NEEDED. 454 g 1     capsaicin (ZOSTRIX) 0.075 % cream Apply topically 3 times daily To areas of itch on back.  OK to dilute with cetaphil cream if too burning. (Patient not taking: Reported on 6/11/2021) 60 g 3     empagliflozin (JARDIANCE) 10 MG TABS tablet Take 1 tablet (10 mg) by mouth daily INSTEAD OF AMARYL (Patient not taking: Reported on 6/11/2021) 30 tablet 3       /68   Pulse 70   Resp 16   SpO2 95%     Examination of his x-rays revealed solid fusion at L4-5.    HEENT is negative.  Extraocular movements are intact.  Face is symmetric.  Tongue is midline.  Neck is supple.    He is able to move his upper extremities functionally.  Passive straight leg raising test is negative.  He is able to carry out Edson's with some assistance but  without pain.  There is no tenderness over the hip joints or over the trochanters.  Examination of the back reveals healed scar.  There is tenderness over the upper lumbar paraspinal region bilaterally.  There is no tenderness of the lower lumbar spine or over the sacroiliac joints.  He did complain of some discomfort over the piriformis.    He walks with the assistance of a cane with shuffling gait pattern.  He is unsteady on his feet.    Impression: At this point this patient with chronic low back pain predominantly going to the hips and not radiating beyond the knees appears to be having facet mediated pain from L2-3 and L3-4.  He has fusion from at L4-5.  His pain may benefit from treatment of the facets with medial branch blocks for confirmation and diagnosis.  If he gets 80% improvement or better, he would be a candidate for radiofrequency ablations.  If radiofrequency ablation is to be considered, it may be necessary to repeat an MRI of his spine, even though he reports he has had them previously before his back surgery.    This was discussed at length with the patient and his wife.  60 minutes spent for the visit, greater than 50% was for counseling on above-mentioned issues.  Thank you much for allowing me to assist in the evaluation of this pleasant gentleman.    Horacio Gonsalves MD

## 2021-08-03 NOTE — LETTER
8/3/2021       RE: Deandre Moore  [address] [facility] [address]     Dear Colleague,    Thank you for referring your patient, Deandre Moore, to the [facility] [place] [facility]. Please see a copy of my visit note below.    I am seeing this patient at the request of Dr. Mittal for evaluation and treatment of chronic low back and hip pain.  History of present illness patient is a very pleasant 80-year-old gentleman, retired , who reports having had severe back pain with spondylolisthesis and lumbar spine spinal stenosis.  He has undergone previously decompression laminectomy and fusion at L4-5.  This was in 2020.  He continues to have pain in the lower back and hip region.  He has had multiple epidural steroid injections without benefit.  He has gone through physical therapy and does not feel that is helpful.  He has taken medications including ibuprofen and Tylenol without benefit.  He walks with a cane.  He is able to walk up to a block.  Standing for 5 to 10 minutes increases his pain.  Pain could be is versus an 8 out of 10.  It is 0 out of 10 when he is sitting.  He also notices some weakness in his feet.  He has lower extremity edema for which she has compression stockings.    He does not report any numbness or tingling and does not report any radicular symptoms.  He does not report any bowel bladder disturbance.    Functionally he is still able to manage his own cares and has not had any falls.    Past Medical History:   Diagnosis Date     Diabetes mellitus (H)      Gout attack      Heart attack (H) 1984     Hypertension        Past Surgical History:   Procedure Laterality Date     CARDIAC SURGERY  1984    4 vessel bypass     CHOLECYSTECTOMY       COLONOSCOPY       ESOPHAGOSCOPY, GASTROSCOPY, DUODENOSCOPY (EGD), COMBINED N/A 3/1/2018    Procedure: COMBINED ESOPHAGOSCOPY,  GASTROSCOPY, DUODENOSCOPY (EGD), BIOPSY SINGLE OR MULTIPLE;  ESOPHAGOGASTRODUODENOSCOPY ;  Surgeon: Daryn Medrano MD;  Location:  GI     NO HISTORY OF SURGERY  3/31/14    derm     PHACOEMULSIFICATION WITH STANDARD INTRAOCULAR LENS IMPLANT  1/21/2013    Procedure: PHACOEMULSIFICATION WITH STANDARD INTRAOCULAR LENS IMPLANT;  Phacoemulsification with standard intraocular lens implant, right eye;  Surgeon: Jay Rodriges MD;  Location:  OR       Social History     Social History Narrative     Not on file       Family History        Negative family history of: Cancer, Skin Cancer        Current Outpatient Medications   Medication Sig Dispense Refill     Alcohol Swabs PADS 1 pad 4 times daily 100 each 6     allopurinol (ZYLOPRIM) 300 MG tablet Take 300 mg by mouth daily       ammonium lactate (LAC-HYDRIN) 12 % external lotion Apply topically 2 times daily Apply twice daily to feet 500 g 1     aspirin 81 MG tablet Take 81 mg by mouth daily.       atorvastatin (LIPITOR) 40 MG tablet Take 1 tablet (40 mg) by mouth daily 90 tablet 4     augmented betamethasone dipropionate (DIPROLENE-AF) 0.05 % ointment Apply topically 2 times daily As needed to itchy areas on back 50 g 3     betamethasone dipropionate (DIPROSONE) 0.05 % external ointment At bedtime apply to ankle and cover with saran wrap (or sock) overnight, wash off in morning 50 g 1     blood glucose (NO BRAND SPECIFIED) lancets standard Use to test blood sugar 3 times daily or as directed. 100 each 11     blood glucose (NO BRAND SPECIFIED) test strip Use to test blood sugar 4 times daily  With meter/ strips/ lancets covered by insurance pt using accuchek 360 each 3     Blood Glucose Monitoring Suppl (BLOOD GLUCOSE MONITOR SYSTEM) w/Device KIT Test 4 times daily with meter covered by  insurance 1 kit 1     Cholecalciferol (VITAMIN D) 2000 UNITS CAPS Take 1 tablet by mouth daily       clopidogrel (PLAVIX) 75 MG tablet Take 75 mg by mouth daily        COLCHICINE PO Take 0.6 mg by mouth daily.       Cyanocobalamin (VITAMIN B12 PO) Take 1 tablet by mouth daily       Dupilumab (DUPIXENT) 300 MG/2ML syringe Inject 2 mLs (300 mg) Subcutaneous every 14 days 4 mL 4     Ginkgo Biloba (GNP GINGKO BILOBA EXTRACT PO) Take 1 tablet by mouth daily        glimepiride (AMARYL) 1 MG tablet TAKE 1 TABLET TWICE DAILY 180 tablet 1     indapamide (LOZOL) 2.5 MG tablet Take 2.5 mg by mouth every morning.       ipratropium (ATROVENT) 0.03 % nasal spray Spray 2 sprays into both nostrils as needed for rhinitis 30min before cooking 30 mL 1     irbesartan (AVAPRO) 150 MG tablet Take 1 tablet (150 mg) by mouth At Bedtime 90 tablet 2     isosorbide mononitrate (IMDUR) 30 MG 24 hr tablet Take 30 mg by mouth daily       metFORMIN (GLUCOPHAGE) 1000 MG tablet Take 1 tablet (1,000 mg) by mouth daily (with dinner) 90 tablet 3     multivitamin, therapeutic with minerals (MULTI-VITAMIN) TABS Take 1 tablet by mouth daily.       mupirocin (BACTROBAN) 2 % external ointment Apply topically 2 times daily 30 g 0     nitroglycerin (NITROSTAT) 0.4 MG SL tablet Place under the tongue as needed       omega-3 acid ethyl esters (LOVAZA) 1 g capsule Take 2 capsules (2 g) by mouth 2 times daily 180 capsule 3     potassium chloride SA (K-DUR/KLOR-CON M) 20 MEQ CR tablet TAKE 1 TABLET THREE TIMES DAILY 270 tablet 1     torsemide (DEMADEX) 10 MG tablet Take 10 mg by mouth daily       triamcinolone (KENALOG) 0.1 % external ointment APPLY TWICE DAILY TO RAISED RASH AREAS ON THE ARMS, LEGS, BODY AS NEEDED. 454 g 1     capsaicin (ZOSTRIX) 0.075 % cream Apply topically 3 times daily To areas of itch on back.  OK to dilute with cetaphil cream if too burning. (Patient not taking: Reported on 6/11/2021) 60 g 3     empagliflozin (JARDIANCE) 10 MG TABS tablet Take 1 tablet (10 mg) by mouth daily INSTEAD OF AMARYL (Patient not taking: Reported on 6/11/2021) 30 tablet 3       /68   Pulse 70   Resp 16   SpO2 95%      Examination of his x-rays revealed solid fusion at L4-5.    HEENT is negative.  Extraocular movements are intact.  Face is symmetric.  Tongue is midline.  Neck is supple.    He is able to move his upper extremities functionally.  Passive straight leg raising test is negative.  He is able to carry out Edson's with some assistance but without pain.  There is no tenderness over the hip joints or over the trochanters.  Examination of the back reveals healed scar.  There is tenderness over the upper lumbar paraspinal region bilaterally.  There is no tenderness of the lower lumbar spine or over the sacroiliac joints.  He did complain of some discomfort over the piriformis.    He walks with the assistance of a cane with shuffling gait pattern.  He is unsteady on his feet.    Impression: At this point this patient with chronic low back pain predominantly going to the hips and not radiating beyond the knees appears to be having facet mediated pain from L2-3 and L3-4.  He has fusion from at L4-5.  His pain may benefit from treatment of the facets with medial branch blocks for confirmation and diagnosis.  If he gets 80% improvement or better, he would be a candidate for radiofrequency ablations.  If radiofrequency ablation is to be considered, it may be necessary to repeat an MRI of his spine, even though he reports he has had them previously before his back surgery.    This was discussed at length with the patient and his wife.  60 minutes spent for the visit, greater than 50% was for counseling on above-mentioned issues.  Thank you much for allowing me to assist in the evaluation of this pleasant gentleman.    Horacio Gonsalves MD       Again, thank you for allowing me to participate in the care of your patient.      Sincerely,    Horacio Gonsalves MD

## 2021-08-03 NOTE — NURSING NOTE
Chief Complaint   Patient presents with     Consult     [facility] NEW SPINE LBP W/ SCIATICA        Ever Awad, EMT

## 2021-08-11 ENCOUNTER — TELEPHONE (OUTPATIENT)
Dept: PHYSICAL MEDICINE AND REHAB | Facility: CLINIC | Age: 81
End: 2021-08-11

## 2021-08-11 NOTE — TELEPHONE ENCOUNTER
Gave patient information of time of injection at [address] on 5th floor of [facility]. Patient does not need a  for this procedure.        Tiffani Butler RN, BSN, PHN

## 2021-08-11 NOTE — TELEPHONE ENCOUNTER
[facility] Call Center    Phone Message    May a detailed message be left on voicemail: yes     Reason for Call: Other: Charles calling to request a call back to discuss the information (time and place) regarding his upcoming procedure on 8/16/21. Please call Charles at your earliest convenience to discuss.     Action Taken: Message routed to:  [facility]    Travel Screening: Not Applicable

## 2021-08-13 ENCOUNTER — LAB (OUTPATIENT)
Dept: LAB | Facility: CLINIC | Age: 81
End: 2021-08-13
Attending: PHYSICAL MEDICINE & REHABILITATION
Payer: COMMERCIAL

## 2021-08-13 DIAGNOSIS — Z11.59 ENCOUNTER FOR SCREENING FOR OTHER VIRAL DISEASES: ICD-10-CM

## 2021-08-13 PROCEDURE — U0003 INFECTIOUS AGENT DETECTION BY NUCLEIC ACID (DNA OR RNA); SEVERE ACUTE RESPIRATORY SYNDROME CORONAVIRUS 2 (SARS-COV-2) (CORONAVIRUS DISEASE [COVID-19]), AMPLIFIED PROBE TECHNIQUE, MAKING USE OF HIGH THROUGHPUT TECHNOLOGIES AS DESCRIBED BY CMS-2020-01-R: HCPCS

## 2021-08-13 PROCEDURE — U0005 INFEC AGEN DETEC AMPLI PROBE: HCPCS

## 2021-08-14 LAB — SARS-COV-2 RNA RESP QL NAA+PROBE: NEGATIVE

## 2021-08-16 ENCOUNTER — ANCILLARY PROCEDURE (OUTPATIENT)
Dept: RADIOLOGY | Facility: AMBULATORY SURGERY CENTER | Age: 81
End: 2021-08-16
Attending: PHYSICAL MEDICINE & REHABILITATION
Payer: COMMERCIAL

## 2021-08-16 ENCOUNTER — HOSPITAL ENCOUNTER (OUTPATIENT)
Facility: AMBULATORY SURGERY CENTER | Age: 81
Discharge: HOME | End: 2021-08-16
Attending: PHYSICAL MEDICINE & REHABILITATION | Admitting: PHYSICAL MEDICINE & REHABILITATION
Payer: COMMERCIAL

## 2021-08-16 VITALS
HEART RATE: 74 BPM | SYSTOLIC BLOOD PRESSURE: 129 MMHG | WEIGHT: 220 LBS | OXYGEN SATURATION: 98 % | DIASTOLIC BLOOD PRESSURE: 72 MMHG | BODY MASS INDEX: 29.16 KG/M2 | RESPIRATION RATE: 16 BRPM | HEIGHT: 73 IN | TEMPERATURE: 97 F

## 2021-08-16 DIAGNOSIS — M47.817 LUMBOSACRAL SPONDYLOSIS WITHOUT MYELOPATHY: ICD-10-CM

## 2021-08-16 PROCEDURE — 64494 INJ PARAVERT F JNT L/S 2 LEV: CPT | Mod: RT

## 2021-08-16 PROCEDURE — 64493 INJ PARAVERT F JNT L/S 1 LEV: CPT | Mod: RT,LT

## 2021-08-16 RX ORDER — IOPAMIDOL 408 MG/ML
INJECTION, SOLUTION INTRATHECAL PRN
Status: DISCONTINUED | OUTPATIENT
Start: 2021-08-16 | End: 2021-08-16 | Stop reason: HOSPADM

## 2021-08-16 RX ORDER — BUPIVACAINE HYDROCHLORIDE 5 MG/ML
INJECTION, SOLUTION PERINEURAL PRN
Status: DISCONTINUED | OUTPATIENT
Start: 2021-08-16 | End: 2021-08-16 | Stop reason: HOSPADM

## 2021-08-16 ASSESSMENT — MIFFLIN-ST. JEOR: SCORE: 1751.79

## 2021-08-16 NOTE — PROCEDURES
Deandre Moore  is a very pleasant 80-year-old gentleman, retired , who reports having had severe back pain with spondylolisthesis and lumbar spine spinal stenosis.  He has undergone previously decompression laminectomy and fusion at L4-5.  This was in 2020.  He continues to have pain in the lower back and hip region.  He has had multiple epidural steroid injections without benefit.  He has gone through physical therapy and does not feel that is helpful.  He has taken medications including ibuprofen and Tylenol without benefit.  He walks with a cane.  He is able to walk up to a block.  Standing for 5 to 10 minutes increases his pain.  Pain could be is versus an 8 out of 10.  It is 0 out of 10 when he is sitting.  It is felt injecting the medial branches from L2-L4 on either side would be beneficial for diagnostic and therapeutic purposes and he is here for this purpose.    With his informed consent, after explaining the benefits and risks of the procedure, he was taken to the fluoroscopic suite and placed prone on the table.  The back was prepped in a sterile fashion.  It was draped as usual.  Timeout was carried out.  Using fluoroscopy, the junction of the superior articular process and the transverse process at L2, L3 and L4 on either side were identified and marked.  1% lidocaine was utilized for topical anesthesia.  Using 25-gauge 3-1/2 inch needle, the above-mentioned points were accessed and confirmed with Isovue for contrast.  With appropriate spread of the contrast, 0.5 cc of 0.5% Marcaine was injected at each site.  An additional half a cc was injected as the needle was withdrawn.  6 cc were utilized.  He tolerated the procedure well.  There was no change in strength or sensation and his pain level postprocedure was 0 out of 10.  He will keep a pain diary and update this clinic.  If he gets 80% improvement, we will proceed with confirmatory block followed by radiofrequency  ablations.    Thank you much following me to assist in his care.    Horacio Gonsalves MD

## 2021-08-16 NOTE — DISCHARGE INSTRUCTIONS
"Home Care Instructions after a Medial Branch Block      In a medial branch block, a local anesthetic (numbing medicine) is injected near the medial branch nerve. This stops the transmission of pain signals from the facet joint. If this reduces your pain and improves your mobility, it may tell the doctor which facet joint is causing the pain. This procedure is a diagnostic procedure and is typically short lasting. With this injection, a steroid to increase the longevity of the blocks effect may or may not be used.    Activity  -You may resume most normal activity levels with the exception of strenuous activity. It is important for us to know if your pain with normal activity is relieved after this injection.  -DO NOT shower for 24 hours  -DO NOT remove bandaid for 24 hours    Pain  -You may experience soreness at the injection site for one or two days  -You may use an ice pack for 20 minutes every 2 hours for the first 24 hours  -You may use a heating pad after the first 24 hours  -You may use Tylenol (acetaminophen) every 4 hours or other pain medicines as     directed by your physician    You may experience numbness radiating into your legs or arms (depending on the procedure location). This numbness may last several hours. Until sensation returns to normal; please use caution in walking, climbing stairs, and stepping out of your vehicle, etc.      DID YOU RECEIVE STEROIDS TODAY?  {YES / NO:713597::\"Yes\"}    Common side effects of steroids:  Not everyone will experience corticosteroid side effects. If side effects are experienced, they will gradually subside in the 7-10 day period following an injection. Most common side effects include:  -Flushed face and/or chest  -Feeling of warmth, particularly in the face but could be an overall feeling of warmth  -Increased blood sugar in diabetic patients  -Menstrual irregularities my occur. If taking hormone-based birth control an alternate method of birth control is " recommended  -Sleep disturbances and/or mood swings are possible  -Leg cramps      PLEASE KEEP TRACK OF YOUR SYMPTOMS AND NOTE YOUR IMPROVEMENT FOR YOUR DOCTOR.       Fill out the pain diary and fax ([phone]) it back to us. This cues us to call the patient to follow up with scheduling the next procedure. They do not need to call us they have faxed the Pain Diary.     If they do not have access to a fax machine, they can attach it to [employer] and we will follow up with them. They do not need to call us.     If they cannot fax or [employer], then call our call center and request to speak with a nurse for follow up after a procedure [phone].    Please contact us if you have:  -Severe pain  -Fever more than 101.5 degrees Fahrenheit  -Signs of infection at the injection site (redness, swelling, or drainage)    If you have questions, please contact our office at [phone] between the hours of 7:00 am and 3:00 pm Monday through Friday. After office hours you can contact the on call provider by dialing [phone]. If you need immediate attention, we recommend that you go to a hospital emergency room or dial [phone].

## 2021-08-17 PROBLEM — Z95.1 HX OF CABG: Status: ACTIVE | Noted: 2021-01-11

## 2021-08-17 PROBLEM — I49.3 VENTRICULAR ECTOPY: Status: ACTIVE | Noted: 2018-09-06

## 2021-08-17 PROBLEM — N28.9 RENAL INSUFFICIENCY SYNDROME: Status: ACTIVE | Noted: 2018-09-06

## 2021-08-17 PROBLEM — I25.10 CORONARY ATHEROSCLEROSIS: Status: ACTIVE | Noted: 2021-08-17

## 2021-08-17 PROBLEM — M72.2 PLANTAR FASCIA SYNDROME: Status: ACTIVE | Noted: 2021-01-14

## 2021-08-17 PROBLEM — E78.5 DYSLIPIDEMIA: Status: ACTIVE | Noted: 2021-08-17

## 2021-08-17 PROBLEM — R60.9 EDEMA: Status: ACTIVE | Noted: 2019-06-15

## 2021-08-17 PROBLEM — I47.10 SUPRAVENTRICULAR TACHYCARDIA (H): Status: ACTIVE | Noted: 2020-02-19

## 2021-08-17 RX ORDER — PREDNISONE 20 MG/1
TABLET ORAL
COMMUNITY
Start: 2021-08-12 | End: 2022-05-18

## 2021-08-20 ENCOUNTER — TELEPHONE (OUTPATIENT)
Dept: PHYSICAL MEDICINE AND REHAB | Facility: CLINIC | Age: 81
End: 2021-08-20

## 2021-08-20 NOTE — TELEPHONE ENCOUNTER
He will need the second set of injections in 2 weeks. Should document the Pain form.     Horacio Gonsalves MD

## 2021-08-20 NOTE — TELEPHONE ENCOUNTER
[facility] Call Center    Phone Message    May a detailed message be left on voicemail: yes     Reason for Call: Other: Patient calling to update Dr. Gonsalves and care team in regards to procedure on Monday 8/16. Patient states that he has been pain free ever since the procedure.    Patient also wondering if a follow up is needed.     Please advise     Action Taken: Other: [facility]    Travel Screening: Not Applicable

## 2021-08-23 DIAGNOSIS — M47.817 LUMBOSACRAL SPONDYLOSIS WITHOUT MYELOPATHY: Primary | ICD-10-CM

## 2021-08-24 ENCOUNTER — OFFICE VISIT (OUTPATIENT)
Dept: DERMATOLOGY | Facility: CLINIC | Age: 81
End: 2021-08-24
Payer: COMMERCIAL

## 2021-08-24 DIAGNOSIS — L30.9 DERMATITIS: Primary | ICD-10-CM

## 2021-08-24 DIAGNOSIS — I87.2 VENOUS INSUFFICIENCY: ICD-10-CM

## 2021-08-24 DIAGNOSIS — I87.2 VENOUS STASIS DERMATITIS, UNSPECIFIED LATERALITY: ICD-10-CM

## 2021-08-24 DIAGNOSIS — L28.1 PRURIGO NODULARIS: ICD-10-CM

## 2021-08-24 DIAGNOSIS — L20.89 OTHER ATOPIC DERMATITIS: ICD-10-CM

## 2021-08-24 PROCEDURE — 99214 OFFICE O/P EST MOD 30 MIN: CPT | Performed by: DERMATOLOGY

## 2021-08-24 RX ORDER — CLOBETASOL PROPIONATE 0.5 MG/G
CREAM TOPICAL
Qty: 60 G | Refills: 5 | Status: SHIPPED | OUTPATIENT
Start: 2021-08-24

## 2021-08-24 RX ORDER — KETOCONAZOLE 20 MG/G
CREAM TOPICAL DAILY
COMMUNITY
Start: 2021-08-12 | End: 2023-01-01

## 2021-08-24 ASSESSMENT — PAIN SCALES - GENERAL: PAINLEVEL: NO PAIN (0)

## 2021-08-24 NOTE — PATIENT INSTRUCTIONS
1. Continue dupixent every 2 weeks.   2. Start clobetasol 0.05% cream twice daily as needed for the rash on the back.   3. For moisturizer, recommend CeraVe, Cetaphil, or Vanicream moisturizer.

## 2021-08-24 NOTE — PROGRESS NOTES
[facility] Dermatology Note  Encounter Date: Aug 24, 2021  Office Visit     Dermatology Problem List:  # Chronic eczematous dermatitis with chronic pruritis  - Current tx: dupilumab 300mg q14d (restarted 8/24/21), clobetasol 0.05% cream  # Prurigo nodularis, left forearm. S/p bx.   - ILK 20 mg/cc on 5/6/21.   # Macular ISK, R axilla. S/p shave bx 5/6/21  # Stasis dermatitis  ____________________________________________    Assessment & Plan:    # Chronic eczematous dermatitis / pruritus / prurigo nodularis. Chronic, flare. 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 Recommended trial of high-potency topical steroid while continuing dupixent. If not improved at follow-up, would consider addition of nbUVB phototherapy.   - Continue dupixent  - Start moisturizing regularly. Recommended Cerave, Cetaphil, or Vanicream.   - Start clobetasol 0.05% cream BID      # Stasis dermatitis / changes in setting of venous insufficiency. Chronic, stable. Etiology discussed. Recommended more consistent use of compression stockings. Reiterated chronic skin changes are unlikely to improve without reduction in lower extremity edema.     Procedures Performed:   None    Follow-up: 2-3 month(s) in-person, or earlier for new or changing lesions    Staff and Scribe:     Provider Disclosure:   The documentation recorded by the scribe accurately reflects the services I personally performed and the decisions made by me.    Edu Rivera MD    Department of Dermatology  [facility]: Phone: [phone], Fax:[phone]  [facility] [place]: Phone: [phone] Fax: [phone]    Scribe Disclosure:  I, Kisha Khan, am serving as a scribe to document services  personally performed by Edu Rivera MD at this visit, based upon the provider's statements to me. All documentation has been reviewed by the aforementioned provider prior to being entered into the official medical record.     I, Kisha Khan, a scribe, prepared the chart for today's encounter.   ____________________________________________    CC: Derm Problem (Charles is here today in order to follow up on Prurigo nodularis. He states that things seem to be going well. He notes general improvement. )      HPI:  Mr. Deandre Moore is a(n) 80 year old male who presents today as a return patient for follow-up. The patient was last seen in dermatology by myself at which point 1 NUB was biopsied and path results demonstrated a macular ISK. Additionally, he was continued on dupixent, triam 0.1% ointment, and betamethasone for treatment of eczematous dermatitis. Finally, he was also continued on dupixent, betamethasone for treatment of prurigo nodularis. One persistent lesion was treated with ILK 20.     Today, the patient states that he has been taking Dupixent until recently, but is still periodically itchy, mostly over his back. He reports some discoloration on his back, but there is no associated rash. Additionally, he endorses some swelling and brown discoloration in his legs, but does not wear compression stockings.     Patient is otherwise feeling well, without additional skin concerns.    Labs Reviewed:  N/A    Physical Exam:  Vitals: There were no vitals taken for this visit.  SKIN: Focused examination of legs and back was performed.  - Mild diffuse xerosis cutis  - Left lower extremity with 3+ pitting edema to the knees. Brown plaques on the anterior shins.   - Upper back with numerous small <1mm brown papules coalescing into linear thin plaques.   - No other lesions of concern on areas examined.     Medications:  Current Outpatient Medications   Medication     Alcohol Swabs PADS     allopurinol  (ZYLOPRIM) 300 MG tablet     ammonium lactate (LAC-HYDRIN) 12 % external lotion     aspirin 81 MG tablet     atorvastatin (LIPITOR) 40 MG tablet     augmented betamethasone dipropionate (DIPROLENE-AF) 0.05 % ointment     betamethasone dipropionate (DIPROSONE) 0.05 % external ointment     blood glucose (NO BRAND SPECIFIED) lancets standard     blood glucose (NO BRAND SPECIFIED) test strip     Blood Glucose Monitoring Suppl (BLOOD GLUCOSE MONITOR SYSTEM) w/Device KIT     Cholecalciferol (VITAMIN D) 2000 UNITS CAPS     clopidogrel (PLAVIX) 75 MG tablet     COLCHICINE PO     Cyanocobalamin (VITAMIN B12 PO)     Dupilumab (DUPIXENT) 300 MG/2ML syringe     Ginkgo Biloba (GNP GINGKO BILOBA EXTRACT PO)     glimepiride (AMARYL) 1 MG tablet     indapamide (LOZOL) 2.5 MG tablet     ipratropium (ATROVENT) 0.03 % nasal spray     irbesartan (AVAPRO) 150 MG tablet     isosorbide mononitrate (IMDUR) 30 MG 24 hr tablet     ketoconazole (NIZORAL) 2 % external cream     metFORMIN (GLUCOPHAGE) 1000 MG tablet     multivitamin, therapeutic with minerals (MULTI-VITAMIN) TABS     mupirocin (BACTROBAN) 2 % external ointment     nitroglycerin (NITROSTAT) 0.4 MG SL tablet     omega-3 acid ethyl esters (LOVAZA) 1 g capsule     potassium chloride SA (K-DUR/KLOR-CON M) 20 MEQ CR tablet     predniSONE (DELTASONE) 20 MG tablet     torsemide (DEMADEX) 10 MG tablet     triamcinolone (KENALOG) 0.1 % external ointment     capsaicin (ZOSTRIX) 0.075 % cream     empagliflozin (JARDIANCE) 10 MG TABS tablet     Current Facility-Administered Medications   Medication     triamcinolone (KENALOG-40) injection 20 mg        Past Medical History:   Patient Active Problem List   Diagnosis     Itching     AD (atopic dermatitis)     Dermatitis     Hyperglycemia     Eczema, unspecified eczema     Intrinsic atopic dermatitis     Other eczema     Notalgia paresthetica     Rash     Pseudophakia of both eyes     Diabetes mellitus (H)     Presbyopia     Type 2 diabetes  mellitus without complication, without long-term current use of insulin (H)     Encounter for long-term (current) use of high-risk medication     Seborrheic keratoses, inflamed     Neoplasm of uncertain behavior of skin     Chronic kidney disease, stage 3     Lumbosacral spondylosis without myelopathy     Hx of CABG     Gout     Essential hypertension     Erectile dysfunction     Edema     Dyslipidemia     Decreased range of motion of ankle     Coronary atherosclerosis     Vitamin D deficiency     Ventricular ectopy     Type 2 diabetes mellitus with diabetic neuropathy (H)     Supraventricular tachycardia (H)     Renal insufficiency syndrome     Pneumothorax     Plantar fascia syndrome     Personal history of tobacco use, presenting hazards to health     Past Medical History:   Diagnosis Date     Diabetes mellitus (H)      Gout attack      Heart attack (H) 1984     Hypertension      Stented coronary artery     6 stents in heart        [facility]  [address] [address] on close of this encounter.

## 2021-08-24 NOTE — LETTER
8/24/2021       RE: Deandre Moore  [address] [facility] [address]     Dear Colleague,    Thank you for referring your patient, Deandre Moore, to the [facility] DERMATOLOGY [facility]INIC [place] at [facility]. Please see a copy of my visit note below.    [facility] Dermatology Note  Encounter Date: Aug 24, 2021  Office Visit     Dermatology Problem List:  # Chronic eczematous dermatitis with chronic pruritis  - Current tx: dupilumab 300mg q14d (restarted 8/24/21), clobetasol 0.05% cream  # Prurigo nodularis, left forearm. S/p bx.   - ILK 20 mg/cc on 5/6/21.   # Macular ISK, R axilla. S/p shave bx 5/6/21  # Stasis dermatitis  ____________________________________________    Assessment & Plan:    # Chronic eczematous dermatitis / pruritus / prurigo nodularis. Chronic, flare. 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 Recommended trial of high-potency topical steroid while continuing dupixent. If not improved at follow-up, would consider addition of nbUVB phototherapy.   - Continue dupixent  - Start moisturizing regularly. Recommended Cerave, Cetaphil, or Vanicream.   - Start clobetasol 0.05% cream BID      # Stasis dermatitis / changes in setting of venous insufficiency. Chronic, stable. Etiology discussed. Recommended more consistent use of compression stockings. Reiterated chronic skin changes are unlikely to improve without reduction in lower extremity edema.     Procedures Performed:   None    Follow-up: 2-3 month(s) in-person, or earlier for new or changing lesions    Staff and Scribe:     Provider Disclosure:   The documentation recorded by the scribe accurately reflects the services I personally performed and the decisions made by me.    Edu Rivera MD  Assistant  Professor  Department of Dermatology  [facility]: Phone: [phone], Fax:[phone]  [facility]: Phone: [phone] Fax: [phone]    Scribe Disclosure:  Kisha DIAZ, am serving as a scribe to document services personally performed by Edu Rivera MD at this visit, based upon the provider's statements to me. All documentation has been reviewed by the aforementioned provider prior to being entered into the official medical record.     IKisha, a scribe, prepared the chart for today's encounter.   ____________________________________________    CC: Derm Problem (Rich is here today in order to follow up on Prurigo nodularis. He states that things seem to be going well. He notes general improvement. )      HPI:  Mr. Deandre Moore is a(n) 80 year old male who presents today as a return patient for follow-up. The patient was last seen in dermatology by myself at which point 1 NUB was biopsied and path results demonstrated a macular ISK. Additionally, he was continued on dupixent, triam 0.1% ointment, and betamethasone for treatment of eczematous dermatitis. Finally, he was also continued on dupixent, betamethasone for treatment of prurigo nodularis. One persistent lesion was treated with ILK 20.     Today, the patient states that he has been taking Dupixent until recently, but is still periodically itchy, mostly over his back. He reports some discoloration on his back, but there is no associated rash. Additionally, he endorses some swelling and brown discoloration in his legs, but does not wear compression stockings.     Patient is otherwise feeling well, without additional skin concerns.    Labs Reviewed:  N/A    Physical Exam:  Vitals: There were no vitals taken for this visit.  SKIN: Focused examination of legs and back was performed.  - Mild diffuse xerosis cutis  - Left lower  extremity with 3+ pitting edema to the knees. Brown plaques on the anterior shins.   - Upper back with numerous small <1mm brown papules coalescing into linear thin plaques.   - No other lesions of concern on areas examined.     Medications:  Current Outpatient Medications   Medication     Alcohol Swabs PADS     allopurinol (ZYLOPRIM) 300 MG tablet     ammonium lactate (LAC-HYDRIN) 12 % external lotion     aspirin 81 MG tablet     atorvastatin (LIPITOR) 40 MG tablet     augmented betamethasone dipropionate (DIPROLENE-AF) 0.05 % ointment     betamethasone dipropionate (DIPROSONE) 0.05 % external ointment     blood glucose (NO BRAND SPECIFIED) lancets standard     blood glucose (NO BRAND SPECIFIED) test strip     Blood Glucose Monitoring Suppl (BLOOD GLUCOSE MONITOR SYSTEM) w/Device KIT     Cholecalciferol (VITAMIN D) 2000 UNITS CAPS     clopidogrel (PLAVIX) 75 MG tablet     COLCHICINE PO     Cyanocobalamin (VITAMIN B12 PO)     Dupilumab (DUPIXENT) 300 MG/2ML syringe     Ginkgo Biloba (GNP GINGKO BILOBA EXTRACT PO)     glimepiride (AMARYL) 1 MG tablet     indapamide (LOZOL) 2.5 MG tablet     ipratropium (ATROVENT) 0.03 % nasal spray     irbesartan (AVAPRO) 150 MG tablet     isosorbide mononitrate (IMDUR) 30 MG 24 hr tablet     ketoconazole (NIZORAL) 2 % external cream     metFORMIN (GLUCOPHAGE) 1000 MG tablet     multivitamin, therapeutic with minerals (MULTI-VITAMIN) TABS     mupirocin (BACTROBAN) 2 % external ointment     nitroglycerin (NITROSTAT) 0.4 MG SL tablet     omega-3 acid ethyl esters (LOVAZA) 1 g capsule     potassium chloride SA (K-DUR/KLOR-CON M) 20 MEQ CR tablet     predniSONE (DELTASONE) 20 MG tablet     torsemide (DEMADEX) 10 MG tablet     triamcinolone (KENALOG) 0.1 % external ointment     capsaicin (ZOSTRIX) 0.075 % cream     empagliflozin (JARDIANCE) 10 MG TABS tablet     Current Facility-Administered Medications   Medication     triamcinolone (KENALOG-40) injection 20 mg        Past Medical  History:   Patient Active Problem List   Diagnosis     Itching     AD (atopic dermatitis)     Dermatitis     Hyperglycemia     Eczema, unspecified eczema     Intrinsic atopic dermatitis     Other eczema     Notalgia paresthetica     Rash     Pseudophakia of both eyes     Diabetes mellitus (H)     Presbyopia     Type 2 diabetes mellitus without complication, without long-term current use of insulin (H)     Encounter for long-term (current) use of high-risk medication     Seborrheic keratoses, inflamed     Neoplasm of uncertain behavior of skin     Chronic kidney disease, stage 3     Lumbosacral spondylosis without myelopathy     Hx of CABG     Gout     Essential hypertension     Erectile dysfunction     Edema     Dyslipidemia     Decreased range of motion of ankle     Coronary atherosclerosis     Vitamin D deficiency     Ventricular ectopy     Type 2 diabetes mellitus with diabetic neuropathy (H)     Supraventricular tachycardia (H)     Renal insufficiency syndrome     Pneumothorax     Plantar fascia syndrome     Personal history of tobacco use, presenting hazards to health     Past Medical History:   Diagnosis Date     Diabetes mellitus (H)      Gout attack      Heart attack (H) 1984     Hypertension      Stented coronary artery     6 stents in heart      CC [facility]  [address] [address] on close of this encounter.

## 2021-08-24 NOTE — NURSING NOTE
Dermatology Rooming Note    Deandre Moore's goals for this visit include:   Chief Complaint   Patient presents with     Derm Problem     Rich is here today in order to follow up on Prurigo nodularis. He states that things seem to be going well. He notes general improvement.      Harshad Mujica, EMT

## 2021-08-25 ENCOUNTER — OFFICE VISIT (OUTPATIENT)
Dept: PODIATRY | Facility: CLINIC | Age: 81
End: 2021-08-25
Payer: COMMERCIAL

## 2021-08-25 VITALS
OXYGEN SATURATION: 100 % | HEIGHT: 73 IN | SYSTOLIC BLOOD PRESSURE: 127 MMHG | DIASTOLIC BLOOD PRESSURE: 75 MMHG | WEIGHT: 220 LBS | HEART RATE: 96 BPM | BODY MASS INDEX: 29.16 KG/M2

## 2021-08-25 DIAGNOSIS — E11.51 TYPE II DIABETES MELLITUS WITH PERIPHERAL ARTERY DISEASE (H): Primary | ICD-10-CM

## 2021-08-25 DIAGNOSIS — L84 CORNS AND CALLOSITIES: ICD-10-CM

## 2021-08-25 PROCEDURE — 99203 OFFICE O/P NEW LOW 30 MIN: CPT | Performed by: PODIATRIST

## 2021-08-25 ASSESSMENT — MIFFLIN-ST. JEOR: SCORE: 1751.79

## 2021-08-25 NOTE — PATIENT INSTRUCTIONS
We wish you continued good healing. If you have any questions or concerns, please do not hesitate to contact us at [phone]    [employer] (secure e-mail communication and access to your chart) to send a message or to make an appointment.    Please remember to call and schedule a follow up appointment if one was recommended at your earliest convenience.     +++OF MARCH 2020+++ LOCATION AND HOURS HAVE CHANGED    PLEASE CALL CLINICS TO VERIFY DAYS AND TIMES  PODIATRY CLINIC HOURS  TELEPHONE NUMBER    Dr. Casey TRUJILLOPIRINEO [facility]        Clinics:  [place]   Tuesday 1PM-6PM  [place]  Wednesday 745AM-330PM  [place]  Thursday/Friday 745AM-230PM  [place] APPOINTMENTS  [phone]    [place] APPOINTMENTS  [phone]          If you need a medication refill, please contact us you may need lab work and/or a follow up visit prior to your refill (i.e. Antifungal medications).    If MRI needed please call Imaging at [phone] or [phone]    HOW DO I GET MY KNEE SCOOTER? Knee scooters can be picked up at ANY Medical Supply stores with your knee scooter Prescription.  OR    Bring your signed prescription to an [facility] showroom.

## 2021-08-25 NOTE — PROGRESS NOTES
Subjective:    Pt is seen today as a new pt for a diabetic foot exam.  Patient has pain in his left foot foot.  Describes as burning pain.  Aggravated activity and relieved by rest.  Points to his first metatarsal head.  Denies erythema edema or drainage.  He has diabetes.  Primary care is Bryan Moritz last seen 6/21.      ROS: See above         Allergies   Allergen Reactions     Beta Adrenergic Blockers Unknown     Latex Dermatitis     Latex Rash     Tape [Adhesive Tape] Dermatitis and Rash     Electrode patches       Current Outpatient Medications   Medication Sig Dispense Refill     Alcohol Swabs PADS 1 pad 4 times daily 100 each 6     allopurinol (ZYLOPRIM) 300 MG tablet Take 300 mg by mouth daily       ammonium lactate (LAC-HYDRIN) 12 % external lotion Apply topically 2 times daily Apply twice daily to feet 500 g 1     aspirin 81 MG tablet Take 81 mg by mouth daily.       atorvastatin (LIPITOR) 40 MG tablet Take 1 tablet (40 mg) by mouth daily 90 tablet 4     augmented betamethasone dipropionate (DIPROLENE-AF) 0.05 % ointment Apply topically 2 times daily As needed to itchy areas on back 50 g 3     betamethasone dipropionate (DIPROSONE) 0.05 % external ointment At bedtime apply to ankle and cover with saran wrap (or sock) overnight, wash off in morning 50 g 1     blood glucose (NO BRAND SPECIFIED) lancets standard Use to test blood sugar 3 times daily or as directed. 100 each 11     blood glucose (NO BRAND SPECIFIED) test strip Use to test blood sugar 4 times daily  With meter/ strips/ lancets covered by insurance pt using accuchek 360 each 3     Blood Glucose Monitoring Suppl (BLOOD GLUCOSE MONITOR SYSTEM) w/Device KIT Test 4 times daily with meter covered by  insurance 1 kit 1     capsaicin (ZOSTRIX) 0.075 % cream Apply topically 3 times daily To areas of itch on back.  OK to dilute with cetaphil cream if too burning. 60 g 3     Cholecalciferol (VITAMIN D) 2000 UNITS CAPS Take 1 tablet by mouth daily        clobetasol (TEMOVATE) 0.05 % external cream Apply twice daily as needed for itching or rash on the back 60 g 5     clopidogrel (PLAVIX) 75 MG tablet Take 75 mg by mouth daily       COLCHICINE PO Take 0.6 mg by mouth daily.       Cyanocobalamin (VITAMIN B12 PO) Take 1 tablet by mouth daily       Dupilumab (DUPIXENT) 300 MG/2ML syringe Inject 2 mLs (300 mg) Subcutaneous every 14 days 4 mL 4     empagliflozin (JARDIANCE) 10 MG TABS tablet Take 1 tablet (10 mg) by mouth daily INSTEAD OF AMARYL 30 tablet 3     Ginkgo Biloba (GNP GINGKO BILOBA EXTRACT PO) Take 1 tablet by mouth daily        glimepiride (AMARYL) 1 MG tablet TAKE 1 TABLET TWICE DAILY 180 tablet 1     indapamide (LOZOL) 2.5 MG tablet Take 2.5 mg by mouth every morning.       ipratropium (ATROVENT) 0.03 % nasal spray Spray 2 sprays into both nostrils as needed for rhinitis 30min before cooking 30 mL 1     irbesartan (AVAPRO) 150 MG tablet Take 1 tablet (150 mg) by mouth At Bedtime 90 tablet 2     isosorbide mononitrate (IMDUR) 30 MG 24 hr tablet Take 30 mg by mouth daily       ketoconazole (NIZORAL) 2 % external cream Apply topically daily       metFORMIN (GLUCOPHAGE) 1000 MG tablet Take 1 tablet (1,000 mg) by mouth daily (with dinner) 90 tablet 3     multivitamin, therapeutic with minerals (MULTI-VITAMIN) TABS Take 1 tablet by mouth daily.       mupirocin (BACTROBAN) 2 % external ointment Apply topically 2 times daily 30 g 0     nitroglycerin (NITROSTAT) 0.4 MG SL tablet Place under the tongue as needed       omega-3 acid ethyl esters (LOVAZA) 1 g capsule Take 2 capsules (2 g) by mouth 2 times daily 180 capsule 3     potassium chloride SA (K-DUR/KLOR-CON M) 20 MEQ CR tablet TAKE 1 TABLET THREE TIMES DAILY 270 tablet 1     predniSONE (DELTASONE) 20 MG tablet TAKE 1 TABLET BY MOUTH ONCE DAILY FOR 7 DAYS       torsemide (DEMADEX) 10 MG tablet Take 10 mg by mouth daily       triamcinolone (KENALOG) 0.1 % external ointment APPLY TWICE DAILY TO RAISED RASH AREAS  ON THE ARMS, LEGS, BODY AS NEEDED. 454 g 1       Patient Active Problem List   Diagnosis     Itching     AD (atopic dermatitis)     Dermatitis     Hyperglycemia     Eczema, unspecified eczema     Intrinsic atopic dermatitis     Other eczema     Notalgia paresthetica     Rash     Pseudophakia of both eyes     Diabetes mellitus (H)     Presbyopia     Type 2 diabetes mellitus without complication, without long-term current use of insulin (H)     Encounter for long-term (current) use of high-risk medication     Seborrheic keratoses, inflamed     Neoplasm of uncertain behavior of skin     Chronic kidney disease, stage 3     Lumbosacral spondylosis without myelopathy     Hx of CABG     Gout     Essential hypertension     Erectile dysfunction     Edema     Dyslipidemia     Decreased range of motion of ankle     Coronary atherosclerosis     Vitamin D deficiency     Ventricular ectopy     Type 2 diabetes mellitus with diabetic neuropathy (H)     Supraventricular tachycardia (H)     Renal insufficiency syndrome     Pneumothorax     Plantar fascia syndrome     Personal history of tobacco use, presenting hazards to health       Past Medical History:   Diagnosis Date     Diabetes mellitus (H)      Gout attack      Heart attack (H) 1984     Hypertension      Stented coronary artery     6 stents in heart       Past Surgical History:   Procedure Laterality Date     CARDIAC SURGERY  1984    4 vessel bypass     CHOLECYSTECTOMY       COLONOSCOPY       ESOPHAGOSCOPY, GASTROSCOPY, DUODENOSCOPY (EGD), COMBINED N/A 3/1/2018    Procedure: COMBINED ESOPHAGOSCOPY, GASTROSCOPY, DUODENOSCOPY (EGD), BIOPSY SINGLE OR MULTIPLE;  ESOPHAGOGASTRODUODENOSCOPY ;  Surgeon: Daryn Medrano MD;  Location: [facility]     INJECT BLOCK MEDIAL BRANCH CERVICAL/THORACIC/LUMBAR Bilateral 8/16/2021    Procedure: BLOCK, NERVE, FACET JOINT, MEDIAL BRANCH, DIAGNOSTIC Lumbar 2-3-4;  Surgeon: Horacio Gonsalves MD;  Location: [facility] OR     NO HISTORY OF SURGERY   "3/31/14    derm     PHACOEMULSIFICATION WITH STANDARD INTRAOCULAR LENS IMPLANT  2013    Procedure: PHACOEMULSIFICATION WITH STANDARD INTRAOCULAR LENS IMPLANT;  Phacoemulsification with standard intraocular lens implant, right eye;  Surgeon: Jay Rodriges MD;  Location: [facility] OR       Family History   Problem Relation Age of Onset     Cancer No family hx of         no skin cancer     Skin Cancer No family hx of        Social History     Tobacco Use     Smoking status: Former Smoker     Quit date: 10/15/1984     Years since quittin.8     Smokeless tobacco: Never Used   Substance Use Topics     Alcohol use: Yes     Comment: occasional-once a year         Exam:    Vitals: /75   Pulse 96   Ht 1.854 m (6' 1\")   Wt 99.8 kg (220 lb)   SpO2 100%   BMI 29.03 kg/m    BMI: Body mass index is 29.03 kg/m .  Height: 6' 1\"    Constitutional/ general:  Pt is in no apparent distress, appears well-nourished.  Cooperative with history and physical exam.     Psych:  The patient answered questions appropriately.  Normal affect.  Seems to have reasonable expectations, in terms of treatment.     Lungs:  Non labored breathing, non labored speech. No cough.  No audible wheezing. Even, quiet breathing.       Vascular:  positive  DP pulse.  negative PT pulse.  CFT < 3 sec.  positive ankle edema.  positive varicosities.  negative pedal hair growth.    Neuro:  Alert and oriented x 3. Coordinated gait.  Light touch sensation is intact to the L4, L5, S1 distributions. No obvious deficits.  No evidence of neurological-based weakness, spasticity, or contracture in the lower extremities.  Monofilament intact on all digits    Derm:  Skin thin, shiny, atrophic, with no hair growth noted.   No erythema, ecchymosis, or cyanosis.  No foot ulcers.    All nails are thickened, elongated, discolored with subungual debris.  Patient has callus left subfirst met head..  No masses or breakdown in the skin bilaterally.  No erythema or " ecchymosis noted bilateral.     Musculoskeletal:    Lower extremity muscle strength is normal.  Patient is ambulatory without an assistive device or brace.  No gross deformities.  Normal arch.        A/P  Diabetes mellitus with PAD  Left foot callus     Callus debrided with a fifteen blade.  Patient will keep this down with a pumice stone.  Will protect foot with good shoe.  Discussed with patient that I do not do routine foot care in my practice.  Will refer to foot care service/nurse.  Return to clinic prn.         Casey Meier DPM, FACFAS

## 2021-08-25 NOTE — LETTER
8/25/2021         RE: Deandre Moore  [address] [facility] [address]        Dear Colleague,    Thank you for referring your patient, Deandre Moore, to the [facility]. Please see a copy of my visit note below.    Subjective:    Pt is seen today as a new pt for a diabetic foot exam.  Patient has pain in his left foot foot.  Describes as burning pain.  Aggravated activity and relieved by rest.  Points to his first metatarsal head.  Denies erythema edema or drainage.  He has diabetes.  Primary care is Bryan Moritz last seen 6/21.      ROS: See above         Allergies   Allergen Reactions     Beta Adrenergic Blockers Unknown     Latex Dermatitis     Latex Rash     Tape [Adhesive Tape] Dermatitis and Rash     Electrode patches       Current Outpatient Medications   Medication Sig Dispense Refill     Alcohol Swabs PADS 1 pad 4 times daily 100 each 6     allopurinol (ZYLOPRIM) 300 MG tablet Take 300 mg by mouth daily       ammonium lactate (LAC-HYDRIN) 12 % external lotion Apply topically 2 times daily Apply twice daily to feet 500 g 1     aspirin 81 MG tablet Take 81 mg by mouth daily.       atorvastatin (LIPITOR) 40 MG tablet Take 1 tablet (40 mg) by mouth daily 90 tablet 4     augmented betamethasone dipropionate (DIPROLENE-AF) 0.05 % ointment Apply topically 2 times daily As needed to itchy areas on back 50 g 3     betamethasone dipropionate (DIPROSONE) 0.05 % external ointment At bedtime apply to ankle and cover with saran wrap (or sock) overnight, wash off in morning 50 g 1     blood glucose (NO BRAND SPECIFIED) lancets standard Use to test blood sugar 3 times daily or as directed. 100 each 11     blood glucose (NO BRAND SPECIFIED) test strip Use to test blood sugar 4 times daily  With meter/ strips/ lancets covered by insurance pt using accuchek 360 each 3     Blood Glucose Monitoring Suppl (BLOOD GLUCOSE MONITOR SYSTEM) w/Device KIT Test 4 times daily with meter  covered by  insurance 1 kit 1     capsaicin (ZOSTRIX) 0.075 % cream Apply topically 3 times daily To areas of itch on back.  OK to dilute with cetaphil cream if too burning. 60 g 3     Cholecalciferol (VITAMIN D) 2000 UNITS CAPS Take 1 tablet by mouth daily       clobetasol (TEMOVATE) 0.05 % external cream Apply twice daily as needed for itching or rash on the back 60 g 5     clopidogrel (PLAVIX) 75 MG tablet Take 75 mg by mouth daily       COLCHICINE PO Take 0.6 mg by mouth daily.       Cyanocobalamin (VITAMIN B12 PO) Take 1 tablet by mouth daily       Dupilumab (DUPIXENT) 300 MG/2ML syringe Inject 2 mLs (300 mg) Subcutaneous every 14 days 4 mL 4     empagliflozin (JARDIANCE) 10 MG TABS tablet Take 1 tablet (10 mg) by mouth daily INSTEAD OF AMARYL 30 tablet 3     Ginkgo Biloba (GNP GINGKO BILOBA EXTRACT PO) Take 1 tablet by mouth daily        glimepiride (AMARYL) 1 MG tablet TAKE 1 TABLET TWICE DAILY 180 tablet 1     indapamide (LOZOL) 2.5 MG tablet Take 2.5 mg by mouth every morning.       ipratropium (ATROVENT) 0.03 % nasal spray Spray 2 sprays into both nostrils as needed for rhinitis 30min before cooking 30 mL 1     irbesartan (AVAPRO) 150 MG tablet Take 1 tablet (150 mg) by mouth At Bedtime 90 tablet 2     isosorbide mononitrate (IMDUR) 30 MG 24 hr tablet Take 30 mg by mouth daily       ketoconazole (NIZORAL) 2 % external cream Apply topically daily       metFORMIN (GLUCOPHAGE) 1000 MG tablet Take 1 tablet (1,000 mg) by mouth daily (with dinner) 90 tablet 3     multivitamin, therapeutic with minerals (MULTI-VITAMIN) TABS Take 1 tablet by mouth daily.       mupirocin (BACTROBAN) 2 % external ointment Apply topically 2 times daily 30 g 0     nitroglycerin (NITROSTAT) 0.4 MG SL tablet Place under the tongue as needed       omega-3 acid ethyl esters (LOVAZA) 1 g capsule Take 2 capsules (2 g) by mouth 2 times daily 180 capsule 3     potassium chloride SA (K-DUR/KLOR-CON M) 20 MEQ CR tablet TAKE 1 TABLET THREE TIMES  DAILY 270 tablet 1     predniSONE (DELTASONE) 20 MG tablet TAKE 1 TABLET BY MOUTH ONCE DAILY FOR 7 DAYS       torsemide (DEMADEX) 10 MG tablet Take 10 mg by mouth daily       triamcinolone (KENALOG) 0.1 % external ointment APPLY TWICE DAILY TO RAISED RASH AREAS ON THE ARMS, LEGS, BODY AS NEEDED. 454 g 1       Patient Active Problem List   Diagnosis     Itching     AD (atopic dermatitis)     Dermatitis     Hyperglycemia     Eczema, unspecified eczema     Intrinsic atopic dermatitis     Other eczema     Notalgia paresthetica     Rash     Pseudophakia of both eyes     Diabetes mellitus (H)     Presbyopia     Type 2 diabetes mellitus without complication, without long-term current use of insulin (H)     Encounter for long-term (current) use of high-risk medication     Seborrheic keratoses, inflamed     Neoplasm of uncertain behavior of skin     Chronic kidney disease, stage 3     Lumbosacral spondylosis without myelopathy     Hx of CABG     Gout     Essential hypertension     Erectile dysfunction     Edema     Dyslipidemia     Decreased range of motion of ankle     Coronary atherosclerosis     Vitamin D deficiency     Ventricular ectopy     Type 2 diabetes mellitus with diabetic neuropathy (H)     Supraventricular tachycardia (H)     Renal insufficiency syndrome     Pneumothorax     Plantar fascia syndrome     Personal history of tobacco use, presenting hazards to health       Past Medical History:   Diagnosis Date     Diabetes mellitus (H)      Gout attack      Heart attack (H) 1984     Hypertension      Stented coronary artery     6 stents in heart       Past Surgical History:   Procedure Laterality Date     CARDIAC SURGERY  1984    4 vessel bypass     CHOLECYSTECTOMY       COLONOSCOPY       ESOPHAGOSCOPY, GASTROSCOPY, DUODENOSCOPY (EGD), COMBINED N/A 3/1/2018    Procedure: COMBINED ESOPHAGOSCOPY, GASTROSCOPY, DUODENOSCOPY (EGD), BIOPSY SINGLE OR MULTIPLE;  ESOPHAGOGASTRODUODENOSCOPY ;  Surgeon: Daryn Mderano,  "MD;  Location:  GI     INJECT BLOCK MEDIAL BRANCH CERVICAL/THORACIC/LUMBAR Bilateral 2021    Procedure: BLOCK, NERVE, FACET JOINT, MEDIAL BRANCH, DIAGNOSTIC Lumbar 2-3-4;  Surgeon: Horacio Gonsalves MD;  Location: [facility] OR     NO HISTORY OF SURGERY  3/31/14    derm     PHACOEMULSIFICATION WITH STANDARD INTRAOCULAR LENS IMPLANT  2013    Procedure: PHACOEMULSIFICATION WITH STANDARD INTRAOCULAR LENS IMPLANT;  Phacoemulsification with standard intraocular lens implant, right eye;  Surgeon: Jay Rodriges MD;  Location: [facility] OR       Family History   Problem Relation Age of Onset     Cancer No family hx of         no skin cancer     Skin Cancer No family hx of        Social History     Tobacco Use     Smoking status: Former Smoker     Quit date: 10/15/1984     Years since quittin.8     Smokeless tobacco: Never Used   Substance Use Topics     Alcohol use: Yes     Comment: occasional-once a year         Exam:    Vitals: /75   Pulse 96   Ht 1.854 m (6' 1\")   Wt 99.8 kg (220 lb)   SpO2 100%   BMI 29.03 kg/m    BMI: Body mass index is 29.03 kg/m .  Height: 6' 1\"    Constitutional/ general:  Pt is in no apparent distress, appears well-nourished.  Cooperative with history and physical exam.     Psych:  The patient answered questions appropriately.  Normal affect.  Seems to have reasonable expectations, in terms of treatment.     Lungs:  Non labored breathing, non labored speech. No cough.  No audible wheezing. Even, quiet breathing.       Vascular:  positive  DP pulse.  negative PT pulse.  CFT < 3 sec.  positive ankle edema.  positive varicosities.  negative pedal hair growth.    Neuro:  Alert and oriented x 3. Coordinated gait.  Light touch sensation is intact to the L4, L5, S1 distributions. No obvious deficits.  No evidence of neurological-based weakness, spasticity, or contracture in the lower extremities.  Monofilament intact on all digits    Derm:  Skin thin, shiny, atrophic, with " no hair growth noted.   No erythema, ecchymosis, or cyanosis.  No foot ulcers.    All nails are thickened, elongated, discolored with subungual debris.  Patient has callus left subfirst met head..  No masses or breakdown in the skin bilaterally.  No erythema or ecchymosis noted bilateral.     Musculoskeletal:    Lower extremity muscle strength is normal.  Patient is ambulatory without an assistive device or brace.  No gross deformities.  Normal arch.        A/P  Diabetes mellitus with PAD  Left foot callus     Callus debrided with a fifteen blade.  Patient will keep this down with a pumice stone.  Will protect foot with good shoe.  Discussed with patient that I do not do routine foot care in my practice.  Will refer to foot care service/nurse.  Return to clinic prn.         Casey Meier DPM, FACFAS               Again, thank you for allowing me to participate in the care of your patient.        Sincerely,        Casey Meier DPM

## 2021-08-26 ENCOUNTER — TELEPHONE (OUTPATIENT)
Dept: ENDOCRINOLOGY | Facility: CLINIC | Age: 81
End: 2021-08-26

## 2021-08-26 DIAGNOSIS — E11.9 TYPE 2 DIABETES MELLITUS WITHOUT COMPLICATION, WITHOUT LONG-TERM CURRENT USE OF INSULIN (H): ICD-10-CM

## 2021-08-26 RX ORDER — GLIMEPIRIDE 1 MG/1
TABLET ORAL
Qty: 270 TABLET | Refills: 1
Start: 2021-08-26 | End: 2022-02-02

## 2021-08-26 NOTE — TELEPHONE ENCOUNTER
Pt now having hyperglycemia - pt on glimepiride at 1 mg twice daily - jardiance at $900/month so he didn't start    Will have pt increase glimepiride at 2 tablets in the AM and 1 tablet in the PM - pt has follow up in the clinic.

## 2021-08-26 NOTE — TELEPHONE ENCOUNTER
"RTC in place 09/21/2021 with Padmini Ulloa    Spoke w/ Pt: states \"Used to be in the 80's\" , \"Dr Campa thought it was too low\" and stopped nighttime dosing. Concerned BS has been in the 130s and 140s lately in the AM and in the  300's at bedtime.     No changes in diet or exercise. But concerned about climbing BS rise.     Provider notified.   Jannie Gray RN on 8/26/2021 at 1:42 PM       [facility]    Phone Message    May a detailed message be left on voicemail: yes     Reason for Call: Symptoms or Concerns     If patient has red-flag symptoms, warm transfer to triage line    Current symptom or concern: blood sugar has been high in the mornings      Action Taken: Message routed to:  [facility] ([facility] endo                                                  "

## 2021-08-27 NOTE — TELEPHONE ENCOUNTER
Spoke with patient and he got 80-90 percent relief from the first set of MBB's. Pain was at a 2 at it's lowest point and everything was easier for him to do. Patient scheduled for second set of MBB's on 9/27/21 at 1:30 PM.       Tiffani Butler RN, BSN, PHN

## 2021-08-31 DIAGNOSIS — Z11.59 ENCOUNTER FOR SCREENING FOR OTHER VIRAL DISEASES: ICD-10-CM

## 2021-09-12 ENCOUNTER — HEALTH MAINTENANCE LETTER (OUTPATIENT)
Age: 81
End: 2021-09-12

## 2021-09-21 ENCOUNTER — OFFICE VISIT (OUTPATIENT)
Dept: ENDOCRINOLOGY | Facility: CLINIC | Age: 81
End: 2021-09-21
Payer: COMMERCIAL

## 2021-09-21 VITALS
HEIGHT: 73 IN | SYSTOLIC BLOOD PRESSURE: 135 MMHG | DIASTOLIC BLOOD PRESSURE: 76 MMHG | WEIGHT: 224.1 LBS | BODY MASS INDEX: 29.7 KG/M2 | HEART RATE: 70 BPM

## 2021-09-21 DIAGNOSIS — N18.31 STAGE 3A CHRONIC KIDNEY DISEASE (H): ICD-10-CM

## 2021-09-21 DIAGNOSIS — E11.9 TYPE 2 DIABETES MELLITUS WITHOUT COMPLICATION, WITHOUT LONG-TERM CURRENT USE OF INSULIN (H): Primary | ICD-10-CM

## 2021-09-21 DIAGNOSIS — R60.0 LOCALIZED EDEMA: ICD-10-CM

## 2021-09-21 LAB — HBA1C MFR BLD: 6.5 % (ref 4.3–?)

## 2021-09-21 PROCEDURE — 99215 OFFICE O/P EST HI 40 MIN: CPT | Performed by: PHYSICIAN ASSISTANT

## 2021-09-21 PROCEDURE — 83036 HEMOGLOBIN GLYCOSYLATED A1C: CPT | Performed by: PHYSICIAN ASSISTANT

## 2021-09-21 ASSESSMENT — MIFFLIN-ST. JEOR: SCORE: 1770.39

## 2021-09-21 ASSESSMENT — PAIN SCALES - GENERAL: PAINLEVEL: NO PAIN (0)

## 2021-09-21 NOTE — PROGRESS NOTES
[facility]  Diabetes Clinic  Bessy Ulloa PA-C, MPAS  09/21/2021      Chief Complaint:   Diabetes        History of Present Illness:   Deandre Moore is a 80 year old male with a history of diabetes mellitus type II, gout, osteoarthritis, coronary artery disease, hypertension and hyperlipidemia who presents for follow up on diabetes. He quit smoking decades ago.      #1 Type II diabetes  Per patient, he was diagnosed with diabetes at age 68.  He as initially on Metformin for many years.  Chart reviewed with patient.  I summary:  -In 2015 Amaryl was added when he was on Prednisone intermittently for itching.   -December 2015 therefore his Amaryl was increased to 8 mg daily due to high Bg on Remeron for sleep.  This was later decreased to 4 mg due to hypoglycemia. Januvia was cost prohibitive for him.   -August 2018 Hemoglobin A1c was 5.8%.   -May 2019 hemoglobin A1c is 5.4%.  Amaryl was decreased to 1 mg twice daily.   -January 2020, hemoglobin A1c is 5.9%   -July 2020: metformin at 1000 mg twice daily and Amaryl 1 mg twice daily continued.  Referred to neurology for tremor.    - May 2021: Only taking Amaryl 1 mg just at night for over a year as had low BG 75, 85 in the mornings.  Now in the mornings 120 - 130.  At night he sees  - 250 ~4 hours after dinner. Just 1 tab Metformin as well.  We started Jardiance 10 mg in place of Amaryl.      Today:  Charles is here with wife Vi.   A1C 6.5 Avg  in range 94%, range 124 - 205.   (A1C should be reasonably reliable as though CKD GFR 60, Hgb in March 13.9)   Jardiance expensive so did not start.  On Amaryl  Bid.    Quit taking Metformin in afternoon as morning BG was <80.    He has just coffee with milk and sugar and maybe a cookie for breakfast, chicken alejandro, bun and coffee with milk and sugar at lunch around and at night, eats around 5-6 pm and has mostly rice with pressley and a soda, with sugar.      He notes that he recently had colonoscopy and told  diverticulosis and wonders if due to diet pop which he has been drinking more of.      He is not that active he reports due to older age and still working with multiple providers to address hip and low back pain.  Appetite is good.     He has ongoing swelling in left leg.  He tells me that while growing up in [facility] he had a rash on his leg as did nearly all children, he grew out of it and yet rash returned though has improved with antibiotics and fungal treatment, he believes that it is due to this and he would like to see infectious disease.      Denies difficulties with low or high BG.  Feels low symptoms of sweating and shaky and tired when BG in the 80s.      Blood Glucose Monitoring:  Avg  in range 94%, range 124 - 205.     DM med regimen:  - not taking too expensive.   Metformin 500 mg daily qam, quit taking in afternoon as morning BG was <80.  Amaryl 1 mg bid.    Diabetes monitoring and complications:  CAD: Yes, hx of MI  Last eye exam results: negative per patient (7/2019)  Microalbuminuria: negative (6/4/2019)  Neuropathy: Some tingling sensation  HTN: No  On Statin: Yes  On Aspirin: Yes  Depression: No      Review of Systems:   Pertinent items are noted in HPI.  All other systems are negative.    Active Medications:     Current Outpatient Medications:      Alcohol Swabs PADS, 1 pad 4 times daily, Disp: 100 each, Rfl: 6     allopurinol (ZYLOPRIM) 300 MG tablet, Take 300 mg by mouth daily, Disp: , Rfl:      ammonium lactate (LAC-HYDRIN) 12 % external lotion, Apply topically 2 times daily Apply twice daily to feet, Disp: 500 g, Rfl: 1     aspirin 81 MG tablet, Take 81 mg by mouth daily., Disp: , Rfl:      atorvastatin (LIPITOR) 40 MG tablet, Take 1 tablet (40 mg) by mouth daily, Disp: 90 tablet, Rfl: 4     augmented betamethasone dipropionate (DIPROLENE-AF) 0.05 % ointment, Apply topically 2 times daily As needed to itchy areas on back, Disp: 50 g, Rfl: 3     betamethasone dipropionate  (DIPROSONE) 0.05 % external ointment, At bedtime apply to ankle and cover with saran wrap (or sock) overnight, wash off in morning, Disp: 50 g, Rfl: 1     blood glucose (NO BRAND SPECIFIED) lancets standard, Use to test blood sugar 3 times daily or as directed., Disp: 100 each, Rfl: 11     blood glucose (NO BRAND SPECIFIED) test strip, Use to test blood sugar 4 times daily  With meter/ strips/ lancets covered by insurance pt using accuchek, Disp: 360 each, Rfl: 3     Blood Glucose Monitoring Suppl (BLOOD GLUCOSE MONITOR SYSTEM) w/Device KIT, Test 4 times daily with meter covered by  insurance, Disp: 1 kit, Rfl: 1     capsaicin (ZOSTRIX) 0.075 % cream, Apply topically 3 times daily To areas of itch on back.  OK to dilute with cetaphil cream if too burning., Disp: 60 g, Rfl: 3     Cholecalciferol (VITAMIN D) 2000 UNITS CAPS, Take 1 tablet by mouth daily, Disp: , Rfl:      clobetasol (TEMOVATE) 0.05 % external cream, Apply twice daily as needed for itching or rash on the back, Disp: 60 g, Rfl: 5     clopidogrel (PLAVIX) 75 MG tablet, Take 75 mg by mouth daily, Disp: , Rfl:      COLCHICINE PO, Take 0.6 mg by mouth daily., Disp: , Rfl:      Cyanocobalamin (VITAMIN B12 PO), Take 1 tablet by mouth daily, Disp: , Rfl:      Dupilumab (DUPIXENT) 300 MG/2ML syringe, Inject 2 mLs (300 mg) Subcutaneous every 14 days, Disp: 4 mL, Rfl: 4     empagliflozin (JARDIANCE) 10 MG TABS tablet, Take 1 tablet (10 mg) by mouth daily INSTEAD OF AMARYL, Disp: 30 tablet, Rfl: 3     Ginkgo Biloba (GNP GINGKO BILOBA EXTRACT PO), Take 1 tablet by mouth daily , Disp: , Rfl:      glimepiride (AMARYL) 1 MG tablet, TAKE 2 tablets in the AM and 1 tablet in the PM, Disp: 270 tablet, Rfl: 1     indapamide (LOZOL) 2.5 MG tablet, Take 2.5 mg by mouth every morning., Disp: , Rfl:      ipratropium (ATROVENT) 0.03 % nasal spray, Spray 2 sprays into both nostrils as needed for rhinitis 30min before cooking, Disp: 30 mL, Rfl: 1     irbesartan (AVAPRO) 150 MG  tablet, Take 1 tablet (150 mg) by mouth At Bedtime, Disp: 90 tablet, Rfl: 2     isosorbide mononitrate (IMDUR) 30 MG 24 hr tablet, Take 30 mg by mouth daily, Disp: , Rfl:      ketoconazole (NIZORAL) 2 % external cream, Apply topically daily, Disp: , Rfl:      metFORMIN (GLUCOPHAGE) 1000 MG tablet, Take 1 tablet (1,000 mg) by mouth daily (with dinner), Disp: 90 tablet, Rfl: 3     multivitamin, therapeutic with minerals (MULTI-VITAMIN) TABS, Take 1 tablet by mouth daily., Disp: , Rfl:      mupirocin (BACTROBAN) 2 % external ointment, Apply topically 2 times daily, Disp: 30 g, Rfl: 0     nitroglycerin (NITROSTAT) 0.4 MG SL tablet, Place under the tongue as needed, Disp: , Rfl:      omega-3 acid ethyl esters (LOVAZA) 1 g capsule, Take 2 capsules (2 g) by mouth 2 times daily, Disp: 180 capsule, Rfl: 3     potassium chloride SA (K-DUR/KLOR-CON M) 20 MEQ CR tablet, TAKE 1 TABLET THREE TIMES DAILY, Disp: 270 tablet, Rfl: 1     predniSONE (DELTASONE) 20 MG tablet, TAKE 1 TABLET BY MOUTH ONCE DAILY FOR 7 DAYS, Disp: , Rfl:      torsemide (DEMADEX) 10 MG tablet, Take 10 mg by mouth daily, Disp: , Rfl:      triamcinolone (KENALOG) 0.1 % external ointment, APPLY TWICE DAILY TO RAISED RASH AREAS ON THE ARMS, LEGS, BODY AS NEEDED., Disp: 454 g, Rfl: 1    Current Facility-Administered Medications:      triamcinolone (KENALOG-40) injection 20 mg, 20 mg, Intra-Lesional, Once, Edu Rivera MD      Allergies:   Beta adrenergic blockers, Latex, Latex, and Tape [adhesive tape]      Past Medical History:  Atopic dermatitis   Diabetes mellitus, type II  Eczema  Gout   Heart attack  Hyperglycemia  Hypertension  Notalgia paresthetica  Pseudophakia of both eyes    Past Surgical History:  Cardiac surgery, four vessel bypass (1984)  Cholecystectomy  Phacoemulsification with standard intraocular lens implant (1/21/2013)    Family History:   No family history of skin cancer.      Social History:   Tobacco Use: former smoker, quit  1984  Alcohol Use: occasionally, once a year  Drug Use: none   PCP: MARCIA MOTA      Brother had heart problem bypass in his 50s, no known kidney problems.  Everyone diabetic.      Physical Exam:   There were no vitals taken for this visit.  Vital signs and physical exam not available.  However the patient reports feeling well. Psych: Alert and oriented times 3; coherent speech, normal  rate and volume, able to articulate logical thoughts, able to abstract reason, no tangential thoughts, no hallucinations or delusions    Data:    Recent Labs   Lab Test 09/21/21  1136 05/18/21  0000 03/11/21  1235 03/10/20  1320 01/03/20  0000 08/16/19  1418 08/23/18  1330 06/04/18  1133 06/04/18  0000 07/05/17  1206 06/19/17  1330 06/19/17  1325 06/19/17  0000 06/06/17  1301 01/22/16  1354 01/22/16  1353   A1C  --   --   --   --   --   --   --   --  5.5  --   --   --  5.9  --    < >  --    HEMOGLOBINA1 6.5 6.0  --   --    < >  --    < >  --   --   --   --   --   --   --   --   --    TSH  --   --   --   --   --  1.85  --   --   --   --   --  1.39  --   --   --   --    LDL  --   --   --   --   --   --   --   --   --   --   --  40  --   --   --  48   HDL  --   --   --   --   --   --   --   --   --   --   --  48  --   --   --  50   TRIG  --   --   --   --   --   --   --   --   --   --   --  83  --   --   --  200*   CR  --   --  1.27* 1.14  --  1.48*   < >  --   --    < >  --  1.23  --    < >   < > 1.08   MICROL  --   --   --   --   --   --   --  <5  --   --  <5  --   --   --    < >  --     < > = values in this interval not displayed.       Hemoglobin   Date Value Ref Range Status   03/11/2021 13.9 13.3 - 17.7 g/dL Final   ]      Assessment and Plan:    Charles is an 81 yo M with type 2 diabetes as well as a  history of gout, osteoarthritis, coronary artery disease, hypertension, and hyperlipidemia who presents today with well controlled diabetes, ongoing chronic swelling of left leg >right and recent diagnosis of diverticulosis.         #1 Type II diabetes:  Well controlled despite drinking soda.  Discussed that Amaryl may be contributing to his swelling and Metformin prefered agent, but he feels the Amaryl is more effective and hesitant to change.   He will continue Amaryl bid for now.  Advised avoiding soda at night,  check BG at night and if >199, please resume Metformin with your lunch as long as morning blood sugar is not less than 100.    Palpitations: resolved      Edema: He would like to see infectious disease.  Consider his history and recurrent rash and swelling, this seems reasonable, but I advise him to discuss with his primary care provider.      46 minutes in preparation for visit reviewing chart, labs and documentation, visiting with patient gathering history and in exam, education and counseling, as well as coordination of care, further chart review and documentation following visit on this date of service and as alluded to documented above.        It is my privilege to be involved in the care of the above patient.     Bessy Ulloa PA-C, MPAS  [facility]  Diabetes, Endocrinology, and Metabolism  [phone] Appointments/Nurse  [phone] Fax  [phone] pager  [phone] nurse line

## 2021-09-21 NOTE — LETTER
9/21/2021       RE: Deandre Moore  [address] [facility] [address]     Dear Colleague,    Thank you for referring your patient, Deandre Moore, to the [facility] ENDOCRINOLOG[facility] [place] at [facility]. Please see a copy of my visit note below.    [facility]  Diabetes Clinic  Bessy Ulloa PA-C, MPAS  09/21/2021      Chief Complaint:   Diabetes        History of Present Illness:   Deandre Moore is a 80 year old male with a history of diabetes mellitus type II, gout, osteoarthritis, coronary artery disease, hypertension and hyperlipidemia who presents for follow up on diabetes. He quit smoking decades ago.      #1 Type II diabetes  Per patient, he was diagnosed with diabetes at age 68.  He as initially on Metformin for many years.  Chart reviewed with patient.  I summary:  -In 2015 Amaryl was added when he was on Prednisone intermittently for itching.   -December 2015 therefore his Amaryl was increased to 8 mg daily due to high Bg on Remeron for sleep.  This was later decreased to 4 mg due to hypoglycemia. Januvia was cost prohibitive for him.   -August 2018 Hemoglobin A1c was 5.8%.   -May 2019 hemoglobin A1c is 5.4%.  Amaryl was decreased to 1 mg twice daily.   -January 2020, hemoglobin A1c is 5.9%   -July 2020: metformin at 1000 mg twice daily and Amaryl 1 mg twice daily continued.  Referred to neurology for tremor.    - May 2021: Only taking Amaryl 1 mg just at night for over a year as had low BG 75, 85 in the mornings.  Now in the mornings 120 - 130.  At night he sees  - 250 ~4 hours after dinner. Just 1 tab Metformin as well.  We started Jardiance 10 mg in place of Amaryl.      Today:  Charles is here with wife Vi.   A1C 6.5 Avg  in range 94%, range 124 - 205.   (A1C should be reasonably reliable as though CKD GFR 60, Hgb in March 13.9)   Jardiance expensive so did not start.  On Amaryl  Bid.    Quit taking  Metformin in afternoon as morning BG was <80.    He has just coffee with milk and sugar and maybe a cookie for breakfast, chicken alejandro, bun and coffee with milk and sugar at lunch around and at night, eats around 5-6 pm and has mostly rice with pressley and a soda, with sugar.      He notes that he recently had colonoscopy and told diverticulosis and wonders if due to diet pop which he has been drinking more of.      He is not that active he reports due to older age and still working with multiple providers to address hip and low back pain.  Appetite is good.     He has ongoing swelling in left leg.  He tells me that while growing up in [facility] he had a rash on his leg as did nearly all children, he grew out of it and yet rash returned though has improved with antibiotics and fungal treatment, he believes that it is due to this and he would like to see infectious disease.      Denies difficulties with low or high BG.  Feels low symptoms of sweating and shaky and tired when BG in the 80s.      Blood Glucose Monitoring:  Avg  in range 94%, range 124 - 205.     DM med regimen:  - not taking too expensive.   Metformin 500 mg daily qam, quit taking in afternoon as morning BG was <80.  Amaryl 1 mg bid.    Diabetes monitoring and complications:  CAD: Yes, hx of MI  Last eye exam results: negative per patient (7/2019)  Microalbuminuria: negative (6/4/2019)  Neuropathy: Some tingling sensation  HTN: No  On Statin: Yes  On Aspirin: Yes  Depression: No      Review of Systems:   Pertinent items are noted in HPI.  All other systems are negative.    Active Medications:     Current Outpatient Medications:      Alcohol Swabs PADS, 1 pad 4 times daily, Disp: 100 each, Rfl: 6     allopurinol (ZYLOPRIM) 300 MG tablet, Take 300 mg by mouth daily, Disp: , Rfl:      ammonium lactate (LAC-HYDRIN) 12 % external lotion, Apply topically 2 times daily Apply twice daily to feet, Disp: 500 g, Rfl: 1     aspirin 81 MG tablet, Take 81 mg  by mouth daily., Disp: , Rfl:      atorvastatin (LIPITOR) 40 MG tablet, Take 1 tablet (40 mg) by mouth daily, Disp: 90 tablet, Rfl: 4     augmented betamethasone dipropionate (DIPROLENE-AF) 0.05 % ointment, Apply topically 2 times daily As needed to itchy areas on back, Disp: 50 g, Rfl: 3     betamethasone dipropionate (DIPROSONE) 0.05 % external ointment, At bedtime apply to ankle and cover with saran wrap (or sock) overnight, wash off in morning, Disp: 50 g, Rfl: 1     blood glucose (NO BRAND SPECIFIED) lancets standard, Use to test blood sugar 3 times daily or as directed., Disp: 100 each, Rfl: 11     blood glucose (NO BRAND SPECIFIED) test strip, Use to test blood sugar 4 times daily  With meter/ strips/ lancets covered by insurance pt using accuchek, Disp: 360 each, Rfl: 3     Blood Glucose Monitoring Suppl (BLOOD GLUCOSE MONITOR SYSTEM) w/Device KIT, Test 4 times daily with meter covered by  insurance, Disp: 1 kit, Rfl: 1     capsaicin (ZOSTRIX) 0.075 % cream, Apply topically 3 times daily To areas of itch on back.  OK to dilute with cetaphil cream if too burning., Disp: 60 g, Rfl: 3     Cholecalciferol (VITAMIN D) 2000 UNITS CAPS, Take 1 tablet by mouth daily, Disp: , Rfl:      clobetasol (TEMOVATE) 0.05 % external cream, Apply twice daily as needed for itching or rash on the back, Disp: 60 g, Rfl: 5     clopidogrel (PLAVIX) 75 MG tablet, Take 75 mg by mouth daily, Disp: , Rfl:      COLCHICINE PO, Take 0.6 mg by mouth daily., Disp: , Rfl:      Cyanocobalamin (VITAMIN B12 PO), Take 1 tablet by mouth daily, Disp: , Rfl:      Dupilumab (DUPIXENT) 300 MG/2ML syringe, Inject 2 mLs (300 mg) Subcutaneous every 14 days, Disp: 4 mL, Rfl: 4     empagliflozin (JARDIANCE) 10 MG TABS tablet, Take 1 tablet (10 mg) by mouth daily INSTEAD OF AMARYL, Disp: 30 tablet, Rfl: 3     Ginkgo Biloba (GNP GINGKO BILOBA EXTRACT PO), Take 1 tablet by mouth daily , Disp: , Rfl:      glimepiride (AMARYL) 1 MG tablet, TAKE 2 tablets in the  AM and 1 tablet in the PM, Disp: 270 tablet, Rfl: 1     indapamide (LOZOL) 2.5 MG tablet, Take 2.5 mg by mouth every morning., Disp: , Rfl:      ipratropium (ATROVENT) 0.03 % nasal spray, Spray 2 sprays into both nostrils as needed for rhinitis 30min before cooking, Disp: 30 mL, Rfl: 1     irbesartan (AVAPRO) 150 MG tablet, Take 1 tablet (150 mg) by mouth At Bedtime, Disp: 90 tablet, Rfl: 2     isosorbide mononitrate (IMDUR) 30 MG 24 hr tablet, Take 30 mg by mouth daily, Disp: , Rfl:      ketoconazole (NIZORAL) 2 % external cream, Apply topically daily, Disp: , Rfl:      metFORMIN (GLUCOPHAGE) 1000 MG tablet, Take 1 tablet (1,000 mg) by mouth daily (with dinner), Disp: 90 tablet, Rfl: 3     multivitamin, therapeutic with minerals (MULTI-VITAMIN) TABS, Take 1 tablet by mouth daily., Disp: , Rfl:      mupirocin (BACTROBAN) 2 % external ointment, Apply topically 2 times daily, Disp: 30 g, Rfl: 0     nitroglycerin (NITROSTAT) 0.4 MG SL tablet, Place under the tongue as needed, Disp: , Rfl:      omega-3 acid ethyl esters (LOVAZA) 1 g capsule, Take 2 capsules (2 g) by mouth 2 times daily, Disp: 180 capsule, Rfl: 3     potassium chloride SA (K-DUR/KLOR-CON M) 20 MEQ CR tablet, TAKE 1 TABLET THREE TIMES DAILY, Disp: 270 tablet, Rfl: 1     predniSONE (DELTASONE) 20 MG tablet, TAKE 1 TABLET BY MOUTH ONCE DAILY FOR 7 DAYS, Disp: , Rfl:      torsemide (DEMADEX) 10 MG tablet, Take 10 mg by mouth daily, Disp: , Rfl:      triamcinolone (KENALOG) 0.1 % external ointment, APPLY TWICE DAILY TO RAISED RASH AREAS ON THE ARMS, LEGS, BODY AS NEEDED., Disp: 454 g, Rfl: 1    Current Facility-Administered Medications:      triamcinolone (KENALOG-40) injection 20 mg, 20 mg, Intra-Lesional, Once, Edu Rivera MD      Allergies:   Beta adrenergic blockers, Latex, Latex, and Tape [adhesive tape]      Past Medical History:  Atopic dermatitis   Diabetes mellitus, type II  Eczema  Gout   Heart attack  Hyperglycemia  Hypertension  Notalgia  paresthetica  Pseudophakia of both eyes    Past Surgical History:  Cardiac surgery, four vessel bypass (1984)  Cholecystectomy  Phacoemulsification with standard intraocular lens implant (1/21/2013)    Family History:   No family history of skin cancer.      Social History:   Tobacco Use: former smoker, quit 1984  Alcohol Use: occasionally, once a year  Drug Use: none   PCP: MARCIA MOTA      Brother had heart problem bypass in his 50s, no known kidney problems.  Everyone diabetic.      Physical Exam:   There were no vitals taken for this visit.  Vital signs and physical exam not available.  However the patient reports feeling well. Psych: Alert and oriented times 3; coherent speech, normal  rate and volume, able to articulate logical thoughts, able to abstract reason, no tangential thoughts, no hallucinations or delusions    Data:    Recent Labs   Lab Test 09/21/21  1136 05/18/21  0000 03/11/21  1235 03/10/20  1320 01/03/20  0000 08/16/19  1418 08/23/18  1330 06/04/18  1133 06/04/18  0000 07/05/17  1206 06/19/17  1330 06/19/17  1325 06/19/17  0000 06/06/17  1301 01/22/16  1354 01/22/16  1353   A1C  --   --   --   --   --   --   --   --  5.5  --   --   --  5.9  --    < >  --    HEMOGLOBINA1 6.5 6.0  --   --    < >  --    < >  --   --   --   --   --   --   --   --   --    TSH  --   --   --   --   --  1.85  --   --   --   --   --  1.39  --   --   --   --    LDL  --   --   --   --   --   --   --   --   --   --   --  40  --   --   --  48   HDL  --   --   --   --   --   --   --   --   --   --   --  48  --   --   --  50   TRIG  --   --   --   --   --   --   --   --   --   --   --  83  --   --   --  200*   CR  --   --  1.27* 1.14  --  1.48*   < >  --   --    < >  --  1.23  --    < >   < > 1.08   MICROL  --   --   --   --   --   --   --  <5  --   --  <5  --   --   --    < >  --     < > = values in this interval not displayed.       Hemoglobin   Date Value Ref Range Status   03/11/2021 13.9 13.3 - 17.7 g/dL Final    ]      Assessment and Plan:    Charles is an 81 yo M with type 2 diabetes as well as a  history of gout, osteoarthritis, coronary artery disease, hypertension, and hyperlipidemia who presents today with well controlled diabetes, ongoing chronic swelling of left leg >right and recent diagnosis of diverticulosis.        #1 Type II diabetes:  Well controlled despite drinking soda.  Discussed that Amaryl may be contributing to his swelling and Metformin prefered agent, but he feels the Amaryl is more effective and hesitant to change.   He will continue Amaryl bid for now.  Advised avoiding soda at night,  check BG at night and if >199, please resume Metformin with your lunch as long as morning blood sugar is not less than 100.    Palpitations: resolved      Edema: He would like to see infectious disease.  Consider his history and recurrent rash and swelling, this seems reasonable, but I advise him to discuss with his primary care provider.      46 minutes in preparation for visit reviewing chart, labs and documentation, visiting with patient gathering history and in exam, education and counseling, as well as coordination of care, further chart review and documentation following visit on this date of service and as alluded to documented above.        It is my privilege to be involved in the care of the above patient.     Bessy Ulloa PA-C, MPAS  [facility]  Diabetes, Endocrinology, and Metabolism  [phone] Appointments/Nurse  [phone] Fax  [phone] pager  [phone] nurse line

## 2021-09-21 NOTE — PATIENT INSTRUCTIONS
I am glad that your blood sugar looks good.  Please check BG at night and if >199, please resume Metformin with your lunch as long as morning blood sugar is not less than 100.    My best wishes,    Bessy Ulloa PA-C, MPAS  [facility]  Diabetes, Endocrinology, and Metabolism  [phone] Appointments/Nurse  [phone] Fax  [phone] URGENTafter hours/weekend Endocrinologist on call

## 2021-09-22 ENCOUNTER — TELEPHONE (OUTPATIENT)
Dept: ENDOCRINOLOGY | Facility: CLINIC | Age: 81
End: 2021-09-22

## 2021-09-22 NOTE — TELEPHONE ENCOUNTER
[facility] Call Center    Phone Message    May a detailed message be left on voicemail: yes     Reason for Call: Medication Question or concern regarding medication   Prescription Clarification  Name of Medication: Metformin   Prescribing Provider: Dr. Ulloa   Pharmacy:[facility] [address] [place] [address] [id number] [facility]   What on the order needs clarification? Patient cant remember what medication changes were made after his appointment yesterday and did not get a print of instructions he wants a call back to verify what he should be taking.           Action Taken: Other: Endo    Travel Screening: Not Applicable

## 2021-09-23 ENCOUNTER — LAB (OUTPATIENT)
Dept: LAB | Facility: CLINIC | Age: 81
End: 2021-09-23
Attending: PHYSICAL MEDICINE & REHABILITATION
Payer: COMMERCIAL

## 2021-09-23 DIAGNOSIS — Z11.59 ENCOUNTER FOR SCREENING FOR OTHER VIRAL DISEASES: ICD-10-CM

## 2021-09-23 PROCEDURE — U0003 INFECTIOUS AGENT DETECTION BY NUCLEIC ACID (DNA OR RNA); SEVERE ACUTE RESPIRATORY SYNDROME CORONAVIRUS 2 (SARS-COV-2) (CORONAVIRUS DISEASE [COVID-19]), AMPLIFIED PROBE TECHNIQUE, MAKING USE OF HIGH THROUGHPUT TECHNOLOGIES AS DESCRIBED BY CMS-2020-01-R: HCPCS

## 2021-09-23 PROCEDURE — U0005 INFEC AGEN DETEC AMPLI PROBE: HCPCS

## 2021-09-23 NOTE — TELEPHONE ENCOUNTER
My chart message sent with the plan :  Please check BG at night and if >199, please resume Metformin with your lunch as long as morning blod sugar ot less than 200.   Brenda Mccarty RN on 9/22/2021 at 8:46 PM

## 2021-09-24 ENCOUNTER — TELEPHONE (OUTPATIENT)
Dept: ENDOCRINOLOGY | Facility: CLINIC | Age: 81
End: 2021-09-24

## 2021-09-24 DIAGNOSIS — E11.9 TYPE 2 DIABETES MELLITUS WITHOUT COMPLICATION, WITHOUT LONG-TERM CURRENT USE OF INSULIN (H): ICD-10-CM

## 2021-09-24 DIAGNOSIS — M25.552 LEFT HIP PAIN: Primary | ICD-10-CM

## 2021-09-24 LAB — SARS-COV-2 RNA RESP QL NAA+PROBE: NEGATIVE

## 2021-09-24 NOTE — TELEPHONE ENCOUNTER
[facility] Call Center    Phone Message    May a detailed message be left on voicemail: yes     Reason for Call: Medication Question or concern regarding medication   Prescription Clarification  Name of Medication: metFORMIN (GLUCOPHAGE) 1000 MG tablet  Prescribing Provider: Lokesh/ Laila   Pharmacy: n/a   What on the order needs clarification? Pt stated Bessy wants pt to take medication in am and pm, while Lokesh said only am.  Pt was doing some research and is concerned with the possible side effects of this medication with regards to his leg/ mobility.  Pt would like Lokesh to review and give recommendation regarding this medication.            Action Taken: Message routed to:  [facility] endo    Travel Screening: Not Applicable

## 2021-09-27 ENCOUNTER — HOSPITAL ENCOUNTER (OUTPATIENT)
Facility: AMBULATORY SURGERY CENTER | Age: 81
Discharge: HOME | End: 2021-09-27
Attending: PHYSICAL MEDICINE & REHABILITATION | Admitting: PHYSICAL MEDICINE & REHABILITATION
Payer: COMMERCIAL

## 2021-09-27 ENCOUNTER — ANCILLARY PROCEDURE (OUTPATIENT)
Dept: RADIOLOGY | Facility: AMBULATORY SURGERY CENTER | Age: 81
End: 2021-09-27
Attending: PHYSICAL MEDICINE & REHABILITATION
Payer: COMMERCIAL

## 2021-09-27 VITALS
RESPIRATION RATE: 18 BRPM | OXYGEN SATURATION: 96 % | HEIGHT: 73 IN | DIASTOLIC BLOOD PRESSURE: 69 MMHG | BODY MASS INDEX: 29.69 KG/M2 | TEMPERATURE: 97 F | HEART RATE: 83 BPM | SYSTOLIC BLOOD PRESSURE: 118 MMHG | WEIGHT: 224 LBS

## 2021-09-27 DIAGNOSIS — R52 PAIN: ICD-10-CM

## 2021-09-27 DIAGNOSIS — M47.817 LUMBOSACRAL SPONDYLOSIS WITHOUT MYELOPATHY: ICD-10-CM

## 2021-09-27 LAB — GLUCOSE BLDC GLUCOMTR-MCNC: 86 MG/DL (ref 70–99)

## 2021-09-27 PROCEDURE — 64495 INJ PARAVERT F JNT L/S 3 LEV: CPT | Mod: RT,LT

## 2021-09-27 PROCEDURE — 64494 INJ PARAVERT F JNT L/S 2 LEV: CPT | Mod: RT,LT

## 2021-09-27 PROCEDURE — 64493 INJ PARAVERT F JNT L/S 1 LEV: CPT | Mod: RT,LT

## 2021-09-27 RX ORDER — IOPAMIDOL 408 MG/ML
INJECTION, SOLUTION INTRATHECAL PRN
Status: DISCONTINUED | OUTPATIENT
Start: 2021-09-27 | End: 2021-09-27 | Stop reason: HOSPADM

## 2021-09-27 RX ORDER — BUPIVACAINE HYDROCHLORIDE 5 MG/ML
INJECTION, SOLUTION PERINEURAL PRN
Status: DISCONTINUED | OUTPATIENT
Start: 2021-09-27 | End: 2021-09-27 | Stop reason: HOSPADM

## 2021-09-27 ASSESSMENT — MIFFLIN-ST. JEOR: SCORE: 1769.94

## 2021-09-27 NOTE — DISCHARGE INSTRUCTIONS
Home Care Instructions after a Medial Branch Block      In a medial branch block, a local anesthetic (numbing medicine) is injected near the medial branch nerve. This stops the transmission of pain signals from the facet joint. If this reduces your pain and improves your mobility, it may tell the doctor which facet joint is causing the pain. This procedure is a diagnostic procedure and is typically short lasting. With this injection, a steroid to increase the longevity of the blocks effect may or may not be used.    Activity  -You may resume most normal activity levels with the exception of strenuous activity. It is important for us to know if your pain with normal activity is relieved after this injection.  -DO NOT shower for 24 hours  -DO NOT remove bandaid for 24 hours    Pain  -You may experience soreness at the injection site for one or two days  -You may use an ice pack for 20 minutes every 2 hours for the first 24 hours  -You may use a heating pad after the first 24 hours  -You may use Tylenol (acetaminophen) every 4 hours or other pain medicines as     directed by your physician    You may experience numbness radiating into your legs or arms (depending on the procedure location). This numbness may last several hours. Until sensation returns to normal; please use caution in walking, climbing stairs, and stepping out of your vehicle, etc.      DID YOU RECEIVE STEROIDS TODAY?  No    Common side effects of steroids:  Not everyone will experience corticosteroid side effects. If side effects are experienced, they will gradually subside in the 7-10 day period following an injection. Most common side effects include:  -Flushed face and/or chest  -Feeling of warmth, particularly in the face but could be an overall feeling of warmth  -Increased blood sugar in diabetic patients  -Menstrual irregularities my occur. If taking hormone-based birth control an alternate method of birth control is recommended  -Sleep  disturbances and/or mood swings are possible  -Leg cramps      PLEASE KEEP TRACK OF YOUR SYMPTOMS AND NOTE YOUR IMPROVEMENT FOR YOUR DOCTOR.       Fill out the pain diary and fax ([phone]) it back to us. This cues us to call the patient to follow up with scheduling the next procedure. They do not need to call us they have faxed the Pain Diary.     If they do not have access to a fax machine, they can attach it to [employer] and we will follow up with them. They do not need to call us.     If they cannot fax or [employer], then call our call center and request to speak with a nurse for follow up after a procedure [phone].    Please contact us if you have:  -Severe pain  -Fever more than 101.5 degrees Fahrenheit  -Signs of infection at the injection site (redness, swelling, or drainage)    If you have questions, please contact our office at [phone] between the hours of 7:00 am and 3:00 pm Monday through Friday. After office hours you can contact the on call provider by dialing [phone]. If you need immediate attention, we recommend that you go to a hospital emergency room or dial [phone].

## 2021-09-27 NOTE — PROCEDURES
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  He has undergone previously decompression laminectomy and fusion at L4-5.  This was in 2020.  He continues to have pain in the lower back and hip region.  He has had multiple epidural steroid injections without benefit.  He has gone through physical therapy and does not feel that is helpful.  He has taken medications including ibuprofen and Tylenol without benefit.  He walks with a cane.  He is able to walk up to a block.  Standing for 5 to 10 minutes increases his pain.  Pain was 8 out of 10.  It is felt injecting the medial branches from L2-L4 on either side would be beneficial for diagnostic  purpose and he is here for this purpose.     With his informed consent, after explaining the benefits and risks of the procedure, he was taken to the fluoroscopic suite and placed prone on the table.  The back was prepped in a sterile fashion.  It was draped as usual.  Timeout was carried out.  Using fluoroscopy, the junction of the superior articular process and the transverse process at L2, L3 and L4 on either side were identified and marked.  1% lidocaine was utilized for topical anesthesia. Using 25-gauge 3-1/2 inch needle, the above-mentioned points were accessed and confirmed with Isovue for contrast.  With appropriate spread of the contrast, 0.5 cc  of 0.5% Marcaine was injected at each site.  An additional half a cc was injected as the needle was withdrawn.  6 cc were utilized.  He tolerated the procedure well.  There was no change in strength or sensation and his pain level postprocedure was 1 out of 10.  He will keep a pain diary and update this clinic.  If he gets 80% improvement,do radiofrequency ablations.     Thank you much following me to assist in his care.     Horacio Gonsalves MD

## 2021-10-07 DIAGNOSIS — M47.817 LUMBOSACRAL SPONDYLOSIS WITHOUT MYELOPATHY: Primary | ICD-10-CM

## 2021-10-08 ENCOUNTER — TELEPHONE (OUTPATIENT)
Dept: PHYSICAL MEDICINE AND REHAB | Facility: CLINIC | Age: 81
End: 2021-10-08

## 2021-10-08 DIAGNOSIS — Z11.59 ENCOUNTER FOR SCREENING FOR OTHER VIRAL DISEASES: ICD-10-CM

## 2021-10-08 NOTE — TELEPHONE ENCOUNTER
Attempt # 1    I called Charles to schedule a procedure with Dr. Gonsalves. Left a voicemail with my call back number and request that they leave a good call back time if they get my voicemail.    Medina ANGLIN  D[facility]anira-Op Coordinator

## 2021-10-12 ENCOUNTER — TELEPHONE (OUTPATIENT)
Dept: PHYSICAL MEDICINE AND REHAB | Facility: CLINIC | Age: 81
End: 2021-10-12

## 2021-10-12 NOTE — TELEPHONE ENCOUNTER
Patient indicated that he received 100% relief from diagnostic, Nerve Block, Facet, Medial Branch block,  L2-4, Bilateral.

## 2021-10-14 ENCOUNTER — LAB (OUTPATIENT)
Dept: LAB | Facility: CLINIC | Age: 81
End: 2021-10-14
Payer: COMMERCIAL

## 2021-10-14 DIAGNOSIS — Z11.59 ENCOUNTER FOR SCREENING FOR OTHER VIRAL DISEASES: ICD-10-CM

## 2021-10-14 PROCEDURE — U0003 INFECTIOUS AGENT DETECTION BY NUCLEIC ACID (DNA OR RNA); SEVERE ACUTE RESPIRATORY SYNDROME CORONAVIRUS 2 (SARS-COV-2) (CORONAVIRUS DISEASE [COVID-19]), AMPLIFIED PROBE TECHNIQUE, MAKING USE OF HIGH THROUGHPUT TECHNOLOGIES AS DESCRIBED BY CMS-2020-01-R: HCPCS

## 2021-10-14 PROCEDURE — U0005 INFEC AGEN DETEC AMPLI PROBE: HCPCS

## 2021-10-15 LAB — SARS-COV-2 RNA RESP QL NAA+PROBE: NEGATIVE

## 2021-10-18 ENCOUNTER — ANCILLARY PROCEDURE (OUTPATIENT)
Dept: RADIOLOGY | Facility: AMBULATORY SURGERY CENTER | Age: 81
End: 2021-10-18
Attending: PHYSICAL MEDICINE & REHABILITATION
Payer: COMMERCIAL

## 2021-10-18 ENCOUNTER — HOSPITAL ENCOUNTER (OUTPATIENT)
Facility: AMBULATORY SURGERY CENTER | Age: 81
Discharge: HOME | End: 2021-10-18
Attending: PHYSICAL MEDICINE & REHABILITATION | Admitting: PHYSICAL MEDICINE & REHABILITATION
Payer: COMMERCIAL

## 2021-10-18 VITALS
RESPIRATION RATE: 18 BRPM | HEART RATE: 66 BPM | DIASTOLIC BLOOD PRESSURE: 87 MMHG | SYSTOLIC BLOOD PRESSURE: 145 MMHG | OXYGEN SATURATION: 99 %

## 2021-10-18 DIAGNOSIS — R52 PAIN: ICD-10-CM

## 2021-10-18 DIAGNOSIS — M47.817 LUMBOSACRAL SPONDYLOSIS WITHOUT MYELOPATHY: ICD-10-CM

## 2021-10-18 LAB
GLUCOSE BLDC GLUCOMTR-MCNC: 75 MG/DL (ref 70–99)
GLUCOSE BLDC GLUCOMTR-MCNC: 78 MG/DL (ref 70–99)

## 2021-10-18 PROCEDURE — 82962 GLUCOSE BLOOD TEST: CPT | Performed by: PATHOLOGY

## 2021-10-18 PROCEDURE — 99152 MOD SED SAME PHYS/QHP 5/>YRS: CPT

## 2021-10-18 PROCEDURE — 64636 DESTROY L/S FACET JNT ADDL: CPT | Mod: RT,LT

## 2021-10-18 RX ORDER — BUPIVACAINE HYDROCHLORIDE 5 MG/ML
INJECTION, SOLUTION PERINEURAL PRN
Status: DISCONTINUED | OUTPATIENT
Start: 2021-10-18 | End: 2021-10-18 | Stop reason: HOSPADM

## 2021-10-18 RX ORDER — IOPAMIDOL 408 MG/ML
INJECTION, SOLUTION INTRAVASCULAR PRN
Status: DISCONTINUED | OUTPATIENT
Start: 2021-10-18 | End: 2021-10-18 | Stop reason: HOSPADM

## 2021-10-18 RX ORDER — MIDAZOLAM HYDROCHLORIDE 1 MG/ML
INJECTION INTRAMUSCULAR; INTRAVENOUS PRN
Status: DISCONTINUED | OUTPATIENT
Start: 2021-10-18 | End: 2021-10-18 | Stop reason: HOSPADM

## 2021-10-18 RX ORDER — FENTANYL CITRATE 50 UG/ML
INJECTION, SOLUTION INTRAMUSCULAR; INTRAVENOUS PRN
Status: DISCONTINUED | OUTPATIENT
Start: 2021-10-18 | End: 2021-10-18 | Stop reason: HOSPADM

## 2021-10-18 NOTE — PROCEDURES
Deandre Moore  is a very pleasant 80-year-old gentleman, retired , who reports having had severe back pain with spondylolisthesis and lumbar spine spinal stenosis.  He has undergone previously decompression laminectomy and fusion at L4-5.  This was in 2020.  He continues to have pain in the lower back and hip region.  He has had multiple epidural steroid injections without benefit.  He has gone through physical therapy and does not feel that is helpful.  He has taken medications including ibuprofen and Tylenol without benefit.  He walks with a cane. He is able to walk up to a block.  Standing for 5 to 10 minutes increases his pain.  Pain was 8 out of 10.  He has had both diagnostic and confirmatory blocks with good relief, 80% better for the duration of anesthetic or longer.  It is therefore felt he would benefit from radiofrequency ablations from L2-L4 on either side and he is here for this purpose.    The benefits and risks of the procedure were reviewed with the patient and he was an appropriate candidate for moderate IV sedation.  With his informed consent, he was taken to the fluoroscopic suite and placed prone on the table.  Vitals including heart rate, blood pressure EKG were monitored as was the O2 sats.  He received IV fluids.  Timeout was carried out.  He received 1 mg of Versed and 50 mcg of fentanyl.  Using fluoroscopy the junction of the superior articular process and the transverse process at L2-L3 and L4 on either side were identified and marked.  Using 1% lidocaine topical anesthesia was obtained.  Using 10 cm long with 10 mm tip radiofrequency probes, the above mentioned points were accessed.  Stimulation was carried out at 2 Hz from 0 to 2 V.  There was no spread beyond the local area.  Isovue-200 was utilized as contrast to ensure that there was no vascular uptake.  0.5% bupivacaine, 1 cc was injected into each of the 6 sites.  Radiofrequency lesioning was carried out at 80  C  for 75 seconds.  2 lesions were carried out at each level on either side.  Stimulation was carried out prior to lesioning.  After lesioning, a mixture consisting of 1 cc of Celestone and 5 cc of 0.5% bupivacaine, 1 cc was injected at each of the sites. He tolerated the procedure well.  No additional sedation was required.  Sedation time was from 13 22-13 44.  He was monitored by a dedicated RN during his sedation.    He tolerated the procedure without any change in strength or sensation.  He will be observed in the PAR before discharging home.  He will be seen in follow-up in about 4 weeks time.  He can continue to use ice and Tylenol as needed for pain.    Thank you much for allowing me to assist in his care.    Horacio Gonsalves MD

## 2021-10-18 NOTE — DISCHARGE INSTRUCTIONS
"Home Care Instructions after a Radio Frequency Ablation      Activity  -Rest today  -Do not work today  -Resume normal activity in 2-3 days  -No heavy lifting, turning or twisting for 24 hours  -DO NOT shower or soak in tub for 24 hours  -DO NOT remove bandaid for 24 hours    Pain  -You may experience soreness at the injection site for one or two days  -You may use an ice pack for 20 minutes every 2 hours for the first 24 hours, longer if needed for comfort, do not use heat  -You may use Tylenol (acetaminophen) every 4 hours or other pain medicines as directed by your physician  -If other pain medications are prescribed by your physician, please follow dosing instructions carefully.    What to expect  You may experience numbness radiating into your legs or arms (depending on the procedure location). This numbness may last several hours. Until sensation returns to normal, please use caution in walking, climbing stairs, and stepping out of your vehicle, etc. Relief of your initial symptoms can take up to 4 weeks to feel better, this is normal and due to the healing process. The procedure site may feel like a deep burn. Using ice will greatly minimize this discomfort. Do not use numbing creams or patches over your injection sites immediately following your procedure. Please keep injection sites covered, clean and dry for 24 hours.    DID YOU RECEIVE SEDATION TODAY?  Yes    Safety  Sedation medicine, if given, may remain active for many hours. It is important for the next 24 hours that you do not:  -Drive a car  -Operate machines or power tools  -Consume alcohol, including beer  -Sign any important papers or legal documents  -Please have a responsible adult with you for 24 hours following any sedation    DID YOU RECEIVE STEROIDS TODAY?  {YES / NO:278413::\"Yes\"}    Common side effects of steroids:  Not everyone will experience corticosteroid side effects. If side effects are experienced, they will gradually subside in " the 7-10 day period following an injection. Most common side effects include:  -Flushed face and/or chest  -Feeling of warmth, particularly in the face but could be an overall feeling of warmth  -Increased blood sugar in diabetic patients  -Menstrual irregularities my occur. If taking hormone-based birth control an alternate method of birth control is recommended  -Sleep disturbances and/or mood swings are possible  -Leg cramps      Please contact us if you have:  -Severe pain  -Fever more than 101.5 degrees Fahrenheit  -Signs of infection at the injection site (redness, swelling, or drainage)    If you have questions, please contact our office at [phone] Option #1 between the hours of 7:00 am and 3:00 pm Monday through Friday. After office hours you can contact the on call provider by dialing [phone]. If you need immediate attention, we recommend that you go to a hospital emergency room or dial [phone].    If you have procedure scheduling questions please call [phone] Option #2    If you are a [facility] patient, please call [phone] for questions

## 2021-10-19 ENCOUNTER — TELEPHONE (OUTPATIENT)
Dept: PHYSICAL MEDICINE AND REHAB | Facility: CLINIC | Age: 81
End: 2021-10-19

## 2021-10-19 NOTE — TELEPHONE ENCOUNTER
Health Call Center    Phone Message    May a detailed message be left on voicemail: yes     Reason for Call: Other: Charles calling to request a call back to discuss why he has a wide bandaid attached to his right leg and is inquiring if he can remove it. He is also stating that he has been light headed since the surgery yesterday and is inquiring what it might be from. Please call Charles at your earliest convenience to discuss.     Action Taken: Message routed to:  [facility] ([facility]    Travel Screening: Not Applicable

## 2021-10-25 DIAGNOSIS — L20.89 OTHER ATOPIC DERMATITIS: ICD-10-CM

## 2021-10-27 ENCOUNTER — TELEPHONE (OUTPATIENT)
Dept: ENDOCRINOLOGY | Facility: CLINIC | Age: 81
End: 2021-10-27

## 2021-10-27 NOTE — LETTER
Patient:  Deandre Moore  :   1940  MRN:     [id number]        Mr.Richard LELAND Moore  [address] [facility] [address]        2021    Dear ,    I see that you do not have a follow-up appointment in endocrinology. I would recommend that you be evaluated by an endocrinologist to minimize complications. If you like to be seen at the [facility], please call [phone] select option #1 for an appointment.    Regards    Trudy Campa MD

## 2021-10-28 RX ORDER — DUPILUMAB 300 MG/2ML
300 INJECTION, SOLUTION SUBCUTANEOUS
Qty: 4 ML | Refills: 3 | Status: SHIPPED | OUTPATIENT
Start: 2021-10-28 | End: 2022-05-03

## 2021-10-28 NOTE — TELEPHONE ENCOUNTER
Received refill request for Dupixent. Patient chart reviewed. Refill accepted. Rx routed to Dr. Rivera.    Guerda Choi MD  Dermatology Resident  [facility]

## 2021-10-28 NOTE — TELEPHONE ENCOUNTER
Dupilumab (DUPIXENT) 300 MG/2ML syringe  Last Written Prescription Date:  5/30/2021  Last Fill Quantity: 4,   # refills: 4  Last Office Visit :  8/24/2021  Future Office visit:  11/17/2021    Routing refill request to provider for review/approval because:  Drug not on the [facility], [facility] or [facility] refill protocol or controlled substance      Indigo Roche RN  Central Triage Red Flags/Med Refills

## 2021-11-17 ENCOUNTER — OFFICE VISIT (OUTPATIENT)
Dept: DERMATOLOGY | Facility: CLINIC | Age: 81
End: 2021-11-17
Payer: COMMERCIAL

## 2021-11-17 DIAGNOSIS — L20.9 ATOPIC DERMATITIS, UNSPECIFIED TYPE: ICD-10-CM

## 2021-11-17 DIAGNOSIS — I87.2 VENOUS STASIS DERMATITIS OF LEFT LOWER EXTREMITY: Primary | ICD-10-CM

## 2021-11-17 PROCEDURE — 99214 OFFICE O/P EST MOD 30 MIN: CPT | Performed by: DERMATOLOGY

## 2021-11-17 RX ORDER — BETAMETHASONE DIPROPIONATE 0.5 MG/G
OINTMENT, AUGMENTED TOPICAL
Qty: 45 G | Refills: 11 | Status: SHIPPED | OUTPATIENT
Start: 2021-11-17 | End: 2022-03-22

## 2021-11-17 ASSESSMENT — PAIN SCALES - GENERAL: PAINLEVEL: NO PAIN (0)

## 2021-11-17 NOTE — LETTER
11/17/2021       RE: Deandre Moore  [address] [facility] [address]     Dear Colleague,    Thank you for referring your patient, Deandre Moore, to the [facility] [place] [facility]. Please see a copy of my visit note below.    [facility] Dermatology Note  Encounter Date: Nov 17, 2021  Office Visit     Dermatology Problem List:  # Chronic eczematous dermatitis with chronic pruritis  - Current tx: augmented betamethasone BID PRN  - Previous tx: dupilumab 300mg q14d (restarted 8/24/21), clobetasol 0.05% cream  # Prurigo nodularis, left forearm. S/p bx.   - ILK 20 mg/cc on 5/6/21.   # Macular ISK, R axilla. S/p shave bx 5/6/21  # Stasis dermatitis   - Compression stockings and augmented betamethasone    ____________________________________________    Assessment & Plan:    # Chronic eczematous dermatitis / pruritus / prurigo nodularis / xerosis cutis. Chronic, side effects of therapy. 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 Has discontinued dupixent due to persistent local injection site reactions. Given relatively minimal symptoms and exam findings today, recommended continuing with topical therapies only. Consider nbUVB phototherapy in the future if flaring.   - Okay to stop dupixient  - Can use augmented betamethasone ointment BID if needed on back    # Stasis dermatitis / changes in setting of venous insufficiency. Chronic, flare.  - Recommended 20-30 mmHg compression stockings   - Start augmented betamethasone ointment BID prn    Procedures Performed:   None    Follow-up: 3 month(s) in-person, or earlier for new or changing lesions    Staff and Scribe:     Scribe Disclosure:  I, Abel Hughes, am serving as a scribe to document services  personally performed by Edu Rivera MD based on data collection and the provider's statements to me.     Provider Disclosure:   The documentation recorded by the scribe accurately reflects the services I personally performed and the decisions made by me.    Edu Rivera MD    Department of Dermatology  [facility]: Phone: [phone], Fax:[phone]  [facility]: Phone: [phone] Fax: [phone]        ____________________________________________    CC: Derm Problem (Rich is here today in order to follow up on prurigo nodularis. He also notes a fungal infection on his left leg. He does not note an itch, but does note discoloration and oozing. )    HPI:  Mr. Deandre Moore is a(n) 80 year old male who presents today as a return patient for follow-up. Last seen by me on 8/24/2021, at which time patient was started on clobetasol 0.05% cream BID for treatment of chronic eczematous dermatitis / pruritis / prurigo nodularis.    Today, patient reports swelling and discoloration in his left lower leg. He has been treating it with betamethasone. Patient reports that he stopped dupixient after one year because he was experiencing pain at the injection site.    Patient is otherwise feeling well, without additional skin concerns.    Labs Reviewed:  N/A    Physical Exam:  Vitals: There were no vitals taken for this visit.  SKIN: Focused examination of left lower extremity and back was performed.  - 2 plus pitting edema to the knee on the left lower extremity. - On the left medial ankle, there is a pink to hyperpigmented scaly plaque.  - A few small brown papules clustered on the right upper back.  - No other lesions of concern on areas examined.     Medications:  Current Outpatient Medications   Medication     Alcohol Swabs PADS     allopurinol (ZYLOPRIM) 300 MG tablet     ammonium  lactate (LAC-HYDRIN) 12 % external lotion     aspirin 81 MG tablet     atorvastatin (LIPITOR) 40 MG tablet     augmented betamethasone dipropionate (DIPROLENE-AF) 0.05 % ointment     betamethasone dipropionate (DIPROSONE) 0.05 % external ointment     blood glucose (NO BRAND SPECIFIED) lancets standard     blood glucose (NO BRAND SPECIFIED) test strip     Blood Glucose Monitoring Suppl (BLOOD GLUCOSE MONITOR SYSTEM) w/Device KIT     capsaicin (ZOSTRIX) 0.075 % cream     Cholecalciferol (VITAMIN D) 2000 UNITS CAPS     clobetasol (TEMOVATE) 0.05 % external cream     clopidogrel (PLAVIX) 75 MG tablet     COLCHICINE PO     Cyanocobalamin (VITAMIN B12 PO)     Dupilumab (DUPIXENT) 300 MG/2ML syringe     empagliflozin (JARDIANCE) 10 MG TABS tablet     Ginkgo Biloba (GNP GINGKO BILOBA EXTRACT PO)     glimepiride (AMARYL) 1 MG tablet     indapamide (LOZOL) 2.5 MG tablet     ipratropium (ATROVENT) 0.03 % nasal spray     irbesartan (AVAPRO) 150 MG tablet     isosorbide mononitrate (IMDUR) 30 MG 24 hr tablet     ketoconazole (NIZORAL) 2 % external cream     metFORMIN (GLUCOPHAGE) 1000 MG tablet     multivitamin, therapeutic with minerals (MULTI-VITAMIN) TABS     mupirocin (BACTROBAN) 2 % external ointment     nitroglycerin (NITROSTAT) 0.4 MG SL tablet     omega-3 acid ethyl esters (LOVAZA) 1 g capsule     potassium chloride SA (K-DUR/KLOR-CON M) 20 MEQ CR tablet     predniSONE (DELTASONE) 20 MG tablet     torsemide (DEMADEX) 10 MG tablet     triamcinolone (KENALOG) 0.1 % external ointment     Current Facility-Administered Medications   Medication     triamcinolone (KENALOG-40) injection 20 mg      Past Medical History:   Patient Active Problem List   Diagnosis     Itching     AD (atopic dermatitis)     Dermatitis     Hyperglycemia     Eczema, unspecified eczema     Intrinsic atopic dermatitis     Other eczema     Notalgia paresthetica     Rash     Pseudophakia of both eyes     Diabetes mellitus (H)     Presbyopia     Type 2  diabetes mellitus without complication, without long-term current use of insulin (H)     Encounter for long-term (current) use of high-risk medication     Seborrheic keratoses, inflamed     Neoplasm of uncertain behavior of skin     Chronic kidney disease, stage 3 (H)     Lumbosacral spondylosis without myelopathy     Hx of CABG     Gout     Essential hypertension     Erectile dysfunction     Edema     Dyslipidemia     Decreased range of motion of ankle     Coronary atherosclerosis     Vitamin D deficiency     Ventricular ectopy     Type 2 diabetes mellitus with diabetic neuropathy (H)     Supraventricular tachycardia (H)     Renal insufficiency syndrome     Pneumothorax     Plantar fascia syndrome     Personal history of tobacco use, presenting hazards to health     Past Medical History:   Diagnosis Date     Diabetes mellitus (H)      Gout attack      Heart attack (H) 1984     Hypertension      Stented coronary artery     6 stents in heart        [facility]  [address] [address] on close of this encounter.

## 2021-11-17 NOTE — NURSING NOTE
Dermatology Rooming Note    Deandre Moore's goals for this visit include:   Chief Complaint   Patient presents with     Derm Problem     Rich is here today in order to follow up on prurigo nodularis. He also notes a fungal infection on his left leg. He does not note an itch, but does note discoloration and oozing.      Harshad Mujica, EMT

## 2021-11-17 NOTE — PATIENT INSTRUCTIONS
1. Recommend compression stockings worn at least 12 hours/day especially during the day when walking around. Recommend 20-30 mmHg.   2. Can use augmented betamethasone 0.05% ointment twice daily as needed for the rash on the leg or the back.

## 2021-11-17 NOTE — PROGRESS NOTES
[facility] Dermatology Note  Encounter Date: Nov 17, 2021  Office Visit     Dermatology Problem List:  # Chronic eczematous dermatitis with chronic pruritis  - Current tx: augmented betamethasone BID PRN  - Previous tx: dupilumab 300mg q14d (restarted 8/24/21), clobetasol 0.05% cream  # Prurigo nodularis, left forearm. S/p bx.   - ILK 20 mg/cc on 5/6/21.   # Macular ISK, R axilla. S/p shave bx 5/6/21  # Stasis dermatitis   - Compression stockings and augmented betamethasone    ____________________________________________    Assessment & Plan:    # Chronic eczematous dermatitis / pruritus / prurigo nodularis / xerosis cutis. Chronic, side effects of therapy. 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 Has discontinued dupixent due to persistent local injection site reactions. Given relatively minimal symptoms and exam findings today, recommended continuing with topical therapies only. Consider nbUVB phototherapy in the future if flaring.   - Okay to stop dupixient  - Can use augmented betamethasone ointment BID if needed on back    # Stasis dermatitis / changes in setting of venous insufficiency. Chronic, flare.  - Recommended 20-30 mmHg compression stockings   - Start augmented betamethasone ointment BID prn    Procedures Performed:   None    Follow-up: 3 month(s) in-person, or earlier for new or changing lesions    Staff and Scribe:     Scribe Disclosure:  I, Abel Hughes, am serving as a scribe to document services personally performed by Edu Rivera MD based on data collection and the provider's statements to me.     Provider Disclosure:   The documentation recorded by the scribe accurately reflects the services I personally performed and the decisions made by me.    Edu Rivera MD    Department of  Dermatology  [facility]: Phone: [phone], Fax:[phone]  [facility]: Phone: [phone] Fax: [phone]        ____________________________________________    CC: Derm Problem (Charles is here today in order to follow up on prurigo nodularis. He also notes a fungal infection on his left leg. He does not note an itch, but does note discoloration and oozing. )    HPI:  Mr. Deandre Moore is a(n) 80 year old male who presents today as a return patient for follow-up. Last seen by me on 8/24/2021, at which time patient was started on clobetasol 0.05% cream BID for treatment of chronic eczematous dermatitis / pruritis / prurigo nodularis.    Today, patient reports swelling and discoloration in his left lower leg. He has been treating it with betamethasone. Patient reports that he stopped dupixient after one year because he was experiencing pain at the injection site.    Patient is otherwise feeling well, without additional skin concerns.    Labs Reviewed:  N/A    Physical Exam:  Vitals: There were no vitals taken for this visit.  SKIN: Focused examination of left lower extremity and back was performed.  - 2 plus pitting edema to the knee on the left lower extremity. - On the left medial ankle, there is a pink to hyperpigmented scaly plaque.  - A few small brown papules clustered on the right upper back.  - No other lesions of concern on areas examined.     Medications:  Current Outpatient Medications   Medication     Alcohol Swabs PADS     allopurinol (ZYLOPRIM) 300 MG tablet     ammonium lactate (LAC-HYDRIN) 12 % external lotion     aspirin 81 MG tablet     atorvastatin (LIPITOR) 40 MG tablet     augmented betamethasone dipropionate (DIPROLENE-AF) 0.05 % ointment     betamethasone dipropionate (DIPROSONE) 0.05 % external ointment     blood glucose (NO BRAND SPECIFIED) lancets standard     blood glucose  (NO BRAND SPECIFIED) test strip     Blood Glucose Monitoring Suppl (BLOOD GLUCOSE MONITOR SYSTEM) w/Device KIT     capsaicin (ZOSTRIX) 0.075 % cream     Cholecalciferol (VITAMIN D) 2000 UNITS CAPS     clobetasol (TEMOVATE) 0.05 % external cream     clopidogrel (PLAVIX) 75 MG tablet     COLCHICINE PO     Cyanocobalamin (VITAMIN B12 PO)     Dupilumab (DUPIXENT) 300 MG/2ML syringe     empagliflozin (JARDIANCE) 10 MG TABS tablet     Ginkgo Biloba (GNP GINGKO BILOBA EXTRACT PO)     glimepiride (AMARYL) 1 MG tablet     indapamide (LOZOL) 2.5 MG tablet     ipratropium (ATROVENT) 0.03 % nasal spray     irbesartan (AVAPRO) 150 MG tablet     isosorbide mononitrate (IMDUR) 30 MG 24 hr tablet     ketoconazole (NIZORAL) 2 % external cream     metFORMIN (GLUCOPHAGE) 1000 MG tablet     multivitamin, therapeutic with minerals (MULTI-VITAMIN) TABS     mupirocin (BACTROBAN) 2 % external ointment     nitroglycerin (NITROSTAT) 0.4 MG SL tablet     omega-3 acid ethyl esters (LOVAZA) 1 g capsule     potassium chloride SA (K-DUR/KLOR-CON M) 20 MEQ CR tablet     predniSONE (DELTASONE) 20 MG tablet     torsemide (DEMADEX) 10 MG tablet     triamcinolone (KENALOG) 0.1 % external ointment     Current Facility-Administered Medications   Medication     triamcinolone (KENALOG-40) injection 20 mg      Past Medical History:   Patient Active Problem List   Diagnosis     Itching     AD (atopic dermatitis)     Dermatitis     Hyperglycemia     Eczema, unspecified eczema     Intrinsic atopic dermatitis     Other eczema     Notalgia paresthetica     Rash     Pseudophakia of both eyes     Diabetes mellitus (H)     Presbyopia     Type 2 diabetes mellitus without complication, without long-term current use of insulin (H)     Encounter for long-term (current) use of high-risk medication     Seborrheic keratoses, inflamed     Neoplasm of uncertain behavior of skin     Chronic kidney disease, stage 3 (H)     Lumbosacral spondylosis without myelopathy     Hx of  CABG     Gout     Essential hypertension     Erectile dysfunction     Edema     Dyslipidemia     Decreased range of motion of ankle     Coronary atherosclerosis     Vitamin D deficiency     Ventricular ectopy     Type 2 diabetes mellitus with diabetic neuropathy (H)     Supraventricular tachycardia (H)     Renal insufficiency syndrome     Pneumothorax     Plantar fascia syndrome     Personal history of tobacco use, presenting hazards to health     Past Medical History:   Diagnosis Date     Diabetes mellitus (H)      Gout attack      Heart attack (H) 1984     Hypertension      Stented coronary artery     6 stents in heart        [facility]  [address] [address] on close of this encounter.

## 2021-12-02 ENCOUNTER — TELEPHONE (OUTPATIENT)
Dept: DERMATOLOGY | Facility: CLINIC | Age: 81
End: 2021-12-02
Payer: COMMERCIAL

## 2021-12-02 NOTE — TELEPHONE ENCOUNTER
Prior Authorization Not Needed per Insurance    Medication: Dupixent- NO PA Needed  Insurance Company: [employer] - Phone [phone] Fax [phone]  Expected CoPay:      Pharmacy Filling the Rx:    Pharmacy Notified:    Patient Notified:      Received fax from [facility] requesting PA but per [employer] current PA is good through 03/15/2022

## 2021-12-05 ENCOUNTER — TRANSFERRED RECORDS (OUTPATIENT)
Dept: HEALTH INFORMATION MANAGEMENT | Facility: CLINIC | Age: 81
End: 2021-12-05
Payer: COMMERCIAL

## 2021-12-21 NOTE — PROGRESS NOTES
HPI:  Anxiety. Prozac daily, reports it helps a lot. Weight loss. Podiatry referral, left foot pain. FLUoxetine 10 mg capsule (12/5/21) from hospital. He states some anxiety and possible claustrophobia. We also discussed with his son Lucio his overall status and he is stable. He states when he takes off his mask his breathing is fine. He is taking all his medications. He has recently moved with his wife from [place], [place]. Some stress/anxiety with this move. He complains of L foot pain and possible h/o plantar fasciitis. He also states decreased appetite and some weight loss. Although his weight is only down a little he is on a diuretic. No other HEENT, cardiopulmonary, abdominal, , neurological, systemic, psychiatrist, lymphatic complaints.         See Care Everywhere 12/5/2021. Discharge summary [facility]:    - s/p CABG x 4V in 1991(LIMA to LAD, SVG to RCA, sequential SVG to Diag and OM)  - 1998: stenting to pCX and pLAD  - 2006: stenting VG to RCA  - 4/2011:Inferior STEMI due to occlusion of SVG to RCA. Successful JOVANNY of SVG.  - angiogram 9/6/18: Successful PTCA, Laser , and 3.5 x 30mm JOVANNY to prox SVG to RCA post-dilated to 4.2mm; Successful PTCA< Laser, ESLAPM, and 3.5 x 15mm JOVANNY to distal SVG to RCA, post-dilated to 4.2mm       He presented to the ED 12/4/2021 for SOB and treated with Lasix.   Past Medical History:   Diagnosis Date     Diabetes mellitus (H)      Gout attack      Heart attack (H) 1984     Hypertension      Stented coronary artery     6 stents in heart     Past Surgical History:   Procedure Laterality Date     CARDIAC SURGERY  1984    4 vessel bypass     CHOLECYSTECTOMY       COLONOSCOPY       DESTRUCTION OF PARAVERTEBRAL FACET LUMBAR / SACRAL ADDITIONAL Bilateral 10/18/2021    Procedure: DESTRUCTION, NERVE, FACET JOINT, LUMBOSACRAL, ADDITIONAL NERVE AFTER DESTRUCTION OF INITIAL LUMBOSACRAL FACET JOINT NERVE;  Surgeon: Horacio Gonsalves MD;  Location: [facility] OR     DESTRUCTION OF  PARAVERTEBRAL FACET LUMBAR / SACRAL SINGLE Bilateral 10/18/2021    Procedure: DESTRUCTION, NERVE, FACET JOINT, LUMBOSACRAL, 1 NERVE;  Surgeon: Horacio Gonsalves MD;  Location: [facility] OR     ESOPHAGOSCOPY, GASTROSCOPY, DUODENOSCOPY (EGD), COMBINED N/A 3/1/2018    Procedure: COMBINED ESOPHAGOSCOPY, GASTROSCOPY, DUODENOSCOPY (EGD), BIOPSY SINGLE OR MULTIPLE;  ESOPHAGOGASTRODUODENOSCOPY ;  Surgeon: Daryn Medrano MD;  Location: [facility]     INJECT BLOCK MEDIAL BRANCH CERVICAL/THORACIC/LUMBAR Bilateral 8/16/2021    Procedure: BLOCK, NERVE, FACET JOINT, MEDIAL BRANCH, DIAGNOSTIC Lumbar 2-3-4;  Surgeon: Horacio Gonsalves MD;  Location: [facility] OR     INJECT BLOCK MEDIAL BRANCH CERVICAL/THORACIC/LUMBAR Bilateral 9/27/2021    Procedure: BLOCK, NERVE, FACET JOINT, MEDIAL BRANCH, DIAGNOSTIC  L2-4, Bilateral;  Surgeon: Horacio Gonsalves MD;  Location: [facility] OR     NO HISTORY OF SURGERY  3/31/14    derm     PHACOEMULSIFICATION WITH STANDARD INTRAOCULAR LENS IMPLANT  1/21/2013    Procedure: PHACOEMULSIFICATION WITH STANDARD INTRAOCULAR LENS IMPLANT;  Phacoemulsification with standard intraocular lens implant, right eye;  Surgeon: Jay Rodriges MD;  Location: [facility]     PE:    Vitals noted, gen, nad, cooperative, alert, neck supple nl rom, lungs with good air movement, RRR, S1, S2, no MRG, abdomen, no acute findings. Grossly normal neurological exam.     Resting echo Care Everywhere 10/4/2021:  Procedure: 2D, Color Doppler and Spectral Doppler.     Indication for study: Atherosclerosis of native coronary artery without angina pectoris, unspecified whether native or transplanted heart   Cardiac Rhythm: Normal sinus and with premature ventricular contractions.Study quality:     Final Impressions:    1. Normal left ventricular size, mildly increased wall thickness, normal global systolic function, calculated EF of 66 %.    2. Right ventricular cavity size is normal, global systolic RV function is normal.    3.  No significant valve disease detected.    4. 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    5. 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     Comparison   Compared to prior exam report of 7/3/2019, there has been no significant change.     NM Cardiac Stress Test Care Everywhere 7/8/2021:  MPRESSION    1. Adequate pharmacologic stress test with regadenoson and low level exercise.    2. Myocardial perfusion was abnormal. There was a medium-sized area of mild to moderate myocardial infarction in the basal, mid, and apical inferior wall and apical inferior wall with moderate stephania-infarction ischemia. Additionally, there was a small to medium-sized area of mild to moderate myocardial infarction in the mid and apical anteroseptal wall with mild stephania-infarction ischemia.    3. Left ventricular cavity size was normal (resting  ml).    4. Overall left ventricular systolic function was normal without wall motion abnormalities. The post stress LVEF was calculated to be 57 %.    5. The pharmacologic stress ECG was negative for ischemia.    6. There were no prior studies available for comparison.       EKG: SR at 75 bpm, RBB. Unchanged from outside EKG in Care Everywhere on 12/5/21.     A/P:     1. Immunizations;  Moderna COVID vaccination x 2. Check with insurance regarding Shingrix. Influenza vaccination?  2. Increased lipids on Atorvastatin; 12/6/2021; HDL 58 and   3. DM2; on Glimepiride, Jardiance, Metformin. She follows with Dr. Campa, Endocrinology and next visit 5/20/2022. Last endocrinology visit 9/21/2021. A1c 5.6% 12/5/2021  4. On Dupilumab and has dermatology appointment with Dr. Rivera 3/22/2022. Last visit 11/17/2021 for chronic eczematous dermatitis.   5. HTN; on Irbesartan  6. Podiatry appt. With Dr. Meier diabetic foot exam. Left foot pain, left foot xray today, referral to podiatry.  7. Seen Dr. Gonsalves, PMR 8/3/2021 for back and hip pain.   8. Seen  by Dr. Verma, [facility] 5/3/2021 for previous spine surgery  9. Seen Dr. Mittal, orthopedics 3/30/2021 for R hip pain  10. Neurology appt. With Dr. Bruno 9/2/2020 for evaluation for jaw tremor and gait imbalance.   11. Cr 12/5/2021 was 1.36  12. TSH 8/16/2019 normal at 1.85  13. On Fluoxetine, refilled 10mg daily rx. Mental health referral for anxiety, see Dr. Sands.   14. CAD on ASA, Isorbide, Plavix, follow up with Dr. Ordoñez Cardiologist at [facility] late December or early January       Total time spent today with this patient including chart review, exam time with patient and documentation : 60 minutes.  This time was exclusive of the EKG interpretation

## 2021-12-22 ENCOUNTER — ANCILLARY PROCEDURE (OUTPATIENT)
Dept: GENERAL RADIOLOGY | Facility: CLINIC | Age: 81
End: 2021-12-22
Attending: INTERNAL MEDICINE
Payer: COMMERCIAL

## 2021-12-22 ENCOUNTER — OFFICE VISIT (OUTPATIENT)
Dept: INTERNAL MEDICINE | Facility: CLINIC | Age: 81
End: 2021-12-22
Payer: COMMERCIAL

## 2021-12-22 VITALS
HEIGHT: 73 IN | SYSTOLIC BLOOD PRESSURE: 122 MMHG | WEIGHT: 217.2 LBS | BODY MASS INDEX: 28.79 KG/M2 | HEART RATE: 96 BPM | DIASTOLIC BLOOD PRESSURE: 71 MMHG | OXYGEN SATURATION: 96 %

## 2021-12-22 DIAGNOSIS — M79.605 PAIN OF LEFT LOWER EXTREMITY: ICD-10-CM

## 2021-12-22 DIAGNOSIS — M79.605 PAIN OF LEFT LOWER EXTREMITY: Primary | ICD-10-CM

## 2021-12-22 DIAGNOSIS — F41.9 ANXIETY: ICD-10-CM

## 2021-12-22 LAB
ATRIAL RATE - MUSE: 75 BPM
DIASTOLIC BLOOD PRESSURE - MUSE: NORMAL MMHG
INTERPRETATION ECG - MUSE: NORMAL
P AXIS - MUSE: 36 DEGREES
PR INTERVAL - MUSE: 236 MS
QRS DURATION - MUSE: 160 MS
QT - MUSE: 434 MS
QTC - MUSE: 484 MS
R AXIS - MUSE: -75 DEGREES
SYSTOLIC BLOOD PRESSURE - MUSE: NORMAL MMHG
T AXIS - MUSE: 63 DEGREES
VENTRICULAR RATE- MUSE: 75 BPM

## 2021-12-22 PROCEDURE — 73620 X-RAY EXAM OF FOOT: CPT | Mod: LT | Performed by: RADIOLOGY

## 2021-12-22 PROCEDURE — 93000 ELECTROCARDIOGRAM COMPLETE: CPT | Performed by: INTERNAL MEDICINE

## 2021-12-22 PROCEDURE — 99205 OFFICE O/P NEW HI 60 MIN: CPT | Mod: 25 | Performed by: INTERNAL MEDICINE

## 2021-12-22 RX ORDER — SPIRONOLACTONE 25 MG/1
25 TABLET ORAL DAILY
COMMUNITY
End: 2022-02-07

## 2021-12-22 RX ORDER — TRAZODONE HYDROCHLORIDE 150 MG/1
150 TABLET ORAL AT BEDTIME
COMMUNITY
End: 2022-01-10

## 2021-12-22 RX ORDER — FLUOXETINE 10 MG/1
10 CAPSULE ORAL DAILY
Qty: 90 CAPSULE | Refills: 3 | Status: SHIPPED | OUTPATIENT
Start: 2021-12-22 | End: 2022-01-24

## 2021-12-22 ASSESSMENT — MIFFLIN-ST. JEOR: SCORE: 1734.09

## 2021-12-22 ASSESSMENT — ANXIETY QUESTIONNAIRES
1. FEELING NERVOUS, ANXIOUS, OR ON EDGE: NEARLY EVERY DAY
7. FEELING AFRAID AS IF SOMETHING AWFUL MIGHT HAPPEN: NEARLY EVERY DAY
2. NOT BEING ABLE TO STOP OR CONTROL WORRYING: NOT AT ALL
IF YOU CHECKED OFF ANY PROBLEMS ON THIS QUESTIONNAIRE, HOW DIFFICULT HAVE THESE PROBLEMS MADE IT FOR YOU TO DO YOUR WORK, TAKE CARE OF THINGS AT HOME, OR GET ALONG WITH OTHER PEOPLE: NOT DIFFICULT AT ALL
6. BECOMING EASILY ANNOYED OR IRRITABLE: NOT AT ALL
GAD7 TOTAL SCORE: 10
5. BEING SO RESTLESS THAT IT IS HARD TO SIT STILL: NOT AT ALL
3. WORRYING TOO MUCH ABOUT DIFFERENT THINGS: SEVERAL DAYS

## 2021-12-22 ASSESSMENT — PATIENT HEALTH QUESTIONNAIRE - PHQ9
5. POOR APPETITE OR OVEREATING: NEARLY EVERY DAY
SUM OF ALL RESPONSES TO PHQ QUESTIONS 1-9: 8

## 2021-12-22 ASSESSMENT — PAIN SCALES - GENERAL: PAINLEVEL: MODERATE PAIN (5)

## 2021-12-23 ASSESSMENT — ANXIETY QUESTIONNAIRES: GAD7 TOTAL SCORE: 10

## 2022-01-01 ENCOUNTER — OFFICE VISIT (OUTPATIENT)
Dept: DERMATOLOGY | Facility: CLINIC | Age: 82
End: 2022-01-01
Payer: COMMERCIAL

## 2022-01-01 ENCOUNTER — TRANSFERRED RECORDS (OUTPATIENT)
Dept: HEALTH INFORMATION MANAGEMENT | Facility: CLINIC | Age: 82
End: 2022-01-01
Payer: COMMERCIAL

## 2022-01-01 ENCOUNTER — HEALTH MAINTENANCE LETTER (OUTPATIENT)
Age: 82
End: 2022-01-01

## 2022-01-01 ENCOUNTER — HOSPITAL ENCOUNTER (OUTPATIENT)
Dept: PHYSICAL THERAPY | Facility: CLINIC | Age: 82
Setting detail: THERAPIES SERIES
Discharge: HOME | End: 2022-09-08
Attending: INTERNAL MEDICINE
Payer: COMMERCIAL

## 2022-01-01 ENCOUNTER — OFFICE VISIT (OUTPATIENT)
Dept: AUDIOLOGY | Facility: CLINIC | Age: 82
End: 2022-01-01
Payer: COMMERCIAL

## 2022-01-01 ENCOUNTER — TELEPHONE (OUTPATIENT)
Dept: PHYSICAL THERAPY | Facility: CLINIC | Age: 82
End: 2022-01-01

## 2022-01-01 ENCOUNTER — TELEPHONE (OUTPATIENT)
Dept: ENDOCRINOLOGY | Facility: CLINIC | Age: 82
End: 2022-01-01

## 2022-01-01 ENCOUNTER — OFFICE VISIT (OUTPATIENT)
Dept: UROLOGY | Facility: CLINIC | Age: 82
End: 2022-01-01
Payer: COMMERCIAL

## 2022-01-01 ENCOUNTER — HOSPITAL ENCOUNTER (OUTPATIENT)
Dept: PHYSICAL THERAPY | Facility: CLINIC | Age: 82
Setting detail: THERAPIES SERIES
Discharge: HOME | End: 2022-08-29
Attending: INTERNAL MEDICINE
Payer: COMMERCIAL

## 2022-01-01 ENCOUNTER — LAB (OUTPATIENT)
Dept: LAB | Facility: CLINIC | Age: 82
End: 2022-01-01

## 2022-01-01 ENCOUNTER — HOSPITAL ENCOUNTER (OUTPATIENT)
Dept: PHYSICAL THERAPY | Facility: CLINIC | Age: 82
Setting detail: THERAPIES SERIES
Discharge: HOME | End: 2022-08-22
Attending: INTERNAL MEDICINE
Payer: COMMERCIAL

## 2022-01-01 ENCOUNTER — TELEPHONE (OUTPATIENT)
Dept: NEUROLOGY | Facility: CLINIC | Age: 82
End: 2022-01-01

## 2022-01-01 ENCOUNTER — LAB (OUTPATIENT)
Dept: LAB | Facility: CLINIC | Age: 82
End: 2022-01-01
Payer: COMMERCIAL

## 2022-01-01 ENCOUNTER — PRE VISIT (OUTPATIENT)
Dept: UROLOGY | Facility: CLINIC | Age: 82
End: 2022-01-01

## 2022-01-01 VITALS
SYSTOLIC BLOOD PRESSURE: 115 MMHG | BODY MASS INDEX: 26.24 KG/M2 | HEIGHT: 73 IN | WEIGHT: 198 LBS | HEART RATE: 42 BPM | DIASTOLIC BLOOD PRESSURE: 72 MMHG

## 2022-01-01 DIAGNOSIS — Z51.81 THERAPEUTIC DRUG MONITORING: ICD-10-CM

## 2022-01-01 DIAGNOSIS — R73.9 HYPERGLYCEMIA: ICD-10-CM

## 2022-01-01 DIAGNOSIS — I87.2 VENOUS STASIS DERMATITIS OF LEFT LOWER EXTREMITY: ICD-10-CM

## 2022-01-01 DIAGNOSIS — E11.40 TYPE 2 DIABETES MELLITUS WITH DIABETIC NEUROPATHY (H): ICD-10-CM

## 2022-01-01 DIAGNOSIS — N13.8 BPH WITH URINARY OBSTRUCTION: ICD-10-CM

## 2022-01-01 DIAGNOSIS — N28.9 RENAL INSUFFICIENCY SYNDROME: ICD-10-CM

## 2022-01-01 DIAGNOSIS — N18.30 CHRONIC KIDNEY DISEASE, STAGE 3 (H): ICD-10-CM

## 2022-01-01 DIAGNOSIS — F41.9 ANXIETY: ICD-10-CM

## 2022-01-01 DIAGNOSIS — L28.1 PRURIGO NODULARIS: ICD-10-CM

## 2022-01-01 DIAGNOSIS — F41.9 ANXIETY: Primary | ICD-10-CM

## 2022-01-01 DIAGNOSIS — E11.9 TYPE 2 DIABETES MELLITUS WITHOUT COMPLICATION, WITHOUT LONG-TERM CURRENT USE OF INSULIN (H): ICD-10-CM

## 2022-01-01 DIAGNOSIS — N40.1 BPH WITH URINARY OBSTRUCTION: ICD-10-CM

## 2022-01-01 DIAGNOSIS — L20.9 ATOPIC DERMATITIS, UNSPECIFIED TYPE: ICD-10-CM

## 2022-01-01 DIAGNOSIS — L20.9 ATOPIC DERMATITIS, UNSPECIFIED TYPE: Primary | ICD-10-CM

## 2022-01-01 DIAGNOSIS — I10 BENIGN ESSENTIAL HYPERTENSION: ICD-10-CM

## 2022-01-01 DIAGNOSIS — N28.9 KIDNEY LESION, NATIVE, RIGHT: Primary | ICD-10-CM

## 2022-01-01 DIAGNOSIS — H90.3 SENSORINEURAL HEARING LOSS, BILATERAL: Primary | ICD-10-CM

## 2022-01-01 DIAGNOSIS — Z11.59 ENCOUNTER FOR SCREENING FOR OTHER VIRAL DISEASES: ICD-10-CM

## 2022-01-01 DIAGNOSIS — N40.0 BENIGN PROSTATIC HYPERPLASIA WITHOUT LOWER URINARY TRACT SYMPTOMS: ICD-10-CM

## 2022-01-01 LAB
ALBUMIN SERPL BCG-MCNC: 3.9 G/DL (ref 3.5–5.2)
ALBUMIN SERPL BCG-MCNC: 4.3 G/DL (ref 3.5–5.2)
ALP SERPL-CCNC: 76 U/L (ref 40–129)
ALP SERPL-CCNC: 83 U/L (ref 40–129)
ALT SERPL W P-5'-P-CCNC: 16 U/L (ref 10–50)
ALT SERPL W P-5'-P-CCNC: 31 U/L (ref 10–50)
ANION GAP SERPL CALCULATED.3IONS-SCNC: 10 MMOL/L (ref 7–15)
AST SERPL W P-5'-P-CCNC: 21 U/L (ref 10–50)
AST SERPL W P-5'-P-CCNC: 34 U/L (ref 10–50)
BASOPHILS # BLD AUTO: 0.1 10E3/UL (ref 0–0.2)
BASOPHILS # BLD AUTO: 0.1 10E3/UL (ref 0–0.2)
BASOPHILS NFR BLD AUTO: 1 %
BASOPHILS NFR BLD AUTO: 1 %
BILIRUB DIRECT SERPL-MCNC: <0.2 MG/DL (ref 0–0.3)
BILIRUB SERPL-MCNC: 0.5 MG/DL
BILIRUB SERPL-MCNC: 0.6 MG/DL
BUN SERPL-MCNC: 17.8 MG/DL (ref 8–23)
CALCIUM SERPL-MCNC: 9.9 MG/DL (ref 8.8–10.2)
CHLORIDE SERPL-SCNC: 99 MMOL/L (ref 98–107)
CREAT SERPL-MCNC: 1.16 MG/DL (ref 0.67–1.17)
DEPRECATED HCO3 PLAS-SCNC: 28 MMOL/L (ref 22–29)
EOSINOPHIL # BLD AUTO: 0.8 10E3/UL (ref 0–0.7)
EOSINOPHIL # BLD AUTO: 1.4 10E3/UL (ref 0–0.7)
EOSINOPHIL NFR BLD AUTO: 16 %
EOSINOPHIL NFR BLD AUTO: 8 %
ERYTHROCYTE [DISTWIDTH] IN BLOOD BY AUTOMATED COUNT: 13.4 % (ref 10–15)
ERYTHROCYTE [DISTWIDTH] IN BLOOD BY AUTOMATED COUNT: 14.5 % (ref 10–15)
GAMMA INTERFERON BACKGROUND BLD IA-ACNC: 0.07 IU/ML
GFR SERPL CREATININE-BSD FRML MDRD: 62 ML/MIN/1.73M2
GLUCOSE SERPL-MCNC: 77 MG/DL (ref 70–99)
HBV CORE AB SERPL QL IA: NONREACTIVE
HBV SURFACE AB SERPL IA-ACNC: 0.67 M[IU]/ML
HBV SURFACE AB SERPL IA-ACNC: NONREACTIVE M[IU]/ML
HBV SURFACE AG SERPL QL IA: NONREACTIVE
HCT VFR BLD AUTO: 32.8 % (ref 40–53)
HCT VFR BLD AUTO: 35.4 % (ref 40–53)
HCV AB SERPL QL IA: NONREACTIVE
HGB BLD-MCNC: 11.4 G/DL (ref 13.3–17.7)
HGB BLD-MCNC: 12.1 G/DL (ref 13.3–17.7)
IMM GRANULOCYTES # BLD: 0 10E3/UL
IMM GRANULOCYTES # BLD: 0 10E3/UL
IMM GRANULOCYTES NFR BLD: 0 %
IMM GRANULOCYTES NFR BLD: 0 %
LYMPHOCYTES # BLD AUTO: 1.3 10E3/UL (ref 0.8–5.3)
LYMPHOCYTES # BLD AUTO: 1.3 10E3/UL (ref 0.8–5.3)
LYMPHOCYTES NFR BLD AUTO: 14 %
LYMPHOCYTES NFR BLD AUTO: 15 %
M TB IFN-G BLD-IMP: NEGATIVE
M TB IFN-G CD4+ BCKGRND COR BLD-ACNC: 9.93 IU/ML
MCH RBC QN AUTO: 31.2 PG (ref 26.5–33)
MCH RBC QN AUTO: 32.1 PG (ref 26.5–33)
MCHC RBC AUTO-ENTMCNC: 34.2 G/DL (ref 31.5–36.5)
MCHC RBC AUTO-ENTMCNC: 34.8 G/DL (ref 31.5–36.5)
MCV RBC AUTO: 91 FL (ref 78–100)
MCV RBC AUTO: 92 FL (ref 78–100)
MITOGEN IGNF BCKGRD COR BLD-ACNC: -0.01 IU/ML
MITOGEN IGNF BCKGRD COR BLD-ACNC: 0 IU/ML
MONOCYTES # BLD AUTO: 0.6 10E3/UL (ref 0–1.3)
MONOCYTES # BLD AUTO: 0.8 10E3/UL (ref 0–1.3)
MONOCYTES NFR BLD AUTO: 7 %
MONOCYTES NFR BLD AUTO: 9 %
NEUTROPHILS # BLD AUTO: 5.4 10E3/UL (ref 1.6–8.3)
NEUTROPHILS # BLD AUTO: 6 10E3/UL (ref 1.6–8.3)
NEUTROPHILS NFR BLD AUTO: 61 %
NEUTROPHILS NFR BLD AUTO: 68 %
NRBC # BLD AUTO: 0 10E3/UL
NRBC # BLD AUTO: 0 10E3/UL
NRBC BLD AUTO-RTO: 0 /100
NRBC BLD AUTO-RTO: 0 /100
PLATELET # BLD AUTO: 202 10E3/UL (ref 150–450)
PLATELET # BLD AUTO: 203 10E3/UL (ref 150–450)
POTASSIUM SERPL-SCNC: 4.3 MMOL/L (ref 3.4–5.3)
PROT SERPL-MCNC: 7.1 G/DL (ref 6.4–8.3)
PROT SERPL-MCNC: 7.4 G/DL (ref 6.4–8.3)
QUANTIFERON MITOGEN: 10 IU/ML
QUANTIFERON NIL TUBE: 0.07 IU/ML
QUANTIFERON TB1 TUBE: 0.07 IU/ML
QUANTIFERON TB2 TUBE: 0.06
RBC # BLD AUTO: 3.55 10E6/UL (ref 4.4–5.9)
RBC # BLD AUTO: 3.88 10E6/UL (ref 4.4–5.9)
SODIUM SERPL-SCNC: 137 MMOL/L (ref 136–145)
URATE SERPL-MCNC: 3.7 MG/DL (ref 3.4–7)
WBC # BLD AUTO: 8.8 10E3/UL (ref 4–11)
WBC # BLD AUTO: 8.9 10E3/UL (ref 4–11)

## 2022-01-01 PROCEDURE — 86481 TB AG RESPONSE T-CELL SUSP: CPT | Performed by: DERMATOLOGY

## 2022-01-01 PROCEDURE — 97110 THERAPEUTIC EXERCISES: CPT | Mod: GP | Performed by: PHYSICAL THERAPIST

## 2022-01-01 PROCEDURE — 86706 HEP B SURFACE ANTIBODY: CPT | Performed by: DERMATOLOGY

## 2022-01-01 PROCEDURE — 84550 ASSAY OF BLOOD/URIC ACID: CPT | Performed by: INTERNAL MEDICINE

## 2022-01-01 PROCEDURE — 97112 NEUROMUSCULAR REEDUCATION: CPT | Mod: GP | Performed by: PHYSICAL THERAPIST

## 2022-01-01 PROCEDURE — 36415 COLL VENOUS BLD VENIPUNCTURE: CPT | Performed by: PATHOLOGY

## 2022-01-01 PROCEDURE — 99213 OFFICE O/P EST LOW 20 MIN: CPT | Mod: 25 | Performed by: PHYSICIAN ASSISTANT

## 2022-01-01 PROCEDURE — 51798 US URINE CAPACITY MEASURE: CPT | Performed by: PHYSICIAN ASSISTANT

## 2022-01-01 PROCEDURE — 85025 COMPLETE CBC W/AUTO DIFF WBC: CPT | Performed by: PATHOLOGY

## 2022-01-01 PROCEDURE — 99207 PR ASSESSMENT FOR HEARING AID: CPT | Performed by: AUDIOLOGIST

## 2022-01-01 PROCEDURE — 86704 HEP B CORE ANTIBODY TOTAL: CPT | Performed by: DERMATOLOGY

## 2022-01-01 PROCEDURE — 99214 OFFICE O/P EST MOD 30 MIN: CPT | Performed by: DERMATOLOGY

## 2022-01-01 PROCEDURE — 86803 HEPATITIS C AB TEST: CPT | Performed by: DERMATOLOGY

## 2022-01-01 PROCEDURE — 87340 HEPATITIS B SURFACE AG IA: CPT | Performed by: DERMATOLOGY

## 2022-01-01 PROCEDURE — 80076 HEPATIC FUNCTION PANEL: CPT | Performed by: PATHOLOGY

## 2022-01-01 PROCEDURE — 80053 COMPREHEN METABOLIC PANEL: CPT | Performed by: PATHOLOGY

## 2022-01-01 RX ORDER — FINASTERIDE 5 MG/1
1 TABLET, FILM COATED ORAL DAILY
Qty: 30 TABLET | Refills: 11 | Status: SHIPPED | OUTPATIENT
Start: 2022-01-01

## 2022-01-01 RX ORDER — FUROSEMIDE 40 MG
TABLET ORAL
Qty: 100 TABLET | Refills: 2 | OUTPATIENT
Start: 2022-01-01

## 2022-01-01 RX ORDER — TRAZODONE HYDROCHLORIDE 150 MG/1
150 TABLET ORAL AT BEDTIME
Qty: 90 TABLET | Refills: 3 | Status: SHIPPED | OUTPATIENT
Start: 2022-01-01 | End: 2023-01-01

## 2022-01-01 RX ORDER — BETAMETHASONE DIPROPIONATE 0.5 MG/G
OINTMENT, AUGMENTED TOPICAL
Qty: 45 G | Refills: 11 | Status: SHIPPED | OUTPATIENT
Start: 2022-01-01

## 2022-01-01 RX ORDER — TAMSULOSIN HYDROCHLORIDE 0.4 MG/1
CAPSULE ORAL
Qty: 100 CAPSULE | Refills: 2 | OUTPATIENT
Start: 2022-01-01

## 2022-01-01 RX ORDER — IRBESARTAN 150 MG/1
150 TABLET ORAL AT BEDTIME
Qty: 100 TABLET | Refills: 1 | Status: ON HOLD | OUTPATIENT
Start: 2022-01-01 | End: 2023-01-01

## 2022-01-01 RX ORDER — TAMSULOSIN HYDROCHLORIDE 0.4 MG/1
0.4 CAPSULE ORAL DAILY
Qty: 90 CAPSULE | Refills: 0 | Status: SHIPPED | OUTPATIENT
Start: 2022-01-01 | End: 2023-01-01

## 2022-01-01 RX ORDER — GLIMEPIRIDE 1 MG/1
TABLET ORAL
Qty: 270 TABLET | Refills: 3 | Status: SHIPPED | OUTPATIENT
Start: 2022-01-01 | End: 2023-01-01

## 2022-01-01 RX ORDER — COLCHICINE 0.6 MG/1
0.6 TABLET ORAL DAILY
Qty: 90 TABLET | Refills: 0 | Status: SHIPPED | OUTPATIENT
Start: 2022-01-01 | End: 2023-01-01

## 2022-01-01 RX ORDER — SPIRONOLACTONE 25 MG/1
25 TABLET ORAL DAILY
Qty: 100 TABLET | Refills: 1 | Status: ON HOLD | OUTPATIENT
Start: 2022-01-01 | End: 2023-01-01

## 2022-01-01 RX ORDER — FINASTERIDE 5 MG/1
1 TABLET, FILM COATED ORAL DAILY
Qty: 30 TABLET | Refills: 0 | Status: SHIPPED | OUTPATIENT
Start: 2022-01-01 | End: 2022-01-01

## 2022-01-01 RX ORDER — ISOSORBIDE MONONITRATE 30 MG/1
30 TABLET, EXTENDED RELEASE ORAL DAILY
Qty: 90 TABLET | Refills: 3 | Status: ON HOLD | OUTPATIENT
Start: 2022-01-01 | End: 2023-01-01

## 2022-01-01 RX ORDER — FOLIC ACID 1 MG/1
1 TABLET ORAL DAILY
Qty: 90 TABLET | Refills: 3 | Status: SHIPPED | OUTPATIENT
Start: 2022-01-01

## 2022-01-01 RX ORDER — ISOSORBIDE MONONITRATE 30 MG/1
TABLET, EXTENDED RELEASE ORAL
Qty: 100 TABLET | Refills: 2 | OUTPATIENT
Start: 2022-01-01

## 2022-01-01 RX ORDER — GABAPENTIN 300 MG/1
CAPSULE ORAL
Qty: 150 CAPSULE | Refills: 4 | OUTPATIENT
Start: 2022-01-01

## 2022-01-01 RX ORDER — FLUOCINOLONE ACETONIDE 0.1 MG/ML
SOLUTION TOPICAL
Qty: 60 ML | Refills: 4 | Status: SHIPPED | OUTPATIENT
Start: 2022-01-01 | End: 2023-01-01

## 2022-01-01 ASSESSMENT — PAIN SCALES - GENERAL
PAINLEVEL: NO PAIN (0)
PAINLEVEL: NO PAIN (0)

## 2022-01-03 ENCOUNTER — HOSPITAL ENCOUNTER (OUTPATIENT)
Dept: CT IMAGING | Facility: CLINIC | Age: 82
Discharge: HOME | End: 2022-01-03
Attending: PHYSICIAN ASSISTANT | Admitting: PHYSICIAN ASSISTANT
Payer: COMMERCIAL

## 2022-01-03 DIAGNOSIS — K59.00 CONSTIPATION: ICD-10-CM

## 2022-01-03 DIAGNOSIS — R63.4 WEIGHT LOSS: ICD-10-CM

## 2022-01-03 LAB
CREAT BLD-MCNC: 1.2 MG/DL (ref 0.7–1.3)
GFR SERPL CREATININE-BSD FRML MDRD: 60 ML/MIN/1.73M2

## 2022-01-03 PROCEDURE — 74177 CT ABD & PELVIS W/CONTRAST: CPT

## 2022-01-03 PROCEDURE — 250N000011 HC RX IP 250 OP 636: Performed by: PHYSICIAN ASSISTANT

## 2022-01-03 PROCEDURE — 82565 ASSAY OF CREATININE: CPT

## 2022-01-03 PROCEDURE — 250N000009 HC RX 250: Performed by: PHYSICIAN ASSISTANT

## 2022-01-03 RX ORDER — IOPAMIDOL 755 MG/ML
106 INJECTION, SOLUTION INTRAVASCULAR ONCE
Status: COMPLETED | OUTPATIENT
Start: 2022-01-03 | End: 2022-01-03

## 2022-01-03 RX ADMIN — SODIUM CHLORIDE 70 ML: 9 INJECTION, SOLUTION INTRAVENOUS at 12:51

## 2022-01-03 RX ADMIN — IOPAMIDOL 106 ML: 755 INJECTION, SOLUTION INTRAVENOUS at 12:51

## 2022-01-10 ENCOUNTER — MYC MEDICAL ADVICE (OUTPATIENT)
Dept: INTERNAL MEDICINE | Facility: CLINIC | Age: 82
End: 2022-01-10
Payer: COMMERCIAL

## 2022-01-10 DIAGNOSIS — N18.30 CHRONIC KIDNEY DISEASE, STAGE 3 (H): Primary | ICD-10-CM

## 2022-01-10 DIAGNOSIS — N28.9 RENAL INSUFFICIENCY SYNDROME: ICD-10-CM

## 2022-01-10 DIAGNOSIS — F41.9 ANXIETY: ICD-10-CM

## 2022-01-10 DIAGNOSIS — E11.9 TYPE 2 DIABETES MELLITUS WITHOUT COMPLICATION, WITHOUT LONG-TERM CURRENT USE OF INSULIN (H): ICD-10-CM

## 2022-01-10 DIAGNOSIS — E11.40 TYPE 2 DIABETES MELLITUS WITH DIABETIC NEUROPATHY (H): ICD-10-CM

## 2022-01-10 DIAGNOSIS — R73.9 HYPERGLYCEMIA: ICD-10-CM

## 2022-01-10 RX ORDER — FLUOXETINE 10 MG/1
10 CAPSULE ORAL DAILY
Qty: 90 CAPSULE | Refills: 3 | Status: CANCELLED | OUTPATIENT
Start: 2022-01-10

## 2022-01-13 RX ORDER — CLOPIDOGREL BISULFATE 75 MG/1
75 TABLET ORAL DAILY
Qty: 90 TABLET | Refills: 0 | Status: SHIPPED | OUTPATIENT
Start: 2022-01-13 | End: 2022-01-29

## 2022-01-13 RX ORDER — ATORVASTATIN CALCIUM 40 MG/1
40 TABLET, FILM COATED ORAL DAILY
Qty: 90 TABLET | Refills: 1 | Status: SHIPPED | OUTPATIENT
Start: 2022-01-13 | End: 2022-03-29

## 2022-01-13 RX ORDER — ALLOPURINOL 300 MG/1
300 TABLET ORAL DAILY
Qty: 90 TABLET | Refills: 0 | Status: SHIPPED | OUTPATIENT
Start: 2022-01-13 | End: 2022-01-29

## 2022-01-13 RX ORDER — SPIRONOLACTONE 25 MG/1
25 TABLET ORAL DAILY
Qty: 90 TABLET | Refills: 0 | OUTPATIENT
Start: 2022-01-13

## 2022-01-13 RX ORDER — ISOSORBIDE MONONITRATE 30 MG/1
30 TABLET, EXTENDED RELEASE ORAL DAILY
Qty: 90 TABLET | Refills: 3 | Status: SHIPPED | OUTPATIENT
Start: 2022-01-13 | End: 2022-01-01

## 2022-01-13 RX ORDER — TAMSULOSIN HYDROCHLORIDE 0.4 MG/1
0.4 CAPSULE ORAL DAILY
Qty: 90 CAPSULE | Refills: 0 | Status: SHIPPED | OUTPATIENT
Start: 2022-01-13 | End: 2022-01-31

## 2022-01-13 RX ORDER — TRAZODONE HYDROCHLORIDE 150 MG/1
150 TABLET ORAL AT BEDTIME
Qty: 90 TABLET | Refills: 2 | Status: SHIPPED | OUTPATIENT
Start: 2022-01-13 | End: 2022-01-01

## 2022-01-13 RX ORDER — COLCHICINE 0.6 MG/1
0.6 TABLET ORAL DAILY
Qty: 90 TABLET | Refills: 0 | Status: SHIPPED | OUTPATIENT
Start: 2022-01-13 | End: 2022-01-29

## 2022-01-13 RX ORDER — FLUOXETINE 10 MG/1
10 CAPSULE ORAL DAILY
Qty: 90 CAPSULE | Refills: 3 | OUTPATIENT
Start: 2022-01-13

## 2022-01-13 NOTE — TELEPHONE ENCOUNTER
clopidogrel (PLAVIX) 75 MG tablet      spironolactone (ALDACTONE) 25 MG tablet  isosorbide mononitrate (IMDUR) 30 MG 24 hr tablet  traZODone (DESYREL) 150 MG tablet  Last Written Prescription Date:  historical    Last Office Visit : 12-22-21  Future Office visit:  1-24-22    Routing refill request to provider for review/approval because:  Medication is reported/historical    tamsulosin (FLOMAX) 0.4 MG capsule      Last Written Prescription Date:  Not on med list    Routing refill request to provider for review/approval because:  Drug not active on patient's medication list    allopurinol (ZYLOPRIM) 300 MG tablet      colchicine (COLCYRS) 0.6 MG tablet  Last Written Prescription Date:  HISTORICAL    Routing refill request to provider for review/approval because:  Medication is reported/historical  OVERDUE LAB: URIC ACID-- FYI TO CLINIC    REFILL DENIED:DUPLICATE, PHARM CALLED  FLUoxetine (PROZAC) 10 MG capsule  last rx: 12-22-21 for 90 tab w/3 RF @ [facility], [id number]. Macomber    metFORMIN (GLUCOPHAGE) 1000 MG tablet  Last Rx: 9-27-21 for 180 tab w/3 RF @ [facility], [phone]. Chow      ENDO Refill- Dr. Campa  atorvastatin (LIPITOR) 40 MG tablet      Last Written Prescription Date:  7-10-20  Last Fill Quantity: 90,   # refills: 4  Last Office Visit : 9-27-21  Future Office visit:  5-20-21  Outside lab 12-6-21 LDL    Overridden by Trudy Campa MD on Jul 10, 2020 11:27 AM   Drug-Drug   1. COLCHICINE / STATINS [Level: Major] [Reason: Benefit outweighs risk]   Other Orders: COLCHICINE PO

## 2022-01-22 NOTE — PROGRESS NOTES
HPI:    Last visit with me 12/22/2021. His son is present today. He still has some chronic urinary issues. He on Flomax. No dysuria or hematuria. He has chronic weakness and deconditioning. He has a hard time getting up from a chair. He denies any rest/exertional CPSOB. He states he ran out of Lasix last week. He remains on his other medications. No other HEENT, cardiopulmonary, abdominal, , neurological, systemic, psychiatric, lymphatic, endocrine complaints.     Past Medical History:   Diagnosis Date     Diabetes mellitus (H)      Gout attack      Heart attack (H) 1984     Hypertension      Stented coronary artery     6 stents in heart     Past Surgical History:   Procedure Laterality Date     CARDIAC SURGERY  1984    4 vessel bypass     CHOLECYSTECTOMY       COLONOSCOPY       DESTRUCTION OF PARAVERTEBRAL FACET LUMBAR / SACRAL ADDITIONAL Bilateral 10/18/2021    Procedure: DESTRUCTION, NERVE, FACET JOINT, LUMBOSACRAL, ADDITIONAL NERVE AFTER DESTRUCTION OF INITIAL LUMBOSACRAL FACET JOINT NERVE;  Surgeon: Horacio Gonsalves MD;  Location: [facility] OR     DESTRUCTION OF PARAVERTEBRAL FACET LUMBAR / SACRAL SINGLE Bilateral 10/18/2021    Procedure: DESTRUCTION, NERVE, FACET JOINT, LUMBOSACRAL, 1 NERVE;  Surgeon: Horacio Gonsalves MD;  Location: [facility] OR     ESOPHAGOSCOPY, GASTROSCOPY, DUODENOSCOPY (EGD), COMBINED N/A 3/1/2018    Procedure: COMBINED ESOPHAGOSCOPY, GASTROSCOPY, DUODENOSCOPY (EGD), BIOPSY SINGLE OR MULTIPLE;  ESOPHAGOGASTRODUODENOSCOPY ;  Surgeon: Daryn Medrano MD;  [facility]     INJECT BLOCK MEDIAL BRANCH CERVICAL/THORACIC/LUMBAR Bilateral 8/16/2021    Procedure: BLOCK, NERVE, FACET JOINT, MEDIAL BRANCH, DIAGNOSTIC Lumbar 2-3-4;  Surgeon: Horacio Gonsalves MD;  Location: [facility] OR     INJECT BLOCK MEDIAL BRANCH CERVICAL/THORACIC/LUMBAR Bilateral 9/27/2021    Procedure: BLOCK, NERVE, FACET JOINT, MEDIAL BRANCH, DIAGNOSTIC  L2-4, Bilateral;  Surgeon: Horacio Gonsalves MD;   Location: [facility] OR     NO HISTORY OF SURGERY  3/31/14    derm     PHACOEMULSIFICATION WITH STANDARD INTRAOCULAR LENS IMPLANT  1/21/2013    Procedure: PHACOEMULSIFICATION WITH STANDARD INTRAOCULAR LENS IMPLANT;  Phacoemulsification with standard intraocular lens implant, right eye;  Surgeon: Jay Rodriges MD;  Location: [facility]     PE:    Vitals noted, gen, nad, cooperative, alert, he is sitting comfortably in a wheel chair, neck supple nl rom, lungs with good air movement, RRR, S1, S2, no MRG, abdomen, no acute findings. He can move all his extremities.     A/P:    1. Immunizations; Moderna COVID vaccine x 2. Check with insurance regarding Shingrix. Tdap 12/8/2021. Pneumococcal 23 done 5/23/2009. Prevnar 13 11/9/2015  2. Psychiatry 1/26/2022. On Fluoxetine for anxiety and refilled today.   3. Podiatry with Dr. Sharp 1/25/2022  4. DM2 follow up with Dr. Campa Endocrine. On Jardiance, Glimepiride. And Metformin. A1c 6.5% on 9/12/2021  5. Dermatology appt. With Dr. Rivera 3/22/2022   6. Gout on Allopurinol and colchicine  7. Increased lipids on Atorvastatin; Lipids 12/6/2021  and HDL 58  8. Plavix, Imdur for CAD. He has follow up [facility], Dr. Ordoñez on 1/31/2022.   9. HTN; Irbesartan; ordered labs today 1/24/2022.   10. Trazodone for sleep   11. Folmax for BPH and placed Urology referral today.   12. Mild elevated Cr 1.38 on 12/6/2021  13. Mobility/deconditioning. Placed PMR referral today.     I will see him back 2/14/2022 same time as appt. That his wife has.     40 minutes spent on the date of the encounter doing chart review, history and exam, documentation and further activities as noted above

## 2022-01-24 ENCOUNTER — LAB (OUTPATIENT)
Dept: LAB | Facility: CLINIC | Age: 82
End: 2022-01-24
Payer: COMMERCIAL

## 2022-01-24 ENCOUNTER — OFFICE VISIT (OUTPATIENT)
Dept: INTERNAL MEDICINE | Facility: CLINIC | Age: 82
End: 2022-01-24
Payer: COMMERCIAL

## 2022-01-24 VITALS
RESPIRATION RATE: 16 BRPM | DIASTOLIC BLOOD PRESSURE: 64 MMHG | OXYGEN SATURATION: 98 % | SYSTOLIC BLOOD PRESSURE: 99 MMHG | HEART RATE: 102 BPM

## 2022-01-24 DIAGNOSIS — F41.9 ANXIETY: ICD-10-CM

## 2022-01-24 DIAGNOSIS — I10 BENIGN ESSENTIAL HYPERTENSION: ICD-10-CM

## 2022-01-24 DIAGNOSIS — N40.0 BENIGN PROSTATIC HYPERPLASIA WITHOUT LOWER URINARY TRACT SYMPTOMS: ICD-10-CM

## 2022-01-24 DIAGNOSIS — Z74.09 MOBILITY IMPAIRED: ICD-10-CM

## 2022-01-24 DIAGNOSIS — I10 BENIGN ESSENTIAL HYPERTENSION: Primary | ICD-10-CM

## 2022-01-24 DIAGNOSIS — E11.9 TYPE 2 DIABETES MELLITUS WITHOUT COMPLICATION, WITHOUT LONG-TERM CURRENT USE OF INSULIN (H): ICD-10-CM

## 2022-01-24 LAB
ALBUMIN SERPL-MCNC: 3.4 G/DL (ref 3.4–5)
ALP SERPL-CCNC: 62 U/L (ref 40–150)
ALT SERPL W P-5'-P-CCNC: 26 U/L (ref 0–70)
ANION GAP SERPL CALCULATED.3IONS-SCNC: 11 MMOL/L (ref 3–14)
AST SERPL W P-5'-P-CCNC: 17 U/L (ref 0–45)
BASOPHILS # BLD AUTO: 0.1 10E3/UL (ref 0–0.2)
BASOPHILS NFR BLD AUTO: 1 %
BILIRUB SERPL-MCNC: 0.7 MG/DL (ref 0.2–1.3)
BUN SERPL-MCNC: 14 MG/DL (ref 7–30)
CALCIUM SERPL-MCNC: 9.3 MG/DL (ref 8.5–10.1)
CHLORIDE BLD-SCNC: 107 MMOL/L (ref 94–109)
CO2 SERPL-SCNC: 25 MMOL/L (ref 20–32)
CREAT SERPL-MCNC: 1.24 MG/DL (ref 0.66–1.25)
EOSINOPHIL # BLD AUTO: 0.7 10E3/UL (ref 0–0.7)
EOSINOPHIL NFR BLD AUTO: 7 %
ERYTHROCYTE [DISTWIDTH] IN BLOOD BY AUTOMATED COUNT: 13.3 % (ref 10–15)
GFR SERPL CREATININE-BSD FRML MDRD: 58 ML/MIN/1.73M2
GLUCOSE BLD-MCNC: 92 MG/DL (ref 70–99)
HCT VFR BLD AUTO: 38.2 % (ref 40–53)
HGB BLD-MCNC: 13 G/DL (ref 13.3–17.7)
IMM GRANULOCYTES # BLD: 0.1 10E3/UL
IMM GRANULOCYTES NFR BLD: 1 %
LYMPHOCYTES # BLD AUTO: 1.4 10E3/UL (ref 0.8–5.3)
LYMPHOCYTES NFR BLD AUTO: 14 %
MCH RBC QN AUTO: 31.2 PG (ref 26.5–33)
MCHC RBC AUTO-ENTMCNC: 34 G/DL (ref 31.5–36.5)
MCV RBC AUTO: 92 FL (ref 78–100)
MONOCYTES # BLD AUTO: 0.8 10E3/UL (ref 0–1.3)
MONOCYTES NFR BLD AUTO: 7 %
NEUTROPHILS # BLD AUTO: 7.2 10E3/UL (ref 1.6–8.3)
NEUTROPHILS NFR BLD AUTO: 70 %
NRBC # BLD AUTO: 0 10E3/UL
NRBC BLD AUTO-RTO: 0 /100
PLATELET # BLD AUTO: 185 10E3/UL (ref 150–450)
POTASSIUM BLD-SCNC: 4.4 MMOL/L (ref 3.4–5.3)
PROT SERPL-MCNC: 6.7 G/DL (ref 6.8–8.8)
RBC # BLD AUTO: 4.17 10E6/UL (ref 4.4–5.9)
SODIUM SERPL-SCNC: 143 MMOL/L (ref 133–144)
WBC # BLD AUTO: 10.2 10E3/UL (ref 4–11)

## 2022-01-24 PROCEDURE — 85025 COMPLETE CBC W/AUTO DIFF WBC: CPT | Performed by: PATHOLOGY

## 2022-01-24 PROCEDURE — 99215 OFFICE O/P EST HI 40 MIN: CPT | Performed by: INTERNAL MEDICINE

## 2022-01-24 PROCEDURE — 80053 COMPREHEN METABOLIC PANEL: CPT | Performed by: PATHOLOGY

## 2022-01-24 PROCEDURE — 36415 COLL VENOUS BLD VENIPUNCTURE: CPT | Performed by: PATHOLOGY

## 2022-01-24 RX ORDER — FUROSEMIDE 40 MG
40 TABLET ORAL DAILY
Qty: 90 TABLET | Refills: 3 | Status: SHIPPED | OUTPATIENT
Start: 2022-01-24 | End: 2022-04-18

## 2022-01-24 RX ORDER — FLUOXETINE 10 MG/1
10 CAPSULE ORAL DAILY
Qty: 90 CAPSULE | Refills: 3 | Status: SHIPPED | OUTPATIENT
Start: 2022-01-24 | End: 2022-05-16

## 2022-01-24 NOTE — NURSING NOTE
Deandre Moore is a 81 year old male patient that presents today in clinic for the following:    Chief Complaint   Patient presents with     RECHECK     Four week follow-up     Cardiology Problem     pulse range:      Hypotension     The patient's allergies and medications were reviewed as noted. A set of vitals were recorded as noted without incident: BP 99/64 (BP Location: Right arm, Patient Position: Sitting, Cuff Size: Adult Regular)   Pulse 102   Resp 16   SpO2 98% . The patient was asked if they had any of the following symptoms in the last forty-eight hours: (1) fever or chills, (2) cough, (3) shortness of breath or difficulty breathing, (4) fatigue, (5) muscle or body aches, (6) headache, (7) new loss of taste or smell, (8) sore throat, (9) congestion or runny nose, (10) nausea or vomiting, and (11) diarrhea. Deandre Moore denies having any of the following symptoms in the last forty-eight hours: (1) fever or chills, (2) cough, (3) shortness of breath or difficulty breathing, (4) fatigue, (5) muscle or body aches, (6) headache, (7) new loss of taste or smell, (8) sore throat, (9) congestion or runny nose, (10) nausea or vomiting, and (11) diarrhea. The patient does not have any other questions for the provider.    Ashu Guzman, EMT at 9:43 AM on 1/24/2022

## 2022-01-25 ENCOUNTER — OFFICE VISIT (OUTPATIENT)
Dept: ORTHOPEDICS | Facility: CLINIC | Age: 82
End: 2022-01-25
Attending: INTERNAL MEDICINE
Payer: COMMERCIAL

## 2022-01-25 DIAGNOSIS — M72.2 PLANTAR FASCIITIS: ICD-10-CM

## 2022-01-25 DIAGNOSIS — M79.605 PAIN OF LEFT LOWER EXTREMITY: ICD-10-CM

## 2022-01-25 DIAGNOSIS — E11.49 TYPE II OR UNSPECIFIED TYPE DIABETES MELLITUS WITH NEUROLOGICAL MANIFESTATIONS, NOT STATED AS UNCONTROLLED(250.60) (H): Primary | ICD-10-CM

## 2022-01-25 DIAGNOSIS — E11.42 DIABETIC POLYNEUROPATHY ASSOCIATED WITH TYPE 2 DIABETES MELLITUS (H): ICD-10-CM

## 2022-01-25 PROCEDURE — 99203 OFFICE O/P NEW LOW 30 MIN: CPT | Performed by: PODIATRIST

## 2022-01-25 NOTE — LETTER
1/25/2022         RE: Deandre Moore  [address]  [facility] [address]        Dear Colleague,    Thank you for referring your patient, Deandre Moore, to the [facility] [place]. Please see a copy of my visit note below.    Date of Service: 1/25/2022    Chief Complaint   Patient presents with     Consult     Pain, bilateral lower extremities. Diabetic. Has been seen at [facility]. Referred by: Dr. Marquez.           HPI: Deandre is a 81 year old male who presents today for further evaluation of left foot pain.  Nature: sharp/dull    Location: Left plantar foot    Duration: Years    Onset: gradual. No trauma    Course: waxes and wanes.     Aggravating/alleviating factors: walking and weight bearing aggravate. Rest alleviates.  No post static dyskinesia.    Previous Treatments: Change in shoes      Review of Systems: No nausea, vomiting, diarrhea, fever, chills, night sweats, shortness of breath, chest pain.    PMH:   Past Medical History:   Diagnosis Date     Diabetes mellitus (H)      Gout attack      Heart attack (H) 1984     Hypertension      Stented coronary artery     6 stents in heart       PSxH:   Past Surgical History:   Procedure Laterality Date     CARDIAC SURGERY  1984    4 vessel bypass     CHOLECYSTECTOMY       COLONOSCOPY       DESTRUCTION OF PARAVERTEBRAL FACET LUMBAR / SACRAL ADDITIONAL Bilateral 10/18/2021    Procedure: DESTRUCTION, NERVE, FACET JOINT, LUMBOSACRAL, ADDITIONAL NERVE AFTER DESTRUCTION OF INITIAL LUMBOSACRAL FACET JOINT NERVE;  Surgeon: Horacio Gonsalves MD;  Location: [facility] OR     DESTRUCTION OF PARAVERTEBRAL FACET LUMBAR / SACRAL SINGLE Bilateral 10/18/2021    Procedure: DESTRUCTION, NERVE, FACET JOINT, LUMBOSACRAL, 1 NERVE;  Surgeon: Horacio Gonsalves MD;  Location: [facility] OR     ESOPHAGOSCOPY, GASTROSCOPY, DUODENOSCOPY (EGD), COMBINED N/A 3/1/2018    Procedure: COMBINED ESOPHAGOSCOPY, GASTROSCOPY, DUODENOSCOPY (EGD), BIOPSY  SINGLE OR MULTIPLE;  ESOPHAGOGASTRODUODENOSCOPY ;  Surgeon: Daryn Medrano MD;  Location: [facility]     INJECT BLOCK MEDIAL BRANCH CERVICAL/THORACIC/LUMBAR Bilateral 2021    Procedure: BLOCK, NERVE, FACET JOINT, MEDIAL BRANCH, DIAGNOSTIC Lumbar 2-3-4;  Surgeon: Horacio Gonsalves MD;  Location: [facility] OR     INJECT BLOCK MEDIAL BRANCH CERVICAL/THORACIC/LUMBAR Bilateral 2021    Procedure: BLOCK, NERVE, FACET JOINT, MEDIAL BRANCH, DIAGNOSTIC  L2-4, Bilateral;  Surgeon: Horacio Gonsalves MD;  Location: [facility] OR     NO HISTORY OF SURGERY  3/31/14    derm     PHACOEMULSIFICATION WITH STANDARD INTRAOCULAR LENS IMPLANT  2013    Procedure: PHACOEMULSIFICATION WITH STANDARD INTRAOCULAR LENS IMPLANT;  Phacoemulsification with standard intraocular lens implant, right eye;  Surgeon: Jay Rodriges MD;  Location: [facility]       Allergies: Beta adrenergic blockers, Latex, Latex, and Tape [adhesive tape]    SH:   Social History     Socioeconomic History     Marital status:      Spouse name: Not on file     Number of children: Not on file     Years of education: Not on file     Highest education level: Not on file   Occupational History     Not on file   Tobacco Use     Smoking status: Former Smoker     Quit date: 10/15/1984     Years since quittin.3     Smokeless tobacco: Never Used   Substance and Sexual Activity     Alcohol use: Yes     Comment: occasional-once a year     Drug use: No     Sexual activity: Not on file   Other Topics Concern     Parent/sibling w/ CABG, MI or angioplasty before 65F 55M? Not Asked   Social History Narrative     Not on file     Social Determinants of Health     Financial Resource Strain: Not on file   Food Insecurity: Not on file   Transportation Needs: Not on file   Physical Activity: Not on file   Stress: Not on file   Social Connections: Not on file   Intimate Partner Violence: Not on file   Housing Stability: Not on file       FH:   Family History    Problem Relation Age of Onset     Cancer No family hx of         no skin cancer     Skin Cancer No family hx of        Objective:  Hemoglobin A1C POCT   Date Value Ref Range Status   09/21/2021 6.5 4.3 - <5.7 % Final   05/18/2021 6.0 4.3 - 6.0 % Final   01/03/2020 5.9 4.3 - 6.0 % Final   06/04/2018 5.5 % Final   06/19/2017 5.9 % Final   06/07/2016 5.4 4.3 - 6.0 % Final       PT and DP pulses are 1/4 bilaterally. CRT is 1 second.  Diminished pedal hair.   Gross sensation is severely diminished bilaterally.  Protective sensation is severely diminished bilaterally as demonstrated with 5.07G monofilament.  He feels 1 point on each foot.  Equinus is noted pes planus with flexor stabilizing hammertoes noted to bilateral feet.  Bilaterally. No pain with active or passive ROM of the ankle, MTJ, 1st ray, or halluces bilaterally,.  No pain noted with palpation of the attachment of the plantar fascia on the calcanei bilaterally.  He does have some pain with palpation of the plantar mid arches.  Edema noted to bilateral legs with left worse than right.  Fat pad atrophy of bilateral heels.  Nails normal bilaterally. No open lesions are noted.     No x-rays indicated during today's visit  Previous films were reviewed today, independent visualization of images was performed, and results were discussed with the patient      Assessment: Deandre is an 81-year-old with pain in primarily the left foot.  He relates that his leg has been swollen since he had a vein procedure done years ago.  He relates that he does not have post static dyskinesia.  He does have a spur on his heel, however he does not have pain in this area with palpation.  He is diabetic and has been so for about 10 years.  He does have quite severe neuropathy.  He also does have some radiculopathy.  I discussed with him that I am not sure that his pain has a mechanical etiology.  We could take a closer look at the left foot with an MRI.  We can see if his plantar  fascia is intact or if it has inflammation.  If it does, we could treat this like plantar fasciitis.  If not, I recommend we do an EMG.  I discussed this with him and his family.  In with the room he has his daughter in law and wife.    Plan:  - Pt seen and evaluated   -Previous x-rays were reviewed.  -Recommend we get an MRI of the area.  He does agree to this.  This was ordered.  -He can start using heel cups to the area.  -We will see him again after his MRI.  He may need an EMG.        Mark Sharp, [place]

## 2022-01-25 NOTE — PROGRESS NOTES
Date of Service: 1/25/2022    Chief Complaint   Patient presents with     Consult     Pain, bilateral lower extremities. Diabetic. Has been seen at [facility]. Referred by: Dr. Marquez.           HPI: Deandre is a 81 year old male who presents today for further evaluation of left foot pain.  Nature: sharp/dull    Location: Left plantar foot    Duration: Years    Onset: gradual. No trauma    Course: waxes and wanes.     Aggravating/alleviating factors: walking and weight bearing aggravate. Rest alleviates.  No post static dyskinesia.    Previous Treatments: Change in shoes      Review of Systems: No nausea, vomiting, diarrhea, fever, chills, night sweats, shortness of breath, chest pain.    PMH:   Past Medical History:   Diagnosis Date     Diabetes mellitus (H)      Gout attack      Heart attack (H) 1984     Hypertension      Stented coronary artery     6 stents in heart       PSxH:   Past Surgical History:   Procedure Laterality Date     CARDIAC SURGERY  1984    4 vessel bypass     CHOLECYSTECTOMY       COLONOSCOPY       DESTRUCTION OF PARAVERTEBRAL FACET LUMBAR / SACRAL ADDITIONAL Bilateral 10/18/2021    Procedure: DESTRUCTION, NERVE, FACET JOINT, LUMBOSACRAL, ADDITIONAL NERVE AFTER DESTRUCTION OF INITIAL LUMBOSACRAL FACET JOINT NERVE;  Surgeon: Horacio Gonsalves MD;  Location: [facility] OR     DESTRUCTION OF PARAVERTEBRAL FACET LUMBAR / SACRAL SINGLE Bilateral 10/18/2021    Procedure: DESTRUCTION, NERVE, FACET JOINT, LUMBOSACRAL, 1 NERVE;  Surgeon: Horacio Gonsalves MD;  Location: [facility] OR     ESOPHAGOSCOPY, GASTROSCOPY, DUODENOSCOPY (EGD), COMBINED N/A 3/1/2018    Procedure: COMBINED ESOPHAGOSCOPY, GASTROSCOPY, DUODENOSCOPY (EGD), BIOPSY SINGLE OR MULTIPLE;  ESOPHAGOGASTRODUODENOSCOPY ;  Surgeon: Daryn Medrano MD;  Location: [facility]     INJECT BLOCK MEDIAL BRANCH CERVICAL/THORACIC/LUMBAR Bilateral 8/16/2021    Procedure: BLOCK, NERVE, FACET JOINT, MEDIAL BRANCH, DIAGNOSTIC Lumbar 2-3-4;  Surgeon:  Horacio Gonsalves MD;  Location: [facility] OR     INJECT BLOCK MEDIAL BRANCH CERVICAL/THORACIC/LUMBAR Bilateral 2021    Procedure: BLOCK, NERVE, FACET JOINT, MEDIAL BRANCH, DIAGNOSTIC  L2-4, Bilateral;  Surgeon: Horacio Gonsalves MD;  Location: [facility] OR     NO HISTORY OF SURGERY  3/31/14    derm     PHACOEMULSIFICATION WITH STANDARD INTRAOCULAR LENS IMPLANT  2013    Procedure: PHACOEMULSIFICATION WITH STANDARD INTRAOCULAR LENS IMPLANT;  Phacoemulsification with standard intraocular lens implant, right eye;  Surgeon: Jay Rodriges MD;  Location: [facility]       Allergies: Beta adrenergic blockers, Latex, Latex, and Tape [adhesive tape]    SH:   Social History     Socioeconomic History     Marital status:      Spouse name: Not on file     Number of children: Not on file     Years of education: Not on file     Highest education level: Not on file   Occupational History     Not on file   Tobacco Use     Smoking status: Former Smoker     Quit date: 10/15/1984     Years since quittin.3     Smokeless tobacco: Never Used   Substance and Sexual Activity     Alcohol use: Yes     Comment: occasional-once a year     Drug use: No     Sexual activity: Not on file   Other Topics Concern     Parent/sibling w/ CABG, MI or angioplasty before 65F 55M? Not Asked   Social History Narrative     Not on file     Social Determinants of Health     Financial Resource Strain: Not on file   Food Insecurity: Not on file   Transportation Needs: Not on file   Physical Activity: Not on file   Stress: Not on file   Social Connections: Not on file   Intimate Partner Violence: Not on file   Housing Stability: Not on file       FH:   Family History   Problem Relation Age of Onset     Cancer No family hx of         no skin cancer     Skin Cancer No family hx of        Objective:  Hemoglobin A1C POCT   Date Value Ref Range Status   2021 6.5 4.3 - <5.7 % Final   2021 6.0 4.3 - 6.0 % Final   2020 5.9 4.3 -  6.0 % Final   06/04/2018 5.5 % Final   06/19/2017 5.9 % Final   06/07/2016 5.4 4.3 - 6.0 % Final       PT and DP pulses are 1/4 bilaterally. CRT is 1 second.  Diminished pedal hair.   Gross sensation is severely diminished bilaterally.  Protective sensation is severely diminished bilaterally as demonstrated with 5.07G monofilament.  He feels 1 point on each foot.  Equinus is noted pes planus with flexor stabilizing hammertoes noted to bilateral feet.  Bilaterally. No pain with active or passive ROM of the ankle, MTJ, 1st ray, or halluces bilaterally,.  No pain noted with palpation of the attachment of the plantar fascia on the calcanei bilaterally.  He does have some pain with palpation of the plantar mid arches.  Edema noted to bilateral legs with left worse than right.  Fat pad atrophy of bilateral heels.  Nails normal bilaterally. No open lesions are noted.     No x-rays indicated during today's visit  Previous films were reviewed today, independent visualization of images was performed, and results were discussed with the patient      Assessment: Deandre is an 81-year-old with pain in primarily the left foot.  He relates that his leg has been swollen since he had a vein procedure done years ago.  He relates that he does not have post static dyskinesia.  He does have a spur on his heel, however he does not have pain in this area with palpation.  He is diabetic and has been so for about 10 years.  He does have quite severe neuropathy.  He also does have some radiculopathy.  I discussed with him that I am not sure that his pain has a mechanical etiology.  We could take a closer look at the left foot with an MRI.  We can see if his plantar fascia is intact or if it has inflammation.  If it does, we could treat this like plantar fasciitis.  If not, I recommend we do an EMG.  I discussed this with him and his family.  In with the room he has his daughter in law and wife.    Plan:  - Pt seen and evaluated   -Previous  x-rays were reviewed.  -Recommend we get an MRI of the area.  He does agree to this.  This was ordered.  -He can start using heel cups to the area.  -We will see him again after his MRI.  He may need an EMG.

## 2022-01-26 ENCOUNTER — TELEPHONE (OUTPATIENT)
Dept: PSYCHIATRY | Facility: CLINIC | Age: 82
End: 2022-01-26

## 2022-01-26 NOTE — TELEPHONE ENCOUNTER
Reason for call:  Other   Patient called regarding (reason for call): call back  Additional comments: Pt had appt with Dr. Smith today. 1/26/22 could not get back on My Chart   Clients son called to see if they could get reconnected. Intake was told that client was being ref to Dr. Sands.   Intake will eventually by [facility] for that DRMargarette But is not @ this time.   Pts son is asking for call backj with further expanation as to why father is being ref to another  And how he will get [facility]     Phone number to reach patient:  Other phone number:  Lucio Moore(Son) # [phone]    Best Time:  ASAP    Can we leave a detailed message on this number?  YES    Travel screening: Not Applicable

## 2022-01-26 NOTE — TELEPHONE ENCOUNTER
Update: I communicated with the VF who was assisting the patient and his son earlier in the afternoon and she has contacted Dr. Sands asking him to reach out to schedule (it sounds like his scheduling process is indeed different than mine). If anyone is in fact able to schedule with him, please let me know, but sorry for misdirecting this request!

## 2022-01-30 DIAGNOSIS — E11.9 TYPE 2 DIABETES MELLITUS WITHOUT COMPLICATION, WITHOUT LONG-TERM CURRENT USE OF INSULIN (H): ICD-10-CM

## 2022-02-02 RX ORDER — GLIMEPIRIDE 1 MG/1
TABLET ORAL
Qty: 270 TABLET | Refills: 3 | Status: SHIPPED | OUTPATIENT
Start: 2022-02-02 | End: 2022-02-18

## 2022-02-02 RX ORDER — SPIRONOLACTONE 25 MG/1
25 TABLET ORAL DAILY
Qty: 90 TABLET | OUTPATIENT
Start: 2022-02-02

## 2022-02-02 RX ORDER — IRBESARTAN 150 MG/1
150 TABLET ORAL AT BEDTIME
Qty: 90 TABLET | Refills: 1 | OUTPATIENT
Start: 2022-02-02

## 2022-02-02 NOTE — TELEPHONE ENCOUNTER
spironolactone (ALDACTONE) 25 MG tablet  Last Written Prescription Date:  ?  Last Fill Quantity: ?   # refills: ?  Last Office Visit :  9/21/2021  Future Office visit:   5/20/2022  Reported as historical     irbesartan (AVAPRO) 150 MG tablet  Last Written Prescription Date:  10/6/2020  Last Fill Quantity: 90,   # refills: 2  Last Office Visit :  9/21/2021  Future Office visit:   5/20/2022  High Drug-Drug Interaction  Irbesartan/Spironolactone    Indigo Roche RN  Central Triage Red Flags/Med Refills    glimepiride (AMARYL) 1 MG tablet  Last Written Prescription Date:  8/26/2021  Last Fill Quantity: 270,   # refills: 1  Last Office Visit :  9/21/2021  Future Office visit:   5/20/2022  270 Tabs, 3 Refills sent to pharm 2/22/2022    metFORMIN (GLUCOPHAGE) 1000 MG tablet  Last Written Prescription Date:  9/27/2021  Last Fill Quantity: 180,   # refills: 3  Last Office Visit :  9/21/2021  Future Office visit:   5/20/2022  180 Tabs, 3 Refills sent to pharm 2/22/2022

## 2022-02-05 ENCOUNTER — ANCILLARY PROCEDURE (OUTPATIENT)
Dept: MRI IMAGING | Facility: CLINIC | Age: 82
End: 2022-02-05
Attending: PODIATRIST
Payer: COMMERCIAL

## 2022-02-05 DIAGNOSIS — E11.42 DIABETIC POLYNEUROPATHY ASSOCIATED WITH TYPE 2 DIABETES MELLITUS (H): ICD-10-CM

## 2022-02-05 DIAGNOSIS — E11.49 TYPE II OR UNSPECIFIED TYPE DIABETES MELLITUS WITH NEUROLOGICAL MANIFESTATIONS, NOT STATED AS UNCONTROLLED(250.60) (H): ICD-10-CM

## 2022-02-05 DIAGNOSIS — M72.2 PLANTAR FASCIITIS: ICD-10-CM

## 2022-02-05 PROCEDURE — 73718 MRI LOWER EXTREMITY W/O DYE: CPT | Mod: LT | Performed by: RADIOLOGY

## 2022-02-05 NOTE — TELEPHONE ENCOUNTER
Dear Deisy;    Please give pts son a call to see if he needs these refilled    Thanks, RONNIE Marquez

## 2022-02-07 DIAGNOSIS — E11.9 TYPE 2 DIABETES MELLITUS WITHOUT COMPLICATION, WITHOUT LONG-TERM CURRENT USE OF INSULIN (H): Primary | ICD-10-CM

## 2022-02-07 NOTE — TELEPHONE ENCOUNTER
[facility] Call Center    Phone Message    May a detailed message be left on voicemail: yes     Reason for Call: Medication Refill Request    Has the patient contacted the pharmacy for the refill? Yes   Name of medication being requested: Spironolactone 25 MG, Irbesartan 150 MG  Provider who prescribed the medication: Dr. Marquez  Pharmacy: [facility]  Date medication is needed:  As soon as possible.    Reference number for [facility] is [id number]      Action Taken: Message routed to:  [facility]    Travel Screening: Not Applicable

## 2022-02-07 NOTE — TELEPHONE ENCOUNTER
irbesartan (AVAPRO) 150 MG tablet  Last Written Prescription Date:  10/6/2020  Last Fill Quantity: 90,   # refills: 2  Last Office Visit : 1/24/2022  Future Office visit:   2/14/2022  Gaps in refills,      Refer to clinic for review and refills per Providers orders.    spironolactone (ALDACTONE) 25 MG tablet  Last Written Prescription Date:  10/6/2020  Last Fill Quantity: 90,   # refills: 2  Last Office Visit : 1/24/2022  Future Office visit:   2/14/2022  Reported as historical    Indigo Roche RN  Central Triage Red Flags/Med Refills

## 2022-02-08 ENCOUNTER — OFFICE VISIT (OUTPATIENT)
Dept: PHYSICAL MEDICINE AND REHAB | Facility: CLINIC | Age: 82
End: 2022-02-08
Payer: COMMERCIAL

## 2022-02-08 ENCOUNTER — OFFICE VISIT (OUTPATIENT)
Dept: ORTHOPEDICS | Facility: CLINIC | Age: 82
End: 2022-02-08
Payer: COMMERCIAL

## 2022-02-08 VITALS
SYSTOLIC BLOOD PRESSURE: 116 MMHG | HEART RATE: 75 BPM | OXYGEN SATURATION: 93 % | RESPIRATION RATE: 16 BRPM | DIASTOLIC BLOOD PRESSURE: 71 MMHG

## 2022-02-08 DIAGNOSIS — E11.42 DIABETIC POLYNEUROPATHY ASSOCIATED WITH TYPE 2 DIABETES MELLITUS (H): ICD-10-CM

## 2022-02-08 DIAGNOSIS — G89.29 OTHER CHRONIC PAIN: ICD-10-CM

## 2022-02-08 DIAGNOSIS — M47.817 LUMBOSACRAL SPONDYLOSIS WITHOUT MYELOPATHY: Primary | ICD-10-CM

## 2022-02-08 DIAGNOSIS — E11.49 TYPE II OR UNSPECIFIED TYPE DIABETES MELLITUS WITH NEUROLOGICAL MANIFESTATIONS, NOT STATED AS UNCONTROLLED(250.60) (H): Primary | ICD-10-CM

## 2022-02-08 DIAGNOSIS — R60.0 LOWER EXTREMITY EDEMA: ICD-10-CM

## 2022-02-08 DIAGNOSIS — L84 TYLOMA: ICD-10-CM

## 2022-02-08 DIAGNOSIS — R26.9 ABNORMAL GAIT: ICD-10-CM

## 2022-02-08 PROCEDURE — 99215 OFFICE O/P EST HI 40 MIN: CPT | Performed by: PHYSICAL MEDICINE & REHABILITATION

## 2022-02-08 PROCEDURE — 99213 OFFICE O/P EST LOW 20 MIN: CPT | Performed by: PODIATRIST

## 2022-02-08 ASSESSMENT — PAIN SCALES - GENERAL: PAINLEVEL: MODERATE PAIN (5)

## 2022-02-08 NOTE — PROGRESS NOTES
The patient returns for a follow-up visit for his ongoing issues related to chronic low back pain and hip pain.    He was last seen by me on October 18, 2021.  At that time he underwent radiofrequency ablation from L2-L4 bilaterally.  He and his wife report that they were noticing good improvement from a pain perspective.  However his pain has since returned.  He has developed a sore/callus in his left foot and recently had that evaluated and treated by his podiatrist.  He has also noticed problems with his gait.  He tends to shuffle.  He also finds it difficult to be as active as he has been in the past.  He rates it at a 0 out of 10 when he is sitting and 2 out of 10 when he is walking.  He denies any radicular symptoms and his pain does not go below the knee.    He has had problems with facial trauma/tremor of his lips and lower jaw.  There is no prior history of Parkinson's.  He has also noticed decline in his weight.  Remainder of his history is unchanged.  Past Medical History:   Diagnosis Date     Diabetes mellitus (H)      Gout attack      Heart attack (H) 1984     Hypertension      Stented coronary artery     6 stents in heart     Past Surgical History:   Procedure Laterality Date     CARDIAC SURGERY  1984    4 vessel bypass     CHOLECYSTECTOMY       COLONOSCOPY       DESTRUCTION OF PARAVERTEBRAL FACET LUMBAR / SACRAL ADDITIONAL Bilateral 10/18/2021    Procedure: DESTRUCTION, NERVE, FACET JOINT, LUMBOSACRAL, ADDITIONAL NERVE AFTER DESTRUCTION OF INITIAL LUMBOSACRAL FACET JOINT NERVE;  Surgeon: Horacio Gonsalves MD;  Location: [facility] OR     DESTRUCTION OF PARAVERTEBRAL FACET LUMBAR / SACRAL SINGLE Bilateral 10/18/2021    Procedure: DESTRUCTION, NERVE, FACET JOINT, LUMBOSACRAL, 1 NERVE;  Surgeon: Horacio Gonsalves MD;  Location: [facility] OR     ESOPHAGOSCOPY, GASTROSCOPY, DUODENOSCOPY (EGD), COMBINED N/A 3/1/2018    Procedure: COMBINED ESOPHAGOSCOPY, GASTROSCOPY, DUODENOSCOPY (EGD), BIOPSY SINGLE OR  MULTIPLE;  ESOPHAGOGASTRODUODENOSCOPY ;  Surgeon: Daryn Medrano MD;  Location: [facility]     INJECT BLOCK MEDIAL BRANCH CERVICAL/THORACIC/LUMBAR Bilateral 8/16/2021    Procedure: BLOCK, NERVE, FACET JOINT, MEDIAL BRANCH, DIAGNOSTIC Lumbar 2-3-4;  Surgeon: Horacio Gonsalves MD;  Location: [facility] OR     INJECT BLOCK MEDIAL BRANCH CERVICAL/THORACIC/LUMBAR Bilateral 9/27/2021    Procedure: BLOCK, NERVE, FACET JOINT, MEDIAL BRANCH, DIAGNOSTIC  L2-4, Bilateral;  Surgeon: Horacio Gonsalves MD;  Location: [facility] OR     NO HISTORY OF SURGERY  3/31/14    derm     PHACOEMULSIFICATION WITH STANDARD INTRAOCULAR LENS IMPLANT  1/21/2013    Procedure: PHACOEMULSIFICATION WITH STANDARD INTRAOCULAR LENS IMPLANT;  Phacoemulsification with standard intraocular lens implant, right eye;  Surgeon: Jay Rodriges MD;  Location: [facility]     Current Outpatient Medications   Medication Sig Dispense Refill     Alcohol Swabs PADS 1 pad 4 times daily 100 each 6     allopurinol (ZYLOPRIM) 300 MG tablet Take 1 tablet (300 mg) by mouth daily 100 tablet 0     ammonium lactate (LAC-HYDRIN) 12 % external lotion Apply topically 2 times daily Apply twice daily to feet 500 g 1     aspirin 81 MG tablet Take 81 mg by mouth daily.       atorvastatin (LIPITOR) 40 MG tablet Take 1 tablet (40 mg) by mouth daily 90 tablet 1     augmented betamethasone dipropionate (DIPROLENE-AF) 0.05 % external ointment Apply twice daily as needed for rash on the leg or back 45 g 11     augmented betamethasone dipropionate (DIPROLENE-AF) 0.05 % ointment Apply topically 2 times daily As needed to itchy areas on back 50 g 3     betamethasone dipropionate (DIPROSONE) 0.05 % external ointment At bedtime apply to ankle and cover with saran wrap (or sock) overnight, wash off in morning 50 g 1     blood glucose (NO BRAND SPECIFIED) lancets standard Use to test blood sugar 3 times daily or as directed. 100 each 11     blood glucose (NO BRAND SPECIFIED) test strip Use  to test blood sugar 4 times daily  With meter/ strips/ lancets covered by insurance pt using accuchek 360 each 3     Blood Glucose Monitoring Suppl (BLOOD GLUCOSE MONITOR SYSTEM) w/Device KIT Test 4 times daily with meter covered by  insurance 1 kit 1     capsaicin (ZOSTRIX) 0.075 % cream Apply topically 3 times daily To areas of itch on back.  OK to dilute with cetaphil cream if too burning. 60 g 3     Cholecalciferol (VITAMIN D) 2000 UNITS CAPS Take 1 tablet by mouth daily       clobetasol (TEMOVATE) 0.05 % external cream Apply twice daily as needed for itching or rash on the back 60 g 5     clopidogrel (PLAVIX) 75 MG tablet Take 1 tablet (75 mg) by mouth daily 100 tablet 0     colchicine (COLCYRS) 0.6 MG tablet Take 1 tablet (0.6 mg) by mouth daily 100 tablet 0     Cyanocobalamin (VITAMIN B12 PO) Take 1 tablet by mouth daily       Dupilumab (DUPIXENT) 300 MG/2ML syringe Inject 2 mLs (300 mg) Subcutaneous every 14 days 4 mL 3     empagliflozin (JARDIANCE) 10 MG TABS tablet Take 1 tablet (10 mg) by mouth daily INSTEAD OF AMARYL 30 tablet 3     FLUoxetine (PROZAC) 10 MG capsule Take 1 capsule (10 mg) by mouth daily 90 capsule 3     furosemide (LASIX) 40 MG tablet Take 1 tablet (40 mg) by mouth daily 90 tablet 3     Ginkgo Biloba (GNP GINGKO BILOBA EXTRACT PO) Take 1 tablet by mouth daily        glimepiride (AMARYL) 1 MG tablet TAKE 2 tablets in the AM and 1 tablet in the  tablet 3     indapamide (LOZOL) 2.5 MG tablet Take 2.5 mg by mouth every morning.       ipratropium (ATROVENT) 0.03 % nasal spray Spray 2 sprays into both nostrils as needed for rhinitis 30min before cooking 30 mL 1     irbesartan (AVAPRO) 150 MG tablet Take 1 tablet (150 mg) by mouth At Bedtime 90 tablet 2     isosorbide mononitrate (IMDUR) 30 MG 24 hr tablet Take 1 tablet (30 mg) by mouth daily 90 tablet 3     ketoconazole (NIZORAL) 2 % external cream Apply topically daily       metFORMIN (GLUCOPHAGE) 1000 MG tablet Take 1 tablet (1,000 mg)  by mouth 2 times daily (with meals) 180 tablet 3     methotrexate 2.5 MG tablet Take 15 mg by mouth       multivitamin, therapeutic with minerals (MULTI-VITAMIN) TABS Take 1 tablet by mouth daily.       mupirocin (BACTROBAN) 2 % external ointment Apply topically 2 times daily 30 g 0     nitroglycerin (NITROSTAT) 0.4 MG SL tablet Place under the tongue as needed       omega-3 acid ethyl esters (LOVAZA) 1 g capsule Take 2 capsules (2 g) by mouth 2 times daily 180 capsule 3     potassium chloride SA (K-DUR/KLOR-CON M) 20 MEQ CR tablet TAKE 1 TABLET THREE TIMES DAILY 270 tablet 1     predniSONE (DELTASONE) 20 MG tablet TAKE 1 TABLET BY MOUTH ONCE DAILY FOR 7 DAYS       spironolactone (ALDACTONE) 25 MG tablet Take 25 mg by mouth daily       tamsulosin (FLOMAX) 0.4 MG capsule Take 1 capsule (0.4 mg) by mouth daily 100 capsule 0     torsemide (DEMADEX) 10 MG tablet Take 10 mg by mouth daily        traZODone (DESYREL) 150 MG tablet Take 1 tablet (150 mg) by mouth At Bedtime 90 tablet 2     triamcinolone (KENALOG) 0.1 % external ointment APPLY TWICE DAILY TO RAISED RASH AREAS ON THE ARMS, LEGS, BODY AS NEEDED. 454 g 1     On examination, he is sitting in a transport chair.  He is able to get up and walk with a single ended cane.  He tends to shuffle and take short steps.  I do not see any resting hand tremor.  There is no rigidity or tightness in his extremities that I can appreciate.  His lower back has tenderness in the thoracolumbar region bilaterally.  The SIJ area is nontender.  Edson's negative.  Rotation of the hips are nontender.  He does have edema in his lower extremities bilaterally and there is chronic venous stasis change.    He is able to move his upper extremities functionally.  His speech is fluent and his affect is pleasant.  Palmomental reflexes present.  Tremoring noted in his lower jaw area.    Impression: At this point, this 81-year-old gentleman, with chronic back pain with prior fusion at L4-5 which  has been stable, is having a number of issues going on including gait disturbance, tremor, shuffling gait pattern, callus/sore in his left foot affecting his gait.  He also has edema in his lower extremities with chronic venous stasis change.    I am recommending that he be evaluated by physical therapy for gait evaluation and trial with four-wheel walker, he may also need compression stocking with 20 to 30 mm pressure knee-high's.  In addition I would recommend evaluation by neurologist for consideration of Parkinson's possibility for his gait disturbance.  I have suggested that he start using a lumbar corset to support his lower back.  We talked about strategies to decrease the impact from sitting in low chairs etc.  I will see him in follow-up about 3 months time.  Sooner if necessary.    More than 40 minutes spent for this visit, greater than 50% was for counseling on above-mentioned issues.    Horacio Gonsalves MD

## 2022-02-08 NOTE — PROGRESS NOTES
Chief Complaint:   Chief Complaint   Patient presents with     Results     MRI, Left foot.           Allergies   Allergen Reactions     Beta Adrenergic Blockers Unknown     Latex Dermatitis     Latex Rash     Tape [Adhesive Tape] Dermatitis and Rash     Electrode patches         Subjective: Deandre is a 81 year old male who presents to the clinic today for a follow up of left foot pain.  He did have an MRI performed.  At today's visit, is with his wife and his daughter-in-law.  He relates today that he is not having pain in his heel, nor did he ever have pain in this area.  At his last visit, which was about 2 weeks ago, I do remember him saying that he was having pain in his arches.  He relates that he has not had pain in that area, today.  I did treat him with a silicone heel cup.  I also ordered an MRI for this.  He relates he is not having any pain here in the plantar midfoot or heel, and is only having pain in the ball of the first metatarsal.  He is not endorsing any neuropathic pain.    Objective  Data Unavailable Data Unavailable Data Unavailable Data Unavailable Data Unavailable 0 lbs 0 oz  There is pain today noted with palpation of the head of the first metatarsal on the left side.  There is no pain noted in the plantar mid arches or in the heels.  I did question him about this, as I wanted to make sure that he was not actually having pain in the arches, which was my strong impression at the last visit.  He is not having pain there.    MRI Impression:  1. No imaging abnormality corresponding to external marker (plantar  aspect of great toe first distal phalangeal base).  2. Visualized portion of central cord of plantar fascia is  unremarkable.  3. Query intermetatarsal bursitis particularly in the first and third  interspaces.     LARRY BINGHAM     Assessment: Deandre is an 81-year-old with pain in the left foot that he initially presented with in the arch.  Today, he relates that he is not having  pain there, nor has he ever.  He relates pain in the first metatarsal head plantarly on the left side, and there is a tyloma in the area.    Plan:   - Pt seen and evaluated  -Tyloma debrided x1.  -MRI was discussed with him.  -He does not need to use a heel cup.  - Pt to return to clinic in 9 weeks for tyloma debridement.

## 2022-02-08 NOTE — LETTER
2/8/2022         RE: Deandre Moore  [address]  [facility] [address]        Dear Colleague,    Thank you for referring your patient, Deandre Moore, to the [facility] [place]. Please see a copy of my visit note below.    Chief Complaint:   Chief Complaint   Patient presents with     Results     MRI, Left foot.           Allergies   Allergen Reactions     Beta Adrenergic Blockers Unknown     Latex Dermatitis     Latex Rash     Tape [Adhesive Tape] Dermatitis and Rash     Electrode patches     Subjective: Deandre is a 81 year old male who presents to the clinic today for a follow up of left foot pain.  He did have an MRI performed.  At today's visit, is with his wife and his daughter-in-law.  He relates today that he is not having pain in his heel, nor did he ever have pain in this area.  At his last visit, which was about 2 weeks ago, I do remember him saying that he was having pain in his arches.  He relates that he has not had pain in that area, today.  I did treat him with a silicone heel cup.  I also ordered an MRI for this.  He relates he is not having any pain here in the plantar midfoot or heel, and is only having pain in the ball of the first metatarsal.  He is not endorsing any neuropathic pain.    Objective  Data Unavailable Data Unavailable Data Unavailable Data Unavailable Data Unavailable 0 lbs 0 oz  There is pain today noted with palpation of the head of the first metatarsal on the left side.  There is no pain noted in the plantar mid arches or in the heels.  I did question him about this, as I wanted to make sure that he was not actually having pain in the arches, which was my strong impression at the last visit.  He is not having pain there.    MRI Impression:  1. No imaging abnormality corresponding to external marker (plantar  aspect of great toe first distal phalangeal base).  2. Visualized portion of central cord of plantar fascia  is  unremarkable.  3. Query intermetatarsal bursitis particularly in the first and third  interspaces.     LARRY BINGHAM     Assessment: Deandre is an 81-year-old with pain in the left foot that he initially presented with in the arch.  Today, he relates that he is not having pain there, nor has he ever.  He relates pain in the first metatarsal head plantarly on the left side, and there is a tyloma in the area.    Plan:   - Pt seen and evaluated  -Tyloma debrided x1.  -MRI was discussed with him.  -He does not need to use a heel cup.  - Pt to return to clinic in 9 weeks for tyloma debridement.      Mark Sharp DPM

## 2022-02-08 NOTE — LETTER
2/8/2022       RE: Deandre Moore  [address]  [facility] [address]     Dear Colleague,    Thank you for referring your patient, Deandre Moore, to the [facility]IC[facility] [place] [facility]. Please see a copy of my visit note below.    The patient returns for a follow-up visit for his ongoing issues related to chronic low back pain and hip pain.    He was last seen by me on October 18, 2021.  At that time he underwent radiofrequency ablation from L2-L4 bilaterally.  He and his wife report that they were noticing good improvement from a pain perspective.  However his pain has since returned.  He has developed a sore/callus in his left foot and recently had that evaluated and treated by his podiatrist.  He has also noticed problems with his gait.  He tends to shuffle.  He also finds it difficult to be as active as he has been in the past.  He rates it at a 0 out of 10 when he is sitting and 2 out of 10 when he is walking.  He denies any radicular symptoms and his pain does not go below the knee.    He has had problems with facial trauma/tremor of his lips and lower jaw.  There is no prior history of Parkinson's.  He has also noticed decline in his weight.  Remainder of his history is unchanged.  Past Medical History:   Diagnosis Date     Diabetes mellitus (H)      Gout attack      Heart attack (H) 1984     Hypertension      Stented coronary artery     6 stents in heart     Past Surgical History:   Procedure Laterality Date     CARDIAC SURGERY  1984    4 vessel bypass     CHOLECYSTECTOMY       COLONOSCOPY       DESTRUCTION OF PARAVERTEBRAL FACET LUMBAR / SACRAL ADDITIONAL Bilateral 10/18/2021    Procedure: DESTRUCTION, NERVE, FACET JOINT, LUMBOSACRAL, ADDITIONAL NERVE AFTER DESTRUCTION OF INITIAL LUMBOSACRAL FACET JOINT NERVE;  Surgeon: Horacio Gonsalves MD;  Location: [facility] OR     DESTRUCTION OF  PARAVERTEBRAL FACET LUMBAR / SACRAL SINGLE Bilateral 10/18/2021    Procedure: DESTRUCTION, NERVE, FACET JOINT, LUMBOSACRAL, 1 NERVE;  Surgeon: Horacio Gonsalves MD;  Location: [facility] OR     ESOPHAGOSCOPY, GASTROSCOPY, DUODENOSCOPY (EGD), COMBINED N/A 3/1/2018    Procedure: COMBINED ESOPHAGOSCOPY, GASTROSCOPY, DUODENOSCOPY (EGD), BIOPSY SINGLE OR MULTIPLE;  ESOPHAGOGASTRODUODENOSCOPY ;  Surgeon: Daryn Medrano MD;  Location: [facility]     INJECT BLOCK MEDIAL BRANCH CERVICAL/THORACIC/LUMBAR Bilateral 8/16/2021    Procedure: BLOCK, NERVE, FACET JOINT, MEDIAL BRANCH, DIAGNOSTIC Lumbar 2-3-4;  Surgeon: Horacio Gonsalves MD;  Location: [facility] OR     INJECT BLOCK MEDIAL BRANCH CERVICAL/THORACIC/LUMBAR Bilateral 9/27/2021    Procedure: BLOCK, NERVE, FACET JOINT, MEDIAL BRANCH, DIAGNOSTIC  L2-4, Bilateral;  Surgeon: Horacio Gonsalves MD;  Location: [facility] OR     NO HISTORY OF SURGERY  3/31/14    derm     PHACOEMULSIFICATION WITH STANDARD INTRAOCULAR LENS IMPLANT  1/21/2013    Procedure: PHACOEMULSIFICATION WITH STANDARD INTRAOCULAR LENS IMPLANT;  Phacoemulsification with standard intraocular lens implant, right eye;  Surgeon: Jay Rodriges MD;  Location: [facility] OR     Current Outpatient Medications   Medication Sig Dispense Refill     Alcohol Swabs PADS 1 pad 4 times daily 100 each 6     allopurinol (ZYLOPRIM) 300 MG tablet Take 1 tablet (300 mg) by mouth daily 100 tablet 0     ammonium lactate (LAC-HYDRIN) 12 % external lotion Apply topically 2 times daily Apply twice daily to feet 500 g 1     aspirin 81 MG tablet Take 81 mg by mouth daily.       atorvastatin (LIPITOR) 40 MG tablet Take 1 tablet (40 mg) by mouth daily 90 tablet 1     augmented betamethasone dipropionate (DIPROLENE-AF) 0.05 % external ointment Apply twice daily as needed for rash on the leg or back 45 g 11     augmented betamethasone dipropionate (DIPROLENE-AF) 0.05 % ointment Apply topically 2 times daily As needed to itchy areas on back  50 g 3     betamethasone dipropionate (DIPROSONE) 0.05 % external ointment At bedtime apply to ankle and cover with saran wrap (or sock) overnight, wash off in morning 50 g 1     blood glucose (NO BRAND SPECIFIED) lancets standard Use to test blood sugar 3 times daily or as directed. 100 each 11     blood glucose (NO BRAND SPECIFIED) test strip Use to test blood sugar 4 times daily  With meter/ strips/ lancets covered by insurance pt using accuchek 360 each 3     Blood Glucose Monitoring Suppl (BLOOD GLUCOSE MONITOR SYSTEM) w/Device KIT Test 4 times daily with meter covered by  insurance 1 kit 1     capsaicin (ZOSTRIX) 0.075 % cream Apply topically 3 times daily To areas of itch on back.  OK to dilute with cetaphil cream if too burning. 60 g 3     Cholecalciferol (VITAMIN D) 2000 UNITS CAPS Take 1 tablet by mouth daily       clobetasol (TEMOVATE) 0.05 % external cream Apply twice daily as needed for itching or rash on the back 60 g 5     clopidogrel (PLAVIX) 75 MG tablet Take 1 tablet (75 mg) by mouth daily 100 tablet 0     colchicine (COLCYRS) 0.6 MG tablet Take 1 tablet (0.6 mg) by mouth daily 100 tablet 0     Cyanocobalamin (VITAMIN B12 PO) Take 1 tablet by mouth daily       Dupilumab (DUPIXENT) 300 MG/2ML syringe Inject 2 mLs (300 mg) Subcutaneous every 14 days 4 mL 3     empagliflozin (JARDIANCE) 10 MG TABS tablet Take 1 tablet (10 mg) by mouth daily INSTEAD OF AMARYL 30 tablet 3     FLUoxetine (PROZAC) 10 MG capsule Take 1 capsule (10 mg) by mouth daily 90 capsule 3     furosemide (LASIX) 40 MG tablet Take 1 tablet (40 mg) by mouth daily 90 tablet 3     Ginkgo Biloba (GNP GINGKO BILOBA EXTRACT PO) Take 1 tablet by mouth daily        glimepiride (AMARYL) 1 MG tablet TAKE 2 tablets in the AM and 1 tablet in the  tablet 3     indapamide (LOZOL) 2.5 MG tablet Take 2.5 mg by mouth every morning.       ipratropium (ATROVENT) 0.03 % nasal spray Spray 2 sprays into both nostrils as needed for rhinitis 30min  before cooking 30 mL 1     irbesartan (AVAPRO) 150 MG tablet Take 1 tablet (150 mg) by mouth At Bedtime 90 tablet 2     isosorbide mononitrate (IMDUR) 30 MG 24 hr tablet Take 1 tablet (30 mg) by mouth daily 90 tablet 3     ketoconazole (NIZORAL) 2 % external cream Apply topically daily       metFORMIN (GLUCOPHAGE) 1000 MG tablet Take 1 tablet (1,000 mg) by mouth 2 times daily (with meals) 180 tablet 3     methotrexate 2.5 MG tablet Take 15 mg by mouth       multivitamin, therapeutic with minerals (MULTI-VITAMIN) TABS Take 1 tablet by mouth daily.       mupirocin (BACTROBAN) 2 % external ointment Apply topically 2 times daily 30 g 0     nitroglycerin (NITROSTAT) 0.4 MG SL tablet Place under the tongue as needed       omega-3 acid ethyl esters (LOVAZA) 1 g capsule Take 2 capsules (2 g) by mouth 2 times daily 180 capsule 3     potassium chloride SA (K-DUR/KLOR-CON M) 20 MEQ CR tablet TAKE 1 TABLET THREE TIMES DAILY 270 tablet 1     predniSONE (DELTASONE) 20 MG tablet TAKE 1 TABLET BY MOUTH ONCE DAILY FOR 7 DAYS       spironolactone (ALDACTONE) 25 MG tablet Take 25 mg by mouth daily       tamsulosin (FLOMAX) 0.4 MG capsule Take 1 capsule (0.4 mg) by mouth daily 100 capsule 0     torsemide (DEMADEX) 10 MG tablet Take 10 mg by mouth daily        traZODone (DESYREL) 150 MG tablet Take 1 tablet (150 mg) by mouth At Bedtime 90 tablet 2     triamcinolone (KENALOG) 0.1 % external ointment APPLY TWICE DAILY TO RAISED RASH AREAS ON THE ARMS, LEGS, BODY AS NEEDED. 454 g 1     On examination, he is sitting in a transport chair.  He is able to get up and walk with a single ended cane.  He tends to shuffle and take short steps.  I do not see any resting hand tremor.  There is no rigidity or tightness in his extremities that I can appreciate.  His lower back has tenderness in the thoracolumbar region bilaterally.  The SIJ area is nontender.  Edson's negative.  Rotation of the hips are nontender.  He does have edema in his lower  extremities bilaterally and there is chronic venous stasis change.    He is able to move his upper extremities functionally.  His speech is fluent and his affect is pleasant.  Palmomental reflexes present.  Tremoring noted in his lower jaw area.    Impression: At this point, this 81-year-old gentleman, with chronic back pain with prior fusion at L4-5 which has been stable, is having a number of issues going on including gait disturbance, tremor, shuffling gait pattern, callus/sore in his left foot affecting his gait.  He also has edema in his lower extremities with chronic venous stasis change.    I am recommending that he be evaluated by physical therapy for gait evaluation and trial with four-wheel walker, he may also need compression stocking with 20 to 30 mm pressure knee-high's.  In addition I would recommend evaluation by neurologist for consideration of Parkinson's possibility for his gait disturbance.  I have suggested that he start using a lumbar corset to support his lower back.  We talked about strategies to decrease the impact from sitting in low chairs etc.  I will see him in follow-up about 3 months time.  Sooner if necessary.    More than 40 minutes spent for this visit, greater than 50% was for counseling on above-mentioned issues.    Horacio Gonsalves MD         Again, thank you for allowing me to participate in the care of your patient.      Sincerely,    Horacio Gonsalves MD

## 2022-02-08 NOTE — NURSING NOTE
Chief Complaint   Patient presents with     RECHECK     [facility] Return - Lower back pain and hip mobility          Paz Goodman

## 2022-02-09 RX ORDER — SPIRONOLACTONE 25 MG/1
25 TABLET ORAL DAILY
Qty: 90 TABLET | Refills: 1 | Status: SHIPPED | OUTPATIENT
Start: 2022-02-09 | End: 2022-04-27

## 2022-02-09 RX ORDER — IRBESARTAN 150 MG/1
150 TABLET ORAL AT BEDTIME
Qty: 90 TABLET | Refills: 1 | Status: SHIPPED | OUTPATIENT
Start: 2022-02-09 | End: 2022-04-27

## 2022-02-10 ENCOUNTER — PRE VISIT (OUTPATIENT)
Dept: UROLOGY | Facility: CLINIC | Age: 82
End: 2022-02-10
Payer: COMMERCIAL

## 2022-02-11 NOTE — TELEPHONE ENCOUNTER
MEDICAL RECORDS REQUEST   [facility]  Urology Clinic  [address] [place] [address]  PHONE: [phone]  Fax: [phone]        FUTURE VISIT INFORMATION                                                   Deandre Moore, : 1940 scheduled for future visit at [facility]    APPOINTMENT INFORMATION:    Date: 03/10/2022    Provider:  Amy Johnston PA    Reason for Visit/Diagnosis: Benign essential hypertension [I10]    Benign prostatic hyperplasia without lower urinary tract symptoms [N40.0]    REFERRAL INFORMATION:    Referring provider:  William Marquez MD    Referring providers clinic:  [facility]    RECORDS REQUESTED FOR VISIT                                                     NOTES  STATUS/DETAILS   OFFICE NOTE from referring provider  yes, 2022 -- William Marquez MD in [facility]   MEDICATION LIST  yes   LABS     URINALYSIS (UA)  no     PRE-VISIT CHECKLIST      Record collection complete Yes   Appointment appropriately scheduled           (right time/right provider) Yes   Joint diagnostic appointment coordinated correctly          (ensure right order & amount of time) Yes   MyChart activation Yes   Questionnaire complete If no, please explain pending

## 2022-02-13 NOTE — PROGRESS NOTES
HPI:    Last visit with me 1/24/2022. His son is present. He has chronic itching. He has chronic back pain. He has some anxiety. He has some sleep issues. He is deconditioned and decreased mobility. He has some chronic itching. No other HEENT, cardiopulmonary, abdominal, , neurological, systemic, lymphatic, endocrine, vascular complaints.      Past Medical History:   Diagnosis Date     Diabetes mellitus (H)      Gout attack      Heart attack (H) 1984     Hypertension      Stented coronary artery     6 stents in heart     Past Surgical History:   Procedure Laterality Date     CARDIAC SURGERY  1984    4 vessel bypass     CHOLECYSTECTOMY       COLONOSCOPY       DESTRUCTION OF PARAVERTEBRAL FACET LUMBAR / SACRAL ADDITIONAL Bilateral 10/18/2021    Procedure: DESTRUCTION, NERVE, FACET JOINT, LUMBOSACRAL, ADDITIONAL NERVE AFTER DESTRUCTION OF INITIAL LUMBOSACRAL FACET JOINT NERVE;  Surgeon: Horacio Gonsalves MD;  Location: [facility] OR     DESTRUCTION OF PARAVERTEBRAL FACET LUMBAR / SACRAL SINGLE Bilateral 10/18/2021    Procedure: DESTRUCTION, NERVE, FACET JOINT, LUMBOSACRAL, 1 NERVE;  Surgeon: Horacio Gonsalves MD;  Location: [facility] OR     ESOPHAGOSCOPY, GASTROSCOPY, DUODENOSCOPY (EGD), COMBINED N/A 3/1/2018    Procedure: COMBINED ESOPHAGOSCOPY, GASTROSCOPY, DUODENOSCOPY (EGD), BIOPSY SINGLE OR MULTIPLE;  ESOPHAGOGASTRODUODENOSCOPY ;  Surgeon: Daryn Medrano MD;  Location: [facility]     INJECT BLOCK MEDIAL BRANCH CERVICAL/THORACIC/LUMBAR Bilateral 8/16/2021    Procedure: BLOCK, NERVE, FACET JOINT, MEDIAL BRANCH, DIAGNOSTIC Lumbar 2-3-4;  Surgeon: Horacio Gonsalves MD;  Location: [facility] OR     INJECT BLOCK MEDIAL BRANCH CERVICAL/THORACIC/LUMBAR Bilateral 9/27/2021    Procedure: BLOCK, NERVE, FACET JOINT, MEDIAL BRANCH, DIAGNOSTIC  L2-4, Bilateral;  Surgeon: Horacio Gonsalves MD;  Location: [facility] OR     NO HISTORY OF SURGERY  3/31/14    derm     PHACOEMULSIFICATION WITH STANDARD INTRAOCULAR LENS IMPLANT   1/21/2013    Procedure: PHACOEMULSIFICATION WITH STANDARD INTRAOCULAR LENS IMPLANT;  Phacoemulsification with standard intraocular lens implant, right eye;  Surgeon: Jay Rodriges MD;  Location: [facility] OR     PE:    Vitals noted, gen, nad, cooperative, alert. He is sitting in a wheel chair, neck supple nl rom, lungs with good air movement, RRR, S1, S2, abdomen, no acute findings. He can move all his extremities.     A/P:    1. Urology appt. 3/22/2022 for BPH on Flomax.   2. DM2; Endocrinology appt. With Dr. Campa 5/20/2022. A1c 5.6% on 12/5/2021. On Jardiance, Glimepiride, Metformin   3. PMR appt. With Dr. Aguero 4/25/2022 for mobility/walking  4. Back pain follow up with Dr. Gonsalves 5/5/2022. Last visit 2/8/2022  5. Podiatry appt with Dr. Sharp 4/4/2022. Last visit 2/8/2022  6. Dermatology appt. For skin check with Dr. Rievra 3/22/2022  7. HTN; on Irbesartan; labs; 1/24/202; stable Cr and Electrolytes  8. Gout on Allopurinol and colchicine and ordered future Uric Acid 1/31/2022  9. Dupilumab? Or reactive airway disease   10. CAD; followed at [facility], Cardiology Dr. Ordoñez, Care Every note 1/31/2022.  On Plavix, Atorvastatin, Lasix, Spironolactone   11. Depression on Fluoxetine and will increase to 20 mg  12. Trazodone for Sleep; he can add Melatonin     30 minutes spent on the date of the encounter doing chart review, history and exam, documentation and further activities as noted above

## 2022-02-14 ENCOUNTER — OFFICE VISIT (OUTPATIENT)
Dept: INTERNAL MEDICINE | Facility: CLINIC | Age: 82
End: 2022-02-14
Payer: COMMERCIAL

## 2022-02-14 VITALS
RESPIRATION RATE: 16 BRPM | SYSTOLIC BLOOD PRESSURE: 116 MMHG | DIASTOLIC BLOOD PRESSURE: 56 MMHG | WEIGHT: 207.1 LBS | BODY MASS INDEX: 27.45 KG/M2 | HEART RATE: 70 BPM | HEIGHT: 73 IN | OXYGEN SATURATION: 95 %

## 2022-02-14 DIAGNOSIS — R53.81 PHYSICAL DECONDITIONING: Primary | ICD-10-CM

## 2022-02-14 PROCEDURE — 99214 OFFICE O/P EST MOD 30 MIN: CPT | Performed by: INTERNAL MEDICINE

## 2022-02-14 ASSESSMENT — MIFFLIN-ST. JEOR: SCORE: 1688.28

## 2022-02-17 NOTE — PROGRESS NOTES
[facility]  Endocrinology  Trudy Campa MD  2/18/22     Chief Complaint:   Diabetes    History of Present Illness:   Deandre Moore is a 81 year old male with a history of diabetes mellitus type II, gout, osteoarthritis, coronary artery disease, hypertension and hyperlipidemia who presents for follow up on diabetes.     This was an in person visit.  Total time spent day of encounter 30 minutes including chart review, documentation time, coordination of care.  History obtained from patient and son.    #1 Type II diabetes  Per patient, he was diagnosed with diabetes at age 68.  He as initially on Metformin for many years.  In 2015 Amaryl was added when he was on Prednisone intermittently for itching.  His blood sugars worsened when he was started on Remeron for sleep in December 2015 therefore his Amaryl was increased to 8 mg daily.  This was later decreased to 4 mg due to hypoglycemia. Januvia was cost prohibitive for him. August 2018 Hemoglobin A1c was 5.8%. May 2019 hemoglobin A1c is 5.4%.  Amaryl was decreased to 1 mg twice daily.  January 2020 hemoglobin A1c 5.9, May 2021 hemoglobin A1c 6.0, September 2021 hemoglobin A1c 6.5, we had briefly consider Jardiance for the patient but because this was about $600 per month, this was not an option for him.    Interval History: February 2022 hemoglobin A1c 5.4.  He is on Metformin 1000 mg twice daily and Amaryl 1 mg twice daily.  He denies any problems with hypoglycemia.     Blood Glucose Monitoring:  Not available    Diabetes monitoring and complications:  CAD: Yes, hx of MI  Last eye exam results: Negative in December 2020  Microalbuminuria: negative (6/4/2019)  Neuropathy: Improved per patient report  HTN: No  On Statin: Yes  On Aspirin: Yes  Depression: No    #2 Leg Ulcers  Patient has a history of painful leg ulcers    Interval history: Per patient report, this has improved    #3 compression fracture  Noted incidentally on T11 on January 2022 CT scan.    #4  right renal lesion  Noted incidentally on January 2022 CT scan.  Apparently this has not significantly changed compared from November 2020-renal ultrasound for evaluation recommended.  Per patient and patient's son report, observation was recommended due to stability and patient's age.    #5 poor hearing  The patient reports it is difficult for him to hear.  This is confirmed by his son.    #6 poor appetite with weight loss  Patient report, he has a poor appetite with weight loss.  He is just not interested in eating.  Per son's report, he has been started on Prozac which has been uptitrated.    #7 need for social support  Patient currently living with son.  The son anticipates they may need a PCA in the near future.    #8 diffuse pruritus  Per patient report, he has diffuse pruritus and dry skin.  Comprehensive metabolic panel showed normal LFTs and bilirubin in January 2022.  This has been present for several months.    Review of Systems:   Pertinent items are noted in HPI.  All other systems are negative.    Active Medications:   Current Outpatient Medications   Medication Sig Dispense Refill     Alcohol Swabs PADS 1 pad 4 times daily 100 each 6     allopurinol (ZYLOPRIM) 300 MG tablet Take 1 tablet (300 mg) by mouth daily 100 tablet 0     ammonium lactate (LAC-HYDRIN) 12 % external lotion Apply topically 2 times daily Apply twice daily to feet 500 g 1     aspirin 81 MG tablet Take 81 mg by mouth daily.       atorvastatin (LIPITOR) 40 MG tablet Take 1 tablet (40 mg) by mouth daily 90 tablet 1     augmented betamethasone dipropionate (DIPROLENE-AF) 0.05 % external ointment Apply twice daily as needed for rash on the leg or back 45 g 11     augmented betamethasone dipropionate (DIPROLENE-AF) 0.05 % ointment Apply topically 2 times daily As needed to itchy areas on back 50 g 3     betamethasone dipropionate (DIPROSONE) 0.05 % external ointment At bedtime apply to ankle and cover with saran wrap (or sock) overnight,  wash off in morning 50 g 1     blood glucose (NO BRAND SPECIFIED) lancets standard Use to test blood sugar 3 times daily or as directed. 100 each 11     blood glucose (NO BRAND SPECIFIED) test strip Use to test blood sugar 4 times daily  With meter/ strips/ lancets covered by insurance pt using accuchek 360 each 3     Blood Glucose Monitoring Suppl (BLOOD GLUCOSE MONITOR SYSTEM) w/Device KIT Test 4 times daily with meter covered by  insurance 1 kit 1     capsaicin (ZOSTRIX) 0.075 % cream Apply topically 3 times daily To areas of itch on back.  OK to dilute with cetaphil cream if too burning. 60 g 3     Cholecalciferol (VITAMIN D) 2000 UNITS CAPS Take 1 tablet by mouth daily       clobetasol (TEMOVATE) 0.05 % external cream Apply twice daily as needed for itching or rash on the back 60 g 5     clopidogrel (PLAVIX) 75 MG tablet Take 1 tablet (75 mg) by mouth daily 100 tablet 0     colchicine (COLCYRS) 0.6 MG tablet Take 1 tablet (0.6 mg) by mouth daily 100 tablet 0     Cyanocobalamin (VITAMIN B12 PO) Take 1 tablet by mouth daily       Dupilumab (DUPIXENT) 300 MG/2ML syringe Inject 2 mLs (300 mg) Subcutaneous every 14 days 4 mL 3     empagliflozin (JARDIANCE) 10 MG TABS tablet Take 1 tablet (10 mg) by mouth daily INSTEAD OF AMARYL 30 tablet 3     FLUoxetine (PROZAC) 10 MG capsule Take 1 capsule (10 mg) by mouth daily 90 capsule 3     furosemide (LASIX) 40 MG tablet Take 1 tablet (40 mg) by mouth daily 90 tablet 3     Ginkgo Biloba (GNP GINGKO BILOBA EXTRACT PO) Take 1 tablet by mouth daily        glimepiride (AMARYL) 1 MG tablet TAKE 2 tablets in the AM and 1 tablet in the  tablet 3     indapamide (LOZOL) 2.5 MG tablet Take 2.5 mg by mouth every morning.       ipratropium (ATROVENT) 0.03 % nasal spray Spray 2 sprays into both nostrils as needed for rhinitis 30min before cooking 30 mL 1     irbesartan (AVAPRO) 150 MG tablet Take 1 tablet (150 mg) by mouth At Bedtime 90 tablet 1     isosorbide mononitrate (IMDUR)  30 MG 24 hr tablet Take 1 tablet (30 mg) by mouth daily 90 tablet 3     ketoconazole (NIZORAL) 2 % external cream Apply topically daily       metFORMIN (GLUCOPHAGE) 1000 MG tablet Take 1 tablet (1,000 mg) by mouth 2 times daily (with meals) 180 tablet 3     methotrexate 2.5 MG tablet Take 15 mg by mouth       multivitamin, therapeutic with minerals (MULTI-VITAMIN) TABS Take 1 tablet by mouth daily.       mupirocin (BACTROBAN) 2 % external ointment Apply topically 2 times daily 30 g 0     nitroglycerin (NITROSTAT) 0.4 MG SL tablet Place under the tongue as needed       omega-3 acid ethyl esters (LOVAZA) 1 g capsule Take 2 capsules (2 g) by mouth 2 times daily 180 capsule 3     potassium chloride SA (K-DUR/KLOR-CON M) 20 MEQ CR tablet TAKE 1 TABLET THREE TIMES DAILY 270 tablet 1     predniSONE (DELTASONE) 20 MG tablet TAKE 1 TABLET BY MOUTH ONCE DAILY FOR 7 DAYS       spironolactone (ALDACTONE) 25 MG tablet Take 1 tablet (25 mg) by mouth daily 90 tablet 1     tamsulosin (FLOMAX) 0.4 MG capsule Take 1 capsule (0.4 mg) by mouth daily 100 capsule 0     torsemide (DEMADEX) 10 MG tablet Take 10 mg by mouth daily        traZODone (DESYREL) 150 MG tablet Take 1 tablet (150 mg) by mouth At Bedtime 90 tablet 2     triamcinolone (KENALOG) 0.1 % external ointment APPLY TWICE DAILY TO RAISED RASH AREAS ON THE ARMS, LEGS, BODY AS NEEDED. 454 g 1     Allergies:   Beta adrenergic blockers; Latex; Latex; and Tape [adhesive tape]      Past Medical History:  Atopic dermatitis   Diabetes mellitus, type II  Eczema  Gout   Heart attack  Hyperglycemia  Hypertension  Notalgia paresthetica  Pseudophakia of both eyes    Past Surgical History:  Cardiac surgery, four vessel bypass (1984)  Cholecystectomy  Phacoemulsification with standard intraocular lens implant (1/21/2013)    Family History:   No family history of skin cancer.      Social History:   Tobacco Use: former smoker, quit 1984  Alcohol Use: occasionally, once a year  Drug Use: none    PCP: MARCIA MOTA      Physical Exam:   Blood pressure 104/63.  GENERAL APPEARANCE: Alert and no distress  NECK: No lymphadenopathy appreciated  Thyroid: No obvious nodules palpated   CV: slightly irregular (previous hx of sinus arrhythmia)  Lungs: CTA bilaterally  Abdomen: Soft, Nontender, non distended, positive bowel sounds   Neuro: pt in wheel chair  Skin: No infection in feet   Mood: Normal         Data:  Office Visit on 02/18/2022   Component Date Value Ref Range Status     Hemoglobin A1C POCT 02/18/2022 5.4  4.3 - <5.7 % Final   Lab on 01/24/2022   Component Date Value Ref Range Status     Sodium 01/24/2022 143  133 - 144 mmol/L Final     Potassium 01/24/2022 4.4  3.4 - 5.3 mmol/L Final     Chloride 01/24/2022 107  94 - 109 mmol/L Final     Carbon Dioxide (CO2) 01/24/2022 25  20 - 32 mmol/L Final     Anion Gap 01/24/2022 11  3 - 14 mmol/L Final     Urea Nitrogen 01/24/2022 14  7 - 30 mg/dL Final     Creatinine 01/24/2022 1.24  0.66 - 1.25 mg/dL Final     Calcium 01/24/2022 9.3  8.5 - 10.1 mg/dL Final     Glucose 01/24/2022 92  70 - 99 mg/dL Final     Alkaline Phosphatase 01/24/2022 62  40 - 150 U/L Final     AST 01/24/2022 17  0 - 45 U/L Final     ALT 01/24/2022 26  0 - 70 U/L Final     Protein Total 01/24/2022 6.7 (A) 6.8 - 8.8 g/dL Final     Albumin 01/24/2022 3.4  3.4 - 5.0 g/dL Final     Bilirubin Total 01/24/2022 0.7  0.2 - 1.3 mg/dL Final     GFR Estimate 01/24/2022 58 (A) >60 mL/min/1.73m2 Final    Effective December 21, 2021 eGFRcr in adults is calculated using the 2021 CKD-EPI creatinine equation which includes age and gender (Cailin loomis al., NEJM, DOI: 10.1056/PDCLom6012673)     WBC Count 01/24/2022 10.2  4.0 - 11.0 10e3/uL Final     RBC Count 01/24/2022 4.17 (A) 4.40 - 5.90 10e6/uL Final     Hemoglobin 01/24/2022 13.0 (A) 13.3 - 17.7 g/dL Final     Hematocrit 01/24/2022 38.2 (A) 40.0 - 53.0 % Final     MCV 01/24/2022 92  78 - 100 fL Final     MCH 01/24/2022 31.2  26.5 - 33.0 pg Final     MCHC  01/24/2022 34.0  31.5 - 36.5 g/dL Final     RDW 01/24/2022 13.3  10.0 - 15.0 % Final     Platelet Count 01/24/2022 185  150 - 450 10e3/uL Final     % Neutrophils 01/24/2022 70  % Final     % Lymphocytes 01/24/2022 14  % Final     % Monocytes 01/24/2022 7  % Final     % Eosinophils 01/24/2022 7  % Final     % Basophils 01/24/2022 1  % Final     % Immature Granulocytes 01/24/2022 1  % Final     NRBCs per 100 WBC 01/24/2022 0  <1 /100 Final     Absolute Neutrophils 01/24/2022 7.2  1.6 - 8.3 10e3/uL Final     Absolute Lymphocytes 01/24/2022 1.4  0.8 - 5.3 10e3/uL Final     Absolute Monocytes 01/24/2022 0.8  0.0 - 1.3 10e3/uL Final     Absolute Eosinophils 01/24/2022 0.7  0.0 - 0.7 10e3/uL Final     Absolute Basophils 01/24/2022 0.1  0.0 - 0.2 10e3/uL Final     Absolute Immature Granulocytes 01/24/2022 0.1  <=0.4 10e3/uL Final     Absolute NRBCs 01/24/2022 0.0  10e3/uL Final   Hospital Outpatient Visit on 01/03/2022   Component Date Value Ref Range Status     Creatinine POCT 01/03/2022 1.2  0.7 - 1.3 mg/dL Final     GFR, ESTIMATED POCT 01/03/2022 60 (A) >60 mL/min/1.73m2 Final   Office Visit on 12/22/2021   Component Date Value Ref Range Status     Ventricular Rate 12/22/2021 75  BPM Final     Atrial Rate 12/22/2021 75  BPM Final     KS Interval 12/22/2021 236  ms Final     QRS Duration 12/22/2021 160  ms Final     QT 12/22/2021 434  ms Final     QTc 12/22/2021 484  ms Final     P Axis 12/22/2021 36  degrees Final     R AXIS 12/22/2021 -75  degrees Final     T Axis 12/22/2021 63  degrees Final     Interpretation ECG 12/22/2021    Final       Assessment and Plan:    #1 Type II diabetes  Hemoglobin A1c acceptable.  No problems with hypoglycemia.  Creatinine normal as of January 2022.  Jardiance expensive ($600 per month) we will have patient continue with Metformin 1000 mg twice daily and glimepiride 1 mg twice daily.    #2 Leg Ulcers  Improved  #3 compression fracture  Noted incidentally on T11 on January 2022 CT scan.   Will get DEXA scan.    #4 right renal lesion  Noted stability.  Will defer to primary provider.    #5 poor hearing  The patient reports it is difficult for him to hear.  Audiology referral made.     #6 poor appetite with weight loss  Uncertain for reason.  I think this may be related to depression.  Will defer to primary provider.    #7 need for social support  Referral made for social work    #8 diffuse pruritus  Recommended Sarna cream and Vanicream as well for the patient increase hydration.      Return in 7 months    Orders Placed This Encounter   Procedures     Dexa hip/pelvis/spine     Adult Audiology Referral     Social Work Referral (Only to be ordered at: [facility] ONLY)     Hemoglobin A1c POCT

## 2022-02-18 ENCOUNTER — OFFICE VISIT (OUTPATIENT)
Dept: ENDOCRINOLOGY | Facility: CLINIC | Age: 82
End: 2022-02-18
Payer: COMMERCIAL

## 2022-02-18 VITALS — DIASTOLIC BLOOD PRESSURE: 63 MMHG | SYSTOLIC BLOOD PRESSURE: 104 MMHG

## 2022-02-18 DIAGNOSIS — H90.8 MIXED CONDUCTIVE AND SENSORINEURAL HEARING LOSS, UNSPECIFIED LATERALITY: ICD-10-CM

## 2022-02-18 DIAGNOSIS — M80.88XD OSTEOPOROTIC COMPRESSION FRACTURE OF SPINE, WITH ROUTINE HEALING, SUBSEQUENT ENCOUNTER: ICD-10-CM

## 2022-02-18 DIAGNOSIS — E11.9 TYPE 2 DIABETES MELLITUS WITHOUT COMPLICATION, WITHOUT LONG-TERM CURRENT USE OF INSULIN (H): Primary | ICD-10-CM

## 2022-02-18 DIAGNOSIS — Z87.310 PERSONAL HISTORY OF (HEALED) OSTEOPOROSIS FRACTURE: ICD-10-CM

## 2022-02-18 LAB — HBA1C MFR BLD: 5.4 % (ref 4.3–?)

## 2022-02-18 PROCEDURE — 99214 OFFICE O/P EST MOD 30 MIN: CPT | Performed by: INTERNAL MEDICINE

## 2022-02-18 PROCEDURE — 83036 HEMOGLOBIN GLYCOSYLATED A1C: CPT | Performed by: INTERNAL MEDICINE

## 2022-02-18 RX ORDER — GLIMEPIRIDE 1 MG/1
1 TABLET ORAL 2 TIMES DAILY
Qty: 180 TABLET | Refills: 3 | Status: SHIPPED | OUTPATIENT
Start: 2022-02-18 | End: 2022-01-01

## 2022-02-18 NOTE — LETTER
2/18/2022       RE: Deandre Moore  [address]  [facility] [address]     Dear Colleague,    Thank you for referring your patient, Deandre Moore, to the [facility] ENDOCRINOLOG[facility] [place] at [facility]. Please see a copy of my visit note below.    [facility]  Endocrinology  Trudy Campa MD  2/18/22     Chief Complaint:   Diabetes    History of Present Illness:   Deandre Moore is a 81 year old male with a history of diabetes mellitus type II, gout, osteoarthritis, coronary artery disease, hypertension and hyperlipidemia who presents for follow up on diabetes.     This was an in person visit.  Total time spent day of encounter 30 minutes including chart review, documentation time, coordination of care.  History obtained from patient and son.    #1 Type II diabetes  Per patient, he was diagnosed with diabetes at age 68.  He as initially on Metformin for many years.  In 2015 Amaryl was added when he was on Prednisone intermittently for itching.  His blood sugars worsened when he was started on Remeron for sleep in December 2015 therefore his Amaryl was increased to 8 mg daily.  This was later decreased to 4 mg due to hypoglycemia. Januvia was cost prohibitive for him. August 2018 Hemoglobin A1c was 5.8%. May 2019 hemoglobin A1c is 5.4%.  Amaryl was decreased to 1 mg twice daily.  January 2020 hemoglobin A1c 5.9, May 2021 hemoglobin A1c 6.0, September 2021 hemoglobin A1c 6.5, we had briefly consider Jardiance for the patient but because this was about $600 per month, this was not an option for him.    Interval History: February 2022 hemoglobin A1c 5.4.  He is on Metformin 1000 mg twice daily and Amaryl 1 mg twice daily.  He denies any problems with hypoglycemia.     Blood Glucose Monitoring:  Not available    Diabetes monitoring and complications:  CAD: Yes, hx of MI  Last eye exam results: Negative in December 2020  Microalbuminuria:  negative (6/4/2019)  Neuropathy: Improved per patient report  HTN: No  On Statin: Yes  On Aspirin: Yes  Depression: No    #2 Leg Ulcers  Patient has a history of painful leg ulcers    Interval history: Per patient report, this has improved    #3 compression fracture  Noted incidentally on T11 on January 2022 CT scan.    #4 right renal lesion  Noted incidentally on January 2022 CT scan.  Apparently this has not significantly changed compared from November 2020-renal ultrasound for evaluation recommended.  Per patient and patient's son report, observation was recommended due to stability and patient's age.    #5 poor hearing  The patient reports it is difficult for him to hear.  This is confirmed by his son.    #6 poor appetite with weight loss  Patient report, he has a poor appetite with weight loss.  He is just not interested in eating.  Per son's report, he has been started on Prozac which has been uptitrated.    #7 need for social support  Patient currently living with son.  The son anticipates they may need a PCA in the near future.    #8 diffuse pruritus  Per patient report, he has diffuse pruritus and dry skin.  Comprehensive metabolic panel showed normal LFTs and bilirubin in January 2022.  This has been present for several months.    Review of Systems:   Pertinent items are noted in HPI.  All other systems are negative.    Active Medications:   Current Outpatient Medications   Medication Sig Dispense Refill     Alcohol Swabs PADS 1 pad 4 times daily 100 each 6     allopurinol (ZYLOPRIM) 300 MG tablet Take 1 tablet (300 mg) by mouth daily 100 tablet 0     ammonium lactate (LAC-HYDRIN) 12 % external lotion Apply topically 2 times daily Apply twice daily to feet 500 g 1     aspirin 81 MG tablet Take 81 mg by mouth daily.       atorvastatin (LIPITOR) 40 MG tablet Take 1 tablet (40 mg) by mouth daily 90 tablet 1     augmented betamethasone dipropionate (DIPROLENE-AF) 0.05 % external ointment Apply twice daily as  needed for rash on the leg or back 45 g 11     augmented betamethasone dipropionate (DIPROLENE-AF) 0.05 % ointment Apply topically 2 times daily As needed to itchy areas on back 50 g 3     betamethasone dipropionate (DIPROSONE) 0.05 % external ointment At bedtime apply to ankle and cover with saran wrap (or sock) overnight, wash off in morning 50 g 1     blood glucose (NO BRAND SPECIFIED) lancets standard Use to test blood sugar 3 times daily or as directed. 100 each 11     blood glucose (NO BRAND SPECIFIED) test strip Use to test blood sugar 4 times daily  With meter/ strips/ lancets covered by insurance pt using accuchek 360 each 3     Blood Glucose Monitoring Suppl (BLOOD GLUCOSE MONITOR SYSTEM) w/Device KIT Test 4 times daily with meter covered by  insurance 1 kit 1     capsaicin (ZOSTRIX) 0.075 % cream Apply topically 3 times daily To areas of itch on back.  OK to dilute with cetaphil cream if too burning. 60 g 3     Cholecalciferol (VITAMIN D) 2000 UNITS CAPS Take 1 tablet by mouth daily       clobetasol (TEMOVATE) 0.05 % external cream Apply twice daily as needed for itching or rash on the back 60 g 5     clopidogrel (PLAVIX) 75 MG tablet Take 1 tablet (75 mg) by mouth daily 100 tablet 0     colchicine (COLCYRS) 0.6 MG tablet Take 1 tablet (0.6 mg) by mouth daily 100 tablet 0     Cyanocobalamin (VITAMIN B12 PO) Take 1 tablet by mouth daily       Dupilumab (DUPIXENT) 300 MG/2ML syringe Inject 2 mLs (300 mg) Subcutaneous every 14 days 4 mL 3     empagliflozin (JARDIANCE) 10 MG TABS tablet Take 1 tablet (10 mg) by mouth daily INSTEAD OF AMARYL 30 tablet 3     FLUoxetine (PROZAC) 10 MG capsule Take 1 capsule (10 mg) by mouth daily 90 capsule 3     furosemide (LASIX) 40 MG tablet Take 1 tablet (40 mg) by mouth daily 90 tablet 3     Ginkgo Biloba (GNP GINGKO BILOBA EXTRACT PO) Take 1 tablet by mouth daily        glimepiride (AMARYL) 1 MG tablet TAKE 2 tablets in the AM and 1 tablet in the  tablet 3      indapamide (LOZOL) 2.5 MG tablet Take 2.5 mg by mouth every morning.       ipratropium (ATROVENT) 0.03 % nasal spray Spray 2 sprays into both nostrils as needed for rhinitis 30min before cooking 30 mL 1     irbesartan (AVAPRO) 150 MG tablet Take 1 tablet (150 mg) by mouth At Bedtime 90 tablet 1     isosorbide mononitrate (IMDUR) 30 MG 24 hr tablet Take 1 tablet (30 mg) by mouth daily 90 tablet 3     ketoconazole (NIZORAL) 2 % external cream Apply topically daily       metFORMIN (GLUCOPHAGE) 1000 MG tablet Take 1 tablet (1,000 mg) by mouth 2 times daily (with meals) 180 tablet 3     methotrexate 2.5 MG tablet Take 15 mg by mouth       multivitamin, therapeutic with minerals (MULTI-VITAMIN) TABS Take 1 tablet by mouth daily.       mupirocin (BACTROBAN) 2 % external ointment Apply topically 2 times daily 30 g 0     nitroglycerin (NITROSTAT) 0.4 MG SL tablet Place under the tongue as needed       omega-3 acid ethyl esters (LOVAZA) 1 g capsule Take 2 capsules (2 g) by mouth 2 times daily 180 capsule 3     potassium chloride SA (K-DUR/KLOR-CON M) 20 MEQ CR tablet TAKE 1 TABLET THREE TIMES DAILY 270 tablet 1     predniSONE (DELTASONE) 20 MG tablet TAKE 1 TABLET BY MOUTH ONCE DAILY FOR 7 DAYS       spironolactone (ALDACTONE) 25 MG tablet Take 1 tablet (25 mg) by mouth daily 90 tablet 1     tamsulosin (FLOMAX) 0.4 MG capsule Take 1 capsule (0.4 mg) by mouth daily 100 capsule 0     torsemide (DEMADEX) 10 MG tablet Take 10 mg by mouth daily        traZODone (DESYREL) 150 MG tablet Take 1 tablet (150 mg) by mouth At Bedtime 90 tablet 2     triamcinolone (KENALOG) 0.1 % external ointment APPLY TWICE DAILY TO RAISED RASH AREAS ON THE ARMS, LEGS, BODY AS NEEDED. 454 g 1     Allergies:   Beta adrenergic blockers; Latex; Latex; and Tape [adhesive tape]      Past Medical History:  Atopic dermatitis   Diabetes mellitus, type II  Eczema  Gout   Heart attack  Hyperglycemia  Hypertension  Notalgia paresthetica  Pseudophakia of both  eyes    Past Surgical History:  Cardiac surgery, four vessel bypass (1984)  Cholecystectomy  Phacoemulsification with standard intraocular lens implant (1/21/2013)    Family History:   No family history of skin cancer.      Social History:   Tobacco Use: former smoker, quit 1984  Alcohol Use: occasionally, once a year  Drug Use: none   PCP: MARCIA MOTA      Physical Exam:   Blood pressure 104/63.  GENERAL APPEARANCE: Alert and no distress  NECK: No lymphadenopathy appreciated  Thyroid: No obvious nodules palpated   CV: slightly irregular (previous hx of sinus arrhythmia)  Lungs: CTA bilaterally  Abdomen: Soft, Nontender, non distended, positive bowel sounds   Neuro: pt in wheel chair  Skin: No infection in feet   Mood: Normal         Data:  Office Visit on 02/18/2022   Component Date Value Ref Range Status     Hemoglobin A1C POCT 02/18/2022 5.4  4.3 - <5.7 % Final   Lab on 01/24/2022   Component Date Value Ref Range Status     Sodium 01/24/2022 143  133 - 144 mmol/L Final     Potassium 01/24/2022 4.4  3.4 - 5.3 mmol/L Final     Chloride 01/24/2022 107  94 - 109 mmol/L Final     Carbon Dioxide (CO2) 01/24/2022 25  20 - 32 mmol/L Final     Anion Gap 01/24/2022 11  3 - 14 mmol/L Final     Urea Nitrogen 01/24/2022 14  7 - 30 mg/dL Final     Creatinine 01/24/2022 1.24  0.66 - 1.25 mg/dL Final     Calcium 01/24/2022 9.3  8.5 - 10.1 mg/dL Final     Glucose 01/24/2022 92  70 - 99 mg/dL Final     Alkaline Phosphatase 01/24/2022 62  40 - 150 U/L Final     AST 01/24/2022 17  0 - 45 U/L Final     ALT 01/24/2022 26  0 - 70 U/L Final     Protein Total 01/24/2022 6.7 (A) 6.8 - 8.8 g/dL Final     Albumin 01/24/2022 3.4  3.4 - 5.0 g/dL Final     Bilirubin Total 01/24/2022 0.7  0.2 - 1.3 mg/dL Final     GFR Estimate 01/24/2022 58 (A) >60 mL/min/1.73m2 Final    Effective December 21, 2021 eGFRcr in adults is calculated using the 2021 CKD-EPI creatinine equation which includes age and gender (Cailin et al., NEJM, DOI:  10.1056/ZVFZmt0256981)     WBC Count 01/24/2022 10.2  4.0 - 11.0 10e3/uL Final     RBC Count 01/24/2022 4.17 (A) 4.40 - 5.90 10e6/uL Final     Hemoglobin 01/24/2022 13.0 (A) 13.3 - 17.7 g/dL Final     Hematocrit 01/24/2022 38.2 (A) 40.0 - 53.0 % Final     MCV 01/24/2022 92  78 - 100 fL Final     MCH 01/24/2022 31.2  26.5 - 33.0 pg Final     MCHC 01/24/2022 34.0  31.5 - 36.5 g/dL Final     RDW 01/24/2022 13.3  10.0 - 15.0 % Final     Platelet Count 01/24/2022 185  150 - 450 10e3/uL Final     % Neutrophils 01/24/2022 70  % Final     % Lymphocytes 01/24/2022 14  % Final     % Monocytes 01/24/2022 7  % Final     % Eosinophils 01/24/2022 7  % Final     % Basophils 01/24/2022 1  % Final     % Immature Granulocytes 01/24/2022 1  % Final     NRBCs per 100 WBC 01/24/2022 0  <1 /100 Final     Absolute Neutrophils 01/24/2022 7.2  1.6 - 8.3 10e3/uL Final     Absolute Lymphocytes 01/24/2022 1.4  0.8 - 5.3 10e3/uL Final     Absolute Monocytes 01/24/2022 0.8  0.0 - 1.3 10e3/uL Final     Absolute Eosinophils 01/24/2022 0.7  0.0 - 0.7 10e3/uL Final     Absolute Basophils 01/24/2022 0.1  0.0 - 0.2 10e3/uL Final     Absolute Immature Granulocytes 01/24/2022 0.1  <=0.4 10e3/uL Final     Absolute NRBCs 01/24/2022 0.0  10e3/uL Final   Hospital Outpatient Visit on 01/03/2022   Component Date Value Ref Range Status     Creatinine POCT 01/03/2022 1.2  0.7 - 1.3 mg/dL Final     GFR, ESTIMATED POCT 01/03/2022 60 (A) >60 mL/min/1.73m2 Final   Office Visit on 12/22/2021   Component Date Value Ref Range Status     Ventricular Rate 12/22/2021 75  BPM Final     Atrial Rate 12/22/2021 75  BPM Final     MN Interval 12/22/2021 236  ms Final     QRS Duration 12/22/2021 160  ms Final     QT 12/22/2021 434  ms Final     QTc 12/22/2021 484  ms Final     P Axis 12/22/2021 36  degrees Final     R AXIS 12/22/2021 -75  degrees Final     T Axis 12/22/2021 63  degrees Final     Interpretation ECG 12/22/2021    Final       Assessment and Plan:    #1 Type II  diabetes  Hemoglobin A1c acceptable.  No problems with hypoglycemia.  Creatinine normal as of January 2022.  Jardiance expensive ($600 per month) we will have patient continue with Metformin 1000 mg twice daily and glimepiride 1 mg twice daily.    #2 Leg Ulcers  Improved  #3 compression fracture  Noted incidentally on T11 on January 2022 CT scan.  Will get DEXA scan.    #4 right renal lesion  Noted stability.  Will defer to primary provider.    #5 poor hearing  The patient reports it is difficult for him to hear.  Audiology referral made.     #6 poor appetite with weight loss  Uncertain for reason.  I think this may be related to depression.  Will defer to primary provider.    #7 need for social support  Referral made for social work    #8 diffuse pruritus  Recommended Sarna cream and Vanicream as well for the patient increase hydration.      Return in 7 months    Orders Placed This Encounter   Procedures     Dexa hip/pelvis/spine     Adult Audiology Referral     Social Work Referral (Only to be ordered at: [facility]/PWB/[facility] ONLY)     Hemoglobin A1c POCT     Again, thank you for allowing me to participate in the care of your patient.      Sincerely,    Trudy Campa MD

## 2022-02-18 NOTE — PATIENT INSTRUCTIONS
Drink more water    -    vanicream lite for dry skin    -  Sarna for itching for dry skin  -  Continue with the metformin at 1000 mg twice daily    Continue with glimepride at 1 mg twice daily    -  Component      Latest Ref Rng & Units 1/24/2022   Sodium      133 - 144 mmol/L 143   Potassium      3.4 - 5.3 mmol/L 4.4   Chloride      94 - 109 mmol/L 107   Carbon Dioxide      20 - 32 mmol/L 25   Anion Gap      3 - 14 mmol/L 11   Urea Nitrogen      7 - 30 mg/dL 14   Creatinine      0.66 - 1.25 mg/dL 1.24   Calcium      8.5 - 10.1 mg/dL 9.3   Glucose      70 - 99 mg/dL 92   Alkaline Phosphatase      40 - 150 U/L 62   AST      0 - 45 U/L 17   ALT      0 - 70 U/L 26   Protein Total      6.8 - 8.8 g/dL 6.7 (L)   Albumin      3.4 - 5.0 g/dL 3.4   Bilirubin Total      0.2 - 1.3 mg/dL 0.7   GFR Estimate      >60 mL/min/1.73m2 58 (L)

## 2022-03-02 ENCOUNTER — PATIENT OUTREACH (OUTPATIENT)
Dept: CARE COORDINATION | Facility: CLINIC | Age: 82
End: 2022-03-02
Payer: COMMERCIAL

## 2022-03-03 ENCOUNTER — PRE VISIT (OUTPATIENT)
Dept: UROLOGY | Facility: CLINIC | Age: 82
End: 2022-03-03
Payer: COMMERCIAL

## 2022-03-03 NOTE — TELEPHONE ENCOUNTER
Reason for visit: consult     Relevant information: bph    Records/imaging/labs/orders: in epic    Pt called: no    At Rooming: paper maurizio

## 2022-03-04 NOTE — PROGRESS NOTES
Social Work Telephone Message Note  [facility] [place]    Patient Name:  Deandre Moore  /Age:  1940 (81 year old)    Referral Source: Dr Trudy Campa  Reason for Referral:  Contact Pt's son re PCA questions    Contacted Patient's son Lucio on 2022 and 3/2/2022. Messages were left on Lucio's voicemail. Will await return phone call and will provide assistance at that time.    UGO Garcia, [facility]    [facility] [place]  [phone]

## 2022-03-07 ENCOUNTER — ANCILLARY PROCEDURE (OUTPATIENT)
Dept: BONE DENSITY | Facility: CLINIC | Age: 82
End: 2022-03-07
Attending: INTERNAL MEDICINE
Payer: COMMERCIAL

## 2022-03-07 DIAGNOSIS — Z87.310 PERSONAL HISTORY OF (HEALED) OSTEOPOROSIS FRACTURE: ICD-10-CM

## 2022-03-07 DIAGNOSIS — Z87.310 HX OF HEALED OSTEOPOROSIS FRACTURE: ICD-10-CM

## 2022-03-07 DIAGNOSIS — M80.88XD OSTEOPOROTIC COMPRESSION FRACTURE OF SPINE, WITH ROUTINE HEALING, SUBSEQUENT ENCOUNTER: ICD-10-CM

## 2022-03-07 PROCEDURE — 77080 DXA BONE DENSITY AXIAL: CPT | Performed by: INTERNAL MEDICINE

## 2022-03-07 PROCEDURE — 77081 DXA BONE DENSITY APPENDICULR: CPT | Mod: XU | Performed by: INTERNAL MEDICINE

## 2022-03-07 NOTE — LETTER
"Patient:  Deandre Moore  :   1940  MRN:     [id number]        Mr.Richard LELAND Moore  [address] [facility] [address]        2022    Dear Deandre    Here is your bone density which actually looks pretty good.  You do not have osteoporosis.  You only have low bone density.  Your risk for any fracture is 5.7% over 10 years and a risk for hip fracture is roughly 1.7% over 10 years.  This is very low. Considering that you did have a fracture of your back, we \"could\" consider an infusion that will take 1/2 hour and reduce your risk for fracture by 60-70^. Otherwise, we would consider repeat bone density in .    If you have any questions, please feel free to contact my nurse at [phone] select option #3 for triage nurse  or  option #1 for scheduling related questions.    Regards    Trudy Campa MD     Resulted Orders   Dexa hip/pelvis/spine    [address] [address]  Phone: [phone]   Fax: [phone]      Patient name:   Deandre Moore  Patient demographics:  81 year old Black or  Male   History:  Diabetes, Family History of Fracture and Tobacco Habit - Past  Current treatments:  Cholesterol Lowering Drugs, Diabetes Medications,   Diuretics, Vitamin D  Scan:    [facility]        Impression  Based on BMD diagnosis is consistent with low bone density based on WHO   criteria Ref. 1    Results   Lumbar spine was excluded due to surgical hardware and degenerative   changes    Left femoral neck  T-score -0.4     Right femoral neck  T-score -0.3     Left Total femur  T-score 0.4 , BMD is 1.156 g/cm2.    Right total femur  T-score -0.1, BMD is 1.090 g/cm2.    Radius: T-score -1.3, BMD 0.689 g/cm2    Interval change  No prior study available for comparison    Fracture risk   Fracture risk calculation is not indicated. Ref.4    Repeat  Recommend repeat DXA in 4 " years.    Principal result :  Hilda Dhillon MD, NOELLE MICHELLE  Division of Endocrinology and Diabetes    Department of Medicine    Technical quality  Satisfactory.   The spine was excluded from analysis. A distal radius was added as   additional site of analysis. Results of the the distal radius are taken   inconsideration in this report, for images and BMD values compare separate   PACS report    References:  Ref. 1. WHO categories:          T-score > -1.0  = normal             .                                T-score -1.0 to -2.5 = low bone density  T-score < -2.5 = osteoporosis        .     Ref. 2. 2015 ISCD official position statements:  www.iscd.org.     Ref. 3.  Today's examination is compared to the technically similar prior   study of the total hip and femur if available. Only changes deemed likely   to be significant based on historical data are reported.  According to the ISCD position statements, total hip rather than femoral   neck regions are to be compared because larger areas give better   precision.  LSC = least significant changes at the [facility] (historical   data)   AP spine =  0.032 g/cm2  (6/26/2007)   Left hip = 0.029 g/cm2  (6/15/2007)   Right hip = 0.018 g/cm2  (6/15/2007)   Left mid radius = 0.043 g/cm2  (6/26/2007)  Please note that the differential diagnosis of increase in bone density at   the lumbar spine includes improvement due to pharmacotherapy vs   inter-current progression of spine degeneration or fracture.    Ref. 4 Fracture risk is calculated in patients aged 40 to 90 years old   with low bone density not on osteoporosis treatment. A 10 year fracture   risk of 3% and higher for hip fracture and 20% and higher for major   osteoporotic fracture is considered higher than acceptable risk and might   be an indication for medical treatment.     Ref. 5.  By definition, osteoporosis may be diagnosed in the presence or   with the history of a low trauma or fragility  fracture.  Fragility and low   trauma fracture is defined as a fracture resulting from the force of a   fall from a standing height or less or a bone that breaks under conditions   that would not cause a normal bone to break.       Ref. 6. NOF Physician's Guideline Website address:  www.nof.org.          [facility] BONE DENSITOMETRY

## 2022-03-07 NOTE — LETTER
"Patient:  Deandre Moore  :   1940  MRN:     [id number]        Mr.Richard LELAND Moore  [address] [facility] [address]        2022    Dear Deandre    Here is your bone density which actually looks pretty good.  You do not have osteoporosis.  You only have low bone density.  Your risk for any fracture is 5.7% over 10 years and a risk for hip fracture is roughly 1.7% over 10 years.  This is very low. Considering that you did have a fracture of your back, we \"could\" consider an infusion that will take 1/2 hour and reduce your risk for fracture by 60-70^. Otherwise, we would consider repeat bone density in .    If you have any questions, please feel free to contact my nurse at [phone] select option #3 for triage nurse  or  option #1 for scheduling related questions.    Regards    Trudy Campa MD     Resulted Orders   Dexa hip/pelvis/spine    [address] [address]  Phone: [phone]   Fax: [phone]      Patient name:   Deandre Moore  Patient demographics:  81 year old Black or  Male   History:  Diabetes, Family History of Fracture and Tobacco Habit - Past  Current treatments:  Cholesterol Lowering Drugs, Diabetes Medications,   Diuretics, Vitamin D  Scan:    [email or web address]        Impression  Based on BMD diagnosis is consistent with low bone density based on WHO   criteria Ref. 1    Results   Lumbar spine was excluded due to surgical hardware and degenerative   changes    Left femoral neck  T-score -0.4     Right femoral neck  T-score -0.3     Left Total femur  T-score 0.4 , BMD is 1.156 g/cm2.    Right total femur  T-score -0.1, BMD is 1.090 g/cm2.    Radius: T-score -1.3, BMD 0.689 g/cm2    Interval change  No prior study available for comparison    Fracture risk   Fracture risk calculation is not indicated. Ref.4    Repeat  Recommend repeat DXA in 4 " years.    Principal result :  Hilda Dhillon MD, NOELLE MICHELLE  Division of Endocrinology and Diabetes    Department of Medicine    Technical quality  Satisfactory.   The spine was excluded from analysis. A distal radius was added as   additional site of analysis. Results of the the distal radius are taken   inconsideration in this report, for images and BMD values compare separate   PACS report    References:  Ref. 1. WHO categories:          T-score > -1.0  = normal             .                                T-score -1.0 to -2.5 = low bone density  T-score < -2.5 = osteoporosis        .     Ref. 2. 2015 ISCD official position statements:  www.iscd.org.     Ref. 3.  Today's examination is compared to the technically similar prior   study of the total hip and femur if available. Only changes deemed likely   to be significant based on historical data are reported.  According to the ISCD position statements, total hip rather than femoral   neck regions are to be compared because larger areas give better   precision.  LSC = least significant changes at the [facility] (historical   data)   AP spine =  0.032 g/cm2  (6/26/2007)   Left hip = 0.029 g/cm2  (6/15/2007)   Right hip = 0.018 g/cm2  (6/15/2007)   Left mid radius = 0.043 g/cm2  (6/26/2007)  Please note that the differential diagnosis of increase in bone density at   the lumbar spine includes improvement due to pharmacotherapy vs   inter-current progression of spine degeneration or fracture.    Ref. 4 Fracture risk is calculated in patients aged 40 to 90 years old   with low bone density not on osteoporosis treatment. A 10 year fracture   risk of 3% and higher for hip fracture and 20% and higher for major   osteoporotic fracture is considered higher than acceptable risk and might   be an indication for medical treatment.     Ref. 5.  By definition, osteoporosis may be diagnosed in the presence or   with the history of a low trauma or fragility  fracture.  Fragility and low   trauma fracture is defined as a fracture resulting from the force of a   fall from a standing height or less or a bone that breaks under conditions   that would not cause a normal bone to break.       Ref. 6. NOF Physician's Guideline Website address:  www.nof.org.          [facility] BONE DENSITOMETRY

## 2022-03-08 DIAGNOSIS — E11.9 TYPE 2 DIABETES MELLITUS WITHOUT COMPLICATION, WITHOUT LONG-TERM CURRENT USE OF INSULIN (H): ICD-10-CM

## 2022-03-09 ENCOUNTER — TELEPHONE (OUTPATIENT)
Dept: ENDOCRINOLOGY | Facility: CLINIC | Age: 82
End: 2022-03-09
Payer: COMMERCIAL

## 2022-03-09 NOTE — RESULT ENCOUNTER NOTE
"Dear Deandre    Here is your bone density which actually looks pretty good.  You do not have osteoporosis.  You only have low bone density.  Your risk for any fracture is 5.7% over 10 years and a risk for hip fracture is roughly 1.7% over 10 years.  This is very low. Considering that you did have a fracture of your back, we \"could\" consider an infusion that will take 1/2 hour and reduce your risk for fracture by 60-70^. Otherwise, we would consider repeat bone density in 2024.    If you have any questions, please feel free to contact my nurse at [phone] select option #3 for triage nurse  or  option #1 for scheduling related questions.    Regards    Trudy Campa MD "

## 2022-03-09 NOTE — TELEPHONE ENCOUNTER
"Called pt  - left message.     -  Dear Deandre    Here is your bone density which actually looks pretty good.  You do not have osteoporosis.  You only have low bone density.  Your risk for any fracture is 5.7% over 10 years and a risk for hip fracture is roughly 1.7% over 10 years.  This is very low. Considering that you did have a fracture of your back, we \"could\" consider an infusion that will take 1/2 hour and reduce your risk for fracture by 60-70^. Otherwise, we would consider repeat bone density in 2024.    If you have any questions, please feel free to contact my nurse at [phone] select option #3 for triage nurse  or  option #1 for scheduling related questions.    Regards    Trudy Campa MD   "

## 2022-03-10 ENCOUNTER — OFFICE VISIT (OUTPATIENT)
Dept: UROLOGY | Facility: CLINIC | Age: 82
End: 2022-03-10
Payer: COMMERCIAL

## 2022-03-10 VITALS
HEIGHT: 73 IN | DIASTOLIC BLOOD PRESSURE: 66 MMHG | WEIGHT: 200 LBS | BODY MASS INDEX: 26.51 KG/M2 | HEART RATE: 69 BPM | SYSTOLIC BLOOD PRESSURE: 116 MMHG

## 2022-03-10 DIAGNOSIS — N40.1 BPH WITH URINARY OBSTRUCTION: ICD-10-CM

## 2022-03-10 DIAGNOSIS — R35.0 URINARY FREQUENCY: Primary | ICD-10-CM

## 2022-03-10 DIAGNOSIS — N13.8 BPH WITH URINARY OBSTRUCTION: ICD-10-CM

## 2022-03-10 LAB
ALBUMIN UR-MCNC: NEGATIVE MG/DL
APPEARANCE UR: CLEAR
BILIRUB UR QL STRIP: NEGATIVE
COLOR UR AUTO: YELLOW
GLUCOSE UR STRIP-MCNC: NEGATIVE MG/DL
HGB UR QL STRIP: NEGATIVE
HYALINE CASTS: 3 /LPF
KETONES UR STRIP-MCNC: NEGATIVE MG/DL
LEUKOCYTE ESTERASE UR QL STRIP: NEGATIVE
MUCOUS THREADS #/AREA URNS LPF: PRESENT /LPF
NITRATE UR QL: NEGATIVE
PH UR STRIP: 6 [PH] (ref 5–7)
RBC URINE: <1 /HPF
SP GR UR STRIP: 1.01 (ref 1–1.03)
UROBILINOGEN UR STRIP-MCNC: NORMAL MG/DL
WBC URINE: 1 /HPF

## 2022-03-10 PROCEDURE — 51798 US URINE CAPACITY MEASURE: CPT | Performed by: PHYSICIAN ASSISTANT

## 2022-03-10 PROCEDURE — 99213 OFFICE O/P EST LOW 20 MIN: CPT | Mod: 25 | Performed by: PHYSICIAN ASSISTANT

## 2022-03-10 PROCEDURE — 81001 URINALYSIS AUTO W/SCOPE: CPT | Performed by: PATHOLOGY

## 2022-03-10 RX ORDER — FINASTERIDE 5 MG/1
5 TABLET, FILM COATED ORAL DAILY
Qty: 90 TABLET | Refills: 2 | Status: SHIPPED | OUTPATIENT
Start: 2022-03-10 | End: 2022-01-01

## 2022-03-10 ASSESSMENT — PAIN SCALES - GENERAL: PAINLEVEL: NO PAIN (0)

## 2022-03-10 NOTE — NURSING NOTE
"Chief Complaint   Patient presents with     Consult     Exam; lower urinary tract systems       Blood pressure 116/66, pulse 69, height 1.854 m (6' 1\"), weight 90.7 kg (200 lb). Body mass index is 26.39 kg/m .    Patient Active Problem List   Diagnosis     Itching     AD (atopic dermatitis)     Dermatitis     Hyperglycemia     Eczema, unspecified eczema     Intrinsic atopic dermatitis     Other eczema     Notalgia paresthetica     Rash     Pseudophakia of both eyes     Diabetes mellitus (H)     Presbyopia     Type 2 diabetes mellitus without complication, without long-term current use of insulin (H)     Encounter for long-term (current) use of high-risk medication     Seborrheic keratoses, inflamed     Neoplasm of uncertain behavior of skin     Chronic kidney disease, stage 3 (H)     Lumbosacral spondylosis without myelopathy     Hx of CABG     Gout     Essential hypertension     Erectile dysfunction     Edema     Dyslipidemia     Decreased range of motion of ankle     Coronary atherosclerosis     Vitamin D deficiency     Ventricular ectopy     Type 2 diabetes mellitus with diabetic neuropathy (H)     Supraventricular tachycardia (H)     Renal insufficiency syndrome     Pneumothorax     Plantar fascia syndrome     Personal history of tobacco use, presenting hazards to health       Allergies   Allergen Reactions     Beta Adrenergic Blockers Unknown     Latex Dermatitis     Latex Rash     Tape [Adhesive Tape] Dermatitis and Rash     Electrode patches       Current Outpatient Medications   Medication Sig Dispense Refill     Alcohol Swabs PADS 1 pad 4 times daily 100 each 6     allopurinol (ZYLOPRIM) 300 MG tablet Take 1 tablet (300 mg) by mouth daily 100 tablet 0     ammonium lactate (LAC-HYDRIN) 12 % external lotion Apply topically 2 times daily Apply twice daily to feet 500 g 1     aspirin 81 MG tablet Take 81 mg by mouth daily.       atorvastatin (LIPITOR) 40 MG tablet Take 1 tablet (40 mg) by mouth daily 90 tablet " 1     augmented betamethasone dipropionate (DIPROLENE-AF) 0.05 % external ointment Apply twice daily as needed for rash on the leg or back 45 g 11     augmented betamethasone dipropionate (DIPROLENE-AF) 0.05 % ointment Apply topically 2 times daily As needed to itchy areas on back 50 g 3     betamethasone dipropionate (DIPROSONE) 0.05 % external ointment At bedtime apply to ankle and cover with saran wrap (or sock) overnight, wash off in morning 50 g 1     blood glucose (NO BRAND SPECIFIED) lancets standard Use to test blood sugar 3 times daily or as directed. 100 each 11     blood glucose (NO BRAND SPECIFIED) test strip Use to test blood sugar 4 times daily  With meter/ strips/ lancets covered by insurance pt using accuchek 360 each 3     Blood Glucose Monitoring Suppl (BLOOD GLUCOSE MONITOR SYSTEM) w/Device KIT Test 4 times daily with meter covered by  insurance 1 kit 1     capsaicin (ZOSTRIX) 0.075 % cream Apply topically 3 times daily To areas of itch on back.  OK to dilute with cetaphil cream if too burning. 60 g 3     Cholecalciferol (VITAMIN D) 2000 UNITS CAPS Take 1 tablet by mouth daily       clobetasol (TEMOVATE) 0.05 % external cream Apply twice daily as needed for itching or rash on the back 60 g 5     clopidogrel (PLAVIX) 75 MG tablet Take 1 tablet (75 mg) by mouth daily 100 tablet 0     colchicine (COLCYRS) 0.6 MG tablet Take 1 tablet (0.6 mg) by mouth daily 100 tablet 0     Cyanocobalamin (VITAMIN B12 PO) Take 1 tablet by mouth daily       Dupilumab (DUPIXENT) 300 MG/2ML syringe Inject 2 mLs (300 mg) Subcutaneous every 14 days 4 mL 3     FLUoxetine (PROZAC) 10 MG capsule Take 1 capsule (10 mg) by mouth daily 90 capsule 3     furosemide (LASIX) 40 MG tablet Take 1 tablet (40 mg) by mouth daily 90 tablet 3     Ginkgo Biloba (GNP GINGKO BILOBA EXTRACT PO) Take 1 tablet by mouth daily        glimepiride (AMARYL) 1 MG tablet Take 1 tablet (1 mg) by mouth 2 times daily 180 tablet 3     indapamide (LOZOL)  2.5 MG tablet Take 2.5 mg by mouth every morning.       ipratropium (ATROVENT) 0.03 % nasal spray Spray 2 sprays into both nostrils as needed for rhinitis 30min before cooking 30 mL 1     irbesartan (AVAPRO) 150 MG tablet Take 1 tablet (150 mg) by mouth At Bedtime 90 tablet 1     isosorbide mononitrate (IMDUR) 30 MG 24 hr tablet Take 1 tablet (30 mg) by mouth daily 90 tablet 3     ketoconazole (NIZORAL) 2 % external cream Apply topically daily       metFORMIN (GLUCOPHAGE) 1000 MG tablet Take 1 tablet (1,000 mg) by mouth 2 times daily (with meals) 180 tablet 3     methotrexate 2.5 MG tablet Take 15 mg by mouth       multivitamin, therapeutic with minerals (MULTI-VITAMIN) TABS Take 1 tablet by mouth daily.       mupirocin (BACTROBAN) 2 % external ointment Apply topically 2 times daily 30 g 0     nitroglycerin (NITROSTAT) 0.4 MG SL tablet Place under the tongue as needed       omega-3 acid ethyl esters (LOVAZA) 1 g capsule Take 2 capsules (2 g) by mouth 2 times daily 180 capsule 3     potassium chloride SA (K-DUR/KLOR-CON M) 20 MEQ CR tablet TAKE 1 TABLET THREE TIMES DAILY 270 tablet 1     predniSONE (DELTASONE) 20 MG tablet TAKE 1 TABLET BY MOUTH ONCE DAILY FOR 7 DAYS       spironolactone (ALDACTONE) 25 MG tablet Take 1 tablet (25 mg) by mouth daily 90 tablet 1     tamsulosin (FLOMAX) 0.4 MG capsule Take 1 capsule (0.4 mg) by mouth daily 100 capsule 0     torsemide (DEMADEX) 10 MG tablet Take 10 mg by mouth daily        traZODone (DESYREL) 150 MG tablet Take 1 tablet (150 mg) by mouth At Bedtime 90 tablet 2     triamcinolone (KENALOG) 0.1 % external ointment APPLY TWICE DAILY TO RAISED RASH AREAS ON THE ARMS, LEGS, BODY AS NEEDED. 454 g 1       Social History     Tobacco Use     Smoking status: Former Smoker     Quit date: 10/15/1984     Years since quittin.4     Smokeless tobacco: Never Used   Substance Use Topics     Alcohol use: Yes     Comment: occasional-once a year     Drug use: No       Tricia Daniels  EMT  3/10/2022  2:59 PM

## 2022-03-10 NOTE — PATIENT INSTRUCTIONS
PLAN:   - Urinalysis today  - Unable to get a uroflow today, but PVR was 98cc  - AUA SS was 2,3,0,0,3,1,1,2 (mixed)  - Continue tamsulosin 0.4mg daily   - Add finasteride 5mg daily - to help shrink the prostate - this medication may take several months before you realize a benefit.   - Return in 3-4 months    [facility]

## 2022-03-10 NOTE — LETTER
3/10/2022       RE: Deandre Moore  [address] [place] [facility] [address]     Dear Colleague,    Thank you for referring your patient, Deandre Moore, to the [facility] [place] at [facility]. Please see a copy of my visit note below.    It was my pleasure to meet Mr. Deandre Moore, a 81 year old year old male seen in consultation today at the request of Dr. Marquez for chief complaint: Consult (Exam; lower urinary tract systems)    Today he is accompanied by his son    HPI: Mr. Deandre Moore has PMH significant for HTN, HLD, CAD (s/p CABG, s/p stents - on plavix), chronic pruritus, chronic back pain, DM and BPH (on tamsulosin 0.8mg daily for many years).       -takes tamsulosin 0.8 mg at night for several years.  No lightheadedness   -Continues to have hesitancy and intermittency   -Does not feel he empties his bladder well   -nocturia x 1. No daytime frequency and urgency.  Outputs volumes are sporadic - sometimes only 50cc.  If he drinks water he urinates immediately,  - Has hesitancy.  No intermittency.    - No nocturnal enuresis.    - some issue with very mild leakage, but he does not notice when it is happening.  Just notices wet spot on underwear.   Postvoid dribble.    - no dysuria or gross hematuria   - drinks water and Sprite, orange and apple juice, 1-2 cups of coffee, Ensure, occasional Gatorade  - Bowels: Occasionally loose, but typically normal.  Uses stool softeners if he doesn't have a daily BM  - has itching, some in the groin. Uses cortisone cream and lotion.  This is chronic.     - IPP placed in [place] about 5 years ago.  He has never used it.  No pain  In penis or scrotum.  Has given up sexual activity due to his heart condition  - Last PSA in 2015 was 1.63ug/L    REVIEW OF DIAGNOSTICS:  6/19/17 - glucose 70, otherwise negative.    1/24/22 - sCr 1.24mg/dL    Past Medical History:   Diagnosis Date      Diabetes mellitus (H)      Gout attack      Heart attack (H) 1984     Hypertension      Stented coronary artery     6 stents in heart       Past Surgical History:   Procedure Laterality Date     CARDIAC SURGERY  1984    4 vessel bypass     CHOLECYSTECTOMY       COLONOSCOPY       DESTRUCTION OF PARAVERTEBRAL FACET LUMBAR / SACRAL ADDITIONAL Bilateral 10/18/2021    Procedure: DESTRUCTION, NERVE, FACET JOINT, LUMBOSACRAL, ADDITIONAL NERVE AFTER DESTRUCTION OF INITIAL LUMBOSACRAL FACET JOINT NERVE;  Surgeon: Horacio Gonsalves MD;  Location: [facility] OR     DESTRUCTION OF PARAVERTEBRAL FACET LUMBAR / SACRAL SINGLE Bilateral 10/18/2021    Procedure: DESTRUCTION, NERVE, FACET JOINT, LUMBOSACRAL, 1 NERVE;  Surgeon: Horacio Gonsalves MD;  Location: [facility] OR     ESOPHAGOSCOPY, GASTROSCOPY, DUODENOSCOPY (EGD), COMBINED N/A 3/1/2018    Procedure: COMBINED ESOPHAGOSCOPY, GASTROSCOPY, DUODENOSCOPY (EGD), BIOPSY SINGLE OR MULTIPLE;  ESOPHAGOGASTRODUODENOSCOPY ;  Surgeon: Daryn Medrano MD;  Location: [facility]     INJECT BLOCK MEDIAL BRANCH CERVICAL/THORACIC/LUMBAR Bilateral 8/16/2021    Procedure: BLOCK, NERVE, FACET JOINT, MEDIAL BRANCH, DIAGNOSTIC Lumbar 2-3-4;  Surgeon: Horacio Gonsalves MD;  Location: [facility] OR     INJECT BLOCK MEDIAL BRANCH CERVICAL/THORACIC/LUMBAR Bilateral 9/27/2021    Procedure: BLOCK, NERVE, FACET JOINT, MEDIAL BRANCH, DIAGNOSTIC  L2-4, Bilateral;  Surgeon: Horacio Gonsalves MD;  Location: [facility] OR     NO HISTORY OF SURGERY  3/31/14    derm     PHACOEMULSIFICATION WITH STANDARD INTRAOCULAR LENS IMPLANT  1/21/2013    Procedure: PHACOEMULSIFICATION WITH STANDARD INTRAOCULAR LENS IMPLANT;  Phacoemulsification with standard intraocular lens implant, right eye;  Surgeon: Jya Rodriges MD;  Location: [facility] OR       FAMILY HISTORY: Denies family history of prostate cancer    SOCIAL HISTORY:   Retired    reports that he quit smoking about 37 years ago. He has never used  smokeless tobacco.    Current Outpatient Medications   Medication Sig Dispense Refill     Alcohol Swabs PADS 1 pad 4 times daily 100 each 6     allopurinol (ZYLOPRIM) 300 MG tablet Take 1 tablet (300 mg) by mouth daily 100 tablet 0     ammonium lactate (LAC-HYDRIN) 12 % external lotion Apply topically 2 times daily Apply twice daily to feet 500 g 1     aspirin 81 MG tablet Take 81 mg by mouth daily.       atorvastatin (LIPITOR) 40 MG tablet Take 1 tablet (40 mg) by mouth daily 90 tablet 1     augmented betamethasone dipropionate (DIPROLENE-AF) 0.05 % external ointment Apply twice daily as needed for rash on the leg or back 45 g 11     augmented betamethasone dipropionate (DIPROLENE-AF) 0.05 % ointment Apply topically 2 times daily As needed to itchy areas on back 50 g 3     betamethasone dipropionate (DIPROSONE) 0.05 % external ointment At bedtime apply to ankle and cover with saran wrap (or sock) overnight, wash off in morning 50 g 1     blood glucose (NO BRAND SPECIFIED) lancets standard Use to test blood sugar 3 times daily or as directed. 100 each 11     blood glucose (NO BRAND SPECIFIED) test strip Use to test blood sugar 4 times daily  With meter/ strips/ lancets covered by insurance pt using accuchek 360 each 3     Blood Glucose Monitoring Suppl (BLOOD GLUCOSE MONITOR SYSTEM) w/Device KIT Test 4 times daily with meter covered by  insurance 1 kit 1     capsaicin (ZOSTRIX) 0.075 % cream Apply topically 3 times daily To areas of itch on back.  OK to dilute with cetaphil cream if too burning. 60 g 3     Cholecalciferol (VITAMIN D) 2000 UNITS CAPS Take 1 tablet by mouth daily       clobetasol (TEMOVATE) 0.05 % external cream Apply twice daily as needed for itching or rash on the back 60 g 5     clopidogrel (PLAVIX) 75 MG tablet Take 1 tablet (75 mg) by mouth daily 100 tablet 0     colchicine (COLCYRS) 0.6 MG tablet Take 1 tablet (0.6 mg) by mouth daily 100 tablet 0     Cyanocobalamin (VITAMIN B12 PO) Take 1 tablet  by mouth daily       Dupilumab (DUPIXENT) 300 MG/2ML syringe Inject 2 mLs (300 mg) Subcutaneous every 14 days 4 mL 3     FLUoxetine (PROZAC) 10 MG capsule Take 1 capsule (10 mg) by mouth daily 90 capsule 3     furosemide (LASIX) 40 MG tablet Take 1 tablet (40 mg) by mouth daily 90 tablet 3     Ginkgo Biloba (GNP GINGKO BILOBA EXTRACT PO) Take 1 tablet by mouth daily        glimepiride (AMARYL) 1 MG tablet Take 1 tablet (1 mg) by mouth 2 times daily 180 tablet 3     indapamide (LOZOL) 2.5 MG tablet Take 2.5 mg by mouth every morning.       ipratropium (ATROVENT) 0.03 % nasal spray Spray 2 sprays into both nostrils as needed for rhinitis 30min before cooking 30 mL 1     irbesartan (AVAPRO) 150 MG tablet Take 1 tablet (150 mg) by mouth At Bedtime 90 tablet 1     isosorbide mononitrate (IMDUR) 30 MG 24 hr tablet Take 1 tablet (30 mg) by mouth daily 90 tablet 3     ketoconazole (NIZORAL) 2 % external cream Apply topically daily       metFORMIN (GLUCOPHAGE) 1000 MG tablet Take 1 tablet (1,000 mg) by mouth 2 times daily (with meals) 180 tablet 3     methotrexate 2.5 MG tablet Take 15 mg by mouth       multivitamin, therapeutic with minerals (MULTI-VITAMIN) TABS Take 1 tablet by mouth daily.       mupirocin (BACTROBAN) 2 % external ointment Apply topically 2 times daily 30 g 0     nitroglycerin (NITROSTAT) 0.4 MG SL tablet Place under the tongue as needed       omega-3 acid ethyl esters (LOVAZA) 1 g capsule Take 2 capsules (2 g) by mouth 2 times daily 180 capsule 3     potassium chloride SA (K-DUR/KLOR-CON M) 20 MEQ CR tablet TAKE 1 TABLET THREE TIMES DAILY 270 tablet 1     predniSONE (DELTASONE) 20 MG tablet TAKE 1 TABLET BY MOUTH ONCE DAILY FOR 7 DAYS       spironolactone (ALDACTONE) 25 MG tablet Take 1 tablet (25 mg) by mouth daily 90 tablet 1     tamsulosin (FLOMAX) 0.4 MG capsule Take 1 capsule (0.4 mg) by mouth daily 100 capsule 0     torsemide (DEMADEX) 10 MG tablet Take 10 mg by mouth daily        traZODone  "(DESYREL) 150 MG tablet Take 1 tablet (150 mg) by mouth At Bedtime 90 tablet 2     triamcinolone (KENALOG) 0.1 % external ointment APPLY TWICE DAILY TO RAISED RASH AREAS ON THE ARMS, LEGS, BODY AS NEEDED. 454 g 1       ALLERGIES: Beta adrenergic blockers, Latex, Latex, and Tape [adhesive tape]      REVIEW OF SYSTEMS:  As above in HPI    GENERAL PHYSICAL EXAM:   Vitals: /66   Pulse 69   Ht 1.854 m (6' 1\")   Wt 90.7 kg (200 lb)   BMI 26.39 kg/m    Body mass index is 26.39 kg/m .    GENERAL: Well groomed, well developed, well nourished male in NAD. Walks with a cane  RESPIRATORY: No increased respiratory effort.   MS: Gait- walks slowly with a cane,  SKIN: Warm to touch, dry.  No visible rashes or lesions on examined areas.  HEMATOLOGIC/LYMPHATIC/IMMUNOLOGIC: normal ant/post cervical, supraclavicular and inguinal nodes. Trace BL LE edema.  NEURO: Alert and oriented x 3.  PSYCH: Normal mood and affect, pleasant and agreeable during interview and exam.     :      Inguinal: Circumcised penis with IPP cylinders palpable within shaft.  No tenderness      Pump in left hemiscrotum - no tenderness, edema, matting      Did not test functionality of IPP    AYDEN:      Normal rectal tone, moderate firm stool in the rectal vault.      Large sized prostate, without tenderness, nodules or asymmetry.    PVR: Residual urine by ultrasound was 98 ml.      RADIOLOGY: The following tests were reviewed:   CT CHEST/ABDOMEN/PELVIS WITH CONTRAST 1/3/2022 1:05 PM     CLINICAL HISTORY: Constipation. Weight loss.     TECHNIQUE: CT scan of the chest, abdomen, and pelvis was performed  following injection of IV contrast. Multiplanar reformats were  obtained. Dose reduction techniques were used.   CONTRAST: 106 mL [id number]     COMPARISON: Outside CT abdomen and pelvis 11/25/2020     FINDINGS:   LUNGS AND PLEURA: Minimal peripheral scarring or atelectasis in the  lung bases. Lungs are otherwise clear. No pleural " effusion.     MEDIASTINUM/AXILLAE: Evidence of previous left ventricular infarction,  status post coronary artery bypass grafting. No lymphadenopathy.     HEPATOBILIARY: Cholecystectomy.     PANCREAS: Normal.     SPLEEN: Normal.     ADRENAL GLANDS: Normal.     KIDNEYS/BLADDER: Benign left renal cyst. 1.6 cm right renal lesion  with punctate calcification has not appreciably changed from previous.     BOWEL: Moderate stool throughout the colon. No obstruction or  inflammatory changes.     PELVIC ORGANS: Mild prostate gland enlargement. Penile prosthesis.     ADDITIONAL FINDINGS: None.     MUSCULOSKELETAL: Sternotomy. Degenerative changes throughout the  spine. Posterior fusion at L4-L5. Mild chronic compression deformity  of T11.                                                                      IMPRESSION:  1.  No specific finding to explain the patient's symptoms of weight  loss. Moderate amount of stool throughout the colon consistent with  history of constipation.  2.  Right renal lesion is indeterminate, but unchanged from  11/25/2020. Consider renal ultrasound to evaluate for cystic versus  solid lesion if not previously performed.     ZEUS NARANJO MD     LABS: The last test results for Ms. Deandre Moore were reviewed.   PSA - No results found for: PSA  BMP -   Recent Labs   Lab Test 01/24/22  1054 01/03/22  1247 10/18/21  1355 10/18/21  1301 09/27/21  1256 03/11/21  1235 03/10/20  1320     --   --   --   --  140 138   POTASSIUM 4.4  --   --   --   --  4.6 3.8   CHLORIDE 107  --   --   --   --  107 104   CO2 25  --   --   --   --  28 29   BUN 14  --   --   --   --  10 10   CR 1.24 1.2  --   --   --  1.27* 1.14   GLC 92  --  75 78   < > 102* 146*   ADDI 9.3  --   --   --   --  9.3 9.3    < > = values in this interval not displayed.       CBC -   Recent Labs   Lab Test 01/24/22  1054 03/11/21  1235 03/10/20  1320   WBC 10.2 9.9 6.8   HGB 13.0* 13.9 12.8*    239 211       ASSESSMENT:   1)  right renal lesion - indeterminate - stable since 2015 or 2016  2) s/p IPP (Dr. Grover - [facility]) --> not in use, but also not bothering him  3) BPH with luts    PLAN:   - Urinalysis today  - Unable to get a uroflow today, but PVR was 98cc  - AUA SS was 2,3,0,0,3,1,1,2 (mixed)  - Continue tamsulosin 0.4mg daily   - Add finasteride 5mg daily - to help shrink the prostate - this medication may take several months before you realize a benefit. Discussed rare S/E of lightheadedness, rare S/E of gynecomastia.   - Return in 3-4 months to re-evaluate symptoms    VISIT DURATION: 30 minutes (3:40 - 4:00 + 10 minutes documentation and ordering on DOS)    Mariana Johnston PA-C  Department of Urologic Surgery

## 2022-03-10 NOTE — PROGRESS NOTES
It was my pleasure to meet Mr. Deandre Moore, a 81 year old year old male seen in consultation today at the request of Dr. Marquez for chief complaint: Consult (Exam; lower urinary tract systems)    Today he is accompanied by his son    HPI: Mr. Deandre Moore has PMH significant for HTN, HLD, CAD (s/p CABG, s/p stents - on plavix), chronic pruritus, chronic back pain, DM and BPH (on tamsulosin 0.8mg daily for many years).       -takes tamsulosin 0.8 mg at night for several years.  No lightheadedness   -Continues to have hesitancy and intermittency   -Does not feel he empties his bladder well   -nocturia x 1. No daytime frequency and urgency.  Outputs volumes are sporadic - sometimes only 50cc.  If he drinks water he urinates immediately,  - Has hesitancy.  No intermittency.    - No nocturnal enuresis.    - some issue with very mild leakage, but he does not notice when it is happening.  Just notices wet spot on underwear.   Postvoid dribble.    - no dysuria or gross hematuria   - drinks water and Sprite, orange and apple juice, 1-2 cups of coffee, Ensure, occasional Gatorade  - Bowels: Occasionally loose, but typically normal.  Uses stool softeners if he doesn't have a daily BM  - has itching, some in the groin. Uses cortisone cream and lotion.  This is chronic.     - IPP placed in [place] about 5 years ago.  He has never used it.  No pain  In penis or scrotum.  Has given up sexual activity due to his heart condition  - Last PSA in 2015 was 1.63ug/L    REVIEW OF DIAGNOSTICS:  6/19/17 - glucose 70, otherwise negative.    1/24/22 - sCr 1.24mg/dL    Past Medical History:   Diagnosis Date     Diabetes mellitus (H)      Gout attack      Heart attack (H) 1984     Hypertension      Stented coronary artery     6 stents in heart       Past Surgical History:   Procedure Laterality Date     CARDIAC SURGERY  1984    4 vessel bypass     CHOLECYSTECTOMY       COLONOSCOPY       DESTRUCTION OF PARAVERTEBRAL FACET  LUMBAR / SACRAL ADDITIONAL Bilateral 10/18/2021    Procedure: DESTRUCTION, NERVE, FACET JOINT, LUMBOSACRAL, ADDITIONAL NERVE AFTER DESTRUCTION OF INITIAL LUMBOSACRAL FACET JOINT NERVE;  Surgeon: Horacio Gonsalves MD;  Location: [facility] OR     DESTRUCTION OF PARAVERTEBRAL FACET LUMBAR / SACRAL SINGLE Bilateral 10/18/2021    Procedure: DESTRUCTION, NERVE, FACET JOINT, LUMBOSACRAL, 1 NERVE;  Surgeon: Horacio Gonsalves MD;  Location: [facility] OR     ESOPHAGOSCOPY, GASTROSCOPY, DUODENOSCOPY (EGD), COMBINED N/A 3/1/2018    Procedure: COMBINED ESOPHAGOSCOPY, GASTROSCOPY, DUODENOSCOPY (EGD), BIOPSY SINGLE OR MULTIPLE;  ESOPHAGOGASTRODUODENOSCOPY ;  Surgeon: Daryn Medrano MD;  Location: [facility]     INJECT BLOCK MEDIAL BRANCH CERVICAL/THORACIC/LUMBAR Bilateral 8/16/2021    Procedure: BLOCK, NERVE, FACET JOINT, MEDIAL BRANCH, DIAGNOSTIC Lumbar 2-3-4;  Surgeon: Horacio Gonsalves MD;  Location: [facility] OR     INJECT BLOCK MEDIAL BRANCH CERVICAL/THORACIC/LUMBAR Bilateral 9/27/2021    Procedure: BLOCK, NERVE, FACET JOINT, MEDIAL BRANCH, DIAGNOSTIC  L2-4, Bilateral;  Surgeon: Horacio Gonsalves MD;  Location: [facility] OR     NO HISTORY OF SURGERY  3/31/14    derm     PHACOEMULSIFICATION WITH STANDARD INTRAOCULAR LENS IMPLANT  1/21/2013    Procedure: PHACOEMULSIFICATION WITH STANDARD INTRAOCULAR LENS IMPLANT;  Phacoemulsification with standard intraocular lens implant, right eye;  Surgeon: Jay Rodriges MD;  Location: [facility]       FAMILY HISTORY: Denies family history of prostate cancer    SOCIAL HISTORY:   Retired    reports that he quit smoking about 37 years ago. He has never used smokeless tobacco.    Current Outpatient Medications   Medication Sig Dispense Refill     Alcohol Swabs PADS 1 pad 4 times daily 100 each 6     allopurinol (ZYLOPRIM) 300 MG tablet Take 1 tablet (300 mg) by mouth daily 100 tablet 0     ammonium lactate (LAC-HYDRIN) 12 % external lotion Apply topically 2 times daily Apply  twice daily to feet 500 g 1     aspirin 81 MG tablet Take 81 mg by mouth daily.       atorvastatin (LIPITOR) 40 MG tablet Take 1 tablet (40 mg) by mouth daily 90 tablet 1     augmented betamethasone dipropionate (DIPROLENE-AF) 0.05 % external ointment Apply twice daily as needed for rash on the leg or back 45 g 11     augmented betamethasone dipropionate (DIPROLENE-AF) 0.05 % ointment Apply topically 2 times daily As needed to itchy areas on back 50 g 3     betamethasone dipropionate (DIPROSONE) 0.05 % external ointment At bedtime apply to ankle and cover with saran wrap (or sock) overnight, wash off in morning 50 g 1     blood glucose (NO BRAND SPECIFIED) lancets standard Use to test blood sugar 3 times daily or as directed. 100 each 11     blood glucose (NO BRAND SPECIFIED) test strip Use to test blood sugar 4 times daily  With meter/ strips/ lancets covered by insurance pt using accuchek 360 each 3     Blood Glucose Monitoring Suppl (BLOOD GLUCOSE MONITOR SYSTEM) w/Device KIT Test 4 times daily with meter covered by  insurance 1 kit 1     capsaicin (ZOSTRIX) 0.075 % cream Apply topically 3 times daily To areas of itch on back.  OK to dilute with cetaphil cream if too burning. 60 g 3     Cholecalciferol (VITAMIN D) 2000 UNITS CAPS Take 1 tablet by mouth daily       clobetasol (TEMOVATE) 0.05 % external cream Apply twice daily as needed for itching or rash on the back 60 g 5     clopidogrel (PLAVIX) 75 MG tablet Take 1 tablet (75 mg) by mouth daily 100 tablet 0     colchicine (COLCYRS) 0.6 MG tablet Take 1 tablet (0.6 mg) by mouth daily 100 tablet 0     Cyanocobalamin (VITAMIN B12 PO) Take 1 tablet by mouth daily       Dupilumab (DUPIXENT) 300 MG/2ML syringe Inject 2 mLs (300 mg) Subcutaneous every 14 days 4 mL 3     FLUoxetine (PROZAC) 10 MG capsule Take 1 capsule (10 mg) by mouth daily 90 capsule 3     furosemide (LASIX) 40 MG tablet Take 1 tablet (40 mg) by mouth daily 90 tablet 3     Ginkgo Biloba (GNP GINGKO  BILOBA EXTRACT PO) Take 1 tablet by mouth daily        glimepiride (AMARYL) 1 MG tablet Take 1 tablet (1 mg) by mouth 2 times daily 180 tablet 3     indapamide (LOZOL) 2.5 MG tablet Take 2.5 mg by mouth every morning.       ipratropium (ATROVENT) 0.03 % nasal spray Spray 2 sprays into both nostrils as needed for rhinitis 30min before cooking 30 mL 1     irbesartan (AVAPRO) 150 MG tablet Take 1 tablet (150 mg) by mouth At Bedtime 90 tablet 1     isosorbide mononitrate (IMDUR) 30 MG 24 hr tablet Take 1 tablet (30 mg) by mouth daily 90 tablet 3     ketoconazole (NIZORAL) 2 % external cream Apply topically daily       metFORMIN (GLUCOPHAGE) 1000 MG tablet Take 1 tablet (1,000 mg) by mouth 2 times daily (with meals) 180 tablet 3     methotrexate 2.5 MG tablet Take 15 mg by mouth       multivitamin, therapeutic with minerals (MULTI-VITAMIN) TABS Take 1 tablet by mouth daily.       mupirocin (BACTROBAN) 2 % external ointment Apply topically 2 times daily 30 g 0     nitroglycerin (NITROSTAT) 0.4 MG SL tablet Place under the tongue as needed       omega-3 acid ethyl esters (LOVAZA) 1 g capsule Take 2 capsules (2 g) by mouth 2 times daily 180 capsule 3     potassium chloride SA (K-DUR/KLOR-CON M) 20 MEQ CR tablet TAKE 1 TABLET THREE TIMES DAILY 270 tablet 1     predniSONE (DELTASONE) 20 MG tablet TAKE 1 TABLET BY MOUTH ONCE DAILY FOR 7 DAYS       spironolactone (ALDACTONE) 25 MG tablet Take 1 tablet (25 mg) by mouth daily 90 tablet 1     tamsulosin (FLOMAX) 0.4 MG capsule Take 1 capsule (0.4 mg) by mouth daily 100 capsule 0     torsemide (DEMADEX) 10 MG tablet Take 10 mg by mouth daily        traZODone (DESYREL) 150 MG tablet Take 1 tablet (150 mg) by mouth At Bedtime 90 tablet 2     triamcinolone (KENALOG) 0.1 % external ointment APPLY TWICE DAILY TO RAISED RASH AREAS ON THE ARMS, LEGS, BODY AS NEEDED. 454 g 1       ALLERGIES: Beta adrenergic blockers, Latex, Latex, and Tape [adhesive tape]      REVIEW OF SYSTEMS:  As above  "in HPI    GENERAL PHYSICAL EXAM:   Vitals: /66   Pulse 69   Ht 1.854 m (6' 1\")   Wt 90.7 kg (200 lb)   BMI 26.39 kg/m    Body mass index is 26.39 kg/m .    GENERAL: Well groomed, well developed, well nourished male in NAD. Walks with a cane  RESPIRATORY: No increased respiratory effort.   MS: Gait- walks slowly with a cane,  SKIN: Warm to touch, dry.  No visible rashes or lesions on examined areas.  HEMATOLOGIC/LYMPHATIC/IMMUNOLOGIC: normal ant/post cervical, supraclavicular and inguinal nodes. Trace BL LE edema.  NEURO: Alert and oriented x 3.  PSYCH: Normal mood and affect, pleasant and agreeable during interview and exam.     :      Inguinal: Circumcised penis with IPP cylinders palpable within shaft.  No tenderness      Pump in left hemiscrotum - no tenderness, edema, matting      Did not test functionality of IPP    AYDEN:      Normal rectal tone, moderate firm stool in the rectal vault.      Large sized prostate, without tenderness, nodules or asymmetry.    PVR: Residual urine by ultrasound was 98 ml.      RADIOLOGY: The following tests were reviewed:   CT CHEST/ABDOMEN/PELVIS WITH CONTRAST 1/3/2022 1:05 PM     CLINICAL HISTORY: Constipation. Weight loss.     TECHNIQUE: CT scan of the chest, abdomen, and pelvis was performed  following injection of IV contrast. Multiplanar reformats were  obtained. Dose reduction techniques were used.   CONTRAST: 106 mL [id number]     COMPARISON: Outside CT abdomen and pelvis 11/25/2020     FINDINGS:   LUNGS AND PLEURA: Minimal peripheral scarring or atelectasis in the  lung bases. Lungs are otherwise clear. No pleural effusion.     MEDIASTINUM/AXILLAE: Evidence of previous left ventricular infarction,  status post coronary artery bypass grafting. No lymphadenopathy.     HEPATOBILIARY: Cholecystectomy.     PANCREAS: Normal.     SPLEEN: Normal.     ADRENAL GLANDS: Normal.     KIDNEYS/BLADDER: Benign left renal cyst. 1.6 cm right renal lesion  with punctate " calcification has not appreciably changed from previous.     BOWEL: Moderate stool throughout the colon. No obstruction or  inflammatory changes.     PELVIC ORGANS: Mild prostate gland enlargement. Penile prosthesis.     ADDITIONAL FINDINGS: None.     MUSCULOSKELETAL: Sternotomy. Degenerative changes throughout the  spine. Posterior fusion at L4-L5. Mild chronic compression deformity  of T11.                                                                      IMPRESSION:  1.  No specific finding to explain the patient's symptoms of weight  loss. Moderate amount of stool throughout the colon consistent with  history of constipation.  2.  Right renal lesion is indeterminate, but unchanged from  11/25/2020. Consider renal ultrasound to evaluate for cystic versus  solid lesion if not previously performed.     ZEUS NARANJO MD     LABS: The last test results for Ms. Deandre Moore were reviewed.   PSA - No results found for: PSA  BMP -   Recent Labs   Lab Test 01/24/22  1054 01/03/22  1247 10/18/21  1355 10/18/21  1301 09/27/21  1256 03/11/21  1235 03/10/20  1320     --   --   --   --  140 138   POTASSIUM 4.4  --   --   --   --  4.6 3.8   CHLORIDE 107  --   --   --   --  107 104   CO2 25  --   --   --   --  28 29   BUN 14  --   --   --   --  10 10   CR 1.24 1.2  --   --   --  1.27* 1.14   GLC 92  --  75 78   < > 102* 146*   ADDI 9.3  --   --   --   --  9.3 9.3    < > = values in this interval not displayed.       CBC -   Recent Labs   Lab Test 01/24/22  1054 03/11/21  1235 03/10/20  1320   WBC 10.2 9.9 6.8   HGB 13.0* 13.9 12.8*    239 211       ASSESSMENT:   1) right renal lesion - indeterminate - stable since 2015 or 2016  2) s/p IPP (Dr. Grover - [facility]) --> not in use, but also not bothering him  3) BPH with luts    PLAN:   - Urinalysis today  - Unable to get a uroflow today, but PVR was 98cc  - AUA SS was 2,3,0,0,3,1,1,2 (mixed)  - Continue tamsulosin 0.4mg daily   - Add  finasteride 5mg daily - to help shrink the prostate - this medication may take several months before you realize a benefit. Discussed rare S/E of lightheadedness, rare S/E of gynecomastia.   - Return in 3-4 months to re-evaluate symptoms    VISIT DURATION: 30 minutes (3:40 - 4:00 + 10 minutes documentation and ordering on DOS)    Mariana Johnston PA-C  Department of Urologic Surgery

## 2022-03-11 DIAGNOSIS — E11.9 TYPE 2 DIABETES MELLITUS WITHOUT COMPLICATION, WITHOUT LONG-TERM CURRENT USE OF INSULIN (H): ICD-10-CM

## 2022-03-11 RX ORDER — ATORVASTATIN CALCIUM 40 MG/1
TABLET, FILM COATED ORAL
Qty: 100 TABLET | Refills: 2 | OUTPATIENT
Start: 2022-03-11

## 2022-03-15 RX ORDER — ATORVASTATIN CALCIUM 40 MG/1
TABLET, FILM COATED ORAL
Qty: 100 TABLET | Refills: 2 | OUTPATIENT
Start: 2022-03-15

## 2022-03-20 NOTE — PROGRESS NOTES
HPI:    Last visit with  Me 2/14/2022. His son is present today. He still has back pain. He has not started with PT yet. He was seen 2/8/2022 by Dr. Gonsalves, PMR for back pain and additional details in that note. He had MRI lumbar spine 8/21/2020 and report in Care Everywhere. Otherwise, no additional HEENT, cardiopulmonary, abdominal, , neurological, systemic, psychiatric, lymphatic, endocrine complaints.     Past Medical History:   Diagnosis Date     Diabetes mellitus (H)      Gout attack      Heart attack (H) 1984     Hypertension      Stented coronary artery     6 stents in heart     Past Surgical History:   Procedure Laterality Date     CARDIAC SURGERY  1984    4 vessel bypass     CHOLECYSTECTOMY       COLONOSCOPY       DESTRUCTION OF PARAVERTEBRAL FACET LUMBAR / SACRAL ADDITIONAL Bilateral 10/18/2021    Procedure: DESTRUCTION, NERVE, FACET JOINT, LUMBOSACRAL, ADDITIONAL NERVE AFTER DESTRUCTION OF INITIAL LUMBOSACRAL FACET JOINT NERVE;  Surgeon: Horacio Gonsalves MD;  Location: [facility] OR     DESTRUCTION OF PARAVERTEBRAL FACET LUMBAR / SACRAL SINGLE Bilateral 10/18/2021    Procedure: DESTRUCTION, NERVE, FACET JOINT, LUMBOSACRAL, 1 NERVE;  Surgeon: Horacio Gonsalves MD;  Location: [facility] OR     ESOPHAGOSCOPY, GASTROSCOPY, DUODENOSCOPY (EGD), COMBINED N/A 3/1/2018    Procedure: COMBINED ESOPHAGOSCOPY, GASTROSCOPY, DUODENOSCOPY (EGD), BIOPSY SINGLE OR MULTIPLE;  ESOPHAGOGASTRODUODENOSCOPY ;  Surgeon: Daryn Medrano MD;  Location: [facility]     INJECT BLOCK MEDIAL BRANCH CERVICAL/THORACIC/LUMBAR Bilateral 8/16/2021    Procedure: BLOCK, NERVE, FACET JOINT, MEDIAL BRANCH, DIAGNOSTIC Lumbar 2-3-4;  Surgeon: Horacio Gonsalves MD;  Location: [facility] OR     INJECT BLOCK MEDIAL BRANCH CERVICAL/THORACIC/LUMBAR Bilateral 9/27/2021    Procedure: BLOCK, NERVE, FACET JOINT, MEDIAL BRANCH, DIAGNOSTIC  L2-4, Bilateral;  Surgeon: Horacio Gonsalves MD;  Location: [facility] OR     NO HISTORY OF SURGERY  3/31/14     derm     PHACOEMULSIFICATION WITH STANDARD INTRAOCULAR LENS IMPLANT  1/21/2013    Procedure: PHACOEMULSIFICATION WITH STANDARD INTRAOCULAR LENS IMPLANT;  Phacoemulsification with standard intraocular lens implant, right eye;  Surgeon: Jay Rodriges MD;  Location: [facility]     PE:    Vitals noted, gen, nad, cooperative,alert, sitting in a wheel chair, comfortable. Neck supple nl rom, lungs with good air movement, RRR, S1, S2, no MRG, abdomen, no acute findings. He can move all his extremities.     A/P:    1. Immunizations; Moderna COVID vaccine x 2. Prevanar 13 done 11/9/2015, Tdap 12/8/2021.Moderna COVID booster today 3/21/2022  2. Dermatology appt. With Dr. Rivera 3/22/2022  3. Podiatry follow up with Dr. Sharp 4/4/2022  4. PMR follow up for back/hip pain Dr. Gonsalves 5/5/2022 and mobility/walking. PT referral   5. Urology 6/23/2022 on Proscar and Flomax; UA normal on 3/10/2022; Seen in Urology 3/10/2022  6. Endocrinology with Dr. Campa 10/21/2022 for DM2 on Glimepiride and Metformin; A1c 5.4% on 2/18/2022. Seen 2/18/2022 by Dr. Campa. DEXA scan 3/7/2022 most negative -1.3.   7. Gout on Allopurinol and Colchicine  8. Increased lipids on Atorvastatin; Lpids 12/6/2021;  and HDL 58  9. On Fluoxetine for depression and increased to 20 mg last visit    10. HTN; on Lasix, Ibesartan; electrolytes and creatinine normal on 1/24/2022  11. Trazodone for sleep  12. On Plavix and Imdur for CAD; [facility] cardiology, Dr. Ordoñez on 1/31/2022, note in Care Everywhere     30 minutes spent on the date of the encounter doing chart review, history and exam, documentation and further activities as noted above

## 2022-03-21 ENCOUNTER — OFFICE VISIT (OUTPATIENT)
Dept: INTERNAL MEDICINE | Facility: CLINIC | Age: 82
End: 2022-03-21
Payer: COMMERCIAL

## 2022-03-21 VITALS
RESPIRATION RATE: 16 BRPM | HEIGHT: 73 IN | WEIGHT: 201 LBS | BODY MASS INDEX: 26.64 KG/M2 | OXYGEN SATURATION: 97 % | HEART RATE: 75 BPM | DIASTOLIC BLOOD PRESSURE: 74 MMHG | SYSTOLIC BLOOD PRESSURE: 121 MMHG

## 2022-03-21 DIAGNOSIS — Z23 NEED FOR COVID-19 VACCINE: ICD-10-CM

## 2022-03-21 DIAGNOSIS — F41.9 ANXIETY: Primary | ICD-10-CM

## 2022-03-21 PROCEDURE — 99214 OFFICE O/P EST MOD 30 MIN: CPT | Mod: 25 | Performed by: INTERNAL MEDICINE

## 2022-03-21 PROCEDURE — 91306 COVID-19,PF,MODERNA (18+ YRS BOOSTER .25ML): CPT | Performed by: INTERNAL MEDICINE

## 2022-03-21 PROCEDURE — 0064A COVID-19,PF,MODERNA (18+ YRS BOOSTER .25ML): CPT | Performed by: INTERNAL MEDICINE

## 2022-03-21 ASSESSMENT — PAIN SCALES - GENERAL: PAINLEVEL: NO PAIN (0)

## 2022-03-21 NOTE — NURSING NOTE
Deandre Moore is a 81 year old male patient that presents today in clinic for the following:    Chief Complaint   Patient presents with     RECHECK     Pt comes into clinic for one month follow up, discuss difficulty walking and PT     The patient's allergies and medications were reviewed as noted. A set of vitals were recorded as noted without incident. The patient does not have any other questions for the provider.    Vivi Holt, EMT at 1:32 PM on 3/21/2022

## 2022-03-22 ENCOUNTER — OFFICE VISIT (OUTPATIENT)
Dept: DERMATOLOGY | Facility: CLINIC | Age: 82
End: 2022-03-22
Payer: COMMERCIAL

## 2022-03-22 DIAGNOSIS — E11.9 TYPE 2 DIABETES MELLITUS WITHOUT COMPLICATION, WITHOUT LONG-TERM CURRENT USE OF INSULIN (H): ICD-10-CM

## 2022-03-22 DIAGNOSIS — R73.9 HYPERGLYCEMIA: ICD-10-CM

## 2022-03-22 DIAGNOSIS — L30.9 DERMATITIS: ICD-10-CM

## 2022-03-22 DIAGNOSIS — I87.2 VENOUS STASIS DERMATITIS OF LEFT LOWER EXTREMITY: ICD-10-CM

## 2022-03-22 DIAGNOSIS — L28.1 PRURIGO NODULARIS: ICD-10-CM

## 2022-03-22 DIAGNOSIS — L84 CALLUS OF FOOT: Primary | ICD-10-CM

## 2022-03-22 PROCEDURE — 99214 OFFICE O/P EST MOD 30 MIN: CPT | Performed by: DERMATOLOGY

## 2022-03-22 RX ORDER — GABAPENTIN 300 MG/1
CAPSULE ORAL
Qty: 90 CAPSULE | Refills: 2 | Status: SHIPPED | OUTPATIENT
Start: 2022-03-22 | End: 2022-05-03

## 2022-03-22 RX ORDER — AMMONIUM LACTATE 12 G/100G
LOTION TOPICAL 2 TIMES DAILY
Qty: 500 G | Refills: 1 | Status: SHIPPED | OUTPATIENT
Start: 2022-03-22 | End: 2022-03-22

## 2022-03-22 RX ORDER — BETAMETHASONE DIPROPIONATE 0.5 MG/G
OINTMENT, AUGMENTED TOPICAL
Qty: 45 G | Refills: 11 | Status: SHIPPED | OUTPATIENT
Start: 2022-03-22 | End: 2022-01-01

## 2022-03-22 RX ORDER — MUPIROCIN 20 MG/G
OINTMENT TOPICAL 2 TIMES DAILY
Qty: 30 G | Refills: 0 | Status: CANCELLED | OUTPATIENT
Start: 2022-03-22

## 2022-03-22 RX ORDER — KETOCONAZOLE 20 MG/G
CREAM TOPICAL DAILY
Status: CANCELLED | OUTPATIENT
Start: 2022-03-22

## 2022-03-22 RX ORDER — CLOBETASOL PROPIONATE 0.5 MG/G
CREAM TOPICAL
Qty: 60 G | Refills: 5 | Status: CANCELLED | OUTPATIENT
Start: 2022-03-22

## 2022-03-22 ASSESSMENT — PAIN SCALES - GENERAL: PAINLEVEL: MODERATE PAIN (4)

## 2022-03-22 NOTE — PROGRESS NOTES
[facility] Dermatology Note  Encounter Date: Mar 22, 2022  Office Visit     Dermatology Problem List:  # Chronic eczematous dermatitis with chronic pruritis  - Current tx: augmented betamethasone BID PRN  - Previous tx: dupilumab 300mg q14d (restarted 8/24/21), clobetasol 0.05% cream  # Prurigo nodularis, left forearm. S/p bx.   - ILK 20 mg/cc on 5/6/21.   # Macular ISK, R axilla. S/p shave bx 5/6/21  # Stasis dermatitis   - Compression stockings and augmented betamethasone  ____________________________________________    Assessment & Plan:    # Chronic eczematous dermatitis / pruritus / prurigo nodularis / xerosis cutis. Chronic, flare.   # Stasis dermatitis / changes in setting of venous insufficiency. Chronic, flare.  No acute evidence of infection.  Continues to have persistent pruritus despite therapies.  Please discontinued and is continue to extend due to persistent local injections and reaction, and unable to do the injections themselves.  He has been using topicals, which have provided moderate relief, although only using betamethasone ointment once a day.  May consider UVB phototherapy in the future for flares.  - Recommended 20-30 mmHg compression stockings   - Augmented betamethasone ointment BID if needed on back and lower extremities  - Start gabapentin 300mg nightly for 1 week, if there are no adverse effects such as lethargy or somnolence then 600mg for 1 week- 2 weeks, and then 900mg if tolerated before bedtime.     #Callus left lower extremitiy  There is a painful callus on the left lower foot.  There is no evidence of callus or wart on the plantar aspect of his foot.  There is also significant xerosis.  - 40% urea ointment to callus daily.     Procedures Performed:   None.    Follow-up: 8 week(s) in-person, or earlier for new or changing lesions    Staff, Medical Student and Scribe:     Scribe Disclosure:  Ching DIAZ, am serving as a scribe to document services personally  "performed by Edu Rivera MD based on data collection and the provider's statements to me.     Jinny White MS4    Provider Disclosure:   The documentation recorded by the scribe accurately reflects the services I personally performed and the decisions made by me.    Staff Physician:  I was present with the medical student who participated in the service and in the documentation of the note. I have verified the history and personally performed the physical exam and medical decision making. I agree with the assessment and plan of care as documented in the note.     Edu Rivera MD  Pronouns: he/him/his    Department of Dermatology  [facility]: Phone: [phone], Fax:[phone]  [facility]: Phone: [phone] Fax: [phone]  ____________________________________________    CC: RECHECK (pt states he is here for rash on both legs, wart on left foot. )    HPI:  Mr. Deandre Moore is a(n) 81 year old male who presents today as a return patient for lower extremity pruritis/rash and pain on left plantar foot. The patient was last seen in dermatology on 11/17/21 by Dr. Rivera at which time he stopped dupixient and was continued on augmented betamethasone ointment BID as needed for treatment of chronic eczematous dermatitis with chronic pruritis. He was also started on augmented betamethasone ointment BID prn for treatment of stasis dermatitis.     \"Corn\" is bothersome on left lower foot, and is painful when he tries to stand on it.     Rashes on lower extremities continue to itch a lot. Has been using betamethasone ointment once a day at most, and washing regularly with iodine.  He does have compression stockings which he may intermittently use but it is very difficult for him to get them on.     Patient is otherwise feeling well, without additional skin concerns.    Labs " Reviewed:  N/A    Physical Exam:  Vitals: There were no vitals taken for this visit.  SKIN: Focused examination of lower extremities and feet was performed.  - multiple nodules on lower extremities with erosions and excoriations  - bark-like garcia of medial lower extremity skin  -No evidence of corn on left plantar aspect of the foot.  There is a hyperkeratotic patch consistent with a callus.  There is significant dry scaly skin.    - No other lesions of concern on areas examined.     Medications:  Current Outpatient Medications   Medication     Alcohol Swabs PADS     allopurinol (ZYLOPRIM) 300 MG tablet     ammonium lactate (LAC-HYDRIN) 12 % external lotion     aspirin 81 MG tablet     atorvastatin (LIPITOR) 40 MG tablet     betamethasone dipropionate (DIPROSONE) 0.05 % external ointment     blood glucose (NO BRAND SPECIFIED) lancets standard     blood glucose (NO BRAND SPECIFIED) test strip     Blood Glucose Monitoring Suppl (BLOOD GLUCOSE MONITOR SYSTEM) w/Device KIT     Cholecalciferol (VITAMIN D) 2000 UNITS CAPS     clobetasol (TEMOVATE) 0.05 % external cream     clopidogrel (PLAVIX) 75 MG tablet     colchicine (COLCYRS) 0.6 MG tablet     Cyanocobalamin (VITAMIN B12 PO)     finasteride (PROSCAR) 5 MG tablet     FLUoxetine (PROZAC) 20 MG capsule     furosemide (LASIX) 40 MG tablet     Ginkgo Biloba (GNP GINGKO BILOBA EXTRACT PO)     glimepiride (AMARYL) 1 MG tablet     indapamide (LOZOL) 2.5 MG tablet     irbesartan (AVAPRO) 150 MG tablet     isosorbide mononitrate (IMDUR) 30 MG 24 hr tablet     ketoconazole (NIZORAL) 2 % external cream     metFORMIN (GLUCOPHAGE) 1000 MG tablet     methotrexate 2.5 MG tablet     multivitamin, therapeutic with minerals (MULTI-VITAMIN) TABS     mupirocin (BACTROBAN) 2 % external ointment     nitroglycerin (NITROSTAT) 0.4 MG SL tablet     predniSONE (DELTASONE) 20 MG tablet     spironolactone (ALDACTONE) 25 MG tablet     tamsulosin (FLOMAX) 0.4 MG capsule     torsemide (DEMADEX)  10 MG tablet     traZODone (DESYREL) 150 MG tablet     triamcinolone (KENALOG) 0.1 % external ointment     augmented betamethasone dipropionate (DIPROLENE-AF) 0.05 % external ointment     augmented betamethasone dipropionate (DIPROLENE-AF) 0.05 % ointment     capsaicin (ZOSTRIX) 0.075 % cream     Dupilumab (DUPIXENT) 300 MG/2ML syringe     FLUoxetine (PROZAC) 10 MG capsule     ipratropium (ATROVENT) 0.03 % nasal spray     omega-3 acid ethyl esters (LOVAZA) 1 g capsule     potassium chloride SA (K-DUR/KLOR-CON M) 20 MEQ CR tablet     Current Facility-Administered Medications   Medication     triamcinolone (KENALOG-40) injection 20 mg      Past Medical History:   Patient Active Problem List   Diagnosis     Itching     AD (atopic dermatitis)     Dermatitis     Hyperglycemia     Eczema, unspecified eczema     Intrinsic atopic dermatitis     Other eczema     Notalgia paresthetica     Rash     Pseudophakia of both eyes     Diabetes mellitus (H)     Presbyopia     Type 2 diabetes mellitus without complication, without long-term current use of insulin (H)     Encounter for long-term (current) use of high-risk medication     Seborrheic keratoses, inflamed     Neoplasm of uncertain behavior of skin     Chronic kidney disease, stage 3 (H)     Lumbosacral spondylosis without myelopathy     Hx of CABG     Gout     Essential hypertension     Erectile dysfunction     Edema     Dyslipidemia     Decreased range of motion of ankle     Coronary atherosclerosis     Vitamin D deficiency     Ventricular ectopy     Type 2 diabetes mellitus with diabetic neuropathy (H)     Supraventricular tachycardia (H)     Renal insufficiency syndrome     Pneumothorax     Plantar fascia syndrome     Personal history of tobacco use, presenting hazards to health     Past Medical History:   Diagnosis Date     Diabetes mellitus (H)      Gout attack      Heart attack (H) 1984     Hypertension      Stented coronary artery     6 stents in heart        JERRY Oreilly  [place] [id number] [address] [id number] on close of this encounter.

## 2022-03-22 NOTE — LETTER
3/22/2022     RE: Deandre Moore  [address]  [facility] [address]     Dear Colleague,    Thank you for referring your patient, Deandre Moore, to the [facility] [place] [facility]. Please see a copy of my visit note below.    [facility] Dermatology Note  Encounter Date: Mar 22, 2022  Office Visit     Dermatology Problem List:  # Chronic eczematous dermatitis with chronic pruritis  - Current tx: augmented betamethasone BID PRN  - Previous tx: dupilumab 300mg q14d (restarted 8/24/21), clobetasol 0.05% cream  # Prurigo nodularis, left forearm. S/p bx.   - ILK 20 mg/cc on 5/6/21.   # Macular ISK, R axilla. S/p shave bx 5/6/21  # Stasis dermatitis   - Compression stockings and augmented betamethasone  ____________________________________________    Assessment & Plan:    # Chronic eczematous dermatitis / pruritus / prurigo nodularis / xerosis cutis. Chronic, flare.   # Stasis dermatitis / changes in setting of venous insufficiency. Chronic, flare.  No acute evidence of infection.  Continues to have persistent pruritus despite therapies.  Please discontinued and is continue to extend due to persistent local injections and reaction, and unable to do the injections themselves.  He has been using topicals, which have provided moderate relief, although only using betamethasone ointment once a day.  May consider UVB phototherapy in the future for flares.  - Recommended 20-30 mmHg compression stockings   - Augmented betamethasone ointment BID if needed on back and lower extremities  - Start gabapentin 300mg nightly for 1 week, if there are no adverse effects such as lethargy or somnolence then 600mg for 1 week- 2 weeks, and then 900mg if tolerated before bedtime.     #Callus left lower extremitiy  There is a painful callus on the left lower foot.  There is no evidence of callus or wart on the plantar aspect of his  "foot.  There is also significant xerosis.  - 40% urea ointment to callus daily.     Procedures Performed:   None.    Follow-up: 8 week(s) in-person, or earlier for new or changing lesions    Staff, Medical Student and Scribe:     Scribe Disclosure:  I, Ching Oden, am serving as a scribe to document services personally performed by Edu Rivera MD based on data collection and the provider's statements to me.     Juancrystal Cindy MS4    Provider Disclosure:   The documentation recorded by the scribe accurately reflects the services I personally performed and the decisions made by me.    Staff Physician:  I was present with the medical student who participated in the service and in the documentation of the note. I have verified the history and personally performed the physical exam and medical decision making. I agree with the assessment and plan of care as documented in the note.     Edu Rivera MD  Pronouns: he/him/his    Department of Dermatology  [facility]: Phone: [phone], Fax:[phone]  [facility]: Phone: [phone] Fax: [phone]  ____________________________________________    CC: RECHECK (pt states he is here for rash on both legs, wart on left foot. )    HPI:  Mr. Deandre Moore is a(n) 81 year old male who presents today as a return patient for lower extremity pruritis/rash and pain on left plantar foot. The patient was last seen in dermatology on 11/17/21 by Dr. Rivera at which time he stopped dupixient and was continued on augmented betamethasone ointment BID as needed for treatment of chronic eczematous dermatitis with chronic pruritis. He was also started on augmented betamethasone ointment BID prn for treatment of stasis dermatitis.     \"Corn\" is bothersome on left lower foot, and is painful when he tries to stand on it.     Rashes on lower extremities " continue to itch a lot. Has been using betamethasone ointment once a day at most, and washing regularly with iodine.  He does have compression stockings which he may intermittently use but it is very difficult for him to get them on.     Patient is otherwise feeling well, without additional skin concerns.    Labs Reviewed:  N/A    Physical Exam:  Vitals: There were no vitals taken for this visit.  SKIN: Focused examination of lower extremities and feet was performed.  - multiple nodules on lower extremities with erosions and excoriations  - bark-like garcia of medial lower extremity skin  -No evidence of corn on left plantar aspect of the foot.  There is a hyperkeratotic patch consistent with a callus.  There is significant dry scaly skin.    - No other lesions of concern on areas examined.     Medications:  Current Outpatient Medications   Medication     Alcohol Swabs PADS     allopurinol (ZYLOPRIM) 300 MG tablet     ammonium lactate (LAC-HYDRIN) 12 % external lotion     aspirin 81 MG tablet     atorvastatin (LIPITOR) 40 MG tablet     betamethasone dipropionate (DIPROSONE) 0.05 % external ointment     blood glucose (NO BRAND SPECIFIED) lancets standard     blood glucose (NO BRAND SPECIFIED) test strip     Blood Glucose Monitoring Suppl (BLOOD GLUCOSE MONITOR SYSTEM) w/Device KIT     Cholecalciferol (VITAMIN D) 2000 UNITS CAPS     clobetasol (TEMOVATE) 0.05 % external cream     clopidogrel (PLAVIX) 75 MG tablet     colchicine (COLCYRS) 0.6 MG tablet     Cyanocobalamin (VITAMIN B12 PO)     finasteride (PROSCAR) 5 MG tablet     FLUoxetine (PROZAC) 20 MG capsule     furosemide (LASIX) 40 MG tablet     Ginkgo Biloba (GNP GINGKO BILOBA EXTRACT PO)     glimepiride (AMARYL) 1 MG tablet     indapamide (LOZOL) 2.5 MG tablet     irbesartan (AVAPRO) 150 MG tablet     isosorbide mononitrate (IMDUR) 30 MG 24 hr tablet     ketoconazole (NIZORAL) 2 % external cream     metFORMIN (GLUCOPHAGE) 1000 MG tablet     methotrexate 2.5  MG tablet     multivitamin, therapeutic with minerals (MULTI-VITAMIN) TABS     mupirocin (BACTROBAN) 2 % external ointment     nitroglycerin (NITROSTAT) 0.4 MG SL tablet     predniSONE (DELTASONE) 20 MG tablet     spironolactone (ALDACTONE) 25 MG tablet     tamsulosin (FLOMAX) 0.4 MG capsule     torsemide (DEMADEX) 10 MG tablet     traZODone (DESYREL) 150 MG tablet     triamcinolone (KENALOG) 0.1 % external ointment     augmented betamethasone dipropionate (DIPROLENE-AF) 0.05 % external ointment     augmented betamethasone dipropionate (DIPROLENE-AF) 0.05 % ointment     capsaicin (ZOSTRIX) 0.075 % cream     Dupilumab (DUPIXENT) 300 MG/2ML syringe     FLUoxetine (PROZAC) 10 MG capsule     ipratropium (ATROVENT) 0.03 % nasal spray     omega-3 acid ethyl esters (LOVAZA) 1 g capsule     potassium chloride SA (K-DUR/KLOR-CON M) 20 MEQ CR tablet     Current Facility-Administered Medications   Medication     triamcinolone (KENALOG-40) injection 20 mg      Past Medical History:   Patient Active Problem List   Diagnosis     Itching     AD (atopic dermatitis)     Dermatitis     Hyperglycemia     Eczema, unspecified eczema     Intrinsic atopic dermatitis     Other eczema     Notalgia paresthetica     Rash     Pseudophakia of both eyes     Diabetes mellitus (H)     Presbyopia     Type 2 diabetes mellitus without complication, without long-term current use of insulin (H)     Encounter for long-term (current) use of high-risk medication     Seborrheic keratoses, inflamed     Neoplasm of uncertain behavior of skin     Chronic kidney disease, stage 3 (H)     Lumbosacral spondylosis without myelopathy     Hx of CABG     Gout     Essential hypertension     Erectile dysfunction     Edema     Dyslipidemia     Decreased range of motion of ankle     Coronary atherosclerosis     Vitamin D deficiency     Ventricular ectopy     Type 2 diabetes mellitus with diabetic neuropathy (H)     Supraventricular tachycardia (H)     Renal insufficiency  syndrome     Pneumothorax     Plantar fascia syndrome     Personal history of tobacco use, presenting hazards to health     Past Medical History:   Diagnosis Date     Diabetes mellitus (H)      Gout attack      Heart attack (H) 1984     Hypertension      Stented coronary artery     6 stents in heart     Again, thank you for allowing me to participate in the care of your patient.      Sincerely,    Edu Rivera MD    CC [address] [address] on close of this encounter.

## 2022-03-22 NOTE — PATIENT INSTRUCTIONS
Itchiness   - Apply betamethasone ointment twice a day to feet and itchy areas  - Start gabapentin 300 mg(1 tablet) nightly for 1 week, then if tolerated 600mg (2 tablets) for 1 week- 2 weeks, and then 900mg (3 tablets) if tolerated before bedtime. Reduce the dose if you experience excessive tiredness/lethargy. Do not take more than 3 tabs/day maximum.    Callus on left foot  - Urea cream 40% daily to break down the callus    Stasis dermatitis  - Compression stockings  - Leg elevations

## 2022-03-24 ENCOUNTER — TELEPHONE (OUTPATIENT)
Dept: DERMATOLOGY | Facility: CLINIC | Age: 82
End: 2022-03-24
Payer: COMMERCIAL

## 2022-03-24 NOTE — TELEPHONE ENCOUNTER
Prior Authorization Retail Medication Request    Medication/Dose: urea (GORDONS UREA) 40 % external ointment  ICD code (if different than what is on RX):  Callus of foot [L84]   Previously Tried and Failed:    Rationale:      Insurance Name:  [facility]  Insurance ID:  [id number]

## 2022-03-24 NOTE — TELEPHONE ENCOUNTER
PA Initiation    Medication: urea (GORDONS UREA) 40 % external ointment  Insurance Company: [employer] ([facility]) - Phone [phone] Fax [phone]  Pharmacy Filling the Rx: [id number] - [facility] [address] AT [facility] [address]  Filling Pharmacy Phone: [phone]  Filling Pharmacy Fax: [phone]  Start Date: 3/24/2022

## 2022-03-25 NOTE — TELEPHONE ENCOUNTER
Called number but it was incorrect. Sending [employer] message.    Sulma Adams, EMT

## 2022-03-25 NOTE — TELEPHONE ENCOUNTER
PRIOR AUTHORIZATION DENIED    Medication: urea (GORDONS UREA) 40 % external ointment--DENIED    Denial Date: 3/24/2022    Denial Rational: Excluded

## 2022-03-26 DIAGNOSIS — E11.9 TYPE 2 DIABETES MELLITUS WITHOUT COMPLICATION, WITHOUT LONG-TERM CURRENT USE OF INSULIN (H): ICD-10-CM

## 2022-03-29 NOTE — TELEPHONE ENCOUNTER
Atorvastatin Calcium 40 MG Oral Tablet  Last Written Prescription Date:  1/13/2022  Last Fill Quantity: 90,   # refills: 1  Last Office Visit : 3/21/2022  Future Office visit:  6/20/2022    Routing refill request to provider for review/approval because:  Second Request  Over due LIPID PROFILE  Refer to clinic for review     Recent Labs   Lab Test 06/19/17  1325   LDL 40       Indigo Roche RN  Central Triage Red Flags/Med Refills      Seen by Dr Campa in past for refills.   Deisy Velasco RN 7:19 AM on 3/30/2022.

## 2022-03-30 RX ORDER — ATORVASTATIN CALCIUM 40 MG/1
40 TABLET, FILM COATED ORAL DAILY
Qty: 90 TABLET | Refills: 1 | Status: SHIPPED | OUTPATIENT
Start: 2022-03-30 | End: 2022-04-27

## 2022-03-31 ENCOUNTER — TELEPHONE (OUTPATIENT)
Dept: PHYSICAL MEDICINE AND REHAB | Facility: CLINIC | Age: 82
End: 2022-03-31
Payer: COMMERCIAL

## 2022-03-31 NOTE — TELEPHONE ENCOUNTER
Writer routed call to PM&R Nurse Pool.   *Patient seeking Home Medical order     Letha Awad LPN  Neurosurgery

## 2022-03-31 NOTE — TELEPHONE ENCOUNTER
[facility] Call Center    Phone Message    May a detailed message be left on voicemail: yes     Reason for Call: Order(s): Other:   Reason for requested: Walker and Abdominal Binder  Date needed: ASAP  Provider name: Dr. Gonsalves    Daughter in law Deisy reports she went to [facility] [place] at phone [phone] for walker and was told there iw no order for walker. She also asks where to get abdominal binder.     Please call Deisy back at [phone] to advise where to get abdominal finder and verify that orders are placed for both.    Action Taken: Message routed to:  [facility] PMR    Travel Screening: Not Applicable

## 2022-03-31 NOTE — TELEPHONE ENCOUNTER
RECORDS RECEIVED FROM: Internal   REASON FOR VISIT: possible PD   Date of Appt: 5/31/22   NOTES (FOR ALL VISITS) STATUS DETAILS   OFFICE NOTE from referring provider Internal Dr Gonsalves @ [facility]:  2/8/22   OFFICE NOTE from other specialist Internal Dr Sohan Bruno @ [facility] Neurology:  9/2/20   MEDICATION LIST Internal    IMAGING  (FOR ALL VISITS)     MRI (HEAD, NECK, SPINE) Internal/Received [facility]:  MRI Brain 9/29/20    [facility]:  MRI Lumbar Spine 8/21/20   CT (HEAD, NECK, SPINE) Received [facility]:  CT Head 4/23/19

## 2022-04-05 ENCOUNTER — TELEPHONE (OUTPATIENT)
Dept: DERMATOLOGY | Facility: CLINIC | Age: 82
End: 2022-04-05
Payer: COMMERCIAL

## 2022-04-05 NOTE — TELEPHONE ENCOUNTER
PA Initiation    Medication: Dupixent-Pa Pending-[facility]  Insurance Company: [employer] ([facility]) - Phone [phone] Fax [phone]  Pharmacy Filling the Rx: [facility] - [place], [place] - [address]  Filling Pharmacy Phone:    Filling Pharmacy Fax:    Start Date: 4/5/2022

## 2022-04-07 NOTE — TELEPHONE ENCOUNTER
Prior Authorization Approval    Authorization Effective Date: 4/5/2022  Authorization Expiration Date: 12/31/2022  Medication: Dupixent-Pa Renewal-approved  Approved Dose/Quantity: 4ml for 28 days  Reference #: (Key: [id number])   Insurance Company: [employer] ([facility]) - Phone [phone] Fax [phone]  Expected CoPay:       CoPay Card Available:      Foundation Assistance Needed:    Which Pharmacy is filling the prescription (Not needed for infusion/clinic administered): [facility] - [place], [place] - [address]  Pharmacy Notified: No  Patient Notified: No

## 2022-04-10 DIAGNOSIS — R73.9 HYPERGLYCEMIA: ICD-10-CM

## 2022-04-10 DIAGNOSIS — F41.9 ANXIETY: ICD-10-CM

## 2022-04-10 DIAGNOSIS — N18.30 CHRONIC KIDNEY DISEASE, STAGE 3 (H): ICD-10-CM

## 2022-04-10 DIAGNOSIS — E11.40 TYPE 2 DIABETES MELLITUS WITH DIABETIC NEUROPATHY (H): ICD-10-CM

## 2022-04-10 DIAGNOSIS — N28.9 RENAL INSUFFICIENCY SYNDROME: ICD-10-CM

## 2022-04-10 DIAGNOSIS — E11.9 TYPE 2 DIABETES MELLITUS WITHOUT COMPLICATION, WITHOUT LONG-TERM CURRENT USE OF INSULIN (H): ICD-10-CM

## 2022-04-12 NOTE — TELEPHONE ENCOUNTER
Writer spoke with patient's daughter-in-law, Deisy, indicating that patient should attend his upcoming PT appointment this Friday to be evaluated for the walker, and they will work with us if any future orders are needed.  Writer also stated that an abdominal binder does not need an order, but they can ask [facility], or other pharmacy for the abdominal binder. Deisy was thankful for the information, and also stated that unfortunately, patient is almost to the point of needing a wheelchair instead of the walker due to his decline, but looks forward to the PT appointment. Mary Olivares, RN on 4/12/2022 at 5:12 PM

## 2022-04-13 RX ORDER — COLCHICINE 0.6 MG/1
TABLET ORAL
Qty: 90 TABLET | Refills: 0 | Status: SHIPPED | OUTPATIENT
Start: 2022-04-13 | End: 2022-01-01

## 2022-04-13 RX ORDER — SPIRONOLACTONE 25 MG/1
TABLET ORAL
Qty: 100 TABLET | Refills: 2 | OUTPATIENT
Start: 2022-04-13

## 2022-04-13 RX ORDER — ALLOPURINOL 300 MG/1
TABLET ORAL
Qty: 90 TABLET | Refills: 0 | Status: SHIPPED | OUTPATIENT
Start: 2022-04-13 | End: 2023-01-01

## 2022-04-13 RX ORDER — IRBESARTAN 150 MG/1
TABLET ORAL
Qty: 100 TABLET | Refills: 2 | OUTPATIENT
Start: 2022-04-13

## 2022-04-13 RX ORDER — CLOPIDOGREL BISULFATE 75 MG/1
TABLET ORAL
Qty: 90 TABLET | Refills: 0 | Status: SHIPPED | OUTPATIENT
Start: 2022-04-13 | End: 2023-01-01

## 2022-04-13 RX ORDER — TAMSULOSIN HYDROCHLORIDE 0.4 MG/1
CAPSULE ORAL
Qty: 90 CAPSULE | Refills: 2 | Status: SHIPPED | OUTPATIENT
Start: 2022-04-13 | End: 2022-01-01

## 2022-04-13 NOTE — TELEPHONE ENCOUNTER
LCV;3/21/2022  [facility] [place]  NCV;6-20-22  tamsulosin (FLOMAX) 0.4 MG capsule  clopidogrel (PLAVIX) 75 MG tablet- ABN HBG- REVIEWED, RF90D  Allopurinol 300 MG Oral Tablet  COLCHICINE  0.6MG  TAB    OVERDUE URIC ACID- FYI TO CLINIC rf 90 DAY(LAB ORDER IN EPIC)

## 2022-04-13 NOTE — TELEPHONE ENCOUNTER
Dup     spironolactone (ALDACTONE) 25 MG tablet 90 tablet 1 2/9/2022  No  Sig - Route: Take 1 tablet (25 mg) by mouth daily - Oral    irbesartan (AVAPRO) 150 MG tablet 90 tablet 1 2/9/2022  No  Sig - Route: Take 1 tablet (150 mg) by mouth At Bedtime - Oral

## 2022-04-15 ENCOUNTER — HOSPITAL ENCOUNTER (OUTPATIENT)
Dept: PHYSICAL THERAPY | Facility: CLINIC | Age: 82
Setting detail: THERAPIES SERIES
Discharge: HOME | End: 2022-04-15
Attending: PHYSICAL MEDICINE & REHABILITATION
Payer: COMMERCIAL

## 2022-04-15 DIAGNOSIS — M47.817 LUMBOSACRAL SPONDYLOSIS WITHOUT MYELOPATHY: ICD-10-CM

## 2022-04-15 DIAGNOSIS — R20.2 NOTALGIA PARESTHETICA: ICD-10-CM

## 2022-04-15 DIAGNOSIS — R26.9 ABNORMAL GAIT: Primary | ICD-10-CM

## 2022-04-15 DIAGNOSIS — G89.29 OTHER CHRONIC PAIN: ICD-10-CM

## 2022-04-15 DIAGNOSIS — R60.0 LOWER EXTREMITY EDEMA: ICD-10-CM

## 2022-04-15 PROCEDURE — 97162 PT EVAL MOD COMPLEX 30 MIN: CPT | Mod: GP | Performed by: PHYSICAL THERAPIST

## 2022-04-15 PROCEDURE — 97116 GAIT TRAINING THERAPY: CPT | Mod: GP | Performed by: PHYSICAL THERAPIST

## 2022-04-15 PROCEDURE — 97110 THERAPEUTIC EXERCISES: CPT | Mod: GP | Performed by: PHYSICAL THERAPIST

## 2022-04-15 ASSESSMENT — 6 MINUTE WALK TEST (6MWT): TOTAL DISTANCE WALKED (FT): 107

## 2022-04-15 NOTE — PROGRESS NOTES
[facility]                                                                                   OUTPATIENT PHYSICAL THERAPY FUNCTIONAL EVALUATION  PLAN OF TREATMENT FOR OUTPATIENT REHABILITATION  (COMPLETE FOR INITIAL CLAIMS ONLY)  Patient's Last Name, First Name, M.I.  YOB: 1940  Deandre Moore     Provider's Name   [facility]   Medical Record No.  [id number]     Start of Care Date:  04/15/22   Onset Date:  02/08/22 (Referral date used)   Type:     _X__PT   ____OT  ____SLP Medical Diagnosis:  Lumbosacral spondylosis without myelopathy (M47.817, Other chronic pain (G89.29), Abnormal gait (R26.9), Lower extremity edema (R60.0     PT Diagnosis:  Impaired functional mobility and activity tolerance Visits from SOC:  1                              __________________________________________________________________________________  Plan of Treatment/Functional Goals:  balance training, bed mobility training, gait training, neuromuscular re-education, ROM, strengthening, stretching, transfer training (LSVT BIG training)           GOALS  HEP  Patient will demonstrate understanding and compliance to his HEP for continued wellbeing upon discharge from skilled physical therapy.  07/13/22    Transfers  Patient will complete 3 STS transfers with 1-2 UE assist from an 18 inch surface in 30 seconds to demonstrate improved LE strength for independent transfers from low surfaces.  07/13/22    Gait speed  Patient will ambulate 25 feet in <15 seconds to demonstrate improved step length and gait speed for increased safety and independence with crossing the street.  07/13/22    Gait distance  Patient will ambulate 400 feet during the 6 MWT with the least restrictive AD or no AD to demonstrate improved activity tolerance for independent and safe community ambulation.  07/13/22    Fall  risk  Patient will complete the TUG in 5 seconds less than baseline to demonstrate improved safety and independence with transfers, turns, and gait.  07/13/22                                     Therapy Frequency:  2 times/Week (Decreasing in frequency as indicated)   Predicted Duration of Therapy Intervention:  90 days    Kailee Childs, PT                                    I CERTIFY THE NEED FOR THESE SERVICES FURNISHED UNDER        THIS PLAN OF TREATMENT AND WHILE UNDER MY CARE     (Physician co-signature of this document indicates review and certification of the therapy plan).                Certification Date From:  04/15/22   Certification Date To:  07/13/22    Referring Provider:  Horacio Gonsalves MD    Initial Assessment  See Epic Evaluation- Start of Care Date: 04/15/22

## 2022-04-15 NOTE — PROGRESS NOTES
04/15/22 0800   Quick Adds   Quick Adds Certification   Type of Visit Initial OP PT Evaluation   General Information   Start of Care Date 04/15/22   Referring Physician Horacio Gonsalves MD   Orders Evaluate and Treat as Indicated   Order Date 02/08/22   Medical Diagnosis Lumbosacral spondylosis without myelopathy (M47.817, Other chronic pain (G89.29), Abnormal gait (R26.9), Lower extremity edema (R60.0   Onset of illness/injury or Date of Surgery 02/08/22  (Referral date used)   Precautions/Limitations fall precautions   Special Instructions also edema control, gait, Assistive device/RW   Surgical/Medical history reviewed Yes   Pertinent history of current problem (include personal factors and/or comorbidities that impact the POC) Patient has a PMH of DM, gout, heart attack 1984, HTN, and stented coronary artery (6x). On October 18, 2021 he underwent radiofrequency ablation from L2-L4 bilaterally with good pain relief, however it is has returned. He also notes shuffling with his gait, notes a sore/callus on his left foot. Patient notes that he is stumbling with walking, notes his legs get weak and family notes he tips forward or backwards. Patient has been using a SPC at home for the last year, also uses his wife's walker but he does not have one of his own. Patient needs help from family to get up from the floor, family notes 3-4 falls within a few weeks. Patient notes hitting his head with the most recent fall, no lasting symptoms but it bruised at the time.   Prior level of function comment Patient was previously IND with all functional mobility and ADLs. Over the last 6 months he has been declining and needing help with upper and lower body dressing, getting to and going to the bathroom. Has a lift chair - spends most of his time there. He currently does some leg exercises in bed - SLR, chest press, shoulder flexion   Current Community Support Family/friend caregiver  (Beatriz - wife, Deisy - DIL both  present for eval)   Patient role/Employment history Retired   Living environment House/[facility]   Home/Community Accessibility Comments Lives with his spouse and daughter in a single story house with 8 DAIJA with a railing - able to negotiate with the railing but without he needs help; Building a new duplex with a walkin shower   Current Assistive Devices Standard Cane   ADL Devices Raised Toilet Seat  with Arms   Assistive Devices Comments His DIL notes that Deandre is using a urinal at night or walking to the bathroom then using a urinal while sitting on the edge of the tub, his wife assists   Patient/Family Goals Statement Improve walking, strength, get a walker   Fall Risk Screen   Fall screen completed by PT   Have you fallen 2 or more times in the past year? Yes   Have you fallen and had an injury in the past year? Yes   Timed Up and Go score (seconds) NT due to fatigue, will assess next session   Is patient a fall risk? Yes   Pain   Patient currently in pain No   Vitals Signs   Blood Pressure 85/51  (No dizziness, drank a glass of water and rested for 5 minutes: 90/51; 89/56 after walking, SOB and fatigue but no dizziness - his DIL notes he has an order for an abdominal binder)   Vital Signs Comments Seated, resting, right arm   Integumentary   Integumentary Comments 1+ pitting edema in the right LE, 2+ pitting edema in the left LE   Posture   Posture Protracted shoulders   Strength   Strength Comments MMT performed in sitting, B LE grossly 4-/5   Bed Mobility   Bed Mobility Comments IND per report, needs UE assist to stand from bed   Transfer Skills   Transfer Comments Heavy B UE assist to stand from the manual wheelchair   Locomotion   Wheel Chair Mobility Comments Discussed possibility of transport chair at home for mobility   Gait   Gait Comments Patient ambulates with a 4WW and decreased gait speed, forward flexed posture and downward gaze, heavy B UE assist on the walker, notes SOB and LE fatigue with  walking, poor foot clearance with shuffling gait and stepping equal to the ball of the foot on the other side   Gait Special Tests   Gait Special Tests 25 FOOT TIMED WALK;SIX MINUTE WALK TEST;OTHER   Gait Special Tests 25 Foot Timed Walk   Seconds 23   Steps 25 Steps   Comments With 4WW, impaired gait speed   Gait Special Tests Six Minute Walk Test   Feet 107 Feet   Comments In 2 minutes with 4WW to fatigue   Sensory Examination   Sensory Perception Comments Peripheral neuropathy in B foot, numb on the bottom of his feet   Modality Interventions   Planned Modality Interventions Comments Per therapist discretion   Planned Therapy Interventions   Planned Therapy Interventions balance training;bed mobility training;gait training;neuromuscular re-education;ROM;strengthening;stretching;transfer training  (LSVT BIG training)   Clinical Impression   Criteria for Skilled Therapeutic Interventions Met yes, treatment indicated   PT Diagnosis Impaired functional mobility and activity tolerance   Influenced by the following impairments Impaired balance, strength, endurance, posture, gait   Functional limitations due to impairments Increased risk for falls, impaired safety and independence with transfers and functional mobility, frequent falls, impaired quality of life   Clinical Presentation Evolving/Changing   Clinical Presentation Rationale Frequent falls and risk for injury   Clinical Decision Making (Complexity) Moderate complexity   Therapy Frequency 2 times/Week  (Decreasing in frequency as indicated)   Predicted Duration of Therapy Intervention (days/wks) 90 days   Risk & Benefits of therapy have been explained Yes   Patient, Family & other staff in agreement with plan of care Yes   Clinical Impression Comments Patient is an 81 year old male presenting to physical therapy with gait and balance difficulties, noting increased shuffling. He is at an increased risk for falls per a history of falls and significant strength  and mobility limitations. Patient may benefit from skilled physical therapy to decrease his risk for falls and increase his safety and independence with functional mobility and ADLs.   GOALS   PT Eval Goals 1;2;3;4;5   Goal 1   Goal Identifier HEP   Goal Description Patient will demonstrate understanding and compliance to his HEP for continued wellbeing upon discharge from skilled physical therapy.   Target Date 07/13/22   Goal 2   Goal Identifier Transfers   Goal Description Patient will complete 3 STS transfers with 1-2 UE assist from an 18 inch surface in 30 seconds to demonstrate improved LE strength for independent transfers from low surfaces.   Goal Progress Eval: 1 rep with heavy  B UE assist   Target Date 07/13/22   Goal 3   Goal Identifier Gait speed   Goal Description Patient will ambulate 25 feet in <15 seconds to demonstrate improved step length and gait speed for increased safety and independence with crossing the street.   Goal Progress Eval: 23 sec with 4WW   Target Date 07/13/22   Goal 4   Goal Identifier Gait distance   Goal Description Patient will ambulate 400 feet during the 6 MWT with the least restrictive AD or no AD to demonstrate improved activity tolerance for independent and safe community ambulation.   Goal Progress Eval: 107 feet with 4WW   Target Date 07/13/22   Goal 5   Goal Identifier Fall risk   Goal Description Patient will complete the TUG in 5 seconds less than baseline to demonstrate improved safety and independence with transfers, turns, and gait.   Goal Progress TBD   Target Date 07/13/22   Total Evaluation Time   PT Eval, Moderate Complexity Minutes (25296) 20   Therapy Certification   Certification date from 04/15/22   Certification date to 07/13/22   Medical Diagnosis Lumbosacral spondylosis without myelopathy (M47.817, Other chronic pain (G89.29), Abnormal gait (R26.9), Lower extremity edema (R60.0   Certification I certify the need for these services furnished under this  plan of treatment and while under my care.  (Physician co-signature of this document indicates review and certification of the therapy plan).

## 2022-04-18 DIAGNOSIS — N40.0 BENIGN PROSTATIC HYPERPLASIA WITHOUT LOWER URINARY TRACT SYMPTOMS: ICD-10-CM

## 2022-04-18 DIAGNOSIS — F41.9 ANXIETY: ICD-10-CM

## 2022-04-18 DIAGNOSIS — I10 BENIGN ESSENTIAL HYPERTENSION: ICD-10-CM

## 2022-04-18 RX ORDER — FUROSEMIDE 40 MG
40 TABLET ORAL DAILY
Qty: 90 TABLET | Refills: 3 | Status: SHIPPED | OUTPATIENT
Start: 2022-04-18 | End: 2023-01-01

## 2022-04-18 NOTE — TELEPHONE ENCOUNTER
Last Clinic Visit: 3/21/2022  [facility] [place]  Remaining refills sent to requested pharmacy.

## 2022-04-19 ENCOUNTER — HOSPITAL ENCOUNTER (OUTPATIENT)
Dept: PHYSICAL THERAPY | Facility: CLINIC | Age: 82
Setting detail: THERAPIES SERIES
Discharge: HOME | End: 2022-04-19
Attending: PHYSICAL MEDICINE & REHABILITATION
Payer: COMMERCIAL

## 2022-04-19 PROCEDURE — 97116 GAIT TRAINING THERAPY: CPT | Mod: GP | Performed by: PHYSICAL THERAPIST

## 2022-04-19 PROCEDURE — 97110 THERAPEUTIC EXERCISES: CPT | Mod: GP | Performed by: PHYSICAL THERAPIST

## 2022-04-24 ENCOUNTER — HEALTH MAINTENANCE LETTER (OUTPATIENT)
Age: 82
End: 2022-04-24

## 2022-04-24 DIAGNOSIS — E11.9 TYPE 2 DIABETES MELLITUS WITHOUT COMPLICATION, WITHOUT LONG-TERM CURRENT USE OF INSULIN (H): ICD-10-CM

## 2022-04-25 ENCOUNTER — HOSPITAL ENCOUNTER (OUTPATIENT)
Dept: PHYSICAL THERAPY | Facility: CLINIC | Age: 82
Setting detail: THERAPIES SERIES
Discharge: HOME | End: 2022-04-25
Attending: PHYSICAL MEDICINE & REHABILITATION
Payer: COMMERCIAL

## 2022-04-25 PROCEDURE — 97110 THERAPEUTIC EXERCISES: CPT | Mod: GP | Performed by: PHYSICAL THERAPIST

## 2022-04-27 RX ORDER — IRBESARTAN 150 MG/1
150 TABLET ORAL AT BEDTIME
Qty: 90 TABLET | Refills: 3 | Status: SHIPPED | OUTPATIENT
Start: 2022-04-27 | End: 2022-01-01

## 2022-04-27 RX ORDER — SPIRONOLACTONE 25 MG/1
25 TABLET ORAL DAILY
Qty: 90 TABLET | Refills: 3 | Status: SHIPPED | OUTPATIENT
Start: 2022-04-27 | End: 2022-01-01

## 2022-04-27 RX ORDER — ATORVASTATIN CALCIUM 40 MG/1
40 TABLET, FILM COATED ORAL DAILY
Qty: 90 TABLET | Refills: 3 | Status: SHIPPED | OUTPATIENT
Start: 2022-04-27 | End: 2023-01-01

## 2022-04-27 NOTE — TELEPHONE ENCOUNTER
Last Clinic Visit: 3/21/2022  [facility] [place]    *Atorvastatin-- LDL completed within protocol timeframe.    - Lipid Panel done at outside lab 12/6/2021

## 2022-05-03 ENCOUNTER — OFFICE VISIT (OUTPATIENT)
Dept: DERMATOLOGY | Facility: CLINIC | Age: 82
End: 2022-05-03
Payer: COMMERCIAL

## 2022-05-03 DIAGNOSIS — L28.1 PRURIGO NODULARIS: Primary | ICD-10-CM

## 2022-05-03 DIAGNOSIS — I87.2 VENOUS STASIS DERMATITIS OF LEFT LOWER EXTREMITY: ICD-10-CM

## 2022-05-03 PROCEDURE — 99214 OFFICE O/P EST MOD 30 MIN: CPT | Mod: GC | Performed by: DERMATOLOGY

## 2022-05-03 RX ORDER — GABAPENTIN 300 MG/1
CAPSULE ORAL
Qty: 90 CAPSULE | Refills: 3 | Status: SHIPPED | OUTPATIENT
Start: 2022-05-03 | End: 2022-05-13

## 2022-05-03 RX ORDER — FLUOCINOLONE ACETONIDE 0.1 MG/ML
SOLUTION TOPICAL
Qty: 60 ML | Refills: 4 | Status: SHIPPED | OUTPATIENT
Start: 2022-05-03 | End: 2022-01-01

## 2022-05-03 ASSESSMENT — PAIN SCALES - GENERAL: PAINLEVEL: NO PAIN (0)

## 2022-05-03 NOTE — PATIENT INSTRUCTIONS
Take 300 mg gabapentin in the morning and also take gabapentin to 900 mg in the evening.     If you aren't feeling too drowsy after a few weeks, you can increase to 300 mg in the morning, 300 mg mid-day, and 900 mg in the evening.     Try the fluocinolone solution on the scalp twice daily as needed for itchiness.

## 2022-05-03 NOTE — LETTER
5/3/2022       RE: Deandre Moore  [address]  [place] [place] [address]     Dear Colleague,    Thank you for referring your patient, Deandre Moore, to the [facility] DERMAT[facility] [place] at [facility]. Please see a copy of my visit note below.    [facility] Dermatology Note  Encounter Date: May 3, 2022  Office Visit     Dermatology Problem List:  # Chronic eczematous dermatitis with chronic pruritis  - Current tx: augmented betamethasone BID PRN  - Previous tx: dupilumab 300mg q14d (restarted 8/24/21; stopped 11/2021 due to persistent injection site reactions), clobetasol 0.05% cream  # Prurigo nodularis, left forearm. S/p bx.   - ILK 20 mg/cc on 5/6/21.   # Macular ISK, R axilla. S/p shave bx 5/6/21  # Stasis dermatitis   - Compression stockings or elevation if unable to use compression socks and augmented betamethasone  # Callus left lower extremitiy  ____________________________________________    Assessment & Plan:    # Chronic eczematous dermatitis / pruritus / prurigo nodularis / xerosis cutis. Chronic, flare/not at goal.   # Stasis dermatitis / changes in setting of venous insufficiency. Chronic, flare/not at goal.   Pruritus improved since starting the gabapentin and seems to be tolerating the gabapentin well.  Has been using augmented betamethasone ointment on itchy areas usually once a day.  Has not been able to get the compression socks on and off so has not been wearing them.  We will try increasing dose of gabapentin to see if pruritus further improves.  - continue Augmented betamethasone ointment BID PRN to pruritus areas on body or lower legs   - increase gabapentin to 300 mg qAM then 900 mg at bedtime. If tolerated, can increased to 300 mg qAM, 300 mg qMiday, and then 900 mg at bedtime.   - start fluocinonide solution BID PRN for itchy areas on scalp   - recommend regular elevation of lower legs if unable  to wear compression socks      Procedures Performed:   None    Follow-up: 2-3 months     Staff and Resident:     Diana Butt MD  Dermatology Resident, PGY3    Staff Physician Comments:   I saw and evaluated the patient with the resident and I agree with the assessment and plan.  I was present for the examination.    Edu Rivera MD  Pronouns: he/him/his    Department of Dermatology  [facility]: Phone: [phone], Fax:[phone]  [facility]: Phone: [phone] Fax: [phone]  ____________________________________________    CC: RECHECK (Deandre is here today for 3m follow up eczematous dermatitis, reports some improvement but still experiencing itching)    HPI:  Mr. Deandre Moore is a(n) 81 year old male who presents today as a return patient for dermatitis. Started on gabapentin 900 mg at bedtime at last visit. Thinks this has helped with the itchiness. Still had quite a bit of itchiness during the day so they switched to 300 mg in the morning and 600 mg at bedtime. Denies feeling drowsy or dizzy. Still itchy on the legs and whole better. Says they aren't able to get the compression socks on and off easily so haven't been using them.     Labs Reviewed:  Creatinine   Date Value Ref Range Status   01/24/2022 1.24 0.66 - 1.25 mg/dL Final   03/11/2021 1.27 (H) 0.66 - 1.25 mg/dL Final       Physical Exam:  Vitals: There were no vitals taken for this visit.  SKIN: Focused examination of legs and abdomen was performed.  - scaly pink excoriated papules on lower legs   - 4+ pitting edema on left lower leg/left foot, scant R lower leg edema   - scattered small hyperpigmented excoriated papules on abdomen and scalp   - No other lesions of concern on areas examined.     Medications:  Current Outpatient Medications   Medication     Alcohol Swabs PADS     allopurinol (ZYLOPRIM) 300 MG  tablet     aspirin 81 MG tablet     atorvastatin (LIPITOR) 40 MG tablet     augmented betamethasone dipropionate (DIPROLENE-AF) 0.05 % external ointment     blood glucose (NO BRAND SPECIFIED) lancets standard     blood glucose (NO BRAND SPECIFIED) test strip     Blood Glucose Monitoring Suppl (BLOOD GLUCOSE MONITOR SYSTEM) w/Device KIT     Cholecalciferol (VITAMIN D) 2000 UNITS CAPS     clobetasol (TEMOVATE) 0.05 % external cream     clopidogrel (PLAVIX) 75 MG tablet     colchicine (COLCYRS) 0.6 MG tablet     Cyanocobalamin (VITAMIN B12 PO)     finasteride (PROSCAR) 5 MG tablet     fluocinolone (SYNALAR) 0.01 % solution     FLUoxetine (PROZAC) 20 MG capsule     furosemide (LASIX) 40 MG tablet     gabapentin (NEURONTIN) 300 MG capsule     Ginkgo Biloba (GNP GINGKO BILOBA EXTRACT PO)     glimepiride (AMARYL) 1 MG tablet     indapamide (LOZOL) 2.5 MG tablet     irbesartan (AVAPRO) 150 MG tablet     isosorbide mononitrate (IMDUR) 30 MG 24 hr tablet     ketoconazole (NIZORAL) 2 % external cream     metFORMIN (GLUCOPHAGE) 1000 MG tablet     methotrexate 2.5 MG tablet     multivitamin w/minerals (THERA-VIT-M) tablet     mupirocin (BACTROBAN) 2 % external ointment     nitroglycerin (NITROSTAT) 0.4 MG SL tablet     predniSONE (DELTASONE) 20 MG tablet     spironolactone (ALDACTONE) 25 MG tablet     tamsulosin (FLOMAX) 0.4 MG capsule     torsemide (DEMADEX) 10 MG tablet     traZODone (DESYREL) 150 MG tablet     triamcinolone (KENALOG) 0.1 % external ointment     urea (GORDONS UREA) 40 % external ointment     FLUoxetine (PROZAC) 10 MG capsule     ipratropium (ATROVENT) 0.03 % nasal spray     omega-3 acid ethyl esters (LOVAZA) 1 g capsule     potassium chloride SA (K-DUR/KLOR-CON M) 20 MEQ CR tablet     Current Facility-Administered Medications   Medication     triamcinolone (KENALOG-40) injection 20 mg      Past Medical History:   Patient Active Problem List   Diagnosis     Itching     AD (atopic dermatitis)     Dermatitis      Hyperglycemia     Eczema, unspecified eczema     Intrinsic atopic dermatitis     Other eczema     Notalgia paresthetica     Rash     Pseudophakia of both eyes     Diabetes mellitus (H)     Presbyopia     Type 2 diabetes mellitus without complication, without long-term current use of insulin (H)     Encounter for long-term (current) use of high-risk medication     Seborrheic keratoses, inflamed     Neoplasm of uncertain behavior of skin     Chronic kidney disease, stage 3 (H)     Lumbosacral spondylosis without myelopathy     Hx of CABG     Gout     Essential hypertension     Erectile dysfunction     Edema     Dyslipidemia     Decreased range of motion of ankle     Coronary atherosclerosis     Vitamin D deficiency     Ventricular ectopy     Type 2 diabetes mellitus with diabetic neuropathy (H)     Supraventricular tachycardia (H)     Renal insufficiency syndrome     Pneumothorax     Plantar fascia syndrome     Personal history of tobacco use, presenting hazards to health     Past Medical History:   Diagnosis Date     Diabetes mellitus (H)      Gout attack      Heart attack (H) 1984     Hypertension      Stented coronary artery     6 stents in heart        CC Denilson Thomas  [facility]  [address] [address] on close of this encounter.

## 2022-05-03 NOTE — PROGRESS NOTES
[facility] Dermatology Note  Encounter Date: May 3, 2022  Office Visit     Dermatology Problem List:  # Chronic eczematous dermatitis with chronic pruritis  - Current tx: augmented betamethasone BID PRN  - Previous tx: dupilumab 300mg q14d (restarted 8/24/21; stopped 11/2021 due to persistent injection site reactions), clobetasol 0.05% cream  # Prurigo nodularis, left forearm. S/p bx.   - ILK 20 mg/cc on 5/6/21.   # Macular ISK, R axilla. S/p shave bx 5/6/21  # Stasis dermatitis   - Compression stockings or elevation if unable to use compression socks and augmented betamethasone  # Callus left lower extremitiy  ____________________________________________    Assessment & Plan:    # Chronic eczematous dermatitis / pruritus / prurigo nodularis / xerosis cutis. Chronic, flare/not at goal.   # Stasis dermatitis / changes in setting of venous insufficiency. Chronic, flare/not at goal.   Pruritus improved since starting the gabapentin and seems to be tolerating the gabapentin well.  Has been using augmented betamethasone ointment on itchy areas usually once a day.  Has not been able to get the compression socks on and off so has not been wearing them.  We will try increasing dose of gabapentin to see if pruritus further improves.  - continue Augmented betamethasone ointment BID PRN to pruritus areas on body or lower legs   - increase gabapentin to 300 mg qAM then 900 mg at bedtime. If tolerated, can increased to 300 mg qAM, 300 mg qMiday, and then 900 mg at bedtime.   - start fluocinonide solution BID PRN for itchy areas on scalp   - recommend regular elevation of lower legs if unable to wear compression socks      Procedures Performed:   None    Follow-up: 2-3 months     Staff and Resident:     Diana Butt MD  Dermatology Resident, PGY3    Staff Physician Comments:   I saw and evaluated the patient with the resident and I agree with the assessment and plan.  I was present for the  examination.    Edu Rivera MD  Pronouns: he/him/his    Department of Dermatology  [facility]: Phone: [phone], Fax:[phone]  [facility]: Phone: [phone] Fax: [phone]  ____________________________________________    CC: RECHECK (Deandre is here today for 3m follow up eczematous dermatitis, reports some improvement but still experiencing itching)    HPI:  Mr. Deandre Moore is a(n) 81 year old male who presents today as a return patient for dermatitis. Started on gabapentin 900 mg at bedtime at last visit. Thinks this has helped with the itchiness. Still had quite a bit of itchiness during the day so they switched to 300 mg in the morning and 600 mg at bedtime. Denies feeling drowsy or dizzy. Still itchy on the legs and whole better. Says they aren't able to get the compression socks on and off easily so haven't been using them.     Labs Reviewed:  Creatinine   Date Value Ref Range Status   01/24/2022 1.24 0.66 - 1.25 mg/dL Final   03/11/2021 1.27 (H) 0.66 - 1.25 mg/dL Final       Physical Exam:  Vitals: There were no vitals taken for this visit.  SKIN: Focused examination of legs and abdomen was performed.  - scaly pink excoriated papules on lower legs   - 4+ pitting edema on left lower leg/left foot, scant R lower leg edema   - scattered small hyperpigmented excoriated papules on abdomen and scalp   - No other lesions of concern on areas examined.     Medications:  Current Outpatient Medications   Medication     Alcohol Swabs PADS     allopurinol (ZYLOPRIM) 300 MG tablet     aspirin 81 MG tablet     atorvastatin (LIPITOR) 40 MG tablet     augmented betamethasone dipropionate (DIPROLENE-AF) 0.05 % external ointment     blood glucose (NO BRAND SPECIFIED) lancets standard     blood glucose (NO BRAND SPECIFIED) test strip     Blood Glucose Monitoring Suppl (BLOOD GLUCOSE  MONITOR SYSTEM) w/Device KIT     Cholecalciferol (VITAMIN D) 2000 UNITS CAPS     clobetasol (TEMOVATE) 0.05 % external cream     clopidogrel (PLAVIX) 75 MG tablet     colchicine (COLCYRS) 0.6 MG tablet     Cyanocobalamin (VITAMIN B12 PO)     finasteride (PROSCAR) 5 MG tablet     fluocinolone (SYNALAR) 0.01 % solution     FLUoxetine (PROZAC) 20 MG capsule     furosemide (LASIX) 40 MG tablet     gabapentin (NEURONTIN) 300 MG capsule     Ginkgo Biloba (GNP GINGKO BILOBA EXTRACT PO)     glimepiride (AMARYL) 1 MG tablet     indapamide (LOZOL) 2.5 MG tablet     irbesartan (AVAPRO) 150 MG tablet     isosorbide mononitrate (IMDUR) 30 MG 24 hr tablet     ketoconazole (NIZORAL) 2 % external cream     metFORMIN (GLUCOPHAGE) 1000 MG tablet     methotrexate 2.5 MG tablet     multivitamin w/minerals (THERA-VIT-M) tablet     mupirocin (BACTROBAN) 2 % external ointment     nitroglycerin (NITROSTAT) 0.4 MG SL tablet     predniSONE (DELTASONE) 20 MG tablet     spironolactone (ALDACTONE) 25 MG tablet     tamsulosin (FLOMAX) 0.4 MG capsule     torsemide (DEMADEX) 10 MG tablet     traZODone (DESYREL) 150 MG tablet     triamcinolone (KENALOG) 0.1 % external ointment     urea (GORDONS UREA) 40 % external ointment     FLUoxetine (PROZAC) 10 MG capsule     ipratropium (ATROVENT) 0.03 % nasal spray     omega-3 acid ethyl esters (LOVAZA) 1 g capsule     potassium chloride SA (K-DUR/KLOR-CON M) 20 MEQ CR tablet     Current Facility-Administered Medications   Medication     triamcinolone (KENALOG-40) injection 20 mg      Past Medical History:   Patient Active Problem List   Diagnosis     Itching     AD (atopic dermatitis)     Dermatitis     Hyperglycemia     Eczema, unspecified eczema     Intrinsic atopic dermatitis     Other eczema     Notalgia paresthetica     Rash     Pseudophakia of both eyes     Diabetes mellitus (H)     Presbyopia     Type 2 diabetes mellitus without complication, without long-term current use of insulin (H)     Encounter  for long-term (current) use of high-risk medication     Seborrheic keratoses, inflamed     Neoplasm of uncertain behavior of skin     Chronic kidney disease, stage 3 (H)     Lumbosacral spondylosis without myelopathy     Hx of CABG     Gout     Essential hypertension     Erectile dysfunction     Edema     Dyslipidemia     Decreased range of motion of ankle     Coronary atherosclerosis     Vitamin D deficiency     Ventricular ectopy     Type 2 diabetes mellitus with diabetic neuropathy (H)     Supraventricular tachycardia (H)     Renal insufficiency syndrome     Pneumothorax     Plantar fascia syndrome     Personal history of tobacco use, presenting hazards to health     Past Medical History:   Diagnosis Date     Diabetes mellitus (H)      Gout attack      Heart attack (H) 1984     Hypertension      Stented coronary artery     6 stents in heart        [facility]  [address] [address] on close of this encounter.

## 2022-05-03 NOTE — NURSING NOTE
Dermatology Rooming Note    Deandre Moore's goals for this visit include:   Chief Complaint   Patient presents with     RECHECK     Deandre is here today for 3m follow up eczematous dermatitis, reports some improvement but still experiencing itching     Sulma Adams, EMT

## 2022-05-04 ENCOUNTER — HOSPITAL ENCOUNTER (OUTPATIENT)
Dept: PHYSICAL THERAPY | Facility: CLINIC | Age: 82
Setting detail: THERAPIES SERIES
Discharge: HOME | End: 2022-05-04
Attending: PHYSICAL MEDICINE & REHABILITATION
Payer: COMMERCIAL

## 2022-05-04 PROCEDURE — 97110 THERAPEUTIC EXERCISES: CPT | Mod: GP | Performed by: PHYSICAL THERAPIST

## 2022-05-05 ENCOUNTER — OFFICE VISIT (OUTPATIENT)
Dept: PHYSICAL MEDICINE AND REHAB | Facility: CLINIC | Age: 82
End: 2022-05-05
Payer: COMMERCIAL

## 2022-05-05 VITALS — SYSTOLIC BLOOD PRESSURE: 122 MMHG | DIASTOLIC BLOOD PRESSURE: 73 MMHG | OXYGEN SATURATION: 96 % | HEART RATE: 89 BPM

## 2022-05-05 DIAGNOSIS — M47.817 LUMBOSACRAL SPONDYLOSIS WITHOUT MYELOPATHY: Primary | ICD-10-CM

## 2022-05-05 DIAGNOSIS — R26.9 ABNORMAL GAIT: ICD-10-CM

## 2022-05-05 PROCEDURE — 99214 OFFICE O/P EST MOD 30 MIN: CPT | Performed by: PHYSICAL MEDICINE & REHABILITATION

## 2022-05-05 ASSESSMENT — PAIN SCALES - GENERAL: PAINLEVEL: NO PAIN (0)

## 2022-05-05 NOTE — PROGRESS NOTES
CC: The patient returns for a follow-up visit for his ongoing issues related to chronic low back and hip issues.    He was last seen by me on 2/8/2022.  He is accompanied by his son Lucio and his wife.  His interval history is remarkable for generally doing well.  He underwent radiofrequency ablation from L2-L4 on either side on October 18, 2021.  He has participated in physical therapy.  Uses a single ended cane.  He walks slowly with some shuffling.  He feels his knees do bother him and he has had falls attributed to that.  He is not needing the use of an abdominal binder currently.  Remainder of his history is otherwise unchanged.    Past Medical History:   Diagnosis Date     Diabetes mellitus (H)      Gout attack      Heart attack (H) 1984     Hypertension      Stented coronary artery     6 stents in heart     Past Surgical History:   Procedure Laterality Date     CARDIAC SURGERY  1984    4 vessel bypass     CHOLECYSTECTOMY       COLONOSCOPY       DESTRUCTION OF PARAVERTEBRAL FACET LUMBAR / SACRAL ADDITIONAL Bilateral 10/18/2021    Procedure: DESTRUCTION, NERVE, FACET JOINT, LUMBOSACRAL, ADDITIONAL NERVE AFTER DESTRUCTION OF INITIAL LUMBOSACRAL FACET JOINT NERVE;  Surgeon: Horacio Gonsalves MD;  Location: [facility]     DESTRUCTION OF PARAVERTEBRAL FACET LUMBAR / SACRAL SINGLE Bilateral 10/18/2021    Procedure: DESTRUCTION, NERVE, FACET JOINT, LUMBOSACRAL, 1 NERVE;  Surgeon: Horacio Gonsalves MD;  Location: [facility]     ESOPHAGOSCOPY, GASTROSCOPY, DUODENOSCOPY (EGD), COMBINED N/A 3/1/2018    Procedure: COMBINED ESOPHAGOSCOPY, GASTROSCOPY, DUODENOSCOPY (EGD), BIOPSY SINGLE OR MULTIPLE;  ESOPHAGOGASTRODUODENOSCOPY ;  Surgeon: Daryn Medrano MD;  Location: [facility]     INJECT BLOCK MEDIAL BRANCH CERVICAL/THORACIC/LUMBAR Bilateral 8/16/2021    Procedure: BLOCK, NERVE, FACET JOINT, MEDIAL BRANCH, DIAGNOSTIC Lumbar 2-3-4;  Surgeon: Horacio Gonsalves MD;  Location: [facility]     INJECT BLOCK MEDIAL  BRANCH CERVICAL/THORACIC/LUMBAR Bilateral 9/27/2021    Procedure: BLOCK, NERVE, FACET JOINT, MEDIAL BRANCH, DIAGNOSTIC  L2-4, Bilateral;  Surgeon: Horacio Gonsalves MD;  Location: [facility]     NO HISTORY OF SURGERY  3/31/14    derm     PHACOEMULSIFICATION WITH STANDARD INTRAOCULAR LENS IMPLANT  1/21/2013    Procedure: PHACOEMULSIFICATION WITH STANDARD INTRAOCULAR LENS IMPLANT;  Phacoemulsification with standard intraocular lens implant, right eye;  Surgeon: Jay Rodriges MD;  Location: [facility]     Current Outpatient Medications   Medication Sig Dispense Refill     Alcohol Swabs PADS 1 pad 4 times daily 100 each 6     allopurinol (ZYLOPRIM) 300 MG tablet TAKE 1 TABLET BY MOUTH  DAILY 90 tablet 0     aspirin 81 MG tablet Take 81 mg by mouth daily.       atorvastatin (LIPITOR) 40 MG tablet Take 1 tablet (40 mg) by mouth daily 90 tablet 3     augmented betamethasone dipropionate (DIPROLENE-AF) 0.05 % external ointment Apply twice daily as needed for rash on the leg or back 45 g 11     blood glucose (NO BRAND SPECIFIED) lancets standard Use to test blood sugar 3 times daily or as directed. 100 each 11     blood glucose (NO BRAND SPECIFIED) test strip Use to test blood sugar 4 times daily  With meter/ strips/ lancets covered by insurance pt using accuchek 360 each 3     Blood Glucose Monitoring Suppl (BLOOD GLUCOSE MONITOR SYSTEM) w/Device KIT Test 4 times daily with meter covered by  insurance 1 kit 1     Cholecalciferol (VITAMIN D) 2000 UNITS CAPS Take 1 tablet by mouth daily       clobetasol (TEMOVATE) 0.05 % external cream Apply twice daily as needed for itching or rash on the back 60 g 5     clopidogrel (PLAVIX) 75 MG tablet TAKE 1 TABLET BY MOUTH  DAILY 90 tablet 0     colchicine (COLCYRS) 0.6 MG tablet TAKE 1 TABLET BY MOUTH  DAILY 90 tablet 0     Cyanocobalamin (VITAMIN B12 PO) Take 1 tablet by mouth daily       finasteride (PROSCAR) 5 MG tablet Take 1 tablet (5 mg) by mouth daily 90 tablet 2      fluocinolone (SYNALAR) 0.01 % solution Apply a thin layer twice daily as needed to scalp for itchiness. 60 mL 4     FLUoxetine (PROZAC) 20 MG capsule Take 1 capsule (20 mg) by mouth daily 90 capsule 3     furosemide (LASIX) 40 MG tablet Take 1 tablet (40 mg) by mouth daily 90 tablet 3     gabapentin (NEURONTIN) 300 MG capsule Take 1 capsule (300 mg) by mouth every morning AND 1 capsule (300 mg) daily (with lunch) AND 3 capsules (900 mg) At Bedtime. 90 capsule 3     Ginkgo Biloba (GNP GINGKO BILOBA EXTRACT PO) Take 1 tablet by mouth daily        glimepiride (AMARYL) 1 MG tablet Take 1 tablet (1 mg) by mouth 2 times daily 180 tablet 3     indapamide (LOZOL) 2.5 MG tablet Take 2.5 mg by mouth every morning.       irbesartan (AVAPRO) 150 MG tablet Take 1 tablet (150 mg) by mouth At Bedtime 90 tablet 3     isosorbide mononitrate (IMDUR) 30 MG 24 hr tablet Take 1 tablet (30 mg) by mouth daily 90 tablet 3     ketoconazole (NIZORAL) 2 % external cream Apply topically daily       metFORMIN (GLUCOPHAGE) 1000 MG tablet Take 1 tablet (1,000 mg) by mouth 2 times daily (with meals) 180 tablet 3     methotrexate 2.5 MG tablet Take 15 mg by mouth       multivitamin w/minerals (THERA-VIT-M) tablet Take 1 tablet by mouth daily.       mupirocin (BACTROBAN) 2 % external ointment Apply topically 2 times daily 30 g 0     nitroglycerin (NITROSTAT) 0.4 MG SL tablet Place under the tongue as needed       predniSONE (DELTASONE) 20 MG tablet TAKE 1 TABLET BY MOUTH ONCE DAILY FOR 7 DAYS       spironolactone (ALDACTONE) 25 MG tablet Take 1 tablet (25 mg) by mouth daily 90 tablet 3     tamsulosin (FLOMAX) 0.4 MG capsule TAKE 1 CAPSULE BY MOUTH  DAILY 90 capsule 2     torsemide (DEMADEX) 10 MG tablet Take 10 mg by mouth daily        traZODone (DESYREL) 150 MG tablet Take 1 tablet (150 mg) by mouth At Bedtime 90 tablet 2     triamcinolone (KENALOG) 0.1 % external ointment APPLY TWICE DAILY TO RAISED RASH AREAS ON THE ARMS, LEGS, BODY AS NEEDED.  454 g 1     urea (GORDONS UREA) 40 % external ointment Apply as many times daily as needed for callus on foot 30 g 11     FLUoxetine (PROZAC) 10 MG capsule Take 1 capsule (10 mg) by mouth daily 90 capsule 3     ipratropium (ATROVENT) 0.03 % nasal spray Spray 2 sprays into both nostrils as needed for rhinitis 30min before cooking 30 mL 1     omega-3 acid ethyl esters (LOVAZA) 1 g capsule Take 2 capsules (2 g) by mouth 2 times daily 180 capsule 3     potassium chloride SA (K-DUR/KLOR-CON M) 20 MEQ CR tablet TAKE 1 TABLET THREE TIMES DAILY 270 tablet 1     /73   Pulse 89   SpO2 96%     On examination, the patient is alert and cooperative.  Vitals are stable.  He is afebrile.  He is able to move his upper extremities functionally.  He is able to carry out straight leg raising test.  He is able to straighten his knee and flex it.  There is no tenderness over the knee joints or over the Pez anserine bursa.  There is no tenderness over the trochanters, lower back or over the SI region.  Edson's negative bilaterally.  There is no tenderness over the lumbar paraspinal regions on either side.    He was able to get up and walk as noted previously.    Impression: At this point, this patient with prior fusion from L4-S1 is doing well following radiofrequency ablations of the L2-L4 facets on either side.  Anticipate that this will be him in about a year and a half's worth of relief.  However he is having some tailbone pain but tests are negative.  I would recommend that he come back for follow-up in about 3 months time.  I also made some suggestions regarding keeping his knees slightly flexed when he goes to bed to see if that would alleviate some of the discomfort he has when he first gets into bed.    He will follow-up with his primary care physician for his other issues including his blood sugar questions.      30 minutes spent for this visit, greater than 50% was for counseling on above-mentioned issues.    Horacio  MD Major

## 2022-05-05 NOTE — LETTER
5/5/2022       RE: Deandre Moore  [address] [facility] [address]     Dear Colleague,    Thank you for referring your patient, Deandre Moore, to the [facility] [place] at [facility]. Please see a copy of my visit note below.    CC: The patient returns for a follow-up visit for his ongoing issues related to chronic low back and hip issues.    He was last seen by me on 2/8/2022.  He is accompanied by his son Lucio and his wife.  His interval history is remarkable for generally doing well.  He underwent radiofrequency ablation from L2-L4 on either side on October 18, 2021.  He has participated in physical therapy.  Uses a single ended cane.  He walks slowly with some shuffling.  He feels his knees do bother him and he has had falls attributed to that.  He is not needing the use of an abdominal binder currently.  Remainder of his history is otherwise unchanged.    Past Medical History:   Diagnosis Date     Diabetes mellitus (H)      Gout attack      Heart attack (H) 1984     Hypertension      Stented coronary artery     6 stents in heart     Past Surgical History:   Procedure Laterality Date     CARDIAC SURGERY  1984    4 vessel bypass     CHOLECYSTECTOMY       COLONOSCOPY       DESTRUCTION OF PARAVERTEBRAL FACET LUMBAR / SACRAL ADDITIONAL Bilateral 10/18/2021    Procedure: DESTRUCTION, NERVE, FACET JOINT, LUMBOSACRAL, ADDITIONAL NERVE AFTER DESTRUCTION OF INITIAL LUMBOSACRAL FACET JOINT NERVE;  Surgeon: Horacio Gonsalves MD;  Location: [facility]     DESTRUCTION OF PARAVERTEBRAL FACET LUMBAR / SACRAL SINGLE Bilateral 10/18/2021    Procedure: DESTRUCTION, NERVE, FACET JOINT, LUMBOSACRAL, 1 NERVE;  Surgeon: Horacio Gonsalves MD;  Location: [facility]     ESOPHAGOSCOPY, GASTROSCOPY, DUODENOSCOPY (EGD), COMBINED N/A 3/1/2018    Procedure: COMBINED ESOPHAGOSCOPY, GASTROSCOPY, DUODENOSCOPY (EGD),  BIOPSY SINGLE OR MULTIPLE;  ESOPHAGOGASTRODUODENOSCOPY ;  Surgeon: Daryn Medrano MD;  Location: [facility]     INJECT BLOCK MEDIAL BRANCH CERVICAL/THORACIC/LUMBAR Bilateral 8/16/2021    Procedure: BLOCK, NERVE, FACET JOINT, MEDIAL BRANCH, DIAGNOSTIC Lumbar 2-3-4;  Surgeon: Horacio Gonsalves MD;  Location: [facility]     INJECT BLOCK MEDIAL BRANCH CERVICAL/THORACIC/LUMBAR Bilateral 9/27/2021    Procedure: BLOCK, NERVE, FACET JOINT, MEDIAL BRANCH, DIAGNOSTIC  L2-4, Bilateral;  Surgeon: Horacio Gonsalves MD;  Location: [facility]     NO HISTORY OF SURGERY  3/31/14    derm     PHACOEMULSIFICATION WITH STANDARD INTRAOCULAR LENS IMPLANT  1/21/2013    Procedure: PHACOEMULSIFICATION WITH STANDARD INTRAOCULAR LENS IMPLANT;  Phacoemulsification with standard intraocular lens implant, right eye;  Surgeon: Jay Rodriges MD;  Location: [facility]     Current Outpatient Medications   Medication Sig Dispense Refill     Alcohol Swabs PADS 1 pad 4 times daily 100 each 6     allopurinol (ZYLOPRIM) 300 MG tablet TAKE 1 TABLET BY MOUTH  DAILY 90 tablet 0     aspirin 81 MG tablet Take 81 mg by mouth daily.       atorvastatin (LIPITOR) 40 MG tablet Take 1 tablet (40 mg) by mouth daily 90 tablet 3     augmented betamethasone dipropionate (DIPROLENE-AF) 0.05 % external ointment Apply twice daily as needed for rash on the leg or back 45 g 11     blood glucose (NO BRAND SPECIFIED) lancets standard Use to test blood sugar 3 times daily or as directed. 100 each 11     blood glucose (NO BRAND SPECIFIED) test strip Use to test blood sugar 4 times daily  With meter/ strips/ lancets covered by insurance pt using accuchek 360 each 3     Blood Glucose Monitoring Suppl (BLOOD GLUCOSE MONITOR SYSTEM) w/Device KIT Test 4 times daily with meter covered by  insurance 1 kit 1     Cholecalciferol (VITAMIN D) 2000 UNITS CAPS Take 1 tablet by mouth daily       clobetasol (TEMOVATE) 0.05 % external cream Apply twice daily as needed for itching  or rash on the back 60 g 5     clopidogrel (PLAVIX) 75 MG tablet TAKE 1 TABLET BY MOUTH  DAILY 90 tablet 0     colchicine (COLCYRS) 0.6 MG tablet TAKE 1 TABLET BY MOUTH  DAILY 90 tablet 0     Cyanocobalamin (VITAMIN B12 PO) Take 1 tablet by mouth daily       finasteride (PROSCAR) 5 MG tablet Take 1 tablet (5 mg) by mouth daily 90 tablet 2     fluocinolone (SYNALAR) 0.01 % solution Apply a thin layer twice daily as needed to scalp for itchiness. 60 mL 4     FLUoxetine (PROZAC) 20 MG capsule Take 1 capsule (20 mg) by mouth daily 90 capsule 3     furosemide (LASIX) 40 MG tablet Take 1 tablet (40 mg) by mouth daily 90 tablet 3     gabapentin (NEURONTIN) 300 MG capsule Take 1 capsule (300 mg) by mouth every morning AND 1 capsule (300 mg) daily (with lunch) AND 3 capsules (900 mg) At Bedtime. 90 capsule 3     Ginkgo Biloba (GNP GINGKO BILOBA EXTRACT PO) Take 1 tablet by mouth daily        glimepiride (AMARYL) 1 MG tablet Take 1 tablet (1 mg) by mouth 2 times daily 180 tablet 3     indapamide (LOZOL) 2.5 MG tablet Take 2.5 mg by mouth every morning.       irbesartan (AVAPRO) 150 MG tablet Take 1 tablet (150 mg) by mouth At Bedtime 90 tablet 3     isosorbide mononitrate (IMDUR) 30 MG 24 hr tablet Take 1 tablet (30 mg) by mouth daily 90 tablet 3     ketoconazole (NIZORAL) 2 % external cream Apply topically daily       metFORMIN (GLUCOPHAGE) 1000 MG tablet Take 1 tablet (1,000 mg) by mouth 2 times daily (with meals) 180 tablet 3     methotrexate 2.5 MG tablet Take 15 mg by mouth       multivitamin w/minerals (THERA-VIT-M) tablet Take 1 tablet by mouth daily.       mupirocin (BACTROBAN) 2 % external ointment Apply topically 2 times daily 30 g 0     nitroglycerin (NITROSTAT) 0.4 MG SL tablet Place under the tongue as needed       predniSONE (DELTASONE) 20 MG tablet TAKE 1 TABLET BY MOUTH ONCE DAILY FOR 7 DAYS       spironolactone (ALDACTONE) 25 MG tablet Take 1 tablet (25 mg) by mouth daily 90 tablet 3     tamsulosin (FLOMAX)  0.4 MG capsule TAKE 1 CAPSULE BY MOUTH  DAILY 90 capsule 2     torsemide (DEMADEX) 10 MG tablet Take 10 mg by mouth daily        traZODone (DESYREL) 150 MG tablet Take 1 tablet (150 mg) by mouth At Bedtime 90 tablet 2     triamcinolone (KENALOG) 0.1 % external ointment APPLY TWICE DAILY TO RAISED RASH AREAS ON THE ARMS, LEGS, BODY AS NEEDED. 454 g 1     urea (GORDONS UREA) 40 % external ointment Apply as many times daily as needed for callus on foot 30 g 11     FLUoxetine (PROZAC) 10 MG capsule Take 1 capsule (10 mg) by mouth daily 90 capsule 3     ipratropium (ATROVENT) 0.03 % nasal spray Spray 2 sprays into both nostrils as needed for rhinitis 30min before cooking 30 mL 1     omega-3 acid ethyl esters (LOVAZA) 1 g capsule Take 2 capsules (2 g) by mouth 2 times daily 180 capsule 3     potassium chloride SA (K-DUR/KLOR-CON M) 20 MEQ CR tablet TAKE 1 TABLET THREE TIMES DAILY 270 tablet 1     /73   Pulse 89   SpO2 96%     On examination, the patient is alert and cooperative.  Vitals are stable.  He is afebrile.  He is able to move his upper extremities functionally.  He is able to carry out straight leg raising test.  He is able to straighten his knee and flex it.  There is no tenderness over the knee joints or over the Pez anserine bursa.  There is no tenderness over the trochanters, lower back or over the SI region.  Edson's negative bilaterally.  There is no tenderness over the lumbar paraspinal regions on either side.    He was able to get up and walk as noted previously.    Impression: At this point, this patient with prior fusion from L4-S1 is doing well following radiofrequency ablations of the L2-L4 facets on either side.  Anticipate that this will be him in about a year and a half's worth of relief.  However he is having some tailbone pain but tests are negative.  I would recommend that he come back for follow-up in about 3 months time.  I also made some suggestions regarding keeping his knees  slightly flexed when he goes to bed to see if that would alleviate some of the discomfort he has when he first gets into bed.    He will follow-up with his primary care physician for his other issues including his blood sugar questions.      30 minutes spent for this visit, greater than 50% was for counseling on above-mentioned issues.    Horacio Gonsalves MD

## 2022-05-09 ENCOUNTER — HOSPITAL ENCOUNTER (OUTPATIENT)
Dept: PHYSICAL THERAPY | Facility: CLINIC | Age: 82
Setting detail: THERAPIES SERIES
Discharge: HOME | End: 2022-05-09
Attending: PHYSICAL MEDICINE & REHABILITATION
Payer: COMMERCIAL

## 2022-05-09 PROCEDURE — 97116 GAIT TRAINING THERAPY: CPT | Mod: GP | Performed by: PHYSICAL THERAPIST

## 2022-05-09 PROCEDURE — 97112 NEUROMUSCULAR REEDUCATION: CPT | Mod: GP | Performed by: PHYSICAL THERAPIST

## 2022-05-09 PROCEDURE — 97110 THERAPEUTIC EXERCISES: CPT | Mod: GP | Performed by: PHYSICAL THERAPIST

## 2022-05-13 DIAGNOSIS — L28.1 PRURIGO NODULARIS: ICD-10-CM

## 2022-05-13 RX ORDER — GABAPENTIN 300 MG/1
CAPSULE ORAL
Qty: 90 CAPSULE | Refills: 3 | Status: SHIPPED | OUTPATIENT
Start: 2022-05-13 | End: 2022-06-11

## 2022-05-13 NOTE — TELEPHONE ENCOUNTER
gabapentin (NEURONTIN) 300 MG capsule    Please send new order to updated/alternative pharmacy for Pt care.   Med not on refill protocol.       Indigo Roche RN  Central Triage Red Flags/Med Refills

## 2022-05-16 ENCOUNTER — HOSPITAL ENCOUNTER (OUTPATIENT)
Dept: PHYSICAL THERAPY | Facility: CLINIC | Age: 82
Setting detail: THERAPIES SERIES
Discharge: HOME | End: 2022-05-16
Attending: PHYSICAL MEDICINE & REHABILITATION
Payer: COMMERCIAL

## 2022-05-16 PROBLEM — E87.6 HYPOKALEMIA: Status: ACTIVE | Noted: 2018-01-18

## 2022-05-16 PROBLEM — M77.50 CALCANEAL BURSITIS: Status: ACTIVE | Noted: 2021-01-14

## 2022-05-16 PROBLEM — I50.9 ACUTE EXACERBATION OF CHF (CONGESTIVE HEART FAILURE) (H): Status: ACTIVE | Noted: 2021-12-06

## 2022-05-16 PROBLEM — I20.9 ANGINA PECTORIS (H): Status: ACTIVE | Noted: 2021-01-11

## 2022-05-16 PROCEDURE — 97116 GAIT TRAINING THERAPY: CPT | Mod: GP | Performed by: PHYSICAL THERAPIST

## 2022-05-16 PROCEDURE — 97110 THERAPEUTIC EXERCISES: CPT | Mod: GP | Performed by: PHYSICAL THERAPIST

## 2022-05-16 NOTE — PROGRESS NOTES
Diagnosis/Summary/Recommendations:      PATIENT: Deandre Moore  81 year old male     : 1940    NERI: May 31, 2022    MRN: [id number]    [address] [facility] [address]     [phone] (M)   [phone] (H)     [email or web address]    Guillermina MOORE SON  [phone]  Granted access    FAMILY HISTORY:  Notable for his brother having a tremor of his hands.  He is not certain if there is any family history of Parkinson disease.      SOCIAL HISTORY:  He is a retired .  He does not smoke or use alcohol.     Assessment:  (G25.9) Movement disorder  (primary encounter diagnosis)  Family history of tremor  - brother    Seen by Dr. Bruno     Brain MRI 2020  1. Mild to moderate cerebral/cerebellar atrophy with mild leukoaraiosis.  2. Stable configuration of the ventricular system, when compared to 2019. Ventricular enlargement is likely from cerebral volume  loss. Normal pressure hydrocephalus is considered unlikely.    Possible parkinsonism referral      Review of diagnosis        Avoidance of dopamine blockers       Motor complication review       Review of Impulse control disorders       Review of surgical or medication options       Gait/Balance/Falls   Able to walk 10 feet  shuffles  Living with son in [facility] - there is a shower and has not been modified  Building an apartment that should be done shortly   Been doing physical therapy at [facility] and may have one more session left    Falls occur when his legs are tired and goes straight down  Denies associated light headedness, freezing or vertigo  He has not had major injuries    Exercise/Therapy performed/offered       Cognitive/Driving   Not driving for 6 months  Has had some cognitive problems    Mood   Denies depression or anxiety   But family states he has mood changes and he was put on fluoxetine/prozac for anxiety  This medication has been helpful and has been on it for ?  6 months  Depression and anxiety is better per son    Hallucinations/delusions   Denies    Sleep   Goes to bed at night around 8pm   Can fall asleep after 30 minutes  He is taking trazodone 150mg for anxiety and sleep  Denies talking in his sleep  He may snore  He wakes up to urinate twice during the night and has a urinal  No night time incontinence  Able to go back asleep  He has some urgency no frequency  Wakes up around 6am and feels good   He gets out of bed by 730/8am    Bladder/Renal/Prostate/Gyn/Other  Benign prostatic hyperplasia  Chronic renal disease stage 3   Erectile dysfunction  gout    GI/Constipation/GERD   gallstones    ENDO/Lipid/DM/Bone density/Thyroid  Lipid disorder  Vitamin D deficiency  Type 2 diabetes with neuropathy  Chow endocrinologist    Cardio/heart/Hyper or Hypotensive   Congestive heart failure  edema  Inferior myocardial infarction  Ascending aorta enlargmenet  Chest pain  History of CABG  Hypertension  Palpitations  Supraventricular tachycardia  Ventricular ectopy    Vision/Dry Eyes/Cataracts/Glaucoma/Macular   Atopic dermatitis  Presbyopia  Pseudophakia of both eyes  Skin cancer    Heme/Anticoagulation/Antiplatelet/Anemia/Other  Vitamin b12 500mcg    ENT/Resp  Hearing loss - seeing gunnar   Loss of smell and taste    Skin/Cancer/Seborrhea/other  Eczema  Seborrheic keratosis  rash    Musculoskeletal/Pain/Headache  Osteoarthrosis  Lumbar spinal stenosis  Notalgia paresthetica    Other:      Medications           Allopurinol zyloprim 300mg 1      Aspirin 81mg 1      Atorvastatin lipitor 40mg  1      Augmented betamethasone dipropionate diprolene 0.05% ointment       Cholecalciferol Vitamin D 2000 units 50mcg 1      Clobetasol temovate 0.05% cream       Clopidogrel plavix 75mg 1      Colchicine colcrys 0.6mg  1      Cyanocobalamin Vitamin B12 500mcg below      Finasteride proscar 5mg  1      Fluocinolone synalar 0.01% solution       Fluoxetine prozac 20mg  1      Furosemide lasix 40mg  1       Gabapentin neurontin 300mg  3  3    Ginko biloba 60mg no      Glimepiride amaryl 1mg  1  1    Indapamide lozol 2.5mg no      Irbesartan avapro 150mg    1    Isosorbide mononitrate imdur 30mg 24hr 1      Ketoconazole nizoral 2% cream       Lorazepam ativan 1mg ?taking      Metformin glucophage 1000mg 1  1    Methotrexate 2.5mg off      Mirtazapine remeron 15mg   1    MVI multivitamin 1      Mupirocin bactroban 2% ointment       Nitroglycerin nitrostat 0.4mg tablets prn      Spironolactone aldactone 25mg 1      Tamsulosin flomax 0.4mg  1      Torsemide demadex 10mg  off      Trazodone desyrel 150mg    1    Triamcinolone kenalog 0.1% ointment       Urea Porfirio's urea 40% ointment       Vitamin B12 500mcg 1               Plan:    Pharmacy review of medications - he has chronic itching - long standing - not sure if related to medication  - states he has had eczema but would have pharmacy review medications as possible factor    Also review of about the antidepressant and sinemet.    Discussion about a dopamine scan (datscan) and would have to review the possiblity of doing this scan as he probably would have to be off one or all of his antidepressants - fluoxetine, mirtazapine and trazodone. Possibly wean off fluoxetine for the scan     So not sure if we can do the datscan - order placed and will  Decide if he will do this     Last brain mri was 2020 - consider doing another one - he did not have prominent vascular parkinsonian changes or normal pressure hydrocephalus on the prior scan. Future mri order placed.     Consider trial of sinemet (carbidopa/levodopa) - but based on his examination =he primarily jaw tremor and does not have a lot of stiffness or slowness. He walks with shuffling gait but has good arm swing.     Start with 1/2 tab daily x 3days then 1/2 tab twice daily for 3-7 days then 1 tab twice daily for one week then 1 tablet 3 times per day. The dose can be increased if there is no significant benefit.  IT may take 1-3 weeks before benefit is seen    To lessen the effects that protein has on the effectiveness of sinemet (i.e.. Levodopa), it is best to take this medication on an empty stomach one hour before or two hours after meals.     If you are experiencing nausea with the medication, then you should take the medication with a CRACKER, GONZALEZ TABLETS OR WITH FOOD. CALL IF STILL HAVING NAUSEA AS WE CAN TRY EXTRA CARBIDOPA, TRIMETHOBENZAMIDE ETC.     Besides nausea there are other side effects including the rare occurrence of a rash to the yellow dye in the sinemet tablets if you are taking the 25/100 formulation.     Iron may interfere with the effectiveness of this medication so do not take iron supplements at the same time you are taking sinemet.      Some people recommend taking Folbic or similar products with their sinemet. Folbic contains Folic Acid (2.5 mg), vitamin B6 (25mg), and vitamin b12 (2mg).       He walked much better with the ustep walker and may walk better with a regular walker    He has a walker that he had to pay 20% of the 275 cost  Kailee Childs, PT had been working with him.     New Rx placed and walker/wheelchair seating clinic ordered    Has ongoing cognitive issues -         Order for:  [employer] PHONE: [phone] FAX: [phone]  Detailed Order/Prescription  Effective Date: May 31, 2022  Patient Name: Deandre CHUN 1940  Length of Need: 99 months=lifetime  Diagnosis & ICD10 Code(s) 332.0 (parkinson)      The patient has a severe walking problem that places him/her at heightened risk of  morbidity or mortality without a walking-aid  A cane or crutch IS NOT sufficient in preventing this patient from falling and injuring  Himself/herself.  A standard walker IS NOT sufficient for preventing your patient from falling and injuring  himself/herself  This is a prescription for the U-Step 2 Walking Stabilizer (\[facility]\"" Code , produced by [employer]),  because this patient has a severe neurological condition or limited use of a hand,  and requires this product to safely ambulate and prevent serious injury due to risk of falling    This patient can safely use this walker  This patient's mobility impairment will be resolved with the use of this walker    Deandre Esparzajackelyn patient's mobility deficit  will be sufficiently resolved by using a U-Step 2 ([facility] #)  What product are you prescribing for your patient   - U-Step Walking Stabilizer ([facility] MODEL #US-PC-2)   - Accessory seat for walker  Cueing Module (Laser and Auditory cue for Parkinson's freezing)    Physician printed name: Carroll Gifford  NPI #: [id number]  Phone: [phone]  [address] [place] [id number]    By signing below, I authorize the use of this document as a legal prescription, and I certify that the above prescribed equipment is medically necessary, reasonable, accurate and complete and is not being prescribed for convenience. I will maintain an original signed copy of this order in my medical records and make it  available to Medicare, their authorized agents or other insurer, if required.    Physician Signature: __Carroll Gifford (electronic signature)   Date: May 31, 2022          Coding statement:   Medical Decision Making:  #  Chronic progressive medical conditions addressed  - see above --   Review and/or interpretation of unique test or documentation from a provider outside of neurology    Independent historian provided additional details   I  Prescription drug management and review of potential side effects and/or monitoring for side effects  -- see above ---  Health impacted by social determinants of health      I have reviewed the note as documented above.  This accurately captures the substance of my conversation with the patient and total time spent preparing for visit, executing visit and completing visit on the day of the visit: 70  minutes.  The portion of this  total time included face to face time 1040am - 1150am    Carroll Gifford MD     ______________________________________    Last visit date and details:             ______________________________________      Patient was asked about 14 Review of systems including changes in vision (dry eyes, double vision), hearing, heart, lungs, musculoskeletal, depression, anxiety, snoring, RBD, insomnia, urinary frequency, urinary urgency, constipation, swallowing problems, hematological, ID, allergies, skin problems: seborrhea, endocrinological: thyroid, diabetes, cholesterol; balance, weight changes, and other neurological problems and these were not significant at this time except for   Patient Active Problem List   Diagnosis     Itching     AD (atopic dermatitis)     Dermatitis     Hyperglycemia     Eczema, unspecified eczema     Intrinsic atopic dermatitis     Other eczema     Notalgia paresthetica     Rash     Pseudophakia of both eyes     Diabetes mellitus (H)     Presbyopia     Type 2 diabetes mellitus without complication, without long-term current use of insulin (H)     Encounter for long-term (current) use of high-risk medication     Seborrheic keratoses, inflamed     Neoplasm of uncertain behavior of skin     Chronic kidney disease, stage 3 (H)     Lumbosacral spondylosis without myelopathy     Hx of CABG     Gout     Essential hypertension     Erectile dysfunction     Edema     Dyslipidemia     Decreased range of motion of ankle     Coronary atherosclerosis     Vitamin D deficiency     Ventricular ectopy     Type 2 diabetes mellitus with diabetic neuropathy (H)     Supraventricular tachycardia (H)     Renal insufficiency syndrome     Pneumothorax     Plantar fascia syndrome     Personal history of tobacco use, presenting hazards to health     Acute exacerbation of CHF (congestive heart failure) (H)     Acute inferior myocardial infarction (H)     Allergy     Ascending aorta enlargement (H)     Benign prostatic hyperplasia      Calcaneal bursitis     Angina pectoris (H)     Chest pain, unspecified     Calculus of gallbladder     Cholecystitis     Hyperlipidemia     Hypokalemia     Lumbar back pain     Lumbar spinal stenosis     Osteoarthrosis     Palpitations          Allergies   Allergen Reactions     Beta Adrenergic Blockers Unknown     Latex Dermatitis     Latex Rash     Tape [Adhesive Tape] Dermatitis and Rash     Electrode patches     Past Surgical History:   Procedure Laterality Date     CARDIAC SURGERY  1984    4 vessel bypass     CHOLECYSTECTOMY       COLONOSCOPY       DESTRUCTION OF PARAVERTEBRAL FACET LUMBAR / SACRAL ADDITIONAL Bilateral 10/18/2021    Procedure: DESTRUCTION, NERVE, FACET JOINT, LUMBOSACRAL, ADDITIONAL NERVE AFTER DESTRUCTION OF INITIAL LUMBOSACRAL FACET JOINT NERVE;  Surgeon: Horacio Gonsalves MD;  Location: [facility] OR     DESTRUCTION OF PARAVERTEBRAL FACET LUMBAR / SACRAL SINGLE Bilateral 10/18/2021    Procedure: DESTRUCTION, NERVE, FACET JOINT, LUMBOSACRAL, 1 NERVE;  Surgeon: Horacio Gonsalves MD;  Location: [facility] OR     ESOPHAGOSCOPY, GASTROSCOPY, DUODENOSCOPY (EGD), COMBINED N/A 3/1/2018    Procedure: COMBINED ESOPHAGOSCOPY, GASTROSCOPY, DUODENOSCOPY (EGD), BIOPSY SINGLE OR MULTIPLE;  ESOPHAGOGASTRODUODENOSCOPY ;  Surgeon: Daryn Medrano MD;  Location: [facility]     INJECT BLOCK MEDIAL BRANCH CERVICAL/THORACIC/LUMBAR Bilateral 8/16/2021    Procedure: BLOCK, NERVE, FACET JOINT, MEDIAL BRANCH, DIAGNOSTIC Lumbar 2-3-4;  Surgeon: Horacio Gonsalves MD;  Location: [facility] OR     INJECT BLOCK MEDIAL BRANCH CERVICAL/THORACIC/LUMBAR Bilateral 9/27/2021    Procedure: BLOCK, NERVE, FACET JOINT, MEDIAL BRANCH, DIAGNOSTIC  L2-4, Bilateral;  Surgeon: Horacio Gonsalves MD;  Location: [facility] OR     NO HISTORY OF SURGERY  3/31/14    derm     PHACOEMULSIFICATION WITH STANDARD INTRAOCULAR LENS IMPLANT  1/21/2013    Procedure: PHACOEMULSIFICATION WITH STANDARD INTRAOCULAR LENS IMPLANT;  Phacoemulsification  with standard intraocular lens implant, right eye;  Surgeon: Jay Rodriges MD;  Location:  OR     Past Medical History:   Diagnosis Date     Diabetes mellitus (H)      Gout attack      Heart attack (H)      Hypertension      Stented coronary artery     6 stents in heart     Social History     Socioeconomic History     Marital status:      Spouse name: Not on file     Number of children: Not on file     Years of education: Not on file     Highest education level: Not on file   Occupational History     Not on file   Tobacco Use     Smoking status: Former Smoker     Quit date: 10/15/1984     Years since quittin.6     Smokeless tobacco: Never Used   Substance and Sexual Activity     Alcohol use: Yes     Comment: occasional-once a year     Drug use: No     Sexual activity: Not on file   Other Topics Concern     Parent/sibling w/ CABG, MI or angioplasty before 65F 55M? Not Asked   Social History Narrative     Not on file     Social Determinants of Health     Financial Resource Strain: Not on file   Food Insecurity: Not on file   Transportation Needs: Not on file   Physical Activity: Not on file   Stress: Not on file   Social Connections: Not on file   Intimate Partner Violence: Not on file   Housing Stability: Not on file       Drug and lactation database from the [place] [employer] of Medicine:  http://toxnet.nlm.nih.gov/cgi-bin/sis/htmlgen?LACT      B/P: Data Unavailable, T: Data Unavailable, P: Data Unavailable, R: Data Unavailable 0 lbs 0 oz  There were no vitals taken for this visit., There is no height or weight on file to calculate BMI.  Medications and Vitals not listed above were documented in the cart and reviewed by me.     Current Outpatient Medications   Medication Sig Dispense Refill     Alcohol Swabs PADS 1 pad 4 times daily 100 each 6     allopurinol (ZYLOPRIM) 300 MG tablet TAKE 1 TABLET BY MOUTH  DAILY 90 tablet 0     aspirin 81 MG tablet Take 81 mg by mouth  daily.       atorvastatin (LIPITOR) 40 MG tablet Take 1 tablet (40 mg) by mouth daily 90 tablet 3     augmented betamethasone dipropionate (DIPROLENE-AF) 0.05 % external ointment Apply twice daily as needed for rash on the leg or back 45 g 11     blood glucose (NO BRAND SPECIFIED) lancets standard Use to test blood sugar 3 times daily or as directed. 100 each 11     blood glucose (NO BRAND SPECIFIED) test strip Use to test blood sugar 4 times daily  With meter/ strips/ lancets covered by insurance pt using accuchek 360 each 3     Blood Glucose Monitoring Suppl (BLOOD GLUCOSE MONITOR SYSTEM) w/Device KIT Test 4 times daily with meter covered by  insurance 1 kit 1     Cholecalciferol (VITAMIN D) 2000 UNITS CAPS Take 1 tablet by mouth daily       clobetasol (TEMOVATE) 0.05 % external cream Apply twice daily as needed for itching or rash on the back 60 g 5     clopidogrel (PLAVIX) 75 MG tablet TAKE 1 TABLET BY MOUTH  DAILY 90 tablet 0     colchicine (COLCYRS) 0.6 MG tablet TAKE 1 TABLET BY MOUTH  DAILY 90 tablet 0     Cyanocobalamin (VITAMIN B12 PO) Take 1 tablet by mouth daily       finasteride (PROSCAR) 5 MG tablet Take 1 tablet (5 mg) by mouth daily 90 tablet 2     fluocinolone (SYNALAR) 0.01 % solution Apply a thin layer twice daily as needed to scalp for itchiness. 60 mL 4     FLUoxetine (PROZAC) 20 MG capsule Take 1 capsule (20 mg) by mouth daily 90 capsule 3     furosemide (LASIX) 40 MG tablet Take 1 tablet (40 mg) by mouth daily 90 tablet 3     gabapentin (NEURONTIN) 300 MG capsule Take 1 capsule (300 mg) by mouth every morning AND 1 capsule (300 mg) daily (with lunch) AND 3 capsules (900 mg) At Bedtime. 90 capsule 3     Ginkgo Biloba (GNP GINGKO BILOBA EXTRACT PO) Take 1 tablet by mouth daily        glimepiride (AMARYL) 1 MG tablet Take 1 tablet (1 mg) by mouth 2 times daily 180 tablet 3     indapamide (LOZOL) 2.5 MG tablet Take 2.5 mg by mouth every morning.       irbesartan (AVAPRO) 150 MG tablet Take 1  tablet (150 mg) by mouth At Bedtime 90 tablet 3     isosorbide mononitrate (IMDUR) 30 MG 24 hr tablet Take 1 tablet (30 mg) by mouth daily 90 tablet 3     ketoconazole (NIZORAL) 2 % external cream Apply topically daily       metFORMIN (GLUCOPHAGE) 1000 MG tablet Take 1 tablet (1,000 mg) by mouth 2 times daily (with meals) 180 tablet 3     methotrexate 2.5 MG tablet Take 15 mg by mouth       multivitamin w/minerals (THERA-VIT-M) tablet Take 1 tablet by mouth daily.       mupirocin (BACTROBAN) 2 % external ointment Apply topically 2 times daily 30 g 0     nitroglycerin (NITROSTAT) 0.4 MG SL tablet Place under the tongue as needed       predniSONE (DELTASONE) 20 MG tablet TAKE 1 TABLET BY MOUTH ONCE DAILY FOR 7 DAYS       spironolactone (ALDACTONE) 25 MG tablet Take 1 tablet (25 mg) by mouth daily 90 tablet 3     tamsulosin (FLOMAX) 0.4 MG capsule TAKE 1 CAPSULE BY MOUTH  DAILY 90 capsule 2     torsemide (DEMADEX) 10 MG tablet Take 10 mg by mouth daily        traZODone (DESYREL) 150 MG tablet Take 1 tablet (150 mg) by mouth At Bedtime 90 tablet 2     triamcinolone (KENALOG) 0.1 % external ointment APPLY TWICE DAILY TO RAISED RASH AREAS ON THE ARMS, LEGS, BODY AS NEEDED. 454 g 1     urea (GORDONS UREA) 40 % external ointment Apply as many times daily as needed for callus on foot 30 g 11         Carroll Gifford MD          3/31/22 1:25 PM  Note          RECORDS RECEIVED FROM: Internal   REASON FOR VISIT: possible PD   Date of Appt: 5/31/22   NOTES (FOR ALL VISITS) STATUS DETAILS   OFFICE NOTE from referring provider Internal Dr Gonsalves @ [facility] PMR:  2/8/22   OFFICE NOTE from other specialist Internal Dr Sohan Bruno @ [facility] Neurology:  9/2/20   MEDICATION LIST Internal     IMAGING  (FOR ALL VISITS)       MRI (HEAD, NECK, SPINE) Internal/Received [facility] MG:  MRI Brain 9/29/20     Critical access hospital:  MRI Lumbar Spine 8/21/20   CT (HEAD, NECK, SPINE) Received [facility]:  CT Head 4/23/19                    HISTORY OF  "PRESENT ILLNESS:  Mr. Moore is an 79-year-old male here for evaluation of tremor.  He is accompanied by his wife.      The patient reports in 04/2019 he was walking.  He felt very unbalanced and knew he was going to fall.  He decided that it would be safer to fall forward, but unfortunately he did not catch himself, and struck his face.  He did go to the emergency room.  He had a head CAT scan that showed atrophy.  He had a cervical spine CAT scan that revealed no acute changes such as a fracture.      Subsequent to this event, he started experiencing a jaw tremor.  He has not appreciated a tremor in his limbs.  He could stop the tremor if he \"thinks about it.\"  He also reports that he has developed difficulty with his gait.  He describes himself as swaying when he walks.  He has not had any falls since a year and a half ago.  He does tell me he lost his taste and smell 15 or 20 years ago.  His wife, who was present, indicates there has been no dream enactment that she has noted.  They do sleep together.      He has not been on any psychiatric medications or antiemetic medications.      Laboratory work in 05/2020 was notable for a basic metabolic panel that was normal aside from a GFR of 53.  His liver functions were normal in 03/2020.  He had a normal TSH last year.      The patient does have a number of chronic health problems, which include type 2 diabetes and diastolic heart failure.  He has coronary artery disease with stents.  He has a history of gout, hypertension and eczema.      CURRENT MEDICATIONS:   1.  Allopurinol.   2.  Lac-Hydrin lotion   3.  Aspirin 81 mg.   4.  Atorvastatin.   5.  Capsaicin for areas of itching on his back.   6.  Clopidogrel.   7.  Colchicine.   8.  Oral B12 supplement of uncertain dose.   9.  Bupixent for eczema.   10.  Ginkgo.   11.  Amaryl.   12.  Lozol.     13.  Atrovent.   14.  Avapro.   15.  Isosorbide.   16.  Metformin.   17.  Nitrostat p.r.n.   18.  Lovaza.   19.  " Triamcinolone ointment.      ALLERGIES:  HE IS ALLERGIC TO ADHESIVE TAPE AND LATEX.  HE HAS BEEN ADVISED NOT TO USE BETA BLOCKERS.      FAMILY HISTORY:  Notable for his brother having a tremor of his hands.  He is not certain if there is any family history of Parkinson disease.      SOCIAL HISTORY:  He is a retired .  He does not smoke or use alcohol.      PHYSICAL EXAMINATION:   VITAL SIGNS:  The patient's heart rate is 75.  Blood pressure 112/57.      He has a reduced blink rate.  He has an intermittent tremor of the jaw.  No rest tremor of the extremities is appreciated.  He has a mild postural tremor.  He does have some postural instability just sitting.      He has cogwheeling in the left arm more than the right.        Pupils are small but reactive and equal.  He has full extraocular motility, both horizontally and vertically, and otherwise cranial nerves II-XII are intact.  Motor examination reveals normal strength.  Sensory  examination is notable for an intact position sense and pinprick, but reduced vibratory appreciation in the fingers and absent vibratory sense in the toes.  Finger-to-nose is done accurately.      His gait is notable for initially taking short steps and then lengthening his stride.  He does swing his arms.  He utilizes a cane for added balance.  He turns in 3 steps.  Reflexes are 1+ in the upper extremities, 2+ at the knees, and absent at the ankles.  Plantar responses are flexor.      IMPRESSION:   1.  Jaw tremor.   2.  Gait imbalance.      PLAN:  It is possible he has an underlying extrapyramidal disorder.  I do want to make certain we are not dealing with any serious structural intracranial process, given his significant imbalance.      He is going to have a brain MRI scan.      I am also going to check a B12 and methylmalonic acid level.      I would not put him on any medication to treat the jaw tremor, and I am a bit reluctant about putting him on any medication for  Parkinson disease given his other health problems.      I will be contacting him with the results of the imaging and blood tests.  Otherwise, I will continue to follow him and see him back in a few months.      ADDENDUM 9/30/20: MRI reviewed and discussed with patient, Mild-moderate small vessel changes and atrophy. Increased ventricles most likely secondary to atrophy rather than NPH. He has not had B12 or MMA done but will do so. He has F/U with me in January.        JUAN ASHLEY MD

## 2022-05-23 ENCOUNTER — HOSPITAL ENCOUNTER (OUTPATIENT)
Dept: PHYSICAL THERAPY | Facility: CLINIC | Age: 82
Setting detail: THERAPIES SERIES
Discharge: HOME | End: 2022-05-23
Attending: PHYSICAL MEDICINE & REHABILITATION
Payer: COMMERCIAL

## 2022-05-23 PROCEDURE — 97112 NEUROMUSCULAR REEDUCATION: CPT | Mod: GP | Performed by: PHYSICAL THERAPIST

## 2022-05-23 PROCEDURE — 97110 THERAPEUTIC EXERCISES: CPT | Mod: GP | Performed by: PHYSICAL THERAPIST

## 2022-05-24 ENCOUNTER — MYC MEDICAL ADVICE (OUTPATIENT)
Dept: INTERNAL MEDICINE | Facility: CLINIC | Age: 82
End: 2022-05-24
Payer: COMMERCIAL

## 2022-05-25 ENCOUNTER — PATIENT OUTREACH (OUTPATIENT)
Dept: DERMATOLOGY | Facility: CLINIC | Age: 82
End: 2022-05-25
Payer: COMMERCIAL

## 2022-05-25 NOTE — CONFIDENTIAL NOTE
edAttempted to reach patient to schedule follow up in the Dermatology Clinic.  No answer,  LM on VM (very brief) to call office and Letter mailed.    Schedule with Dr. Edu Rivera - [email or web address].

## 2022-05-25 NOTE — LETTER
May 25, 2022      Deandre Moore  [address] [facility] [address]        Dear Deandre Moore:    We recently reviewed your medical records from [facility] and found that you are due for an appointment with Dr. Edu Rivera.  Our office has attempted to reach you via telephone and left a message.    At your earliest convenience, please call the clinic at [phone] to arrange the recommended exam and possible testing.  Please kindly mention this letter when calling so that your appointment(s) can be accurately scheduled.      Sincerely,       The Clinic Staff        Medical Record Number:  [id number]

## 2022-05-27 DIAGNOSIS — F41.9 ANXIETY: ICD-10-CM

## 2022-05-27 DIAGNOSIS — E11.40 TYPE 2 DIABETES MELLITUS WITH DIABETIC NEUROPATHY (H): ICD-10-CM

## 2022-05-27 DIAGNOSIS — N18.30 CHRONIC KIDNEY DISEASE, STAGE 3 (H): ICD-10-CM

## 2022-05-27 DIAGNOSIS — E11.9 TYPE 2 DIABETES MELLITUS WITHOUT COMPLICATION, WITHOUT LONG-TERM CURRENT USE OF INSULIN (H): ICD-10-CM

## 2022-05-27 DIAGNOSIS — N28.9 RENAL INSUFFICIENCY SYNDROME: ICD-10-CM

## 2022-05-27 DIAGNOSIS — R73.9 HYPERGLYCEMIA: ICD-10-CM

## 2022-05-30 RX ORDER — GARLIC 180 MG
60 TABLET, DELAYED RELEASE (ENTERIC COATED) ORAL DAILY
COMMUNITY
End: 2022-05-31

## 2022-05-30 RX ORDER — MIRTAZAPINE 15 MG/1
15 TABLET, FILM COATED ORAL AT BEDTIME
COMMUNITY
End: 2023-01-01

## 2022-05-30 RX ORDER — ASCORBIC ACID, THIAMINE, RIBOFLAVIN, NIACINAMIDE, PYRIDOXINE, FOLIC ACID, COBALAMIN, BIOTIN, PANTOTHENIC ACID 100; 1.5; 1.7; 20; 10; 1; 6; 300; 1 MG/1; MG/1; MG/1; MG/1; MG/1; MG/1; UG/1; UG/1; MG/1
1 TABLET, COATED ORAL DAILY
COMMUNITY
End: 2023-01-01

## 2022-05-30 RX ORDER — LORAZEPAM 1 MG/1
TABLET ORAL
COMMUNITY
End: 2023-01-01

## 2022-05-31 ENCOUNTER — OFFICE VISIT (OUTPATIENT)
Dept: NEUROLOGY | Facility: CLINIC | Age: 82
End: 2022-05-31
Attending: PHYSICAL MEDICINE & REHABILITATION
Payer: COMMERCIAL

## 2022-05-31 ENCOUNTER — PRE VISIT (OUTPATIENT)
Dept: NEUROLOGY | Facility: CLINIC | Age: 82
End: 2022-05-31

## 2022-05-31 VITALS
SYSTOLIC BLOOD PRESSURE: 119 MMHG | RESPIRATION RATE: 16 BRPM | OXYGEN SATURATION: 97 % | DIASTOLIC BLOOD PRESSURE: 72 MMHG | HEART RATE: 66 BPM | TEMPERATURE: 98.4 F

## 2022-05-31 DIAGNOSIS — M47.817 LUMBOSACRAL SPONDYLOSIS WITHOUT MYELOPATHY: ICD-10-CM

## 2022-05-31 DIAGNOSIS — R60.0 LOWER EXTREMITY EDEMA: ICD-10-CM

## 2022-05-31 DIAGNOSIS — G89.29 OTHER CHRONIC PAIN: ICD-10-CM

## 2022-05-31 DIAGNOSIS — G20.A1 PARKINSON DISEASE (H): ICD-10-CM

## 2022-05-31 DIAGNOSIS — R26.9 ABNORMAL GAIT: ICD-10-CM

## 2022-05-31 DIAGNOSIS — G25.9 MOVEMENT DISORDER: Primary | ICD-10-CM

## 2022-05-31 PROCEDURE — 99215 OFFICE O/P EST HI 40 MIN: CPT | Performed by: PSYCHIATRY & NEUROLOGY

## 2022-05-31 PROCEDURE — 99417 PROLNG OP E/M EACH 15 MIN: CPT | Performed by: PSYCHIATRY & NEUROLOGY

## 2022-05-31 RX ORDER — ASPIRIN 81 MG/1
81 TABLET, CHEWABLE ORAL
COMMUNITY
End: 2022-05-31

## 2022-05-31 ASSESSMENT — PAIN SCALES - GENERAL: PAINLEVEL: NO PAIN (0)

## 2022-05-31 NOTE — PATIENT INSTRUCTIONS
Assessment:  (G25.9) Movement disorder  (primary encounter diagnosis)  Family history of tremor  - brother    Seen by Dr. Bruno 2020    Brain MRI 9/29/2020  1. Mild to moderate cerebral/cerebellar atrophy with mild leukoaraiosis.  2. Stable configuration of the ventricular system, when compared to 4/23/2019. Ventricular enlargement is likely from cerebral volume  loss. Normal pressure hydrocephalus is considered unlikely.    Possible parkinsonism referral      Review of diagnosis        Avoidance of dopamine blockers       Motor complication review       Review of Impulse control disorders       Review of surgical or medication options       Gait/Balance/Falls   Able to walk 10 feet  shuffles  Living with son in [facility] - there is a shower and has not been modified  Building an apartment that should be done shortly   Been doing physical therapy at [facility] and may have one more session left    Falls occur when his legs are tired and goes straight down  Denies associated light headedness, freezing or vertigo  He has not had major injuries    Exercise/Therapy performed/offered       Cognitive/Driving   Not driving for 6 months  Has had some cognitive problems    Mood   Denies depression or anxiety   But family states he has mood changes and he was put on fluoxetine/prozac for anxiety  This medication has been helpful and has been on it for ? 6 months  Depression and anxiety is better per son    Hallucinations/delusions   Denies    Sleep   Goes to bed at night around 8pm   Can fall asleep after 30 minutes  He is taking trazodone 150mg for anxiety and sleep  Denies talking in his sleep  He may snore  He wakes up to urinate twice during the night and has a urinal  No night time incontinence  Able to go back asleep  He has some urgency no frequency  Wakes up around 6am and feels good   He gets out of bed by 730/8am    Bladder/Renal/Prostate/Gyn/Other  Benign prostatic hyperplasia  Chronic renal disease  stage 3   Erectile dysfunction  gout    GI/Constipation/GERD   gallstones    ENDO/Lipid/DM/Bone density/Thyroid  Lipid disorder  Vitamin D deficiency  Type 2 diabetes with neuropathy  Chow endocrinologist    Cardio/heart/Hyper or Hypotensive   Congestive heart failure  edema  Inferior myocardial infarction  Ascending aorta enlargmenet  Chest pain  History of CABG  Hypertension  Palpitations  Supraventricular tachycardia  Ventricular ectopy    Vision/Dry Eyes/Cataracts/Glaucoma/Macular   Atopic dermatitis  Presbyopia  Pseudophakia of both eyes  Skin cancer    Heme/Anticoagulation/Antiplatelet/Anemia/Other  Vitamin b12 500mcg    ENT/Resp  Hearing loss - seeing gunnar   Loss of smell and taste    Skin/Cancer/Seborrhea/other  Eczema  Seborrheic keratosis  rash    Musculoskeletal/Pain/Headache  Osteoarthrosis  Lumbar spinal stenosis  Notalgia paresthetica    Other:      Medications           Allopurinol zyloprim 300mg 1      Aspirin 81mg 1      Atorvastatin lipitor 40mg  1      Augmented betamethasone dipropionate diprolene 0.05% ointment       Cholecalciferol Vitamin D 2000 units 50mcg 1      Clobetasol temovate 0.05% cream       Clopidogrel plavix 75mg 1      Colchicine colcrys 0.6mg  1      Cyanocobalamin Vitamin B12 500mcg below      Finasteride proscar 5mg  1      Fluocinolone synalar 0.01% solution       Fluoxetine prozac 20mg  1      Furosemide lasix 40mg  1      Gabapentin neurontin 300mg  3  3    Ginko biloba 60mg no      Glimepiride amaryl 1mg  1  1    Indapamide lozol 2.5mg no      Irbesartan avapro 150mg    1    Isosorbide mononitrate imdur 30mg 24hr 1      Ketoconazole nizoral 2% cream       Lorazepam ativan 1mg ?taking      Metformin glucophage 1000mg 1  1    Methotrexate 2.5mg off      Mirtazapine remeron 15mg   1    MVI multivitamin 1      Mupirocin bactroban 2% ointment       Nitroglycerin nitrostat 0.4mg tablets prn      Spironolactone aldactone 25mg 1      Tamsulosin flomax 0.4mg  1      Torsemide  demadex 10mg  off      Trazodone desyrel 150mg    1    Triamcinolone kenalog 0.1% ointment       Urea Porfirio's urea 40% ointment       Vitamin B12 500mcg 1               Plan:    Pharmacy review of medications - he has chronic itching - long standing - not sure if related to medication  - states he has had eczema but would have pharmacy review medications as possible factor    Also review of about the antidepressant and sinemet.    Discussion about a dopamine scan (datscan) and would have to review the possiblity of doing this scan as he probably would have to be off one or all of his antidepressants - fluoxetine, mirtazapine and trazodone. Possibly wean off fluoxetine for the scan     So not sure if we can do the datscan - order placed and will  Decide if he will do this     Last brain mri was 2020 - consider doing another one - he did not have prominent vascular parkinsonian changes or normal pressure hydrocephalus on the prior scan. Future mri order placed.     Consider trial of sinemet (carbidopa/levodopa) - but based on his examination =he primarily jaw tremor and does not have a lot of stiffness or slowness. He walks with shuffling gait but has good arm swing.     Start with 1/2 tab daily x 3days then 1/2 tab twice daily for 3-7 days then 1 tab twice daily for one week then 1 tablet 3 times per day. The dose can be increased if there is no significant benefit. IT may take 1-3 weeks before benefit is seen    To lessen the effects that protein has on the effectiveness of sinemet (i.e.. Levodopa), it is best to take this medication on an empty stomach one hour before or two hours after meals.     If you are experiencing nausea with the medication, then you should take the medication with a CRACKER, GONZALEZ TABLETS OR WITH FOOD. CALL IF STILL HAVING NAUSEA AS WE CAN TRY EXTRA CARBIDOPA, TRIMETHOBENZAMIDE ETC.     Besides nausea there are other side effects including the rare occurrence of a rash to the yellow dye  in the sinemet tablets if you are taking the 25/100 formulation.     Iron may interfere with the effectiveness of this medication so do not take iron supplements at the same time you are taking sinemet.      Some people recommend taking Folbic or similar products with their sinemet. Folbic contains Folic Acid (2.5 mg), vitamin B6 (25mg), and vitamin b12 (2mg).       He walked much better with the ustep walker and may walk better with a regular walker    He has a walker that he had to pay 20% of the 275 cost  Kailee Childs, PT had been working with him.     New Rx placed and walker/wheelchair seating clinic ordered    Has ongoing cognitive issues -

## 2022-05-31 NOTE — LETTER
2022       RE: Deandre Moore  [address] [facility] [address]     Dear Colleague,    Thank you for referring your patient, Deandre Moore, to the [facility] NEUROLOGY CLINIC [place] [facility]. Please see a copy of my visit note below.          Diagnosis/Summary/Recommendations:      PATIENT: Deandre Moore  81 year old male     : 1940    NERI: May 31, 2022    MRN: [id number]    [address]   [facility] [address]     [phone] ()   [phone] ()     [email or web address]    Guillermina MOORE SON  [phone]  Granted access    FAMILY HISTORY:  Notable for his brother having a tremor of his hands.  He is not certain if there is any family history of Parkinson disease.      SOCIAL HISTORY:  He is a retired .  He does not smoke or use alcohol.     Assessment:  (G25.9) Movement disorder  (primary encounter diagnosis)  Family history of tremor  - brother    Seen by Dr. Bruno     Brain MRI 2020  1. Mild to moderate cerebral/cerebellar atrophy with mild leukoaraiosis.  2. Stable configuration of the ventricular system, when compared to 2019. Ventricular enlargement is likely from cerebral volume  loss. Normal pressure hydrocephalus is considered unlikely.    Possible parkinsonism referral      Review of diagnosis        Avoidance of dopamine blockers       Motor complication review       Review of Impulse control disorders       Review of surgical or medication options       Gait/Balance/Falls   Able to walk 10 feet  shuffles  Living with son in [facility] - there is a shower and has not been modified  Building an apartment that should be done shortly   Been doing physical therapy at [facility] and may have one more session left    Falls occur when his legs are tired and goes straight down  Denies associated light headedness, freezing or vertigo  He has not had  major injuries    Exercise/Therapy performed/offered       Cognitive/Driving   Not driving for 6 months  Has had some cognitive problems    Mood   Denies depression or anxiety   But family states he has mood changes and he was put on fluoxetine/prozac for anxiety  This medication has been helpful and has been on it for ? 6 months  Depression and anxiety is better per son    Hallucinations/delusions   Denies    Sleep   Goes to bed at night around 8pm   Can fall asleep after 30 minutes  He is taking trazodone 150mg for anxiety and sleep  Denies talking in his sleep  He may snore  He wakes up to urinate twice during the night and has a urinal  No night time incontinence  Able to go back asleep  He has some urgency no frequency  Wakes up around 6am and feels good   He gets out of bed by 730/8am    Bladder/Renal/Prostate/Gyn/Other  Benign prostatic hyperplasia  Chronic renal disease stage 3   Erectile dysfunction  gout    GI/Constipation/GERD   gallstones    ENDO/Lipid/DM/Bone density/Thyroid  Lipid disorder  Vitamin D deficiency  Type 2 diabetes with neuropathy  Chow endocrinologist    Cardio/heart/Hyper or Hypotensive   Congestive heart failure  edema  Inferior myocardial infarction  Ascending aorta enlargmenet  Chest pain  History of CABG  Hypertension  Palpitations  Supraventricular tachycardia  Ventricular ectopy    Vision/Dry Eyes/Cataracts/Glaucoma/Macular   Atopic dermatitis  Presbyopia  Pseudophakia of both eyes  Skin cancer    Heme/Anticoagulation/Antiplatelet/Anemia/Other  Vitamin b12 500mcg    ENT/Resp  Hearing loss - seeing gunnar   Loss of smell and taste    Skin/Cancer/Seborrhea/other  Eczema  Seborrheic keratosis  rash    Musculoskeletal/Pain/Headache  Osteoarthrosis  Lumbar spinal stenosis  Notalgia paresthetica    Other:      Medications           Allopurinol zyloprim 300mg 1      Aspirin 81mg 1      Atorvastatin lipitor 40mg  1      Augmented betamethasone dipropionate diprolene 0.05% ointment        Cholecalciferol Vitamin D 2000 units 50mcg 1      Clobetasol temovate 0.05% cream       Clopidogrel plavix 75mg 1      Colchicine colcrys 0.6mg  1      Cyanocobalamin Vitamin B12 500mcg below      Finasteride proscar 5mg  1      Fluocinolone synalar 0.01% solution       Fluoxetine prozac 20mg  1      Furosemide lasix 40mg  1      Gabapentin neurontin 300mg  3  3    Ginko biloba 60mg no      Glimepiride amaryl 1mg  1  1    Indapamide lozol 2.5mg no      Irbesartan avapro 150mg    1    Isosorbide mononitrate imdur 30mg 24hr 1      Ketoconazole nizoral 2% cream       Lorazepam ativan 1mg ?taking      Metformin glucophage 1000mg 1  1    Methotrexate 2.5mg off      Mirtazapine remeron 15mg   1    MVI multivitamin 1      Mupirocin bactroban 2% ointment       Nitroglycerin nitrostat 0.4mg tablets prn      Spironolactone aldactone 25mg 1      Tamsulosin flomax 0.4mg  1      Torsemide demadex 10mg  off      Trazodone desyrel 150mg    1    Triamcinolone kenalog 0.1% ointment       Urea Porfirio's urea 40% ointment       Vitamin B12 500mcg 1               Plan:    Pharmacy review of medications - he has chronic itching - long standing - not sure if related to medication  - states he has had eczema but would have pharmacy review medications as possible factor    Also review of about the antidepressant and sinemet.    Discussion about a dopamine scan (datscan) and would have to review the possiblity of doing this scan as he probably would have to be off one or all of his antidepressants - fluoxetine, mirtazapine and trazodone. Possibly wean off fluoxetine for the scan     So not sure if we can do the datscan - order placed and will  Decide if he will do this     Last brain mri was 2020 - consider doing another one - he did not have prominent vascular parkinsonian changes or normal pressure hydrocephalus on the prior scan. Future mri order placed.     Consider trial of sinemet (carbidopa/levodopa) - but based on his examination  =he primarily jaw tremor and does not have a lot of stiffness or slowness. He walks with shuffling gait but has good arm swing.     Start with 1/2 tab daily x 3days then 1/2 tab twice daily for 3-7 days then 1 tab twice daily for one week then 1 tablet 3 times per day. The dose can be increased if there is no significant benefit. IT may take 1-3 weeks before benefit is seen    To lessen the effects that protein has on the effectiveness of sinemet (i.e.. Levodopa), it is best to take this medication on an empty stomach one hour before or two hours after meals.     If you are experiencing nausea with the medication, then you should take the medication with a CRACKER, GONZALEZ TABLETS OR WITH FOOD. CALL IF STILL HAVING NAUSEA AS WE CAN TRY EXTRA CARBIDOPA, TRIMETHOBENZAMIDE ETC.     Besides nausea there are other side effects including the rare occurrence of a rash to the yellow dye in the sinemet tablets if you are taking the 25/100 formulation.     Iron may interfere with the effectiveness of this medication so do not take iron supplements at the same time you are taking sinemet.      Some people recommend taking Folbic or similar products with their sinemet. Folbic contains Folic Acid (2.5 mg), vitamin B6 (25mg), and vitamin b12 (2mg).       He walked much better with the ustep walker and may walk better with a regular walker    He has a walker that he had to pay 20% of the 275 cost  Kailee Chidls PT had been working with him.     New Rx placed and walker/wheelchair seating clinic ordered    Has ongoing cognitive issues -         Order for:  [employer] PHONE: [phone] FAX: [phone]  Detailed Order/Prescription  Effective Date: May 31, 2022  Patient Name: Deandre CHUN 1940  Length of Need: 99 months=lifetime  Diagnosis & ICD10 Code(s) 332.0 (parkinson)      The patient has a severe walking problem that places him/her at heightened risk of  morbidity or mortality without a  walking-aid  A cane or crutch IS NOT sufficient in preventing this patient from falling and injuring  Himself/herself.  A standard walker IS NOT sufficient for preventing your patient from falling and injuring  himself/herself  This is a prescription for the U-Step 2 Walking Stabilizer ([facility] Code , produced by [employer]), because this patient has a severe neurological condition or limited use of a hand,  and requires this product to safely ambulate and prevent serious injury due to risk of falling    This patient can safely use this walker  This patient's mobility impairment will be resolved with the use of this walker    Deandre Moore patient's mobility deficit  will be sufficiently resolved by using a U-Step 2 ([facility] #)  What product are you prescribing for your patient   - U-Step Walking Stabilizer ([facility] MODEL #US-PC-2)   - Accessory seat for walker  Cueing Module (Laser and Auditory cue for Parkinson's freezing)    Physician printed name: Carroll Gifford  NPI #: [id number]  Phone: [phone]  [address] [place] [address]    By signing below, I authorize the use of this document as a legal prescription, and I certify that the above prescribed equipment is medically necessary, reasonable, accurate and complete and is not being prescribed for convenience. I will maintain an original signed copy of this order in my medical records and make it  available to Medicare, their authorized agents or other insurer, if required.    Physician Signature: __Carroll Gifford (electronic signature)   Date: May 31, 2022          Coding statement:   Medical Decision Making:  #  Chronic progressive medical conditions addressed  - see above --   Review and/or interpretation of unique test or documentation from a provider outside of neurology    Independent historian provided additional details   I  Prescription drug management and review of potential side effects and/or monitoring for side effects   -- see above ---  Health impacted by social determinants of health      I have reviewed the note as documented above.  This accurately captures the substance of my conversation with the patient and total time spent preparing for visit, executing visit and completing visit on the day of the visit: 70  minutes.  The portion of this total time included face to face time 1040am - 1150am    Carroll Gifford MD     ______________________________________    Last visit date and details:             ______________________________________      Patient was asked about 14 Review of systems including changes in vision (dry eyes, double vision), hearing, heart, lungs, musculoskeletal, depression, anxiety, snoring, RBD, insomnia, urinary frequency, urinary urgency, constipation, swallowing problems, hematological, ID, allergies, skin problems: seborrhea, endocrinological: thyroid, diabetes, cholesterol; balance, weight changes, and other neurological problems and these were not significant at this time except for   Patient Active Problem List   Diagnosis     Itching     AD (atopic dermatitis)     Dermatitis     Hyperglycemia     Eczema, unspecified eczema     Intrinsic atopic dermatitis     Other eczema     Notalgia paresthetica     Rash     Pseudophakia of both eyes     Diabetes mellitus (H)     Presbyopia     Type 2 diabetes mellitus without complication, without long-term current use of insulin (H)     Encounter for long-term (current) use of high-risk medication     Seborrheic keratoses, inflamed     Neoplasm of uncertain behavior of skin     Chronic kidney disease, stage 3 (H)     Lumbosacral spondylosis without myelopathy     Hx of CABG     Gout     Essential hypertension     Erectile dysfunction     Edema     Dyslipidemia     Decreased range of motion of ankle     Coronary atherosclerosis     Vitamin D deficiency     Ventricular ectopy     Type 2 diabetes mellitus with diabetic neuropathy (H)     Supraventricular tachycardia (H)      Renal insufficiency syndrome     Pneumothorax     Plantar fascia syndrome     Personal history of tobacco use, presenting hazards to health     Acute exacerbation of CHF (congestive heart failure) (H)     Acute inferior myocardial infarction (H)     Allergy     Ascending aorta enlargement (H)     Benign prostatic hyperplasia     Calcaneal bursitis     Angina pectoris (H)     Chest pain, unspecified     Calculus of gallbladder     Cholecystitis     Hyperlipidemia     Hypokalemia     Lumbar back pain     Lumbar spinal stenosis     Osteoarthrosis     Palpitations          Allergies   Allergen Reactions     Beta Adrenergic Blockers Unknown     Latex Dermatitis     Latex Rash     Tape [Adhesive Tape] Dermatitis and Rash     Electrode patches     Past Surgical History:   Procedure Laterality Date     CARDIAC SURGERY  1984    4 vessel bypass     CHOLECYSTECTOMY       COLONOSCOPY       DESTRUCTION OF PARAVERTEBRAL FACET LUMBAR / SACRAL ADDITIONAL Bilateral 10/18/2021    Procedure: DESTRUCTION, NERVE, FACET JOINT, LUMBOSACRAL, ADDITIONAL NERVE AFTER DESTRUCTION OF INITIAL LUMBOSACRAL FACET JOINT NERVE;  Surgeon: Horacio Gonsalves MD;  Location: [facility] OR     DESTRUCTION OF PARAVERTEBRAL FACET LUMBAR / SACRAL SINGLE Bilateral 10/18/2021    Procedure: DESTRUCTION, NERVE, FACET JOINT, LUMBOSACRAL, 1 NERVE;  Surgeon: Horacio Gonsalves MD;  Location: [facility] OR     ESOPHAGOSCOPY, GASTROSCOPY, DUODENOSCOPY (EGD), COMBINED N/A 3/1/2018    Procedure: COMBINED ESOPHAGOSCOPY, GASTROSCOPY, DUODENOSCOPY (EGD), BIOPSY SINGLE OR MULTIPLE;  ESOPHAGOGASTRODUODENOSCOPY ;  Surgeon: Daryn Medrano MD;  Location: [facility]     INJECT BLOCK MEDIAL BRANCH CERVICAL/THORACIC/LUMBAR Bilateral 8/16/2021    Procedure: BLOCK, NERVE, FACET JOINT, MEDIAL BRANCH, DIAGNOSTIC Lumbar 2-3-4;  Surgeon: Horacio Gonsalves MD;  Location: [facility] OR     INJECT BLOCK MEDIAL BRANCH CERVICAL/THORACIC/LUMBAR Bilateral 9/27/2021    Procedure: BLOCK,  NERVE, FACET JOINT, MEDIAL BRANCH, DIAGNOSTIC  L2-4, Bilateral;  Surgeon: Horacio Gonsalves MD;  Location: [facility] OR     NO HISTORY OF SURGERY  3/31/14    derm     PHACOEMULSIFICATION WITH STANDARD INTRAOCULAR LENS IMPLANT  2013    Procedure: PHACOEMULSIFICATION WITH STANDARD INTRAOCULAR LENS IMPLANT;  Phacoemulsification with standard intraocular lens implant, right eye;  Surgeon: Jay Rodriges MD;  Location: [facility] OR     Past Medical History:   Diagnosis Date     Diabetes mellitus (H)      Gout attack      Heart attack (H)      Hypertension      Stented coronary artery     6 stents in heart     Social History     Socioeconomic History     Marital status:      Spouse name: Not on file     Number of children: Not on file     Years of education: Not on file     Highest education level: Not on file   Occupational History     Not on file   Tobacco Use     Smoking status: Former Smoker     Quit date: 10/15/1984     Years since quittin.6     Smokeless tobacco: Never Used   Substance and Sexual Activity     Alcohol use: Yes     Comment: occasional-once a year     Drug use: No     Sexual activity: Not on file   Other Topics Concern     Parent/sibling w/ CABG, MI or angioplasty before 65F 55M? Not Asked   Social History Narrative     Not on file     Social Determinants of Health     Financial Resource Strain: Not on file   Food Insecurity: Not on file   Transportation Needs: Not on file   Physical Activity: Not on file   Stress: Not on file   Social Connections: Not on file   Intimate Partner Violence: Not on file   Housing Stability: Not on file       Drug and lactation database from the [place] [employer] of Medicine:  http://toxnet.nlm.nih.gov/cgi-bin/sis/htmlgen?LACT      B/P: Data Unavailable, T: Data Unavailable, P: Data Unavailable, R: Data Unavailable 0 lbs 0 oz  There were no vitals taken for this visit., There is no height or weight on file to calculate BMI.  Medications  and Vitals not listed above were documented in the cart and reviewed by me.     Current Outpatient Medications   Medication Sig Dispense Refill     Alcohol Swabs PADS 1 pad 4 times daily 100 each 6     allopurinol (ZYLOPRIM) 300 MG tablet TAKE 1 TABLET BY MOUTH  DAILY 90 tablet 0     aspirin 81 MG tablet Take 81 mg by mouth daily.       atorvastatin (LIPITOR) 40 MG tablet Take 1 tablet (40 mg) by mouth daily 90 tablet 3     augmented betamethasone dipropionate (DIPROLENE-AF) 0.05 % external ointment Apply twice daily as needed for rash on the leg or back 45 g 11     blood glucose (NO BRAND SPECIFIED) lancets standard Use to test blood sugar 3 times daily or as directed. 100 each 11     blood glucose (NO BRAND SPECIFIED) test strip Use to test blood sugar 4 times daily  With meter/ strips/ lancets covered by insurance pt using accuchek 360 each 3     Blood Glucose Monitoring Suppl (BLOOD GLUCOSE MONITOR SYSTEM) w/Device KIT Test 4 times daily with meter covered by  insurance 1 kit 1     Cholecalciferol (VITAMIN D) 2000 UNITS CAPS Take 1 tablet by mouth daily       clobetasol (TEMOVATE) 0.05 % external cream Apply twice daily as needed for itching or rash on the back 60 g 5     clopidogrel (PLAVIX) 75 MG tablet TAKE 1 TABLET BY MOUTH  DAILY 90 tablet 0     colchicine (COLCYRS) 0.6 MG tablet TAKE 1 TABLET BY MOUTH  DAILY 90 tablet 0     Cyanocobalamin (VITAMIN B12 PO) Take 1 tablet by mouth daily       finasteride (PROSCAR) 5 MG tablet Take 1 tablet (5 mg) by mouth daily 90 tablet 2     fluocinolone (SYNALAR) 0.01 % solution Apply a thin layer twice daily as needed to scalp for itchiness. 60 mL 4     FLUoxetine (PROZAC) 20 MG capsule Take 1 capsule (20 mg) by mouth daily 90 capsule 3     furosemide (LASIX) 40 MG tablet Take 1 tablet (40 mg) by mouth daily 90 tablet 3     gabapentin (NEURONTIN) 300 MG capsule Take 1 capsule (300 mg) by mouth every morning AND 1 capsule (300 mg) daily (with lunch) AND 3 capsules (900 mg)  At Bedtime. 90 capsule 3     Ginkgo Biloba (GNP GINGKO BILOBA EXTRACT PO) Take 1 tablet by mouth daily        glimepiride (AMARYL) 1 MG tablet Take 1 tablet (1 mg) by mouth 2 times daily 180 tablet 3     indapamide (LOZOL) 2.5 MG tablet Take 2.5 mg by mouth every morning.       irbesartan (AVAPRO) 150 MG tablet Take 1 tablet (150 mg) by mouth At Bedtime 90 tablet 3     isosorbide mononitrate (IMDUR) 30 MG 24 hr tablet Take 1 tablet (30 mg) by mouth daily 90 tablet 3     ketoconazole (NIZORAL) 2 % external cream Apply topically daily       metFORMIN (GLUCOPHAGE) 1000 MG tablet Take 1 tablet (1,000 mg) by mouth 2 times daily (with meals) 180 tablet 3     methotrexate 2.5 MG tablet Take 15 mg by mouth       multivitamin w/minerals (THERA-VIT-M) tablet Take 1 tablet by mouth daily.       mupirocin (BACTROBAN) 2 % external ointment Apply topically 2 times daily 30 g 0     nitroglycerin (NITROSTAT) 0.4 MG SL tablet Place under the tongue as needed       predniSONE (DELTASONE) 20 MG tablet TAKE 1 TABLET BY MOUTH ONCE DAILY FOR 7 DAYS       spironolactone (ALDACTONE) 25 MG tablet Take 1 tablet (25 mg) by mouth daily 90 tablet 3     tamsulosin (FLOMAX) 0.4 MG capsule TAKE 1 CAPSULE BY MOUTH  DAILY 90 capsule 2     torsemide (DEMADEX) 10 MG tablet Take 10 mg by mouth daily        traZODone (DESYREL) 150 MG tablet Take 1 tablet (150 mg) by mouth At Bedtime 90 tablet 2     triamcinolone (KENALOG) 0.1 % external ointment APPLY TWICE DAILY TO RAISED RASH AREAS ON THE ARMS, LEGS, BODY AS NEEDED. 454 g 1     urea (GORDONS UREA) 40 % external ointment Apply as many times daily as needed for callus on foot 30 g 11         Carroll Gifford MD          3/31/22 1:25 PM  Note          RECORDS RECEIVED FROM: Internal   REASON FOR VISIT: possible PD   Date of Appt: 5/31/22   NOTES (FOR ALL VISITS) STATUS DETAILS   OFFICE NOTE from referring provider Internal Dr Gonsalves @ [facility] PMR:  2/8/22   OFFICE NOTE from other specialist Internal   "Sohan Bruno @ [facility] Neurology:  9/2/20   MEDICATION LIST Internal     IMAGING  (FOR ALL VISITS)       MRI (HEAD, NECK, SPINE) Internal/Received [facility]:  MRI Brain 9/29/20     [facility]:  MRI Lumbar Spine 8/21/20   CT (HEAD, NECK, SPINE) Received [facility]:  CT Head 4/23/19                    HISTORY OF PRESENT ILLNESS:  Mr. Moore is an 79-year-old male here for evaluation of tremor.  He is accompanied by his wife.      The patient reports in 04/2019 he was walking.  He felt very unbalanced and knew he was going to fall.  He decided that it would be safer to fall forward, but unfortunately he did not catch himself, and struck his face.  He did go to the emergency room.  He had a head CAT scan that showed atrophy.  He had a cervical spine CAT scan that revealed no acute changes such as a fracture.      Subsequent to this event, he started experiencing a jaw tremor.  He has not appreciated a tremor in his limbs.  He could stop the tremor if he \"thinks about it.\"  He also reports that he has developed difficulty with his gait.  He describes himself as swaying when he walks.  He has not had any falls since a year and a half ago.  He does tell me he lost his taste and smell 15 or 20 years ago.  His wife, who was present, indicates there has been no dream enactment that she has noted.  They do sleep together.      He has not been on any psychiatric medications or antiemetic medications.      Laboratory work in 05/2020 was notable for a basic metabolic panel that was normal aside from a GFR of 53.  His liver functions were normal in 03/2020.  He had a normal TSH last year.      The patient does have a number of chronic health problems, which include type 2 diabetes and diastolic heart failure.  He has coronary artery disease with stents.  He has a history of gout, hypertension and eczema.      CURRENT MEDICATIONS:   1.  Allopurinol.   2.  Lac-Hydrin lotion   3.  Aspirin 81 mg.   4.  Atorvastatin.   5.  " Capsaicin for areas of itching on his back.   6.  Clopidogrel.   7.  Colchicine.   8.  Oral B12 supplement of uncertain dose.   9.  Bupixent for eczema.   10.  Ginkgo.   11.  Amaryl.   12.  Lozol.     13.  Atrovent.   14.  Avapro.   15.  Isosorbide.   16.  Metformin.   17.  Nitrostat p.r.n.   18.  Lovaza.   19.  Triamcinolone ointment.      ALLERGIES:  HE IS ALLERGIC TO ADHESIVE TAPE AND LATEX.  HE HAS BEEN ADVISED NOT TO USE BETA BLOCKERS.      FAMILY HISTORY:  Notable for his brother having a tremor of his hands.  He is not certain if there is any family history of Parkinson disease.      SOCIAL HISTORY:  He is a retired .  He does not smoke or use alcohol.      PHYSICAL EXAMINATION:   VITAL SIGNS:  The patient's heart rate is 75.  Blood pressure 112/57.      He has a reduced blink rate.  He has an intermittent tremor of the jaw.  No rest tremor of the extremities is appreciated.  He has a mild postural tremor.  He does have some postural instability just sitting.      He has cogwheeling in the left arm more than the right.        Pupils are small but reactive and equal.  He has full extraocular motility, both horizontally and vertically, and otherwise cranial nerves II-XII are intact.  Motor examination reveals normal strength.  Sensory  examination is notable for an intact position sense and pinprick, but reduced vibratory appreciation in the fingers and absent vibratory sense in the toes.  Finger-to-nose is done accurately.      His gait is notable for initially taking short steps and then lengthening his stride.  He does swing his arms.  He utilizes a cane for added balance.  He turns in 3 steps.  Reflexes are 1+ in the upper extremities, 2+ at the knees, and absent at the ankles.  Plantar responses are flexor.      IMPRESSION:   1.  Jaw tremor.   2.  Gait imbalance.      PLAN:  It is possible he has an underlying extrapyramidal disorder.  I do want to make certain we are not dealing with any  serious structural intracranial process, given his significant imbalance.      He is going to have a brain MRI scan.      I am also going to check a B12 and methylmalonic acid level.      I would not put him on any medication to treat the jaw tremor, and I am a bit reluctant about putting him on any medication for Parkinson disease given his other health problems.      I will be contacting him with the results of the imaging and blood tests.  Otherwise, I will continue to follow him and see him back in a few months.      ADDENDUM 9/30/20: MRI reviewed and discussed with patient, Mild-moderate small vessel changes and atrophy. Increased ventricles most likely secondary to atrophy rather than NPH. He has not had B12 or MMA done but will do so. He has F/U with me in January.        JUAN ASHLEY MD

## 2022-06-02 RX ORDER — COLCHICINE 0.6 MG/1
TABLET ORAL
Qty: 90 TABLET | Refills: 3 | OUTPATIENT
Start: 2022-06-02

## 2022-06-02 RX ORDER — ALLOPURINOL 300 MG/1
TABLET ORAL
Qty: 90 TABLET | Refills: 3 | OUTPATIENT
Start: 2022-06-02

## 2022-06-02 RX ORDER — CLOPIDOGREL BISULFATE 75 MG/1
TABLET ORAL
Qty: 90 TABLET | Refills: 3 | OUTPATIENT
Start: 2022-06-02

## 2022-06-06 ENCOUNTER — TELEPHONE (OUTPATIENT)
Dept: NEUROLOGY | Facility: CLINIC | Age: 82
End: 2022-06-06
Payer: COMMERCIAL

## 2022-06-06 NOTE — TELEPHONE ENCOUNTER
MTM referral from: [facility] visit (referral by provider)    MTM referral outreach attempt #2 on June 6, 2022 at 1:39 PM      Outcome: Patient not reachable after several attempts, will route to MTM Pharmacist/Provider as an FYI.  MT scheduling number is [phone].  Thank you for the referral.    Antony Wright, MTM coordinator    Pt is private pay

## 2022-06-07 ENCOUNTER — HOSPITAL ENCOUNTER (OUTPATIENT)
Dept: PHYSICAL THERAPY | Facility: CLINIC | Age: 82
Setting detail: THERAPIES SERIES
Discharge: HOME | End: 2022-06-07
Attending: PHYSICAL MEDICINE & REHABILITATION
Payer: COMMERCIAL

## 2022-06-07 PROCEDURE — 97112 NEUROMUSCULAR REEDUCATION: CPT | Mod: GP | Performed by: PHYSICAL THERAPIST

## 2022-06-07 PROCEDURE — 97110 THERAPEUTIC EXERCISES: CPT | Mod: GP | Performed by: PHYSICAL THERAPIST

## 2022-06-07 PROCEDURE — 97116 GAIT TRAINING THERAPY: CPT | Mod: GP | Performed by: PHYSICAL THERAPIST

## 2022-06-08 ENCOUNTER — TELEPHONE (OUTPATIENT)
Dept: NEUROLOGY | Facility: CLINIC | Age: 82
End: 2022-06-08

## 2022-06-08 ENCOUNTER — OFFICE VISIT (OUTPATIENT)
Dept: AUDIOLOGY | Facility: CLINIC | Age: 82
End: 2022-06-08
Payer: COMMERCIAL

## 2022-06-08 DIAGNOSIS — G20.A1 PARKINSON DISEASE (H): Primary | ICD-10-CM

## 2022-06-08 DIAGNOSIS — H90.8 MIXED CONDUCTIVE AND SENSORINEURAL HEARING LOSS, UNSPECIFIED LATERALITY: ICD-10-CM

## 2022-06-08 DIAGNOSIS — H90.3 SENSORINEURAL HEARING LOSS, BILATERAL: Primary | ICD-10-CM

## 2022-06-08 PROCEDURE — 92557 COMPREHENSIVE HEARING TEST: CPT | Performed by: AUDIOLOGIST

## 2022-06-08 PROCEDURE — 92550 TYMPANOMETRY & REFLEX THRESH: CPT | Mod: 52 | Performed by: AUDIOLOGIST

## 2022-06-08 NOTE — PROGRESS NOTES
AUDIOLOGY REPORT    SUBJECTIVE:  Deandre Moore is a 81 year old male who was seen in the Audiology Clinic at the [facility] for audiologic evaluation, referred by Trudy Campa M.D.  The patient reports his family has concerns about his hearing, he does not. The patient denies pain, tinnitus, dizziness, noise exposure or ear surgeries.  He was accompanied today by his wife and daughter in law.    OBJECTIVE:    Otoscopic exam indicates ears are clear of cerumen bilaterally     Pure Tone Thresholds assessed using conventional audiometry with good  reliability from 250-8000 Hz bilaterally using circumaural headphones     RIGHT:  Normal sloping to moderate-severe sensorineural hearing loss    LEFT:    Normal sloping to moderate-severe sensorineural hearing loss    Tympanogram:    RIGHT: normal eardrum mobility    LEFT:   normal eardrum mobility    Reflexes (reported by stimulus ear):  RIGHT: Ipsilateral is present at normal levels  RIGHT: Contralateral: could not maintain seal  LEFT:   Ipsilateral: could not maintain seal  LEFT:   Contralateral is absent at frequencies tested      Speech Reception Threshold:    RIGHT: 30 dB HL    LEFT:   35 dB HL  Word Recognition Score:     RIGHT: 80% at 75 dB HL using NU-6 recorded word list.    LEFT:   76% at 75 dB HL using NU-6 recorded word list.    Patient did not want to complete hearing aid evaluation today as he would like to look into his [employer] benefits first.      ASSESSMENT:   Today s results were discussed with the patient in detail.     PLAN:    Patient was counseled regarding hearing loss and impact on communication.  Patient is a good candidate for amplification at this time. It is recommended that the patient return for appointment on the 6/30/2022 if he would like to move forward with hearing aids through our clinic.  Please call this clinic with questions regarding these results or recommendations.        Lyndsay  Pallavi Fitzgerald, [facility] [place]  Licensed Audiologist  [place] License #[id number]

## 2022-06-09 DIAGNOSIS — L28.1 PRURIGO NODULARIS: ICD-10-CM

## 2022-06-11 RX ORDER — GABAPENTIN 300 MG/1
CAPSULE ORAL
Qty: 150 CAPSULE | Refills: 4 | Status: SHIPPED | OUTPATIENT
Start: 2022-06-11 | End: 2023-01-01

## 2022-06-11 NOTE — TELEPHONE ENCOUNTER
Received refill request for gabapentin as JUDSON. Chart (including notes and pertinent labs) reviewed, refill appropriate. Attending Dr. Rivera CC'd as an FYI.      Maverick Steel MD (PGY-2)  Dermatology Resident

## 2022-06-11 NOTE — TELEPHONE ENCOUNTER
gabapentin (NEURONTIN) 300 MG capsule    Last Written Prescription Date: 5/13/22  Last Fill Quantity: 90,   # refills: 3  Last Office Visit : 5/3/22  Future Office visit:  9/27/22    Routed because:  Not on protocol

## 2022-06-13 ENCOUNTER — HOSPITAL ENCOUNTER (OUTPATIENT)
Dept: PHYSICAL THERAPY | Facility: CLINIC | Age: 82
Setting detail: THERAPIES SERIES
Discharge: HOME | End: 2022-06-13
Attending: PHYSICAL MEDICINE & REHABILITATION
Payer: COMMERCIAL

## 2022-06-13 PROCEDURE — 97112 NEUROMUSCULAR REEDUCATION: CPT | Mod: GP | Performed by: PHYSICAL THERAPIST

## 2022-06-13 PROCEDURE — 97110 THERAPEUTIC EXERCISES: CPT | Mod: GP | Performed by: PHYSICAL THERAPIST

## 2022-06-17 NOTE — PROGRESS NOTES
HPI:    His daughter-in-law brought him into day. Her largest concern is having a PCA to help out at home. Still with dermatitis and itching and was seen by Dr. Rivera, Dermatology 3/22/2022 and remains on topical medication as well as gabapentin. No other acute HEENT, cardiopulmonary, abdominal, , neurological, systemic, psychiatric, lymphatic, endocrine, vascular complaints.      Past Medical History:   Diagnosis Date     Diabetes mellitus (H)      Gout attack      Heart attack (H) 1984     Hypertension      Stented coronary artery     6 stents in heart     Past Surgical History:   Procedure Laterality Date     CARDIAC SURGERY  1984    4 vessel bypass     CHOLECYSTECTOMY       COLONOSCOPY       DESTRUCTION OF PARAVERTEBRAL FACET LUMBAR / SACRAL ADDITIONAL Bilateral 10/18/2021    Procedure: DESTRUCTION, NERVE, FACET JOINT, LUMBOSACRAL, ADDITIONAL NERVE AFTER DESTRUCTION OF INITIAL LUMBOSACRAL FACET JOINT NERVE;  Surgeon: Horacio Gonsalves MD;  Location: [facility] OR     DESTRUCTION OF PARAVERTEBRAL FACET LUMBAR / SACRAL SINGLE Bilateral 10/18/2021    Procedure: DESTRUCTION, NERVE, FACET JOINT, LUMBOSACRAL, 1 NERVE;  Surgeon: Horacio Gonsalves MD;  Location: [facility] OR     ESOPHAGOSCOPY, GASTROSCOPY, DUODENOSCOPY (EGD), COMBINED N/A 3/1/2018    Procedure: COMBINED ESOPHAGOSCOPY, GASTROSCOPY, DUODENOSCOPY (EGD), BIOPSY SINGLE OR MULTIPLE;  ESOPHAGOGASTRODUODENOSCOPY ;  Surgeon: Daryn Medrano MD;  Location: [facility]     INJECT BLOCK MEDIAL BRANCH CERVICAL/THORACIC/LUMBAR Bilateral 8/16/2021    Procedure: BLOCK, NERVE, FACET JOINT, MEDIAL BRANCH, DIAGNOSTIC Lumbar 2-3-4;  Surgeon: Horacio Gonsalves MD;  Location: [facility] OR     INJECT BLOCK MEDIAL BRANCH CERVICAL/THORACIC/LUMBAR Bilateral 9/27/2021    Procedure: BLOCK, NERVE, FACET JOINT, MEDIAL BRANCH, DIAGNOSTIC  L2-4, Bilateral;  Surgeon: Horacio Gonsalves MD;  Location: [facility] OR     NO HISTORY OF SURGERY  3/31/14    derm     PHACOEMULSIFICATION WITH  STANDARD INTRAOCULAR LENS IMPLANT  1/21/2013    Procedure: PHACOEMULSIFICATION WITH STANDARD INTRAOCULAR LENS IMPLANT;  Phacoemulsification with standard intraocular lens implant, right eye;  Surgeon: Jay Rodriges MD;  Location: [facility] OR     PE:    Vitals, noted, gen, nad, cooperative, alert, he is sitting in a wheel chair, neck supple nl rom, lungs with good air movement, RRR, S1, S2, no MRG, abdomen, no acute findings. Grossly unchanged neurological exam.       A/P:    1. Immunizations; Moderna COVID vaccine x 3. Prevnar 13 done 11/9/2015, Tdap 12/8/2021. Pneumococcal 20 today 6/20/2022  2. Dermatology appt. With Dr. Rivera 3/22/2022 and next visit 9/27/2022. He is taking gabapentin for dermatitis. Some discussion regarding using methotrexate for this iin the past for itching? Will discuss with Dr. Rivera, Dermatology  3. Podiatry follow up with Dr. Sharp 4/4/2022  4. PMR follow up for back/hip pain Dr. Gonsalves 5/5/2022 and mobility/walking. PT referral placed 5/31/2022 by Dr. Gifford, Neurology   5. Urology appt. 6/30/2022 on Proscar and Flomax; UA normal on 3/10/2022; Seen in Urology 3/10/2022  6. Endocrinology follow up with Dr. Campa 10/21/2022 for DM2 on Glimepiride and Metformin; A1c 5.4% on 2/18/2022. Seen 2/18/2022 by Dr. Campa. DEXA scan 3/7/2022 most negative -1.3.   7. Gout on Allopurinol and Colchicine  8. Increased lipids on Atorvastatin; Lpids 12/6/2021;  and HDL 58  9. On Fluoxetine for depression and increased to 20 mg  10. HTN; on Lasix, Ibesartan; electrolytes and creatinine normal on 1/24/2022  11. Trazodone for sleep  12. On Plavix and Imdur for CAD; [facility]iology, Dr. Ordoñez on 1/31/2022, note in Care Everywhere; saw Dr. Thurston, Cardiology 6/17/2022; no change in plan.   13. Seen by Dr. Gifford, Neurology 5/31/2022 for balance and tremor      30 minutes spent on the date of the encounter doing chart review, history and exam, documentation and further activities as noted  above

## 2022-06-20 ENCOUNTER — PATIENT OUTREACH (OUTPATIENT)
Dept: CARE COORDINATION | Facility: CLINIC | Age: 82
End: 2022-06-20

## 2022-06-20 ENCOUNTER — OFFICE VISIT (OUTPATIENT)
Dept: INTERNAL MEDICINE | Facility: CLINIC | Age: 82
End: 2022-06-20
Payer: COMMERCIAL

## 2022-06-20 VITALS
RESPIRATION RATE: 16 BRPM | HEIGHT: 73 IN | SYSTOLIC BLOOD PRESSURE: 102 MMHG | WEIGHT: 198.7 LBS | DIASTOLIC BLOOD PRESSURE: 62 MMHG | HEART RATE: 61 BPM | BODY MASS INDEX: 26.33 KG/M2 | OXYGEN SATURATION: 97 %

## 2022-06-20 DIAGNOSIS — Z23 ENCOUNTER FOR IMMUNIZATION: Primary | ICD-10-CM

## 2022-06-20 PROCEDURE — 99214 OFFICE O/P EST MOD 30 MIN: CPT | Mod: 25 | Performed by: INTERNAL MEDICINE

## 2022-06-20 PROCEDURE — G0009 ADMIN PNEUMOCOCCAL VACCINE: HCPCS | Performed by: INTERNAL MEDICINE

## 2022-06-20 PROCEDURE — 90677 PCV20 VACCINE IM: CPT | Performed by: INTERNAL MEDICINE

## 2022-06-20 RX ORDER — GABAPENTIN 300 MG/1
300 CAPSULE ORAL 2 TIMES DAILY
COMMUNITY
Start: 2022-06-11 | End: 2022-01-01

## 2022-06-20 NOTE — NURSING NOTE
"Deandre Moore is a 81 year old male patient that presents today in clinic for the following:    Chief Complaint   Patient presents with     Follow Up     Three month follow-up     Derm Problem     Pruritis     Referral     Fall     It was reported that the patient fell on the way to clinic today. The patient reports that he didn't fall, didn't hit his head, and didn't lose consciousness.      The patient's allergies and medications were reviewed as noted. A set of vitals were recorded as noted without incident: /62 (BP Location: Left arm, Patient Position: Sitting, Cuff Size: Adult Regular)   Pulse 61   Resp 16   Ht 1.854 m (6' 1\")   Wt 90.1 kg (198 lb 11.2 oz)   SpO2 97%   BMI 26.22 kg/m  . The patient does not have any other questions for the provider.    Ashu Guzman, EMT at 2:17 PM on 6/20/2022  "

## 2022-06-20 NOTE — PROGRESS NOTES
Social Work Intervention  [facility]    Data/Intervention:    Patient Name:  Deandre Moore  /Age:  1940 (81 year old)    Visit Type: in person  Referral Source: PCC  Reason for Referral:  Needs assistance in home.     Collaborated With:    -Patient, wife, and daughter in lawDeisy    Psychosocial Information/Concerns:  Deandre and is wife, Beatriz, are moving into an apartment connected to their sonLucio's home. Deandre needs help with most ADLs including dressing, bathing, toileting, etc. His wife Beatriz is becoming more unable to help with these things as she is 89 years old and has dementia. Their monthly income is approx $2000, assets are more unclear (family trust with multiple people as the beneficiary), so not sure if they would qualify for Medical Assistance/ Elderly Waiver or AC Waiver.     Intervention/Education/Resources Provided:   provided supportive listening and validation throughout conversation. Patient and his wife contributed minimal information to the conversation.  spoke mainly to their daughter-in-law, Deisy.   submitted a referral to have the Senior [employer] Line contact Patient's sonLucio regarding their eligibility for services. Confirmation number for the referral is [id number].    Assessment/Plan:  Senior [employer] Line will contact Patient's sonLucio, to go over eligibility requirements for services. If ineligible, Patient would need to pay out of pocket for PCA-type services.     Provided patient/family with contact information and availability.    Neelam Siegel [facility]  Clinical , Outpatient Specialty Clinics  Direct Phone: [phone]

## 2022-06-20 NOTE — PROGRESS NOTES
Deandre Moore had an unwitnessed fall today at the [facility] today in the 4B bathroom. Once the  informed me of the unwitnessed fall, I reported to the restroom wherein Deandre was sitting near the sink in the bathroom. The patient reported that he just feel after using the restroom. The patient was oriented to person, place, time, and situation. The patient was protecting his airway; the patient was ventilating adequately; the patient had adequately circulation. The patient denied any head trauma associated with the fall; the patient denied any loss of consciousness associated with the fall; the patient denied any tenderness or pain in his cervical spine. A secondary trauma assessment was preformed without any obvious DCAP-BTLS. With assistance from Vivi, the patient was lifted off the ground without incident. Upon standing, the patient denied dizziness or light headedness. After this, the patient took a seat in his wheelchair without incident. A brief report was given to Dr. Marquez about the situation without incident. The patient and his wife exited the [facility] without incident.     Ashu Guzman, EMT at 3:18 PM on 6/20/2022

## 2022-06-20 NOTE — PROGRESS NOTES
Deandre Moore received the Prevnar 20 immunization today in clinic at the request of Dr. William Marquez. The immunization was given under the supervision of Dr. William Marquez if assistance was needed. The patient does not report a history of adverse reactions associated with vaccine administration. The immunization site was cleaned with an alcohol prep wipe. The immunization was given without incident--see immunization list for administration details. No swelling or redness was observed at the site of injection after the immunization was given. The patient was advised to remain in fourth floor lobby of the [facility] for fifteen minutes after the injection in case of an averse reaction.     Ashu Guzman, EMT at 2:54 PM on 6/20/2022

## 2022-06-22 ENCOUNTER — HOSPITAL ENCOUNTER (OUTPATIENT)
Dept: PHYSICAL THERAPY | Facility: CLINIC | Age: 82
Setting detail: THERAPIES SERIES
Discharge: HOME | End: 2022-06-22
Attending: PHYSICAL MEDICINE & REHABILITATION
Payer: COMMERCIAL

## 2022-06-22 PROCEDURE — 97110 THERAPEUTIC EXERCISES: CPT | Mod: GP | Performed by: PHYSICAL THERAPIST

## 2022-06-22 NOTE — PROGRESS NOTES
[facility]    Outpatient Physical Therapy Progress Note  Patient: Deandre Moore  : 1940    Beginning/End Dates of Reporting Period:  4/15/22 to 22    Referring Provider: Horacio Gonsalves MD    Therapy Diagnosis: Impaired gait      Client Self Report: Reports things are going okay. Walking around. The knee has been hurting more, which is restricting my walking. My son ordered me life alert. He reports no falls since last session.    Objective Measurements:        Objective Measure: 25 foot walk  Details: 17.9sec, 28 steps (Increased R knee pain today)  Objective Measure: Walking distance  Details: 187 feet with 4WW (Limited by R knee pain)  Objective Measure: 30 sec STS  Details: 4.5 reps with one hand on chair and one hand on walker          Goals:  Goal Identifier HEP   Goal Description Patient will demonstrate understanding and compliance to his HEP 3x per week week for continued wellbeing upon discharge from skilled physical therapy.   Target Date 22   Date Met      Progress (detail required for progress note): 22 - reports continued compliance with HEP. Improved strength about 50%. Stamina is difficult. 22: Patient reports compliance to his exercises at home, son Lucio notes that his walking has already improved     Goal Identifier Transfers - Progressed 22   Goal Description Patient will complete 8 STS transfers with 1-2 UE assist from an 18 inch surface in 30 seconds to demonstrate improved LE strength for independent transfers from low surfaces.   Target Date 22   Date Met      Progress (detail required for progress note): Eval: 1 rep with heavy  B UE assist; 22: 3 reps with heavy B UE assist, goal of 3 reps met and progressed. 22: 4 reps with heavy B UE assist, progressing.22 - 30sec STS 4.5 with one hand on walker, one on chair, good  control/balance.     Goal Identifier Gait speed - Progressed 5/23/22   Goal Description Patient will ambulate 25 feet in <10 seconds to demonstrate improved step length and gait speed for increased safety and independence with crossing the street.   Target Date 07/13/22   Date Met      Progress (detail required for progress note): Eval: 23 sec with 4WW; 5/23/22: 13.69 sec with 4WW, goal of <15 seconds met, progressed. 6/13/22: 12.44 sec with a 4WW 6/22/22 - 17.8 sec with 4WW (high knee pain today)     Goal Identifier Gait distance   Goal Description Patient will ambulate 400 feet during the 6 MWT with the least restrictive AD or no AD to demonstrate improved activity tolerance for independent and safe community ambulation.   Target Date 07/13/22   Date Met      Progress (detail required for progress note): Eval: 107 feet with 4WW; 4/19/22: 190 feet with 4WW; 5/16/22: 210 feet with 4WW; 6/7/22: 167 feet, his DIL notes he seems more weak and tired over the last couple of weeks 6/22/22: 187 feet     Goal Identifier Fall risk   Goal Description Patient will complete the TUG in 15 seconds less than baseline to demonstrate improved safety and independence with transfers, turns, and gait.   Target Date 07/13/22   Date Met      Progress (detail required for progress note): 4/19/22: 29.63 sec with a 4WW; 6/7/22: 27.36 sec with a 4WW, slowly progressing toward his goal 6/22/22 - not completed today d/t increased R knee pain       Plan:  Continue therapy per current plan of care.    Discharge:  No

## 2022-06-22 NOTE — DISCHARGE INSTRUCTIONS
06/22/22   - Ice your right knee to reduce pain and inflammation: 15 minutes, 2-3x/day.   - PT will send message to Dr. Marquez about knee pain for further assessment. You may get a call from him clinic to set up appointment.   - Use 5lb free weights for bicep curls   - No weights and arms over head or arm raises for shoulder strengthening

## 2022-06-23 ENCOUNTER — PRE VISIT (OUTPATIENT)
Dept: UROLOGY | Facility: CLINIC | Age: 82
End: 2022-06-23

## 2022-06-23 NOTE — TELEPHONE ENCOUNTER
Reason for visit: follow up     Relevant information: urinary frequency    Records/imaging/labs/orders: in epic    Pt called: no    At Rooming: normal

## 2022-06-27 ENCOUNTER — TELEPHONE (OUTPATIENT)
Dept: INTERNAL MEDICINE | Facility: CLINIC | Age: 82
End: 2022-06-27

## 2022-06-27 NOTE — TELEPHONE ENCOUNTER
----- Message from Jasvir Johnson sent at 6/27/2022  1:12 PM CDT -----  Regarding: FW: patient update on knee pain  Please help schedule an appointment with any of our providers or ortho same day clinic for his right knee pain.        Thank you,    Jasvir  ----- Message -----  From: William Marquez MD  Sent: 6/24/2022  10:24 AM CDT  To: Jasvir Johnson  Subject: FW: patient update on knee pain                  Dear Jasvir;    Please triage and you will probably have to call son and/or daughter-in-law. If worse knee pain sxs. He can see anyone including ortho/sports medicine    RONNIE Marquez  ----- Message -----  From: Oly Du, PT  Sent: 6/22/2022   4:03 PM CDT  To: William Marquez MD  Subject: patient update on knee pain                      Dr. Marquez,     I saw Deandre in physical therapy today and his walking/mobility was significantly limited by right knee pain. This is the first time I have worked with him, as he has been primarily following another PT. However, he reports this is not new and his knee pain often limits him in his daily life. He was open to the idea of getting his knee pain further evaluated. Is this something you could help him with? I think he could benefit from an evaluation for his knee to see what options are available to him to improve his tolerance to walking and mobility.     Please message me with any questions or concerns     Thank you!   Oly Du, PT

## 2022-06-30 ENCOUNTER — OFFICE VISIT (OUTPATIENT)
Dept: AUDIOLOGY | Facility: CLINIC | Age: 82
End: 2022-06-30
Payer: COMMERCIAL

## 2022-06-30 DIAGNOSIS — H90.3 SENSORINEURAL HEARING LOSS, BILATERAL: Primary | ICD-10-CM

## 2022-06-30 PROCEDURE — 92591 PR HEARING AID EXAM BINAURAL: CPT | Performed by: AUDIOLOGIST

## 2022-06-30 NOTE — PROGRESS NOTES
AUDIOLOGY REPORT    SUBJECTIVE:   Deandre Moore is a 81 year old male was seen in the Audiology Clinic at  [facility] on 6/30/22 to discuss concerns with hearing and functional communication difficulties. The patient was accompanied by their wife and son. Denadre has been seen previously on 6/8/22, and results revealed a bilateral normal to moderately-severe sensorineural hearing loss. Deandre notes difficulty with communication in a variety of listening situations.    OBJECTIVE:  Patient is a hearing aid candidate. Patient would like to move forward with a hearing aid evaluation today. Therefore, the patient was presented with different options for amplification to help aid in communication. Discussed styles, levels of technology and monaural vs. binaural fitting.     The hearing aid(s) mutually chosen were:  Binaural: Phonak Audeo P30-R  COLOR: P3  BATTERY SIZE: rechargeable  EARMOLD/TIPS: Large closed  CANAL/ LENGTH: 2 std    ASSESSMENT:     Reviewed purchase information and warranty information with patient. The 45 day trial period was explained to patient. The patient was given a copy of the [place] Department of [employer] consumer brochure on purchasing hearing instruments. Patient risk factors have been provided to the patient in writing prior to the sale of the hearing aid per FDA regulation. The risk factors are also available in the User Instructional Booklet to be presented on the day of the hearing aid fitting. Hearing aid(s) ordered. Hearing aid evaluation completed.    PLAN:   Deandre is scheduled to return in 2-3 weeks for a hearing aid fitting and programming. Purchase agreement will be completed on that date. Please contact this clinic with any questions or concerns.      Pallavi Mariscal, [facility]  Licensed Audiologist  [place] License #[id number]

## 2022-07-05 ENCOUNTER — HOSPITAL ENCOUNTER (OUTPATIENT)
Dept: PHYSICAL THERAPY | Facility: CLINIC | Age: 82
Setting detail: THERAPIES SERIES
Discharge: HOME | End: 2022-07-05
Attending: PHYSICAL MEDICINE & REHABILITATION
Payer: COMMERCIAL

## 2022-07-05 PROCEDURE — 97116 GAIT TRAINING THERAPY: CPT | Mod: GP | Performed by: PHYSICAL THERAPIST

## 2022-07-05 PROCEDURE — 97110 THERAPEUTIC EXERCISES: CPT | Mod: GP | Performed by: PHYSICAL THERAPIST

## 2022-07-05 NOTE — DISCHARGE INSTRUCTIONS
Start helping with some of the cooking - you can sit on your walker seat and help with some of the prep at the table or counter.    Try walking to some music at home, walk with your wife and see who can go longer.     When you walk, think about taking long steps to hit your heel first.    Do not let family bring you food or drinks - try to get up and get them on your own. Get up and walk to the fridge at least one time per day, using your walker.    When you stand from the lift chair, do not bring the chair up as high as possible. Try to stand from the lowest setting you can.     Try adding some resistance to the exercise pedals.    Add weights to your ankles for when you are straightening and bending your knee. Hold for 5 seconds then bring your foot down slowly. Do as many as you can.     Sitting in the chair, squeeze your shoulder blades down and together, hold for 5 seconds.

## 2022-07-11 ENCOUNTER — HOSPITAL ENCOUNTER (OUTPATIENT)
Dept: PHYSICAL THERAPY | Facility: CLINIC | Age: 82
Setting detail: THERAPIES SERIES
Discharge: HOME | End: 2022-07-11
Attending: PHYSICAL MEDICINE & REHABILITATION
Payer: COMMERCIAL

## 2022-07-11 PROCEDURE — 97110 THERAPEUTIC EXERCISES: CPT | Mod: GP | Performed by: PHYSICAL THERAPIST

## 2022-07-11 NOTE — PROGRESS NOTES
[facility]    OUTPATIENT PHYSICAL THERAPY  PLAN OF TREATMENT FOR OUTPATIENT REHABILITATION AND PROGRESS NOTE           Patient's Last Name, First Name, Deandre Rae Date of Birth  1940   Provider's Name  [facility] Medical Record No.  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   Target Date 10/08/22   Date Met      Progress (detail required for progress note): 6/22/22 - reports continued compliance with HEP. Improved strength about 50%. Stamina is difficult. 4/25/22: Patient reports compliance to his exercises at home, son Lucio notes that his walking has already improved     Goal Identifier Transfers - Progressed 5/23/22   Goal Description Patient will complete 8 STS transfers with 1-2 UE assist from an 18 inch surface in 30 seconds to demonstrate improved LE strength for independent transfers from low surfaces.   Target Date 10/08/22   Date Met      Progress (detail required for progress note): Eval: 1 rep with heavy  B UE assist; 5/23/22: 3 reps with heavy B UE assist, goal of 3 reps met and progressed. 6/7/22: 4 reps with heavy B UE assist, progressing.6/22/22 - 30sec STS 4.5 with one hand on walker, one on chair, good control/balance.  7/11/22: 6 reps with B UE assist, progressing     Goal Identifier Gait speed - Progressed 5/23/22   Goal Description Patient will ambulate 25 feet in <10 seconds to demonstrate improved step length and gait speed for increased safety and independence with crossing the street.   Target Date 10/08/22   Date Met      Progress (detail required for progress note): Eval: 23 sec with 4WW; 5/23/22: 13.69 sec with 4WW, goal of <15 seconds met, progressed. 6/13/22: 12.44 sec with a 4WW 6/22/22 - 17.8 sec with 4WW (high knee pain today)     Goal Identifier Gait distance   Goal Description Patient will ambulate 400 feet during the 6 MWT with the least restrictive AD or no AD to demonstrate improved activity tolerance for independent and safe community ambulation.   Target Date 10/08/22   Date Met      Progress (detail required for progress note): Eval: 107 feet with 4WW; 4/19/22: 190 feet with 4WW; 5/16/22: 210 feet with 4WW; 6/7/22: 167 feet, his DIL notes he seems more weak and tired over the last couple of weeks 6/22/22: 187 feet with 4WW; 7/5/22: 196 feet with 4WW to fatigue     Goal Identifier Fall risk   Goal Description Patient will complete the TUG in 15 seconds less than baseline to demonstrate improved safety and independence with transfers, turns, and gait.   Target Date 10/08/22   Date Met      Progress (detail required for progress note): 4/19/22: 29.63 sec with a 4WW; 6/7/22: 27.36 sec with a 4WW, slowly progressing toward his goal 6/22/22 - not completed today d/t increased R knee pain; 7/11/22: 21.89 sec with 4WW, progressing       Beginning/End Dates of Progress Note Reporting Period:  4/15/22 to 7/11/22    Progress Toward Goals:   Progress this reporting period: Patient is making good progress toward all of his goals. He demonstrates improved strength with increased number of transfers completed in 30 seconds from 1 initially to 6 today, however he still requires the use of his arms. Patient also demonstrates  improved gait distance and gait speed compared to the evaluation. He continues to be at an increased risk for falls per the TUG, and may benefit from continued PT to address the remaining deficits.     Client Self (Subjective) Report for Progress Note Reporting Period: Patient arrived 25 minutes late for his appointment. He did not try any of the suggestions discussed last time. He is feeling more tired today, maybe due to the heat.           I CERTIFY THE NEED FOR THESE SERVICES FURNISHED UNDER        THIS PLAN OF TREATMENT AND WHILE UNDER MY CARE     (Physician co-signature of this document indicates review and certification of the therapy plan).                Referring Provider: Horacio Gonsalves MD  PCP: William Marquez MD Kelly Hoyt, PT

## 2022-07-12 ENCOUNTER — TELEPHONE (OUTPATIENT)
Dept: DERMATOLOGY | Facility: CLINIC | Age: 82
End: 2022-07-12

## 2022-07-12 ENCOUNTER — TELEPHONE (OUTPATIENT)
Dept: NEUROLOGY | Facility: CLINIC | Age: 82
End: 2022-07-12

## 2022-07-12 NOTE — TELEPHONE ENCOUNTER
Call and left message with Derm number to schedule a follow up with Dr Rivera in September. [place] to schedule in a PRITESH with Dr. Rivera per clinic.

## 2022-07-12 NOTE — TELEPHONE ENCOUNTER
----- Message from Ashley Moctezuma CMA sent at 7/12/2022  9:13 AM CDT -----  Regarding: RE: Sept appt with Miguel Richard to schedule in a PRITESH with Dr. Rivera   ----- Message -----  From: Sarah Willams  Sent: 7/11/2022   3:32 PM CDT  To: [facility]  Subject: Sept appt with Miguel                             Where can patient be schedule.? Dr Rivera next opening is November?    Sarah  ----- Message -----  From: Daylin Tran  Sent: 7/11/2022   3:28 PM CDT  To: Sarah Willams    Can you help with this?  ----- Message -----  From: Jasvir Johnson  Sent: 7/11/2022   9:54 AM CDT  To: [facility]    Please help coordinate an earlier than September appointment with Dr. Rivera in Derm.  See Dr. Rivera notes below.        Jasvir TOMLINSON  ----- Message -----  From: William Marquez MD  Sent: 7/10/2022   7:47 AM CDT  To: Jasvir Johnson    Dear Jasvir;    Please see attached note from RONNIE Guthrie  ----- Message -----  From: Edu Rivera MD  Sent: 6/28/2022  12:27 PM CDT  To: William Marquez MD    Hey-    Methotrexate can be an option, but I think there are safer alternatives to consider. If he'd like to meet earlier - could you have him reach out to the clinic and they can see if he can be squeezed in earlier than September.     Thanks!    Edu Rivera MD  Pronouns: he/him/his    Department of Dermatology  [facility]: Phone: [phone], Fax:[phone]  [facility]: Phone: [phone] Fax: [phone]      ----- Message -----  From: William Marquez MD  Sent: 6/25/2022  10:22 AM CDT  To: Edu Rivera MD    Dear William;    You last saw Mr. Moore 5/3/2022 for several issues including chronic eczematous dermatitis. He has 9/7/2022 follow up with you scheduled. I saw him recently still with itching and family members recall him being on methotrexate for this? Anyway, I  told  them I would contact regarding the use of methotrexate. Not sure how effective or safe this would be    Thanks, Raimundo Marquez

## 2022-07-15 ENCOUNTER — TELEPHONE (OUTPATIENT)
Dept: DERMATOLOGY | Facility: CLINIC | Age: 82
End: 2022-07-15

## 2022-07-15 NOTE — TELEPHONE ENCOUNTER
David and sent a letter.    ----- Message from Ashley Moctezuma CMA sent at 7/12/2022  9:13 AM CDT -----  Regarding: RE: Sept appt with Miguel Richard to schedule in a PRITESH with Dr. Rivera   ----- Message -----  From: Saarh Willams  Sent: 7/11/2022   3:32 PM CDT  To: [facility]  Subject: Sept appt with Miguel                             Where can patient be schedule.? Dr Rivera next opening is November?    Sarah  ----- Message -----  From: Daylin Tran  Sent: 7/11/2022   3:28 PM CDT  To: Sarah Willams    Can you help with this?  ----- Message -----  From: Jasvir Johnson  Sent: 7/11/2022   9:54 AM CDT  To: Clinic Coordinators[facility]    Please help coordinate an earlier than September appointment with Dr. Rivera in Derm.  See Dr. Rivera notes below.        Jasvir TOMLINSON  ----- Message -----  From: William Marquez MD  Sent: 7/10/2022   7:47 AM CDT  To: Jasvir Johnson    Dear Jasvir;    Please see attached note from RONNIE Guthrie  ----- Message -----  From: Edu Rivera MD  Sent: 6/28/2022  12:27 PM CDT  To: MD Felipe Pluidoy-    Methotrexate can be an option, but I think there are safer alternatives to consider. If he'd like to meet earlier - could you have him reach out to the clinic and they can see if he can be squeezed in earlier than September.     Thanks!    Edu Rivera MD  Pronouns: he/him/his    Department of Dermatology  [facility]: Phone: [phone], Fax:[phone]  [facility]: Phone: [phone] Fax: [phone]      ----- Message -----  From: William Marquez MD  Sent: 6/25/2022  10:22 AM CDT  To: Edu Rivera MD    Dear William;    You last saw Mr. Moore 5/3/2022 for several issues including chronic eczematous dermatitis. He has 9/7/2022 follow up with you scheduled. I saw him recently still with itching and family members recall him being on  methotrexate for this? Anyway, I told  them I would contact regarding the use of methotrexate. Not sure how effective or safe this would be    Thanks, Raimundo Marquez

## 2022-07-21 ENCOUNTER — OFFICE VISIT (OUTPATIENT)
Dept: AUDIOLOGY | Facility: CLINIC | Age: 82
End: 2022-07-21

## 2022-07-21 DIAGNOSIS — H90.3 SENSORINEURAL HEARING LOSS, BILATERAL: Primary | ICD-10-CM

## 2022-07-21 PROCEDURE — V5160 DISPENSING FEE BINAURAL: HCPCS | Performed by: AUDIOLOGIST

## 2022-07-21 PROCEDURE — V5020 CONFORMITY EVALUATION: HCPCS | Performed by: AUDIOLOGIST

## 2022-07-21 PROCEDURE — V5261 HEARING AID, DIGIT, BIN, BTE: HCPCS | Performed by: AUDIOLOGIST

## 2022-07-21 PROCEDURE — V5011 HEARING AID FITTING/CHECKING: HCPCS | Performed by: AUDIOLOGIST

## 2022-07-21 NOTE — PROGRESS NOTES
AUDIOLOGY REPORT    SUBJECTIVE:   Deandre Moore is a 81 year old male who was seen in the Audiology Clinic at the [facility] for a fitting of binaural amplification. Previous results have revealed a bilateral normal to moderately-severe sensorineural hearing loss. He was accompanied by his wife and son.    OBJECTIVE:   The hearing aid conformity evaluation was completed.The hearing aids were placed and they provided a good fit. Real-ear-probe-microphone measurements were completed on the [employer] system and were a good match to NAL-NL1 target with soft sounds audible, moderate sounds comfortable, and loud sounds below discomfort. UCLs are verified through maximum power output measures and demonstrate appropriate limiting of loud inputs. Deandre was oriented to proper hearing aid use, care, cleaning (no water, dry brush), batteries (size: BATTERY SIZE: rechargeable, insertion/removal, toxicity, low-battery signal), aid insertion/removal, user booklet, warranty information, storage cases, and other hearing aid details. The patient confirmed understanding of hearing aid use and care, and showed proper insertion of hearing aid and batteries while in the office today.Deandre reported good volume and sound quality today.     EAR(S) FIT: Bilateral  HEARING AID MODEL NAME: Phonak Audeo P30-R  HEARING AID STYLE: -in-the-ear behind-the-ear  EARMOLDS/TIP: Medium closed  SERIAL NUMBERS: Right: [id number] Left: [id number]  WARRANTY END DATE: 9/27/25    ASSESSMENT:   Phonak Audeo P30 hearing aid(s) were fit today. Verification measures were performed. Deandre signed the Hearing Aid Purchase Agreement and was given a copy, as well as details on his hearing aids. Patient was counseled that exact out of pocket amounts cannot be determined for hearing aid claims being sent to insurance. Any insurance coverage information presented to the patient is an estimate only, and is not  a guarantee of payment. Patient has been advised to check with their own insurance.    PLAN:  Deandre will return for follow-up in 2-3 weeks for a hearing aid review appointment. Please call this clinic with questions regarding today s appointment.    Pallavi Mariscal, [facility]  Licensed Audiologist  [place] License #[id number]

## 2022-07-23 DIAGNOSIS — N40.1 BPH WITH URINARY OBSTRUCTION: ICD-10-CM

## 2022-07-23 DIAGNOSIS — N13.8 BPH WITH URINARY OBSTRUCTION: ICD-10-CM

## 2022-07-25 ENCOUNTER — HOSPITAL ENCOUNTER (OUTPATIENT)
Dept: PHYSICAL THERAPY | Facility: CLINIC | Age: 82
Setting detail: THERAPIES SERIES
Discharge: HOME | End: 2022-07-25
Attending: PHYSICAL MEDICINE & REHABILITATION
Payer: COMMERCIAL

## 2022-07-25 PROCEDURE — 97110 THERAPEUTIC EXERCISES: CPT | Mod: GP | Performed by: PHYSICAL THERAPIST

## 2022-07-25 PROCEDURE — 97112 NEUROMUSCULAR REEDUCATION: CPT | Mod: GP | Performed by: PHYSICAL THERAPIST

## 2022-07-25 RX ORDER — FINASTERIDE 5 MG/1
TABLET, FILM COATED ORAL
Qty: 100 TABLET | Refills: 2 | OUTPATIENT
Start: 2022-07-25

## 2022-07-26 ENCOUNTER — PATIENT OUTREACH (OUTPATIENT)
Dept: NEUROLOGY | Facility: CLINIC | Age: 82
End: 2022-07-26

## 2022-07-26 NOTE — PROGRESS NOTES
Attempted to reach patient to schedule follow up in the Neurology Clinic.  No answer,  LM on VM to call office and [employer] message sent..      Schedule with Carroll Gifford MD in Nov, MRI.

## 2022-08-01 ENCOUNTER — TELEPHONE (OUTPATIENT)
Dept: ENDOCRINOLOGY | Facility: CLINIC | Age: 82
End: 2022-08-01

## 2022-08-01 NOTE — TELEPHONE ENCOUNTER
----- Message from Casey Diez sent at 8/1/2022  1:36 PM CDT -----  Regarding: Reschedule Needed  Pt is currently scheduled to see Dr. Campa 10/21/2022, however Dr. Campa needs to close her clinic for that afternoon. Dr. Campa has opened a make up clinic for 10/13/2022 and her schedule for this day is currently wide open and on hold. Please help reschedule this patient's appointment for 10/13/2022.

## 2022-08-01 NOTE — TELEPHONE ENCOUNTER
Patient was unable to answer the phone and I was unable to leave a voicemail as preferred number was a cafe and home number would not connect. Sent [employer] 08/01/2022.

## 2022-08-03 NOTE — TELEPHONE ENCOUNTER
Attempted to call 8/3/22 but pt's phone number listed is for a cafe, VM does not state that this cafe belongs to pt. [employer] has not been read that was sent 8/1. Letter sent 8/3. Pt is to reschedule appt on 10/21/22 for 10/13/22 w/ Dr. Campa. 10/13/22 will not populate w/ auto search and will need to be manually scheduled.

## 2022-08-05 NOTE — TELEPHONE ENCOUNTER
LVM on pt's mobile # - pt has not read MyChart f/ 8/1- letter sent 8/3. Pt to reschedule appt 10/21/22 for 10/13/22 w/ Dr. Campa. 10/13/2022 will not populate w/ auto search and will need to be scheduled manually.

## 2022-08-09 NOTE — TELEPHONE ENCOUNTER
LVM 8/9/22 for pt to c/back to reschedule his current appt on 10/21/22 for 10/13/22. Pt has been reached out to many times. KEVIN, Sarah sent, and a letter has been sent. MAX NUMBER OF ATTEMPTS REACHED.

## 2022-08-25 NOTE — TELEPHONE ENCOUNTER
Last Clinic Visit: [facility] 6/20/22  traZODone HCl 150 MG Oral Tablet #90, 3 refills  Diagnosis: anxiety  Thank-you, Marcela VARELA RN Medication Refill Team

## 2022-09-06 NOTE — PROGRESS NOTES
AUDIOLOGY REPORT    SUBJECTIVE:  Deandre Moore is a 81 year old male who was seen in the Audiology Clinic at the [facility] on 9/6/2022  for a follow-up check regarding the fitting of new hearing aids. Previous results have revealed a bilateral.  The patient has been seen previously in this clinic and was fit with Phonak Audeo P30-R hearing aids on 7/21/22.  Deandre reports that he is not wearing his hearing aids consistently as he has difficulty getting them in, however when they are in he finds benefit. His wife and son also reported that they think he hears better when they are in.     OBJECTIVE:   We discussed strategies for insertion and removal. We also discussed switching hearing aid style to an in the ear option however patient reported he would like to keep his current ones and keep practicing.    Reviewed 45 day trial period, care, cleaning (no water, dry brush), batteries (size N/a) insertion/removal, toxicity, low-battery signal), aid insertion/removal, volume adjustment (if applicable), user booklet, warranty information, storage cases, and other hearing aid details.     No charge visit today (in warranty hearing aid check).    ASSESSMENT:   A follow-up appointment for hearing aid fitting was completed today. Changes to hearing aid was completed as outlined above.     PLAN:  Deandre will return for follow-up as needed, or at least every 9-12 months for cleaning and assessment of hearing aid.  . Please call this clinic with any questions regarding today s appointment.    Pallavi Mariscal, [facility]  Licensed Audiologist  [place] License #[id number]

## 2022-09-20 NOTE — TELEPHONE ENCOUNTER
Isosorbide Mononitrate ER 30 MG Oral Tablet Extended Release 24 Hour  Last Written Prescription Date:  1/13/22     isosorbide mononitrate 30 MG 24 hr tablet 90 tablet 3R1/13/2022> [facility]         [facility]  ([facility]) - [place], [place] - [address]

## 2022-09-21 NOTE — TELEPHONE ENCOUNTER
gabapentin (NEURONTIN) 300 MG capsule 150 capsule 4 6/11/2022         Last Written Prescription Date:  6-  Last Fill Quantity: 150,   # refills: 4  Last Office Visit : 6- [facility]                               5-3-2022 [facility]  Future Office visit:  None [facility]                                 9- [facility]    Routing refill request to provider for review/approval because:  Who should be managing?  Last ordered by [facility] - refill request sent to [facility].    Medication list and request do NOT match    Medication list:  TAKE 1 CAPSULE BY MOUTH IN  THE MORNING , 1 CAPSULE  WITH LUNCH AND 3 CAPSULES  AT BEDTIME    Request:  Take 1 cap (300 mg) by mouth 2 times daily       Kathleen M Doege RN

## 2022-09-23 NOTE — LETTER
Patient:  Deandre Moore  :   1940  MRN:     [id number]        Mr.Richard LELAND Moore  [address] [address]        2022    Dear ,    I see that you do not have a follow-up appointment in endocrinology. I would recommend that you be evaluated by an endocrinologist to minimize complications. If you like to be seen at the [facility], please call [phone] select option #1 for an appointment.    Regards    Trudy Campa MD

## 2022-09-23 NOTE — TELEPHONE ENCOUNTER
Glimepiride 1 MG Oral Tablet  Last Written Prescription Date:  2/18/2022  Last Fill Quantity: 180,   # refills: 3  Last Office Visit :  2/18/2022  Future Office visit:  None  Routing refill request to provider for review/approval because:  Please send new order to updated pharmacy for Pt care.  Please clarify order.  Order in chart and order from pharmacy do not match.     metFORMIN HCl 1000 MG Oral Tablet     Last Written Prescription Date:  2/18/2022  Last Fill Quantity: 180,   # refills: 3  Last Office Visit :  2/18/2022  Future Office visit:  None  Routing refill request to provider for review/approval because:  Please send new order to updated pharmacy for Pt care.      Indigo Roche RN  Central Triage Red Flags/Med Refills

## 2022-09-27 NOTE — PROGRESS NOTES
[facility] Dermatology Note  Encounter Date: Sep 27, 2022  Office Visit     Dermatology Problem List:  # Chronic eczematous dermatitis with chronic pruritis  - Current tx: methotrexate (pending lab work up 9/27/2022), augmented betamethasone BID PRN  - Previous tx: dupilumab 300mg q14d (restarted 8/24/21; stopped 11/2021 due to persistent injection site reactions), clobetasol 0.05% cream, gabapentin  # Prurigo nodularis, left forearm. S/p bx.   - ILK 20 mg/cc on 5/6/21.   # Macular ISK, R axilla. S/p shave bx 5/6/21  # Stasis dermatitis   - Compression stockings or elevation if unable to use compression socks and augmented betamethasone  # Callus left lower extremitiy  ____________________________________________    Assessment & Plan:    # Chronic eczematous dermatitis / pruritus / prurigo nodularis / xerosis cutis. Chronic, flare/not at goal.   Pruritus has been persistent. He is currently taking gabapentin and using augmented betamethasone ointment without relief. He has tried dupixent in the past, so discussed alternative treatment options to pursue at this time including methotrexate, upacatinib and light therapy. Will try methotrexate for now as detailed below.  - Labs ordered  - Start methotrexate pending lab workup; plan to start at 15 mg PO qweekly with folic acids on other days of the week. Discussed that it may take up to 3 months to notice improvement.   - Continue Augmented betamethasone ointment 0.05% BID PRN to pruritus areas on body or lower legs  - refilled today  - Start fluocinolone 0.01% solution BID to scalp for itching - refilled today  - Discontinue gabapentin, as this has not seemed to be helping  - Recommend regular elevation of lower legs if unable to wear compression socks     Procedures Performed:   None    Follow-up: 6 week(s) in-person, or earlier for new or changing lesions    Staff and Scribe:     Scribe Disclosure:  I, Ching Oden, am serving as a scribe to  document services personally performed by Edu Rivera MD based on data collection and the provider's statements to me.     Provider Disclosure:   The documentation recorded by the scribe accurately reflects the services I personally performed and the decisions made by me.    Edu Rivera MD    Department of Dermatology  [facility]: Phone: [phone], Fax:[phone]  [facility]: Phone: [phone] Fax: [phone]  ____________________________________________    CC: Derm Problem (Follow up. Hx of prurigo nodularis and eczematous dermatitis)    HPI:  Mr. Deandre Moore is a(n) 81 year old male who presents today as a return patient for follow up. The patient was last seen in dermatology on 5/3/22 by myself at which time he was advised to increase gabapentin dose, start flucinonide solution, and continue on augmented betamethasone ointment.    Today, the patient reports that he has had continued itching all over his body, primarily on his back, arms, and legs which have also started blistering up. He states he has been using the augmented betamethasone ointment inconsistently for over a year now. He has been taking gabapentin which did not seem to be helping so he decreased his dose after speaking with a pharmacist. He has tried dupixent and alternative steroid creams in the past.     He also reports continued itching in his scalp. He states that he has not been using the fluocinolone oil.     Patient is otherwise feeling well, without additional skin concerns.    Labs Reviewed:  N/A    Physical Exam:  Vitals: There were no vitals taken for this visit.  SKIN: Focused examination of the trunk and extremities was performed.  - Diffuse xerosis cutis   - Numerous excoriated scaly papules on bilateral upper extremities  - No other lesions of concern on areas examined.      Medications:  Current Outpatient Medications   Medication     Alcohol Swabs PADS     allopurinol (ZYLOPRIM) 300 MG tablet     aspirin 81 MG tablet     atorvastatin (LIPITOR) 40 MG tablet     augmented betamethasone dipropionate (DIPROLENE-AF) 0.05 % external ointment     B Complex-C-Folic Acid (DIALYVITE) TABS     blood glucose (NO BRAND SPECIFIED) lancets standard     blood glucose (NO BRAND SPECIFIED) test strip     Blood Glucose Monitoring Suppl (BLOOD GLUCOSE MONITOR SYSTEM) w/Device KIT     Cholecalciferol (VITAMIN D) 2000 UNITS CAPS     clobetasol (TEMOVATE) 0.05 % external cream     clopidogrel (PLAVIX) 75 MG tablet     colchicine (COLCYRS) 0.6 MG tablet     Cyanocobalamin (VITAMIN B12) 500 MCG TABS     finasteride (PROSCAR) 5 MG tablet     fluocinolone (SYNALAR) 0.01 % solution     FLUoxetine (PROZAC) 20 MG capsule     furosemide (LASIX) 40 MG tablet     gabapentin (NEURONTIN) 300 MG capsule     gabapentin (NEURONTIN) 300 MG capsule     glimepiride (AMARYL) 1 MG tablet     irbesartan (AVAPRO) 150 MG tablet     isosorbide mononitrate (IMDUR) 30 MG 24 hr tablet     ketoconazole (NIZORAL) 2 % external cream     LORazepam (ATIVAN) 1 MG tablet     metFORMIN (GLUCOPHAGE) 1000 MG tablet     mirtazapine (REMERON) 15 MG tablet     multivitamin w/minerals (THERA-VIT-M) tablet     mupirocin (BACTROBAN) 2 % external ointment     nitroglycerin (NITROSTAT) 0.4 MG SL tablet     spironolactone (ALDACTONE) 25 MG tablet     tamsulosin (FLOMAX) 0.4 MG capsule     traZODone (DESYREL) 150 MG tablet     triamcinolone (KENALOG) 0.1 % external ointment     vitamin B-12 (CYANOCOBALAMIN) 500 MCG tablet     urea (GORDONS UREA) 40 % external ointment     Current Facility-Administered Medications   Medication     triamcinolone (KENALOG-40) injection 20 mg      Past Medical History:   Patient Active Problem List   Diagnosis     Itching     AD (atopic dermatitis)     Dermatitis     Hyperglycemia     Eczema, unspecified eczema      Intrinsic atopic dermatitis     Other eczema     Notalgia paresthetica     Rash     Pseudophakia of both eyes     Diabetes mellitus (H)     Presbyopia     Type 2 diabetes mellitus without complication, without long-term current use of insulin (H)     Encounter for long-term (current) use of high-risk medication     Seborrheic keratoses, inflamed     Neoplasm of uncertain behavior of skin     Chronic kidney disease, stage 3 (H)     Lumbosacral spondylosis without myelopathy     Hx of CABG     Gout     Essential hypertension     Erectile dysfunction     Edema     Dyslipidemia     Decreased range of motion of ankle     Coronary atherosclerosis     Vitamin D deficiency     Ventricular ectopy     Type 2 diabetes mellitus with diabetic neuropathy (H)     Supraventricular tachycardia (H)     Renal insufficiency syndrome     Pneumothorax     Plantar fascia syndrome     Personal history of tobacco use, presenting hazards to health     Acute exacerbation of CHF (congestive heart failure) (H)     Acute inferior myocardial infarction (H)     Allergy     Ascending aorta enlargement (H)     Benign prostatic hyperplasia     Calcaneal bursitis     Angina pectoris (H)     Chest pain, unspecified     Calculus of gallbladder     Cholecystitis     Hyperlipidemia     Hypokalemia     Lumbar back pain     Lumbar spinal stenosis     Osteoarthrosis     Palpitations     Past Medical History:   Diagnosis Date     Diabetes mellitus (H)      Gout attack      Heart attack (H) 1984     Hypertension      Stented coronary artery     6 stents in heart        CC William Marquez MD  [address] [address] on close of this encounter.

## 2022-09-27 NOTE — LETTER
November 29, 2022      Deandre Moore  [address] [facility] [address]        Dear Deandre Moore,     We are writing to inform you of your test results that show, as we've been trying to reach you by phone and have been unsuccessful:     1. Labs all normal except for stable, mild anemia (low red blood cell count).   2. I did send a prescriptions for methotrexate and folic acid to your pharmacy which is taken as:  - Take 15 mg (6 tablets of 2.5 mg) once weekly  - On days of the week you don't take methotrexate, take folic acid 1 mg supplement.  3. I would like you to schedule a repeat lab appointment in about 2 weeks after starting methotrexate to recheck your blood counts and liver enzymes. My staff will reach out to schedule this.      Thank you for taking the time to be seen in our dermatology clinic. If you have further questions or concerns, please contact the clinic(see phone number listed below).     Resulted Orders   Comprehensive metabolic panel   Result Value Ref Range    Sodium 137 136 - 145 mmol/L    Potassium 4.3 3.4 - 5.3 mmol/L    Chloride 99 98 - 107 mmol/L    Carbon Dioxide (CO2) 28 22 - 29 mmol/L    Anion Gap 10 7 - 15 mmol/L    Urea Nitrogen 17.8 8.0 - 23.0 mg/dL    Creatinine 1.16 0.67 - 1.17 mg/dL    Calcium 9.9 8.8 - 10.2 mg/dL    Glucose 77 70 - 99 mg/dL    Alkaline Phosphatase 83 40 - 129 U/L    AST 21 10 - 50 U/L    ALT 16 10 - 50 U/L    Protein Total 7.4 6.4 - 8.3 g/dL    Albumin 4.3 3.5 - 5.2 g/dL    Bilirubin Total 0.6 <=1.2 mg/dL    GFR Estimate 62 >60 mL/min/1.73m2      Comment:      Effective December 21, 2021 eGFRcr in adults is calculated using the 2021 CKD-EPI creatinine equation which includes age and gender (Cailin et al., NEJM, DOI: 10.1056/BMJNts2505664)   Hepatitis B Surface Antibody   Result Value Ref Range    Hepatitis B Surface Antibody Instrument Value 0.67 <8.00 m[IU]/mL    Hepatitis B Surface Antibody Nonreactive       Comment:      No antibody detected  when the value is less than 8.00 mIU/mL.   Hepatitis B surface antigen   Result Value Ref Range    Hepatitis B Surface Antigen Nonreactive Nonreactive   Hepatitis B core antibody   Result Value Ref Range    Hepatitis B Core Antibody Total Nonreactive Nonreactive   Hepatitis C antibody   Result Value Ref Range    Hepatitis C Antibody Nonreactive Nonreactive    Narrative    Assay performance characteristics have not been established for newborns, infants, and children.   CBC with platelets and differential   Result Value Ref Range    WBC Count 8.8 4.0 - 11.0 10e3/uL    RBC Count 3.88 (L) 4.40 - 5.90 10e6/uL    Hemoglobin 12.1 (L) 13.3 - 17.7 g/dL    Hematocrit 35.4 (L) 40.0 - 53.0 %    MCV 91 78 - 100 fL    MCH 31.2 26.5 - 33.0 pg    MCHC 34.2 31.5 - 36.5 g/dL    RDW 13.4 10.0 - 15.0 %    Platelet Count 202 150 - 450 10e3/uL    % Neutrophils 61 %    % Lymphocytes 15 %    % Monocytes 7 %    % Eosinophils 16 %    % Basophils 1 %    % Immature Granulocytes 0 %    NRBCs per 100 WBC 0 <1 /100    Absolute Neutrophils 5.4 1.6 - 8.3 10e3/uL    Absolute Lymphocytes 1.3 0.8 - 5.3 10e3/uL    Absolute Monocytes 0.6 0.0 - 1.3 10e3/uL    Absolute Eosinophils 1.4 (H) 0.0 - 0.7 10e3/uL    Absolute Basophils 0.1 0.0 - 0.2 10e3/uL    Absolute Immature Granulocytes 0.0 <=0.4 10e3/uL    Absolute NRBCs 0.0 10e3/uL   Quantiferon TB Gold Plus Grey Tube   Result Value Ref Range    Quantiferon Nil Tube 0.07 IU/mL   Quantiferon TB Gold Plus Green Tube   Result Value Ref Range    Quantiferon TB1 Tube 0.07 IU/mL   Quantiferon TB Gold Plus Yellow Tube   Result Value Ref Range    Quantiferon TB2 Tube 0.06    Quantiferon TB Gold Plus Purple Tube   Result Value Ref Range    Quantiferon Mitogen 10.00 IU/mL   Quantiferon TB Gold Plus   Result Value Ref Range    Quantiferon-TB Gold Plus Negative Negative      Comment:      No interferon gamma response to M.tuberculosis antigens was detected. Infection with M.tuberculosis is unlikely, however a single  negative result does not exclude infection. In patients at high risk for infection, a second test should be considered in accordance with the 2017 ATS/IDSA/CDC Clinical Pract  ice Guidelines for Diagnosis of Tuberculosis in Adults and Children     TB1 Ag minus Nil Value 0.00 IU/mL    TB2 Ag minus Nil Value -0.01 IU/mL    Mitogen minus Nil Result 9.93 IU/mL    Nil Result 0.07 IU/mL       Sincerely,     Edu Rivera MD  Pronouns: he/him/his    Department of Dermatology  [facility]: Phone: [phone], Fax:[phone]  [facility]: Phone: [phone] Fax: [phone]

## 2022-09-27 NOTE — LETTER
9/27/2022       RE: Deandre Moore  [address]  [facility] [address]     Dear Colleague,    Thank you for referring your patient, Deandre Moore, to the [facility] [place] at [facility]. Please see a copy of my visit note below.    [facility] Dermatology Note  Encounter Date: Sep 27, 2022  Office Visit     Dermatology Problem List:  # Chronic eczematous dermatitis with chronic pruritis  - Current tx: methotrexate (pending lab work up 9/27/2022), augmented betamethasone BID PRN  - Previous tx: dupilumab 300mg q14d (restarted 8/24/21; stopped 11/2021 due to persistent injection site reactions), clobetasol 0.05% cream, gabapentin  # Prurigo nodularis, left forearm. S/p bx.   - ILK 20 mg/cc on 5/6/21.   # Macular ISK, R axilla. S/p shave bx 5/6/21  # Stasis dermatitis   - Compression stockings or elevation if unable to use compression socks and augmented betamethasone  # Callus left lower extremitiy  ____________________________________________    Assessment & Plan:    # Chronic eczematous dermatitis / pruritus / prurigo nodularis / xerosis cutis. Chronic, flare/not at goal.   Pruritus has been persistent. He is currently taking gabapentin and using augmented betamethasone ointment without relief. He has tried dupixent in the past, so discussed alternative treatment options to pursue at this time including methotrexate, upacatinib and light therapy. Will try methotrexate for now as detailed below.  - Labs ordered  - Start methotrexate pending lab workup; plan to start at 15 mg PO qweekly with folic acids on other days of the week. Discussed that it may take up to 3 months to notice improvement.   - Continue Augmented betamethasone ointment 0.05% BID PRN to pruritus areas on body or lower legs  - refilled today  - Start fluocinolone 0.01% solution BID to scalp for itching - refilled today  - Discontinue  gabapentin, as this has not seemed to be helping  - Recommend regular elevation of lower legs if unable to wear compression socks     Procedures Performed:   None    Follow-up: 6 week(s) in-person, or earlier for new or changing lesions    Staff and Scribe:     Scribe Disclosure:  I, Ching Oden, am serving as a scribe to document services personally performed by Edu Rivera MD based on data collection and the provider's statements to me.     Provider Disclosure:   The documentation recorded by the scribe accurately reflects the services I personally performed and the decisions made by me.    Edu Rivera MD    Department of Dermatology  [facility]: Phone: [phone], Fax:[phone]  [facility]: Phone: [phone] Fax: [phone]  ____________________________________________    CC: Derm Problem (Follow up. Hx of prurigo nodularis and eczematous dermatitis)    HPI:  Mr. Deandre Moore is a(n) 81 year old male who presents today as a return patient for follow up. The patient was last seen in dermatology on 5/3/22 by myself at which time he was advised to increase gabapentin dose, start flucinonide solution, and continue on augmented betamethasone ointment.    Today, the patient reports that he has had continued itching all over his body, primarily on his back, arms, and legs which have also started blistering up. He states he has been using the augmented betamethasone ointment inconsistently for over a year now. He has been taking gabapentin which did not seem to be helping so he decreased his dose after speaking with a pharmacist. He has tried dupixent and alternative steroid creams in the past.     He also reports continued itching in his scalp. He states that he has not been using the fluocinolone oil.     Patient is otherwise feeling well, without additional skin  concerns.    Labs Reviewed:  N/A    Physical Exam:  Vitals: There were no vitals taken for this visit.  SKIN: Focused examination of the trunk and extremities was performed.  - Diffuse xerosis cutis   - Numerous excoriated scaly papules on bilateral upper extremities  - No other lesions of concern on areas examined.     Medications:  Current Outpatient Medications   Medication     Alcohol Swabs PADS     allopurinol (ZYLOPRIM) 300 MG tablet     aspirin 81 MG tablet     atorvastatin (LIPITOR) 40 MG tablet     augmented betamethasone dipropionate (DIPROLENE-AF) 0.05 % external ointment     B Complex-C-Folic Acid (DIALYVITE) TABS     blood glucose (NO BRAND SPECIFIED) lancets standard     blood glucose (NO BRAND SPECIFIED) test strip     Blood Glucose Monitoring Suppl (BLOOD GLUCOSE MONITOR SYSTEM) w/Device KIT     Cholecalciferol (VITAMIN D) 2000 UNITS CAPS     clobetasol (TEMOVATE) 0.05 % external cream     clopidogrel (PLAVIX) 75 MG tablet     colchicine (COLCYRS) 0.6 MG tablet     Cyanocobalamin (VITAMIN B12) 500 MCG TABS     finasteride (PROSCAR) 5 MG tablet     fluocinolone (SYNALAR) 0.01 % solution     FLUoxetine (PROZAC) 20 MG capsule     furosemide (LASIX) 40 MG tablet     gabapentin (NEURONTIN) 300 MG capsule     gabapentin (NEURONTIN) 300 MG capsule     glimepiride (AMARYL) 1 MG tablet     irbesartan (AVAPRO) 150 MG tablet     isosorbide mononitrate (IMDUR) 30 MG 24 hr tablet     ketoconazole (NIZORAL) 2 % external cream     LORazepam (ATIVAN) 1 MG tablet     metFORMIN (GLUCOPHAGE) 1000 MG tablet     mirtazapine (REMERON) 15 MG tablet     multivitamin w/minerals (THERA-VIT-M) tablet     mupirocin (BACTROBAN) 2 % external ointment     nitroglycerin (NITROSTAT) 0.4 MG SL tablet     spironolactone (ALDACTONE) 25 MG tablet     tamsulosin (FLOMAX) 0.4 MG capsule     traZODone (DESYREL) 150 MG tablet     triamcinolone (KENALOG) 0.1 % external ointment     vitamin B-12 (CYANOCOBALAMIN) 500 MCG tablet     urea  (GORDONS UREA) 40 % external ointment     Current Facility-Administered Medications   Medication     triamcinolone (KENALOG-40) injection 20 mg      Past Medical History:   Patient Active Problem List   Diagnosis     Itching     AD (atopic dermatitis)     Dermatitis     Hyperglycemia     Eczema, unspecified eczema     Intrinsic atopic dermatitis     Other eczema     Notalgia paresthetica     Rash     Pseudophakia of both eyes     Diabetes mellitus (H)     Presbyopia     Type 2 diabetes mellitus without complication, without long-term current use of insulin (H)     Encounter for long-term (current) use of high-risk medication     Seborrheic keratoses, inflamed     Neoplasm of uncertain behavior of skin     Chronic kidney disease, stage 3 (H)     Lumbosacral spondylosis without myelopathy     Hx of CABG     Gout     Essential hypertension     Erectile dysfunction     Edema     Dyslipidemia     Decreased range of motion of ankle     Coronary atherosclerosis     Vitamin D deficiency     Ventricular ectopy     Type 2 diabetes mellitus with diabetic neuropathy (H)     Supraventricular tachycardia (H)     Renal insufficiency syndrome     Pneumothorax     Plantar fascia syndrome     Personal history of tobacco use, presenting hazards to health     Acute exacerbation of CHF (congestive heart failure) (H)     Acute inferior myocardial infarction (H)     Allergy     Ascending aorta enlargement (H)     Benign prostatic hyperplasia     Calcaneal bursitis     Angina pectoris (H)     Chest pain, unspecified     Calculus of gallbladder     Cholecystitis     Hyperlipidemia     Hypokalemia     Lumbar back pain     Lumbar spinal stenosis     Osteoarthrosis     Palpitations     Past Medical History:   Diagnosis Date     Diabetes mellitus (H)      Gout attack      Heart attack (H) 1984     Hypertension      Stented coronary artery     6 stents in heart        CC William Marquez MD  [address] [address] on  close of this encounter.

## 2022-09-27 NOTE — NURSING NOTE
Chief Complaint   Patient presents with     Derm Problem     Follow up. Hx of prurigo nodularis and eczematous dermatitis     Steven Gama, EMT

## 2022-09-30 NOTE — RESULT ENCOUNTER NOTE
Mychart sent.   Can we schedule lab appt in 2 weeks and then follow-up with me in 6 weeks? Orders placed. For follow-up can schedule in PRITESH slot or in a :45 slot on November 16th if not already scheduled with another patient.  Thanks!    Edu Rivera MD  Pronouns: he/him/his    Department of Dermatology  [facility]: Phone: [phone], Fax:[phone]  [facility]: Phone: [phone] Fax: [phone]

## 2022-10-22 NOTE — LETTER
Patient:  Deandre Moore  :   1940  MRN:     [id number]        Mr.Richard LELAND Moore  [address] [facility] [address]        2022    Dear ,    I see that you do not have a follow-up appointment in endocrinology.  Of note, I am booking out about 6 months in advance. If you like to be seen at the [facility], please call [phone] select option #1 for an appointment.    Regards    Trudy Campa MD

## 2022-10-26 NOTE — TELEPHONE ENCOUNTER
Received fax from insurance regarding additional information sent in.  Insurance is wanting to know if the patient can try and fail Elidel for his condition.  Forwarding to provider and nursing staff to address    Hemigard Intro: Due to skin fragility and wound tension, it was decided to use HEMIGARD adhesive retention suture devices to permit a linear closure. The skin was cleaned and dried for a 6cm distance away from the wound. Excessive hair, if present, was removed to allow for adhesion.

## 2022-11-08 NOTE — TELEPHONE ENCOUNTER
"gabapentin (NEURONTIN) 300 MG capsule      Last Written Prescription Date:  6/11/2022  Last Fill Quantity: 150 cap,   # refills: 4  Last Office Visit : 9/27/2022  Future Office visit:  none    Routing refill request to on-call Dermatology provider for review/approval because:  Medication not on the Dermatology refill protocol and request needs review.  - refill requested but plan last visit 9/27/2022 \"Discontinue gabapentin, as this has not seemed to be helping\".   "

## 2022-11-15 NOTE — TELEPHONE ENCOUNTER
Finasteride 5 MG Oral Tablet  Last Written Prescription Date:   10/1/2022  Last Fill Quantity: 30,   # refills: 0  Last Office Visit :  3/10/2022  Future Office visit:   11/17/2022  30 Tabs, 11 Refills sent to keri Roche RN  Central Triage Red Flags/Med Refills

## 2022-11-16 NOTE — TELEPHONE ENCOUNTER
Isosorbide Mononitrate ER 30 MG Oral Tablet Extended Release 24 Hour    Last Written Prescription Date:  1/13/2022  Last Fill Quantity: 90,   # refills: 3  Last Office Visit :  6/20/2022  Future Office visit:  None  90 Tabs, 3 Refills sent to keri Roche RN  Central Triage Red Flags/Med Refills

## 2022-11-16 NOTE — TELEPHONE ENCOUNTER
COLCHICINE  0.6MG  TAB  Last Written Prescription Date:   4/13/2022  Last Fill Quantity: 90,   # refills: 0  Last Office Visit :  6/20/2022  Future Office visit:  None  Routing refill request to provider for review/approval because:  Needing updated Uric Acid Level on file for this med  Please call Pt to schedule      FLUoxetine HCl 20 MG Oral Capsule  Last Written Prescription Date:   3/21/2022  Last Fill Quantity: 90,   # refills: 3  Last Office Visit :  6/20/2022  Future Office visit:  None  Routing refill request to provider for review/approval because:  Refer to clinic for review     Indigo Roche RN  Central Triage Red Flags/Med Refills

## 2022-11-17 NOTE — PROGRESS NOTES
HPI: Mr. Deandre Moore is a 81 year old year old male presenting today for evaluation of chief complaint(s): No chief complaint on file.    Today, he is accompanied by his son and wife.      HPI: Mr. Deandre Moore has PMH significant for HTN, HLD, CAD (s/p CABG, s/p stents - on plavix), chronic pruritus, chronic back pain, DM, ED s/p IPP (about 5 years ago), BPH (on tamsulosin 0.8mg daily for many years) who was last seen by me on 3/10/22 for sensation of incomplete emptying and occasional low grade incontinence.  We checked his PVR which was 98cc. Obtained a UA which was normal which exception of hyaline casts 3/hpf.  We continued tamsulosin and added finasteride.      Today he returns in routine followup  Voiding is stable.  Typically voids to a urinal.  Feels he is emptying fine. Rare accidents because of urgency.    Nocturia x 1.  No dysuria or hematuria.    Prefers wheelchair when out of the home. At home he uses a walker to get around  Also taking spironolactone (Rx'd by Dr. Marquez) which contributes to frequency.   Wondering about his kidney function.  Was told at some point sCr was worsening.   Wanting to discuss his CT scan from 1/2022 showing a stable 1.8cm right renal lesion     Current Outpatient Medications   Medication Sig Dispense Refill     Alcohol Swabs PADS 1 pad 4 times daily 100 each 6     allopurinol (ZYLOPRIM) 300 MG tablet TAKE 1 TABLET BY MOUTH  DAILY 90 tablet 0     aspirin 81 MG tablet Take 81 mg by mouth daily.       atorvastatin (LIPITOR) 40 MG tablet Take 1 tablet (40 mg) by mouth daily 90 tablet 3     augmented betamethasone dipropionate (DIPROLENE-AF) 0.05 % external ointment Apply twice daily as needed for rash on the leg or back 45 g 11     B Complex-C-Folic Acid (DIALYVITE) TABS Take 1 tablet by mouth daily       blood glucose (NO BRAND SPECIFIED) lancets standard Use to test blood sugar 3 times daily or as directed. 100 each 11     blood glucose (NO BRAND SPECIFIED)  test strip Use to test blood sugar 4 times daily  With meter/ strips/ lancets covered by insurance pt using accuchek 360 each 3     Blood Glucose Monitoring Suppl (BLOOD GLUCOSE MONITOR SYSTEM) w/Device KIT Test 4 times daily with meter covered by  insurance 1 kit 1     Cholecalciferol (VITAMIN D) 2000 UNITS CAPS Take 1 tablet by mouth daily       clobetasol (TEMOVATE) 0.05 % external cream Apply twice daily as needed for itching or rash on the back 60 g 5     clopidogrel (PLAVIX) 75 MG tablet TAKE 1 TABLET BY MOUTH  DAILY 90 tablet 0     colchicine (COLCYRS) 0.6 MG tablet Take 1 tablet (0.6 mg) by mouth daily 90 tablet 0     Cyanocobalamin (VITAMIN B12) 500 MCG TABS Take 1 tablet by mouth daily       finasteride (PROSCAR) 5 MG tablet Take 1 tablet (5 mg) by mouth daily 30 tablet 11     fluocinolone (SYNALAR) 0.01 % solution Apply a thin layer twice daily as needed to scalp for itchiness. 60 mL 4     FLUoxetine (PROZAC) 20 MG capsule Take 1 capsule (20 mg) by mouth daily 90 capsule 3     folic acid (FOLVITE) 1 MG tablet Take 1 tablet (1 mg) by mouth daily Daily on days you don't take MTX 90 tablet 3     furosemide (LASIX) 40 MG tablet Take 1 tablet (40 mg) by mouth daily 90 tablet 3     gabapentin (NEURONTIN) 300 MG capsule TAKE 1 CAPSULE BY MOUTH IN  THE MORNING , 1 CAPSULE  WITH LUNCH AND 3 CAPSULES  AT BEDTIME 150 capsule 4     glimepiride (AMARYL) 1 MG tablet TAKE 2 TABLETS BY MOUTH IN  THE MORNING AND 1 TABLET BY MOUTH IN THE EVENING 270 tablet 3     irbesartan (AVAPRO) 150 MG tablet Take 1 tablet (150 mg) by mouth At Bedtime 90 tablet 3     isosorbide mononitrate (IMDUR) 30 MG 24 hr tablet Take 1 tablet (30 mg) by mouth daily 90 tablet 3     ketoconazole (NIZORAL) 2 % external cream Apply topically daily       LORazepam (ATIVAN) 1 MG tablet Not sure is taking       metFORMIN (GLUCOPHAGE) 1000 MG tablet Take 1 tablet (1,000 mg) by mouth 2 times daily (with meals) 180 tablet 1     methotrexate 2.5 MG tablet Take  "6 tablets (15 mg) by mouth every 7 days for 90 days 24 tablet 2     mirtazapine (REMERON) 15 MG tablet Take 15 mg by mouth At Bedtime       multivitamin w/minerals (THERA-VIT-M) tablet Take 1 tablet by mouth daily.       mupirocin (BACTROBAN) 2 % external ointment Apply topically 2 times daily 30 g 0     nitroglycerin (NITROSTAT) 0.4 MG SL tablet Place under the tongue as needed       spironolactone (ALDACTONE) 25 MG tablet Take 1 tablet (25 mg) by mouth daily 90 tablet 3     tamsulosin (FLOMAX) 0.4 MG capsule TAKE 1 CAPSULE BY MOUTH  DAILY 90 capsule 2     traZODone (DESYREL) 150 MG tablet Take 1 tablet (150 mg) by mouth At Bedtime 90 tablet 3     triamcinolone (KENALOG) 0.1 % external ointment APPLY TWICE DAILY TO RAISED RASH AREAS ON THE ARMS, LEGS, BODY AS NEEDED. 454 g 1     urea (GORDONS UREA) 40 % external ointment Apply as many times daily as needed for callus on foot (Patient not taking: Reported on 9/27/2022) 30 g 11     vitamin B-12 (CYANOCOBALAMIN) 500 MCG tablet          ALLERGIES: Beta adrenergic blockers, Latex, Latex, and Tape [adhesive tape]      REVIEW OF SYSTEMS:  As above in HPI     GENERAL PHYSICAL EXAM:   Vitals: /72   Pulse (!) 42   Ht 1.854 m (6' 1\")   Wt 89.8 kg (198 lb)   BMI 26.12 kg/m    Body mass index is 26.12 kg/m .    GENERAL: Well groomed, well developed, well nourished male in NAD. Sitting in a wheelchair  SKIN: Warm to touch, dry.  No visible rashes or lesions on examined areas.  HEMATOLOGIC/LYMPHATIC/IMMUNOLOGIC:  No LE edema.  NEURO: Alert and oriented x 3.  PSYCH: Normal mood and affect, pleasant and agreeable during interview and exam.    RADIOLOGY: The following tests were reviewed:   CT CHEST/ABDOMEN/PELVIS WITH CONTRAST 1/3/2022 1:05 PM     CLINICAL HISTORY: Constipation. Weight loss.     TECHNIQUE: CT scan of the chest, abdomen, and pelvis was performed  following injection of IV contrast. Multiplanar reformats were  obtained. Dose reduction techniques were used. "   CONTRAST: 106 mL Isovue-370     COMPARISON: Outside CT abdomen and pelvis 11/25/2020     FINDINGS:   LUNGS AND PLEURA: Minimal peripheral scarring or atelectasis in the  lung bases. Lungs are otherwise clear. No pleural effusion.     MEDIASTINUM/AXILLAE: Evidence of previous left ventricular infarction,  status post coronary artery bypass grafting. No lymphadenopathy.     HEPATOBILIARY: Cholecystectomy.     PANCREAS: Normal.     SPLEEN: Normal.     ADRENAL GLANDS: Normal.     KIDNEYS/BLADDER: Benign left renal cyst. 1.6 cm right renal lesion  with punctate calcification has not appreciably changed from previous.     BOWEL: Moderate stool throughout the colon. No obstruction or  inflammatory changes.     PELVIC ORGANS: Mild prostate gland enlargement. Penile prosthesis.     ADDITIONAL FINDINGS: None.     MUSCULOSKELETAL: Sternotomy. Degenerative changes throughout the  spine. Posterior fusion at L4-L5. Mild chronic compression deformity  of T11.                                                                      IMPRESSION:  1.  No specific finding to explain the patient's symptoms of weight  loss. Moderate amount of stool throughout the colon consistent with  history of constipation.  2.  Right renal lesion is indeterminate, but unchanged from  11/25/2020. Consider renal ultrasound to evaluate for cystic versus  solid lesion if not previously performed.    LABS: The last test results for Mr. Deandre Moore were reviewed:  PSA - No results found for: PSA  BMP -   Recent Labs   Lab Test 09/27/22  1702 01/24/22  1054 01/03/22  1247 10/18/21  1355 09/27/21  1256 03/11/21  1235    143  --   --   --  140   POTASSIUM 4.3 4.4  --   --   --  4.6   CHLORIDE 99 107  --   --   --  107   CO2 28 25  --   --   --  28   BUN 17.8 14  --   --   --  10   CR 1.16 1.24 1.2  --   --  1.27*   GLC 77 92  --  75   < > 102*   ADDI 9.9 9.3  --   --   --  9.3    < > = values in this interval not displayed.       CBC -   Recent  "Labs   Lab Test 09/27/22  1702 01/24/22  1054 03/11/21  1235   WBC 8.8 10.2 9.9   HGB 12.1* 13.0* 13.9    185 239       ASSESSMENT:   1) RIght renal lesion seen on CT from 1/2022  2) CKD stable -   3) BPH with mixed luts and incomplete emptying     PLAN:   - We reviewed sCr for the past 6 years - essentially stable  - Random bladder scan today shows 46cc (patient voided 1 hour prior)   - Continue tamsulosin (flomax) and finasteride (proscar) to help with urinating.  At some point could consider stopping tamsulosin   - Consider physical therapy (to help you hold urine).  Condom (external catheters) could be considered if urine control worsens.  A overactive bladder medication could be considered to help you \"hold\" your urine.   - Remember that caffeine worsens urine urgency and can contribute to leakage.    - Be aware that spironolactone can contribute to urgency - if this becomes problematic, discuss with Dr. Marquez whether or not this type of medication is absolutely necessary     - SCHEDULE: Renal ultrasound in the next couple months to re-evaluate the right renal lesion   - Return in 6 months to re-evaluate urinary symptoms.  Please return sooner as needed.      VISIT DURATION: 22 minutes (3:07 - 3:24 + 5 minutes documentation on DOS)    Mariana Johnston PA-C  Department of Urologic Surgery    "

## 2022-11-17 NOTE — LETTER
11/17/2022       RE: Deandre Moore  [address]  [facility] [address]     Dear Colleague,    Thank you for referring your patient, Deandre Moore, to the [facility] [place] at [facility]. Please see a copy of my visit note below.    HPI: Mr. Deandre Moore is a 81 year old year old male presenting today for evaluation of chief complaint(s): No chief complaint on file.    Today, he is accompanied by his son and wife.      HPI: Mr. Deandre Moore has PMH significant for HTN, HLD, CAD (s/p CABG, s/p stents - on plavix), chronic pruritus, chronic back pain, DM, ED s/p IPP (about 5 years ago), BPH (on tamsulosin 0.8mg daily for many years) who was last seen by me on 3/10/22 for sensation of incomplete emptying and occasional low grade incontinence.  We checked his PVR which was 98cc. Obtained a UA which was normal which exception of hyaline casts 3/hpf.  We continued tamsulosin and added finasteride.      Today he returns in routine followup  Voiding is stable.  Typically voids to a urinal.  Feels he is emptying fine. Rare accidents because of urgency.    Nocturia x 1.  No dysuria or hematuria.    Prefers wheelchair when out of the home. At home he uses a walker to get around  Also taking spironolactone (Rx'd by Dr. Marquez) which contributes to frequency.   Wondering about his kidney function.  Was told at some point sCr was worsening.   Wanting to discuss his CT scan from 1/2022 showing a stable 1.8cm right renal lesion     Current Outpatient Medications   Medication Sig Dispense Refill     Alcohol Swabs PADS 1 pad 4 times daily 100 each 6     allopurinol (ZYLOPRIM) 300 MG tablet TAKE 1 TABLET BY MOUTH  DAILY 90 tablet 0     aspirin 81 MG tablet Take 81 mg by mouth daily.       atorvastatin (LIPITOR) 40 MG tablet Take 1 tablet (40 mg) by mouth daily 90 tablet 3     augmented betamethasone dipropionate (DIPROLENE-AF) 0.05  % external ointment Apply twice daily as needed for rash on the leg or back 45 g 11     B Complex-C-Folic Acid (DIALYVITE) TABS Take 1 tablet by mouth daily       blood glucose (NO BRAND SPECIFIED) lancets standard Use to test blood sugar 3 times daily or as directed. 100 each 11     blood glucose (NO BRAND SPECIFIED) test strip Use to test blood sugar 4 times daily  With meter/ strips/ lancets covered by insurance pt using accuchek 360 each 3     Blood Glucose Monitoring Suppl (BLOOD GLUCOSE MONITOR SYSTEM) w/Device KIT Test 4 times daily with meter covered by  insurance 1 kit 1     Cholecalciferol (VITAMIN D) 2000 UNITS CAPS Take 1 tablet by mouth daily       clobetasol (TEMOVATE) 0.05 % external cream Apply twice daily as needed for itching or rash on the back 60 g 5     clopidogrel (PLAVIX) 75 MG tablet TAKE 1 TABLET BY MOUTH  DAILY 90 tablet 0     colchicine (COLCYRS) 0.6 MG tablet Take 1 tablet (0.6 mg) by mouth daily 90 tablet 0     Cyanocobalamin (VITAMIN B12) 500 MCG TABS Take 1 tablet by mouth daily       finasteride (PROSCAR) 5 MG tablet Take 1 tablet (5 mg) by mouth daily 30 tablet 11     fluocinolone (SYNALAR) 0.01 % solution Apply a thin layer twice daily as needed to scalp for itchiness. 60 mL 4     FLUoxetine (PROZAC) 20 MG capsule Take 1 capsule (20 mg) by mouth daily 90 capsule 3     folic acid (FOLVITE) 1 MG tablet Take 1 tablet (1 mg) by mouth daily Daily on days you don't take MTX 90 tablet 3     furosemide (LASIX) 40 MG tablet Take 1 tablet (40 mg) by mouth daily 90 tablet 3     gabapentin (NEURONTIN) 300 MG capsule TAKE 1 CAPSULE BY MOUTH IN  THE MORNING , 1 CAPSULE  WITH LUNCH AND 3 CAPSULES  AT BEDTIME 150 capsule 4     glimepiride (AMARYL) 1 MG tablet TAKE 2 TABLETS BY MOUTH IN  THE MORNING AND 1 TABLET BY MOUTH IN THE EVENING 270 tablet 3     irbesartan (AVAPRO) 150 MG tablet Take 1 tablet (150 mg) by mouth At Bedtime 90 tablet 3     isosorbide mononitrate (IMDUR) 30 MG 24 hr tablet Take 1  "tablet (30 mg) by mouth daily 90 tablet 3     ketoconazole (NIZORAL) 2 % external cream Apply topically daily       LORazepam (ATIVAN) 1 MG tablet Not sure is taking       metFORMIN (GLUCOPHAGE) 1000 MG tablet Take 1 tablet (1,000 mg) by mouth 2 times daily (with meals) 180 tablet 1     methotrexate 2.5 MG tablet Take 6 tablets (15 mg) by mouth every 7 days for 90 days 24 tablet 2     mirtazapine (REMERON) 15 MG tablet Take 15 mg by mouth At Bedtime       multivitamin w/minerals (THERA-VIT-M) tablet Take 1 tablet by mouth daily.       mupirocin (BACTROBAN) 2 % external ointment Apply topically 2 times daily 30 g 0     nitroglycerin (NITROSTAT) 0.4 MG SL tablet Place under the tongue as needed       spironolactone (ALDACTONE) 25 MG tablet Take 1 tablet (25 mg) by mouth daily 90 tablet 3     tamsulosin (FLOMAX) 0.4 MG capsule TAKE 1 CAPSULE BY MOUTH  DAILY 90 capsule 2     traZODone (DESYREL) 150 MG tablet Take 1 tablet (150 mg) by mouth At Bedtime 90 tablet 3     triamcinolone (KENALOG) 0.1 % external ointment APPLY TWICE DAILY TO RAISED RASH AREAS ON THE ARMS, LEGS, BODY AS NEEDED. 454 g 1     urea (GORDONS UREA) 40 % external ointment Apply as many times daily as needed for callus on foot (Patient not taking: Reported on 9/27/2022) 30 g 11     vitamin B-12 (CYANOCOBALAMIN) 500 MCG tablet          ALLERGIES: Beta adrenergic blockers, Latex, Latex, and Tape [adhesive tape]      REVIEW OF SYSTEMS:  As above in HPI     GENERAL PHYSICAL EXAM:   Vitals: /72   Pulse (!) 42   Ht 1.854 m (6' 1\")   Wt 89.8 kg (198 lb)   BMI 26.12 kg/m    Body mass index is 26.12 kg/m .    GENERAL: Well groomed, well developed, well nourished male in NAD. Sitting in a wheelchair  SKIN: Warm to touch, dry.  No visible rashes or lesions on examined areas.  HEMATOLOGIC/LYMPHATIC/IMMUNOLOGIC:  No LE edema.  NEURO: Alert and oriented x 3.  PSYCH: Normal mood and affect, pleasant and agreeable during interview and exam.    RADIOLOGY: The " following tests were reviewed:   CT CHEST/ABDOMEN/PELVIS WITH CONTRAST 1/3/2022 1:05 PM     CLINICAL HISTORY: Constipation. Weight loss.     TECHNIQUE: CT scan of the chest, abdomen, and pelvis was performed  following injection of IV contrast. Multiplanar reformats were  obtained. Dose reduction techniques were used.   CONTRAST: 106 mL Isovue-370     COMPARISON: Outside CT abdomen and pelvis 11/25/2020     FINDINGS:   LUNGS AND PLEURA: Minimal peripheral scarring or atelectasis in the  lung bases. Lungs are otherwise clear. No pleural effusion.     MEDIASTINUM/AXILLAE: Evidence of previous left ventricular infarction,  status post coronary artery bypass grafting. No lymphadenopathy.     HEPATOBILIARY: Cholecystectomy.     PANCREAS: Normal.     SPLEEN: Normal.     ADRENAL GLANDS: Normal.     KIDNEYS/BLADDER: Benign left renal cyst. 1.6 cm right renal lesion  with punctate calcification has not appreciably changed from previous.     BOWEL: Moderate stool throughout the colon. No obstruction or  inflammatory changes.     PELVIC ORGANS: Mild prostate gland enlargement. Penile prosthesis.     ADDITIONAL FINDINGS: None.     MUSCULOSKELETAL: Sternotomy. Degenerative changes throughout the  spine. Posterior fusion at L4-L5. Mild chronic compression deformity  of T11.                                                                      IMPRESSION:  1.  No specific finding to explain the patient's symptoms of weight  loss. Moderate amount of stool throughout the colon consistent with  history of constipation.  2.  Right renal lesion is indeterminate, but unchanged from  11/25/2020. Consider renal ultrasound to evaluate for cystic versus  solid lesion if not previously performed.    LABS: The last test results for Mr. Deandre Moore were reviewed:  PSA - No results found for: PSA  BMP -   Recent Labs   Lab Test 09/27/22  1702 01/24/22  1054 01/03/22  1247 10/18/21  1355 09/27/21  1256 03/11/21  1235    143  --   --  "  --  140   POTASSIUM 4.3 4.4  --   --   --  4.6   CHLORIDE 99 107  --   --   --  107   CO2 28 25  --   --   --  28   BUN 17.8 14  --   --   --  10   CR 1.16 1.24 1.2  --   --  1.27*   GLC 77 92  --  75   < > 102*   ADDI 9.9 9.3  --   --   --  9.3    < > = values in this interval not displayed.       CBC -   Recent Labs   Lab Test 09/27/22  1702 01/24/22  1054 03/11/21  1235   WBC 8.8 10.2 9.9   HGB 12.1* 13.0* 13.9    185 239       ASSESSMENT:   1) RIght renal lesion seen on CT from 1/2022  2) CKD stable -   3) BPH with mixed luts and incomplete emptying     PLAN:   - We reviewed sCr for the past 6 years - essentially stable  - Random bladder scan today shows 46cc (patient voided 1 hour prior)   - Continue tamsulosin (flomax) and finasteride (proscar) to help with urinating.  At some point could consider stopping tamsulosin   - Consider physical therapy (to help you hold urine).  Condom (external catheters) could be considered if urine control worsens.  A overactive bladder medication could be considered to help you \"hold\" your urine.   - Remember that caffeine worsens urine urgency and can contribute to leakage.    - Be aware that spironolactone can contribute to urgency - if this becomes problematic, discuss with Dr. Marquez whether or not this type of medication is absolutely necessary     - SCHEDULE: Renal ultrasound in the next couple months to re-evaluate the right renal lesion   - Return in 6 months to re-evaluate urinary symptoms.  Please return sooner as needed.      VISIT DURATION: 22 minutes (3:07 - 3:24 + 5 minutes documentation on DOS)    Mariana Johnston PA-C  Department of Urologic Surgery      "

## 2022-11-17 NOTE — NURSING NOTE
"Chief Complaint   Patient presents with     Follow Up       Blood pressure 115/72, pulse (!) 42, height 1.854 m (6' 1\"), weight 89.8 kg (198 lb). Body mass index is 26.12 kg/m .    Patient Active Problem List   Diagnosis     Itching     AD (atopic dermatitis)     Dermatitis     Hyperglycemia     Eczema, unspecified eczema     Intrinsic atopic dermatitis     Other eczema     Notalgia paresthetica     Rash     Pseudophakia of both eyes     Diabetes mellitus (H)     Presbyopia     Type 2 diabetes mellitus without complication, without long-term current use of insulin (H)     Encounter for long-term (current) use of high-risk medication     Seborrheic keratoses, inflamed     Neoplasm of uncertain behavior of skin     Chronic kidney disease, stage 3 (H)     Lumbosacral spondylosis without myelopathy     Hx of CABG     Gout     Essential hypertension     Erectile dysfunction     Edema     Dyslipidemia     Decreased range of motion of ankle     Coronary atherosclerosis     Vitamin D deficiency     Ventricular ectopy     Type 2 diabetes mellitus with diabetic neuropathy (H)     Supraventricular tachycardia (H)     Renal insufficiency syndrome     Pneumothorax     Plantar fascia syndrome     Personal history of tobacco use, presenting hazards to health     Acute exacerbation of CHF (congestive heart failure) (H)     Acute inferior myocardial infarction (H)     Allergy     Ascending aorta enlargement (H)     Benign prostatic hyperplasia     Calcaneal bursitis     Angina pectoris (H)     Chest pain, unspecified     Calculus of gallbladder     Cholecystitis     Hyperlipidemia     Hypokalemia     Lumbar back pain     Lumbar spinal stenosis     Osteoarthrosis     Palpitations       Allergies   Allergen Reactions     Beta Adrenergic Blockers Unknown     Latex Dermatitis     Latex Rash     Tape [Adhesive Tape] Dermatitis and Rash     Electrode patches       Current Outpatient Medications   Medication Sig Dispense Refill     " allopurinol (ZYLOPRIM) 300 MG tablet TAKE 1 TABLET BY MOUTH  DAILY 90 tablet 0     aspirin 81 MG tablet Take 81 mg by mouth daily.       atorvastatin (LIPITOR) 40 MG tablet Take 1 tablet (40 mg) by mouth daily 90 tablet 3     augmented betamethasone dipropionate (DIPROLENE-AF) 0.05 % external ointment Apply twice daily as needed for rash on the leg or back 45 g 11     B Complex-C-Folic Acid (DIALYVITE) TABS Take 1 tablet by mouth daily       Cholecalciferol (VITAMIN D) 2000 UNITS CAPS Take 1 tablet by mouth daily       clobetasol (TEMOVATE) 0.05 % external cream Apply twice daily as needed for itching or rash on the back 60 g 5     clopidogrel (PLAVIX) 75 MG tablet TAKE 1 TABLET BY MOUTH  DAILY 90 tablet 0     colchicine (COLCYRS) 0.6 MG tablet Take 1 tablet (0.6 mg) by mouth daily 90 tablet 0     Cyanocobalamin (VITAMIN B12) 500 MCG TABS Take 1 tablet by mouth daily       finasteride (PROSCAR) 5 MG tablet Take 1 tablet (5 mg) by mouth daily 30 tablet 11     fluocinolone (SYNALAR) 0.01 % solution Apply a thin layer twice daily as needed to scalp for itchiness. 60 mL 4     FLUoxetine (PROZAC) 20 MG capsule Take 1 capsule (20 mg) by mouth daily 90 capsule 3     folic acid (FOLVITE) 1 MG tablet Take 1 tablet (1 mg) by mouth daily Daily on days you don't take MTX 90 tablet 3     furosemide (LASIX) 40 MG tablet Take 1 tablet (40 mg) by mouth daily 90 tablet 3     glimepiride (AMARYL) 1 MG tablet TAKE 2 TABLETS BY MOUTH IN  THE MORNING AND 1 TABLET BY MOUTH IN THE EVENING 270 tablet 3     irbesartan (AVAPRO) 150 MG tablet Take 1 tablet (150 mg) by mouth At Bedtime 90 tablet 3     isosorbide mononitrate (IMDUR) 30 MG 24 hr tablet Take 1 tablet (30 mg) by mouth daily 90 tablet 3     LORazepam (ATIVAN) 1 MG tablet Not sure is taking       metFORMIN (GLUCOPHAGE) 1000 MG tablet Take 1 tablet (1,000 mg) by mouth 2 times daily (with meals) 180 tablet 1     methotrexate 2.5 MG tablet Take 6 tablets (15 mg) by mouth every 7 days  for 90 days 24 tablet 2     mirtazapine (REMERON) 15 MG tablet Take 15 mg by mouth At Bedtime       multivitamin w/minerals (THERA-VIT-M) tablet Take 1 tablet by mouth daily.       mupirocin (BACTROBAN) 2 % external ointment Apply topically 2 times daily 30 g 0     spironolactone (ALDACTONE) 25 MG tablet Take 1 tablet (25 mg) by mouth daily 90 tablet 3     tamsulosin (FLOMAX) 0.4 MG capsule Take 1 capsule (0.4 mg) by mouth daily 90 capsule 0     traZODone (DESYREL) 150 MG tablet Take 1 tablet (150 mg) by mouth At Bedtime 90 tablet 3     Alcohol Swabs PADS 1 pad 4 times daily 100 each 6     blood glucose (NO BRAND SPECIFIED) lancets standard Use to test blood sugar 3 times daily or as directed. 100 each 11     blood glucose (NO BRAND SPECIFIED) test strip Use to test blood sugar 4 times daily  With meter/ strips/ lancets covered by insurance pt using accuchek 360 each 3     Blood Glucose Monitoring Suppl (BLOOD GLUCOSE MONITOR SYSTEM) w/Device KIT Test 4 times daily with meter covered by  insurance 1 kit 1     gabapentin (NEURONTIN) 300 MG capsule TAKE 1 CAPSULE BY MOUTH IN  THE MORNING , 1 CAPSULE  WITH LUNCH AND 3 CAPSULES  AT BEDTIME (Patient not taking: Reported on 2022) 150 capsule 4     ketoconazole (NIZORAL) 2 % external cream Apply topically daily (Patient not taking: Reported on 2022)       nitroglycerin (NITROSTAT) 0.4 MG SL tablet Place under the tongue as needed       triamcinolone (KENALOG) 0.1 % external ointment APPLY TWICE DAILY TO RAISED RASH AREAS ON THE ARMS, LEGS, BODY AS NEEDED. (Patient not taking: Reported on 2022) 454 g 1     urea (GORDONS UREA) 40 % external ointment Apply as many times daily as needed for callus on foot (Patient not taking: Reported on 2022) 30 g 11     vitamin B-12 (CYANOCOBALAMIN) 500 MCG tablet          Social History     Tobacco Use     Smoking status: Former     Types: Cigarettes     Quit date: 10/15/1984     Years since quittin.1      Smokeless tobacco: Never   Substance Use Topics     Alcohol use: Yes     Comment: occasional-once a year     Drug use: No       Bushra Donahue  11/17/2022  3:02 PM

## 2022-11-17 NOTE — PATIENT INSTRUCTIONS
"PLAN:   - Continue tamsulosin (flomax) and finasteride (proscar) to help with urinating  - Consider physical therapy (to help you hold urine).  Condom (external catheters) could be considered if urine control worsens.  A overactive bladder medication could be considered to help you \"hold\" your urine.   - Remember that caffeine worsens urine urgency and can contribute to leakage.      - SCHEDULE: Renal ultrasound in the next couple months to re-evaluate the right renal lesion   - Return in 6 months to re-evaluate symptoms.  Please return sooner as needed.      [facility]   "

## 2022-11-18 NOTE — RESULT ENCOUNTER NOTE
Hey-  It looks like follow-up after last appointment was never made. Can we schedule follow-up before the end of the year? Can use PRITESH slot or overbook Wed AM clinic in :45 slot if not already overbooked. Thanks!    Edu Rivera MD  Pronouns: he/him/his    Department of Dermatology  [facility]: Phone: [phone], Fax:[phone]  [facility]: Phone: [phone] Fax: [phone]

## 2022-11-30 PROBLEM — R53.81 PHYSICAL DECONDITIONING: Status: ACTIVE | Noted: 2022-06-17

## 2022-11-30 PROBLEM — L20.9 ATOPIC DERMATITIS: Status: ACTIVE | Noted: 2022-06-17

## 2022-12-01 NOTE — TELEPHONE ENCOUNTER
Left Voicemail with Family Member (1st Attempt) for the patient to call back and schedule the following:    Appointment type: RMO  Provider:   Return date: 12/9/2022 or 12/16/2022 with Dr. Avila @ [facility]  Specialty phone number: [phone]  Additional appointment(s) needed: none  Additonal Notes:     Dr. Gifford is out of Clinic on medical leave, appointment needs to be rescheduled with either Dr. Avila on 12/9/2022 or 12/16/2022 at the [facility].    Patient may wait to reschedule with Dr. Gifford, first available is spring 2023 for in clinic visit.    Also sent a iMER message.    Kami R/Procedure    [facility]   Neurology, NeuroSurgery, NeuroPsychology and Pain Management Specialties  Medical/Surgical Adult Specialties

## 2022-12-14 NOTE — TELEPHONE ENCOUNTER
irbesartan (AVAPRO) 150 MG tablet    Angiotensin-II Receptors Passed     6/20/2022  [facility] [place]   William Marquez MD  Internal Medicine    spironolactone (ALDACTONE) 25 MG tablet  Diuretics (Including Combos) Protocol Passed

## 2022-12-27 NOTE — TELEPHONE ENCOUNTER
metFORMIN HCl 1000 MG Oral Tablet      Last Written Prescription Date:  9/23/22  Last Fill Quantity: 180,   # refills: 1  Last Office Visit : 6/20/22  Future Office visit:  1/18/23      Refill requested too soon, was given 180 days on 9/23/22

## 2023-01-01 ENCOUNTER — APPOINTMENT (OUTPATIENT)
Dept: OCCUPATIONAL THERAPY | Facility: CLINIC | Age: 83
DRG: 682 | End: 2023-01-01
Payer: COMMERCIAL

## 2023-01-01 ENCOUNTER — MEDICAL CORRESPONDENCE (OUTPATIENT)
Dept: HEALTH INFORMATION MANAGEMENT | Facility: CLINIC | Age: 83
End: 2023-01-01
Payer: COMMERCIAL

## 2023-01-01 ENCOUNTER — TELEPHONE (OUTPATIENT)
Dept: INTERNAL MEDICINE | Facility: CLINIC | Age: 83
End: 2023-01-01
Payer: COMMERCIAL

## 2023-01-01 ENCOUNTER — DOCUMENTATION ONLY (OUTPATIENT)
Dept: INTERNAL MEDICINE | Facility: CLINIC | Age: 83
End: 2023-01-01
Payer: COMMERCIAL

## 2023-01-01 ENCOUNTER — APPOINTMENT (OUTPATIENT)
Dept: PHYSICAL THERAPY | Facility: CLINIC | Age: 83
DRG: 682 | End: 2023-01-01
Payer: COMMERCIAL

## 2023-01-01 ENCOUNTER — HEALTH MAINTENANCE LETTER (OUTPATIENT)
Age: 83
End: 2023-01-01

## 2023-01-01 ENCOUNTER — APPOINTMENT (OUTPATIENT)
Dept: GENERAL RADIOLOGY | Facility: CLINIC | Age: 83
DRG: 682 | End: 2023-01-01
Attending: STUDENT IN AN ORGANIZED HEALTH CARE EDUCATION/TRAINING PROGRAM
Payer: COMMERCIAL

## 2023-01-01 ENCOUNTER — MYC MEDICAL ADVICE (OUTPATIENT)
Dept: INTERNAL MEDICINE | Facility: CLINIC | Age: 83
End: 2023-01-01
Payer: COMMERCIAL

## 2023-01-01 ENCOUNTER — TRANSFERRED RECORDS (OUTPATIENT)
Dept: HEALTH INFORMATION MANAGEMENT | Facility: CLINIC | Age: 83
End: 2023-01-01
Payer: COMMERCIAL

## 2023-01-01 ENCOUNTER — OFFICE VISIT (OUTPATIENT)
Dept: INTERNAL MEDICINE | Facility: CLINIC | Age: 83
End: 2023-01-01
Payer: COMMERCIAL

## 2023-01-01 ENCOUNTER — TELEPHONE (OUTPATIENT)
Dept: ENDOCRINOLOGY | Facility: CLINIC | Age: 83
End: 2023-01-01
Payer: COMMERCIAL

## 2023-01-01 ENCOUNTER — HOSPITAL ENCOUNTER (INPATIENT)
Facility: CLINIC | Age: 83
LOS: 5 days | Discharge: NURSING, REHAB OR LONG-TERM CARE | DRG: 682 | End: 2023-05-17
Attending: EMERGENCY MEDICINE | Admitting: INTERNAL MEDICINE
Payer: COMMERCIAL

## 2023-01-01 ENCOUNTER — PRE VISIT (OUTPATIENT)
Dept: NEUROLOGY | Facility: CLINIC | Age: 83
End: 2023-01-01

## 2023-01-01 ENCOUNTER — HOSPITAL ENCOUNTER (INPATIENT)
Facility: CLINIC | Age: 83
LOS: 9 days | Discharge: NURSING, REHAB OR LONG-TERM CARE | DRG: 682 | End: 2023-07-26
Attending: STUDENT IN AN ORGANIZED HEALTH CARE EDUCATION/TRAINING PROGRAM | Admitting: ORTHOPAEDIC SURGERY
Payer: COMMERCIAL

## 2023-01-01 ENCOUNTER — ANCILLARY PROCEDURE (OUTPATIENT)
Dept: GENERAL RADIOLOGY | Facility: CLINIC | Age: 83
End: 2023-01-01
Attending: INTERNAL MEDICINE
Payer: COMMERCIAL

## 2023-01-01 ENCOUNTER — OFFICE VISIT (OUTPATIENT)
Dept: ORTHOPEDICS | Facility: CLINIC | Age: 83
End: 2023-01-01
Payer: COMMERCIAL

## 2023-01-01 ENCOUNTER — APPOINTMENT (OUTPATIENT)
Dept: CT IMAGING | Facility: CLINIC | Age: 83
DRG: 682 | End: 2023-01-01
Attending: STUDENT IN AN ORGANIZED HEALTH CARE EDUCATION/TRAINING PROGRAM
Payer: COMMERCIAL

## 2023-01-01 ENCOUNTER — APPOINTMENT (OUTPATIENT)
Dept: GENERAL RADIOLOGY | Facility: CLINIC | Age: 83
DRG: 682 | End: 2023-01-01
Payer: COMMERCIAL

## 2023-01-01 ENCOUNTER — TELEPHONE (OUTPATIENT)
Dept: DERMATOLOGY | Facility: CLINIC | Age: 83
End: 2023-01-01
Payer: COMMERCIAL

## 2023-01-01 ENCOUNTER — APPOINTMENT (OUTPATIENT)
Dept: CT IMAGING | Facility: CLINIC | Age: 83
DRG: 682 | End: 2023-01-01
Attending: EMERGENCY MEDICINE
Payer: COMMERCIAL

## 2023-01-01 ENCOUNTER — APPOINTMENT (OUTPATIENT)
Dept: PHYSICAL THERAPY | Facility: CLINIC | Age: 83
DRG: 682 | End: 2023-01-01
Attending: PHYSICIAN ASSISTANT
Payer: COMMERCIAL

## 2023-01-01 ENCOUNTER — APPOINTMENT (OUTPATIENT)
Dept: ULTRASOUND IMAGING | Facility: CLINIC | Age: 83
DRG: 682 | End: 2023-01-01
Attending: PHYSICIAN ASSISTANT
Payer: COMMERCIAL

## 2023-01-01 ENCOUNTER — ANCILLARY PROCEDURE (OUTPATIENT)
Dept: ULTRASOUND IMAGING | Facility: CLINIC | Age: 83
End: 2023-01-01
Attending: PHYSICIAN ASSISTANT
Payer: COMMERCIAL

## 2023-01-01 ENCOUNTER — APPOINTMENT (OUTPATIENT)
Dept: GENERAL RADIOLOGY | Facility: CLINIC | Age: 83
DRG: 682 | End: 2023-01-01
Attending: EMERGENCY MEDICINE
Payer: COMMERCIAL

## 2023-01-01 ENCOUNTER — TELEPHONE (OUTPATIENT)
Dept: DERMATOLOGY | Facility: CLINIC | Age: 83
End: 2023-01-01

## 2023-01-01 ENCOUNTER — DOCUMENTATION ONLY (OUTPATIENT)
Dept: OTHER | Facility: CLINIC | Age: 83
End: 2023-01-01
Payer: COMMERCIAL

## 2023-01-01 ENCOUNTER — OFFICE VISIT (OUTPATIENT)
Dept: NEUROLOGY | Facility: CLINIC | Age: 83
End: 2023-01-01
Attending: INTERNAL MEDICINE
Payer: COMMERCIAL

## 2023-01-01 ENCOUNTER — MEDICAL CORRESPONDENCE (OUTPATIENT)
Dept: HEALTH INFORMATION MANAGEMENT | Facility: CLINIC | Age: 83
End: 2023-01-01

## 2023-01-01 ENCOUNTER — APPOINTMENT (OUTPATIENT)
Dept: CARDIOLOGY | Facility: CLINIC | Age: 83
DRG: 682 | End: 2023-01-01
Attending: PHYSICIAN ASSISTANT
Payer: COMMERCIAL

## 2023-01-01 ENCOUNTER — APPOINTMENT (OUTPATIENT)
Dept: MRI IMAGING | Facility: CLINIC | Age: 83
DRG: 682 | End: 2023-01-01
Attending: STUDENT IN AN ORGANIZED HEALTH CARE EDUCATION/TRAINING PROGRAM
Payer: COMMERCIAL

## 2023-01-01 ENCOUNTER — PRE VISIT (OUTPATIENT)
Dept: UROLOGY | Facility: CLINIC | Age: 83
End: 2023-01-01

## 2023-01-01 ENCOUNTER — TELEPHONE (OUTPATIENT)
Dept: NEUROPSYCHOLOGY | Facility: CLINIC | Age: 83
End: 2023-01-01
Payer: COMMERCIAL

## 2023-01-01 ENCOUNTER — APPOINTMENT (OUTPATIENT)
Dept: CARDIOLOGY | Facility: CLINIC | Age: 83
DRG: 682 | End: 2023-01-01
Attending: STUDENT IN AN ORGANIZED HEALTH CARE EDUCATION/TRAINING PROGRAM
Payer: COMMERCIAL

## 2023-01-01 VITALS — BODY MASS INDEX: 20.79 KG/M2 | HEIGHT: 74 IN | WEIGHT: 162 LBS

## 2023-01-01 VITALS
WEIGHT: 164.5 LBS | HEART RATE: 56 BPM | OXYGEN SATURATION: 96 % | BODY MASS INDEX: 21.12 KG/M2 | SYSTOLIC BLOOD PRESSURE: 118 MMHG | RESPIRATION RATE: 16 BRPM | TEMPERATURE: 98.3 F | DIASTOLIC BLOOD PRESSURE: 57 MMHG

## 2023-01-01 VITALS
WEIGHT: 162.3 LBS | OXYGEN SATURATION: 100 % | SYSTOLIC BLOOD PRESSURE: 119 MMHG | HEIGHT: 74 IN | RESPIRATION RATE: 18 BRPM | TEMPERATURE: 98.7 F | BODY MASS INDEX: 20.83 KG/M2 | DIASTOLIC BLOOD PRESSURE: 75 MMHG | HEART RATE: 50 BPM

## 2023-01-01 VITALS
SYSTOLIC BLOOD PRESSURE: 109 MMHG | OXYGEN SATURATION: 100 % | HEART RATE: 83 BPM | DIASTOLIC BLOOD PRESSURE: 59 MMHG | BODY MASS INDEX: 26.13 KG/M2 | HEIGHT: 73 IN

## 2023-01-01 DIAGNOSIS — E11.40 TYPE 2 DIABETES MELLITUS WITH DIABETIC NEUROPATHY (H): ICD-10-CM

## 2023-01-01 DIAGNOSIS — E11.9 TYPE 2 DIABETES MELLITUS WITHOUT COMPLICATION, WITHOUT LONG-TERM CURRENT USE OF INSULIN (H): ICD-10-CM

## 2023-01-01 DIAGNOSIS — N17.9 AKI (ACUTE KIDNEY INJURY) (H): ICD-10-CM

## 2023-01-01 DIAGNOSIS — N40.0 BENIGN PROSTATIC HYPERPLASIA WITHOUT LOWER URINARY TRACT SYMPTOMS: ICD-10-CM

## 2023-01-01 DIAGNOSIS — M54.9 BACK PAIN, UNSPECIFIED BACK LOCATION, UNSPECIFIED BACK PAIN LATERALITY, UNSPECIFIED CHRONICITY: ICD-10-CM

## 2023-01-01 DIAGNOSIS — N17.9 SEPSIS WITH ACUTE RENAL FAILURE WITHOUT SEPTIC SHOCK, DUE TO UNSPECIFIED ORGANISM, UNSPECIFIED ACUTE RENAL FAILURE TYPE (H): Primary | ICD-10-CM

## 2023-01-01 DIAGNOSIS — R41.89 COGNITIVE IMPAIRMENT: Primary | ICD-10-CM

## 2023-01-01 DIAGNOSIS — R65.20 SEPSIS WITH ACUTE RENAL FAILURE WITHOUT SEPTIC SHOCK, DUE TO UNSPECIFIED ORGANISM, UNSPECIFIED ACUTE RENAL FAILURE TYPE (H): Primary | ICD-10-CM

## 2023-01-01 DIAGNOSIS — E11.621 DIABETIC ULCER OF LEFT MIDFOOT ASSOCIATED WITH TYPE 2 DIABETES MELLITUS, WITH FAT LAYER EXPOSED (H): ICD-10-CM

## 2023-01-01 DIAGNOSIS — R91.8 PULMONARY INFILTRATES: ICD-10-CM

## 2023-01-01 DIAGNOSIS — N28.9 RENAL INSUFFICIENCY SYNDROME: ICD-10-CM

## 2023-01-01 DIAGNOSIS — R33.9 URINARY RETENTION: ICD-10-CM

## 2023-01-01 DIAGNOSIS — R73.9 HYPERGLYCEMIA: ICD-10-CM

## 2023-01-01 DIAGNOSIS — R41.3 MEMORY LOSS: ICD-10-CM

## 2023-01-01 DIAGNOSIS — N18.30 CHRONIC KIDNEY DISEASE, STAGE 3 (H): ICD-10-CM

## 2023-01-01 DIAGNOSIS — I10 BENIGN ESSENTIAL HYPERTENSION: ICD-10-CM

## 2023-01-01 DIAGNOSIS — Z20.822 CONTACT WITH AND (SUSPECTED) EXPOSURE TO COVID-19: ICD-10-CM

## 2023-01-01 DIAGNOSIS — E11.49 TYPE II OR UNSPECIFIED TYPE DIABETES MELLITUS WITH NEUROLOGICAL MANIFESTATIONS, NOT STATED AS UNCONTROLLED(250.60) (H): ICD-10-CM

## 2023-01-01 DIAGNOSIS — Z23 NEED FOR VACCINATION: ICD-10-CM

## 2023-01-01 DIAGNOSIS — M79.605 PAIN OF LEFT LOWER EXTREMITY: ICD-10-CM

## 2023-01-01 DIAGNOSIS — R79.9 ABNORMAL FINDING OF BLOOD CHEMISTRY, UNSPECIFIED: ICD-10-CM

## 2023-01-01 DIAGNOSIS — N28.9 KIDNEY LESION, NATIVE, RIGHT: ICD-10-CM

## 2023-01-01 DIAGNOSIS — R09.89 RHONCHI: ICD-10-CM

## 2023-01-01 DIAGNOSIS — L20.9 ATOPIC DERMATITIS, UNSPECIFIED TYPE: ICD-10-CM

## 2023-01-01 DIAGNOSIS — A41.9 SEPSIS WITH ACUTE ORGAN DYSFUNCTION WITHOUT SEPTIC SHOCK, DUE TO UNSPECIFIED ORGANISM, UNSPECIFIED TYPE: ICD-10-CM

## 2023-01-01 DIAGNOSIS — M79.605 PAIN OF LEFT LOWER EXTREMITY: Primary | ICD-10-CM

## 2023-01-01 DIAGNOSIS — A41.9 SEPSIS WITH ACUTE RENAL FAILURE WITHOUT SEPTIC SHOCK, DUE TO UNSPECIFIED ORGANISM, UNSPECIFIED ACUTE RENAL FAILURE TYPE (H): Primary | ICD-10-CM

## 2023-01-01 DIAGNOSIS — R05.9 COUGH, UNSPECIFIED TYPE: ICD-10-CM

## 2023-01-01 DIAGNOSIS — R33.9 RETENTION OF URINE, UNSPECIFIED: ICD-10-CM

## 2023-01-01 DIAGNOSIS — E11.49 TYPE II OR UNSPECIFIED TYPE DIABETES MELLITUS WITH NEUROLOGICAL MANIFESTATIONS, NOT STATED AS UNCONTROLLED(250.60) (H): Primary | ICD-10-CM

## 2023-01-01 DIAGNOSIS — F41.9 ANXIETY: ICD-10-CM

## 2023-01-01 DIAGNOSIS — L97.422 DIABETIC ULCER OF LEFT MIDFOOT ASSOCIATED WITH TYPE 2 DIABETES MELLITUS, WITH FAT LAYER EXPOSED (H): ICD-10-CM

## 2023-01-01 DIAGNOSIS — R65.20 SEPSIS WITH ACUTE ORGAN DYSFUNCTION WITHOUT SEPTIC SHOCK, DUE TO UNSPECIFIED ORGANISM, UNSPECIFIED TYPE: ICD-10-CM

## 2023-01-01 DIAGNOSIS — F19.982 DRUG-INDUCED INSOMNIA (H): ICD-10-CM

## 2023-01-01 DIAGNOSIS — Z53.9 DIAGNOSIS NOT YET DEFINED: Primary | ICD-10-CM

## 2023-01-01 DIAGNOSIS — E86.1 HYPOVOLEMIA: ICD-10-CM

## 2023-01-01 DIAGNOSIS — E11.42 DIABETIC POLYNEUROPATHY ASSOCIATED WITH TYPE 2 DIABETES MELLITUS (H): ICD-10-CM

## 2023-01-01 DIAGNOSIS — L84 TYLOMA: ICD-10-CM

## 2023-01-01 DIAGNOSIS — M54.9 BACK PAIN, UNSPECIFIED BACK LOCATION, UNSPECIFIED BACK PAIN LATERALITY, UNSPECIFIED CHRONICITY: Primary | ICD-10-CM

## 2023-01-01 DIAGNOSIS — E78.49 OTHER HYPERLIPIDEMIA: Primary | ICD-10-CM

## 2023-01-01 DIAGNOSIS — Z86.79 HISTORY OF HEART FAILURE: ICD-10-CM

## 2023-01-01 DIAGNOSIS — M54.50 LUMBAR BACK PAIN: ICD-10-CM

## 2023-01-01 DIAGNOSIS — R53.81 PHYSICAL DECONDITIONING: ICD-10-CM

## 2023-01-01 DIAGNOSIS — G47.09 OTHER INSOMNIA: ICD-10-CM

## 2023-01-01 DIAGNOSIS — R93.89 CHEST X-RAY ABNORMALITY: Primary | ICD-10-CM

## 2023-01-01 LAB
ALBUMIN MFR UR ELPH: 13.5 MG/DL (ref 1–14)
ALBUMIN SERPL BCG-MCNC: 3.6 G/DL (ref 3.5–5.2)
ALBUMIN SERPL BCG-MCNC: 4 G/DL (ref 3.5–5.2)
ALBUMIN UR-MCNC: 10 MG/DL
ALBUMIN UR-MCNC: NEGATIVE MG/DL
ALBUMIN UR-MCNC: NEGATIVE MG/DL
ALP SERPL-CCNC: 64 U/L (ref 40–129)
ALP SERPL-CCNC: 77 U/L (ref 40–129)
ALT SERPL W P-5'-P-CCNC: 15 U/L (ref 10–50)
ALT SERPL W P-5'-P-CCNC: 24 U/L (ref 0–70)
ANION GAP SERPL CALCULATED.3IONS-SCNC: 10 MMOL/L (ref 7–15)
ANION GAP SERPL CALCULATED.3IONS-SCNC: 12 MMOL/L (ref 7–15)
ANION GAP SERPL CALCULATED.3IONS-SCNC: 19 MMOL/L (ref 7–15)
ANION GAP SERPL CALCULATED.3IONS-SCNC: 7 MMOL/L (ref 7–15)
ANION GAP SERPL CALCULATED.3IONS-SCNC: 8 MMOL/L (ref 7–15)
ANION GAP SERPL CALCULATED.3IONS-SCNC: 9 MMOL/L (ref 7–15)
APPEARANCE UR: CLEAR
AST SERPL W P-5'-P-CCNC: 28 U/L (ref 10–50)
AST SERPL W P-5'-P-CCNC: 29 U/L (ref 0–45)
ATRIAL RATE - MUSE: 100 BPM
ATRIAL RATE - MUSE: 88 BPM
B-OH-BUTYR SERPL-SCNC: 0.4 MMOL/L
BACTERIA BLD CULT: NO GROWTH
BASOPHILS # BLD AUTO: 0 10E3/UL (ref 0–0.2)
BASOPHILS # BLD AUTO: 0.1 10E3/UL (ref 0–0.2)
BASOPHILS NFR BLD AUTO: 0 %
BASOPHILS NFR BLD AUTO: 1 %
BILIRUB SERPL-MCNC: 0.4 MG/DL
BILIRUB SERPL-MCNC: 0.7 MG/DL
BILIRUB UR QL STRIP: NEGATIVE
BUN SERPL-MCNC: 11.1 MG/DL (ref 8–23)
BUN SERPL-MCNC: 12.3 MG/DL (ref 8–23)
BUN SERPL-MCNC: 12.6 MG/DL (ref 8–23)
BUN SERPL-MCNC: 13.4 MG/DL (ref 8–23)
BUN SERPL-MCNC: 14.9 MG/DL (ref 8–23)
BUN SERPL-MCNC: 15.3 MG/DL (ref 8–23)
BUN SERPL-MCNC: 21.1 MG/DL (ref 8–23)
BUN SERPL-MCNC: 23.7 MG/DL (ref 8–23)
BUN SERPL-MCNC: 32 MG/DL (ref 8–23)
BUN SERPL-MCNC: 33.4 MG/DL (ref 8–23)
BUN SERPL-MCNC: 38.6 MG/DL (ref 8–23)
BUN SERPL-MCNC: 50 MG/DL (ref 8–23)
BUN SERPL-MCNC: 8.1 MG/DL (ref 8–23)
BUN SERPL-MCNC: 9.1 MG/DL (ref 8–23)
BUN SERPL-MCNC: 9.9 MG/DL (ref 8–23)
CA-I BLD-MCNC: 4.9 MG/DL (ref 4.4–5.2)
CALCIUM SERPL-MCNC: 8.6 MG/DL (ref 8.8–10.2)
CALCIUM SERPL-MCNC: 8.7 MG/DL (ref 8.8–10.2)
CALCIUM SERPL-MCNC: 8.7 MG/DL (ref 8.8–10.2)
CALCIUM SERPL-MCNC: 8.8 MG/DL (ref 8.8–10.2)
CALCIUM SERPL-MCNC: 8.9 MG/DL (ref 8.8–10.2)
CALCIUM SERPL-MCNC: 8.9 MG/DL (ref 8.8–10.2)
CALCIUM SERPL-MCNC: 9 MG/DL (ref 8.8–10.2)
CALCIUM SERPL-MCNC: 9 MG/DL (ref 8.8–10.2)
CALCIUM SERPL-MCNC: 9.1 MG/DL (ref 8.8–10.2)
CALCIUM SERPL-MCNC: 9.3 MG/DL (ref 8.8–10.2)
CALCIUM SERPL-MCNC: 9.4 MG/DL (ref 8.8–10.2)
CHLORIDE SERPL-SCNC: 100 MMOL/L (ref 98–107)
CHLORIDE SERPL-SCNC: 101 MMOL/L (ref 98–107)
CHLORIDE SERPL-SCNC: 101 MMOL/L (ref 98–107)
CHLORIDE SERPL-SCNC: 102 MMOL/L (ref 98–107)
CHLORIDE SERPL-SCNC: 104 MMOL/L (ref 98–107)
CHLORIDE SERPL-SCNC: 105 MMOL/L (ref 98–107)
CHLORIDE SERPL-SCNC: 107 MMOL/L (ref 98–107)
CHLORIDE SERPL-SCNC: 107 MMOL/L (ref 98–107)
CHLORIDE SERPL-SCNC: 108 MMOL/L (ref 98–107)
CHLORIDE SERPL-SCNC: 108 MMOL/L (ref 98–107)
CHLORIDE SERPL-SCNC: 109 MMOL/L (ref 98–107)
CHLORIDE SERPL-SCNC: 94 MMOL/L (ref 98–107)
CHLORIDE SERPL-SCNC: 98 MMOL/L (ref 98–107)
COLOR UR AUTO: ABNORMAL
COLOR UR AUTO: ABNORMAL
COLOR UR AUTO: YELLOW
CPB POCT: NO
CREAT BLD-MCNC: 3.7 MG/DL (ref 0.7–1.3)
CREAT SERPL-MCNC: 1.27 MG/DL (ref 0.67–1.17)
CREAT SERPL-MCNC: 1.28 MG/DL (ref 0.67–1.17)
CREAT SERPL-MCNC: 1.29 MG/DL (ref 0.67–1.17)
CREAT SERPL-MCNC: 1.31 MG/DL (ref 0.67–1.17)
CREAT SERPL-MCNC: 1.33 MG/DL (ref 0.67–1.17)
CREAT SERPL-MCNC: 1.33 MG/DL (ref 0.67–1.17)
CREAT SERPL-MCNC: 1.34 MG/DL (ref 0.67–1.17)
CREAT SERPL-MCNC: 1.36 MG/DL (ref 0.67–1.17)
CREAT SERPL-MCNC: 1.52 MG/DL (ref 0.67–1.17)
CREAT SERPL-MCNC: 1.56 MG/DL (ref 0.67–1.17)
CREAT SERPL-MCNC: 1.66 MG/DL (ref 0.67–1.17)
CREAT SERPL-MCNC: 1.95 MG/DL (ref 0.67–1.17)
CREAT SERPL-MCNC: 2.32 MG/DL (ref 0.67–1.17)
CREAT SERPL-MCNC: 2.48 MG/DL (ref 0.67–1.17)
CREAT SERPL-MCNC: 3.09 MG/DL (ref 0.67–1.17)
CREAT SERPL-MCNC: 3.21 MG/DL (ref 0.67–1.17)
CREAT SERPL-MCNC: 3.21 MG/DL (ref 0.67–1.17)
CREAT UR-MCNC: 116 MG/DL
CREAT UR-MCNC: 116 MG/DL
CRP SERPL-MCNC: 5.53 MG/L
DEPRECATED CALCIDIOL+CALCIFEROL SERPL-MC: 29 UG/L (ref 20–75)
DEPRECATED HCO3 PLAS-SCNC: 20 MMOL/L (ref 22–29)
DEPRECATED HCO3 PLAS-SCNC: 21 MMOL/L (ref 22–29)
DEPRECATED HCO3 PLAS-SCNC: 21 MMOL/L (ref 22–29)
DEPRECATED HCO3 PLAS-SCNC: 22 MMOL/L (ref 22–29)
DEPRECATED HCO3 PLAS-SCNC: 23 MMOL/L (ref 22–29)
DEPRECATED HCO3 PLAS-SCNC: 24 MMOL/L (ref 22–29)
DEPRECATED HCO3 PLAS-SCNC: 25 MMOL/L (ref 22–29)
DEPRECATED HCO3 PLAS-SCNC: 26 MMOL/L (ref 22–29)
DIASTOLIC BLOOD PRESSURE - MUSE: NORMAL MMHG
DIASTOLIC BLOOD PRESSURE - MUSE: NORMAL MMHG
EOSINOPHIL # BLD AUTO: 0.2 10E3/UL (ref 0–0.7)
EOSINOPHIL # BLD AUTO: 0.2 10E3/UL (ref 0–0.7)
EOSINOPHIL # BLD AUTO: 0.8 10E3/UL (ref 0–0.7)
EOSINOPHIL # BLD AUTO: 0.8 10E3/UL (ref 0–0.7)
EOSINOPHIL NFR BLD AUTO: 1 %
EOSINOPHIL NFR BLD AUTO: 1 %
EOSINOPHIL NFR BLD AUTO: 13 %
EOSINOPHIL NFR BLD AUTO: 13 %
ERYTHROCYTE [DISTWIDTH] IN BLOOD BY AUTOMATED COUNT: 14.3 % (ref 10–15)
ERYTHROCYTE [DISTWIDTH] IN BLOOD BY AUTOMATED COUNT: 14.3 % (ref 10–15)
ERYTHROCYTE [DISTWIDTH] IN BLOOD BY AUTOMATED COUNT: 14.4 % (ref 10–15)
ERYTHROCYTE [DISTWIDTH] IN BLOOD BY AUTOMATED COUNT: 14.5 % (ref 10–15)
ERYTHROCYTE [DISTWIDTH] IN BLOOD BY AUTOMATED COUNT: 14.6 % (ref 10–15)
ERYTHROCYTE [DISTWIDTH] IN BLOOD BY AUTOMATED COUNT: 14.9 % (ref 10–15)
ERYTHROCYTE [DISTWIDTH] IN BLOOD BY AUTOMATED COUNT: 15.1 % (ref 10–15)
ERYTHROCYTE [DISTWIDTH] IN BLOOD BY AUTOMATED COUNT: 15.3 % (ref 10–15)
ERYTHROCYTE [SEDIMENTATION RATE] IN BLOOD BY WESTERGREN METHOD: 46 MM/HR (ref 0–20)
FERRITIN SERPL-MCNC: 230 NG/ML (ref 31–409)
FLUAV RNA SPEC QL NAA+PROBE: NEGATIVE
FLUBV RNA RESP QL NAA+PROBE: NEGATIVE
FOLATE SERPL-MCNC: 20.5 NG/ML (ref 4.6–34.8)
GFR SERPL CREATININE-BSD FRML MDRD: 15 ML/MIN/1.73M2
GFR SERPL CREATININE-BSD FRML MDRD: 18 ML/MIN/1.73M2
GFR SERPL CREATININE-BSD FRML MDRD: 18 ML/MIN/1.73M2
GFR SERPL CREATININE-BSD FRML MDRD: 19 ML/MIN/1.73M2
GFR SERPL CREATININE-BSD FRML MDRD: 25 ML/MIN/1.73M2
GFR SERPL CREATININE-BSD FRML MDRD: 27 ML/MIN/1.73M2
GFR SERPL CREATININE-BSD FRML MDRD: 33 ML/MIN/1.73M2
GFR SERPL CREATININE-BSD FRML MDRD: 40 ML/MIN/1.73M2
GFR SERPL CREATININE-BSD FRML MDRD: 44 ML/MIN/1.73M2
GFR SERPL CREATININE-BSD FRML MDRD: 45 ML/MIN/1.73M2
GFR SERPL CREATININE-BSD FRML MDRD: 51 ML/MIN/1.73M2
GFR SERPL CREATININE-BSD FRML MDRD: 52 ML/MIN/1.73M2
GFR SERPL CREATININE-BSD FRML MDRD: 53 ML/MIN/1.73M2
GFR SERPL CREATININE-BSD FRML MDRD: 53 ML/MIN/1.73M2
GFR SERPL CREATININE-BSD FRML MDRD: 54 ML/MIN/1.73M2
GFR SERPL CREATININE-BSD FRML MDRD: 55 ML/MIN/1.73M2
GFR SERPL CREATININE-BSD FRML MDRD: 55 ML/MIN/1.73M2
GFR SERPL CREATININE-BSD FRML MDRD: 56 ML/MIN/1.73M2
GLUCOSE BLD-MCNC: 99 MG/DL (ref 70–99)
GLUCOSE BLDC GLUCOMTR-MCNC: 100 MG/DL (ref 70–99)
GLUCOSE BLDC GLUCOMTR-MCNC: 100 MG/DL (ref 70–99)
GLUCOSE BLDC GLUCOMTR-MCNC: 101 MG/DL (ref 70–99)
GLUCOSE BLDC GLUCOMTR-MCNC: 101 MG/DL (ref 70–99)
GLUCOSE BLDC GLUCOMTR-MCNC: 103 MG/DL (ref 70–99)
GLUCOSE BLDC GLUCOMTR-MCNC: 104 MG/DL (ref 70–99)
GLUCOSE BLDC GLUCOMTR-MCNC: 106 MG/DL (ref 70–99)
GLUCOSE BLDC GLUCOMTR-MCNC: 106 MG/DL (ref 70–99)
GLUCOSE BLDC GLUCOMTR-MCNC: 107 MG/DL (ref 70–99)
GLUCOSE BLDC GLUCOMTR-MCNC: 108 MG/DL (ref 70–99)
GLUCOSE BLDC GLUCOMTR-MCNC: 108 MG/DL (ref 70–99)
GLUCOSE BLDC GLUCOMTR-MCNC: 110 MG/DL (ref 70–99)
GLUCOSE BLDC GLUCOMTR-MCNC: 112 MG/DL (ref 70–99)
GLUCOSE BLDC GLUCOMTR-MCNC: 113 MG/DL (ref 70–99)
GLUCOSE BLDC GLUCOMTR-MCNC: 114 MG/DL (ref 70–99)
GLUCOSE BLDC GLUCOMTR-MCNC: 114 MG/DL (ref 70–99)
GLUCOSE BLDC GLUCOMTR-MCNC: 115 MG/DL (ref 70–99)
GLUCOSE BLDC GLUCOMTR-MCNC: 115 MG/DL (ref 70–99)
GLUCOSE BLDC GLUCOMTR-MCNC: 116 MG/DL (ref 70–99)
GLUCOSE BLDC GLUCOMTR-MCNC: 119 MG/DL (ref 70–99)
GLUCOSE BLDC GLUCOMTR-MCNC: 120 MG/DL (ref 70–99)
GLUCOSE BLDC GLUCOMTR-MCNC: 121 MG/DL (ref 70–99)
GLUCOSE BLDC GLUCOMTR-MCNC: 121 MG/DL (ref 70–99)
GLUCOSE BLDC GLUCOMTR-MCNC: 122 MG/DL (ref 70–99)
GLUCOSE BLDC GLUCOMTR-MCNC: 124 MG/DL (ref 70–99)
GLUCOSE BLDC GLUCOMTR-MCNC: 126 MG/DL (ref 70–99)
GLUCOSE BLDC GLUCOMTR-MCNC: 126 MG/DL (ref 70–99)
GLUCOSE BLDC GLUCOMTR-MCNC: 130 MG/DL (ref 70–99)
GLUCOSE BLDC GLUCOMTR-MCNC: 131 MG/DL (ref 70–99)
GLUCOSE BLDC GLUCOMTR-MCNC: 135 MG/DL (ref 70–99)
GLUCOSE BLDC GLUCOMTR-MCNC: 136 MG/DL (ref 70–99)
GLUCOSE BLDC GLUCOMTR-MCNC: 137 MG/DL (ref 70–99)
GLUCOSE BLDC GLUCOMTR-MCNC: 137 MG/DL (ref 70–99)
GLUCOSE BLDC GLUCOMTR-MCNC: 138 MG/DL (ref 70–99)
GLUCOSE BLDC GLUCOMTR-MCNC: 139 MG/DL (ref 70–99)
GLUCOSE BLDC GLUCOMTR-MCNC: 139 MG/DL (ref 70–99)
GLUCOSE BLDC GLUCOMTR-MCNC: 140 MG/DL (ref 70–99)
GLUCOSE BLDC GLUCOMTR-MCNC: 145 MG/DL (ref 70–99)
GLUCOSE BLDC GLUCOMTR-MCNC: 148 MG/DL (ref 70–99)
GLUCOSE BLDC GLUCOMTR-MCNC: 149 MG/DL (ref 70–99)
GLUCOSE BLDC GLUCOMTR-MCNC: 151 MG/DL (ref 70–99)
GLUCOSE BLDC GLUCOMTR-MCNC: 153 MG/DL (ref 70–99)
GLUCOSE BLDC GLUCOMTR-MCNC: 154 MG/DL (ref 70–99)
GLUCOSE BLDC GLUCOMTR-MCNC: 157 MG/DL (ref 70–99)
GLUCOSE BLDC GLUCOMTR-MCNC: 161 MG/DL (ref 70–99)
GLUCOSE BLDC GLUCOMTR-MCNC: 161 MG/DL (ref 70–99)
GLUCOSE BLDC GLUCOMTR-MCNC: 164 MG/DL (ref 70–99)
GLUCOSE BLDC GLUCOMTR-MCNC: 176 MG/DL (ref 70–99)
GLUCOSE BLDC GLUCOMTR-MCNC: 182 MG/DL (ref 70–99)
GLUCOSE BLDC GLUCOMTR-MCNC: 185 MG/DL (ref 70–99)
GLUCOSE BLDC GLUCOMTR-MCNC: 345 MG/DL (ref 70–99)
GLUCOSE BLDC GLUCOMTR-MCNC: 50 MG/DL (ref 70–99)
GLUCOSE BLDC GLUCOMTR-MCNC: 54 MG/DL (ref 70–99)
GLUCOSE BLDC GLUCOMTR-MCNC: 62 MG/DL (ref 70–99)
GLUCOSE BLDC GLUCOMTR-MCNC: 65 MG/DL (ref 70–99)
GLUCOSE BLDC GLUCOMTR-MCNC: 72 MG/DL (ref 70–99)
GLUCOSE BLDC GLUCOMTR-MCNC: 74 MG/DL (ref 70–99)
GLUCOSE BLDC GLUCOMTR-MCNC: 74 MG/DL (ref 70–99)
GLUCOSE BLDC GLUCOMTR-MCNC: 79 MG/DL (ref 70–99)
GLUCOSE BLDC GLUCOMTR-MCNC: 79 MG/DL (ref 70–99)
GLUCOSE BLDC GLUCOMTR-MCNC: 80 MG/DL (ref 70–99)
GLUCOSE BLDC GLUCOMTR-MCNC: 81 MG/DL (ref 70–99)
GLUCOSE BLDC GLUCOMTR-MCNC: 87 MG/DL (ref 70–99)
GLUCOSE BLDC GLUCOMTR-MCNC: 89 MG/DL (ref 70–99)
GLUCOSE BLDC GLUCOMTR-MCNC: 95 MG/DL (ref 70–99)
GLUCOSE BLDC GLUCOMTR-MCNC: 96 MG/DL (ref 70–99)
GLUCOSE BLDC GLUCOMTR-MCNC: 96 MG/DL (ref 70–99)
GLUCOSE BLDC GLUCOMTR-MCNC: 97 MG/DL (ref 70–99)
GLUCOSE BLDC GLUCOMTR-MCNC: 98 MG/DL (ref 70–99)
GLUCOSE BLDC GLUCOMTR-MCNC: 98 MG/DL (ref 70–99)
GLUCOSE SERPL-MCNC: 100 MG/DL (ref 70–99)
GLUCOSE SERPL-MCNC: 101 MG/DL (ref 70–99)
GLUCOSE SERPL-MCNC: 103 MG/DL (ref 70–99)
GLUCOSE SERPL-MCNC: 104 MG/DL (ref 70–99)
GLUCOSE SERPL-MCNC: 107 MG/DL (ref 70–99)
GLUCOSE SERPL-MCNC: 111 MG/DL (ref 70–99)
GLUCOSE SERPL-MCNC: 114 MG/DL (ref 70–99)
GLUCOSE SERPL-MCNC: 116 MG/DL (ref 70–99)
GLUCOSE SERPL-MCNC: 130 MG/DL (ref 70–99)
GLUCOSE SERPL-MCNC: 153 MG/DL (ref 70–99)
GLUCOSE SERPL-MCNC: 161 MG/DL (ref 70–99)
GLUCOSE SERPL-MCNC: 77 MG/DL (ref 70–99)
GLUCOSE SERPL-MCNC: 98 MG/DL (ref 70–99)
GLUCOSE SERPL-MCNC: 98 MG/DL (ref 70–99)
GLUCOSE SERPL-MCNC: 99 MG/DL (ref 70–99)
GLUCOSE UR STRIP-MCNC: NEGATIVE MG/DL
HCO3 BLDV-SCNC: 27 MMOL/L (ref 21–28)
HCO3 BLDV-SCNC: 28 MMOL/L (ref 21–28)
HCO3 BLDV-SCNC: 29 MMOL/L (ref 21–28)
HCT VFR BLD AUTO: 24.6 % (ref 40–53)
HCT VFR BLD AUTO: 25.3 % (ref 40–53)
HCT VFR BLD AUTO: 25.7 % (ref 40–53)
HCT VFR BLD AUTO: 26.1 % (ref 40–53)
HCT VFR BLD AUTO: 26.2 % (ref 40–53)
HCT VFR BLD AUTO: 26.7 % (ref 40–53)
HCT VFR BLD AUTO: 26.8 % (ref 40–53)
HCT VFR BLD AUTO: 26.9 % (ref 40–53)
HCT VFR BLD AUTO: 27 % (ref 40–53)
HCT VFR BLD AUTO: 27.9 % (ref 40–53)
HCT VFR BLD AUTO: 28.1 % (ref 40–53)
HCT VFR BLD AUTO: 28.2 % (ref 40–53)
HCT VFR BLD AUTO: 29.2 % (ref 40–53)
HCT VFR BLD AUTO: 31.6 % (ref 40–53)
HCT VFR BLD CALC: 27 % (ref 40–53)
HGB BLD-MCNC: 10 G/DL (ref 13.3–17.7)
HGB BLD-MCNC: 8.1 G/DL (ref 13.3–17.7)
HGB BLD-MCNC: 8.4 G/DL (ref 13.3–17.7)
HGB BLD-MCNC: 8.5 G/DL (ref 13.3–17.7)
HGB BLD-MCNC: 8.7 G/DL (ref 13.3–17.7)
HGB BLD-MCNC: 8.7 G/DL (ref 13.3–17.7)
HGB BLD-MCNC: 8.8 G/DL (ref 13.3–17.7)
HGB BLD-MCNC: 8.8 G/DL (ref 13.3–17.7)
HGB BLD-MCNC: 8.9 G/DL (ref 13.3–17.7)
HGB BLD-MCNC: 9.1 G/DL (ref 13.3–17.7)
HGB BLD-MCNC: 9.1 G/DL (ref 13.3–17.7)
HGB BLD-MCNC: 9.2 G/DL (ref 13.3–17.7)
HGB BLD-MCNC: 9.2 G/DL (ref 13.3–17.7)
HGB BLD-MCNC: 9.4 G/DL (ref 13.3–17.7)
HGB BLD-MCNC: 9.4 G/DL (ref 13.3–17.7)
HGB UR QL STRIP: NEGATIVE
HOLD SPECIMEN: NORMAL
HYALINE CASTS: 1 /LPF
HYALINE CASTS: 27 /LPF
HYALINE CASTS: 8 /LPF
IMM GRANULOCYTES # BLD: 0 10E3/UL
IMM GRANULOCYTES # BLD: 0.1 10E3/UL
IMM GRANULOCYTES NFR BLD: 1 %
INTERPRETATION ECG - MUSE: NORMAL
INTERPRETATION ECG - MUSE: NORMAL
IRON BINDING CAPACITY (ROCHE): 207 UG/DL (ref 240–430)
IRON SATN MFR SERPL: 18 % (ref 15–46)
IRON SERPL-MCNC: 37 UG/DL (ref 61–157)
KETONES UR STRIP-MCNC: NEGATIVE MG/DL
LACTATE BLD-SCNC: 0.9 MMOL/L
LACTATE BLD-SCNC: 4.6 MMOL/L
LACTATE SERPL-SCNC: 0.7 MMOL/L (ref 0.7–2)
LACTATE SERPL-SCNC: 1.4 MMOL/L (ref 0.7–2)
LACTATE SERPL-SCNC: 1.8 MMOL/L (ref 0.7–2)
LACTATE SERPL-SCNC: 1.8 MMOL/L (ref 0.7–2)
LACTATE SERPL-SCNC: 2.5 MMOL/L (ref 0.7–2)
LACTATE SERPL-SCNC: 2.9 MMOL/L (ref 0.7–2)
LACTATE SERPL-SCNC: 6.6 MMOL/L (ref 0.7–2)
LEUKOCYTE ESTERASE UR QL STRIP: NEGATIVE
LVEF ECHO: NORMAL
LVEF ECHO: NORMAL
LYMPHOCYTES # BLD AUTO: 0.7 10E3/UL (ref 0.8–5.3)
LYMPHOCYTES # BLD AUTO: 0.7 10E3/UL (ref 0.8–5.3)
LYMPHOCYTES # BLD AUTO: 0.8 10E3/UL (ref 0.8–5.3)
LYMPHOCYTES # BLD AUTO: 1.4 10E3/UL (ref 0.8–5.3)
LYMPHOCYTES NFR BLD AUTO: 13 %
LYMPHOCYTES NFR BLD AUTO: 22 %
LYMPHOCYTES NFR BLD AUTO: 5 %
LYMPHOCYTES NFR BLD AUTO: 7 %
MAGNESIUM SERPL-MCNC: 1.3 MG/DL (ref 1.7–2.3)
MAGNESIUM SERPL-MCNC: 1.4 MG/DL (ref 1.7–2.3)
MAGNESIUM SERPL-MCNC: 1.4 MG/DL (ref 1.7–2.3)
MAGNESIUM SERPL-MCNC: 1.5 MG/DL (ref 1.7–2.3)
MAGNESIUM SERPL-MCNC: 1.5 MG/DL (ref 1.7–2.3)
MAGNESIUM SERPL-MCNC: 1.6 MG/DL (ref 1.7–2.3)
MAGNESIUM SERPL-MCNC: 1.7 MG/DL (ref 1.7–2.3)
MAGNESIUM SERPL-MCNC: 1.8 MG/DL (ref 1.7–2.3)
MAGNESIUM SERPL-MCNC: 1.8 MG/DL (ref 1.7–2.3)
MAGNESIUM SERPL-MCNC: 1.9 MG/DL (ref 1.7–2.3)
MAGNESIUM SERPL-MCNC: 2.2 MG/DL (ref 1.7–2.3)
MAGNESIUM SERPL-MCNC: 2.4 MG/DL (ref 1.7–2.3)
MAGNESIUM SERPL-MCNC: 2.7 MG/DL (ref 1.7–2.3)
MCH RBC QN AUTO: 32.1 PG (ref 26.5–33)
MCH RBC QN AUTO: 32.3 PG (ref 26.5–33)
MCH RBC QN AUTO: 32.4 PG (ref 26.5–33)
MCH RBC QN AUTO: 32.4 PG (ref 26.5–33)
MCH RBC QN AUTO: 32.5 PG (ref 26.5–33)
MCH RBC QN AUTO: 32.5 PG (ref 26.5–33)
MCH RBC QN AUTO: 32.6 PG (ref 26.5–33)
MCH RBC QN AUTO: 32.7 PG (ref 26.5–33)
MCH RBC QN AUTO: 32.7 PG (ref 26.5–33)
MCH RBC QN AUTO: 32.8 PG (ref 26.5–33)
MCH RBC QN AUTO: 32.9 PG (ref 26.5–33)
MCH RBC QN AUTO: 33.3 PG (ref 26.5–33)
MCH RBC QN AUTO: 33.5 PG (ref 26.5–33)
MCH RBC QN AUTO: 33.6 PG (ref 26.5–33)
MCHC RBC AUTO-ENTMCNC: 31.6 G/DL (ref 31.5–36.5)
MCHC RBC AUTO-ENTMCNC: 32.2 G/DL (ref 31.5–36.5)
MCHC RBC AUTO-ENTMCNC: 32.6 G/DL (ref 31.5–36.5)
MCHC RBC AUTO-ENTMCNC: 32.6 G/DL (ref 31.5–36.5)
MCHC RBC AUTO-ENTMCNC: 32.7 G/DL (ref 31.5–36.5)
MCHC RBC AUTO-ENTMCNC: 32.8 G/DL (ref 31.5–36.5)
MCHC RBC AUTO-ENTMCNC: 32.9 G/DL (ref 31.5–36.5)
MCHC RBC AUTO-ENTMCNC: 33.1 G/DL (ref 31.5–36.5)
MCHC RBC AUTO-ENTMCNC: 33.2 G/DL (ref 31.5–36.5)
MCHC RBC AUTO-ENTMCNC: 33.2 G/DL (ref 31.5–36.5)
MCHC RBC AUTO-ENTMCNC: 33.3 G/DL (ref 31.5–36.5)
MCHC RBC AUTO-ENTMCNC: 33.3 G/DL (ref 31.5–36.5)
MCHC RBC AUTO-ENTMCNC: 33.5 G/DL (ref 31.5–36.5)
MCHC RBC AUTO-ENTMCNC: 33.7 G/DL (ref 31.5–36.5)
MCV RBC AUTO: 100 FL (ref 78–100)
MCV RBC AUTO: 101 FL (ref 78–100)
MCV RBC AUTO: 101 FL (ref 78–100)
MCV RBC AUTO: 102 FL (ref 78–100)
MCV RBC AUTO: 102 FL (ref 78–100)
MCV RBC AUTO: 97 FL (ref 78–100)
MCV RBC AUTO: 98 FL (ref 78–100)
MCV RBC AUTO: 98 FL (ref 78–100)
MCV RBC AUTO: 99 FL (ref 78–100)
METHYLMALONATE SERPL-SCNC: 0.18 UMOL/L (ref 0–0.4)
MONOCYTES # BLD AUTO: 0.3 10E3/UL (ref 0–1.3)
MONOCYTES # BLD AUTO: 0.4 10E3/UL (ref 0–1.3)
MONOCYTES # BLD AUTO: 0.6 10E3/UL (ref 0–1.3)
MONOCYTES # BLD AUTO: 0.8 10E3/UL (ref 0–1.3)
MONOCYTES NFR BLD AUTO: 4 %
MONOCYTES NFR BLD AUTO: 6 %
MONOCYTES NFR BLD AUTO: 6 %
MONOCYTES NFR BLD AUTO: 7 %
MRSA DNA SPEC QL NAA+PROBE: NEGATIVE
MUCOUS THREADS #/AREA URNS LPF: PRESENT /LPF
NEUTROPHILS # BLD AUTO: 10.3 10E3/UL (ref 1.6–8.3)
NEUTROPHILS # BLD AUTO: 3.7 10E3/UL (ref 1.6–8.3)
NEUTROPHILS # BLD AUTO: 4.6 10E3/UL (ref 1.6–8.3)
NEUTROPHILS # BLD AUTO: 8.8 10E3/UL (ref 1.6–8.3)
NEUTROPHILS NFR BLD AUTO: 57 %
NEUTROPHILS NFR BLD AUTO: 69 %
NEUTROPHILS NFR BLD AUTO: 84 %
NEUTROPHILS NFR BLD AUTO: 87 %
NITRATE UR QL: NEGATIVE
NRBC # BLD AUTO: 0 10E3/UL
NRBC BLD AUTO-RTO: 0 /100
NT-PROBNP SERPL-MCNC: 652 PG/ML (ref 0–1800)
P AXIS - MUSE: 47 DEGREES
P AXIS - MUSE: NORMAL DEGREES
PCO2 BLDV: 41 MM HG (ref 40–50)
PCO2 BLDV: 41 MM HG (ref 40–50)
PCO2 BLDV: 59 MM HG (ref 40–50)
PH BLDV: 7.27 [PH] (ref 7.32–7.43)
PH BLDV: 7.45 [PH] (ref 7.32–7.43)
PH BLDV: 7.46 [PH] (ref 7.32–7.43)
PH UR STRIP: 5.5 [PH] (ref 5–7)
PH UR STRIP: 6 [PH] (ref 5–7)
PH UR STRIP: 7 [PH] (ref 5–7)
PHOSPHATE SERPL-MCNC: 2.5 MG/DL (ref 2.5–4.5)
PHOSPHATE SERPL-MCNC: 2.7 MG/DL (ref 2.5–4.5)
PHOSPHATE SERPL-MCNC: 2.8 MG/DL (ref 2.5–4.5)
PHOSPHATE SERPL-MCNC: 2.9 MG/DL (ref 2.5–4.5)
PHOSPHATE SERPL-MCNC: 3 MG/DL (ref 2.5–4.5)
PHOSPHATE SERPL-MCNC: 3.2 MG/DL (ref 2.5–4.5)
PHOSPHATE SERPL-MCNC: 3.4 MG/DL (ref 2.5–4.5)
PHOSPHATE SERPL-MCNC: 4.7 MG/DL (ref 2.5–4.5)
PLATELET # BLD AUTO: 147 10E3/UL (ref 150–450)
PLATELET # BLD AUTO: 150 10E3/UL (ref 150–450)
PLATELET # BLD AUTO: 152 10E3/UL (ref 150–450)
PLATELET # BLD AUTO: 171 10E3/UL (ref 150–450)
PLATELET # BLD AUTO: 180 10E3/UL (ref 150–450)
PLATELET # BLD AUTO: 189 10E3/UL (ref 150–450)
PLATELET # BLD AUTO: 195 10E3/UL (ref 150–450)
PLATELET # BLD AUTO: 201 10E3/UL (ref 150–450)
PLATELET # BLD AUTO: 202 10E3/UL (ref 150–450)
PLATELET # BLD AUTO: 208 10E3/UL (ref 150–450)
PLATELET # BLD AUTO: 217 10E3/UL (ref 150–450)
PLATELET # BLD AUTO: 219 10E3/UL (ref 150–450)
PLATELET # BLD AUTO: 221 10E3/UL (ref 150–450)
PLATELET # BLD AUTO: 221 10E3/UL (ref 150–450)
PLATELET # BLD AUTO: 228 10E3/UL (ref 150–450)
PLATELET # BLD AUTO: 243 10E3/UL (ref 150–450)
PO2 BLDV: 19 MM HG (ref 25–47)
PO2 BLDV: 39 MM HG (ref 25–47)
PO2 BLDV: 42 MM HG (ref 25–47)
POTASSIUM BLD-SCNC: 5.5 MMOL/L (ref 3.4–5.3)
POTASSIUM SERPL-SCNC: 4.1 MMOL/L (ref 3.4–5.3)
POTASSIUM SERPL-SCNC: 4.1 MMOL/L (ref 3.4–5.3)
POTASSIUM SERPL-SCNC: 4.2 MMOL/L (ref 3.4–5.3)
POTASSIUM SERPL-SCNC: 4.3 MMOL/L (ref 3.4–5.3)
POTASSIUM SERPL-SCNC: 4.4 MMOL/L (ref 3.4–5.3)
POTASSIUM SERPL-SCNC: 4.4 MMOL/L (ref 3.4–5.3)
POTASSIUM SERPL-SCNC: 4.5 MMOL/L (ref 3.4–5.3)
POTASSIUM SERPL-SCNC: 4.5 MMOL/L (ref 3.4–5.3)
POTASSIUM SERPL-SCNC: 4.6 MMOL/L (ref 3.4–5.3)
POTASSIUM SERPL-SCNC: 4.6 MMOL/L (ref 3.4–5.3)
POTASSIUM SERPL-SCNC: 4.9 MMOL/L (ref 3.4–5.3)
POTASSIUM SERPL-SCNC: 4.9 MMOL/L (ref 3.4–5.3)
POTASSIUM SERPL-SCNC: 5 MMOL/L (ref 3.4–5.3)
POTASSIUM SERPL-SCNC: 5.1 MMOL/L (ref 3.4–5.3)
POTASSIUM SERPL-SCNC: 5.1 MMOL/L (ref 3.4–5.3)
POTASSIUM SERPL-SCNC: 5.3 MMOL/L (ref 3.4–5.3)
POTASSIUM SERPL-SCNC: 5.7 MMOL/L (ref 3.4–5.3)
PR INTERVAL - MUSE: 292 MS
PR INTERVAL - MUSE: NORMAL MS
PROT SERPL-MCNC: 6.3 G/DL (ref 6.4–8.3)
PROT SERPL-MCNC: 6.7 G/DL (ref 6.4–8.3)
PROT/CREAT 24H UR: 0.12 MG/MG CR (ref 0–0.2)
QRS DURATION - MUSE: 152 MS
QRS DURATION - MUSE: 160 MS
QT - MUSE: 396 MS
QT - MUSE: 428 MS
QTC - MUSE: 479 MS
QTC - MUSE: 562 MS
R AXIS - MUSE: -77 DEGREES
R AXIS - MUSE: -80 DEGREES
RBC # BLD AUTO: 2.49 10E6/UL (ref 4.4–5.9)
RBC # BLD AUTO: 2.5 10E6/UL (ref 4.4–5.9)
RBC # BLD AUTO: 2.54 10E6/UL (ref 4.4–5.9)
RBC # BLD AUTO: 2.6 10E6/UL (ref 4.4–5.9)
RBC # BLD AUTO: 2.63 10E6/UL (ref 4.4–5.9)
RBC # BLD AUTO: 2.69 10E6/UL (ref 4.4–5.9)
RBC # BLD AUTO: 2.69 10E6/UL (ref 4.4–5.9)
RBC # BLD AUTO: 2.71 10E6/UL (ref 4.4–5.9)
RBC # BLD AUTO: 2.72 10E6/UL (ref 4.4–5.9)
RBC # BLD AUTO: 2.74 10E6/UL (ref 4.4–5.9)
RBC # BLD AUTO: 2.77 10E6/UL (ref 4.4–5.9)
RBC # BLD AUTO: 2.81 10E6/UL (ref 4.4–5.9)
RBC # BLD AUTO: 2.82 10E6/UL (ref 4.4–5.9)
RBC # BLD AUTO: 2.82 10E6/UL (ref 4.4–5.9)
RBC # BLD AUTO: 2.93 10E6/UL (ref 4.4–5.9)
RBC # BLD AUTO: 3.09 10E6/UL (ref 4.4–5.9)
RBC URINE: 1 /HPF
RBC URINE: <1 /HPF
RBC URINE: <1 /HPF
RSV RNA SPEC NAA+PROBE: NEGATIVE
SA TARGET DNA: NEGATIVE
SAO2 % BLDV: 23 % (ref 94–100)
SAO2 % BLDV: 76 % (ref 94–100)
SAO2 % BLDV: 80 % (ref 94–100)
SARS-COV-2 RNA RESP QL NAA+PROBE: NEGATIVE
SARS-COV-2 RNA RESP QL NAA+PROBE: NEGATIVE
SODIUM BLD-SCNC: 133 MMOL/L (ref 133–144)
SODIUM SERPL-SCNC: 133 MMOL/L (ref 136–145)
SODIUM SERPL-SCNC: 134 MMOL/L (ref 136–145)
SODIUM SERPL-SCNC: 135 MMOL/L (ref 136–145)
SODIUM SERPL-SCNC: 136 MMOL/L (ref 136–145)
SODIUM SERPL-SCNC: 136 MMOL/L (ref 136–145)
SODIUM SERPL-SCNC: 137 MMOL/L (ref 136–145)
SODIUM SERPL-SCNC: 137 MMOL/L (ref 136–145)
SODIUM SERPL-SCNC: 139 MMOL/L (ref 136–145)
SODIUM SERPL-SCNC: 140 MMOL/L (ref 136–145)
SP GR UR STRIP: 1.01 (ref 1–1.03)
SP GR UR STRIP: 1.02 (ref 1–1.03)
SP GR UR STRIP: 1.02 (ref 1–1.03)
SQUAMOUS EPITHELIAL: <1 /HPF
SQUAMOUS EPITHELIAL: <1 /HPF
SYSTOLIC BLOOD PRESSURE - MUSE: NORMAL MMHG
SYSTOLIC BLOOD PRESSURE - MUSE: NORMAL MMHG
T AXIS - MUSE: 26 DEGREES
T AXIS - MUSE: 47 DEGREES
TROPONIN T SERPL HS-MCNC: 27 NG/L
TROPONIN T SERPL HS-MCNC: 30 NG/L
TROPONIN T SERPL HS-MCNC: 35 NG/L
TROPONIN T SERPL HS-MCNC: 38 NG/L
TROPONIN T SERPL HS-MCNC: 46 NG/L
TSH SERPL DL<=0.005 MIU/L-ACNC: 2.01 UIU/ML (ref 0.3–4.2)
UROBILINOGEN UR STRIP-MCNC: 2 MG/DL
UROBILINOGEN UR STRIP-MCNC: NORMAL MG/DL
UROBILINOGEN UR STRIP-MCNC: NORMAL MG/DL
VENTRICULAR RATE- MUSE: 104 BPM
VENTRICULAR RATE- MUSE: 88 BPM
VIT B12 SERPL-MCNC: 409 PG/ML (ref 232–1245)
WBC # BLD AUTO: 10.4 10E3/UL (ref 4–11)
WBC # BLD AUTO: 12 10E3/UL (ref 4–11)
WBC # BLD AUTO: 5.1 10E3/UL (ref 4–11)
WBC # BLD AUTO: 6 10E3/UL (ref 4–11)
WBC # BLD AUTO: 6.1 10E3/UL (ref 4–11)
WBC # BLD AUTO: 6.1 10E3/UL (ref 4–11)
WBC # BLD AUTO: 6.2 10E3/UL (ref 4–11)
WBC # BLD AUTO: 6.5 10E3/UL (ref 4–11)
WBC # BLD AUTO: 6.6 10E3/UL (ref 4–11)
WBC # BLD AUTO: 6.7 10E3/UL (ref 4–11)
WBC # BLD AUTO: 6.8 10E3/UL (ref 4–11)
WBC # BLD AUTO: 7.1 10E3/UL (ref 4–11)
WBC # BLD AUTO: 7.4 10E3/UL (ref 4–11)
WBC # BLD AUTO: 7.9 10E3/UL (ref 4–11)
WBC URINE: 1 /HPF
ZINC SERPL-MCNC: 51.6 UG/DL

## 2023-01-01 PROCEDURE — 83735 ASSAY OF MAGNESIUM: CPT | Performed by: ORTHOPAEDIC SURGERY

## 2023-01-01 PROCEDURE — 71045 X-RAY EXAM CHEST 1 VIEW: CPT

## 2023-01-01 PROCEDURE — 83605 ASSAY OF LACTIC ACID: CPT | Performed by: ORTHOPAEDIC SURGERY

## 2023-01-01 PROCEDURE — 99232 SBSQ HOSP IP/OBS MODERATE 35: CPT | Performed by: ORTHOPAEDIC SURGERY

## 2023-01-01 PROCEDURE — 83921 ORGANIC ACID SINGLE QUANT: CPT

## 2023-01-01 PROCEDURE — 80048 BASIC METABOLIC PNL TOTAL CA: CPT

## 2023-01-01 PROCEDURE — 83605 ASSAY OF LACTIC ACID: CPT

## 2023-01-01 PROCEDURE — 250N000011 HC RX IP 250 OP 636

## 2023-01-01 PROCEDURE — 93308 TTE F-UP OR LMTD: CPT | Mod: 26 | Performed by: STUDENT IN AN ORGANIZED HEALTH CARE EDUCATION/TRAINING PROGRAM

## 2023-01-01 PROCEDURE — 84156 ASSAY OF PROTEIN URINE: CPT | Performed by: STUDENT IN AN ORGANIZED HEALTH CARE EDUCATION/TRAINING PROGRAM

## 2023-01-01 PROCEDURE — 85027 COMPLETE CBC AUTOMATED: CPT

## 2023-01-01 PROCEDURE — 91312 COVID-19 VACCINE BIVALENT BOOSTER 12+ (PFIZER): CPT | Performed by: INTERNAL MEDICINE

## 2023-01-01 PROCEDURE — 83735 ASSAY OF MAGNESIUM: CPT | Performed by: STUDENT IN AN ORGANIZED HEALTH CARE EDUCATION/TRAINING PROGRAM

## 2023-01-01 PROCEDURE — 84100 ASSAY OF PHOSPHORUS: CPT

## 2023-01-01 PROCEDURE — 81001 URINALYSIS AUTO W/SCOPE: CPT | Performed by: EMERGENCY MEDICINE

## 2023-01-01 PROCEDURE — 99232 SBSQ HOSP IP/OBS MODERATE 35: CPT | Mod: GC | Performed by: ORTHOPAEDIC SURGERY

## 2023-01-01 PROCEDURE — 82947 ASSAY GLUCOSE BLOOD QUANT: CPT | Performed by: EMERGENCY MEDICINE

## 2023-01-01 PROCEDURE — 97116 GAIT TRAINING THERAPY: CPT | Mod: GP

## 2023-01-01 PROCEDURE — 36415 COLL VENOUS BLD VENIPUNCTURE: CPT

## 2023-01-01 PROCEDURE — 82962 GLUCOSE BLOOD TEST: CPT

## 2023-01-01 PROCEDURE — 72110 X-RAY EXAM L-2 SPINE 4/>VWS: CPT | Performed by: STUDENT IN AN ORGANIZED HEALTH CARE EDUCATION/TRAINING PROGRAM

## 2023-01-01 PROCEDURE — 36415 COLL VENOUS BLD VENIPUNCTURE: CPT | Performed by: ORTHOPAEDIC SURGERY

## 2023-01-01 PROCEDURE — 83605 ASSAY OF LACTIC ACID: CPT | Performed by: EMERGENCY MEDICINE

## 2023-01-01 PROCEDURE — 97165 OT EVAL LOW COMPLEX 30 MIN: CPT | Mod: GO

## 2023-01-01 PROCEDURE — 82010 KETONE BODYS QUAN: CPT

## 2023-01-01 PROCEDURE — 84443 ASSAY THYROID STIM HORMONE: CPT | Performed by: PHYSICIAN ASSISTANT

## 2023-01-01 PROCEDURE — 93308 TTE F-UP OR LMTD: CPT | Performed by: STUDENT IN AN ORGANIZED HEALTH CARE EDUCATION/TRAINING PROGRAM

## 2023-01-01 PROCEDURE — 250N000011 HC RX IP 250 OP 636: Performed by: PHYSICIAN ASSISTANT

## 2023-01-01 PROCEDURE — 999N000128 HC STATISTIC PERIPHERAL IV START W/O US GUIDANCE

## 2023-01-01 PROCEDURE — 87635 SARS-COV-2 COVID-19 AMP PRB: CPT | Performed by: STUDENT IN AN ORGANIZED HEALTH CARE EDUCATION/TRAINING PROGRAM

## 2023-01-01 PROCEDURE — 80048 BASIC METABOLIC PNL TOTAL CA: CPT | Performed by: ORTHOPAEDIC SURGERY

## 2023-01-01 PROCEDURE — 71275 CT ANGIOGRAPHY CHEST: CPT | Mod: 26 | Performed by: RADIOLOGY

## 2023-01-01 PROCEDURE — 85027 COMPLETE CBC AUTOMATED: CPT | Performed by: STUDENT IN AN ORGANIZED HEALTH CARE EDUCATION/TRAINING PROGRAM

## 2023-01-01 PROCEDURE — 99222 1ST HOSP IP/OBS MODERATE 55: CPT | Performed by: PHYSICIAN ASSISTANT

## 2023-01-01 PROCEDURE — 36415 COLL VENOUS BLD VENIPUNCTURE: CPT | Performed by: EMERGENCY MEDICINE

## 2023-01-01 PROCEDURE — 82310 ASSAY OF CALCIUM: CPT | Performed by: ORTHOPAEDIC SURGERY

## 2023-01-01 PROCEDURE — 99232 SBSQ HOSP IP/OBS MODERATE 35: CPT | Performed by: STUDENT IN AN ORGANIZED HEALTH CARE EDUCATION/TRAINING PROGRAM

## 2023-01-01 PROCEDURE — 250N000013 HC RX MED GY IP 250 OP 250 PS 637

## 2023-01-01 PROCEDURE — 258N000003 HC RX IP 258 OP 636

## 2023-01-01 PROCEDURE — 250N000009 HC RX 250: Performed by: PHYSICIAN ASSISTANT

## 2023-01-01 PROCEDURE — 99222 1ST HOSP IP/OBS MODERATE 55: CPT | Performed by: PSYCHIATRY & NEUROLOGY

## 2023-01-01 PROCEDURE — 84100 ASSAY OF PHOSPHORUS: CPT | Performed by: STUDENT IN AN ORGANIZED HEALTH CARE EDUCATION/TRAINING PROGRAM

## 2023-01-01 PROCEDURE — 99221 1ST HOSP IP/OBS SF/LOW 40: CPT | Performed by: NURSE PRACTITIONER

## 2023-01-01 PROCEDURE — 36415 COLL VENOUS BLD VENIPUNCTURE: CPT | Performed by: PHYSICIAN ASSISTANT

## 2023-01-01 PROCEDURE — 85025 COMPLETE CBC W/AUTO DIFF WBC: CPT

## 2023-01-01 PROCEDURE — 82728 ASSAY OF FERRITIN: CPT | Performed by: PHYSICIAN ASSISTANT

## 2023-01-01 PROCEDURE — 93010 ELECTROCARDIOGRAM REPORT: CPT | Performed by: EMERGENCY MEDICINE

## 2023-01-01 PROCEDURE — 83735 ASSAY OF MAGNESIUM: CPT

## 2023-01-01 PROCEDURE — 51702 INSERT TEMP BLADDER CATH: CPT | Performed by: STUDENT IN AN ORGANIZED HEALTH CARE EDUCATION/TRAINING PROGRAM

## 2023-01-01 PROCEDURE — 250N000011 HC RX IP 250 OP 636: Mod: JZ

## 2023-01-01 PROCEDURE — 99285 EMERGENCY DEPT VISIT HI MDM: CPT | Mod: 25 | Performed by: STUDENT IN AN ORGANIZED HEALTH CARE EDUCATION/TRAINING PROGRAM

## 2023-01-01 PROCEDURE — 97112 NEUROMUSCULAR REEDUCATION: CPT | Mod: GP

## 2023-01-01 PROCEDURE — 99222 1ST HOSP IP/OBS MODERATE 55: CPT | Mod: FS | Performed by: PHYSICIAN ASSISTANT

## 2023-01-01 PROCEDURE — 250N000013 HC RX MED GY IP 250 OP 250 PS 637: Performed by: INTERNAL MEDICINE

## 2023-01-01 PROCEDURE — 255N000002 HC RX 255 OP 636: Performed by: STUDENT IN AN ORGANIZED HEALTH CARE EDUCATION/TRAINING PROGRAM

## 2023-01-01 PROCEDURE — 36415 COLL VENOUS BLD VENIPUNCTURE: CPT | Performed by: STUDENT IN AN ORGANIZED HEALTH CARE EDUCATION/TRAINING PROGRAM

## 2023-01-01 PROCEDURE — 86140 C-REACTIVE PROTEIN: CPT

## 2023-01-01 PROCEDURE — 85014 HEMATOCRIT: CPT | Performed by: EMERGENCY MEDICINE

## 2023-01-01 PROCEDURE — 250N000013 HC RX MED GY IP 250 OP 250 PS 637: Performed by: PHYSICIAN ASSISTANT

## 2023-01-01 PROCEDURE — 258N000003 HC RX IP 258 OP 636: Performed by: STUDENT IN AN ORGANIZED HEALTH CARE EDUCATION/TRAINING PROGRAM

## 2023-01-01 PROCEDURE — 97110 THERAPEUTIC EXERCISES: CPT | Mod: GP

## 2023-01-01 PROCEDURE — 0124A COVID-19 VACCINE BIVALENT BOOSTER 12+ (PFIZER): CPT | Performed by: INTERNAL MEDICINE

## 2023-01-01 PROCEDURE — 97535 SELF CARE MNGMENT TRAINING: CPT | Mod: GO

## 2023-01-01 PROCEDURE — 250N000013 HC RX MED GY IP 250 OP 250 PS 637: Performed by: STUDENT IN AN ORGANIZED HEALTH CARE EDUCATION/TRAINING PROGRAM

## 2023-01-01 PROCEDURE — 74174 CTA ABD&PLVS W/CONTRAST: CPT | Mod: 26 | Performed by: RADIOLOGY

## 2023-01-01 PROCEDURE — 82947 ASSAY GLUCOSE BLOOD QUANT: CPT

## 2023-01-01 PROCEDURE — 120N000002 HC R&B MED SURG/OB UMMC

## 2023-01-01 PROCEDURE — 82565 ASSAY OF CREATININE: CPT

## 2023-01-01 PROCEDURE — 99239 HOSP IP/OBS DSCHRG MGMT >30: CPT | Mod: GC | Performed by: INTERNAL MEDICINE

## 2023-01-01 PROCEDURE — 85027 COMPLETE CBC AUTOMATED: CPT | Performed by: PHYSICIAN ASSISTANT

## 2023-01-01 PROCEDURE — 84484 ASSAY OF TROPONIN QUANT: CPT | Performed by: STUDENT IN AN ORGANIZED HEALTH CARE EDUCATION/TRAINING PROGRAM

## 2023-01-01 PROCEDURE — 97530 THERAPEUTIC ACTIVITIES: CPT | Mod: GP

## 2023-01-01 PROCEDURE — 84484 ASSAY OF TROPONIN QUANT: CPT

## 2023-01-01 PROCEDURE — 72128 CT CHEST SPINE W/O DYE: CPT | Mod: 26 | Performed by: RADIOLOGY

## 2023-01-01 PROCEDURE — 87637 SARSCOV2&INF A&B&RSV AMP PRB: CPT | Performed by: EMERGENCY MEDICINE

## 2023-01-01 PROCEDURE — 71250 CT THORAX DX C-: CPT | Mod: 26 | Performed by: RADIOLOGY

## 2023-01-01 PROCEDURE — 87040 BLOOD CULTURE FOR BACTERIA: CPT | Performed by: STUDENT IN AN ORGANIZED HEALTH CARE EDUCATION/TRAINING PROGRAM

## 2023-01-01 PROCEDURE — 73630 X-RAY EXAM OF FOOT: CPT | Mod: 26 | Performed by: RADIOLOGY

## 2023-01-01 PROCEDURE — 93005 ELECTROCARDIOGRAM TRACING: CPT | Performed by: STUDENT IN AN ORGANIZED HEALTH CARE EDUCATION/TRAINING PROGRAM

## 2023-01-01 PROCEDURE — 84132 ASSAY OF SERUM POTASSIUM: CPT

## 2023-01-01 PROCEDURE — 96365 THER/PROPH/DIAG IV INF INIT: CPT | Mod: 59 | Performed by: STUDENT IN AN ORGANIZED HEALTH CARE EDUCATION/TRAINING PROGRAM

## 2023-01-01 PROCEDURE — 81001 URINALYSIS AUTO W/SCOPE: CPT

## 2023-01-01 PROCEDURE — 87040 BLOOD CULTURE FOR BACTERIA: CPT | Performed by: EMERGENCY MEDICINE

## 2023-01-01 PROCEDURE — 99232 SBSQ HOSP IP/OBS MODERATE 35: CPT | Performed by: PHYSICIAN ASSISTANT

## 2023-01-01 PROCEDURE — 97530 THERAPEUTIC ACTIVITIES: CPT | Mod: GO

## 2023-01-01 PROCEDURE — 83735 ASSAY OF MAGNESIUM: CPT | Performed by: PHYSICIAN ASSISTANT

## 2023-01-01 PROCEDURE — 250N000011 HC RX IP 250 OP 636: Performed by: STUDENT IN AN ORGANIZED HEALTH CARE EDUCATION/TRAINING PROGRAM

## 2023-01-01 PROCEDURE — 84132 ASSAY OF SERUM POTASSIUM: CPT | Performed by: ORTHOPAEDIC SURGERY

## 2023-01-01 PROCEDURE — 97530 THERAPEUTIC ACTIVITIES: CPT | Mod: GO | Performed by: OCCUPATIONAL THERAPIST

## 2023-01-01 PROCEDURE — 96367 TX/PROPH/DG ADDL SEQ IV INF: CPT | Performed by: STUDENT IN AN ORGANIZED HEALTH CARE EDUCATION/TRAINING PROGRAM

## 2023-01-01 PROCEDURE — 96360 HYDRATION IV INFUSION INIT: CPT | Mod: 59 | Performed by: STUDENT IN AN ORGANIZED HEALTH CARE EDUCATION/TRAINING PROGRAM

## 2023-01-01 PROCEDURE — 82306 VITAMIN D 25 HYDROXY: CPT

## 2023-01-01 PROCEDURE — 80048 BASIC METABOLIC PNL TOTAL CA: CPT | Performed by: STUDENT IN AN ORGANIZED HEALTH CARE EDUCATION/TRAINING PROGRAM

## 2023-01-01 PROCEDURE — 71045 X-RAY EXAM CHEST 1 VIEW: CPT | Mod: 26 | Performed by: STUDENT IN AN ORGANIZED HEALTH CARE EDUCATION/TRAINING PROGRAM

## 2023-01-01 PROCEDURE — 71045 X-RAY EXAM CHEST 1 VIEW: CPT | Mod: 26 | Performed by: RADIOLOGY

## 2023-01-01 PROCEDURE — 84100 ASSAY OF PHOSPHORUS: CPT | Performed by: PHYSICIAN ASSISTANT

## 2023-01-01 PROCEDURE — 83550 IRON BINDING TEST: CPT | Performed by: PHYSICIAN ASSISTANT

## 2023-01-01 PROCEDURE — 72158 MRI LUMBAR SPINE W/O & W/DYE: CPT | Mod: 26 | Performed by: RADIOLOGY

## 2023-01-01 PROCEDURE — 85652 RBC SED RATE AUTOMATED: CPT

## 2023-01-01 PROCEDURE — 97166 OT EVAL MOD COMPLEX 45 MIN: CPT | Mod: GO | Performed by: OCCUPATIONAL THERAPIST

## 2023-01-01 PROCEDURE — 97161 PT EVAL LOW COMPLEX 20 MIN: CPT | Mod: GP

## 2023-01-01 PROCEDURE — 97597 DBRDMT OPN WND 1ST 20 CM/<: CPT | Performed by: PODIATRIST

## 2023-01-01 PROCEDURE — 99207 PR APP CREDIT; MD BILLING SHARED VISIT: CPT | Performed by: PHYSICIAN ASSISTANT

## 2023-01-01 PROCEDURE — 96374 THER/PROPH/DIAG INJ IV PUSH: CPT | Performed by: EMERGENCY MEDICINE

## 2023-01-01 PROCEDURE — 71046 X-RAY EXAM CHEST 2 VIEWS: CPT | Performed by: RADIOLOGY

## 2023-01-01 PROCEDURE — 82803 BLOOD GASES ANY COMBINATION: CPT

## 2023-01-01 PROCEDURE — 72128 CT CHEST SPINE W/O DYE: CPT

## 2023-01-01 PROCEDURE — 80053 COMPREHEN METABOLIC PANEL: CPT | Performed by: EMERGENCY MEDICINE

## 2023-01-01 PROCEDURE — 93306 TTE W/DOPPLER COMPLETE: CPT | Mod: 26 | Performed by: INTERNAL MEDICINE

## 2023-01-01 PROCEDURE — 74174 CTA ABD&PLVS W/CONTRAST: CPT

## 2023-01-01 PROCEDURE — 71250 CT THORAX DX C-: CPT

## 2023-01-01 PROCEDURE — 72157 MRI CHEST SPINE W/O & W/DYE: CPT

## 2023-01-01 PROCEDURE — 99232 SBSQ HOSP IP/OBS MODERATE 35: CPT | Mod: GC | Performed by: INTERNAL MEDICINE

## 2023-01-01 PROCEDURE — 82947 ASSAY GLUCOSE BLOOD QUANT: CPT | Performed by: PHYSICIAN ASSISTANT

## 2023-01-01 PROCEDURE — 80053 COMPREHEN METABOLIC PANEL: CPT

## 2023-01-01 PROCEDURE — 250N000012 HC RX MED GY IP 250 OP 636 PS 637

## 2023-01-01 PROCEDURE — 84484 ASSAY OF TROPONIN QUANT: CPT | Performed by: EMERGENCY MEDICINE

## 2023-01-01 PROCEDURE — 82310 ASSAY OF CALCIUM: CPT | Performed by: STUDENT IN AN ORGANIZED HEALTH CARE EDUCATION/TRAINING PROGRAM

## 2023-01-01 PROCEDURE — 93010 ELECTROCARDIOGRAM REPORT: CPT | Performed by: STUDENT IN AN ORGANIZED HEALTH CARE EDUCATION/TRAINING PROGRAM

## 2023-01-01 PROCEDURE — 97535 SELF CARE MNGMENT TRAINING: CPT | Mod: GO | Performed by: OCCUPATIONAL THERAPIST

## 2023-01-01 PROCEDURE — 84630 ASSAY OF ZINC: CPT | Performed by: STUDENT IN AN ORGANIZED HEALTH CARE EDUCATION/TRAINING PROGRAM

## 2023-01-01 PROCEDURE — 99214 OFFICE O/P EST MOD 30 MIN: CPT | Mod: 25 | Performed by: INTERNAL MEDICINE

## 2023-01-01 PROCEDURE — 83605 ASSAY OF LACTIC ACID: CPT | Performed by: INTERNAL MEDICINE

## 2023-01-01 PROCEDURE — 96361 HYDRATE IV INFUSION ADD-ON: CPT | Performed by: EMERGENCY MEDICINE

## 2023-01-01 PROCEDURE — 76770 US EXAM ABDO BACK WALL COMP: CPT | Mod: 26 | Performed by: RADIOLOGY

## 2023-01-01 PROCEDURE — 11042 DBRDMT SUBQ TIS 1ST 20SQCM/<: CPT | Performed by: PODIATRIST

## 2023-01-01 PROCEDURE — 999N000007 HC SITE CHECK

## 2023-01-01 PROCEDURE — 76770 US EXAM ABDO BACK WALL COMP: CPT | Mod: GC | Performed by: RADIOLOGY

## 2023-01-01 PROCEDURE — 255N000002 HC RX 255 OP 636: Performed by: INTERNAL MEDICINE

## 2023-01-01 PROCEDURE — 93005 ELECTROCARDIOGRAM TRACING: CPT | Performed by: EMERGENCY MEDICINE

## 2023-01-01 PROCEDURE — 83880 ASSAY OF NATRIURETIC PEPTIDE: CPT | Performed by: STUDENT IN AN ORGANIZED HEALTH CARE EDUCATION/TRAINING PROGRAM

## 2023-01-01 PROCEDURE — 250N000011 HC RX IP 250 OP 636: Mod: JZ | Performed by: STUDENT IN AN ORGANIZED HEALTH CARE EDUCATION/TRAINING PROGRAM

## 2023-01-01 PROCEDURE — 99222 1ST HOSP IP/OBS MODERATE 55: CPT | Mod: 25 | Performed by: INTERNAL MEDICINE

## 2023-01-01 PROCEDURE — 99285 EMERGENCY DEPT VISIT HI MDM: CPT | Mod: 25 | Performed by: EMERGENCY MEDICINE

## 2023-01-01 PROCEDURE — 72131 CT LUMBAR SPINE W/O DYE: CPT

## 2023-01-01 PROCEDURE — 82607 VITAMIN B-12: CPT | Performed by: PHYSICIAN ASSISTANT

## 2023-01-01 PROCEDURE — 85025 COMPLETE CBC W/AUTO DIFF WBC: CPT | Performed by: EMERGENCY MEDICINE

## 2023-01-01 PROCEDURE — 36415 COLL VENOUS BLD VENIPUNCTURE: CPT | Performed by: INTERNAL MEDICINE

## 2023-01-01 PROCEDURE — 82570 ASSAY OF URINE CREATININE: CPT | Performed by: STUDENT IN AN ORGANIZED HEALTH CARE EDUCATION/TRAINING PROGRAM

## 2023-01-01 PROCEDURE — 82746 ASSAY OF FOLIC ACID SERUM: CPT

## 2023-01-01 PROCEDURE — 250N000011 HC RX IP 250 OP 636: Mod: JZ | Performed by: ORTHOPAEDIC SURGERY

## 2023-01-01 PROCEDURE — 99239 HOSP IP/OBS DSCHRG MGMT >30: CPT | Performed by: STUDENT IN AN ORGANIZED HEALTH CARE EDUCATION/TRAINING PROGRAM

## 2023-01-01 PROCEDURE — 82374 ASSAY BLOOD CARBON DIOXIDE: CPT

## 2023-01-01 PROCEDURE — 999N000208 ECHOCARDIOGRAM COMPLETE

## 2023-01-01 PROCEDURE — A9585 GADOBUTROL INJECTION: HCPCS | Performed by: STUDENT IN AN ORGANIZED HEALTH CARE EDUCATION/TRAINING PROGRAM

## 2023-01-01 PROCEDURE — 250N000012 HC RX MED GY IP 250 OP 636 PS 637: Performed by: ORTHOPAEDIC SURGERY

## 2023-01-01 PROCEDURE — 97110 THERAPEUTIC EXERCISES: CPT | Mod: GO | Performed by: OCCUPATIONAL THERAPIST

## 2023-01-01 PROCEDURE — 73630 X-RAY EXAM OF FOOT: CPT | Mod: LT

## 2023-01-01 PROCEDURE — 87641 MR-STAPH DNA AMP PROBE: CPT

## 2023-01-01 PROCEDURE — 72131 CT LUMBAR SPINE W/O DYE: CPT | Mod: 26 | Performed by: RADIOLOGY

## 2023-01-01 PROCEDURE — 99215 OFFICE O/P EST HI 40 MIN: CPT | Performed by: PSYCHIATRY & NEUROLOGY

## 2023-01-01 PROCEDURE — 72158 MRI LUMBAR SPINE W/O & W/DYE: CPT

## 2023-01-01 PROCEDURE — 99213 OFFICE O/P EST LOW 20 MIN: CPT | Mod: 25 | Performed by: PODIATRIST

## 2023-01-01 PROCEDURE — 99213 OFFICE O/P EST LOW 20 MIN: CPT | Performed by: PODIATRIST

## 2023-01-01 PROCEDURE — 258N000003 HC RX IP 258 OP 636: Performed by: EMERGENCY MEDICINE

## 2023-01-01 PROCEDURE — 72157 MRI CHEST SPINE W/O & W/DYE: CPT | Mod: 26 | Performed by: RADIOLOGY

## 2023-01-01 PROCEDURE — 97162 PT EVAL MOD COMPLEX 30 MIN: CPT | Mod: GP

## 2023-01-01 PROCEDURE — 76770 US EXAM ABDO BACK WALL COMP: CPT

## 2023-01-01 PROCEDURE — 250N000011 HC RX IP 250 OP 636: Performed by: EMERGENCY MEDICINE

## 2023-01-01 RX ORDER — IRBESARTAN 150 MG/1
75 TABLET ORAL AT BEDTIME
COMMUNITY
End: 2023-01-01

## 2023-01-01 RX ORDER — FLUOXETINE 40 MG/1
40 CAPSULE ORAL DAILY
COMMUNITY

## 2023-01-01 RX ORDER — CLOBETASOL PROPIONATE 0.5 MG/G
CREAM TOPICAL 2 TIMES DAILY
Status: DISCONTINUED | OUTPATIENT
Start: 2023-01-01 | End: 2023-01-01 | Stop reason: HOSPADM

## 2023-01-01 RX ORDER — ENOXAPARIN SODIUM 100 MG/ML
40 INJECTION SUBCUTANEOUS EVERY 24 HOURS
Status: DISCONTINUED | OUTPATIENT
Start: 2023-01-01 | End: 2023-01-01

## 2023-01-01 RX ORDER — HEPARIN SODIUM 5000 [USP'U]/.5ML
5000 INJECTION, SOLUTION INTRAVENOUS; SUBCUTANEOUS EVERY 12 HOURS
Status: DISCONTINUED | OUTPATIENT
Start: 2023-01-01 | End: 2023-01-01 | Stop reason: HOSPADM

## 2023-01-01 RX ORDER — FLUOXETINE 40 MG/1
40 CAPSULE ORAL DAILY
Status: DISCONTINUED | OUTPATIENT
Start: 2023-01-01 | End: 2023-01-01 | Stop reason: HOSPADM

## 2023-01-01 RX ORDER — PROCHLORPERAZINE 25 MG
12.5 SUPPOSITORY, RECTAL RECTAL EVERY 12 HOURS PRN
Status: DISCONTINUED | OUTPATIENT
Start: 2023-01-01 | End: 2023-01-01 | Stop reason: HOSPADM

## 2023-01-01 RX ORDER — PIPERACILLIN SODIUM, TAZOBACTAM SODIUM 2; .25 G/10ML; G/10ML
2.25 INJECTION, POWDER, LYOPHILIZED, FOR SOLUTION INTRAVENOUS EVERY 6 HOURS
Status: DISCONTINUED | OUTPATIENT
Start: 2023-01-01 | End: 2023-01-01

## 2023-01-01 RX ORDER — DEXTROSE MONOHYDRATE 25 G/50ML
25-50 INJECTION, SOLUTION INTRAVENOUS
Status: DISCONTINUED | OUTPATIENT
Start: 2023-01-01 | End: 2023-01-01 | Stop reason: HOSPADM

## 2023-01-01 RX ORDER — DICLOFENAC SODIUM 75 MG/1
75 TABLET, DELAYED RELEASE ORAL AT BEDTIME
Qty: 15 TABLET | Refills: 0 | Status: SHIPPED | OUTPATIENT
Start: 2023-01-01 | End: 2023-01-01

## 2023-01-01 RX ORDER — GADOBUTROL 604.72 MG/ML
0.1 INJECTION INTRAVENOUS ONCE
Status: COMPLETED | OUTPATIENT
Start: 2023-01-01 | End: 2023-01-01

## 2023-01-01 RX ORDER — ATORVASTATIN CALCIUM 40 MG/1
40 TABLET, FILM COATED ORAL DAILY
Status: DISCONTINUED | OUTPATIENT
Start: 2023-01-01 | End: 2023-01-01 | Stop reason: HOSPADM

## 2023-01-01 RX ORDER — MAGNESIUM SULFATE HEPTAHYDRATE 40 MG/ML
2 INJECTION, SOLUTION INTRAVENOUS ONCE
Status: COMPLETED | OUTPATIENT
Start: 2023-01-01 | End: 2023-01-01

## 2023-01-01 RX ORDER — ACETAMINOPHEN 325 MG/1
650 TABLET ORAL EVERY 6 HOURS PRN
Status: DISCONTINUED | OUTPATIENT
Start: 2023-01-01 | End: 2023-01-01 | Stop reason: HOSPADM

## 2023-01-01 RX ORDER — DICLOFENAC SODIUM 75 MG/1
75 TABLET, DELAYED RELEASE ORAL
COMMUNITY
End: 2023-01-01

## 2023-01-01 RX ORDER — TAMSULOSIN HYDROCHLORIDE 0.4 MG/1
0.4 CAPSULE ORAL DAILY
Status: DISCONTINUED | OUTPATIENT
Start: 2023-01-01 | End: 2023-01-01

## 2023-01-01 RX ORDER — FINASTERIDE 5 MG/1
5 TABLET, FILM COATED ORAL DAILY
Status: DISCONTINUED | OUTPATIENT
Start: 2023-01-01 | End: 2023-01-01 | Stop reason: HOSPADM

## 2023-01-01 RX ORDER — IRBESARTAN 150 MG/1
75 TABLET ORAL AT BEDTIME
Qty: 45 TABLET | Refills: 1 | Status: SHIPPED | OUTPATIENT
Start: 2023-01-01 | End: 2023-01-01

## 2023-01-01 RX ORDER — COLCHICINE 0.6 MG/1
0.6 TABLET ORAL DAILY
Status: DISCONTINUED | OUTPATIENT
Start: 2023-01-01 | End: 2023-01-01 | Stop reason: HOSPADM

## 2023-01-01 RX ORDER — NICOTINE POLACRILEX 4 MG
15-30 LOZENGE BUCCAL
Status: DISCONTINUED | OUTPATIENT
Start: 2023-01-01 | End: 2023-01-01 | Stop reason: HOSPADM

## 2023-01-01 RX ORDER — SODIUM CHLORIDE, SODIUM LACTATE, POTASSIUM CHLORIDE, CALCIUM CHLORIDE 600; 310; 30; 20 MG/100ML; MG/100ML; MG/100ML; MG/100ML
INJECTION, SOLUTION INTRAVENOUS CONTINUOUS
Status: ACTIVE | OUTPATIENT
Start: 2023-01-01 | End: 2023-01-01

## 2023-01-01 RX ORDER — TAMSULOSIN HYDROCHLORIDE 0.4 MG/1
0.4 CAPSULE ORAL DAILY
Status: DISCONTINUED | OUTPATIENT
Start: 2023-01-01 | End: 2023-01-01 | Stop reason: HOSPADM

## 2023-01-01 RX ORDER — ALLOPURINOL 100 MG/1
200 TABLET ORAL DAILY
Status: DISCONTINUED | OUTPATIENT
Start: 2023-01-01 | End: 2023-01-01 | Stop reason: HOSPADM

## 2023-01-01 RX ORDER — BISACODYL 10 MG
10 SUPPOSITORY, RECTAL RECTAL DAILY PRN
Status: DISCONTINUED | OUTPATIENT
Start: 2023-01-01 | End: 2023-01-01 | Stop reason: HOSPADM

## 2023-01-01 RX ORDER — GABAPENTIN 300 MG/1
300 CAPSULE ORAL
Start: 2023-01-01

## 2023-01-01 RX ORDER — ONDANSETRON 4 MG/1
4 TABLET, ORALLY DISINTEGRATING ORAL EVERY 6 HOURS PRN
Status: DISCONTINUED | OUTPATIENT
Start: 2023-01-01 | End: 2023-01-01 | Stop reason: HOSPADM

## 2023-01-01 RX ORDER — NALOXONE HYDROCHLORIDE 0.4 MG/ML
0.2 INJECTION, SOLUTION INTRAMUSCULAR; INTRAVENOUS; SUBCUTANEOUS
Status: DISCONTINUED | OUTPATIENT
Start: 2023-01-01 | End: 2023-01-01 | Stop reason: HOSPADM

## 2023-01-01 RX ORDER — HYDROMORPHONE HYDROCHLORIDE 1 MG/ML
0.5 INJECTION, SOLUTION INTRAMUSCULAR; INTRAVENOUS; SUBCUTANEOUS ONCE
Status: COMPLETED | OUTPATIENT
Start: 2023-01-01 | End: 2023-01-01

## 2023-01-01 RX ORDER — UBIDECARENONE 75 MG
100 CAPSULE ORAL DAILY
Status: DISCONTINUED | OUTPATIENT
Start: 2023-01-01 | End: 2023-01-01 | Stop reason: HOSPADM

## 2023-01-01 RX ORDER — MAGNESIUM SULFATE HEPTAHYDRATE 40 MG/ML
4 INJECTION, SOLUTION INTRAVENOUS ONCE
Status: COMPLETED | OUTPATIENT
Start: 2023-01-01 | End: 2023-01-01

## 2023-01-01 RX ORDER — FUROSEMIDE 40 MG
40 TABLET ORAL DAILY
COMMUNITY
End: 2023-01-01

## 2023-01-01 RX ORDER — OXYCODONE HYDROCHLORIDE 5 MG/1
5 TABLET ORAL EVERY 6 HOURS PRN
Status: DISCONTINUED | OUTPATIENT
Start: 2023-01-01 | End: 2023-01-01

## 2023-01-01 RX ORDER — ALLOPURINOL 300 MG/1
1 TABLET ORAL DAILY
Qty: 90 TABLET | Refills: 3 | Status: SHIPPED | OUTPATIENT
Start: 2023-01-01 | End: 2023-01-01

## 2023-01-01 RX ORDER — MUPIROCIN 20 MG/G
OINTMENT TOPICAL 2 TIMES DAILY
Status: DISCONTINUED | OUTPATIENT
Start: 2023-01-01 | End: 2023-01-01 | Stop reason: HOSPADM

## 2023-01-01 RX ORDER — CLOPIDOGREL BISULFATE 75 MG/1
75 TABLET ORAL DAILY
Status: DISCONTINUED | OUTPATIENT
Start: 2023-01-01 | End: 2023-01-01 | Stop reason: HOSPADM

## 2023-01-01 RX ORDER — POLYETHYLENE GLYCOL 3350 17 G/17G
17 POWDER, FOR SOLUTION ORAL DAILY
Qty: 510 G
Start: 2023-01-01

## 2023-01-01 RX ORDER — DICLOFENAC SODIUM 75 MG/1
75 TABLET, DELAYED RELEASE ORAL
Status: DISCONTINUED | OUTPATIENT
Start: 2023-01-01 | End: 2023-01-01

## 2023-01-01 RX ORDER — IOPAMIDOL 755 MG/ML
100 INJECTION, SOLUTION INTRAVASCULAR ONCE
Status: COMPLETED | OUTPATIENT
Start: 2023-01-01 | End: 2023-01-01

## 2023-01-01 RX ORDER — SENNOSIDES 8.6 MG
8.6 TABLET ORAL 2 TIMES DAILY
Status: DISCONTINUED | OUTPATIENT
Start: 2023-01-01 | End: 2023-01-01 | Stop reason: HOSPADM

## 2023-01-01 RX ORDER — MORPHINE SULFATE 2 MG/ML
2 INJECTION, SOLUTION INTRAMUSCULAR; INTRAVENOUS
Status: COMPLETED | OUTPATIENT
Start: 2023-01-01 | End: 2023-01-01

## 2023-01-01 RX ORDER — FOLIC ACID 1 MG/1
5 TABLET ORAL DAILY
Status: DISCONTINUED | OUTPATIENT
Start: 2023-01-01 | End: 2023-01-01 | Stop reason: HOSPADM

## 2023-01-01 RX ORDER — LIDOCAINE 40 MG/G
CREAM TOPICAL
Status: DISCONTINUED | OUTPATIENT
Start: 2023-01-01 | End: 2023-01-01 | Stop reason: HOSPADM

## 2023-01-01 RX ORDER — POLYETHYLENE GLYCOL 3350 17 G/17G
17 POWDER, FOR SOLUTION ORAL DAILY
Status: DISCONTINUED | OUTPATIENT
Start: 2023-01-01 | End: 2023-01-01 | Stop reason: HOSPADM

## 2023-01-01 RX ORDER — LIDOCAINE 4 G/G
1 PATCH TOPICAL
Status: DISCONTINUED | OUTPATIENT
Start: 2023-01-01 | End: 2023-01-01 | Stop reason: HOSPADM

## 2023-01-01 RX ORDER — ASPIRIN 81 MG/1
81 TABLET, CHEWABLE ORAL DAILY
Status: DISCONTINUED | OUTPATIENT
Start: 2023-01-01 | End: 2023-01-01 | Stop reason: HOSPADM

## 2023-01-01 RX ORDER — ALLOPURINOL 300 MG/1
300 TABLET ORAL DAILY
Status: DISCONTINUED | OUTPATIENT
Start: 2023-01-01 | End: 2023-01-01 | Stop reason: HOSPADM

## 2023-01-01 RX ORDER — MUPIROCIN 20 MG/G
OINTMENT TOPICAL PRN
COMMUNITY

## 2023-01-01 RX ORDER — TRAZODONE HYDROCHLORIDE 150 MG/1
150 TABLET ORAL AT BEDTIME
Qty: 90 TABLET | Refills: 3 | Status: SHIPPED | OUTPATIENT
Start: 2023-01-01 | End: 2023-01-01

## 2023-01-01 RX ORDER — LANOLIN ALCOHOL/MO/W.PET/CERES
1 CREAM (GRAM) TOPICAL DAILY
Status: DISCONTINUED | OUTPATIENT
Start: 2023-01-01 | End: 2023-01-01

## 2023-01-01 RX ORDER — AMOXICILLIN 250 MG
1 CAPSULE ORAL 2 TIMES DAILY PRN
Status: DISCONTINUED | OUTPATIENT
Start: 2023-01-01 | End: 2023-01-01 | Stop reason: HOSPADM

## 2023-01-01 RX ORDER — SODIUM CHLORIDE 9 MG/ML
INJECTION, SOLUTION INTRAVENOUS CONTINUOUS
Status: DISCONTINUED | OUTPATIENT
Start: 2023-01-01 | End: 2023-01-01

## 2023-01-01 RX ORDER — ONDANSETRON 2 MG/ML
4 INJECTION INTRAMUSCULAR; INTRAVENOUS EVERY 6 HOURS PRN
Status: DISCONTINUED | OUTPATIENT
Start: 2023-01-01 | End: 2023-01-01 | Stop reason: HOSPADM

## 2023-01-01 RX ORDER — SODIUM CHLORIDE, SODIUM LACTATE, POTASSIUM CHLORIDE, CALCIUM CHLORIDE 600; 310; 30; 20 MG/100ML; MG/100ML; MG/100ML; MG/100ML
INJECTION, SOLUTION INTRAVENOUS CONTINUOUS
Status: DISCONTINUED | OUTPATIENT
Start: 2023-01-01 | End: 2023-01-01

## 2023-01-01 RX ORDER — LANOLIN ALCOHOL/MO/W.PET/CERES
100 CREAM (GRAM) TOPICAL DAILY
Start: 2023-01-01

## 2023-01-01 RX ORDER — SENNOSIDES 8.6 MG
8.6 TABLET ORAL 2 TIMES DAILY PRN
Status: DISCONTINUED | OUTPATIENT
Start: 2023-01-01 | End: 2023-01-01 | Stop reason: HOSPADM

## 2023-01-01 RX ORDER — TRAZODONE HYDROCHLORIDE 150 MG/1
150 TABLET ORAL AT BEDTIME
COMMUNITY
End: 2023-01-01

## 2023-01-01 RX ORDER — GABAPENTIN 100 MG/1
100 CAPSULE ORAL
Qty: 30 CAPSULE | Refills: 0 | Status: SHIPPED | OUTPATIENT
Start: 2023-01-01 | End: 2023-01-01

## 2023-01-01 RX ORDER — LORAZEPAM 0.5 MG/1
1 TABLET ORAL
Status: COMPLETED | OUTPATIENT
Start: 2023-01-01 | End: 2023-01-01

## 2023-01-01 RX ORDER — FOLIC ACID 1 MG/1
1 TABLET ORAL DAILY
Status: DISCONTINUED | OUTPATIENT
Start: 2023-01-01 | End: 2023-01-01 | Stop reason: HOSPADM

## 2023-01-01 RX ORDER — NALOXONE HYDROCHLORIDE 0.4 MG/ML
0.4 INJECTION, SOLUTION INTRAMUSCULAR; INTRAVENOUS; SUBCUTANEOUS
Status: DISCONTINUED | OUTPATIENT
Start: 2023-01-01 | End: 2023-01-01 | Stop reason: HOSPADM

## 2023-01-01 RX ORDER — FOLIC ACID 1 MG/1
1 TABLET ORAL DAILY
Status: DISCONTINUED | OUTPATIENT
Start: 2023-01-01 | End: 2023-01-01

## 2023-01-01 RX ORDER — ISOSORBIDE MONONITRATE 30 MG/1
30 TABLET, EXTENDED RELEASE ORAL DAILY
Status: DISCONTINUED | OUTPATIENT
Start: 2023-01-01 | End: 2023-01-01 | Stop reason: HOSPADM

## 2023-01-01 RX ORDER — GABAPENTIN 100 MG/1
100 CAPSULE ORAL
Status: DISCONTINUED | OUTPATIENT
Start: 2023-01-01 | End: 2023-01-01

## 2023-01-01 RX ORDER — LORAZEPAM 1 MG/1
1 TABLET ORAL
Status: COMPLETED | OUTPATIENT
Start: 2023-01-01 | End: 2023-01-01

## 2023-01-01 RX ORDER — DICLOFENAC SODIUM 75 MG/1
TABLET, DELAYED RELEASE ORAL
Qty: 15 TABLET | Refills: 0 | OUTPATIENT
Start: 2023-01-01

## 2023-01-01 RX ORDER — ATORVASTATIN CALCIUM 40 MG/1
40 TABLET, FILM COATED ORAL DAILY
Qty: 100 TABLET | Refills: 0 | Status: SHIPPED | OUTPATIENT
Start: 2023-01-01 | End: 2023-08-16

## 2023-01-01 RX ORDER — UREA 10 %
500 LOTION (ML) TOPICAL DAILY
Status: DISCONTINUED | OUTPATIENT
Start: 2023-01-01 | End: 2023-01-01 | Stop reason: HOSPADM

## 2023-01-01 RX ORDER — FUROSEMIDE 40 MG
40 TABLET ORAL DAILY
Qty: 90 TABLET | Refills: 3 | Status: ON HOLD | OUTPATIENT
Start: 2023-01-01 | End: 2023-01-01

## 2023-01-01 RX ORDER — OXYCODONE HYDROCHLORIDE 5 MG/1
5 TABLET ORAL EVERY 4 HOURS PRN
Status: DISCONTINUED | OUTPATIENT
Start: 2023-01-01 | End: 2023-01-01

## 2023-01-01 RX ORDER — ALLOPURINOL 100 MG/1
200 TABLET ORAL DAILY
COMMUNITY
Start: 2023-01-01

## 2023-01-01 RX ORDER — POLYETHYLENE GLYCOL 3350 17 G/17G
17 POWDER, FOR SOLUTION ORAL ONCE
Status: COMPLETED | OUTPATIENT
Start: 2023-01-01 | End: 2023-01-01

## 2023-01-01 RX ORDER — PIPERACILLIN SODIUM, TAZOBACTAM SODIUM 4; .5 G/20ML; G/20ML
4.5 INJECTION, POWDER, LYOPHILIZED, FOR SOLUTION INTRAVENOUS ONCE
Status: COMPLETED | OUTPATIENT
Start: 2023-01-01 | End: 2023-01-01

## 2023-01-01 RX ORDER — CLOPIDOGREL BISULFATE 75 MG/1
TABLET ORAL
Qty: 90 TABLET | Refills: 3 | OUTPATIENT
Start: 2023-01-01

## 2023-01-01 RX ORDER — GABAPENTIN 300 MG/1
300 CAPSULE ORAL
Status: DISCONTINUED | OUTPATIENT
Start: 2023-01-01 | End: 2023-01-01 | Stop reason: HOSPADM

## 2023-01-01 RX ORDER — GLIMEPIRIDE 1 MG/1
TABLET ORAL
Qty: 300 TABLET | Refills: 2 | Status: SHIPPED | OUTPATIENT
Start: 2023-01-01 | End: 2023-01-01

## 2023-01-01 RX ORDER — TAMSULOSIN HYDROCHLORIDE 0.4 MG/1
0.4 CAPSULE ORAL DAILY
Qty: 90 CAPSULE | Refills: 3 | Status: SHIPPED | OUTPATIENT
Start: 2023-01-01 | End: 2023-01-01

## 2023-01-01 RX ORDER — ACETAMINOPHEN 325 MG/1
650 TABLET ORAL EVERY 4 HOURS PRN
Status: DISCONTINUED | OUTPATIENT
Start: 2023-01-01 | End: 2023-01-01

## 2023-01-01 RX ORDER — PROCHLORPERAZINE MALEATE 5 MG
5 TABLET ORAL EVERY 6 HOURS PRN
Status: DISCONTINUED | OUTPATIENT
Start: 2023-01-01 | End: 2023-01-01 | Stop reason: HOSPADM

## 2023-01-01 RX ORDER — HYDROMORPHONE HYDROCHLORIDE 1 MG/ML
0.5 INJECTION, SOLUTION INTRAMUSCULAR; INTRAVENOUS; SUBCUTANEOUS
Status: COMPLETED | OUTPATIENT
Start: 2023-01-01 | End: 2023-01-01

## 2023-01-01 RX ORDER — MIRTAZAPINE 15 MG/1
15 TABLET, FILM COATED ORAL AT BEDTIME
Status: DISCONTINUED | OUTPATIENT
Start: 2023-01-01 | End: 2023-01-01 | Stop reason: HOSPADM

## 2023-01-01 RX ORDER — IRBESARTAN 75 MG/1
75 TABLET ORAL AT BEDTIME
Status: DISCONTINUED | OUTPATIENT
Start: 2023-01-01 | End: 2023-01-01 | Stop reason: HOSPADM

## 2023-01-01 RX ORDER — FLUOCINOLONE ACETONIDE 0.1 MG/ML
SOLUTION TOPICAL 2 TIMES DAILY PRN
Status: DISCONTINUED | OUTPATIENT
Start: 2023-01-01 | End: 2023-01-01 | Stop reason: HOSPADM

## 2023-01-01 RX ORDER — COLCHICINE 0.6 MG/1
0.6 TABLET ORAL DAILY
Qty: 90 TABLET | Refills: 3 | Status: SHIPPED | OUTPATIENT
Start: 2023-01-01

## 2023-01-01 RX ORDER — IRBESARTAN 150 MG/1
75 TABLET ORAL AT BEDTIME
Qty: 100 TABLET | Refills: 1 | Status: SHIPPED | OUTPATIENT
Start: 2023-01-01 | End: 2023-01-01

## 2023-01-01 RX ORDER — CEFTRIAXONE 2 G/1
2 INJECTION, POWDER, FOR SOLUTION INTRAMUSCULAR; INTRAVENOUS ONCE
Status: COMPLETED | OUTPATIENT
Start: 2023-01-01 | End: 2023-01-01

## 2023-01-01 RX ORDER — GLIMEPIRIDE 1 MG/1
1 TABLET ORAL
COMMUNITY
End: 2023-01-01

## 2023-01-01 RX ORDER — AMOXICILLIN 250 MG
2 CAPSULE ORAL 2 TIMES DAILY PRN
Status: DISCONTINUED | OUTPATIENT
Start: 2023-01-01 | End: 2023-01-01 | Stop reason: HOSPADM

## 2023-01-01 RX ORDER — MIRTAZAPINE 15 MG/1
15 TABLET, FILM COATED ORAL AT BEDTIME
Start: 2023-01-01

## 2023-01-01 RX ORDER — ATORVASTATIN CALCIUM 40 MG/1
40 TABLET, FILM COATED ORAL DAILY
Qty: 100 TABLET | Refills: 0 | Status: SHIPPED | OUTPATIENT
Start: 2023-01-01 | End: 2023-01-01

## 2023-01-01 RX ORDER — SPIRONOLACTONE 25 MG/1
25 TABLET ORAL DAILY
COMMUNITY
End: 2023-01-01

## 2023-01-01 RX ADMIN — COLCHICINE 0.6 MG: 0.6 TABLET, FILM COATED ORAL at 09:22

## 2023-01-01 RX ADMIN — MUPIROCIN: 20 OINTMENT TOPICAL at 10:18

## 2023-01-01 RX ADMIN — IRBESARTAN 75 MG: 75 TABLET ORAL at 21:41

## 2023-01-01 RX ADMIN — CLOBETASOL PROPIONATE: 0.5 CREAM TOPICAL at 20:18

## 2023-01-01 RX ADMIN — SODIUM CHLORIDE, POTASSIUM CHLORIDE, SODIUM LACTATE AND CALCIUM CHLORIDE: 600; 310; 30; 20 INJECTION, SOLUTION INTRAVENOUS at 00:25

## 2023-01-01 RX ADMIN — Medication 500 MCG: at 20:06

## 2023-01-01 RX ADMIN — ACETAMINOPHEN 650 MG: 325 TABLET, FILM COATED ORAL at 15:16

## 2023-01-01 RX ADMIN — HEPARIN SODIUM 5000 UNITS: 5000 INJECTION, SOLUTION INTRAVENOUS; SUBCUTANEOUS at 08:56

## 2023-01-01 RX ADMIN — SODIUM CHLORIDE, POTASSIUM CHLORIDE, SODIUM LACTATE AND CALCIUM CHLORIDE 1000 ML: 600; 310; 30; 20 INJECTION, SOLUTION INTRAVENOUS at 10:21

## 2023-01-01 RX ADMIN — VANCOMYCIN HYDROCHLORIDE 1500 MG: 10 INJECTION, POWDER, LYOPHILIZED, FOR SOLUTION INTRAVENOUS at 09:27

## 2023-01-01 RX ADMIN — TRAZODONE HYDROCHLORIDE 150 MG: 100 TABLET ORAL at 22:42

## 2023-01-01 RX ADMIN — FINASTERIDE 5 MG: 5 TABLET, FILM COATED ORAL at 10:48

## 2023-01-01 RX ADMIN — ACETAMINOPHEN 650 MG: 325 TABLET ORAL at 18:51

## 2023-01-01 RX ADMIN — HEPARIN SODIUM 5000 UNITS: 5000 INJECTION, SOLUTION INTRAVENOUS; SUBCUTANEOUS at 10:48

## 2023-01-01 RX ADMIN — ATORVASTATIN CALCIUM 40 MG: 40 TABLET, FILM COATED ORAL at 08:28

## 2023-01-01 RX ADMIN — Medication 100 MCG: at 09:10

## 2023-01-01 RX ADMIN — THIAMINE HYDROCHLORIDE 250 MG: 100 INJECTION, SOLUTION INTRAMUSCULAR; INTRAVENOUS at 14:20

## 2023-01-01 RX ADMIN — HEPARIN SODIUM 5000 UNITS: 5000 INJECTION, SOLUTION INTRAVENOUS; SUBCUTANEOUS at 09:27

## 2023-01-01 RX ADMIN — INSULIN ASPART 3 UNITS: 100 INJECTION, SOLUTION INTRAVENOUS; SUBCUTANEOUS at 08:26

## 2023-01-01 RX ADMIN — ACETAMINOPHEN 650 MG: 325 TABLET ORAL at 12:49

## 2023-01-01 RX ADMIN — MAGNESIUM SULFATE IN WATER 4 G: 40 INJECTION, SOLUTION INTRAVENOUS at 10:14

## 2023-01-01 RX ADMIN — SENNOSIDES 8.6 MG: 8.6 TABLET, FILM COATED ORAL at 07:49

## 2023-01-01 RX ADMIN — CLOBETASOL PROPIONATE: 0.5 CREAM TOPICAL at 09:11

## 2023-01-01 RX ADMIN — THIAMINE HYDROCHLORIDE 500 MG: 100 INJECTION, SOLUTION INTRAMUSCULAR; INTRAVENOUS at 14:32

## 2023-01-01 RX ADMIN — ALLOPURINOL 300 MG: 300 TABLET ORAL at 21:10

## 2023-01-01 RX ADMIN — ATORVASTATIN CALCIUM 40 MG: 40 TABLET, FILM COATED ORAL at 08:48

## 2023-01-01 RX ADMIN — HEPARIN SODIUM 5000 UNITS: 5000 INJECTION, SOLUTION INTRAVENOUS; SUBCUTANEOUS at 09:23

## 2023-01-01 RX ADMIN — CLOPIDOGREL BISULFATE 75 MG: 75 TABLET ORAL at 09:11

## 2023-01-01 RX ADMIN — PIPERACILLIN AND TAZOBACTAM 2.25 G: 2; .25 INJECTION, POWDER, FOR SOLUTION INTRAVENOUS at 15:15

## 2023-01-01 RX ADMIN — HEPARIN SODIUM 5000 UNITS: 5000 INJECTION, SOLUTION INTRAVENOUS; SUBCUTANEOUS at 21:20

## 2023-01-01 RX ADMIN — ATORVASTATIN CALCIUM 40 MG: 40 TABLET, FILM COATED ORAL at 09:21

## 2023-01-01 RX ADMIN — INSULIN ASPART 1 UNITS: 100 INJECTION, SOLUTION INTRAVENOUS; SUBCUTANEOUS at 09:10

## 2023-01-01 RX ADMIN — HEPARIN SODIUM 5000 UNITS: 5000 INJECTION, SOLUTION INTRAVENOUS; SUBCUTANEOUS at 07:54

## 2023-01-01 RX ADMIN — ASPIRIN 81 MG CHEWABLE TABLET 81 MG: 81 TABLET CHEWABLE at 08:44

## 2023-01-01 RX ADMIN — FOLIC ACID 1 MG: 1 TABLET ORAL at 08:44

## 2023-01-01 RX ADMIN — COLCHICINE 0.6 MG: 0.6 TABLET, FILM COATED ORAL at 10:48

## 2023-01-01 RX ADMIN — GABAPENTIN 300 MG: 300 CAPSULE ORAL at 21:08

## 2023-01-01 RX ADMIN — COLCHICINE 0.6 MG: 0.6 TABLET, FILM COATED ORAL at 07:44

## 2023-01-01 RX ADMIN — ONDANSETRON 4 MG: 2 INJECTION INTRAMUSCULAR; INTRAVENOUS at 07:49

## 2023-01-01 RX ADMIN — SODIUM CHLORIDE, POTASSIUM CHLORIDE, SODIUM LACTATE AND CALCIUM CHLORIDE: 600; 310; 30; 20 INJECTION, SOLUTION INTRAVENOUS at 09:49

## 2023-01-01 RX ADMIN — FLUOXETINE HYDROCHLORIDE 40 MG: 40 CAPSULE ORAL at 08:49

## 2023-01-01 RX ADMIN — THIAMINE HYDROCHLORIDE 500 MG: 100 INJECTION, SOLUTION INTRAMUSCULAR; INTRAVENOUS at 20:11

## 2023-01-01 RX ADMIN — FOLIC ACID 1 MG: 1 TABLET ORAL at 07:42

## 2023-01-01 RX ADMIN — FLUOXETINE 20 MG: 20 CAPSULE ORAL at 10:48

## 2023-01-01 RX ADMIN — COLCHICINE 0.6 MG: 0.6 TABLET, FILM COATED ORAL at 15:51

## 2023-01-01 RX ADMIN — ACETAMINOPHEN 650 MG: 325 TABLET ORAL at 22:27

## 2023-01-01 RX ADMIN — THIAMINE HYDROCHLORIDE 250 MG: 100 INJECTION, SOLUTION INTRAMUSCULAR; INTRAVENOUS at 10:24

## 2023-01-01 RX ADMIN — ACETAMINOPHEN 650 MG: 325 TABLET, FILM COATED ORAL at 05:56

## 2023-01-01 RX ADMIN — MUPIROCIN: 20 OINTMENT TOPICAL at 09:10

## 2023-01-01 RX ADMIN — CLOPIDOGREL BISULFATE 75 MG: 75 TABLET ORAL at 21:10

## 2023-01-01 RX ADMIN — ACETAMINOPHEN 650 MG: 325 TABLET ORAL at 09:18

## 2023-01-01 RX ADMIN — SODIUM CHLORIDE 500 ML: 9 INJECTION, SOLUTION INTRAVENOUS at 08:58

## 2023-01-01 RX ADMIN — DEXTROSE 15 G: 15 GEL ORAL at 13:36

## 2023-01-01 RX ADMIN — FOLIC ACID 1 MG: 1 TABLET ORAL at 09:09

## 2023-01-01 RX ADMIN — INSULIN ASPART 1 UNITS: 100 INJECTION, SOLUTION INTRAVENOUS; SUBCUTANEOUS at 12:54

## 2023-01-01 RX ADMIN — CEFTRIAXONE SODIUM 2 G: 2 INJECTION, POWDER, FOR SOLUTION INTRAMUSCULAR; INTRAVENOUS at 12:25

## 2023-01-01 RX ADMIN — ATORVASTATIN CALCIUM 40 MG: 40 TABLET, FILM COATED ORAL at 21:10

## 2023-01-01 RX ADMIN — SENNOSIDES 8.6 MG: 8.6 TABLET, FILM COATED ORAL at 17:19

## 2023-01-01 RX ADMIN — Medication 500 MCG: at 08:44

## 2023-01-01 RX ADMIN — HYDROMORPHONE HYDROCHLORIDE 0.5 MG: 1 INJECTION, SOLUTION INTRAMUSCULAR; INTRAVENOUS; SUBCUTANEOUS at 13:01

## 2023-01-01 RX ADMIN — ATORVASTATIN CALCIUM 40 MG: 40 TABLET, FILM COATED ORAL at 08:03

## 2023-01-01 RX ADMIN — TAMSULOSIN HYDROCHLORIDE 0.4 MG: 0.4 CAPSULE ORAL at 10:48

## 2023-01-01 RX ADMIN — Medication 500 MCG: at 08:56

## 2023-01-01 RX ADMIN — INSULIN ASPART 1 UNITS: 100 INJECTION, SOLUTION INTRAVENOUS; SUBCUTANEOUS at 12:51

## 2023-01-01 RX ADMIN — LIDOCAINE PATCH 4% 1 PATCH: 40 PATCH TOPICAL at 08:58

## 2023-01-01 RX ADMIN — ALLOPURINOL 300 MG: 300 TABLET ORAL at 10:48

## 2023-01-01 RX ADMIN — ISOSORBIDE MONONITRATE 30 MG: 30 TABLET, EXTENDED RELEASE ORAL at 10:14

## 2023-01-01 RX ADMIN — TAMSULOSIN HYDROCHLORIDE 0.4 MG: 0.4 CAPSULE ORAL at 09:09

## 2023-01-01 RX ADMIN — COLCHICINE 0.6 MG: 0.6 TABLET, FILM COATED ORAL at 10:13

## 2023-01-01 RX ADMIN — ATORVASTATIN CALCIUM 40 MG: 40 TABLET, FILM COATED ORAL at 07:42

## 2023-01-01 RX ADMIN — THIAMINE HYDROCHLORIDE 500 MG: 100 INJECTION, SOLUTION INTRAMUSCULAR; INTRAVENOUS at 08:55

## 2023-01-01 RX ADMIN — ASPIRIN 81 MG CHEWABLE TABLET 81 MG: 81 TABLET CHEWABLE at 08:28

## 2023-01-01 RX ADMIN — HEPARIN SODIUM 5000 UNITS: 5000 INJECTION, SOLUTION INTRAVENOUS; SUBCUTANEOUS at 20:11

## 2023-01-01 RX ADMIN — GABAPENTIN 100 MG: 100 CAPSULE ORAL at 21:01

## 2023-01-01 RX ADMIN — ACETAMINOPHEN 650 MG: 325 TABLET, FILM COATED ORAL at 04:20

## 2023-01-01 RX ADMIN — POLYETHYLENE GLYCOL 3350 17 G: 17 POWDER, FOR SOLUTION ORAL at 08:44

## 2023-01-01 RX ADMIN — Medication 500 MCG: at 07:44

## 2023-01-01 RX ADMIN — FINASTERIDE 5 MG: 5 TABLET, FILM COATED ORAL at 20:06

## 2023-01-01 RX ADMIN — MIRTAZAPINE 15 MG: 15 TABLET, FILM COATED ORAL at 21:29

## 2023-01-01 RX ADMIN — PIPERACILLIN AND TAZOBACTAM 4.5 G: 4; .5 INJECTION, POWDER, FOR SOLUTION INTRAVENOUS at 07:55

## 2023-01-01 RX ADMIN — SODIUM CHLORIDE, POTASSIUM CHLORIDE, SODIUM LACTATE AND CALCIUM CHLORIDE: 600; 310; 30; 20 INJECTION, SOLUTION INTRAVENOUS at 10:56

## 2023-01-01 RX ADMIN — ALLOPURINOL 300 MG: 300 TABLET ORAL at 10:15

## 2023-01-01 RX ADMIN — AZITHROMYCIN 500 MG: 500 INJECTION, POWDER, LYOPHILIZED, FOR SOLUTION INTRAVENOUS at 13:01

## 2023-01-01 RX ADMIN — ASPIRIN 81 MG CHEWABLE TABLET 81 MG: 81 TABLET CHEWABLE at 08:03

## 2023-01-01 RX ADMIN — FINASTERIDE 5 MG: 5 TABLET, FILM COATED ORAL at 07:54

## 2023-01-01 RX ADMIN — FOLIC ACID 1 MG: 1 TABLET ORAL at 08:03

## 2023-01-01 RX ADMIN — FLUOXETINE HYDROCHLORIDE 40 MG: 40 CAPSULE ORAL at 20:06

## 2023-01-01 RX ADMIN — Medication 1 MG: at 21:07

## 2023-01-01 RX ADMIN — ATORVASTATIN CALCIUM 40 MG: 40 TABLET, FILM COATED ORAL at 10:47

## 2023-01-01 RX ADMIN — HEPARIN SODIUM 5000 UNITS: 5000 INJECTION, SOLUTION INTRAVENOUS; SUBCUTANEOUS at 20:18

## 2023-01-01 RX ADMIN — ASPIRIN 81 MG CHEWABLE TABLET 81 MG: 81 TABLET CHEWABLE at 07:43

## 2023-01-01 RX ADMIN — LIDOCAINE PATCH 4% 1 PATCH: 40 PATCH TOPICAL at 10:08

## 2023-01-01 RX ADMIN — ASPIRIN 81 MG CHEWABLE TABLET 81 MG: 81 TABLET CHEWABLE at 10:15

## 2023-01-01 RX ADMIN — Medication 100 MCG: at 09:50

## 2023-01-01 RX ADMIN — ATORVASTATIN CALCIUM 40 MG: 40 TABLET, FILM COATED ORAL at 08:44

## 2023-01-01 RX ADMIN — LIDOCAINE PATCH 4% 1 PATCH: 40 PATCH TOPICAL at 08:41

## 2023-01-01 RX ADMIN — FOLIC ACID 5 MG: 1 TABLET ORAL at 10:18

## 2023-01-01 RX ADMIN — ALLOPURINOL 200 MG: 100 TABLET ORAL at 08:44

## 2023-01-01 RX ADMIN — Medication 100 MCG: at 10:14

## 2023-01-01 RX ADMIN — MUPIROCIN: 20 OINTMENT TOPICAL at 21:28

## 2023-01-01 RX ADMIN — CLOBETASOL PROPIONATE: 0.5 CREAM TOPICAL at 09:26

## 2023-01-01 RX ADMIN — MUPIROCIN: 20 OINTMENT TOPICAL at 19:49

## 2023-01-01 RX ADMIN — Medication 5 MG: at 22:42

## 2023-01-01 RX ADMIN — ASPIRIN 81 MG CHEWABLE TABLET 81 MG: 81 TABLET CHEWABLE at 07:44

## 2023-01-01 RX ADMIN — ATORVASTATIN CALCIUM 40 MG: 40 TABLET, FILM COATED ORAL at 09:09

## 2023-01-01 RX ADMIN — FOLIC ACID 1 MG: 1 TABLET ORAL at 09:22

## 2023-01-01 RX ADMIN — FLUOXETINE HYDROCHLORIDE 40 MG: 40 CAPSULE ORAL at 08:03

## 2023-01-01 RX ADMIN — Medication 500 MCG: at 07:42

## 2023-01-01 RX ADMIN — ACETAMINOPHEN 650 MG: 325 TABLET ORAL at 17:26

## 2023-01-01 RX ADMIN — ASPIRIN 81 MG CHEWABLE TABLET 81 MG: 81 TABLET CHEWABLE at 09:09

## 2023-01-01 RX ADMIN — FLUOXETINE HYDROCHLORIDE 40 MG: 40 CAPSULE ORAL at 07:42

## 2023-01-01 RX ADMIN — SODIUM CHLORIDE 500 ML: 9 INJECTION, SOLUTION INTRAVENOUS at 11:30

## 2023-01-01 RX ADMIN — OXYCODONE HYDROCHLORIDE 5 MG: 5 TABLET ORAL at 09:09

## 2023-01-01 RX ADMIN — HEPARIN SODIUM 5000 UNITS: 5000 INJECTION, SOLUTION INTRAVENOUS; SUBCUTANEOUS at 07:42

## 2023-01-01 RX ADMIN — Medication 25 MG: at 11:11

## 2023-01-01 RX ADMIN — FINASTERIDE 5 MG: 5 TABLET, FILM COATED ORAL at 10:14

## 2023-01-01 RX ADMIN — CLOPIDOGREL BISULFATE 75 MG: 75 TABLET ORAL at 10:15

## 2023-01-01 RX ADMIN — TAMSULOSIN HYDROCHLORIDE 0.4 MG: 0.4 CAPSULE ORAL at 21:11

## 2023-01-01 RX ADMIN — MIRTAZAPINE 15 MG: 15 TABLET, FILM COATED ORAL at 21:07

## 2023-01-01 RX ADMIN — MAGNESIUM SULFATE IN WATER 4 G: 40 INJECTION, SOLUTION INTRAVENOUS at 16:07

## 2023-01-01 RX ADMIN — THIAMINE HYDROCHLORIDE 250 MG: 100 INJECTION, SOLUTION INTRAMUSCULAR; INTRAVENOUS at 09:40

## 2023-01-01 RX ADMIN — FLUOXETINE HYDROCHLORIDE 40 MG: 40 CAPSULE ORAL at 08:24

## 2023-01-01 RX ADMIN — FOLIC ACID 1 MG: 1 TABLET ORAL at 20:06

## 2023-01-01 RX ADMIN — TAMSULOSIN HYDROCHLORIDE 0.4 MG: 0.4 CAPSULE ORAL at 10:14

## 2023-01-01 RX ADMIN — SODIUM CHLORIDE, POTASSIUM CHLORIDE, SODIUM LACTATE AND CALCIUM CHLORIDE 2205 ML: 600; 310; 30; 20 INJECTION, SOLUTION INTRAVENOUS at 07:04

## 2023-01-01 RX ADMIN — SENNOSIDES 8.6 MG: 8.6 TABLET, COATED ORAL at 19:49

## 2023-01-01 RX ADMIN — HEPARIN SODIUM 5000 UNITS: 5000 INJECTION, SOLUTION INTRAVENOUS; SUBCUTANEOUS at 09:09

## 2023-01-01 RX ADMIN — Medication 1 MG: at 19:35

## 2023-01-01 RX ADMIN — POLYETHYLENE GLYCOL 3350 17 G: 17 POWDER, FOR SOLUTION ORAL at 10:08

## 2023-01-01 RX ADMIN — SENNOSIDES 8.6 MG: 8.6 TABLET, COATED ORAL at 10:14

## 2023-01-01 RX ADMIN — HEPARIN SODIUM 5000 UNITS: 5000 INJECTION, SOLUTION INTRAVENOUS; SUBCUTANEOUS at 20:28

## 2023-01-01 RX ADMIN — MAGNESIUM SULFATE IN WATER 4 G: 40 INJECTION, SOLUTION INTRAVENOUS at 10:21

## 2023-01-01 RX ADMIN — POLYETHYLENE GLYCOL 3350 17 G: 17 POWDER, FOR SOLUTION ORAL at 07:42

## 2023-01-01 RX ADMIN — THIAMINE HYDROCHLORIDE 250 MG: 100 INJECTION, SOLUTION INTRAMUSCULAR; INTRAVENOUS at 20:19

## 2023-01-01 RX ADMIN — FLUOXETINE 20 MG: 20 CAPSULE ORAL at 21:11

## 2023-01-01 RX ADMIN — MUPIROCIN: 20 OINTMENT TOPICAL at 21:12

## 2023-01-01 RX ADMIN — THIAMINE HYDROCHLORIDE 250 MG: 100 INJECTION, SOLUTION INTRAMUSCULAR; INTRAVENOUS at 21:27

## 2023-01-01 RX ADMIN — ALLOPURINOL 300 MG: 300 TABLET ORAL at 09:50

## 2023-01-01 RX ADMIN — SENNOSIDES 8.6 MG: 8.6 TABLET, COATED ORAL at 20:28

## 2023-01-01 RX ADMIN — GADOBUTROL 7.5 ML: 604.72 INJECTION INTRAVENOUS at 13:14

## 2023-01-01 RX ADMIN — THIAMINE HYDROCHLORIDE 250 MG: 100 INJECTION, SOLUTION INTRAMUSCULAR; INTRAVENOUS at 07:53

## 2023-01-01 RX ADMIN — Medication 500 MCG: at 08:28

## 2023-01-01 RX ADMIN — GABAPENTIN 300 MG: 300 CAPSULE ORAL at 21:07

## 2023-01-01 RX ADMIN — ISOSORBIDE MONONITRATE 30 MG: 30 TABLET, EXTENDED RELEASE ORAL at 09:24

## 2023-01-01 RX ADMIN — ACETAMINOPHEN 650 MG: 325 TABLET, FILM COATED ORAL at 21:29

## 2023-01-01 RX ADMIN — ISOSORBIDE MONONITRATE 30 MG: 30 TABLET, EXTENDED RELEASE ORAL at 09:10

## 2023-01-01 RX ADMIN — HEPARIN SODIUM 5000 UNITS: 5000 INJECTION, SOLUTION INTRAVENOUS; SUBCUTANEOUS at 20:39

## 2023-01-01 RX ADMIN — FLUOXETINE HYDROCHLORIDE 40 MG: 40 CAPSULE ORAL at 07:54

## 2023-01-01 RX ADMIN — FLUOXETINE 20 MG: 20 CAPSULE ORAL at 09:25

## 2023-01-01 RX ADMIN — HEPARIN SODIUM 5000 UNITS: 5000 INJECTION, SOLUTION INTRAVENOUS; SUBCUTANEOUS at 08:03

## 2023-01-01 RX ADMIN — CLOBETASOL PROPIONATE: 0.5 CREAM TOPICAL at 19:49

## 2023-01-01 RX ADMIN — Medication 5 MG: at 23:27

## 2023-01-01 RX ADMIN — MAGNESIUM SULFATE IN WATER 4 G: 40 INJECTION, SOLUTION INTRAVENOUS at 08:55

## 2023-01-01 RX ADMIN — SODIUM CHLORIDE, POTASSIUM CHLORIDE, SODIUM LACTATE AND CALCIUM CHLORIDE: 600; 310; 30; 20 INJECTION, SOLUTION INTRAVENOUS at 23:18

## 2023-01-01 RX ADMIN — ATORVASTATIN CALCIUM 40 MG: 40 TABLET, FILM COATED ORAL at 10:14

## 2023-01-01 RX ADMIN — ALLOPURINOL 300 MG: 300 TABLET ORAL at 09:10

## 2023-01-01 RX ADMIN — FLUOXETINE 20 MG: 20 CAPSULE ORAL at 09:51

## 2023-01-01 RX ADMIN — Medication 5 MG: at 21:01

## 2023-01-01 RX ADMIN — GABAPENTIN 300 MG: 300 CAPSULE ORAL at 20:44

## 2023-01-01 RX ADMIN — HUMAN ALBUMIN MICROSPHERES AND PERFLUTREN 5 ML: 10; .22 INJECTION, SOLUTION INTRAVENOUS at 07:54

## 2023-01-01 RX ADMIN — GABAPENTIN 100 MG: 100 CAPSULE ORAL at 21:29

## 2023-01-01 RX ADMIN — MAGNESIUM SULFATE IN WATER 2 G: 40 INJECTION, SOLUTION INTRAVENOUS at 10:49

## 2023-01-01 RX ADMIN — SODIUM CHLORIDE, POTASSIUM CHLORIDE, SODIUM LACTATE AND CALCIUM CHLORIDE 1000 ML: 600; 310; 30; 20 INJECTION, SOLUTION INTRAVENOUS at 10:12

## 2023-01-01 RX ADMIN — Medication 5 MG: at 21:40

## 2023-01-01 RX ADMIN — FINASTERIDE 5 MG: 5 TABLET, FILM COATED ORAL at 07:42

## 2023-01-01 RX ADMIN — CLOPIDOGREL BISULFATE 75 MG: 75 TABLET ORAL at 09:51

## 2023-01-01 RX ADMIN — MIRTAZAPINE 15 MG: 15 TABLET, FILM COATED ORAL at 20:08

## 2023-01-01 RX ADMIN — FOLIC ACID 1 MG: 1 TABLET ORAL at 21:11

## 2023-01-01 RX ADMIN — TAMSULOSIN HYDROCHLORIDE 0.4 MG: 0.4 CAPSULE ORAL at 09:51

## 2023-01-01 RX ADMIN — HEPARIN SODIUM 5000 UNITS: 5000 INJECTION, SOLUTION INTRAVENOUS; SUBCUTANEOUS at 21:07

## 2023-01-01 RX ADMIN — INSULIN ASPART 1 UNITS: 100 INJECTION, SOLUTION INTRAVENOUS; SUBCUTANEOUS at 18:08

## 2023-01-01 RX ADMIN — Medication 100 MCG: at 09:23

## 2023-01-01 RX ADMIN — ATORVASTATIN CALCIUM 40 MG: 40 TABLET, FILM COATED ORAL at 07:54

## 2023-01-01 RX ADMIN — CLOPIDOGREL BISULFATE 75 MG: 75 TABLET ORAL at 10:48

## 2023-01-01 RX ADMIN — MIRTAZAPINE 15 MG: 15 TABLET, FILM COATED ORAL at 20:44

## 2023-01-01 RX ADMIN — POLYETHYLENE GLYCOL 3350 17 G: 17 POWDER, FOR SOLUTION ORAL at 10:49

## 2023-01-01 RX ADMIN — FOLIC ACID 5 MG: 1 TABLET ORAL at 09:51

## 2023-01-01 RX ADMIN — HEPARIN SODIUM 5000 UNITS: 5000 INJECTION, SOLUTION INTRAVENOUS; SUBCUTANEOUS at 21:11

## 2023-01-01 RX ADMIN — FOLIC ACID 5 MG: 1 TABLET ORAL at 10:47

## 2023-01-01 RX ADMIN — IOPAMIDOL 100 ML: 755 INJECTION, SOLUTION INTRAVENOUS at 08:43

## 2023-01-01 RX ADMIN — FOLIC ACID 1 MG: 1 TABLET ORAL at 08:24

## 2023-01-01 RX ADMIN — THIAMINE HYDROCHLORIDE 250 MG: 100 INJECTION, SOLUTION INTRAMUSCULAR; INTRAVENOUS at 07:37

## 2023-01-01 RX ADMIN — SENNOSIDES 8.6 MG: 8.6 TABLET, FILM COATED ORAL at 21:36

## 2023-01-01 RX ADMIN — ASPIRIN 81 MG CHEWABLE TABLET 81 MG: 81 TABLET CHEWABLE at 08:49

## 2023-01-01 RX ADMIN — ASPIRIN 81 MG CHEWABLE TABLET 81 MG: 81 TABLET CHEWABLE at 10:47

## 2023-01-01 RX ADMIN — ATORVASTATIN CALCIUM 40 MG: 40 TABLET, FILM COATED ORAL at 20:06

## 2023-01-01 RX ADMIN — ACETAMINOPHEN 650 MG: 325 TABLET ORAL at 10:43

## 2023-01-01 RX ADMIN — FOLIC ACID 1 MG: 1 TABLET ORAL at 07:54

## 2023-01-01 RX ADMIN — HEPARIN SODIUM 5000 UNITS: 5000 INJECTION, SOLUTION INTRAVENOUS; SUBCUTANEOUS at 19:55

## 2023-01-01 RX ADMIN — FOLIC ACID 1 MG: 1 TABLET ORAL at 08:28

## 2023-01-01 RX ADMIN — Medication 1 MG: at 03:09

## 2023-01-01 RX ADMIN — CLOBETASOL PROPIONATE: 0.5 CREAM TOPICAL at 10:18

## 2023-01-01 RX ADMIN — FLUOXETINE 20 MG: 20 CAPSULE ORAL at 10:14

## 2023-01-01 RX ADMIN — CLOBETASOL PROPIONATE: 0.5 CREAM TOPICAL at 21:30

## 2023-01-01 RX ADMIN — CLOPIDOGREL BISULFATE 75 MG: 75 TABLET ORAL at 09:22

## 2023-01-01 RX ADMIN — THIAMINE HYDROCHLORIDE 250 MG: 100 INJECTION, SOLUTION INTRAMUSCULAR; INTRAVENOUS at 15:03

## 2023-01-01 RX ADMIN — THIAMINE HYDROCHLORIDE 500 MG: 100 INJECTION, SOLUTION INTRAMUSCULAR; INTRAVENOUS at 19:35

## 2023-01-01 RX ADMIN — FINASTERIDE 5 MG: 5 TABLET, FILM COATED ORAL at 09:11

## 2023-01-01 RX ADMIN — FINASTERIDE 5 MG: 5 TABLET, FILM COATED ORAL at 08:24

## 2023-01-01 RX ADMIN — POLYETHYLENE GLYCOL 3350 17 G: 17 POWDER, FOR SOLUTION ORAL at 07:54

## 2023-01-01 RX ADMIN — HYDROMORPHONE HYDROCHLORIDE 0.5 MG: 1 INJECTION, SOLUTION INTRAMUSCULAR; INTRAVENOUS; SUBCUTANEOUS at 15:03

## 2023-01-01 RX ADMIN — ACETAMINOPHEN 650 MG: 325 TABLET, FILM COATED ORAL at 19:22

## 2023-01-01 RX ADMIN — FINASTERIDE 5 MG: 5 TABLET, FILM COATED ORAL at 08:28

## 2023-01-01 RX ADMIN — FINASTERIDE 5 MG: 5 TABLET, FILM COATED ORAL at 09:50

## 2023-01-01 RX ADMIN — Medication 1 MG: at 21:01

## 2023-01-01 RX ADMIN — LIDOCAINE PATCH 4% 1 PATCH: 40 PATCH TOPICAL at 10:49

## 2023-01-01 RX ADMIN — FINASTERIDE 5 MG: 5 TABLET, FILM COATED ORAL at 07:44

## 2023-01-01 RX ADMIN — METHOTREXATE SODIUM 15 MG: 2.5 TABLET ORAL at 13:47

## 2023-01-01 RX ADMIN — FLUOXETINE HYDROCHLORIDE 40 MG: 40 CAPSULE ORAL at 07:44

## 2023-01-01 RX ADMIN — ASPIRIN 81 MG CHEWABLE TABLET 81 MG: 81 TABLET CHEWABLE at 09:51

## 2023-01-01 RX ADMIN — SODIUM CHLORIDE: 9 INJECTION, SOLUTION INTRAVENOUS at 09:07

## 2023-01-01 RX ADMIN — FINASTERIDE 5 MG: 5 TABLET, FILM COATED ORAL at 21:10

## 2023-01-01 RX ADMIN — COLCHICINE 0.6 MG: 0.6 TABLET, FILM COATED ORAL at 07:42

## 2023-01-01 RX ADMIN — HUMAN ALBUMIN MICROSPHERES AND PERFLUTREN 5 ML: 10; .22 INJECTION, SOLUTION INTRAVENOUS at 10:25

## 2023-01-01 RX ADMIN — HEPARIN SODIUM 5000 UNITS: 5000 INJECTION, SOLUTION INTRAVENOUS; SUBCUTANEOUS at 08:25

## 2023-01-01 RX ADMIN — Medication 12.5 MG: at 22:27

## 2023-01-01 RX ADMIN — HEPARIN SODIUM 5000 UNITS: 5000 INJECTION, SOLUTION INTRAVENOUS; SUBCUTANEOUS at 20:05

## 2023-01-01 RX ADMIN — POLYETHYLENE GLYCOL 3350 17 G: 17 POWDER, FOR SOLUTION ORAL at 08:28

## 2023-01-01 RX ADMIN — FLUOXETINE HYDROCHLORIDE 40 MG: 40 CAPSULE ORAL at 08:44

## 2023-01-01 RX ADMIN — ASPIRIN 81 MG CHEWABLE TABLET 81 MG: 81 TABLET CHEWABLE at 21:10

## 2023-01-01 RX ADMIN — SENNOSIDES 8.6 MG: 8.6 TABLET, COATED ORAL at 09:51

## 2023-01-01 RX ADMIN — GADOBUTROL 7.6 ML: 604.72 INJECTION INTRAVENOUS at 13:01

## 2023-01-01 RX ADMIN — ACETAMINOPHEN 650 MG: 325 TABLET, FILM COATED ORAL at 17:15

## 2023-01-01 RX ADMIN — MIRTAZAPINE 15 MG: 15 TABLET, FILM COATED ORAL at 19:56

## 2023-01-01 RX ADMIN — POLYETHYLENE GLYCOL 3350 17 G: 17 POWDER, FOR SOLUTION ORAL at 16:36

## 2023-01-01 RX ADMIN — DEXTROSE 15 G: 15 GEL ORAL at 22:08

## 2023-01-01 RX ADMIN — Medication 500 MCG: at 08:24

## 2023-01-01 RX ADMIN — ALLOPURINOL 200 MG: 100 TABLET ORAL at 15:51

## 2023-01-01 RX ADMIN — HEPARIN SODIUM 5000 UNITS: 5000 INJECTION, SOLUTION INTRAVENOUS; SUBCUTANEOUS at 08:45

## 2023-01-01 RX ADMIN — THIAMINE HYDROCHLORIDE 500 MG: 100 INJECTION, SOLUTION INTRAMUSCULAR; INTRAVENOUS at 14:04

## 2023-01-01 RX ADMIN — Medication 1 MG: at 20:08

## 2023-01-01 RX ADMIN — Medication 1 MG: at 20:39

## 2023-01-01 RX ADMIN — SENNOSIDES 8.6 MG: 8.6 TABLET, COATED ORAL at 20:18

## 2023-01-01 RX ADMIN — ASPIRIN 81 MG CHEWABLE TABLET 81 MG: 81 TABLET CHEWABLE at 12:47

## 2023-01-01 RX ADMIN — FOLIC ACID 1 MG: 1 TABLET ORAL at 08:49

## 2023-01-01 RX ADMIN — TRAZODONE HYDROCHLORIDE 150 MG: 150 TABLET ORAL at 21:57

## 2023-01-01 RX ADMIN — ACETAMINOPHEN 650 MG: 325 TABLET, FILM COATED ORAL at 19:55

## 2023-01-01 RX ADMIN — MUPIROCIN: 20 OINTMENT TOPICAL at 09:26

## 2023-01-01 RX ADMIN — COLCHICINE 0.6 MG: 0.6 TABLET, FILM COATED ORAL at 10:15

## 2023-01-01 RX ADMIN — THIAMINE HYDROCHLORIDE 500 MG: 100 INJECTION, SOLUTION INTRAMUSCULAR; INTRAVENOUS at 08:47

## 2023-01-01 RX ADMIN — HEPARIN SODIUM 5000 UNITS: 5000 INJECTION, SOLUTION INTRAVENOUS; SUBCUTANEOUS at 21:02

## 2023-01-01 RX ADMIN — COLCHICINE 0.6 MG: 0.6 TABLET, FILM COATED ORAL at 21:11

## 2023-01-01 RX ADMIN — ATORVASTATIN CALCIUM 40 MG: 40 TABLET, FILM COATED ORAL at 07:44

## 2023-01-01 RX ADMIN — ALLOPURINOL 200 MG: 100 TABLET ORAL at 07:44

## 2023-01-01 RX ADMIN — HEPARIN SODIUM 5000 UNITS: 5000 INJECTION, SOLUTION INTRAVENOUS; SUBCUTANEOUS at 07:44

## 2023-01-01 RX ADMIN — THIAMINE HYDROCHLORIDE 250 MG: 100 INJECTION, SOLUTION INTRAMUSCULAR; INTRAVENOUS at 08:26

## 2023-01-01 RX ADMIN — THIAMINE HYDROCHLORIDE 250 MG: 100 INJECTION, SOLUTION INTRAMUSCULAR; INTRAVENOUS at 09:10

## 2023-01-01 RX ADMIN — FLUOXETINE HYDROCHLORIDE 40 MG: 40 CAPSULE ORAL at 08:28

## 2023-01-01 RX ADMIN — ASPIRIN 81 MG CHEWABLE TABLET 81 MG: 81 TABLET CHEWABLE at 09:24

## 2023-01-01 RX ADMIN — COLCHICINE 0.6 MG: 0.6 TABLET, FILM COATED ORAL at 09:09

## 2023-01-01 RX ADMIN — HEPARIN SODIUM 5000 UNITS: 5000 INJECTION, SOLUTION INTRAVENOUS; SUBCUTANEOUS at 19:43

## 2023-01-01 RX ADMIN — GABAPENTIN 100 MG: 100 CAPSULE ORAL at 19:35

## 2023-01-01 RX ADMIN — SENNOSIDES 8.6 MG: 8.6 TABLET, FILM COATED ORAL at 21:08

## 2023-01-01 RX ADMIN — TRAZODONE HYDROCHLORIDE 150 MG: 100 TABLET ORAL at 21:40

## 2023-01-01 RX ADMIN — HEPARIN SODIUM 5000 UNITS: 5000 INJECTION, SOLUTION INTRAVENOUS; SUBCUTANEOUS at 19:49

## 2023-01-01 RX ADMIN — TAMSULOSIN HYDROCHLORIDE 0.4 MG: 0.4 CAPSULE ORAL at 09:29

## 2023-01-01 RX ADMIN — Medication 1 MG: at 21:29

## 2023-01-01 RX ADMIN — ALLOPURINOL 200 MG: 100 TABLET ORAL at 07:43

## 2023-01-01 RX ADMIN — CLOBETASOL PROPIONATE: 0.5 CREAM TOPICAL at 21:12

## 2023-01-01 RX ADMIN — ISOSORBIDE MONONITRATE 30 MG: 30 TABLET, EXTENDED RELEASE ORAL at 09:51

## 2023-01-01 RX ADMIN — INSULIN ASPART 1 UNITS: 100 INJECTION, SOLUTION INTRAVENOUS; SUBCUTANEOUS at 17:20

## 2023-01-01 RX ADMIN — COLCHICINE 0.6 MG: 0.6 TABLET, FILM COATED ORAL at 09:50

## 2023-01-01 RX ADMIN — ACETAMINOPHEN 650 MG: 325 TABLET ORAL at 02:30

## 2023-01-01 RX ADMIN — ASPIRIN 81 MG CHEWABLE TABLET 81 MG: 81 TABLET CHEWABLE at 07:54

## 2023-01-01 RX ADMIN — FLUOXETINE 20 MG: 20 CAPSULE ORAL at 09:09

## 2023-01-01 RX ADMIN — ATORVASTATIN CALCIUM 40 MG: 40 TABLET, FILM COATED ORAL at 08:24

## 2023-01-01 RX ADMIN — SENNOSIDES 8.6 MG: 8.6 TABLET, COATED ORAL at 10:08

## 2023-01-01 RX ADMIN — ATORVASTATIN CALCIUM 40 MG: 40 TABLET, FILM COATED ORAL at 08:56

## 2023-01-01 RX ADMIN — MUPIROCIN: 20 OINTMENT TOPICAL at 20:19

## 2023-01-01 RX ADMIN — GABAPENTIN 300 MG: 300 CAPSULE ORAL at 20:07

## 2023-01-01 RX ADMIN — LORAZEPAM 1 MG: 0.5 TABLET ORAL at 12:28

## 2023-01-01 RX ADMIN — HEPARIN SODIUM 5000 UNITS: 5000 INJECTION, SOLUTION INTRAVENOUS; SUBCUTANEOUS at 08:27

## 2023-01-01 RX ADMIN — ALLOPURINOL 300 MG: 300 TABLET ORAL at 09:21

## 2023-01-01 RX ADMIN — Medication 500 MCG: at 07:54

## 2023-01-01 RX ADMIN — FINASTERIDE 5 MG: 5 TABLET, FILM COATED ORAL at 08:44

## 2023-01-01 RX ADMIN — ACETAMINOPHEN 650 MG: 325 TABLET ORAL at 04:38

## 2023-01-01 RX ADMIN — POLYETHYLENE GLYCOL 3350 17 G: 17 POWDER, FOR SOLUTION ORAL at 08:55

## 2023-01-01 RX ADMIN — HEPARIN SODIUM 5000 UNITS: 5000 INJECTION, SOLUTION INTRAVENOUS; SUBCUTANEOUS at 09:51

## 2023-01-01 RX ADMIN — FINASTERIDE 5 MG: 5 TABLET, FILM COATED ORAL at 08:56

## 2023-01-01 RX ADMIN — SODIUM CHLORIDE, POTASSIUM CHLORIDE, SODIUM LACTATE AND CALCIUM CHLORIDE 1000 ML: 600; 310; 30; 20 INJECTION, SOLUTION INTRAVENOUS at 10:08

## 2023-01-01 RX ADMIN — Medication 500 MCG: at 08:03

## 2023-01-01 RX ADMIN — FINASTERIDE 5 MG: 5 TABLET, FILM COATED ORAL at 09:23

## 2023-01-01 RX ADMIN — SENNOSIDES 8.6 MG: 8.6 TABLET, COATED ORAL at 10:47

## 2023-01-01 RX ADMIN — HEPARIN SODIUM 5000 UNITS: 5000 INJECTION, SOLUTION INTRAVENOUS; SUBCUTANEOUS at 09:10

## 2023-01-01 RX ADMIN — HEPARIN SODIUM 5000 UNITS: 5000 INJECTION, SOLUTION INTRAVENOUS; SUBCUTANEOUS at 19:35

## 2023-01-01 RX ADMIN — ISOSORBIDE MONONITRATE 30 MG: 30 TABLET, EXTENDED RELEASE ORAL at 10:48

## 2023-01-01 RX ADMIN — LORAZEPAM 1 MG: 1 TABLET ORAL at 12:32

## 2023-01-01 RX ADMIN — ASPIRIN 81 MG CHEWABLE TABLET 81 MG: 81 TABLET CHEWABLE at 08:56

## 2023-01-01 RX ADMIN — ATORVASTATIN CALCIUM 40 MG: 40 TABLET, FILM COATED ORAL at 09:50

## 2023-01-01 RX ADMIN — HEPARIN SODIUM 5000 UNITS: 5000 INJECTION, SOLUTION INTRAVENOUS; SUBCUTANEOUS at 20:07

## 2023-01-01 RX ADMIN — FLUOXETINE HYDROCHLORIDE 40 MG: 40 CAPSULE ORAL at 08:56

## 2023-01-01 RX ADMIN — FOLIC ACID 1 MG: 1 TABLET ORAL at 07:44

## 2023-01-01 RX ADMIN — FOLIC ACID 1 MG: 1 TABLET ORAL at 08:55

## 2023-01-01 RX ADMIN — Medication 100 MCG: at 10:48

## 2023-01-01 RX ADMIN — POLYETHYLENE GLYCOL 3350 17 G: 17 POWDER, FOR SOLUTION ORAL at 07:44

## 2023-01-01 RX ADMIN — FINASTERIDE 5 MG: 5 TABLET, FILM COATED ORAL at 08:49

## 2023-01-01 RX ADMIN — Medication 500 MCG: at 08:49

## 2023-01-01 RX ADMIN — MORPHINE SULFATE 2 MG: 2 INJECTION, SOLUTION INTRAMUSCULAR; INTRAVENOUS at 08:42

## 2023-01-01 RX ADMIN — THIAMINE HYDROCHLORIDE 250 MG: 100 INJECTION, SOLUTION INTRAMUSCULAR; INTRAVENOUS at 08:37

## 2023-01-01 RX ADMIN — TRAZODONE HYDROCHLORIDE 150 MG: 100 TABLET ORAL at 21:01

## 2023-01-01 RX ADMIN — Medication 100 MCG: at 21:11

## 2023-01-01 RX ADMIN — FINASTERIDE 5 MG: 5 TABLET, FILM COATED ORAL at 08:04

## 2023-01-01 ASSESSMENT — ACTIVITIES OF DAILY LIVING (ADL)
ADLS_ACUITY_SCORE: 53
ADLS_ACUITY_SCORE: 35
ADLS_ACUITY_SCORE: 51
ADLS_ACUITY_SCORE: 43
ADLS_ACUITY_SCORE: 37
ADLS_ACUITY_SCORE: 49
DRESS: 1-->ASSISTANCE (EQUIPMENT/PERSON) NEEDED
TRANSFERRING: 1-->ASSISTANCE (EQUIPMENT/PERSON) NEEDED (NOT DEVELOPMENTALLY APPROPRIATE)
ADLS_ACUITY_SCORE: 51
ADLS_ACUITY_SCORE: 53
ADLS_ACUITY_SCORE: 53
ADLS_ACUITY_SCORE: 43
ADLS_ACUITY_SCORE: 52
ADLS_ACUITY_SCORE: 49
ADLS_ACUITY_SCORE: 53
ADLS_ACUITY_SCORE: 48
ADLS_ACUITY_SCORE: 52
TRANSFERRING: 1-->ASSISTANCE (EQUIPMENT/PERSON) NEEDED
ADLS_ACUITY_SCORE: 51
ADLS_ACUITY_SCORE: 53
ADLS_ACUITY_SCORE: 55
ADLS_ACUITY_SCORE: 52
TOILETING_ISSUES: YES
ADLS_ACUITY_SCORE: 43
ADLS_ACUITY_SCORE: 47
ADLS_ACUITY_SCORE: 37
ADLS_ACUITY_SCORE: 35
ADLS_ACUITY_SCORE: 35
ADLS_ACUITY_SCORE: 53
ADLS_ACUITY_SCORE: 37
FALL_HISTORY_WITHIN_LAST_SIX_MONTHS: YES
ADLS_ACUITY_SCORE: 55
ADLS_ACUITY_SCORE: 52
ADLS_ACUITY_SCORE: 49
ADLS_ACUITY_SCORE: 52
ADLS_ACUITY_SCORE: 43
DEPENDENT_IADLS:: CLEANING;COOKING;LAUNDRY;SHOPPING;MEAL PREPARATION;MEDICATION MANAGEMENT;MONEY MANAGEMENT;TRANSPORTATION
ADLS_ACUITY_SCORE: 37
ADLS_ACUITY_SCORE: 49
ADLS_ACUITY_SCORE: 43
ADLS_ACUITY_SCORE: 48
ADLS_ACUITY_SCORE: 52
ADLS_ACUITY_SCORE: 37
ADLS_ACUITY_SCORE: 53
FALL_HISTORY_WITHIN_LAST_SIX_MONTHS: YES
ADLS_ACUITY_SCORE: 51
ADLS_ACUITY_SCORE: 55
CHANGE_IN_FUNCTIONAL_STATUS_SINCE_ONSET_OF_CURRENT_ILLNESS/INJURY: YES
ADLS_ACUITY_SCORE: 47
ADLS_ACUITY_SCORE: 53
ADLS_ACUITY_SCORE: 47
ADLS_ACUITY_SCORE: 48
DRESS: 1-->ASSISTANCE (EQUIPMENT/PERSON) NEEDED
TOILETING: 1-->ASSISTANCE (EQUIPMENT/PERSON) NEEDED
ADLS_ACUITY_SCORE: 35
TOILETING_ISSUES: YES
ADLS_ACUITY_SCORE: 52
ADLS_ACUITY_SCORE: 37
ADLS_ACUITY_SCORE: 43
ADLS_ACUITY_SCORE: 52
ADLS_ACUITY_SCORE: 52
ADLS_ACUITY_SCORE: 37
ADLS_ACUITY_SCORE: 43
WEAR_GLASSES_OR_BLIND: YES
ADLS_ACUITY_SCORE: 43
ADLS_ACUITY_SCORE: 55
ADLS_ACUITY_SCORE: 53
VISION_MANAGEMENT: GLASSES
ADLS_ACUITY_SCORE: 51
ADLS_ACUITY_SCORE: 51
ADLS_ACUITY_SCORE: 47
ADLS_ACUITY_SCORE: 37
ADLS_ACUITY_SCORE: 53
ADLS_ACUITY_SCORE: 48
ADLS_ACUITY_SCORE: 43
ADLS_ACUITY_SCORE: 51
ADLS_ACUITY_SCORE: 53
ADLS_ACUITY_SCORE: 52
ADLS_ACUITY_SCORE: 37
ADLS_ACUITY_SCORE: 47
ADLS_ACUITY_SCORE: 53
ADLS_ACUITY_SCORE: 51
CONCENTRATING,_REMEMBERING_OR_MAKING_DECISIONS_DIFFICULTY: NO
ADLS_ACUITY_SCORE: 43
ADLS_ACUITY_SCORE: 52
ADLS_ACUITY_SCORE: 53
WALKING_OR_CLIMBING_STAIRS_DIFFICULTY: YES
ADLS_ACUITY_SCORE: 55
ADLS_ACUITY_SCORE: 51
ADLS_ACUITY_SCORE: 47
ADLS_ACUITY_SCORE: 52
ADLS_ACUITY_SCORE: 55
ADLS_ACUITY_SCORE: 51
ADLS_ACUITY_SCORE: 53
WEAR_GLASSES_OR_BLIND: YES
ADLS_ACUITY_SCORE: 51
ADLS_ACUITY_SCORE: 35
ADLS_ACUITY_SCORE: 47
ADLS_ACUITY_SCORE: 53
ADLS_ACUITY_SCORE: 52
ADLS_ACUITY_SCORE: 53
ADLS_ACUITY_SCORE: 55
ADLS_ACUITY_SCORE: 53
ADLS_ACUITY_SCORE: 52
ADLS_ACUITY_SCORE: 47
ADLS_ACUITY_SCORE: 51
CONCENTRATING,_REMEMBERING_OR_MAKING_DECISIONS_DIFFICULTY: NO
ADLS_ACUITY_SCORE: 47
ADLS_ACUITY_SCORE: 48
TOILETING_ASSISTANCE: TOILETING DIFFICULTY, ASSISTANCE 1 PERSON
ADLS_ACUITY_SCORE: 51
TRANSFERRING: 1-->ASSISTANCE (EQUIPMENT/PERSON) NEEDED (NOT DEVELOPMENTALLY APPROPRIATE)
CHANGE_IN_FUNCTIONAL_STATUS_SINCE_ONSET_OF_CURRENT_ILLNESS/INJURY: YES
TOILETING: 1-->ASSISTANCE (EQUIPMENT/PERSON) NEEDED (NOT DEVELOPMENTALLY APPROPRIATE)
ADLS_ACUITY_SCORE: 48
ADLS_ACUITY_SCORE: 48
VISION_MANAGEMENT: GLASSES
ADLS_ACUITY_SCORE: 53
DRESSING/BATHING_DIFFICULTY: YES
ADLS_ACUITY_SCORE: 53
ADLS_ACUITY_SCORE: 35
ADLS_ACUITY_SCORE: 52
ADLS_ACUITY_SCORE: 51
ADLS_ACUITY_SCORE: 48
ADLS_ACUITY_SCORE: 51
ADLS_ACUITY_SCORE: 43
ADLS_ACUITY_SCORE: 51
ADLS_ACUITY_SCORE: 47
ADLS_ACUITY_SCORE: 52
ADLS_ACUITY_SCORE: 35
ADLS_ACUITY_SCORE: 48
ADLS_ACUITY_SCORE: 35
ADLS_ACUITY_SCORE: 52
ADLS_ACUITY_SCORE: 55
DOING_ERRANDS_INDEPENDENTLY_DIFFICULTY: YES
WALKING_OR_CLIMBING_STAIRS: AMBULATION DIFFICULTY, ASSISTANCE 1 PERSON;TRANSFERRING DIFFICULTY, ASSISTANCE 1 PERSON;STAIR CLIMBING DIFFICULTY, DEPENDENT
DIFFICULTY_EATING/SWALLOWING: NO
ADLS_ACUITY_SCORE: 51
ADLS_ACUITY_SCORE: 51
ADLS_ACUITY_SCORE: 37
ADLS_ACUITY_SCORE: 53
ADLS_ACUITY_SCORE: 52
ADLS_ACUITY_SCORE: 43
BATHING: 1-->ASSISTANCE NEEDED
ADLS_ACUITY_SCORE: 48
ADLS_ACUITY_SCORE: 48
DRESSING/BATHING: BATHING DIFFICULTY, ASSISTANCE 1 PERSON;DRESSING DIFFICULTY, ASSISTANCE 1 PERSON
ADLS_ACUITY_SCORE: 51
ADLS_ACUITY_SCORE: 48
WALKING_OR_CLIMBING_STAIRS_DIFFICULTY: YES
ADLS_ACUITY_SCORE: 52
DRESS: 0-->ASSISTANCE NEEDED (DEVELOPMENTALLY APPROPRIATE)
ADLS_ACUITY_SCORE: 43
TRANSFERRING: 1-->ASSISTANCE (EQUIPMENT/PERSON) NEEDED
DRESS: 1-->ASSISTANCE (EQUIPMENT/PERSON) NEEDED (NOT DEVELOPMENTALLY APPROPRIATE)
ADLS_ACUITY_SCORE: 51
ADLS_ACUITY_SCORE: 48
ADLS_ACUITY_SCORE: 37
ADLS_ACUITY_SCORE: 52
ADLS_ACUITY_SCORE: 53
NUMBER_OF_TIMES_PATIENT_HAS_FALLEN_WITHIN_LAST_SIX_MONTHS: 1
ADLS_ACUITY_SCORE: 51
ADLS_ACUITY_SCORE: 43
ADLS_ACUITY_SCORE: 47
ADLS_ACUITY_SCORE: 51
ADLS_ACUITY_SCORE: 51
ADLS_ACUITY_SCORE: 52
ADLS_ACUITY_SCORE: 37
ADLS_ACUITY_SCORE: 37
ADLS_ACUITY_SCORE: 51
TOILETING: 1-->ASSISTANCE (EQUIPMENT/PERSON) NEEDED
TOILETING: 1-->ASSISTANCE (EQUIPMENT/PERSON) NEEDED (NOT DEVELOPMENTALLY APPROPRIATE)
ADLS_ACUITY_SCORE: 48
EQUIPMENT_CURRENTLY_USED_AT_HOME: GRAB BAR, TOILET;GRAB BAR, TUB/SHOWER;SHOWER CHAIR;WALKER, ROLLING
ADLS_ACUITY_SCORE: 51
ADLS_ACUITY_SCORE: 52
ADLS_ACUITY_SCORE: 43
ADLS_ACUITY_SCORE: 47
DEPENDENT_IADLS:: CLEANING;COOKING;LAUNDRY;SHOPPING;MEAL PREPARATION;MEDICATION MANAGEMENT;MONEY MANAGEMENT;TRANSPORTATION
ADLS_ACUITY_SCORE: 51
ADLS_ACUITY_SCORE: 37
ADLS_ACUITY_SCORE: 37
ADLS_ACUITY_SCORE: 52
ADLS_ACUITY_SCORE: 51
BATHING: 1-->ASSISTANCE NEEDED
ADLS_ACUITY_SCORE: 53
ADLS_ACUITY_SCORE: 52
ADLS_ACUITY_SCORE: 53
EQUIPMENT_CURRENTLY_USED_AT_HOME: GRAB BAR, TOILET;GRAB BAR, TUB/SHOWER;SHOWER CHAIR;WALKER, ROLLING
ADLS_ACUITY_SCORE: 51
ADLS_ACUITY_SCORE: 48
ADLS_ACUITY_SCORE: 37
ADLS_ACUITY_SCORE: 47
WALKING_OR_CLIMBING_STAIRS: AMBULATION DIFFICULTY, ASSISTANCE 1 PERSON;TRANSFERRING DIFFICULTY, ASSISTANCE 1 PERSON;STAIR CLIMBING DIFFICULTY, DEPENDENT
ADLS_ACUITY_SCORE: 55
ADLS_ACUITY_SCORE: 41
ADLS_ACUITY_SCORE: 37
ADLS_ACUITY_SCORE: 53
ADLS_ACUITY_SCORE: 55
ADLS_ACUITY_SCORE: 51
ADLS_ACUITY_SCORE: 43
ADLS_ACUITY_SCORE: 52
ADLS_ACUITY_SCORE: 35
DRESSING/BATHING_DIFFICULTY: YES
DOING_ERRANDS_INDEPENDENTLY_DIFFICULTY: YES
ADLS_ACUITY_SCORE: 55
ADLS_ACUITY_SCORE: 53
ADLS_ACUITY_SCORE: 53
TOILETING_ASSISTANCE: TOILETING DIFFICULTY, ASSISTANCE 1 PERSON
NUMBER_OF_TIMES_PATIENT_HAS_FALLEN_WITHIN_LAST_SIX_MONTHS: 24
DIFFICULTY_EATING/SWALLOWING: NO
ADLS_ACUITY_SCORE: 51
DRESSING/BATHING: BATHING DIFFICULTY, ASSISTANCE 1 PERSON;DRESSING DIFFICULTY, ASSISTANCE 1 PERSON
ADLS_ACUITY_SCORE: 55
ADLS_ACUITY_SCORE: 52
ADLS_ACUITY_SCORE: 37
ADLS_ACUITY_SCORE: 37
ADLS_ACUITY_SCORE: 55

## 2023-01-01 ASSESSMENT — MONTREAL COGNITIVE ASSESSMENT (MOCA)
6. READ LIST OF DIGITS [FORWARD/BACKWARD]: 2
13. ORIENTATION SUBSCORE: 2
11. FOR EACH PAIR OF WORDS, WHAT CATEGORY DO THEY BELONG TO (OUT OF 2): 2
WHAT IS THE TOTAL SCORE (OUT OF 30): 18
9. REPEAT EACH SENTENCE: 2
12. MEMORY INDEX SCORE: 0
8. SERIAL SUBTRACTION OF 7S: 2
WHAT LEVEL OF EDUCATION WAS ATTAINED: 0
VISUOSPATIAL/EXECUTIVE SUBSCORE: 4
10. [FLUENCY] NAME WORDS STARTING WITH DESIGNATED LETTER: 0
7. [VIGILENCE] TAP WHEN HEARING DESIGNATED LETTER: 1
4. NAME EACH OF THE THREE ANIMALS SHOWN: 3

## 2023-01-16 NOTE — TELEPHONE ENCOUNTER
Tamsulosin HCl 0.4 MG Oral Capsule  Passed protocol    COLCHICINE  0.6MG  TAB  Passed protocol    Office Visit  6/20/2022  [facility] [place]   William Marquez MD  Internal Medicine     Next appt: 1/18/23      Warnings Override History for colchicine (COLCYRS) 0.6 MG tablet [[id number]]    Overridden by Landry Loyola RN on Nov 17, 2022 8:07 AM   Drug-Drug   1. COLCHICINE / STATINS [Level: Major] [Reason: Will monitor drug levels/drug effects ]   Other Orders: atorvastatin (LIPITOR) 40 MG tablet

## 2023-01-17 NOTE — TELEPHONE ENCOUNTER
Methotrexate 2.5 MG Oral Tablet  Last Written Prescription Date:   9/30/2022-12/29/2022  Last Fill Quantity: 24,   # refills: 2  Last Office Visit :  9/27/2022  Future Office visit:   1/18/2023    Routing refill request to provider for review/approval because:  Drug not on the [facility], [facility] or  Health refill protocol or controlled substance      Indigo Roche RN  Central Triage Red Flags/Med Refills

## 2023-01-17 NOTE — PROGRESS NOTES
HPI:    Mr. Moore comes in for follow up today. His son and wife is present today. He states chronic lower back pain but not changed all that much over the last several months. He has a cough (dry?) for a month or so and it seems to occur after eating. No clear aspiration sxs. He also has post nasal drip more when he lies down. His son has concerns about his memory and would like him to see Memory/Neurology. He remains on his same medications. No other HEENT, cardiopulmonary, abdominal, , neurological, systemic, psychiatric, lymphatic, endocrine, vascular complaints.     Past Medical History:   Diagnosis Date     Diabetes mellitus (H)      Gout attack      Heart attack (H) 1984     Hypertension      Stented coronary artery     6 stents in heart     Past Surgical History:   Procedure Laterality Date     CARDIAC SURGERY  1984    4 vessel bypass     CHOLECYSTECTOMY       COLONOSCOPY       DESTRUCTION OF PARAVERTEBRAL FACET LUMBAR / SACRAL ADDITIONAL Bilateral 10/18/2021    Procedure: DESTRUCTION, NERVE, FACET JOINT, LUMBOSACRAL, ADDITIONAL NERVE AFTER DESTRUCTION OF INITIAL LUMBOSACRAL FACET JOINT NERVE;  Surgeon: Horacio Gonsalves MD;  Location: [facility] OR     DESTRUCTION OF PARAVERTEBRAL FACET LUMBAR / SACRAL SINGLE Bilateral 10/18/2021    Procedure: DESTRUCTION, NERVE, FACET JOINT, LUMBOSACRAL, 1 NERVE;  Surgeon: Horacio Gonsalves MD;  Location: [facility] OR     ESOPHAGOSCOPY, GASTROSCOPY, DUODENOSCOPY (EGD), COMBINED N/A 3/1/2018    Procedure: COMBINED ESOPHAGOSCOPY, GASTROSCOPY, DUODENOSCOPY (EGD), BIOPSY SINGLE OR MULTIPLE;  ESOPHAGOGASTRODUODENOSCOPY ;  Surgeon: Daryn Medrano MD;  Location: [facility]     INJECT BLOCK MEDIAL BRANCH CERVICAL/THORACIC/LUMBAR Bilateral 8/16/2021    Procedure: BLOCK, NERVE, FACET JOINT, MEDIAL BRANCH, DIAGNOSTIC Lumbar 2-3-4;  Surgeon: Horacio Gonsalves MD;  Location: [facility] OR     INJECT BLOCK MEDIAL BRANCH CERVICAL/THORACIC/LUMBAR Bilateral 9/27/2021    Procedure:  BLOCK, NERVE, FACET JOINT, MEDIAL BRANCH, DIAGNOSTIC  L2-4, Bilateral;  Surgeon: Horacio Gonsalves MD;  Location: [facility] OR     NO HISTORY OF SURGERY  3/31/14    derm     PHACOEMULSIFICATION WITH STANDARD INTRAOCULAR LENS IMPLANT  1/21/2013    Procedure: PHACOEMULSIFICATION WITH STANDARD INTRAOCULAR LENS IMPLANT;  Phacoemulsification with standard intraocular lens implant, right eye;  Surgeon: Jay Rodriges MD;  Location: [facility] OR     PE:    Vitals noted, gen, nad, he is sitting in a wheel chair.  cooperative, alert, neck supple nl rom, lungs with good to fair air movement, no wheezing. RRR, S1, S2, no MRG, abdomen, no acute findings. He moves all his extremities.     A/P:    1. Immunizations; Moderna COVID vaccine x 3. Prevnar 13 done 11/9/2015, Tdap 12/8/2021. Pneumococcal 20 6/20/2022. Influenza vaccine; he wants to hold on this today and will get future outside pharmacy. COVID bi-valent today (1/18/2023). Check with insurance regarding Shingrix.   2. Dermatology appt. With Dr. Rivera 9/27/2022. He is taking gabapentin for dermatitis. He is also on methotrexate and folic acid.    3. Podiatry follow up with Dr. Sharp 4/4/2022  4. PMR follow up for back/hip pain Dr. Gonsalves 5/5/2022 and mobility/walking.  Ordered lumbar X-rays today and refer back to Dr. Gonsalves for back pain.   5. Urology follow up with Ms. Johnston 5/18/2023. Renal vascular ultrasound scheduled for 1/18/2023.   6. Endocrinology follow up with Dr. Campa 10/21/2022 for DM2 on Glimepiride and Metformin; A1c 5.4% on 2/18/2022. Seen 2/18/2022 by Dr. Campa. DEXA scan 3/7/2022 most negative -1.3.   7. Gout on Allopurinol and Colchicine. Uric acid 3.7 on 11/17/2022.   8. Increased lipids on Atorvastatin; Lpids 12/6/2021;  and HDL 58  9. On Fluoxetine for depression and increased to 20 mg  10. HTN; on Lasix, Ibesartan; electrolytes and creatinine normal on 9/27/2022.   11. Trazodone for sleep  12. On Plavix and Imdur for CAD;  [facility], Dr. Ordoñez on 1/31/2022, note in Care Everywhere; saw Dr. Thurston, Cardiology 6/17/2022; no change in plan.   13. Seen by Dr. Gifford, Neurology 5/31/2022 for balance and tremor  14. Cough complaints with eating? Ordered CXR today. If persists/worse. Would have him see ENT to evaluate larynx. If swallowing is a more prominent issues will get a swallowing study.   15. Memory issues; placed neurology referral today.      30 minutes spent on the date of the encounter doing chart review, history and exam, documentation and further activities as noted above

## 2023-01-17 NOTE — TELEPHONE ENCOUNTER
[id number] received refill request for aforementioned medication. Patient chart and attached communication reviewed.    Refill request approved.     Comment/Reasoning:  Benefit of continuing patient on current therapy outweighs risks of sudden discontinuation. Patient overdue for followup. Clinic schedulers, please assist with scheduling patient in next available w Dr. Rivera.     Freida Talbot MD  PGY-3, Internal Medicine-Dermatology    Prescribing provider cc'd as FYI only.

## 2023-01-18 NOTE — NURSING NOTE
"Deandre Moore is a 82 year old male patient that presents today in clinic for the following:    Chief Complaint   Patient presents with     Follow Up     Back Pain     Cough     Pt reports a dry cough     The patient's allergies and medications were reviewed as noted. A set of vitals were recorded as noted without incident: /59 (BP Location: Right arm, Patient Position: Sitting, Cuff Size: Adult Regular)   Pulse 83   Ht 1.854 m (6' 0.99\")   SpO2 100%   BMI 26.13 kg/m  . The patient does not have any other questions for the provider.    Dave Vilchis, Visit Facilitator 9:45 AM on 1/18/2023   "

## 2023-01-21 NOTE — TELEPHONE ENCOUNTER
Dear Landry;    Please see results note I sent to Mr. Moore and help arrange for a chest CT scan (ordered this encounter). You will have to call his son with this information    ThanksRONNIE    Dear Mr. Moore;    Your chest X-ray may have some findings and I recommend we do a full chest CT scan. I ordered this today and our nursing saff, Landry Loyola will give you a call to help arrange this.    RONNIE Marquez MD

## 2023-01-23 NOTE — TELEPHONE ENCOUNTER
LVM with pt's son Lucio.Left pcc and imaging number for pt to call back to schedule CT scan ordered by Dr Marquez.

## 2023-02-02 NOTE — TELEPHONE ENCOUNTER
Furosemide 40 MG Oral Tablet  Last Written Prescription Date:   4/18/2022  Last Fill Quantity: 90,   # refills: 3  Last Office Visit :  1/18/2023  Future Office visit:   7/19/2023  90 Tabs, 3 Refills sent to keri Roche RN  Central Triage Red Flags/Med Refills

## 2023-02-14 NOTE — NURSING NOTE
Acute Care - Occupational Therapy Discharge  [facility]    Patient Name: Carol Rodriguez  : 1967    MRN: [id number]                              Today's Date: 2023       Admit Date: 2023    Visit Dx:     ICD-10-CM ICD-9-CM   1. Spinal stenosis of lumbar region without neurogenic claudication  M48.061 724.02   2. Decreased activities of daily living (ADL)  Z78.9 V49.89   3. Impaired mobility  Z74.09 799.89     Patient Active Problem List   Diagnosis   • Malignant neoplasm of right breast in female, estrogen receptor positive (HCC)   • Anemia   • Francisco esophagus   • GERD without esophagitis   • Hiatal hernia   • Irritable bowel syndrome   • Lumbar radicular pain   • Tortuous colon   • Coronary artery calcification   • Lumbar stenosis   • Osteoporosis   • Drug induced constipation     Past Medical History:   Diagnosis Date   • Anxiety    • Arthralgia    • Arthritis    • Francisco's esophagus    • Carcinoembryonic antigen present    • Chronic kidney disease    • COPD (chronic obstructive pulmonary disease) (HCC)    • Coronary artery calcification 10/17/2022     STATES CARDIOLOGIST AND DR.J DELCID HAVE CLEARED HER OF THIS, CHOLESTEROL IS GOOD, NO PROBLEMS AT THIS TIME   • Fibrocystic breast disease    • GERD (gastroesophageal reflux disease)    • Hypertension    • Hypokalemia    • Infiltrating ductal carcinoma of breast (HCC)      ER Status: Positive; NV Status: Positive   • Iron deficiency anemia    • Osteopenia    • Tobacco user      Past Surgical History:   Procedure Laterality Date   • ANTERIOR LUMBAR EXPOSURE N/A 2023    Procedure: ANTERIOR LUMBAR EXPOSURE;  Surgeon: Jordy Macario DO;  [facility]: [facility];  Service: Vascular;  Laterality: N/A;   • BREAST BIOPSY Right 2006    Biopsy of breast, right, benign fibrocystic changes   • CARPAL TUNNEL RELEASE      left 2011; right 2011   •  SECTION      x2. Most recently in    • COLONOSCOPY     • ENDOSCOPY     •  Drug Administration Record    Prior to injection, verified patient identity using patient's name and date of birth.  Due to injection administration, patient instructed to remain in clinic for 15 minutes  afterwards, and to report any adverse reaction to me immediately.    Drug Name: triamcinolone acetonide(kenalog)  Dose: 1mL of triamcinolone 20mg/mL, 20mg dose  Route administered: ID  NDC #: Kenalog-40 ([address]-8)  Amount of waste(mL):0.5  Reason for waste: Multi dose vial    LOT #: [id number]   SITE: See provider note  : DearJane  EXPIRATION DATE: 10/2022     LUMBAR FUSION N/A 2/13/2023    Procedure: ANTERIOR DECOMPRESSION, ANTERIOR LUMBAR INTERBODY FUSION WITH INSTRUMENTATION L5-S1;  Surgeon: CAMILA Goldstein MD;  [facility];  Service: Orthopedic Spine;  Laterality: N/A;   • MASTECTOMY MODIFIED RADICAL / SIMPLE / COMPLETE Right 09/29/2006   • SIMPLE MASTECTOMY Left 09/29/2006     prophylactic, left with left breast reconstruction   • TONSILLECTOMY     • TOTAL ABDOMINAL HYSTERECTOMY WITH SALPINGO OOPHORECTOMY  06/07/2006      General Information    No documentation.                Mobility/ADL's    No documentation.                Obj/Interventions    No documentation.                Goals/Plan    No documentation.                Clinical Impression    No documentation.                Outcome Measures     Row Name 02/14/23 0925 02/14/23 0853       How much help from another person do you currently need...    Turning from your back to your side while in flat bed without using bedrails? 4  -MF 4  -AD    Moving from lying on back to sitting on the side of a flat bed without bedrails? 4  -MF 3  -AD    Moving to and from a bed to a chair (including a wheelchair)? 3  -MF 3  -AD    Standing up from a chair using your arms (e.g., wheelchair, bedside chair)? 3  -MF 3  -AD    Climbing 3-5 steps with a railing? 3  -MF 3  -AD    To walk in hospital room? 3  -MF 3  -AD    AM-PAC 6 Clicks Score (PT) 20  -MF 19  -AD    Highest level of mobility 6 --> Walked 10 steps or more  -MF 6 --> Walked 10 steps or more  -AD    Row Name 02/14/23 0925          Functional Assessment    Outcome Measure Options AM-PAC 6 Clicks Basic Mobility (PT)  -MF           User Key  (r) = Recorded By, (t) = Taken By, (c) = Cosigned By    Initials Name Provider Type    Anny Palacios PTA Physical Therapist Assistant    Tianna Adams, RN Registered Nurse              Occupational Therapy Education     Title: PT OT SLP Therapies (Resolved)     Topic: Occupational Therapy (Resolved)     Point:  ADL training (Resolved)     Description:   Instruct learner(s) on proper safety adaptation and remediation techniques during self care or transfers.   Instruct in proper use of assistive devices.              Learning Progress Summary           Patient Eager, E,D, VU,DU,NR by PB at 2/13/2023 1300   Significant Other Eager, E,D, VU,DU,NR by PB at 2/13/2023 1300   Mother Eager, E,D, VU,DU,NR by PB at 2/13/2023 1300                   Point: Home exercise program (Resolved)     Description:   Instruct learner(s) on appropriate technique for monitoring, assisting and/or progressing therapeutic exercises/activities.              Learner Progress:  Not documented in this visit.          Point: Precautions (Resolved)     Description:   Instruct learner(s) on prescribed precautions during self-care and functional transfers.              Learning Progress Summary           Patient Eager, E,D, VU,DU,NR by PB at 2/13/2023 1300   Significant Other Eager, E,D, VU,DU,NR by PB at 2/13/2023 1300   Mother Eager, E,D, VU,DU,NR by PB at 2/13/2023 1300                   Point: Body mechanics (Resolved)     Description:   Instruct learner(s) on proper positioning and spine alignment during self-care, functional mobility activities and/or exercises.              Learning Progress Summary           Patient Eager, E,D, VU,DU,NR by PB at 2/13/2023 1300   Significant Other Eager, E,D, VU,DU,NR by PB at 2/13/2023 1300   Mother Eager, E,D, VU,DU,NR by PB at 2/13/2023 1300                               User Key     Initials Effective Dates Name Provider Type Discipline     01/03/23 -  Carina De La O OT Student OT Student OT              OT Recommendation and Plan           Time Calculation:    Time Calculation- OT     Row Name 02/14/23 1202             Timed Charges    46260 - Gait Training Minutes  30  -MF         Total Minutes    Timed Charges Total Minutes 30  -MF       Total Minutes 30  -MF            User Key  (r) = Recorded By, (t) =  Taken By, (c) = Cosigned By    Initials Name Provider Type    Anny Palacios, PTA Physical Therapist Assistant                     OT Discharge Summary  Anticipated Discharge Disposition (OT): home with assist  Reason for Discharge: Discharge from facility  Outcomes Achieved: Refer to plan of care for updates on goals achieved  Discharge Destination: Home with assist    Corrie Fregoso, OTR/BELLA  2/14/2023

## 2023-02-22 NOTE — TELEPHONE ENCOUNTER
Allopurinol 300 MG Oral Tablet  Passed protocol    traZODone HCl 150 MG Oral Tablet  Passed protocol    Office Visit    1/18/2023  [facility] [place]   William Marquez MD  Internal Medicine     Future appt: 7/19/23        Warnings Override History for traZODone (DESYREL) 150 MG tablet [[id number]]    Overridden by William Marquez MD on Nov 17, 2022 8:53 AM   Drug-Drug   1. SELECTIVE SEROTONIN REUPTAKE INHIBITORS / SEROTONERGIC NON-OPIOID CNS DEPRESSANTS [Level: Major]   Other Orders: FLUoxetine (PROZAC) 20 MG capsule         Overridden by Marcela Tejeda RN on Aug 25, 2022 6:56 AM   Drug-Drug   1. TRAZODONE / MIRTAZAPINE [Level: Major]   Other Orders: mirtazapine (REMERON) 15 MG tablet      2. SELECTIVE SEROTONIN REUPTAKE INHIBITORS / SEROTONERGIC NON-OPIOID CNS DEPRESSANTS [Level: Major]   Other Orders: FLUoxetine (PROZAC) 20 MG capsule

## 2023-02-23 NOTE — TELEPHONE ENCOUNTER
Methotrexate 2.5 MG Oral Tablet  Last Written Prescription Date:   1/17/2023  Last Fill Quantity: 72,   # refills: 1  Last Office Visit : 9/27/2022  Future Office visit:  None    Routing refill request to provider for review/approval because:  Drug not on the [facility] [place] or [facility] refill protocol or controlled substance      Indigo Roche RN  Central Triage Red Flags/Med Refills

## 2023-03-01 NOTE — TELEPHONE ENCOUNTER
LDL ordered. Routed to clinical coordinators to assist with scheduling lab.    JOVANNY HO RN on 3/1/2023 at 8:24 AM

## 2023-03-14 NOTE — TELEPHONE ENCOUNTER
metFORMIN (GLUCOPHAGE) 1000 MG tablet     Last Written Prescription Date:  09-  Last Fill Quantity: 180,   # refills: 1  Last Office Visit : 02- Dr. Trudy Campa  Future Office visit:  Not on file  Routing refill request to provider for review/approval because:  Biguanide Agents Failed 03/10/2023 09:57 PM   Protocol Details  Patient has documented A1c within the specified period of time.    Patient does NOT have a diagnosis of CHF.    Recent (6 mo) or future (30 days) visit within the authorizing provider's specialty     Lab Test 02/18/22  1413     A1C  --      HEMOGLOBINA1 5.4       Scheduling has been notified to contact the pt for appointment.

## 2023-03-15 NOTE — LETTER
Patient:  Deandre Moore  :   1940  MRN:     [id number]        Mr.Richard LELAND Moore  [address] [facility] [address]        March 15, 2023    Dear ,    I see that you do not have a follow up appointment. I would recommend that you be evaluated by an endocrinologist to minimize complications. Of note, I am scheduling out about 6 months.  If you like to be seen at the [facility], please call [phone] select option #1 for an appointment.    Regards    Trudy Campa MD

## 2023-03-21 NOTE — PROGRESS NOTES
Chief Complaint   Patient presents with     Follow Up     Callus care.             Allergies   Allergen Reactions     Beta Adrenergic Blockers Unknown     Latex Dermatitis     Latex Rash     Tape [Adhesive Tape] Dermatitis and Rash     Electrode patches         Subjective: Deandre is a 81 year old male who presents to the clinic today for a follow up of left foot pain.  He is here today with his wife and son.  He relates that the pain has come back in the last few months in the ball of the left first metatarsal.  His wife has noted drainage so she has been putting a Band-Aid on this area.  He was last seen by me about a year ago.    Objective  Hemoglobin A1C   Date Value Ref Range Status   06/04/2018 5.5 % Final   06/19/2017 5.9 % Final   06/07/2016 5.4 4.3 - 6.0 % Final     Hemoglobin A1C POCT   Date Value Ref Range Status   02/18/2022 5.4 4.3 - <5.7 % Final   09/21/2021 6.5 4.3 - <5.7 % Final   05/18/2021 6.0 4.3 - 6.0 % Final         Hyperkeratotic lesion noted to the plantar first left metatarsal head.  This was sharply debrided and the following wound was noted:      A wound is noted at left  Plantar first met head measuring 0.4 cm x 0.2 cm x 0.1 cm.    Partida Classification: 2    Wound base: Red/Granulation    Edges: Hyperkeratotic    Drainage: moderate/serosanguinous    Odor: No    Undermining: No    Bone Exposure: No    Clinical Signs of Infection: No    After obtaining patient consent, the wound was irrigated with copious amounts of saline. A scalpel was then used to debride the wound into subcutaneous tissue. The wound edges were debrided back to healthy, bleeding tissue. The wound base exhibited healthy bleeding. Given the patient's lack of sensation, no anesthesia was necessary for the procedure.    Barriers to Healing: Wound is in an area of pressure.    Treatment Plan: Iodosorb and Mepilex.  I would also like him to get a DH shoe, size large, however he did not have these in clinic.  Orders written for  these.    Pt Ability to Follow Plan: Likely good      MRI Impression:  1. No imaging abnormality corresponding to external marker (plantar  aspect of great toe first distal phalangeal base).  2. Visualized portion of central cord of plantar fascia is  unremarkable.  3. Query intermetatarsal bursitis particularly in the first and third  interspaces.     LARRY BINGHAM     Assessment: Deandre is an 81-year-old with pain in the left foot that is secondary to the wound.  He does have diabetes.    Plan:   - Pt seen and evaluated  -Wound debrided x1.  -He will start dressings as above.  Supplies were given to him.  -Orders written for him to go to the orthotic lab to get the DH shoe.  -He does not walk much, but when he does he should wear the DH shoe.  -See again in 2 weeks.

## 2023-03-21 NOTE — LETTER
3/21/2023       RE: Deandre Moore  [address] [place] [facility] [address]    Dear Colleague,    Thank you for referring your patient, Deandre Moore, to the [facility] [place]. Please see a copy of my visit note below.    Chief Complaint   Patient presents with     Follow Up     Callus care.             Allergies   Allergen Reactions     Beta Adrenergic Blockers Unknown     Latex Dermatitis     Latex Rash     Tape [Adhesive Tape] Dermatitis and Rash     Electrode patches         Subjective: eDandre is a 81 year old male who presents to the clinic today for a follow up of left foot pain.  He is here today with his wife and son.  He relates that the pain has come back in the last few months in the ball of the left first metatarsal.  His wife has noted drainage so she has been putting a Band-Aid on this area.  He was last seen by me about a year ago.    Objective  Hemoglobin A1C   Date Value Ref Range Status   06/04/2018 5.5 % Final   06/19/2017 5.9 % Final   06/07/2016 5.4 4.3 - 6.0 % Final     Hemoglobin A1C POCT   Date Value Ref Range Status   02/18/2022 5.4 4.3 - <5.7 % Final   09/21/2021 6.5 4.3 - <5.7 % Final   05/18/2021 6.0 4.3 - 6.0 % Final         Hyperkeratotic lesion noted to the plantar first left metatarsal head.  This was sharply debrided and the following wound was noted:      A wound is noted at left  Plantar first met head measuring 0.4 cm x 0.2 cm x 0.1 cm.    Partida Classification: 2    Wound base: Red/Granulation    Edges: Hyperkeratotic    Drainage: moderate/serosanguinous    Odor: No    Undermining: No    Bone Exposure: No    Clinical Signs of Infection: No    After obtaining patient consent, the wound was irrigated with copious amounts of saline. A scalpel was then used to debride the wound into subcutaneous tissue. The wound edges were debrided back to healthy, bleeding tissue. The wound base exhibited healthy bleeding. Given the patient's lack of  sensation, no anesthesia was necessary for the procedure.    Barriers to Healing: Wound is in an area of pressure.    Treatment Plan: Iodosorb and Mepilex.  I would also like him to get a DH shoe, size large, however he did not have these in clinic.  Orders written for these.    Pt Ability to Follow Plan: Likely good    MRI Impression:  1. No imaging abnormality corresponding to external marker (plantar  aspect of great toe first distal phalangeal base).  2. Visualized portion of central cord of plantar fascia is  unremarkable.  3. Query intermetatarsal bursitis particularly in the first and third  interspaces.     LARRY BINGHAM     Assessment: Deandre is an 81-year-old with pain in the left foot that is secondary to the wound.  He does have diabetes.    Plan:   - Pt seen and evaluated  -Wound debrided x1.  -He will start dressings as above.  Supplies were given to him.  -Orders written for him to go to the orthotic lab to get the DH shoe.  -He does not walk much, but when he does he should wear the DH shoe.  -See again in 2 weeks.    Mark Sharp DPM

## 2023-04-04 NOTE — PROGRESS NOTES
Chief Complaint   Patient presents with     Wound Check     Left plantar first met.            Allergies   Allergen Reactions     Beta Adrenergic Blockers Unknown     Latex Dermatitis     Latex Rash     Tape [Adhesive Tape] Dermatitis and Rash     Electrode patches         Subjective: Deandre is a 81 year old male who presents to the clinic today for a follow up of left foot pain.  He is here today with his wife and son.  He relates that the pain has come back in the last few months in the ball of the left first metatarsal.  His wife has noted drainage so she has been putting a Band-Aid on this area.  He was last seen by me about a year ago.    Interval history: He is with his son today.  He relates that the wound is slightly painful, however he thinks it is getting better. He did get the Dh shoe.     Objective  Hemoglobin A1C   Date Value Ref Range Status   06/04/2018 5.5 % Final   06/19/2017 5.9 % Final   06/07/2016 5.4 4.3 - 6.0 % Final     Hemoglobin A1C POCT   Date Value Ref Range Status   02/18/2022 5.4 4.3 - <5.7 % Final   09/21/2021 6.5 4.3 - <5.7 % Final   05/18/2021 6.0 4.3 - 6.0 % Final         Hyperkeratotic lesion noted to the plantar first left metatarsal head.  This was sharply debrided and the following wound was noted:            A wound is noted at left  Plantar first met head measuring 0.2 cm x 0.2 cm x 0.1 cm.    Partida Classification: 2    Wound base: Red/Granulation    Edges: Hyperkeratotic    Drainage: moderate/serosanguinous    Odor: No    Undermining: No    Bone Exposure: No    Clinical Signs of Infection: No    After obtaining patient consent, the wound was irrigated with copious amounts of saline. A scalpel was then used to debride the wound into dremal tissue. The wound edges were debrided back to healthy, bleeding tissue. The wound base exhibited healthy bleeding. Given the patient's lack of sensation, no anesthesia was necessary for the procedure.    Barriers to Healing: Wound is in an  area of pressure.    Treatment Plan: Medihoney and Mepilex.  I would also like him to get a DH shoe, size large, however he did not have these in clinic.  Orders written for these.    Pt Ability to Follow Plan: Likely good      MRI Impression:  1. No imaging abnormality corresponding to external marker (plantar  aspect of great toe first distal phalangeal base).  2. Visualized portion of central cord of plantar fascia is  unremarkable.  3. Query intermetatarsal bursitis particularly in the first and third  interspaces.     LARRY BINGHAM     Assessment: Deandre is an 81-year-old with pain in the left foot that is secondary to the wound.  He does have diabetes.    Plan:   - Pt seen and evaluated  -Wound debrided x1.  -He will start dressings as above.  Supplies were given to him.  -He does not walk much, but when he does he should wear the DH shoe.  -See again in 2 weeks.

## 2023-04-04 NOTE — LETTER
4/4/2023         RE: Deandre Moore  [address] [place] [facility] [address]        Dear Colleague,    Thank you for referring your patient, Deandre Moore, to the [facility] [place]. Please see a copy of my visit note below.    Chief Complaint   Patient presents with    Wound Check     Left plantar first met.            Allergies   Allergen Reactions    Beta Adrenergic Blockers Unknown    Latex Dermatitis    Latex Rash    Tape [Adhesive Tape] Dermatitis and Rash     Electrode patches         Subjective: Deandre is a 81 year old male who presents to the clinic today for a follow up of left foot pain.  He is here today with his wife and son.  He relates that the pain has come back in the last few months in the ball of the left first metatarsal.  His wife has noted drainage so she has been putting a Band-Aid on this area.  He was last seen by me about a year ago.    Interval history: He is with his son today.  He relates that the wound is slightly painful, however he thinks it is getting better. He did get the Dh shoe.     Objective  Hemoglobin A1C   Date Value Ref Range Status   06/04/2018 5.5 % Final   06/19/2017 5.9 % Final   06/07/2016 5.4 4.3 - 6.0 % Final     Hemoglobin A1C POCT   Date Value Ref Range Status   02/18/2022 5.4 4.3 - <5.7 % Final   09/21/2021 6.5 4.3 - <5.7 % Final   05/18/2021 6.0 4.3 - 6.0 % Final         Hyperkeratotic lesion noted to the plantar first left metatarsal head.  This was sharply debrided and the following wound was noted:            A wound is noted at left  Plantar first met head measuring 0.2 cm x 0.2 cm x 0.1 cm.    Partida Classification: 2    Wound base: Red/Granulation    Edges: Hyperkeratotic    Drainage: moderate/serosanguinous    Odor: No    Undermining: No    Bone Exposure: No    Clinical Signs of Infection: No    After obtaining patient consent, the wound was irrigated with copious amounts of saline. A scalpel was then used to  debride the wound into dremal tissue. The wound edges were debrided back to healthy, bleeding tissue. The wound base exhibited healthy bleeding. Given the patient's lack of sensation, no anesthesia was necessary for the procedure.    Barriers to Healing: Wound is in an area of pressure.    Treatment Plan: Medihoney and Mepilex.  I would also like him to get a DH shoe, size large, however he did not have these in clinic.  Orders written for these.    Pt Ability to Follow Plan: Likely good      MRI Impression:  1. No imaging abnormality corresponding to external marker (plantar  aspect of great toe first distal phalangeal base).  2. Visualized portion of central cord of plantar fascia is  unremarkable.  3. Query intermetatarsal bursitis particularly in the first and third  interspaces.     LARRY BINGHAM     Assessment: Deandre is an 81-year-old with pain in the left foot that is secondary to the wound.  He does have diabetes.    Plan:   - Pt seen and evaluated  -Wound debrided x1.  -He will start dressings as above.  Supplies were given to him.  -He does not walk much, but when he does he should wear the DH shoe.  -See again in 2 weeks.      Again, thank you for allowing me to participate in the care of your patient.        Sincerely,        Mark Sharp DPM

## 2023-04-18 NOTE — LETTER
4/18/2023         RE: Deandre Moore  [address] [facility] [address]        Dear Colleague,    Thank you for referring your patient, Deandre Moore, to the [facility] [place]. Please see a copy of my visit note below.    Chief Complaint   Patient presents with    Wound Check     2 week follow up. Left plantar first met.            Allergies   Allergen Reactions    Beta Adrenergic Blockers Unknown    Latex Dermatitis    Latex Rash    Tape [Adhesive Tape] Dermatitis and Rash     Electrode patches         Subjective: Deandre is a 81 year old male who presents to the clinic today for a follow up of left foot pain.  He is here today with his wife and son.  He relates that the pain has come back in the last few months in the ball of the left first metatarsal.  His wife has noted drainage so she has been putting a Band-Aid on this area.  He was last seen by me about a year ago.    Interval history: He is with his son and wife today.  He wears the DH shoe and uses Medihoney.     Objective  Hemoglobin A1C   Date Value Ref Range Status   06/04/2018 5.5 % Final   06/19/2017 5.9 % Final   06/07/2016 5.4 4.3 - 6.0 % Final     Hemoglobin A1C POCT   Date Value Ref Range Status   02/18/2022 5.4 4.3 - <5.7 % Final   09/21/2021 6.5 4.3 - <5.7 % Final   05/18/2021 6.0 4.3 - 6.0 % Final         Hyperkeratotic lesion noted to the plantar first left metatarsal head.  This was sharply debrided and the following wound was noted:        A wound is noted at left  Plantar first met head measuring 0.1 cm x 0.1 cm x 0.1 cm.    Partida Classification: 2    Wound base: Red/Granulation    Edges: Hyperkeratotic    Drainage: moderate/serosanguinous    Odor: No    Undermining: No    Bone Exposure: No    Clinical Signs of Infection: No    After obtaining patient consent, the wound was irrigated with copious amounts of saline. A scalpel was then used to debride the wound into dermal tissue. The wound  edges were debrided back to healthy, bleeding tissue. The wound base exhibited healthy bleeding. Given the patient's lack of sensation, no anesthesia was necessary for the procedure.    Barriers to Healing: Wound is in an area of pressure.    Treatment Plan: Medihoney and Mepilex.  I would also like him to get a DH shoe, size large, however he did not have these in clinic.  Orders written for these.    Pt Ability to Follow Plan: Likely good      MRI Impression:  1. No imaging abnormality corresponding to external marker (plantar  aspect of great toe first distal phalangeal base).  2. Visualized portion of central cord of plantar fascia is  unremarkable.  3. Query intermetatarsal bursitis particularly in the first and third  interspaces.     LARRY BINGHAM     Assessment: Deandre is an 81-year-old with pain in the left foot that is secondary to the wound.  He does have diabetes.    Plan:   - Pt seen and evaluated  -Wound debrided x1.  -He will start dressings as above.  Supplies were given to him.  -He does not walk much, but when he does he should wear the DH shoe.  -See again in 2 weeks.      Again, thank you for allowing me to participate in the care of your patient.        Sincerely,        Mark Sharp DPM

## 2023-04-18 NOTE — PROGRESS NOTES
Chief Complaint   Patient presents with     Wound Check     2 week follow up. Left plantar first met.            Allergies   Allergen Reactions     Beta Adrenergic Blockers Unknown     Latex Dermatitis     Latex Rash     Tape [Adhesive Tape] Dermatitis and Rash     Electrode patches         Subjective: Deandre is a 81 year old male who presents to the clinic today for a follow up of left foot pain.  He is here today with his wife and son.  He relates that the pain has come back in the last few months in the ball of the left first metatarsal.  His wife has noted drainage so she has been putting a Band-Aid on this area.  He was last seen by me about a year ago.    Interval history: He is with his son and wife today.  He wears the  shoe and uses Medihoney.     Objective  Hemoglobin A1C   Date Value Ref Range Status   06/04/2018 5.5 % Final   06/19/2017 5.9 % Final   06/07/2016 5.4 4.3 - 6.0 % Final     Hemoglobin A1C POCT   Date Value Ref Range Status   02/18/2022 5.4 4.3 - <5.7 % Final   09/21/2021 6.5 4.3 - <5.7 % Final   05/18/2021 6.0 4.3 - 6.0 % Final         Hyperkeratotic lesion noted to the plantar first left metatarsal head.  This was sharply debrided and the following wound was noted:        A wound is noted at left  Plantar first met head measuring 0.1 cm x 0.1 cm x 0.1 cm.    Partida Classification: 2    Wound base: Red/Granulation    Edges: Hyperkeratotic    Drainage: moderate/serosanguinous    Odor: No    Undermining: No    Bone Exposure: No    Clinical Signs of Infection: No    After obtaining patient consent, the wound was irrigated with copious amounts of saline. A scalpel was then used to debride the wound into dermal tissue. The wound edges were debrided back to healthy, bleeding tissue. The wound base exhibited healthy bleeding. Given the patient's lack of sensation, no anesthesia was necessary for the procedure.    Barriers to Healing: Wound is in an area of pressure.    Treatment Plan: Medihoney  and Mepilex.  I would also like him to get a DH shoe, size large, however he did not have these in clinic.  Orders written for these.    Pt Ability to Follow Plan: Likely good      MRI Impression:  1. No imaging abnormality corresponding to external marker (plantar  aspect of great toe first distal phalangeal base).  2. Visualized portion of central cord of plantar fascia is  unremarkable.  3. Query intermetatarsal bursitis particularly in the first and third  interspaces.     LARRY BINGHAM     Assessment: Deandre is an 81-year-old with pain in the left foot that is secondary to the wound.  He does have diabetes.    Plan:   - Pt seen and evaluated  -Wound debrided x1.  -He will start dressings as above.  Supplies were given to him.  -He does not walk much, but when he does he should wear the DH shoe.  -See again in 2 weeks.

## 2023-04-30 NOTE — TELEPHONE ENCOUNTER
RECORDS RECEIVED FROM:    REASON FOR VISIT: Back Pain   Date of Appt: 7/5/2023   NOTES (FOR ALL VISITS) STATUS DETAILS   OFFICE NOTE from referring provider Gladis Marquez-1/18/2023   OFFICE NOTE from other specialist     DISCHARGE SUMMARY from hospital     DISCHARGE REPORT from the ER     OPERATIVE REPORT     [facility] ([facility] ONLY)     EMG     EEG     MEDICATION LIST     IMAGING  (FOR ALL VISITS)     LUMBAR PUNCTURE     NANCY SCAN (MOVEMENT)     ULTRASOUND (CAROTID BILAT) *VASCULAR*     MRI (HEAD, NECK, SPINE) PACS  MR Brain-9/29/2020   CT (HEAD, NECK, SPINE) PACS  XR Lumbar Spine-1/18/2023, 3/30/2021

## 2023-05-02 NOTE — LETTER
5/2/2023         RE: Deandre Moore  [address] [facility] [address]        Dear Colleague,    Thank you for referring your patient, Deandre Moore, to the [facility] [place]. Please see a copy of my visit note below.    Chief Complaint   Patient presents with    RECHECK     Left foot.             Allergies   Allergen Reactions    Beta Adrenergic Blockers Unknown    Latex Dermatitis    Latex Rash    Tape [Adhesive Tape] Dermatitis and Rash     Electrode patches         Subjective: Deandre is a 81 year old male who presents to the clinic today for a follow up of left foot pain.  He is here today with his wife and son.  He relates that the pain has come back in the last few months in the ball of the left first metatarsal.  His wife has noted drainage so she has been putting a Band-Aid on this area.  He was last seen by me about a year ago.    Interval history: He is with his son  today.  He wears the DH shoe and uses Iodosorb.     Objective  Hemoglobin A1C   Date Value Ref Range Status   06/04/2018 5.5 % Final   06/19/2017 5.9 % Final   06/07/2016 5.4 4.3 - 6.0 % Final     Hemoglobin A1C POCT   Date Value Ref Range Status   02/18/2022 5.4 4.3 - <5.7 % Final   09/21/2021 6.5 4.3 - <5.7 % Final   05/18/2021 6.0 4.3 - 6.0 % Final         Hyperkeratotic lesion noted to the plantar first left metatarsal head.  This was sharply debrided and the following wound was noted:        A wound is noted at left  Plantar first met head has healed.       MRI Impression:  1. No imaging abnormality corresponding to external marker (plantar  aspect of great toe first distal phalangeal base).  2. Visualized portion of central cord of plantar fascia is  unremarkable.  3. Query intermetatarsal bursitis particularly in the first and third  interspaces.     LARRY BINGHAM     Assessment: Deandre is an 81-year-old with pain in the left foot that is secondary to the wound.  He does have  diabetes.    Plan:   - Pt seen and evaluated  -Wound is healed.  He can use just a Mepilex to the area now.  -He does not walk much, but when he does he should wear the DH shoe.  -See again in 3 weeks.      Mark Sharp DPM

## 2023-05-03 NOTE — PROGRESS NOTES
Chief Complaint   Patient presents with     RECHECK     Left foot.             Allergies   Allergen Reactions     Beta Adrenergic Blockers Unknown     Latex Dermatitis     Latex Rash     Tape [Adhesive Tape] Dermatitis and Rash     Electrode patches         Subjective: Deandre is a 81 year old male who presents to the clinic today for a follow up of left foot pain.  He is here today with his wife and son.  He relates that the pain has come back in the last few months in the ball of the left first metatarsal.  His wife has noted drainage so she has been putting a Band-Aid on this area.  He was last seen by me about a year ago.    Interval history: He is with his son  today.  He wears the DH shoe and uses Iodosorb.     Objective  Hemoglobin A1C   Date Value Ref Range Status   06/04/2018 5.5 % Final   06/19/2017 5.9 % Final   06/07/2016 5.4 4.3 - 6.0 % Final     Hemoglobin A1C POCT   Date Value Ref Range Status   02/18/2022 5.4 4.3 - <5.7 % Final   09/21/2021 6.5 4.3 - <5.7 % Final   05/18/2021 6.0 4.3 - 6.0 % Final         Hyperkeratotic lesion noted to the plantar first left metatarsal head.  This was sharply debrided and the following wound was noted:        A wound is noted at left  Plantar first met head has healed.       MRI Impression:  1. No imaging abnormality corresponding to external marker (plantar  aspect of great toe first distal phalangeal base).  2. Visualized portion of central cord of plantar fascia is  unremarkable.  3. Query intermetatarsal bursitis particularly in the first and third  interspaces.     LARRY BINGHAM     Assessment: Deandre is an 81-year-old with pain in the left foot that is secondary to the wound.  He does have diabetes.    Plan:   - Pt seen and evaluated  -Wound is healed.  He can use just a Mepilex to the area now.  -He does not walk much, but when he does he should wear the DH shoe.  -See again in 3 weeks.

## 2023-05-05 NOTE — TELEPHONE ENCOUNTER
methotrexate 2.5 MG tablet      Last Written Prescription Date:  2-23-23  Last Fill Quantity: 86,   # refills: 0  Last Office Visit: 9-27-22  Future Office visit:  7-25-23    CBC RESULTS: Recent Labs   Lab Test 11/17/22  1607   WBC 8.9   RBC 3.55*   HGB 11.4*   HCT 32.8*   MCV 92   MCH 32.1   MCHC 34.8   RDW 14.5          Creatinine   Date Value Ref Range Status   09/27/2022 1.16 0.67 - 1.17 mg/dL Final   03/11/2021 1.27 (H) 0.66 - 1.25 mg/dL Final   ]    Liver Function Studies -   Recent Labs   Lab Test 11/17/22  1607   PROTTOTAL 7.1   ALBUMIN 3.9   BILITOTAL 0.5   ALKPHOS 76   AST 34   ALT 31       Routing refill request to provider for review/approval because:  Med not on derm protocol

## 2023-05-12 PROBLEM — N17.9 AKI (ACUTE KIDNEY INJURY) (H): Status: ACTIVE | Noted: 2023-01-01

## 2023-05-12 PROBLEM — R91.8 PULMONARY INFILTRATES: Status: ACTIVE | Noted: 2023-01-01

## 2023-05-12 NOTE — ED TRIAGE NOTES
Pt arrived via EMS c/o lower back pain not able to move for 12 hrs. he was moving independent with assist x1. , weakness, confusion. Family noticed today.pt lives with wife and son at home 25 mcg fentanyl given.  12 led PVC  . BG 59 D10 given .PMH: MI, diabetic, chronic  Back pain. Denied SOB or chest pain

## 2023-05-12 NOTE — PROGRESS NOTES
Pt blood sugar 57 @ 1230, Pt reports no s/s. Writer gave pt two orange juice with 4 sugar packets and a cup of vanilla ice cream.     Recheck @ 1245. BG 75    Recheck @ 1300. BG 96. Sandwhich, yogurt and cheese stick given     Recheck @ 1345 .

## 2023-05-12 NOTE — MEDICATION SCRIBE - ADMISSION MEDICATION HISTORY
Medication Scribe Admission Medication History    Admission medication history is complete. The information provided in this note is only as accurate as the sources available at the time of the update.    Medication reconciliation/reorder completed by provider prior to medication history? No    Information Source(s): Family member via phone    Pertinent Information: Pt received both AM and PM medication doses yesterday at home. Pt takes 15 mg methotrexate on Sundays.     Changes made to PTA medication list:    Added: None    Deleted: 12.5 mg dose of methotrexate, gabapentin, topical ketoconazole, ativan, mirtazapine, vitamin D3, multivitamin    Changed: Pt son states he is taking glimepiride BID instead of 2 tablets in AM and 1 tablet in PM, PO voltaren changed to PRN    Medication Affordability:  Not including over the counter (OTC) medications, was there a time in the past 3 months when you did not take your medications as prescribed because of cost?: No    Allergies reviewed with patient and updates made in EHR: no    Medication History Completed By: Filomena Parker 5/12/2023 5:49 PM    Prior to Admission medications    Medication Sig Last Dose Taking? Auth Provider Long Term End Date   allopurinol (ZYLOPRIM) 300 MG tablet Take 1 tablet (300 mg) by mouth daily 5/11/2023 Yes William Marquez MD     aspirin 81 MG tablet Take 81 mg by mouth daily. 5/11/2023 Yes Reported, Patient     atorvastatin (LIPITOR) 40 MG tablet Take 1 tablet (40 mg) by mouth daily 5/11/2023 Yes William Marquez MD Yes    augmented betamethasone dipropionate (DIPROLENE-AF) 0.05 % external ointment Apply twice daily as needed for rash on the leg or back  Yes Edu Rivera MD     clobetasol (TEMOVATE) 0.05 % external cream Apply twice daily as needed for itching or rash on the back  Yes Edu Rivera MD     clopidogrel (PLAVIX) 75 MG tablet TAKE 1 TABLET BY MOUTH  DAILY 5/11/2023 Yes William Marquez MD Yes    colchicine  (COLCYRS) 0.6 MG tablet Take 1 tablet (0.6 mg) by mouth daily 5/11/2023 Yes William Marquez MD     Cyanocobalamin (VITAMIN B12) 500 MCG TABS Take 1 tablet by mouth daily 5/11/2023 Yes Reported, Patient     diclofenac (VOLTAREN) 75 MG EC tablet Take 1 tablet (75 mg) by mouth At Bedtime  Patient taking differently: Take 75 mg by mouth nightly as needed  Yes Mark Sharp DPM Yes    finasteride (PROSCAR) 5 MG tablet Take 1 tablet (5 mg) by mouth daily 5/11/2023 Yes Aym Johnston PA     fluocinolone (SYNALAR) 0.01 % solution Apply a thin layer twice daily as needed to scalp for itchiness.  Yes Edu Rivera MD     FLUoxetine (PROZAC) 20 MG capsule Take 1 capsule (20 mg) by mouth daily 5/11/2023 Yes William Marquez MD Yes    folic acid (FOLVITE) 1 MG tablet Take 1 tablet (1 mg) by mouth daily Daily on days you don't take MTX 5/11/2023 Yes Edu Rivera MD     furosemide (LASIX) 40 MG tablet Take 1 tablet (40 mg) by mouth daily 5/11/2023 Yes William Marquez MD Yes    glimepiride (AMARYL) 1 MG tablet TAKE 2 TABLETS BY MOUTH IN  THE MORNING AND 1 TABLET BY MOUTH IN THE EVENING  Patient taking differently: Take 1 mg by mouth 2 times daily (before meals) 5/11/2023 Yes Trudy Campa MD Yes    irbesartan (AVAPRO) 150 MG tablet Take 1 tablet (150 mg) by mouth At Bedtime 5/11/2023 Yes William Marquez MD Yes    isosorbide mononitrate (IMDUR) 30 MG 24 hr tablet Take 1 tablet (30 mg) by mouth daily 5/11/2023 Yes William Marquez MD Yes    metFORMIN (GLUCOPHAGE) 1000 MG tablet Take 1 tablet (1,000 mg) by mouth 2 times daily (with meals) 5/11/2023 Yes Trudy Campa MD Yes    methotrexate 2.5 MG tablet Take 6 tablets (15 mg) by mouth every 7 days for 30 days 4/30/2023 Yes Edu Rivera MD Yes 6/4/23   mupirocin (BACTROBAN) 2 % external ointment Apply topically 2 times daily  Yes Trudy Campa MD     spironolactone (ALDACTONE) 25 MG tablet Take 1 tablet (25 mg) by mouth  daily 5/11/2023 Yes William Marquez MD Yes    tamsulosin (FLOMAX) 0.4 MG capsule Take 1 capsule (0.4 mg) by mouth daily 5/11/2023 at am Yes William Marquez MD     traZODone (DESYREL) 150 MG tablet Take 1 tablet (150 mg) by mouth At Bedtime 5/11/2023 Yes William Marquez MD Yes    Alcohol Swabs PADS 1 pad 4 times daily   Trudy Campa MD     blood glucose (NO BRAND SPECIFIED) lancets standard Use to test blood sugar 3 times daily or as directed.   Trudy Campa MD     blood glucose (NO BRAND SPECIFIED) test strip Use to test blood sugar 4 times daily  With meter/ strips/ lancets covered by insurance pt using accuchek   Trudy Campa MD     Blood Glucose Monitoring Suppl (BLOOD GLUCOSE MONITOR SYSTEM) w/Device KIT Test 4 times daily with meter covered by  insurance   Trudy Campa MD     nitroglycerin (NITROSTAT) 0.4 MG SL tablet Place under the tongue as needed   Reported, Patient Yes

## 2023-05-12 NOTE — LETTER
Formerly [facility] [address] [address]  [phone]    FACSIMILE TRANSMITTAL SHEET    TO: Sangeetha  COMPANY:   FAX NUMBER:   PHONE NUMBER:   FROM: Denis RASHEED  PHONE: [phone]  DATE: 05/15/23  NUMBER OF PAGES:     _____URGENT _____REVIEW ONLY _____PLEASE COMMENT____PLEASE REPLY    NOTES/COMMENTS: Michael Vivas! Please review this patient for the [facility], [facility] and [facility]. Thanks!                                      IF YOU DID NOT RECEIVE THE CORRECT NUMBER OF PAGES OR THE FAX DID NOT COME THROUGH CLEARLY, PLEASE CALL THE SENDER     CONFIDENTIALITY STATEMENT: Confidential information that may accompany this transmission contains protected health information under state and federal law and is legally privileged. This information is intended only for the use of the individual or entity named above and may be used only for carrying out treatment, payment or other healthcare operations. The recipient or person responsible for delivering this information is prohibited by law from disclosing this information without proper authorization to any other party, unless required to do so by law or regulation. If you are not the intended recipient, you are hereby notified that any review, dissemination, distribution, or copying of this message is strictly prohibited. If you have received this communication in error, please destroy the materials and contact us immediately by calling the number listed above. No response indicates that the information was received by the appropriate authorized party

## 2023-05-12 NOTE — ED PROVIDER NOTES
[place] [facility]ERGENCY DEPARTMENT ([facility])    5/12/23       ED PROVIDER NOTE    History     Chief Complaint   Patient presents with     Back Pain     Weakness and confusion     HPI  Deandre Moore is a 82 year old male who lives at home with his wife and recently has been having some increasing back pain with decreased mobility.  The patient presents to the ER with increasing weakness and confusion and en route to our facility was given  25 mcg of fentanyl.  The patient presents to the ER and was seen in room 3 where he complained of upper back pain and increasing weakness over the past few days.  Past medical history is significant for CHF, ascending aortic enlargement, and diabetes.  Patient's most recent chest/abdominal CT was done in January 2022 and revealed mild chronic compression of T11 without significant Aortic enlargement.    This part of the medical record was transcribed by Neelam Vincent, Medical Scribe, from a dictation done by Bubba Castillo MD.     Past Medical History  Past Medical History:   Diagnosis Date     Diabetes mellitus (H)      Gout attack      Heart attack (H) 1984     Hypertension      Stented coronary artery     6 stents in heart     Past Surgical History:   Procedure Laterality Date     CARDIAC SURGERY  1984    4 vessel bypass     CHOLECYSTECTOMY       COLONOSCOPY       DESTRUCTION OF PARAVERTEBRAL FACET LUMBAR / SACRAL ADDITIONAL Bilateral 10/18/2021    Procedure: DESTRUCTION, NERVE, FACET JOINT, LUMBOSACRAL, ADDITIONAL NERVE AFTER DESTRUCTION OF INITIAL LUMBOSACRAL FACET JOINT NERVE;  Surgeon: Horacio Gonsalves MD;  Location: [facility] OR     DESTRUCTION OF PARAVERTEBRAL FACET LUMBAR / SACRAL SINGLE Bilateral 10/18/2021    Procedure: DESTRUCTION, NERVE, FACET JOINT, LUMBOSACRAL, 1 NERVE;  Surgeon: Horacio Gonsalves MD;  Location: [facility] OR     ESOPHAGOSCOPY, GASTROSCOPY, DUODENOSCOPY (EGD), COMBINED N/A 3/1/2018    Procedure: COMBINED ESOPHAGOSCOPY,  GASTROSCOPY, DUODENOSCOPY (EGD), BIOPSY SINGLE OR MULTIPLE;  ESOPHAGOGASTRODUODENOSCOPY ;  Surgeon: Daryn Medrano MD;  Location: [facility]     INJECT BLOCK MEDIAL BRANCH CERVICAL/THORACIC/LUMBAR Bilateral 8/16/2021    Procedure: BLOCK, NERVE, FACET JOINT, MEDIAL BRANCH, DIAGNOSTIC Lumbar 2-3-4;  Surgeon: Horacio Gonsalves MD;  Location: [facility] OR     INJECT BLOCK MEDIAL BRANCH CERVICAL/THORACIC/LUMBAR Bilateral 9/27/2021    Procedure: BLOCK, NERVE, FACET JOINT, MEDIAL BRANCH, DIAGNOSTIC  L2-4, Bilateral;  Surgeon: Horacio Gonsalves MD;  Location: [facility] OR     NO HISTORY OF SURGERY  3/31/14    derm     PHACOEMULSIFICATION WITH STANDARD INTRAOCULAR LENS IMPLANT  1/21/2013    Procedure: PHACOEMULSIFICATION WITH STANDARD INTRAOCULAR LENS IMPLANT;  Phacoemulsification with standard intraocular lens implant, right eye;  Surgeon: Jay Rodriges MD;  Location: [facility] OR     Alcohol Swabs PADS  allopurinol (ZYLOPRIM) 300 MG tablet  aspirin 81 MG tablet  atorvastatin (LIPITOR) 40 MG tablet  augmented betamethasone dipropionate (DIPROLENE-AF) 0.05 % external ointment  B Complex-C-Folic Acid (DIALYVITE) TABS  blood glucose (NO BRAND SPECIFIED) lancets standard  blood glucose (NO BRAND SPECIFIED) test strip  Blood Glucose Monitoring Suppl (BLOOD GLUCOSE MONITOR SYSTEM) w/Device KIT  Cholecalciferol (VITAMIN D) 2000 UNITS CAPS  clobetasol (TEMOVATE) 0.05 % external cream  clopidogrel (PLAVIX) 75 MG tablet  colchicine (COLCYRS) 0.6 MG tablet  Cyanocobalamin (VITAMIN B12) 500 MCG TABS  diclofenac (VOLTAREN) 75 MG EC tablet  finasteride (PROSCAR) 5 MG tablet  fluocinolone (SYNALAR) 0.01 % solution  FLUoxetine (PROZAC) 20 MG capsule  folic acid (FOLVITE) 1 MG tablet  furosemide (LASIX) 40 MG tablet  gabapentin (NEURONTIN) 300 MG capsule  glimepiride (AMARYL) 1 MG tablet  irbesartan (AVAPRO) 150 MG tablet  isosorbide mononitrate (IMDUR) 30 MG 24 hr tablet  ketoconazole (NIZORAL) 2 % external cream  LORazepam (ATIVAN)  "1 MG tablet  metFORMIN (GLUCOPHAGE) 1000 MG tablet  methotrexate 2.5 MG tablet  methotrexate 2.5 MG tablet  mirtazapine (REMERON) 15 MG tablet  multivitamin w/minerals (THERA-VIT-M) tablet  mupirocin (BACTROBAN) 2 % external ointment  nitroglycerin (NITROSTAT) 0.4 MG SL tablet  spironolactone (ALDACTONE) 25 MG tablet  tamsulosin (FLOMAX) 0.4 MG capsule  traZODone (DESYREL) 150 MG tablet  triamcinolone (KENALOG) 0.1 % external ointment  urea (GORDONS UREA) 40 % external ointment  vitamin B-12 (CYANOCOBALAMIN) 500 MCG tablet      Allergies   Allergen Reactions     Beta Adrenergic Blockers Unknown     Latex Dermatitis     Latex Rash     Tape [Adhesive Tape] Dermatitis and Rash     Electrode patches     Family History  Family History   Problem Relation Age of Onset     Tremor Brother      Other - See Comments Brother         living in [place]     Other - See Comments Brother         living in [place]     Other - See Comments Son         [place]     Other - See Comments Son         [place]     Other - See Comments Daughter         [place]     Cancer No family hx of         no skin cancer     Skin Cancer No family hx of      Social History   Social History     Tobacco Use     Smoking status: Former     Types: Cigarettes     Quit date: 10/15/1984     Years since quittin.5     Smokeless tobacco: Never   Substance Use Topics     Alcohol use: Yes     Comment: occasional-once a year     Drug use: No      Past medical history, past surgical history, medications, allergies, family history, and social history were reviewed with the patient. No additional pertinent items.      A medically appropriate review of systems was performed with pertinent positives and negatives noted in the HPI, and all other systems negative.    Physical Exam   BP: 107/72  Pulse: 88  Resp: 22  Height: 188 cm (6' 2.02\")  Weight: 75.8 kg (167 lb)  SpO2: 100 %  Physical Exam  Vitals and nursing note reviewed.   Constitutional:       Comments: Elderly " conversant appears dehydrated with sunken eyes and patient seems to be slightly confused; weak, frail   HENT:      Head: Atraumatic.   Eyes:      Extraocular Movements: Extraocular movements intact.      Pupils: Pupils are equal, round, and reactive to light.   Cardiovascular:      Rate and Rhythm: Regular rhythm.   Pulmonary:      Comments: Good aeration bilaterally  Abdominal:      Palpations: Abdomen is soft.      Tenderness: There is no abdominal tenderness.   Genitourinary:     Comments: Patient stated to nursing personnel he had not urinated since yesterday and therefore bladder scan was done which revealed 500 cc of urine present and a Cifuentes catheter was placed by nursing personnel.  Musculoskeletal:         General: No deformity.      Cervical back: Neck supple.   Neurological:      General: No focal deficit present.      Mental Status: He is oriented to person, place, and time.   Psychiatric:         Mood and Affect: Mood normal.           ED Course, Procedures, & Data      Procedures        EKG revealed a normal sinus rhythm at a rate of 88 with a first-degree block and a NV interval point 292.  The patient had a right bundle branch block with a QRS duration of point 152 and otherwise had a leftward axis with no acute ST or T wave changes compared with previous EKGs.  This is read by me personally.     Results for orders placed or performed during the hospital encounter of 05/12/23   XR Chest Port 1 View     Status: None    Narrative    Exam: XR CHEST PORT 1 VIEW, 5/12/2023 9:36 AM    Indication: AMS    Comparison: 1/18/2023    Findings:   Intact median sternotomy wires. Stable enlarged cardiac silhouette.  Diffuse bilateral interstitial prominence. Probable small left pleural  effusion. No pneumothorax. Increased left basilar opacities with  silhouetting of the left hemidiaphragm.      Impression    Impression:   1. Probable small left pleural effusion with increased left basilar  atelectasis versus  consolidation.  2. Findings suggestive of pulmonary edema.    ANNA CINTRON DO         SYSTEM ID:  [id number]   CT Thoracic Spine w/o Contrast     Status: None    Narrative    Thoracic and Lumbar spine CT without contrast    History: back pain with t11 comp fracture.    Comparison: CT chest 12/23/2015 and CT Abdomen-Pelvis 2/5/2018    Technique: Axial, coronal, and sagittal multiplanar reconstructions  obtained from acquisition of thoracic and lumbar spine CT scan .    Findings:  Thoracic spine:   Osteopenia.    Right convex curvature of the midthoracic spine. Upper endplate  compression deformity associated with a Schmorl' node.    There is no acute fracture or subluxation. There is no prevertebral  edema.     There is no spinal canal or foraminal stenosis at any level. No soft  tissue abnormality in the visualized paraspinous tissues anteriorly.    Lumbar Spine:   Osteopenia.    Upper endplate compression deformity associated with a Schmorl's node  at L2. Schmorl's node at the lower endplate of L1.    Instrumented posterior spinal fusion at L4-5 with complete osseous  fusion. No evidence of hardware failure.    Minimal retrolisthesis at L3-4. Decompressive laminectomy at L4-5.    Multilevel disc height narrowing. On a level by level basis, the  findings are as follows:    T12-L1:  There is no focal abnormality.  L1-2:  Disc osteophyte complex. Mild bilateral neural foraminal  stenosis. No spinal canal stenosis.  L2-3:  Disc osteophyte complex. Mild bilateral facet hypertrophy. Mild  bilateral neural foraminal stenosis. Mild spinal canal stenosis..  L3-4:  Disc osteophyte complex and left greater than right facet  hypertrophy. Moderate left and mild right neural foraminal stenosis.  Mild spinal canal stenosis.  L4-5:  Fusion level. Decompressive laminectomy. Mild bilateral neural  foraminal stenosis.  L5-S1:  There is no focal abnormality.  The visualized paraspinous tissues anteriorly are unremarkable.    Abnormal  aortic calcifications.      Impression    Impression:   1. Thoracic spine:  No acute fracture or subluxation. Upper endplate  compression deformity associated with a Schmorl's node at T11.   2. Lumbar Spine:   a. No acute fracture or subluxation.  b. Postsurgical changes of instrumented spinal fusion and spinal canal  decompression at L4-5. There is complete dorsal lateral surface  fusion. No evidence of hardware failure.  c. Upper endplate compression deformity associated with a Schmorl's  node at L2.     STEFANI POOL MD         SYSTEM ID:  [id number]   CT Lumbar Spine w/o Contrast     Status: None    Narrative    Thoracic and Lumbar spine CT without contrast    History: back pain with t11 comp fracture.    Comparison: CT chest 12/23/2015 and CT Abdomen-Pelvis 2/5/2018    Technique: Axial, coronal, and sagittal multiplanar reconstructions  obtained from acquisition of thoracic and lumbar spine CT scan .    Findings:  Thoracic spine:   Osteopenia.    Right convex curvature of the midthoracic spine. Upper endplate  compression deformity associated with a Schmorl' node.    There is no acute fracture or subluxation. There is no prevertebral  edema.     There is no spinal canal or foraminal stenosis at any level. No soft  tissue abnormality in the visualized paraspinous tissues anteriorly.    Lumbar Spine:   Osteopenia.    Upper endplate compression deformity associated with a Schmorl's node  at L2. Schmorl's node at the lower endplate of L1.    Instrumented posterior spinal fusion at L4-5 with complete osseous  fusion. No evidence of hardware failure.    Minimal retrolisthesis at L3-4. Decompressive laminectomy at L4-5.    Multilevel disc height narrowing. On a level by level basis, the  findings are as follows:    T12-L1:  There is no focal abnormality.  L1-2:  Disc osteophyte complex. Mild bilateral neural foraminal  stenosis. No spinal canal stenosis.  L2-3:  Disc osteophyte complex. Mild bilateral facet  hypertrophy. Mild  bilateral neural foraminal stenosis. Mild spinal canal stenosis..  L3-4:  Disc osteophyte complex and left greater than right facet  hypertrophy. Moderate left and mild right neural foraminal stenosis.  Mild spinal canal stenosis.  L4-5:  Fusion level. Decompressive laminectomy. Mild bilateral neural  foraminal stenosis.  L5-S1:  There is no focal abnormality.  The visualized paraspinous tissues anteriorly are unremarkable.    Abnormal aortic calcifications.      Impression    Impression:   1. Thoracic spine:  No acute fracture or subluxation. Upper endplate  compression deformity associated with a Schmorl's node at T11.   2. Lumbar Spine:   a. No acute fracture or subluxation.  b. Postsurgical changes of instrumented spinal fusion and spinal canal  decompression at L4-5. There is complete dorsal lateral surface  fusion. No evidence of hardware failure.  c. Upper endplate compression deformity associated with a Schmorl's  node at L2.     STEFANI POOL MD         SYSTEM ID:  [id number]   CT Chest w/o Contrast     Status: None    Narrative    Examination: CT CHEST W/O CONTRAST, 5/12/2023 10:23 AM     HISTORY: left pleural effusion vs. infiltrate    Comparison: CT of the chest abdomen and pelvis 1/3/2022, chest  radiograph 5/12/2023    TECHNIQUE: Helical acquisition of CT images from the lung apices to  the kidneys without IV contrast. Coronal and axial MIP images were  reconstructed from the source data.    FINDINGS:     Thorax:    Bilateral gynecomastia. Hyperdense nodule of the right thyroid  measuring 6 mm.    There is no definite axillary, hilar, or mediastinal lymphadenopathy.  Conventional aortic arch branching pattern. Aortic arch  atherosclerosis. Surgical changes of CABG. Cardiomegaly.  Subendocardial fat deposition along the anteroseptal left ventricular  wall compatible with prior myocardial infarct. No pericardial  effusion. Dilated main pulmonary artery measuring up to 3.4  cm.  Coronary artery stents and calcification.     Lungs:  The trachea and central airways are clear. Increased peripheral  reticular opacities with slight basilar predominance and areas of  subpleural sparing. Areas of minimal peripheral traction  bronchiolectasis, most conspicuous in the lingula. No honeycombing.  Superimposed patchy groundglass opacities of the left greater than  right posterior lower lobes with associated septal thickening. Trace  left pleural effusion. No pneumothorax.    Upper abdomen: Limited evaluation of the upper abdomen. Status post  cholecystectomy.    Bones: Mild L1 superior endplate compression deformity, age  indeterminate but new since 1/3/2022. Stable chronic T11 anterior  wedge compression deformity. Moderate right glenohumeral joint  degenerative change. Postoperative changes of the chest with median  sternotomy wires intact.      Impression    IMPRESSION:   1. Patchy groundglass opacities of the left greater than right lower  lobes superimposed on worsened peripheral and basilar predominant  subpleural reticular abnormality, suspicious for infection or  exacerbation of interstitial lung disease, such as NSIP. Consider  pulmonology consultation.  2. Dilated main pulmonary artery, which can be seen in the setting of  pulmonary hypertension.  3. Trace left pleural effusion.    I have personally reviewed the examination and initial interpretation  and I agree with the findings.    ANNA CINTRON DO         SYSTEM ID:  [id number]   [place] Draw     Status: None (In process)    Narrative    The following orders were created for panel order [place] Draw.  Procedure                               Abnormality         Status                     ---------                               -----------         ------                     Extra Blue Top Tube[[id number]]                              Final result               Extra Red Top Tube[[id number]]                               Final result                Extra Green Top (Lithium...[[id number]]                                                 Extra Purple Top Tube[[id number]]                            Final result               Extra Green Top (Lithium...[[id number]]                      Final result                 Please view results for these tests on the individual orders.   Extra Blue Top Tube     Status: None   Result Value Ref Range    Hold Specimen JIC    Extra Red Top Tube     Status: None   Result Value Ref Range    Hold Specimen JIC    Extra Purple Top Tube     Status: None   Result Value Ref Range    Hold Specimen JIC    Extra Green Top (Lithium Heparin) ON ICE     Status: None   Result Value Ref Range    Hold Specimen JIC    iStat Gases Electrolytes ICA Glucose Venous, POCT     Status: Abnormal   Result Value Ref Range    CPB Applied No     Hematocrit POCT 27 (L) 40 - 53 %    Calcium, Ionized Whole Blood POCT 4.9 4.4 - 5.2 mg/dL    Glucose Whole Blood POCT 99 70 - 99 mg/dL    Bicarbonate Venous POCT 29 (H) 21 - 28 mmol/L    Hemoglobin POCT 9.2 (L) 13.3 - 17.7 g/dL    Potassium POCT 5.5 (H) 3.4 - 5.3 mmol/L    Sodium POCT 133 133 - 144 mmol/L    pCO2 Venous POCT 41 40 - 50 mm Hg    pO2 Venous POCT 42 25 - 47 mm Hg    pH Venous POCT 7.46 (H) 7.32 - 7.43    O2 Sat, Venous POCT 80 (L) 94 - 100 %   iStat Gases (lactate) venous, POCT     Status: Abnormal   Result Value Ref Range    Lactic Acid POCT 0.9 <=2.0 mmol/L    Bicarbonate Venous POCT 28 21 - 28 mmol/L    O2 Sat, Venous POCT 76 (L) 94 - 100 %    pCO2V Venous POCT 41 40 - 50 mm Hg    pH Venous POCT 7.45 (H) 7.32 - 7.43    pO2 Venous POCT 39 25 - 47 mm Hg   Comprehensive metabolic panel     Status: Abnormal   Result Value Ref Range    Sodium 133 (L) 136 - 145 mmol/L    Potassium 5.7 (H) 3.4 - 5.3 mmol/L    Chloride 98 98 - 107 mmol/L    Carbon Dioxide (CO2) 25 22 - 29 mmol/L    Anion Gap 10 7 - 15 mmol/L    Urea Nitrogen 38.6 (H) 8.0 - 23.0 mg/dL    Creatinine 2.48 (H) 0.67 - 1.17 mg/dL    Calcium  9.1 8.8 - 10.2 mg/dL    Glucose 101 (H) 70 - 99 mg/dL    Alkaline Phosphatase 64 40 - 129 U/L    AST 28 10 - 50 U/L    ALT 15 10 - 50 U/L    Protein Total 6.3 (L) 6.4 - 8.3 g/dL    Albumin 3.6 3.5 - 5.2 g/dL    Bilirubin Total 0.7 <=1.2 mg/dL    GFR Estimate 25 (L) >60 mL/min/1.73m2   Troponin T, High Sensitivity     Status: Abnormal   Result Value Ref Range    Troponin T, High Sensitivity 30 (H) <=22 ng/L   UA with Microscopic reflex to Culture     Status: Abnormal    Specimen: Urine, Catheter   Result Value Ref Range    Color Urine Light Yellow Colorless, Straw, Light Yellow, Yellow    Appearance Urine Clear Clear    Glucose Urine Negative Negative mg/dL    Bilirubin Urine Negative Negative    Ketones Urine Negative Negative mg/dL    Specific Gravity Urine 1.016 1.003 - 1.035    Blood Urine Negative Negative    pH Urine 7.0 5.0 - 7.0    Protein Albumin Urine Negative Negative mg/dL    Urobilinogen Urine 2.0 Normal, 2.0 mg/dL    Nitrite Urine Negative Negative    Leukocyte Esterase Urine Negative Negative    Mucus Urine Present (A) None Seen /LPF    RBC Urine <1 <=2 /HPF    WBC Urine 1 <=5 /HPF    Squamous Epithelials Urine <1 <=1 /HPF    Hyaline Casts Urine 8 (H) <=2 /LPF    Narrative    Urine Culture not indicated   CBC with platelets and differential     Status: Abnormal   Result Value Ref Range    WBC Count 10.4 4.0 - 11.0 10e3/uL    RBC Count 2.77 (L) 4.40 - 5.90 10e6/uL    Hemoglobin 9.1 (L) 13.3 - 17.7 g/dL    Hematocrit 27.0 (L) 40.0 - 53.0 %    MCV 98 78 - 100 fL    MCH 32.9 26.5 - 33.0 pg    MCHC 33.7 31.5 - 36.5 g/dL    RDW 14.6 10.0 - 15.0 %    Platelet Count 228 150 - 450 10e3/uL    % Neutrophils 84 %    % Lymphocytes 7 %    % Monocytes 6 %    % Eosinophils 1 %    % Basophils 1 %    % Immature Granulocytes 1 %    NRBCs per 100 WBC 0 <1 /100    Absolute Neutrophils 8.8 (H) 1.6 - 8.3 10e3/uL    Absolute Lymphocytes 0.7 (L) 0.8 - 5.3 10e3/uL    Absolute Monocytes 0.6 0.0 - 1.3 10e3/uL    Absolute  Eosinophils 0.2 0.0 - 0.7 10e3/uL    Absolute Basophils 0.1 0.0 - 0.2 10e3/uL    Absolute Immature Granulocytes 0.1 <=0.4 10e3/uL    Absolute NRBCs 0.0 10e3/uL   Glucose by meter     Status: Abnormal   Result Value Ref Range    GLUCOSE BY METER POCT 103 (H) 70 - 99 mg/dL   EKG 12-lead, tracing only     Status: None   Result Value Ref Range    Systolic Blood Pressure  mmHg    Diastolic Blood Pressure  mmHg    Ventricular Rate 88 BPM    Atrial Rate 88 BPM    ME Interval 292 ms    QRS Duration 152 ms     ms    QTc 479 ms    P Axis 47 degrees    R AXIS -77 degrees    T Axis 26 degrees    Interpretation ECG       Sinus rhythm with 1st degree A-V block with occasional Premature ventricular complexes  Left axis deviation  Right bundle branch block  Abnormal ECG    Unconfirmed report - interpretation of this ECG is computer generated - see medical record for final interpretation  Confirmed by - EMERGENCY ROOM, PHYSICIAN ([id number]),  ARTHUR MOYA ([id number]) on 5/12/2023 8:37:37 AM     CBC with platelets differential     Status: Abnormal    Narrative    The following orders were created for panel order CBC with platelets differential.  Procedure                               Abnormality         Status                     ---------                               -----------         ------                     CBC with platelets and d...[[id number]]  Abnormal            Final result                 Please view results for these tests on the individual orders.     Medications   sodium chloride (PF) 0.9% PF flush 3 mL (3 mLs Intracatheter $Given 5/12/23 0900)   sodium chloride (PF) 0.9% PF flush 3 mL (has no administration in time range)   sodium chloride 0.9% infusion (0 mLs Intravenous Not Given 5/12/23 0911)   0.9% sodium chloride BOLUS (has no administration in time range)   cefTRIAXone (ROCEPHIN) 2 g vial to attach to  ml bag for ADULTS or NS 50 ml bag for PEDS (has no administration in time range)    azithromycin (ZITHROMAX) 500 mg in sodium chloride 0.9 % 250 mL intermittent infusion (has no administration in time range)   0.9% sodium chloride BOLUS (0 mLs Intravenous Stopped 5/12/23 1000)   morphine (PF) injection 2 mg (2 mg Intravenous $Given 5/12/23 0818)       Critical care was not performed.     Medical Decision Making  The patient's presentation was of moderate complexity (an acute illness with systemic symptoms).    The patient's evaluation involved:  an assessment requiring an independent historian (Son)  ordering and/or review of 3+ test(s) in this encounter (See above)  review of 3+ test result(s) ordered prior to this encounter (See epic)    The patient's management necessitated high risk (a decision regarding hospitalization).      Assessment & Plan      I have reviewed the nursing notes.    Patient's thoracic and lumbar CTs reveal no acute source of the patient's back pain.  Patient was having some urinary retention a Cifuentes catheter was placed.  Patient had good strength of his lower extremities so it was not thought that he has any kind of spinal injury in his lumbar back.  Meanwhile the patient has a BRENT along with his dehydration and has bilateral lower lobe infiltrates left greater than right for which the patient will be treated with antibiotics.    Patient will be admitted to the medicine service.    I have reviewed the findings, diagnosis, and plan with the patient's family        Final diagnoses:   Pulmonary infiltrates - LLL > RLL   BRENT (acute kidney injury) (H) - weakness, dehydration, FTT   Lumbar back pain   Urinary retention       Neelam DIAZ, am serving as a trained medical scribe to document services personally performed by Bubba Castillo MD based on the provider's statements to me on May 12, 2023.  This document has been checked and approved by the attending provider.    IBubba MD, was physically present and have reviewed and verified the  accuracy of this note documented by Neelam Vincent, medical scribe.        Bubba Castillo Md  [facility] EMERGENCY DEPARTMENT  5/12/2023     Bubba Castillo MD  05/12/23 [id number]

## 2023-05-12 NOTE — H&P
[facility]    History and Physical - Hospitalist Service, GOLD TEAM   Date of Admission: 5/12/2023    Assessment & Plan   Deandre Moore is a 82 year old male with history of CAD (s/p CABG and stenting), HFpEF, DMII, HTN, depression, HLD, gout, BPH, chronic pain, chronic dermatitis, R renal lesion, and insomnia who was admitted to [facility] 5/12/2023 with urinary retention, BRENT, and worsening back pain    BRENT, hyperkalemia, ?urinary retnetion: BL Cr 1-1.2, elevated to 2.48 on admission. BUN 38.6  UA bland. K 5.7. EKG no peaked T waves, not treated in the ED. Cifuentes placed in the ED due to retention. Etiology likely multifactorial including post-renal from retention, prerenal from poor oral intake, meds, other  - Consider renal and/or urology consults pending course  - Continue Cifuentes for now --> needs TOV 5/13 vs 5/14  - Repeat BMP now  - Mg, phos  - Renal US  - Pharmacy med rec consult   - Further management pending repeat BMP   - Stop IVF given heart failure, multiple boluses in the ED  - LVM for patient's son, Lucio, at [phone]  - Full Code for now per d/w patient. Suggest ongoing d/w patient and family    Cognitive decline: Noted by family. Neurology consult placed per PCP Dr. Marquez 1/2023. Per chart review, c/f Parkinsonian features. Tremor noted on exam. Currently A&O x3, able to have complex conversations. It is likely that cognitive decline contributory to several issues that have prompted hospitalization   - Follow up with neurology 7/5/2023 as planned  - TSH    Acute on chronic back pain and weakness, mechanical falls: Chronic pain for years. Reports worsening x2 weeks with mechanical falls. CT thoracic and lumbar spine 5/12: no acute thoracic findings, postsurgical changes of L4-5 spinal fusion and spinal canal decompression, upper endplate compression deformity at L2. Follows OP with PM&R, last seen 5/5/22. Physical exam with 5/5 strength of the BLE,  normal sensation, no focal neuro changes  - Low threshold to d/w NSGY if any clinical changes  - PT, OT  - Orthostatic pressures  - Fall precuations  - Echo    Weight loss, failure to thrive, severe protein calorie malnutrition: Reports 100 lb weight loss in 1 year. Noted to have at least 35 lb weight loss since last fall. Appetite quite poor. Denies abdominal pain, N/V, s/s aspiration  - Nutrition consult  - Needs further workup for weight loss once other hospital issues are stabilized    HFpEF, possible pulmonary HTN: No recent echo. Follows OP with [facility], last seen 6/2022. PTA on Lasix, irbesartan, Imdur, Spironolactone. Chest CT 5/12 dilated main pulmonary artery which could be c/w pulmonary HTN. Prior BL weight and 200 lbs, down to 167 on presentation. No e/o gross hypervolemia on exam  - Hold lasix, spironolactone, irbesartan given BRENT   - Continue PTA Imdur  - Daily weights, I&Os  - Needs OP cardiology follow up    Acute on chronic normocytic anemia: BL hgb 11-12. Hgb 9.1 5/12. No e/o acute bleeding  - Nutrition studies  - CBC 5/13  - Continue B12, folic acid  - Further workup if ongoing decline    DMII, hypoglycemia: A1c 5.4 2/2022. PTA on Glimepiride, Metformin. Follows OP with Dr. Campa of endocrinology. Glucose briefly low to 54, since resolved.   - Hold PTA meds  - MSSI, hypoglycemia protocol     Prior concern for PNA: CXR 5/12 small L pleural effusion with basilar atelectasis vs consolidation. Chest CT 5/12 L>R GGO suspicious for infection. LA WBC normal. 7.46/41/42/80. Treated with Ceftriaxone and Azithromycin in the ED. Briefly on 3L without any hypoxia, now VSS on room air. Afebrile, no cough. Lungs CTAB  - Hold off on further abx for now given clinical picture not c/w PNA    BPH, urinary incontinence: Follows OP with urology, last seen 1/2023. Continue finasteride, Flomax. Urinary rentention as above  - Continue PTA meds  - Follow up OP 6/1/2023 as planned    R renal lesion: Follows OP  with urology as above. Renal US 1/2023 2.0 cm R renal lesion with punctate calcification, likely benign, however cannot rule out malignancy  - Renal US as above    Hyponatremia: Mild. Na 133. Monitor 5/13    QTc prolongation: QTc 479. Avoid prolonging meds, trend if adding prolonging meds    Other Medical Issues:  Trop elevation: Asymptomatic. Trop 27 (30). EKG first degree block with PVCs and RBBB, c/w prior. Likely increased demand in the setting of the above. Low threshold for repeat/further workup with any changes given clinical history   CAD s/p CABG and stenting: Follows OP with [facility], last seen 6/2022. Continue PTA ASA and Plavix, follow up OP with indicated  Chronic L foot pain: Follows OP with Dr. Sharp of podiatry, last seen 5/2. Follow up with podiatry 5/16 as planned  Gout: Continue Allopurinol, colchicine   HLD: Continue statin  Depression, insomnia: Continue PTA Fluoxetine, Remeron, Trazodone   Chronic dermatitis: Follows OP with dermatology. Continue PTA gabapentin. Unclear if patient on Methotrexate. Hold for now, pharmacy consult pending       Diet:   Regular diet   DVT Prophylaxis: Heparin SQ  Cifuentes Catheter: PRESENT, indication:    Lines: None     Cardiac Monitoring: None  Code Status:   Full code    Clinically Significant Risk Factors Present on Admission        # Hyperkalemia: Highest K = 5.7 mmol/L in last 2 days, will monitor as appropriate         # Drug Induced Platelet Defect: home medication list includes an antiplatelet medication   # Hypertension: Noted on problem list               Disposition Plan      Expected Discharge Date: 05/14/2023                The patient's care was discussed with the patient and attending physician, Dr. Stephanie Goodman PA-C  Hospitalist Service, [facility]  Securely message with [employer] (more info)  Text page via [facility] Paging/Directory   See signed in provider for up to  date coverage information    ______________________________________________________________________    Chief Complaint   BRENT    History is obtained from the patient and medical record    History of Present Illness   Deandre Moore is a 82 year old male with history of CAD (s/p CABG and stenting), HFpEF, DMII, HTN, depression, HLD, gout, BPH, chronic pain, chronic dermatitis, R renal lesion, and insomnia who was admitted to [facility] 5/12/2023 with urinary retention, BRENT, and worsening back pain    Patient confirms that he has had chronic back pain for several years. It has been worsening over the past many months and has been worse over the past 2 weeks. He has barely been able to get out of bed due to pain. He reports falls that have occurred in the setting of back pain. One time he had his head but did not lose consciousness. This was about 3 weeks ago. Patient states appetite has been poor for over a year. Food does not taste good. Denies nausea or vomiting. No abdominal pain, constipation, diarrhea. He states he had a 100 pound weight loss.  Patient denies fever, chills. Occasionally has a dry cough. No dyspnea, ZELAYA, PND, orthopnea. He denies sick contacts. Patient says he was able to urinate prior to admission but that a catheter was placed when he was brought to the emergency department. Writer attempted to contact patient's son for collateral information however was unable to get through.      Past Medical History    Past Medical History:   Diagnosis Date     Diabetes mellitus (H)      Gout attack      Heart attack (H) 1984     Hypertension      Stented coronary artery     6 stents in heart       Past Surgical History   Past Surgical History:   Procedure Laterality Date     CARDIAC SURGERY  1984    4 vessel bypass     CHOLECYSTECTOMY       COLONOSCOPY       DESTRUCTION OF PARAVERTEBRAL FACET LUMBAR / SACRAL ADDITIONAL Bilateral 10/18/2021    Procedure: DESTRUCTION, NERVE, FACET JOINT, LUMBOSACRAL, ADDITIONAL  NERVE AFTER DESTRUCTION OF INITIAL LUMBOSACRAL FACET JOINT NERVE;  Surgeon: Horacio Gonsalves MD;  Location: [facility] OR     DESTRUCTION OF PARAVERTEBRAL FACET LUMBAR / SACRAL SINGLE Bilateral 10/18/2021    Procedure: DESTRUCTION, NERVE, FACET JOINT, LUMBOSACRAL, 1 NERVE;  Surgeon: Horacio Gonsalves MD;  Location: [facility] OR     ESOPHAGOSCOPY, GASTROSCOPY, DUODENOSCOPY (EGD), COMBINED N/A 3/1/2018    Procedure: COMBINED ESOPHAGOSCOPY, GASTROSCOPY, DUODENOSCOPY (EGD), BIOPSY SINGLE OR MULTIPLE;  ESOPHAGOGASTRODUODENOSCOPY ;  Surgeon: Daryn Medrano MD;  Location: [facility]     INJECT BLOCK MEDIAL BRANCH CERVICAL/THORACIC/LUMBAR Bilateral 2021    Procedure: BLOCK, NERVE, FACET JOINT, MEDIAL BRANCH, DIAGNOSTIC Lumbar 2-3-4;  Surgeon: Horacio Gonsalves MD;  Location: [facility] OR     INJECT BLOCK MEDIAL BRANCH CERVICAL/THORACIC/LUMBAR Bilateral 2021    Procedure: BLOCK, NERVE, FACET JOINT, MEDIAL BRANCH, DIAGNOSTIC  L2-4, Bilateral;  Surgeon: Horacio Gonsalves MD;  Location: [facility] OR     NO HISTORY OF SURGERY  3/31/14    derm     PHACOEMULSIFICATION WITH STANDARD INTRAOCULAR LENS IMPLANT  2013    Procedure: PHACOEMULSIFICATION WITH STANDARD INTRAOCULAR LENS IMPLANT;  Phacoemulsification with standard intraocular lens implant, right eye;  Surgeon: Jay Rodriges MD;  Location: [facility] OR       Prior to Admission Medications   Prior to Admission Medications   Prescriptions Last Dose Informant Patient Reported? Taking?   Alcohol Swabs PADS   No No   Si pad 4 times daily   B Complex-C-Folic Acid (DIALYVITE) TABS   Yes No   Sig: Take 1 tablet by mouth daily   Blood Glucose Monitoring Suppl (BLOOD GLUCOSE MONITOR SYSTEM) w/Device KIT   No No   Sig: Test 4 times daily with meter covered by  insurance   Cholecalciferol (VITAMIN D) 2000 UNITS CAPS   Yes No   Sig: Take 1 tablet by mouth daily   Cyanocobalamin (VITAMIN B12) 500 MCG TABS   Yes No   Sig: Take 1 tablet by mouth daily   FLUoxetine  (PROZAC) 20 MG capsule   No No   Sig: Take 1 capsule (20 mg) by mouth daily   LORazepam (ATIVAN) 1 MG tablet   Yes No   Sig: Not sure is taking   allopurinol (ZYLOPRIM) 300 MG tablet   No No   Sig: Take 1 tablet (300 mg) by mouth daily   aspirin 81 MG tablet   Yes No   Sig: Take 81 mg by mouth daily.   atorvastatin (LIPITOR) 40 MG tablet   No No   Sig: Take 1 tablet (40 mg) by mouth daily   augmented betamethasone dipropionate (DIPROLENE-AF) 0.05 % external ointment   No No   Sig: Apply twice daily as needed for rash on the leg or back   blood glucose (NO BRAND SPECIFIED) lancets standard   No No   Sig: Use to test blood sugar 3 times daily or as directed.   blood glucose (NO BRAND SPECIFIED) test strip   No No   Sig: Use to test blood sugar 4 times daily  With meter/ strips/ lancets covered by insurance pt using accuchek   clobetasol (TEMOVATE) 0.05 % external cream   No No   Sig: Apply twice daily as needed for itching or rash on the back   clopidogrel (PLAVIX) 75 MG tablet   No No   Sig: TAKE 1 TABLET BY MOUTH  DAILY   colchicine (COLCYRS) 0.6 MG tablet   No No   Sig: Take 1 tablet (0.6 mg) by mouth daily   diclofenac (VOLTAREN) 75 MG EC tablet   No No   Sig: Take 1 tablet (75 mg) by mouth At Bedtime   finasteride (PROSCAR) 5 MG tablet   No No   Sig: Take 1 tablet (5 mg) by mouth daily   fluocinolone (SYNALAR) 0.01 % solution   No No   Sig: Apply a thin layer twice daily as needed to scalp for itchiness.   folic acid (FOLVITE) 1 MG tablet   No No   Sig: Take 1 tablet (1 mg) by mouth daily Daily on days you don't take MTX   furosemide (LASIX) 40 MG tablet   No No   Sig: Take 1 tablet (40 mg) by mouth daily   gabapentin (NEURONTIN) 300 MG capsule   No No   Sig: TAKE 1 CAPSULE BY MOUTH IN  THE MORNING , 1 CAPSULE  WITH LUNCH AND 3 CAPSULES  AT BEDTIME   Patient not taking: Reported on 11/17/2022   glimepiride (AMARYL) 1 MG tablet   No No   Sig: TAKE 2 TABLETS BY MOUTH IN  THE MORNING AND 1 TABLET BY MOUTH IN THE  EVENING   irbesartan (AVAPRO) 150 MG tablet   No No   Sig: Take 1 tablet (150 mg) by mouth At Bedtime   isosorbide mononitrate (IMDUR) 30 MG 24 hr tablet   No No   Sig: Take 1 tablet (30 mg) by mouth daily   ketoconazole (NIZORAL) 2 % external cream   Yes No   Sig: Apply topically daily   Patient not taking: Reported on 11/17/2022   metFORMIN (GLUCOPHAGE) 1000 MG tablet   No No   Sig: Take 1 tablet (1,000 mg) by mouth 2 times daily (with meals)   methotrexate 2.5 MG tablet   Yes No   Sig: Take 12.5 mg by mouth once a week   methotrexate 2.5 MG tablet   No No   Sig: Take 6 tablets (15 mg) by mouth every 7 days for 30 days   mirtazapine (REMERON) 15 MG tablet   Yes No   Sig: Take 15 mg by mouth At Bedtime   multivitamin w/minerals (THERA-VIT-M) tablet   Yes No   Sig: Take 1 tablet by mouth daily.   mupirocin (BACTROBAN) 2 % external ointment   No No   Sig: Apply topically 2 times daily   nitroglycerin (NITROSTAT) 0.4 MG SL tablet   Yes No   Sig: Place under the tongue as needed   spironolactone (ALDACTONE) 25 MG tablet   No No   Sig: Take 1 tablet (25 mg) by mouth daily   tamsulosin (FLOMAX) 0.4 MG capsule   No No   Sig: Take 1 capsule (0.4 mg) by mouth daily   traZODone (DESYREL) 150 MG tablet   No No   Sig: Take 1 tablet (150 mg) by mouth At Bedtime   triamcinolone (KENALOG) 0.1 % external ointment   No No   Sig: APPLY TWICE DAILY TO RAISED RASH AREAS ON THE ARMS, LEGS, BODY AS NEEDED.   Patient not taking: Reported on 11/17/2022   urea (GORDONS UREA) 40 % external ointment   No No   Sig: Apply as many times daily as needed for callus on foot   Patient not taking: Reported on 11/17/2022   vitamin B-12 (CYANOCOBALAMIN) 500 MCG tablet   Yes No      Facility-Administered Medications Last Administration Doses Remaining   triamcinolone (KENALOG-40) injection 20 mg None recorded 1           Review of Systems    The 10 point Review of Systems is negative other than noted in the HPI or here.      Physical Exam   Vital  Signs:     BP: 120/74 Pulse: 85   Resp: 18 SpO2: 98 % O2 Device: None (Room air) Oxygen Delivery: 3 LPM  Weight: 167 lbs 0 oz  General Appearance: Alert and oriented x3, in hospital bed  HEENT: Anicteric sclera, MMM   Respiratory: Breathing comfortably on room air, lungs CTAB without wheezing or crackles   Cardiovascular: RRR, S1, S2. No murmur noted  GI: Abdomen soft, non-tender with active bowel sounds. No guarding or rebound  Lymph/Hematologic: No peripheral edema, distal pulses palpable   Skin: No rash or jaundice   Musculoskeletal: Moves all extremities. Strength 5/5 in the BUE and the BLE  Neurologic: No focal deficits, CN II-XII intact. Normal coordination of movement with finger to nose. Normal gross sensation. BLE strength 5/5       Medical Decision Making   Data     I have personally reviewed the following data over the past 24 hrs:    10.4  \   9.1 (L)   / 228     133 (L) 98 38.6 (H) /  81   5.7 (H) 25 2.48 (H) \       ALT: 15 AST: 28 AP: 64 TBILI: 0.7   ALB: 3.6 TOT PROTEIN: 6.3 (L) LIPASE: N/A       Trop: 27 (H) BNP: N/A       Procal: N/A CRP: N/A Lactic Acid: 0.7       Ferritin:  230 % Retic:  N/A LDH:  N/A

## 2023-05-12 NOTE — LETTER
[facility] [address] [address]  [phone]    FACSIMILE TRANSMITTAL SHEET    TO: Sangeetha  COMPANY:   FAX NUMBER:   PHONE NUMBER:      FROM: Denis RASHEED  PHONE: [phone]  DATE: 05/17/23  NUMBER OF PAGES:     _____URGENT _____REVIEW ONLY _____PLEASE COMMENT____PLEASE REPLY    NOTES/COMMENTS: Michael Vivas! See discharge orders attached.                                      IF YOU DID NOT RECEIVE THE CORRECT NUMBER OF PAGES OR THE FAX DID NOT COME THROUGH CLEARLY, PLEASE CALL THE SENDER     CONFIDENTIALITY STATEMENT: Confidential information that may accompany this transmission contains protected health information under state and federal law and is legally privileged. This information is intended only for the use of the individual or entity named above and may be used only for carrying out treatment, payment or other healthcare operations. The recipient or person responsible for delivering this information is prohibited by law from disclosing this information without proper authorization to any other party, unless required to do so by law or regulation. If you are not the intended recipient, you are hereby notified that any review, dissemination, distribution, or copying of this message is strictly prohibited. If you have received this communication in error, please destroy the materials and contact us immediately by calling the number listed above. No response indicates that the information was received by the appropriate authorized party

## 2023-05-13 NOTE — PROGRESS NOTES
[facility]    Medicine Progress Note - Hospitalist Service, GOLD TEAM 9    Date of Admission:  5/12/2023    Assessment & Plan   Deandre Moore is a 82 year old male with history of CAD (s/p CABG and stenting), HFpEF, DMII, HTN, depression, HLD, gout, BPH, chronic pain, chronic dermatitis, R renal lesion, and insomnia who was admitted to [facility] 5/12/2023 with urinary retention, BRENT, and worsening back pain    Interval Changes:  -MRI t spine  -MRI l spine, unable to be completed due to pain, will repeat MRI l spine tomorrow  -IV fluids  -Continue jose catheter  -Magnesium replacement  -THiamine replacement with poor po intake  -IV fluids x 1 L  -Scheduled melatonin with sundowning    #BRENT  #hyperkalemia - resolved  #urinary retnetion:   BL Cr 1-1.2, elevated to 2.48 on admission. BUN 38.6  UA bland. K 5.7. EKG no peaked T waves, not treated in the ED.   -Jose placed in the ED due to retention.   -Likely secondary to retention and poor po intake  Plan:  >Jose catheter place, TOV on 5/14  - LVM for patient's son, Lucio, at [phone]    Cognitive decline  Probable dementia: Noted by family. Neurology consult placed per PCP Dr. Marquez 1/2023. Per chart review, c/f Parkinsonian features. Tremor noted on exam. Currently A&O x3, able to have complex conversations. However, unable to complete concentration tasks and impaired short term memory  - Follow up with neurology 7/5/2023 as planned    Acute on chronic back pain and weakness, mechanical falls: Chronic pain for years. Reports worsening x2 weeks with mechanical falls. CT thoracic and lumbar spine 5/12: no acute thoracic findings, postsurgical changes of L4-5 spinal fusion and spinal canal decompression, upper endplate compression deformity at L2. Follows OP with PM&R, last seen 5/5/22  Plan:  >MRI t- spine  >Plan for MRI L spine on 5/14  - Low threshold to d/w NSGY if any clinical changes  - PT, OT  - Fall  precuations    Weight loss, failure to thrive, severe protein calorie malnutrition: Reports 100 lb weight loss in 1 year. Noted to have at least 35 lb weight loss since last fall. Appetite quite poor. Denies abdominal pain, N/V, s/s aspiration  - Nutrition consult  - Needs further workup for weight loss once other hospital issues are stabilized    HFpEF, possible pulmonary HTN: No recent echo. Follows OP with [facility], last seen 6/2022. PTA on Lasix, irbesartan, Imdur, Spironolactone. Chest CT 5/12 dilated main pulmonary artery which could be c/w pulmonary HTN. Prior BL weight and 200 lbs, down to 167 on presentation. No e/o gross hypervolemia on exam  - Hold lasix, spironolactone, irbesartan given BRENT   - Continue PTA Imdur  - Daily weights, I&Os  - Needs OP cardiology follow up    Acute on chronic normocytic anemia: BL hgb 11-12. Hgb 9.1 5/12. No e/o acute bleeding  - Nutrition studies    DMII, hypoglycemia: A1c 5.4 2/2022. PTA on Glimepiride, Metformin. Follows OP with Dr. Campa of endocrinology. Glucose briefly low to 54, since resolved.   - Hold PTA meds  - MSSI, hypoglycemia protocol     BPH, urinary incontinence: Follows OP with urology, last seen 1/2023. Continue finasteride, Flomax. Urinary rentention as above  - Continue PTA meds  - Follow up OP 6/1/2023 as planned    R renal lesion: Follows OP with urology as above. Renal US 1/2023 2.0 cm R renal lesion with punctate calcification, likely benign, however cannot rule out malignancy    Hyponatremia: Mild. Na 133. Monitor 5/13    QTc prolongation: QTc 479. Avoid prolonging meds, trend if adding prolonging meds  Hypomagnesemia - replace Mag    Other Medical Issues:  Trop elevation: Asymptomatic. Trop 27 (30). EKG first degree block with PVCs and RBBB, c/w prior. Likely increased demand in the setting of the above. Low threshold for repeat/further workup with any changes given clinical history   CAD s/p CABG and stenting: Follows OP with [facility], last seen 6/2022. Continue PTA ASA and Plavix, follow up OP with indicated  Chronic L foot pain: Follows OP with Dr. Sharp of podiatry, last seen 5/2. Follow up with podiatry 5/16 as planned  Gout: Continue Allopurinol, colchicine   HLD: Continue statin  Depression, insomnia: Continue PTA Fluoxetine, Remeron, Trazodone   Chronic dermatitis: Follows OP with dermatology. Continue PTA gabapentin. Unclear if patient on Methotrexate. Hold for now, pharmacy consult pending  Prior concern for PNA: CXR 5/12 small L pleural effusion with basilar atelectasis vs consolidation. Chest CT 5/12 L>R GGO suspicious for infection. LA WBC normal. 7.46/41/42/80. Treated with Ceftriaxone and Azithromycin in the ED. Briefly on 3L without any hypoxia, now VSS on room air. Afebrile, no cough. Lungs CTAB       Diet: Combination Diet Regular Diet Adult  Snacks/Supplements Adult: Ensure Enlive; With Meals    DVT Prophylaxis: Heparin SQ  Cifuentes Catheter: PRESENT, indication:    Lines: None     Cardiac Monitoring: None  Code Status: Full Code  -discussed by admitting team on 5/12    Clinically Significant Risk Factors        # Hyperkalemia: Highest K = 5.7 mmol/L in last 2 days, will monitor as appropriate     # Hypomagnesemia: Lowest Mg = 1.4 mg/dL in last 2 days, will replace as needed       # Hypertension: Noted on problem list                 Disposition Plan     Expected Discharge Date: 05/14/2023                  Klever Cooper MD  Hospitalist Service, GOLD TEAM 9  [facility]  Securely message with [employer] (more info)  Text page via [facility] Paging/Directory   See signed in provider for up to date coverage information  ______________________________________________________________________    Interval History   Overnight, patient admitted. He states that he continues to have severe back pain. He states he has had difficulty with urination for past few weeks with his wife  assisting him and not being able to get urine out. He denies any weakness in his arms or legs. He denies any new tingling or numbness in legs or arms. No saddle anesthsia. No incontinence. No fever or chills. No night sweats. He has lost approx 100 lbs over past few months. He reports poor appetite.     Physical Exam   Vital Signs: Temp: 97.8  F (36.6  C) Temp src: Oral BP: 102/69 Pulse: 82   Resp: 18 SpO2: 97 % O2 Device: None (Room air)    Weight: 167 lbs 0 oz    Constitutional: alert, oriented to location, year, month  Eyes: no icterus  ENT: supple, no JVD  Respiratory: CTAB, no wheezing, no crackles  Cardiovascular: RRR  GI: soft,non-tender, non-distended, bowel sounds present  Musculoskeletal:  strength, arm flexion, arm extension - 5/5  Hip flexion, foot and plantar dorsiflexion strength 5/5 bilaterally  Neurologic:   Intact sensation to light touch on bilateral upper and lower distal extremities   Psych: Unable to remember 3 objects with distraction  Unable to complete concentration tasks (months of year)    Medical Decision Making             Data     I have personally reviewed the following data over the past 24 hrs:    6.0  \   8.9 (L)   / 202     135 (L) 101 32.0 (H) /  130 (H)   5.1 24 1.95 (H) \       TSH: 2.01 T4: N/A A1C: N/A       Ferritin:  N/A % Retic:  N/A LDH:  N/A

## 2023-05-13 NOTE — PLAN OF CARE
Problem: Oral Intake Inadequate  Goal: Improved Oral Intake  Outcome: Progressing   Goal Outcome Evaluation: ordered oral nutrition supplements.

## 2023-05-13 NOTE — PLAN OF CARE
"Goal Outcome Evaluation:  Vitals: /69 (BP Location: Right arm)   Pulse 82   Temp 97.8  F (36.6  C) (Oral)   Resp 18   Ht 1.88 m (6' 2.02\")   Wt 75.8 kg (167 lb)   SpO2 97%   BMI 21.43 kg/m      Neuros: alert, disoriented to time and situation. Agitated at times, redirectable   IV: PIV R TKO   Labs/Electrolytes:   Resp/trach: RA, SATs 90, Pt not wanting to keep O2 monitor on.   Diet: Reg tolerating well with set up assist   Bowel status: no BM this shift.   : jose in place, with good output   Pain: lower back pain managed with PRN oxy  Activity: repositioned q2hrs A-2   Plan: continue with POC   Update this shift: pt did not tolerate MRI due to increased pain,  will repeat tomorrow       Plan of Care Reviewed With: patient    Overall Patient Progress: no changeOverall Patient Progress: no change           "

## 2023-05-13 NOTE — PROVIDER NOTIFICATION
Gold crossover paged at 0154  FYI pt having increased confusion and trouble following commands, seems like sundowning.

## 2023-05-13 NOTE — PROGRESS NOTES
"Evaluation completed in ED.     Chanelle Lund PT. Pager [phone]     05/13/23 1055   Appointment Info   Signing Clinician's Name / Credentials (PT) Chanelle Lund, PT, DPT   Rehab Comments (PT) spinal precautions for comfort, RN to assist       Present no   Living Environment   People in Home spouse   Current Living Arrangements house   Home Accessibility stairs to enter home   Number of Stairs, Main Entrance 2   Stair Railings, Main Entrance railings safe and in good condition   Transportation Anticipated family or friend will provide   Living Environment Comments Pt reports he lives in a one-level home with his wife who is present/available to assist. Son lives nearby. 2 DAIJA with handrails. Family provides transportation.   Self-Care   Usual Activity Tolerance moderate   Current Activity Tolerance poor   Regular Exercise Yes   Activity/Exercise Type biking  (recumbent)   Exercise Amount/Frequency 2 times/wk   Equipment Currently Used at Home walker, rolling;shower chair;grab bar, tub/shower   Fall history within last six months yes   Number of times patient has fallen within last six months   (28)   Activity/Exercise/Self-Care Comment Reports Kevan with dressing and bathing. Walk-in shower with shower chair and grabbars. Family assists with bed mobility and ambulation with 4WW. Reports 28 falls in last 6 months. Tries to use recumbent bike throughout week for exercise.   General Information   Onset of Illness/Injury or Date of Surgery 05/12/23   Referring Physician Avani Goodman PA-C   Patient/Family Therapy Goals Statement (PT) pain management   Pertinent History of Current Problem (include personal factors and/or comorbidities that impact the POC) per EMR: \"Deandre Moore is a 82 year old male with history of CAD (s/p CABG and stenting), HFpEF, DMII, HTN, depression, HLD, gout, BPH, chronic pain, chronic dermatitis, R renal lesion, and insomnia who was admitted to [facility] 5/12/2023 " "with urinary retention, BRENT, and worsening back pain\"   Existing Precautions/Restrictions fall  (spinal for comfort)   Weight-Bearing Status - LUE partial weight-bearing (% in comments)   Weight-Bearing Status - RUE partial weight-bearing (% in comments)   Weight-Bearing Status - LLE full weight-bearing   Weight-Bearing Status - RLE full weight-bearing   Cognition   Orientation Status (Cognition) oriented to;person;place;situation   Pain Assessment   Patient Currently in Pain Yes, see Vital Sign flowsheet  (7/10 in back)   Integumentary/Edema   Integumentary/Edema no deficits were identifed   Posture    Posture Forward head position;Protracted shoulders;Kyphosis   Range of Motion (ROM)   Range of Motion ROM deficits secondary to weakness   ROM Comment limited B knee extension   Strength (Manual Muscle Testing)   Strength (Manual Muscle Testing) Deficits observed during functional mobility   Strength Comments grossly 2+/5 in BLE; generalized weakness   Bed Mobility   Comment, (Bed Mobility) supine > sit with max A for trunk and HOB elevated; utilized log roll technique   Transfers   Comment, (Transfers) sit > stand with FWW and mod A x2   Gait/Stairs (Locomotion)   Comment, (Gait/Stairs) gait impaired   Balance   Balance other (describe)   Balance Comments fair(-) sitting balance; poor standing balance   Sensory Examination   Sensory Perception patient reports no sensory changes   Coordination   Coordination other (see comments)   Coordination Comments decreased coordination noted   Muscle Tone   Muscle Tone no deficits were identified   Clinical Impression   Criteria for Skilled Therapeutic Intervention Yes, treatment indicated   PT Diagnosis (PT) impaired functional mobility; difficulty walking; increased risk for falls   Influenced by the following impairments decreased balance, strength, coordination, and endurance; increased pain   Functional limitations due to impairments difficulty with bed mobility, " transfers, walking, and stairs   Clinical Presentation (PT Evaluation Complexity) Stable/Uncomplicated   Clinical Presentation Rationale per clinical judgment   Clinical Decision Making (Complexity) moderate complexity   Planned Therapy Interventions (PT) balance training;bed mobility training;gait training;home exercise program;motor coordination training;neuromuscular re-education;patient/family education;postural re-education;ROM (range of motion);stair training;strengthening;stretching;transfer training;risk factor education;progressive activity/exercise;home program guidelines   Anticipated Equipment Needs at Discharge (PT)   (tbd)   Risk & Benefits of therapy have been explained evaluation/treatment results reviewed;care plan/treatment goals reviewed;risks/benefits reviewed;current/potential barriers reviewed;participants voiced agreement with care plan;participants included;patient   Physical Therapy Goals   PT Frequency 5x/week   PT Predicted Duration/Target Date for Goal Attainment 05/27/23   PT: Bed Mobility Independent;Supine to/from sit;Rolling;Bridging  (with HOB flat; spinal precautions for comfort as needed)   PT: Transfers Supervision/stand-by assist;Sit to/from stand;Bed to/from chair  (with LRAD)   PT: Gait Supervision/stand-by assist;150 feet  (with LRAD)   PT: Stairs Supervision/stand-by assist;2 stairs;Rail on both sides   PT Discharge Planning   PT Plan bed mobility, sitting balance, repeated sit <> stands with LRAD, pre-gait as able, B LE ther ex   PT Discharge Recommendation (DC Rec) Transitional Care Facility   PT Rationale for DC Rec Pt demonstrating functional mobility significantly below baseline. Currently Ax1 for bed mobility and Ax2 with FWW for sit <> stands. High risk for falls. Recommend TCU to improve safety and IND prior to returning home with wife. Pt in agreement to plan. Will update as appropriate.   PT Brief overview of current status Ax2 for bed mobility; OH lift for  transfers

## 2023-05-13 NOTE — PLAN OF CARE
"Shift: 9443-0763    VS: /72 (BP Location: Right arm)   Pulse 60   Temp 97.7  F (36.5  C) (Oral)   Resp 18   Ht 1.88 m (6' 2.02\")   Wt 75.8 kg (167 lb)   SpO2 100%   BMI 21.43 kg/m    Pain: Pt denies pain overnight.  Neuro: Alert, but confused.   Cardiac: On tele monitor  Respiratory: O2 sats at 99% on RA.  Diet/Appetite: Poor appetite. On combination regular diet.   /GI: Pt has a jose in place with urine output, no BM overnight.   LDA's: Right and left lower forearm PIV SL.   Skin: Dry and intact   Activity: Assist of one  Pertinent labs: AM labs     Plan: consider neurology consult to evaluate for dementia with parkinsonian features        "

## 2023-05-14 NOTE — PROGRESS NOTES
[facility]    Medicine Progress Note - Hospitalist Service, GOLD TEAM 9    Date of Admission:  5/12/2023    Assessment & Plan   Deandre Moore is a 82 year old male with history of CAD (s/p CABG and stenting), HFpEF, DMII, HTN, depression, HLD, gout, BPH, chronic pain, chronic dermatitis, R renal lesion, and insomnia who was admitted to [facility] 5/12/2023 with urinary retention, BRENT, and worsening back pain    Interval Changes:  -MRI l spine, pre-mediacate with ativan and dilaudid to allow exam with significnat back pain  -Lidocaine patch for back pain, tylenol prn, oxycodone for break thru pain  -IV fluids  -Continue jose catheter  -Magnesium replacement  -Scheduled melatonin with sundowning    #BRENT - improving  #hyperkalemia - resolved  #urinary retnetion:   BL Cr 1-1.2, elevated to 2.48 on admission. BUN 38.6  UA bland. K 5.7. EKG no peaked T waves, not treated in the ED.   -Jose placed in the ED due to retention.   -Likely secondary to retention and poor po intake  Plan:  >Jose catheter place, TOV on 5/15    Cognitive decline  Probable dementia: Noted by family. Neurology consult placed per PCP Dr. Marquez 1/2023. Per chart review, c/f Parkinsonian features. Tremor noted on exam. Currently A&O x3, able to have complex conversations. However, unable to complete concentration tasks and impaired short term memory  - Follow up with neurology 7/5/2023 as planned    Acute on chronic back pain and weakness, mechanical falls: Chronic pain for years. Reports worsening x2 weeks with mechanical falls. CT thoracic and lumbar spine 5/12: no acute thoracic findings, postsurgical changes of L4-5 spinal fusion and spinal canal decompression, upper endplate compression deformity at L2. Follows OP with PM&R, last seen 5/5/22  -MRI t spine (5/13) - compression deformity at T11, post surgical changes , moderate stenosis at T6-8, nodule at T6  Plan:  >Plan for MRI L spine on 5/14  -  Low threshold to d/w NSGY if any clinical changes  - PT, OT  - Fall precuations    Weight loss, failure to thrive, severe protein calorie malnutrition: Reports 100 lb weight loss in 1 year. Noted to have at least 35 lb weight loss since last fall. Appetite quite poor. Denies abdominal pain, N/V, s/s aspiration  - Nutrition consult  - Needs further workup for weight loss once other hospital issues are stabilized    HFpEF, possible pulmonary HTN: No recent echo. Follows OP with [facility], last seen 6/2022. PTA on Lasix, irbesartan, Imdur, Spironolactone. Chest CT 5/12 dilated main pulmonary artery which could be c/w pulmonary HTN. Prior BL weight and 200 lbs, down to 167 on presentation. No e/o gross hypervolemia on exam  - Hold lasix, spironolactone, irbesartan given BRENT   - Continue PTA Imdur  - Daily weights, I&Os  - Needs OP cardiology follow up    Acute on chronic normocytic anemia: BL hgb 11-12. Hgb 9.1 5/12. No e/o acute bleeding  - Nutrition studies    DMII, hypoglycemia: A1c 5.4 2/2022. PTA on Glimepiride, Metformin. Follows OP with Dr. Campa of endocrinology. Glucose briefly low to 54, since resolved.   - Hold PTA meds  - MSSI, hypoglycemia protocol     BPH, urinary incontinence: Follows OP with urology, last seen 1/2023. Continue finasteride, Flomax. Urinary rentention as above  - Continue PTA meds -[id number]  - Follow up OP 6/1/2023 as planned    R renal lesion: Follows OP with urology as above. Renal US 1/2023 2.0 cm R renal lesion with punctate calcification, likely benign, however cannot rule out malignancy    Hyponatremia: Mild. Na 133. Monitor 5/13    QTc prolongation: QTc 479. Avoid prolonging meds, trend if adding prolonging meds  Hypomagnesemia - replace Mag    Other Medical Issues:  Trop elevation: Asymptomatic. Trop 27 (30). EKG first degree block with PVCs and RBBB, c/w prior. Likely increased demand in the setting of the above. Low threshold for repeat/further workup with any changes given  clinical history   CAD s/p CABG and stenting: Follows OP with [facility], last seen 6/2022. Continue PTA ASA and Plavix, follow up OP with indicated  Chronic L foot pain: Follows OP with Dr. Sharp of podiatry, last seen 5/2. Follow up with podiatry 5/16 as planned  Gout: Continue Allopurinol, colchicine   HLD: Continue statin  Depression, insomnia: Continue PTA Fluoxetine, Remeron, Trazodone   Chronic dermatitis: Follows OP with dermatology. Continue PTA gabapentin. Unclear if patient on Methotrexate. Hold for now, pharmacy consult pending  Prior concern for PNA: CXR 5/12 small L pleural effusion with basilar atelectasis vs consolidation. Chest CT 5/12 L>R GGO suspicious for infection. LA WBC normal. 7.46/41/42/80. Treated with Ceftriaxone and Azithromycin in the ED. Briefly on 3L without any hypoxia, now VSS on room air. Afebrile, no cough. Lungs CTAB       Diet: Combination Diet Regular Diet Adult  Snacks/Supplements Adult: Ensure Enlive; With Meals    DVT Prophylaxis: Heparin SQ  Cifuentes Catheter: PRESENT, indication: Retention  Lines: None     Cardiac Monitoring: None  Code Status: Full Code  -discussed by admitting team on 5/12    Clinically Significant Risk Factors        # Hyperkalemia: Highest K = 5.7 mmol/L in last 2 days, will monitor as appropriate     # Hypomagnesemia: Lowest Mg = 1.4 mg/dL in last 2 days, will replace as needed       # Hypertension: Noted on problem list                 Disposition Plan     Expected Discharge Date: 05/14/2023                  Klever Cooper MD  Hospitalist Service, GOLD TEAM 9  [facility]  Securely message with [employer] (more info)  Text page via [facility] Paging/Directory   See signed in provider for up to date coverage information  ______________________________________________________________________    Interval History   Patient states it is difficult for him to be in the hospital. He denies any changes in  breathing. No fever or chills. No chest pain or shortness of breath. He has not had a bowel movment for a few days. He continues to have poor appetite. Discussed plan for MRI l spine today.     Physical Exam   Vital Signs: Temp: 97.6  F (36.4  C) Temp src: Oral BP: 132/72 Pulse: 83   Resp: 18 SpO2: 97 % O2 Device: None (Room air)    Weight: 167 lbs 0 oz    Constitutional: alert, oriented to location, year, month  Eyes: no icterus  ENT: supple, no JVD  Respiratory: CTAB, no wheezing, no crackles  Cardiovascular: RRR  GI: soft,non-tender, non-distended, bowel sounds present  :  Cifuentes catheter draining yellow urine  Neurologic: intact distal  strength  Psych: Unable to remember 3 objects with distraction  Unable to complete concentration tasks (months of year)    Medical Decision Making             Data     I have personally reviewed the following data over the past 24 hrs:    7.4  \   8.9 (L)   / 195     135 (L) 104 15.3 /  100 (H)   5.1 24 1.52 (H) \

## 2023-05-14 NOTE — PLAN OF CARE
"Goal Outcome Evaluation:    /86 (BP Location: Right arm)   Pulse 82   Temp 97.4  F (36.3  C) (Oral)   Resp 18   Ht 1.88 m (6' 2.02\")   Wt 75.8 kg (167 lb)   SpO2 94%   BMI 21.43 kg/m      Shift:82 year old male with history Cardiovascular arterial disease, depression, HDL, Gout. Presented to ED with worsening back pain, BRENT and urinary retention.  VS: Intermittently maynor running into the mid to high 50's on room air stating well, afebrile  Neuro: AOx2. Patient is disorientated to time and situation and needs to re-directed to  BG: Last Bs 139  Respiratory: NO tachypnea witnessed on room air  Pain/Nausea/PRN: Denies pain, denies nausea  Diet: Regular diet  LDA: Right lower forearm IV SL  GI/: No Bm's present during shift. Cifuentes catheter very good output  Skin:Rash on chest, lower legs, and abdomen.  Mobility: reposition every 2 hours and assist x2.    Thoracic MRI still needs to be done unclear as to the time and when.      Handoff given to following RN.        "

## 2023-05-14 NOTE — PROGRESS NOTES
"Goal Outcome Evaluation:  Vitals: /69 (BP Location: Right arm)   Pulse 82   Temp 97.8  F (36.6  C) (Oral)   Resp 18   Ht 1.88 m (6' 2.02\")   Wt 75.8 kg (167 lb)   SpO2 97%   BMI 21.43 kg/m       Neuros: alert, disoriented to time and situation. Agitated this am, redirectable--calm this evening.   IV: R PIV--receiving scheduled Abx, Thiamine, LR bolus infusing 100ml/hr.  Labs/Electrolytes: Mg--received 2g IV x1, recheck tomorrow.   Resp/trach: RA, denies SOBE.   Diet: Reg tolerating well with set up assist   Bowel status: LBM 5/13 per pt, non today, stool softners given per order.  : jose in place, with good output   Pain: low back, BLE pain--received Tylenol q6hrs prn, Oxy given x1--minimizing to avoid increased confusions  Activity: up to bedside chair with 2A, rolling walker/GB, likes to sit in chair for meals.   Skin: Callus on right foot,  rash and skin discoloration all over body-except face--likes to scratch---skin cleansed and scheduled creams applied   Plan: continue with POC   Update this shift: MRI completed today--was premedicated with Ativan po and Dil IV.        Plan of Care Reviewed With: patient     Overall Patient Progress: no changeOverall Patient Progress: no change     "

## 2023-05-14 NOTE — PLAN OF CARE
"/72 (BP Location: Right arm)   Pulse 83   Temp 97.6  F (36.4  C) (Oral)   Resp 18   Ht 1.88 m (6' 2.02\")   Wt 75.8 kg (167 lb)   SpO2 97%   BMI 21.43 kg/m        Shift: [id number]  Status: Urinary retention, BRENT, and back pain.   Neuro: A&O x 4. Forgetful.   Resp: WDL. RA.   Cardiac: WDL. Denies cardiac chest pain.   GI/: Combination regular diet. No BM this shift. Cifuentes catheter-retention.   Skin: Warm. Hx: chronic dermatitis. Rash and skin discoloration all over body.   Activity: Assist of 2, Lift. Repo every 2 hours.   Incision & Drainage: PIV-SL.   Pain: Lower back pain 7/10. Gave PRN tylenol. Declined oxycodone-states it upsets his stomach.   Labs: Reviewed.   Changes: No acute changes.   Plan: Continue with POC. MRI today at 0900.         Goal Outcome Evaluation: Ongoing.       Plan of Care Reviewed With: patient    Overall Patient Progress: no change    Admitted/transferred from: ED  2 RN full   skin assessment completed by Martha Mejia, RN and Heraclio HARN.  Skin assessment finding: Rash and skin discoloration all over body-except face. Cifuentes.   Interventions/actions: skin interventions Maintained skin dry, adequate hydration, topical applied in ED.       Bedside Emergency Equipment Present:  Suction Regulator: Yes  Suction Canister: Yes  Tubing between Regulator and Canister: Yes  O2 Regulator with Tree: Yes  Ambu Bag: Yes            "

## 2023-05-15 NOTE — PROGRESS NOTES
[facility]    Medicine Progress Note - Hospitalist Service, GOLD TEAM 9    Date of Admission:  5/12/2023    Assessment & Plan   Deandre Moore is a 82 year old male with history of CAD (s/p CABG and stenting), HFpEF, DMII, HTN, depression, HLD, gout, BPH, chronic pain, chronic dermatitis, R renal lesion, and insomnia who was admitted to [facility] 5/12/2023 with urinary retention, BRENT, and worsening back pain    Interval Changes:  -Trial of void on 5/15, remove jose  -Bowel regiment - senna, miralax, milk of mag prn  -Neurosurgery consult, no acute surgical intervention  -patient agreeable to TCU, referral sent  -Stop sliding scale insulin and POC glucose checks with no insulin needs  -Hopeful to resume ARB in AM      #BRENT - improving  #hyperkalemia - resolved  #urinary retnetion:   BL Cr 1-1.2, elevated to 2.48 on admission. BUN 38.6  UA bland. K 5.7. EKG no peaked T waves, not treated in the ED.   -Jose placed in the ED due to retention.   -Likely secondary to retention and poor po intake  Plan:  >Jose catheter place, TOV on 5/15    Cognitive decline  Probable dementia: Noted by family. Neurology consult placed per PCP Dr. Marquez 1/2023. Per chart review, c/f Parkinsonian features. Tremor noted on exam. Currently A&O x3, able to have complex conversations. However, unable to complete concentration tasks and impaired short term memory  - Follow up with neurology 7/5/2023 as planned    Acute on chronic back pain and weakness, mechanical falls: Chronic pain for years. Reports worsening x2 weeks with mechanical falls. CT thoracic and lumbar spine 5/12: no acute thoracic findings, postsurgical changes of L4-5 spinal fusion and spinal canal decompression, upper endplate compression deformity at L2. Follows OP with PM&R, last seen 5/5/22  -MRI t spine (5/13) - compression deformity at T11, post surgical changes , moderate stenosis at T6-8, nodule at T6  -MRI L spine  (5/14) - multi level bulges with moderate narrowing L1-L3 and severe neural forminal narrowing at l3-l4  Plan:  >neurosurgery consulted, no acute interventions  >Plan for discharge to TCU for PT/OT  - PT, OT  - Fall precuations    Weight loss, failure to thrive, severe protein calorie malnutrition: Reports 100 lb weight loss in 1 year. Noted to have at least 35 lb weight loss since last fall. Appetite quite poor. Denies abdominal pain, N/V, s/s aspiration  - Nutrition consult  - Needs further workup for weight loss once other hospital issues are stabilized  -Plans for outpatient CT chest/abdomen/pelvis to evaluate for malignancy    HFpEF, possible pulmonary HTN: No recent echo. Follows OP with [facility], last seen 6/2022. PTA on Lasix, irbesartan, Imdur, Spironolactone. Chest CT 5/12 dilated main pulmonary artery which could be c/w pulmonary HTN. Prior BL weight and 200 lbs, down to 167 on presentation. No e/o gross hypervolemia on exam  - Hold lasix, spironolactone, irbesartan given BRENT   - Continue PTA Imdur  - Daily weights, I&Os  - Needs OP cardiology follow up    Acute on chronic normocytic anemia: BL hgb 11-12. Hgb 9.1 5/12. No e/o acute bleeding  - Nutrition studies    DMII, hypoglycemia: A1c 5.4 2/2022. PTA on Glimepiride, Metformin. Follows OP with Dr. Campa of endocrinology. Glucose briefly low to 54, since resolved.   - Hold PTA meds  - MSSI, hypoglycemia protocol     BPH, urinary incontinence: Follows OP with urology, last seen 1/2023. Continue finasteride, Flomax. Urinary rentention as above  - Continue PTA meds -[id number]  - Follow up OP 6/1/2023 as planned    R renal lesion: Follows OP with urology as above. Renal US 1/2023 2.0 cm R renal lesion with punctate calcification, likely benign, however cannot rule out malignancy    Hyponatremia: Mild. Na 133. Monitor 5/13    QTc prolongation: QTc 479. Avoid prolonging meds, trend if adding prolonging meds  Hypomagnesemia - replace Mag    Other Medical  Issues:  Trop elevation: Asymptomatic. Trop 27 (30). EKG first degree block with PVCs and RBBB, c/w prior. Likely increased demand in the setting of the above. Low threshold for repeat/further workup with any changes given clinical history   CAD s/p CABG and stenting: Follows OP with [facility], last seen 6/2022. Continue PTA ASA and Plavix, follow up OP with indicated  Chronic L foot pain: Follows OP with Dr. Sharp of podiatry, last seen 5/2. Follow up with podiatry 5/16 as planned  Gout: Continue Allopurinol, colchicine   HLD: Continue statin  Depression, insomnia: Continue PTA Fluoxetine, Remeron, Trazodone   Chronic dermatitis: Follows OP with dermatology. Continue PTA gabapentin. Unclear if patient on Methotrexate. Hold for now, pharmacy consult pending  Prior concern for PNA: CXR 5/12 small L pleural effusion with basilar atelectasis vs consolidation. Chest CT 5/12 L>R GGO suspicious for infection. LA WBC normal. 7.46/41/42/80. Treated with Ceftriaxone and Azithromycin in the ED. Briefly on 3L without any hypoxia, now VSS on room air. Afebrile, no cough. Lungs CTAB       Diet: Combination Diet Regular Diet Adult  Snacks/Supplements Adult: Ensure Enlive; With Meals    DVT Prophylaxis: Heparin SQ  Cifuentes Catheter: PRESENT, indication: Retention  Lines: None     Cardiac Monitoring: None  Code Status: Full Code  -discussed by admitting team on 5/12    Clinically Significant Risk Factors            # Hypomagnesemia: Lowest Mg = 1.4 mg/dL in last 2 days, will replace as needed       # Hypertension: Noted on problem list                 Disposition Plan      Expected Discharge Date: 05/16/2023        Discharge Comments: PT/OT recommending TCU. [facility] if patient and family will agree.  Med ready on 5/16          Klever Cooper MD  Hospitalist Service, GOLD TEAM 9  [facility]  Securely message with [employer] (more info)  Text page via [employer] Paging/Directory    See signed in provider for up to date coverage information  ______________________________________________________________________    Interval History   No acute events overnight. He had some confusion. This morning feels constipated. We discussed trial of void and he is agreeable. DIscussed TCU for rehab and he is also agreeable. No fever or chills. He continues to have back pain. We discussed plan for outpatient follow up.     Physical Exam   Vital Signs: Temp: 97.9  F (36.6  C) Temp src: Oral BP: 103/53 Pulse: 66   Resp: 16 SpO2: 95 % O2 Device: None (Room air)    Weight: 167 lbs 0 oz    Constitutional: alert, oriented to location, year, month  Eyes: no icterus  ENT: supple, no JVD  Respiratory: CTAB, no wheezing, no crackles  Cardiovascular: RRR  GI: soft,non-tender, non-distended, bowel sounds present  :  Cifuentse catheter draining yellow urine  Neurologic: intact distal  strength    Medical Decision Making             Data     I have personally reviewed the following data over the past 24 hrs:    6.6  \   8.1 (L)   / 189     135 (L) 104 15.3 /  120 (H)   5.1 24 1.52 (H) \

## 2023-05-15 NOTE — PROGRESS NOTES
05/15/23 0932   Appointment Info   Signing Clinician's Name / Credentials (OT) Debbie Kiser OTR/L   Living Environment   People in Home spouse   Current Living Arrangements house   Home Accessibility stairs to enter home   Number of Stairs, Main Entrance 2   Stair Railings, Main Entrance railings safe and in good condition   Transportation Anticipated family or friend will provide   Living Environment Comments Pt reports he lives in a one-level home with his wife. She also uses a walker so has limited ability to assist. Son lives nearby. 2 DAIJA with handrails. Family provides transportation.   Self-Care   Usual Activity Tolerance moderate   Current Activity Tolerance fair   Regular Exercise Yes   Activity/Exercise Type biking  (recumbent)   Exercise Amount/Frequency 2 times/wk   Equipment Currently Used at Home grab bar, toilet;grab bar, tub/shower;shower chair;walker, rolling   Fall history within last six months yes   Number of times patient has fallen within last six months   (24)   Activity/Exercise/Self-Care Comment Reports Kevan with dressing and bathing. Walk-in shower with shower chair and grabbars. Family provides SBA for bed mobility and ambulation with 4WW. Reports 24 falls in last 6 months. Tries to use recumbent bike throughout week for exercise.   Instrumental Activities of Daily Living (IADL)   IADL Comments son brings meals and assists with errands; wife completes some home chores   General Information   Onset of Illness/Injury or Date of Surgery 05/12/23   Referring Physician Avani Goodman   Patient/Family Therapy Goal Statement (OT) open to tcu, concerned about his back pain   Additional Occupational Profile Info/Pertinent History of Current Problem per chart: 82 year old male with history of CAD (s/p CABG and stenting), HFpEF, DMII, HTN, depression, HLD, gout, BPH, chronic pain, chronic dermatitis, R renal lesion, and insomnia who was admitted to [facility] 5/12/2023 with urinary retention, BRENT, and  worsening back pain   Existing Precautions/Restrictions fall  (spinal precautions for comfort)   Limitations/Impairments safety/cognitive   Left Upper Extremity (Weight-bearing Status) full weight-bearing (FWB)   Right Upper Extremity (Weight-bearing Status) full weight-bearing (FWB)   Left Lower Extremity (Weight-bearing Status) full weight-bearing (FWB)   Right Lower Extremity (Weight-bearing Status) full weight-bearing (FWB)   General Observations and Info activity order: up with assist prn   Cognitive Status Examination   Orientation Status orientation to person, place and time  (off on date by 1)   Affect/Mental Status (Cognitive) WFL   Follows Commands follows one-step commands;over 90% accuracy   Cognitive Status Comments per chart pt has early dementia, including impaired STM; pt engages in conversation/interview WFL   Visual Perception   Visual Impairment/Limitations corrective lenses full-time   Sensory   Sensory Quick Adds sensation intact   Pain Assessment   Patient Currently in Pain Yes, see Vital Sign flowsheet   Posture   Posture forward head position;protracted shoulders  (neck lateral deviation to L, resting)   Range of Motion Comprehensive   Comment, General Range of Motion BUE WFL, limited B shoulders   Strength Comprehensive (MMT)   Comment, General Manual Muscle Testing (MMT) Assessment generalized weakness, able to lift all limbs against gravity   Coordination   Coordination Comments some mild arthritic changes; mild tremor with activity   Bed Mobility   Bed Mobility rolling right;scooting/bridging;supine-sit;sit-supine   Rolling Right Hall (Bed Mobility) moderate assist (50% patient effort);verbal cues   Scooting/Bridging Hall (Bed Mobility) minimum assist (75% patient effort);verbal cues   Supine-Sit Hall (Bed Mobility) moderate assist (50% patient effort);2 person assist   Assistive Device (Bed Mobility) bed rails;draw sheet   Transfers   Transfers bed-chair  transfer;sit-stand transfer;toilet transfer;shower transfer   Transfer Skill: Bed to Chair/Chair to Bed   Bed-Chair [place] (Transfers) minimum assist (75% patient effort);2 person assist   Assistive Device (Bed-Chair Transfers) standard walker   Sit-Stand Transfer   Sit-Stand [place] (Transfers) moderate assist (50% patient effort);verbal cues   Assistive Device (Sit-Stand Transfers) walker, front-wheeled   Shower Transfer   [place] Level (Shower Transfer) maximum assist (25% patient effort)   Shower Transfer Comments tub/shower with grab bars and shower seat; per clinical judgement   Toilet Transfer   [place] Level (Toilet Transfer) moderate assist (50% patient effort)   Toilet Transfer Comments per clinical judgement   Balance   Balance Comments pt tends to lean posteriorly and to L seated EOB; unsteady standing, posterior lean when holding 2WW   Activities of Daily Living   BADL Assessment/Intervention upper body dressing;lower body dressing;grooming;feeding;toileting   Upper Body Dressing Assessment/Training   Comment, (Upper Body Dressing) per clinical judgement   [place] Level (Upper Body Dressing) minimum assist (75% patient effort)   Lower Body Dressing Assessment/Training   [place] Level (Lower Body Dressing) moderate assist (50% patient effort)   Grooming Assessment/Training   [place] Level (Grooming) minimum assist (75% patient effort);set up   Eating/Self Feeding   [place] Level (Feeding) contact guard assist;set up   Toileting   Comment, (Toileting) per clinical judgement   [place] Level (Toileting) maximum assist (25% patient effort)   Clinical Impression   Criteria for Skilled Therapeutic Interventions Met (OT) Yes, treatment indicated   OT Diagnosis impaired ADLs   OT Problem List-Impairments impacting ADL problems related to;activity tolerance impaired;balance;cognition;mobility;range of motion (ROM);strength;pain   Assessment of Occupational Performance  3-5 Performance Deficits   Identified Performance Deficits dressing, toilet transfer, toileting, tub/shower transfer   Planned Therapy Interventions (OT) ADL retraining;bed mobility training;ROM;strengthening;transfer training;home program guidelines;progressive activity/exercise   Clinical Decision Making Complexity (OT) moderate complexity   Risk & Benefits of therapy have been explained evaluation/treatment results reviewed;patient   OT Total Evaluation Time   OT Eval, Moderate Complexity Minutes (70086) 10   OT Goals   Therapy Frequency (OT) 5 times/wk   OT Predicted Duration/Target Date for Goal Attainment 05/29/23   OT Goals Hygiene/Grooming;Lower Body Dressing;Transfers;Toilet Transfer/Toileting;Cognition   OT: Hygiene/Grooming supervision/stand-by assist   OT: Lower Body Dressing Supervision/stand-by assist   OT: Transfer Supervision/stand-by assist;with assistive device   OT: Toilet Transfer/Toileting Supervision/stand-by assist;toilet transfer;cleaning and garment management;using adaptive equipment   OT: Cognitive Patient/caregiver will verbalize understanding of cognitive assessment results/recommendations as needed for safe discharge planning   Interventions   Interventions Quick Adds Self-Care/Home Management;Therapeutic Activity   Self-Care/Home Management   Self-Care/Home Mgmt/ADL, Compensatory, Meal Prep Minutes (35482) 10   Treatment Detail/Skilled Intervention Facilitated ADLs seated in chair. Pt completed face washing and oral cares, A for set-up and to manage items. Mild tremor noted with cares, pt tends to brace hands on table for completing (ex for applying paste to toothbrush). Assisted pt with set-up of breakfast tray. Pt generally appears intact cognitively with conversation, but STM deficits noted in chart review. Will assess further.   Therapeutic Activities   Therapeutic Activity Minutes (29838) 12   Symptoms noted during/after treatment fatigue;increased pain   Treatment Detail/Skilled  "Intervention Pt ed in logrolling for minimizing back pain with bed mobility.  Pt cued for reaching for bedrail, and for flexing contralateral knee to assist with rolling, ModAx1 to complete.  Pt able to progress LE off bed with cues. ModAx2 side > sit. Posterior lean, and somewhat to L, seated EOB, able to self-correct with cues.  ModAx2 and CGAx1 to stand with 2WW. Posterior lean standing, pt cued for DENISSE, hands on walker handles, upright posture.  ModAx1 and CGAx1 to take a few steps to chair, cued for positioning at chair prior to descent. Cued for hand placement.  After a short rest, pt stood again and walked 3' forward/backward, poor balance with backwards. Pt reports his walker \"flips\" at home, causing most of his \"24\" falls, PT notified of pt comments.   OT Discharge Planning   OT Plan toilet transfer, ADLs seated EOB, dressing, functional cognition   OT Discharge Recommendation (DC Rec) Transitional Care Facility   OT Rationale for DC Rec Pt below baseline, limited by pain and weakness, and with frequent falls in past 6 months.  Pt would benefit from continued therapy to maximize safety and independence with ADLs.  Pt lives with wife, but she has limited ability to assist as she also uses a walker for mobility.   OT Brief overview of current status ModAx2 bed mobility, ModAx1 and CGA x1 bed > chair   Total Session Time   Timed Code Treatment Minutes 22   Total Session Time (sum of timed and untimed services) 32     "

## 2023-05-15 NOTE — CONSULTS
[facility]       NEUROSURGERY CONSULTATION NOTE    This consultation was requested by Dr. Cooper from the Medicine service.      Reason for Consultation  Back Pain    HPI:  Deandre Moore is a 82 year old male with history of CAD (s/p CABG and stenting), HFpEF, DMII, HTN, depression, HLD, gout, BPH, chronic pain, chronic dermatitis, R renal lesion, insomnia, and history of L4-5 posterior spinal fusion in 2009 with Dr. Verma who was admitted to [facility] 5/12/2023 with urinary retention, BRENT, and worsening back pain.      Patient states that he has a history of chronic back pain but his current symptoms began approximately 2 to 3 days ago and were not preceded by any specific trauma or injury.  Patient states that the pain is centered in his lower back but above the belt line.  The patient denies radiation of pain to the bilateral lower extremities.  Apparently the patient complained of urinary retention upon admission however states he has not noted any change in his bowel or bladder habits recently.  The patient denies weakness or paresthesias in the bilateral lower extremities.  Currently the patient states that prolonged standing and walking exacerbate the pain most and sitting improves the pain.  The patient states that he has not underwent any injections or other invasive procedures regarding his low back since his surgery in 2009.  It also should be noted that the patient states he did get relief following his surgical procedure in 2009.  States he is not using any pain medications for his symptoms but does take aspirin daily given his cardiac history.  Upon reviewing the patient's chart it does appear that the patient did report falling about 3 weeks ago and states that it may have been related to his back pain.  It also appears that the patient has recently had a profound weight loss of 100 pounds.  The patient denies personal history of cancer.  Patient is  also scheduled to follow-up with Neurology in July 2023 for concerns of Parkinson's disease and worsening resting tremor.         PAST MEDICAL HISTORY:   Past Medical History:   Diagnosis Date     Diabetes mellitus (H)      Gout attack      Heart attack (H) 1984     Hypertension      Stented coronary artery     6 stents in heart       PAST SURGICAL HISTORY:   Past Surgical History:   Procedure Laterality Date     CARDIAC SURGERY  1984    4 vessel bypass     CHOLECYSTECTOMY       COLONOSCOPY       DESTRUCTION OF PARAVERTEBRAL FACET LUMBAR / SACRAL ADDITIONAL Bilateral 10/18/2021    Procedure: DESTRUCTION, NERVE, FACET JOINT, LUMBOSACRAL, ADDITIONAL NERVE AFTER DESTRUCTION OF INITIAL LUMBOSACRAL FACET JOINT NERVE;  Surgeon: Horacio Gonsalves MD;  Location: [facility] OR     DESTRUCTION OF PARAVERTEBRAL FACET LUMBAR / SACRAL SINGLE Bilateral 10/18/2021    Procedure: DESTRUCTION, NERVE, FACET JOINT, LUMBOSACRAL, 1 NERVE;  Surgeon: Horacio Gonsalves MD;  Location: [facility] OR     ESOPHAGOSCOPY, GASTROSCOPY, DUODENOSCOPY (EGD), COMBINED N/A 3/1/2018    Procedure: COMBINED ESOPHAGOSCOPY, GASTROSCOPY, DUODENOSCOPY (EGD), BIOPSY SINGLE OR MULTIPLE;  ESOPHAGOGASTRODUODENOSCOPY ;  Surgeon: Daryn Medrano MD;  Location: [facility] GI     INJECT BLOCK MEDIAL BRANCH CERVICAL/THORACIC/LUMBAR Bilateral 8/16/2021    Procedure: BLOCK, NERVE, FACET JOINT, MEDIAL BRANCH, DIAGNOSTIC Lumbar 2-3-4;  Surgeon: Horacio Gonsalves MD;  Location: [facility] OR     INJECT BLOCK MEDIAL BRANCH CERVICAL/THORACIC/LUMBAR Bilateral 9/27/2021    Procedure: BLOCK, NERVE, FACET JOINT, MEDIAL BRANCH, DIAGNOSTIC  L2-4, Bilateral;  Surgeon: Horacio Gonsalves MD;  Location: [facility] OR     NO HISTORY OF SURGERY  3/31/14    derm     PHACOEMULSIFICATION WITH STANDARD INTRAOCULAR LENS IMPLANT  1/21/2013    Procedure: PHACOEMULSIFICATION WITH STANDARD INTRAOCULAR LENS IMPLANT;  Phacoemulsification with standard intraocular lens implant, right eye;  Surgeon:  "Jay Rodriges MD;  Location: [facility] OR       FAMILY HISTORY:   Family History   Problem Relation Age of Onset     Tremor Brother      Other - See Comments Brother         living in [place]     Other - See Comments Brother         living in [place]     Other - See Comments Son         [place]     Other - See Comments Son         [place]     Other - See Comments Daughter         [place]     Cancer No family hx of         no skin cancer     Skin Cancer No family hx of        SOCIAL HISTORY:   Social History     Tobacco Use     Smoking status: Former     Types: Cigarettes     Quit date: 10/15/1984     Years since quittin.6     Smokeless tobacco: Never   Vaping Use     Vaping status: Not on file   Substance Use Topics     Alcohol use: Yes     Comment: occasional-once a year       MEDICATIONS:  No current outpatient medications on file.       Allergies:  Allergies   Allergen Reactions     Beta Adrenergic Blockers Unknown     Latex Dermatitis     Latex Rash     Tape [Adhesive Tape] Dermatitis and Rash     Electrode patches       ROS: 10 point ROS of systems including Constitutional, Eyes, Respiratory, Cardiovascular, Gastroenterology, Genitourinary, Integumentary, Muscularskeletal, Psychiatric were all negative except for pertinent positives noted in my HPI.    Physical exam:   Blood pressure 117/61, pulse 57, temperature 97.9  F (36.6  C), temperature source Oral, resp. rate 16, height 1.88 m (6' 2.02\"), weight 75.8 kg (167 lb), SpO2 99 %.  General: awake and alert  HEENT: atraumatic, normocephalic   PULM: breathing comfortably on room air  MSK: normal bulk and tone  : rectal tone intact  NEUROLOGIC:  -- Awake; Alert; oriented x 3  -- Follows commands briskly  -- +repetition, calculation, and naming  -- Speech fluent, spontaneous. No aphasia or dysarthria.  -- no gaze preference. No apparent hemineglect.  Cranial Nerves:  -- visual fields full to confrontation, PERRL 3-2mm bilat and brisk, extraocular " movements intact  -- face symmetrical, tongue midline  -- sensory V1-V3 intact bilaterally  -- palate elevates symmetrically, uvula midline  -- hearing grossly intact bilat  -- Trapezii 5/5 strength bilat symmetric  -- resting tremor       Motor:  Normal bulk / tone; no tremor, rigidity, or bradykinesia.  No muscle wasting or fasciculations  No Pronator Drift     Delt Bi Tri Hand Flexion/  Extension Iliopsoas Quadriceps Hamstrings Tibialis Anterior Gastroc    C5 C6 C7 C8/T1 L2 L3 L4-S1 L4 S1   R 5 5 5 5 5 5 5 5 5   L 5 4+ 4+ 5 5 5 5 5 5     Sensory:  intact to LT x 4 extremities       Reflexes: no clonus or Hanson's       Bi Tri BR Karson Pat Ach Bab     C5-6 C7-8 C6 UMN L2-4 S1 UMN   R 2+ 2+ 2+ Norm 2+ 2+ Norm   L 2+ 2+ 2+ Norm 2+ 2+ Norm      Gait: deferred       IMAGING:    Lumbar MRI:   Multilevel disc bulges with facet arthropathy resulting in moderate  spinal canal narrowing at L1-2 and L2-3. Disc bulges with facet  arthropathy also result in moderate to severe bilateral neural  foraminal narrowing at L1-2 and severe bilateral neural foraminal  narrowing at L3-4.      LABS:   Last Comprehensive Metabolic Panel:  Lab Results   Component Value Date     05/15/2023    POTASSIUM 4.6 05/15/2023    CHLORIDE 105 05/15/2023    CO2 24 05/15/2023    ANIONGAP 7 05/15/2023     (H) 05/15/2023    BUN 9.9 05/15/2023    CR 1.34 (H) 05/15/2023    GFRESTIMATED 52 (L) 05/15/2023    ADDI 8.8 05/15/2023         Lab Results   Component Value Date    WBC 6.6 05/15/2023    WBC 9.9 03/11/2021     Lab Results   Component Value Date    RBC 2.49 05/15/2023    RBC 4.48 03/11/2021     Lab Results   Component Value Date    HGB 8.1 05/15/2023    HGB 13.9 03/11/2021     Lab Results   Component Value Date    HCT 24.6 05/15/2023    HCT 39.7 03/11/2021     Lab Results   Component Value Date    MCV 99 05/15/2023    MCV 89 03/11/2021     Lab Results   Component Value Date    MCH 32.5 05/15/2023    MCH 31.0 03/11/2021     Lab Results    Component Value Date    MCHC 32.9 05/15/2023    MCHC 35.0 03/11/2021     Lab Results   Component Value Date    RDW 14.6 05/15/2023    RDW 13.2 03/11/2021     Lab Results   Component Value Date     05/15/2023     03/11/2021     No results found for: INR   No results found for: PTT       ASSESSMENT:  82 year old male with history of L4-5 posterior spinal fusion in 2009 admitted with BRENT, urinary retention and low back pain.      In addition to the assessment of diagnoses detailed above, this 82 year old male  patient admitted from the Emergency Department has the following conditions contributing to the complexity of their medical care:    Platelet dysfunction given prior aspirin use           RECOMMENDATIONS:  No neurosurgical intervention indicated at this time.  I did discuss imaging results with the patient and did explain that if conservative management did not provide improvement of symptoms that surgical intervention may be an option.  The patient states that he is not interested in surgical options but would be willing to move forward with conservative measures.   Would recommend PT/OT evaluations and could consider intralaminar epidural steroid injection as outpatient for pain (referral to medical spine clinic).    Neurosurgery will sign off, please do not hesitate to call with questions/concerns.           CARLINE Anderson, CNP  Department of Neurosurgery  Pager: [phone]      The patient was discussed with Dr. See Sarabai , neurosurgery chief resident, and Dr. Samy Coe, neurosurgery staff.

## 2023-05-15 NOTE — PLAN OF CARE
Goal Outcome Evaluation:      Plan of Care Reviewed With: patient    Overall Patient Progress: no changeOverall Patient Progress: no change     Neuros:  Alert but confused. Able to make needs known.    Cardiac: denies chest pain, BP stable.   Respiratory: denies SOB, sating in mid 90's on RA.   GI/: due to  Void. Bladder scan at 1800 was 150 ml.  +BS, refused Miralax and milk of mag.   Diet: regular  Activity: assisted with 2 up to chair for meals. Worked with PT and OT.   Skin: chronic dermatitis- generalized.   LDA: FARTUN CROWDER.   Pain: reports back pain, given PRN tylenol once.   Lab: reviewed.   New changes this shift: jose removed at  1230 today.  Bladder scan at 1730 was 150. Paged MD, awaiting for call back.   Plan: continue to monitor UOP.

## 2023-05-15 NOTE — PLAN OF CARE
"Blood pressure 103/53, pulse 66, temperature 97.9  F (36.6  C), temperature source Oral, resp. rate 16, height 1.88 m (6' 2.02\"), weight 75.8 kg (167 lb), SpO2 95 %.    8783-3385   Cardiac: bradycardic intermittently @ baseline, denies cardiac chest pain   Resp: RA, sating >93%, denies SOB  Neuro: disoriented to situation, calm & cooperative. Bed alarm on for safety   GI/: jose in place w/ AUOP, no BM this shift   Diet: Regular  Skin: chronic dermatitis-generalized   IV access: R PIV SL   Labs: Reviewed   Nausea: denies   Activity: Assist 2x w/ walker & GB  Pain: denies   Plan: continue POC      "

## 2023-05-15 NOTE — PLAN OF CARE
Goal Outcome Evaluation:      Plan of Care Reviewed With: patient    Overall Patient Progress: no changeOverall Patient Progress: no change    Shift: 3274-3053  Cardiac: bradycardic @ baseline, denies cardiac chest pain   Resp: RA, sating >93%, denies SOB  Neuro: disoriented to situation, calm & cooperative   GI/: jose in place w/ AUOP, no BM this shift   Diet: Regular  Skin: chronic dermatitis-generalized rash all over body   IV access: R PIV SL   Labs: Reviewed   Nausea: denies   Activity: Assist 2x w/ walker & GB  Pain: denies     Plan: continue POC  New changes this shift: no acute changes this shift

## 2023-05-15 NOTE — CONSULTS
Care Management Initial Consult    General Information  Assessment completed with: Patient, Care Team Member, -chart review, Patient- Deandre  Type of CM/SW Visit: Initial Assessment    Primary Care Provider verified and updated as needed: Yes   Readmission within the last 30 days: no previous admission in last 30 days      Reason for Consult: discharge planning, other (see comments) (elevated readmission score)  Advance Care Planning:            Communication Assessment  Patient's communication style: spoken language (English or Bilingual)             Cognitive  Cognitive/Neuro/Behavioral: .WDL except  Level of Consciousness: alert  Arousal Level: opens eyes spontaneously  Orientation: disoriented to, situation, time  Mood/Behavior: calm  Best Language: 0 - No aphasia  Speech: slow    Living Environment:   People in home: spouse     Current living Arrangements: house -- 2 steps to get into the house  Able to return to prior arrangements: no       Family/Social Support:  Care provided by: self, child(jessica)  Provides care for: no one, unable/limited ability to care for self  Marital Status:   Wife, Children           Description of Support System: Supportive, Involved      Patients son Lucio lives next door to the patient and helps with running errands, cooks meals, helps manage medications. Patient has two other children; daughter Vernell who patient said is not involved in his care, and son Madan who lives in [place].    Support Assessment: Adequate family and caregiver support, Adequate social supports    Current Resources:   Patient receiving home care services: No     Community Resources: None  Equipment currently used at home: grab bar, toilet, grab bar, tub/shower, shower chair, walker, rolling  Supplies currently used at home:      Employment/Financial:  Employment Status: retired        Financial Concerns: No concerns identified           Does the patient's insurance plan have a 3 day qualifying  hospital stay waiver?  No    Lifestyle & Psychosocial Needs:  Social Determinants of Health     Tobacco Use: Medium Risk (4/4/2023)    Patient History      Smoking Tobacco Use: Former      Smokeless Tobacco Use: Never      Passive Exposure: Not on file   Alcohol Use: Not on file   Financial Resource Strain: Not on file   Food Insecurity: Not on file   Transportation Needs: Not on file   Physical Activity: Not on file   Stress: Not on file   Social Connections: Not on file   Intimate Partner Violence: Not on file   Depression: Not at risk (4/4/2023)    PHQ-2      PHQ-2 Score: 0   Housing Stability: Not on file       Functional Status:  Prior to admission patient needed assistance:   Dependent ADLs:: Ambulation-cane, Ambulation-walker  Dependent IADLs:: Cleaning, Cooking, Laundry, Shopping, Meal Preparation, Medication Management, Money Management, Transportation  Assesssment of Functional Status: Not at baseline with mobility    - patients sariah Valdes assists with cooking meals, running errands, driving patient to where he needs, and managing medications. Patient said they have a maid come in once a week.    Mental Health Status:  Mental Health Status: No Current Concerns       Chemical Dependency Status:  Chemical Dependency Status: No Current Concerns             Values/Beliefs:  Spiritual, Cultural Beliefs, Buddhism Practices, Values that affect care: did not ask                Additional Information:    SW completed assessment at bedside with the patient. Introduced self and role. Patient resides at home with his wife. Per chart review, patients wife also uses a walker and has limited ability to assist at home.    SW discussed discharge recommendations. Patient is agreeable to going to TCU. Patient would like to be close to his house if possible. ELEAZAR sent referrals in the [place].    [facility]  [address] [place] [address]  [address]  P: [phone]  Admissions Noy: [phone]  F:  [phone]    [facility]  [address] [place]  [address]  P: [phone]  Pager: [phone]  F: [phone]    [facility]  [address] [place]  [address]  P: [phone]  Admissions [place]: [phone]  F: [phone]    [facility]  [address]  P: [phone]  F: [phone]    [facility]  [address] [place] [address]  P: [phone]   F: [phone]    [facility] [place]  [address] [facility] [address]  P: [phone]   F: [phone]    [facility]  [address] [place]  [address]  P: [phone]  F: [phone]    [facility]  [address] [place] [address]  P: [phone]   F: [phone]       Declined    [facility]  [address] [facility]  P: [phone]  F: [phone]  5/15: declined, does not have TCU    [facility]  [address] [place]  [address]  P: [phone]  F: [phone]  Admissions: [phone]  5/15: does not accept patients insurance    [facility]   [address] [place] [address], [facility]  Main (P) [phone]  Admissions (P) [phone]  Admissions (F) [phone]  5/15: no beds available      ROSIE Gonsales LSJANIS   7B    Phone: [phone]  Pager: [phone]

## 2023-05-16 NOTE — UTILIZATION REVIEW
Admission Status; Secondary Review Determination    Under the authority of the Utilization Management Committee, the utilization review process indicated a secondary review on the above patient. The review outcome is based on review of the medical records, discussions with staff, and applying clinical experience noted on the date of the review.    (x) Inpatient Status Appropriate - This patient's medical care is consistent with medical management for inpatient care and reasonable inpatient medical practice.    RATIONALE FOR DETERMINATION:82-year-old male who presented to the hospital with significant increased back pain in the upper back with decreased mobility, worsening weakness as well as increased confusion.  Patient found to have significant acute kidney injury with creatinine level 2.5 up from a baseline of 1.16 with associated hyperkalemia, new anemia 9.1 with most recent baseline 11.5, imaging revealed patchy groundglass opacities left greater than right lower lungs as well as some interstitial lung changes.  CT imaging revealed an old T12 compression fracture but due to patient's significant ongoing pain as patient required IV narcotics on admission, patient was scheduled for MRI imaging that required further IV narcotics and benzodiazepines in order to accomplish a due to patient's significant back pain unable to initially do in hospital day 2 and accomplished on hospital day 3.  Patient received IV bolus x2 on day of admission and still had significant acute kidney injury on hospital day 3 requiring further IV bolus on hospital day 2 and 3 with persistent acute kidney injury noted on May 14 hospital day 3.  Therefore due to patient's significant pain requiring further imaging including benzodiazepines and IV narcotics on hospital day 3 as well as IV fluids on day 1 2 and 3 with persistent acute kidney injury this Medicare advantage patient required greater than 3 nights in the hospital with acute  medical management appropriate for inpatient care.    At the time of admission with the information available to the attending physician more than 2 nights Hospital complex care was anticipated, based on patient risk of adverse outcome if treated as outpatient and complex care required. Inpatient admission is appropriate based on the Medicare guidelines.    This document was produced using voice recognition software    The information on this document is developed by the utilization review team in order for the business office to ensure compliance. This only denotes the appropriateness of proper admission status and does not reflect the quality of care rendered.    The definitions of Inpatient Status and Observation Status used in making the determination above are those provided in the CMS Coverage Manual, Chapter 1 and Chapter 6, section 70.4.    Sincerely,    Anand Galvan MD  Utilization Review  Physician Advisor  [facility].

## 2023-05-16 NOTE — PROGRESS NOTES
Care Management Follow Up    Length of Stay (days): 4    Expected Discharge Date: 05/17/2023     Concerns to be Addressed: discharge planning      Patient plan of care discussed at interdisciplinary rounds: Yes    Anticipated Discharge Disposition: Transitional Care    [facility] [address] [address]  P: [phone]   F: [phone]     Anticipated Discharge Services:    Anticipated Discharge DME:      Patient/family educated on Medicare website which has current facility and service quality ratings:  yes  Education Provided on the Discharge Plan:  yes  Patient/Family in Agreement with the Plan: yes    Referrals Placed by CM/SW:    Private pay costs discussed: Not applicable    Additional Information:    Patient accepted at [facility]. SW met with patient at bedside to discuss the two options. Patient said to call [facility] his son to discuss TCU options. SW explained to patient SW had tried to call [facility] twice today with no answer. Patient said to try [facility] again, but if ELEAZAR is not able to get a hold of [facility], he is okay with going to [facility].    SW arranged a wheelchair ride at 11am tomorrow morning. SW left message for [employer] to notify her of ride time and let her know our PT is recommending the patient get issued a two wheel walker at discharge from TCU as he has been doing very well with the two wheel walker while working with therapy inpatient.     Attempted to call patients son [facility] back two more times in the afternoon with no answer. Left an additional voicemail for [facility] letting him know the patient is discharging to [facility] tomorrow at 11am.      ROSIE Gonsales   7B    Phone: [phone]  Pager: [phone]

## 2023-05-16 NOTE — PROGRESS NOTES
[facility]    Medicine Progress Note - Hospitalist Service, GOLD TEAM 9    Date of Admission:  5/12/2023    Assessment & Plan   Deandre Moore is a 82 year old male with history of CAD (s/p CABG and stenting), HFpEF, DMII, HTN, depression, HLD, gout, BPH, chronic pain, chronic dermatitis, R renal lesion, and insomnia who was admitted to [facility] 5/12/2023 with urinary retention, BRENT, and worsening back pain    Interval Changes:  -Continue trial of void / monitoring for urinary retention   -Bowel regiment - senna, miralax, milk of mag prn  -Neurosurgery signed off, can f/u outpatient for injections if desired vs NSG   -Restart irbesartan tonight at half dose given normal/low normal BP, BMP recheck tomorrow. Assuming Cr stable, pt would be medically ready for discharge and would plan to discharge to TCU tomorrow.       #BRENT - improving  #hyperkalemia - resolved  #urinary retnetion:   BL Cr 1-1.2, elevated to 2.48 on admission. BUN 38.6  UA bland. K 5.7. EKG no peaked T waves, not treated in the ED.   -Cifuentes placed in the ED due to retention.   -Likely secondary to retention and poor po intake  Plan:  >Cifuentes catheter place, TOV on 5/15 and successful thus far  -Restart irbesartan 5/16    Cognitive decline  Probable dementia: Noted by family. Neurology consult placed per PCP Dr. Marquez 1/2023. Per chart review, c/f Parkinsonian features. Tremor noted on exam. Currently A&O x3, able to have complex conversations. However, unable to complete concentration tasks and impaired short term memory  - Follow up with neurology 7/5/2023 as planned    Acute on chronic back pain and weakness, mechanical falls: Chronic pain for years. Reports worsening x2 weeks with mechanical falls. CT thoracic and lumbar spine 5/12: no acute thoracic findings, postsurgical changes of L4-5 spinal fusion and spinal canal decompression, upper endplate compression deformity at L2. Follows OP with PM&R,  last seen 5/5/22  -MRI t spine (5/13) - compression deformity at T11, post surgical changes , moderate stenosis at T6-8, nodule at T6  -MRI L spine (5/14) - multi level bulges with moderate narrowing L1-L3 and severe neural forminal narrowing at l3-l4  Plan:  >neurosurgery consulted, no acute interventions, signed off  >Plan for discharge to TCU for PT/OT  - PT, OT  - Fall precuations    Weight loss, failure to thrive, severe protein calorie malnutrition: Reports 100 lb weight loss in 1 year. Noted to have at least 35 lb weight loss since last fall. Appetite quite poor. Denies abdominal pain, N/V, s/s aspiration  - Nutrition consult  - Needs further workup for weight loss once other hospital issues are stabilized  -Plans for outpatient CT chest/abdomen/pelvis to evaluate for malignancy    HFpEF, possible pulmonary HTN: No recent echo. Follows OP with [facility], last seen 6/2022. PTA on Lasix, irbesartan, Imdur, Spironolactone. Chest CT 5/12 dilated main pulmonary artery which could be c/w pulmonary HTN. Prior BL weight and 200 lbs, down to 167 on presentation. No e/o gross hypervolemia on exam  - Hold lasix, spironolactone, irbesartan given BRENT   - Continue PTA Imdur  - Daily weights, I&Os  - Needs OP cardiology follow up    Acute on chronic normocytic anemia: BL hgb 11-12. Hgb 9.1 5/12. No e/o acute bleeding  - Nutrition studies    DMII, hypoglycemia: A1c 5.4 2/2022. PTA on Glimepiride, Metformin. Follows OP with Dr. Campa of endocrinology. Glucose briefly low to 54, since resolved.   - Hold PTA meds  - MSSI, hypoglycemia protocol     BPH, urinary incontinence: Follows OP with urology, last seen 1/2023. Continue finasteride, Flomax. Urinary rentention as above  - Continue PTA meds  - Follow up OP 6/1/2023 as planned    R renal lesion: Follows OP with urology as above. Renal US 1/2023 2.0 cm R renal lesion with punctate calcification, likely benign, however cannot rule out malignancy    Hyponatremia: Mild. Na  133. Monitor 5/13    QTc prolongation: QTc 479. Avoid prolonging meds, trend if adding prolonging meds  Hypomagnesemia - replace Mag    Other Medical Issues:  Trop elevation: Asymptomatic. Trop 27 (30). EKG first degree block with PVCs and RBBB, c/w prior. Likely increased demand in the setting of the above. Low threshold for repeat/further workup with any changes given clinical history   CAD s/p CABG and stenting: Follows OP with [facility], last seen 6/2022. Continue PTA ASA and Plavix, follow up OP with indicated  Chronic L foot pain: Follows OP with Dr. Sharp of podiatry, last seen 5/2. Follow up with podiatry 5/16 as planned  Gout: Continue Allopurinol, colchicine   HLD: Continue statin  Depression, insomnia: Continue PTA Fluoxetine, Remeron, Trazodone   Chronic dermatitis: Follows OP with dermatology. Continue PTA gabapentin. Unclear if patient on Methotrexate. Hold for now, pharmacy consult pending  Prior concern for PNA: CXR 5/12 small L pleural effusion with basilar atelectasis vs consolidation. Chest CT 5/12 L>R GGO suspicious for infection. LA WBC normal. 7.46/41/42/80. Treated with Ceftriaxone and Azithromycin in the ED. Briefly on 3L without any hypoxia, now VSS on room air. Afebrile, no cough. Lungs CTAB       Diet: Combination Diet Regular Diet Adult  Snacks/Supplements Adult: Ensure Enlive; With Meals    DVT Prophylaxis: Heparin SQ  Cifuentes Catheter: Not present  Lines: None     Cardiac Monitoring: None  Code Status: Full Code  -discussed by admitting team on 5/12    Clinically Significant Risk Factors                  # Hypertension: Noted on problem list                 Disposition Plan     Expected Discharge Date: 05/16/2023      Destination: home with family  Discharge Comments: PT/OT recommending TCU. [facility] if patient and family will agree.  Med ready on 5/16          Macho Luis DO  Hospitalist Service, GOLD TEAM [address] [place] [address]  Center  Securely message with [employer] (more info)  Text page via [employer] Paging/Directory   See signed in provider for up to date coverage information  ______________________________________________________________________    Interval History   Pt today had many questions about his weight loss, the scans we have done, what we have found. Had a long talk about all of this. He states he is urinating okay and feels like he is emptying his bladder since the catheter was taken out.      Physical Exam   Vital Signs: Temp: 97.4  F (36.3  C) Temp src: Oral BP: 115/73 Pulse: 74   Resp: 18 SpO2: 100 % O2 Device: None (Room air)    Weight: 167 lbs 0 oz    Constitutional: alert, oriented to location, year, month  Eyes: no icterus  ENT: supple, no JVD  Respiratory: CTAB, no wheezing, no crackles  Cardiovascular: RRR  GI: soft,non-tender, non-distended, bowel sounds present  :  Cifuentes catheter draining yellow urine  Neurologic: intact distal  strength    Medical Decision Making             Data     I have personally reviewed the following data over the past 24 hrs:    6.7  \   8.7 (L)   / 221     N/A N/A N/A /  110 (H)   N/A N/A N/A \

## 2023-05-16 NOTE — TELEPHONE ENCOUNTER
----- Message from Edu Rivera MD sent at 5/5/2023 11:55 AM CDT -----  Hey-    I received refill request for methotrexate for this patient - he is overdue for labs and next appointment not until July.     I did give him a 1-month refill, but I would like him to come in for labs at earliest convenience and no additional refills until these have been drawn.     I placed orders - can you call him to schedule a lab appointment?    Thanks!    Edu Rivera MD  Pronouns: he/him/his    Department of Dermatology  [facility]: Phone: [phone], Fax:[phone]  [facility]: Phone: [phone] Fax: [phone]

## 2023-05-16 NOTE — PLAN OF CARE
"Goal Outcome Evaluation:      Plan of Care Reviewed With: patient    Overall Patient Progress: improvingOverall Patient Progress: improving    /78 (BP Location: Left arm)   Pulse 86   Temp 98.2  F (36.8  C) (Oral)   Resp 18   Ht 1.88 m (6' 2.02\")   Wt 73.6 kg (162 lb 4.8 oz)   SpO2 99%   BMI 20.83 kg/m      Neuros: Alert and intermittent confusion.  Able to make needs known.    Cardiac: denies chest pain, BP stable.   Respiratory: denies SOB, sating in mid 90's on RA.   GI/: voiding adequately by urinal.  +BS, + flatus, no BM. Refused miralax this morning.   Diet: regular  Activity: assisted of two up in chair for meals.  Skin: no new deficits.   LDA: R PIV SL.   Pain: reports back pain, PRN tylenol given with relief.   Lab: mag 1.5 today. Updated MD, no new orders.   New changes this shift: no acute changes this shift.   Plan: WC ride set up at 11 am tomorrow to TCU.  Continue to monitor and POC.       "

## 2023-05-16 NOTE — PLAN OF CARE
Goal Outcome Evaluation:      Plan of Care Reviewed With: patient    Overall Patient Progress: no change    Time:1930-0730  Neuros: Alert, disoriented time, situation, bed alarm on.   Cardiac: WNL, denies chest pain.   Respiratory: Sats >95% on RA. Denies SOB.   Pain: Denies pain this shift, repositioned for comfort, pt slept well overnight.   Nausea: Denies N/V  Diet: Regular diet, ensure supplements.   GI/: Voided x3, post void bladder scan 162 ml at 0205 . +BS, no BM this shift.   Skin: chronic dermatitis, on scheduled creams.   Lines: R PIV SL.  Labs: Reviewed.   Activity: Up in chair and ambulated in room with A2 walker/gait belt.   New Changes this Shift: None  Plan: Continue POC.

## 2023-05-17 NOTE — PLAN OF CARE
Physical Therapy Discharge Summary    Reason for therapy discharge:    Discharged to transitional care facility.    Progress towards therapy goal(s). See goals on Care Plan in [facility] electronic health record for goal details.  Goals partially met.  Barriers to achieving goals:   discharge from facility.    Therapy recommendation(s):    Continued therapy is recommended.  Rationale/Recommendations:  Patient will benefit from further PT to continue to improve safety and independence with functional mobility.

## 2023-05-17 NOTE — DISCHARGE SUMMARY
[facility]  Hospitalist Discharge Summary      Date of Admission:  5/12/2023  Date of Discharge:  5/17/2023  Discharging Provider: Macho Luis DO  Discharge Service: Hospitalist Service, GOLD TEAM 9    Discharge Diagnoses   BRENT - improving  hyperkalemia - resolved  urinary retention  Cognitive decline  Probable dementia  Acute on chronic back pain and weakness, mechanical falls  Weight loss, failure to thrive, severe protein calorie malnutrition  HFpEF, possible pulmonary HTN  Acute on chronic normocytic anemia  DMII, hypoglycemia  BPH, urinary incontinence  R renal lesion  Hyponatremia  QTc prolongation  Hypomagnesemia   Troponin elevation  CAD s/p CABG and stenting  Chronic L foot pain  Gout  HLD  Depression  Insomnia  Chronic dermatitis    Clinically Significant Risk Factors          Follow-ups Needed After Discharge   Follow-up Appointments     Follow Up and recommended labs and tests      -Follow up with Nursing home physician and/or follow up with primary care   provider in 1 week. Your lasix and spironolactone were held because of   your BRENT which should be evaluated in the outpatient setting. Your   irbesartan dose was reduced given your normal/low-normal blood pressures   in the hospital. They can also do your normal cancer screenings and workup   for your weight loss.     -Follow up with neurosurgery regarding your back pain and interventions   for this as soon as they are available. Your PCP can place this referral.    - Follow up with neurology 7/5/2023 as planned             Unresulted Labs Ordered in the Past 30 Days of this Admission     Date and Time Order Name Status Description    5/12/2023 11:54 AM Blood Culture Peripheral Blood Preliminary         Discharge Disposition   Discharged to short-term care facility  Condition at discharge: Stable    Hospital Course   Deandre Moore is a 82 year old male with history of CAD (s/p CABG and stenting),  HFpEF, DMII, HTN, depression, HLD, gout, BPH, chronic pain, chronic dermatitis, R renal lesion, and insomnia who was admitted to [facility] 5/12/2023 with urinary retention, BRENT, and worsening back pain. Catheter placed, UA bland, renal function improved. TOV successful. Back pain stable, had CT thoracic/lumbar scans, MRI T and L spine as well, neurosurgery consulted. No acute intervention warranted at this time. Pt can follow up outpatient.     #BRENT - improving  #hyperkalemia - resolved  #urinary retnetion   BL Cr 1-1.2, elevated to 2.48 on admission. BUN 38.6  UA bland. K 5.7. EKG no peaked T waves, not treated in the ED.   -Cifuentes placed in the ED due to retention.   -BRENT likely secondary to retention and poor po intake  Plan:  >Cifuentes catheter placed, TOV on 5/15 and successful thus far. Can f/u outpatient regarding this  -Restarted irbesartan 5/16, Cr stable 5/17. Can follow up with PCP outpatient in 1 week for BP and BMP recheck    Cognitive decline  Probable dementia: Noted by family. Neurology consult placed per PCP Dr. Marquez 1/2023. Per chart review, c/f Parkinsonian features. Tremor noted on exam. Currently A&O x3, able to have complex conversations. However, unable to complete concentration tasks and impaired short term memory  - Follow up with neurology 7/5/2023 as planned    Acute on chronic back pain and weakness, mechanical falls: Chronic pain for years. Reports worsening x2 weeks with mechanical falls. CT thoracic and lumbar spine 5/12: no acute thoracic findings, postsurgical changes of L4-5 spinal fusion and spinal canal decompression, upper endplate compression deformity at L2. Follows OP with PM&R, last seen 5/5/22  -MRI t spine (5/13) - compression deformity at T11, post surgical changes , moderate stenosis at T6-8, nodule at T6  -MRI L spine (5/14) - multi level bulges with moderate narrowing L1-L3 and severe neural forminal narrowing at l3-l4  Plan:  >neurosurgery consulted, no acute interventions,  signed off, follow up outpatient   >Plan for discharge to TCU for PT/OT  - PT, OT   - Fall precuations    Weight loss, failure to thrive, severe protein calorie malnutrition: Reports 100 lb weight loss in 1 year. Noted to have at least 35 lb weight loss since last fall. Appetite quite poor. Denies abdominal pain, N/V, s/s aspiration  - Nutrition consult  - Needs further workup for weight loss once other hospital issues are stabilized  -Plans for outpatient CT chest/abdomen/pelvis to evaluate for malignancy    HFpEF, possible pulmonary HTN: No recent echo. Follows OP with [facility], last seen 6/2022. PTA on Lasix, irbesartan, Imdur, Spironolactone. Chest CT 5/12 dilated main pulmonary artery which could be c/w pulmonary HTN. Prior BL weight and 200 lbs, down to 167 on presentation. No e/o gross hypervolemia on exam  - Hold lasix, spironolactone given BRENT  - Restarted half dose  irbesartan   - Continue PTA Imdur  - Needs OP cardiology follow up    Acute on chronic normocytic anemia: BL hgb 11-12. Hgb 9.1 5/12. No e/o acute bleeding  - f/u outpatient     DMII, hypoglycemia: A1c 5.4 2/2022. PTA on Glimepiride, Metformin. Follows OP with Dr. Campa of endocrinology. Glucose briefly low to 54, since resolved.   - PTA meds  - MSSI, hypoglycemia protocol     BPH, urinary incontinence: Follows OP with urology, last seen 1/2023. Continue finasteride, Flomax. Urinary rentention as above  - Continue PTA meds  - Follow up OP urology 6/1/2023 as planned    R renal lesion: Follows OP with urology as above. Renal US 1/2023 2.0 cm R renal lesion with punctate calcification, likely benign, however cannot rule out malignancy. F/u outpatient     Hyponatremia: Mild. Monitored     QTc prolongation: QTc 479. Avoid prolonging meds, trend if adding prolonging meds  Hypomagnesemia - replaced prn     Other Medical Issues:  Trop elevation: Asymptomatic. Trop 27 (30). EKG first degree block with PVCs and RBBB, c/w prior. Likely increased  demand in the setting of the above. Low concern for ACS.   CAD s/p CABG and stenting: Follows OP with [facility], last seen 6/2022. Continue PTA ASA and Plavix, follow up OP with indicated  Chronic L foot pain: Follows OP with Dr. Sharp of podiatry, last seen 5/2. Follow up with podiatry 5/16 as planned  Gout: Continue Allopurinol, colchicine   HLD: Continue statin  Depression, insomnia: Continue PTA Fluoxetine, Remeron, Trazodone   Chronic dermatitis: Follows OP with dermatology. Continue PTA gabapentin. Unclear if patient on Methotrexate. Hold for now, pharmacy consult pending  Prior concern for PNA: CXR 5/12 small L pleural effusion with basilar atelectasis vs consolidation. Chest CT 5/12 L>R GGO suspicious for infection. LA WBC normal. 7.46/41/42/80. Treated with Ceftriaxone and Azithromycin in the ED. Briefly on 3L without any hypoxia, then VSS on room air. Afebrile, no cough. Lungs CTAB. Continued to be stable and not hypoxic off abx thus low concern for clinical PNA. Can monitor clinically and follow up outpatient.       Consultations This Hospital Stay   MEDICATION HISTORY IP PHARMACY CONSULT  NUTRITION SERVICES ADULT IP CONSULT  PHYSICAL THERAPY ADULT IP CONSULT  OCCUPATIONAL THERAPY ADULT IP CONSULT  CARE MANAGEMENT / SOCIAL WORK IP CONSULT  NEUROSURGERY ADULT IP CONSULT  PHYSICAL THERAPY ADULT IP CONSULT  OCCUPATIONAL THERAPY ADULT IP CONSULT    Code Status   Full Code    Time Spent on this Encounter   IMacho DO, personally saw the patient today and spent greater than 30 minutes discharging this patient.       Macho Luis DO  [facility] [address] [place] [id number]  Phone: [phone]  ______________________________________________________________________    Physical Exam   Vital Signs: Temp: 98.2  F (36.8  C) Temp src: Oral BP: 112/78 Pulse: 86   Resp: 18 SpO2: 99 % O2 Device: None (Room air)    Weight: 162 lbs 4.8 oz  Constitutional: alert,  oriented to location, year, month  Eyes: no icterus  ENT: supple, no JVD  Respiratory: CTAB, no wheezing, no crackles  Cardiovascular: RRR  GI: soft,non-tender, non-distended, bowel sounds present  Neurologic: intact distal  strength        Primary Care Physician   William Marquez    Discharge Orders      General info for SNF    Length of Stay Estimate: Short Term Care: Estimated # of Days <30  Condition at Discharge: Stable  Level of care:skilled   Rehabilitation Potential: Fair  Admission H&P remains valid and up-to-date: Yes  Recent Chemotherapy: N/A  Use Nursing Home Standing Orders: Yes     Mantoux instructions    Give two-step Mantoux (PPD) Per Facility Policy Yes     Follow Up and recommended labs and tests    -Follow up with Nursing home physician and/or follow up with primary care provider in 1 week. Your lasix and spironolactone were held because of your BRENT which should be evaluated in the outpatient setting. Your irbesartan dose was reduced given your normal/low-normal blood pressures in the hospital. They can also do your normal cancer screenings and workup for your weight loss.     -Follow up with neurosurgery regarding your back pain and interventions for this as soon as they are available. Your PCP can place this referral.     Reason for your hospital stay    Back pain and BRENT (kidney injury)     Bladder scan    X 2 for post void residual     Activity - Up with nursing assistance     Physical Therapy Adult Consult    Evaluate and treat as clinically indicated.    Reason:  chronic deconditioning, back pain     Occupational Therapy Adult Consult    Evaluate and treat as clinically indicated.    Reason:  chronic deconditioning, back pain     Fall precautions     Diet    Follow this diet upon discharge:       Regular Diet Adult       Significant Results and Procedures   Most Recent 3 CBC's:Recent Labs   Lab Test 05/17/23  0551 05/16/23  0756 05/15/23  0553   WBC 6.2 6.7 6.6   HGB 8.9* 8.7* 8.1*    MCV 98 97 99    221 189     Most Recent 3 BMP's:Recent Labs   Lab Test 05/17/23  0551 05/16/23  0756 05/15/23  1806 05/15/23  0833 05/15/23  0553    139  --   --  136   POTASSIUM 4.3 4.5  --   --  4.6   CHLORIDE 104 108*  --   --  105   CO2 24 24  --   --  24   BUN 8.1 9.1  --   --  9.9   CR 1.27* 1.31*  --   --  1.34*   ANIONGAP 9 7  --   --  7   ADDI 9.3 9.3  --   --  8.8   * 111* 110*   < > 107*    < > = values in this interval not displayed.   ,   Results for orders placed or performed during the hospital encounter of 05/12/23   XR Chest Port 1 View    Narrative    Exam: XR CHEST PORT 1 VIEW, 5/12/2023 9:36 AM    Indication: AMS    Comparison: 1/18/2023    Findings:   Intact median sternotomy wires. Stable enlarged cardiac silhouette.  Diffuse bilateral interstitial prominence. Probable small left pleural  effusion. No pneumothorax. Increased left basilar opacities with  silhouetting of the left hemidiaphragm.      Impression    Impression:   1. Probable small left pleural effusion with increased left basilar  atelectasis versus consolidation.  2. Findings suggestive of pulmonary edema.    ANNA CINTRON          SYSTEM ID:  [id number]   CT Thoracic Spine w/o Contrast    Narrative    Thoracic and Lumbar spine CT without contrast    History: back pain with t11 comp fracture.    Comparison: CT chest 12/23/2015 and CT Abdomen-Pelvis 2/5/2018    Technique: Axial, coronal, and sagittal multiplanar reconstructions  obtained from acquisition of thoracic and lumbar spine CT scan .    Findings:  Thoracic spine:   Osteopenia.    Right convex curvature of the midthoracic spine. Upper endplate  compression deformity associated with a Schmorl' node.    There is no acute fracture or subluxation. There is no prevertebral  edema.     There is no spinal canal or foraminal stenosis at any level. No soft  tissue abnormality in the visualized paraspinous tissues anteriorly.    Lumbar Spine:    Osteopenia.    Upper endplate compression deformity associated with a Schmorl's node  at L2. Schmorl's node at the lower endplate of L1.    Instrumented posterior spinal fusion at L4-5 with complete osseous  fusion. No evidence of hardware failure.    Minimal retrolisthesis at L3-4. Decompressive laminectomy at L4-5.    Multilevel disc height narrowing. On a level by level basis, the  findings are as follows:    T12-L1:  There is no focal abnormality.  L1-2:  Disc osteophyte complex. Mild bilateral neural foraminal  stenosis. No spinal canal stenosis.  L2-3:  Disc osteophyte complex. Mild bilateral facet hypertrophy. Mild  bilateral neural foraminal stenosis. Mild spinal canal stenosis..  L3-4:  Disc osteophyte complex and left greater than right facet  hypertrophy. Moderate left and mild right neural foraminal stenosis.  Mild spinal canal stenosis.  L4-5:  Fusion level. Decompressive laminectomy. Mild bilateral neural  foraminal stenosis.  L5-S1:  There is no focal abnormality.  The visualized paraspinous tissues anteriorly are unremarkable.    Abnormal aortic calcifications.      Impression    Impression:   1. Thoracic spine:  No acute fracture or subluxation. Upper endplate  compression deformity associated with a Schmorl's node at T11.   2. Lumbar Spine:   a. No acute fracture or subluxation.  b. Postsurgical changes of instrumented spinal fusion and spinal canal  decompression at L4-5. There is complete dorsal lateral surface  fusion. No evidence of hardware failure.  c. Upper endplate compression deformity associated with a Schmorl's  node at L2.     STEFANI POOL MD         SYSTEM ID:  [id number]   CT Lumbar Spine w/o Contrast    Narrative    Thoracic and Lumbar spine CT without contrast    History: back pain with t11 comp fracture.    Comparison: CT chest 12/23/2015 and CT Abdomen-Pelvis 2/5/2018    Technique: Axial, coronal, and sagittal multiplanar reconstructions  obtained from acquisition of thoracic  and lumbar spine CT scan .    Findings:  Thoracic spine:   Osteopenia.    Right convex curvature of the midthoracic spine. Upper endplate  compression deformity associated with a Schmorl' node.    There is no acute fracture or subluxation. There is no prevertebral  edema.     There is no spinal canal or foraminal stenosis at any level. No soft  tissue abnormality in the visualized paraspinous tissues anteriorly.    Lumbar Spine:   Osteopenia.    Upper endplate compression deformity associated with a Schmorl's node  at L2. Schmorl's node at the lower endplate of L1.    Instrumented posterior spinal fusion at L4-5 with complete osseous  fusion. No evidence of hardware failure.    Minimal retrolisthesis at L3-4. Decompressive laminectomy at L4-5.    Multilevel disc height narrowing. On a level by level basis, the  findings are as follows:    T12-L1:  There is no focal abnormality.  L1-2:  Disc osteophyte complex. Mild bilateral neural foraminal  stenosis. No spinal canal stenosis.  L2-3:  Disc osteophyte complex. Mild bilateral facet hypertrophy. Mild  bilateral neural foraminal stenosis. Mild spinal canal stenosis..  L3-4:  Disc osteophyte complex and left greater than right facet  hypertrophy. Moderate left and mild right neural foraminal stenosis.  Mild spinal canal stenosis.  L4-5:  Fusion level. Decompressive laminectomy. Mild bilateral neural  foraminal stenosis.  L5-S1:  There is no focal abnormality.  The visualized paraspinous tissues anteriorly are unremarkable.    Abnormal aortic calcifications.      Impression    Impression:   1. Thoracic spine:  No acute fracture or subluxation. Upper endplate  compression deformity associated with a Schmorl's node at T11.   2. Lumbar Spine:   a. No acute fracture or subluxation.  b. Postsurgical changes of instrumented spinal fusion and spinal canal  decompression at L4-5. There is complete dorsal lateral surface  fusion. No evidence of hardware failure.  c. Upper  endplate compression deformity associated with a Schmorl's  node at L2.     STEFANI POOL MD         SYSTEM ID:  [id number]   CT Chest w/o Contrast    Narrative    Examination: CT CHEST W/O CONTRAST, 5/12/2023 10:23 AM     HISTORY: left pleural effusion vs. infiltrate    Comparison: CT of the chest abdomen and pelvis 1/3/2022, chest  radiograph 5/12/2023    TECHNIQUE: Helical acquisition of CT images from the lung apices to  the kidneys without IV contrast. Coronal and axial MIP images were  reconstructed from the source data.    FINDINGS:     Thorax:    Bilateral gynecomastia. Hyperdense nodule of the right thyroid  measuring 6 mm.    There is no definite axillary, hilar, or mediastinal lymphadenopathy.  Conventional aortic arch branching pattern. Aortic arch  atherosclerosis. Surgical changes of CABG. Cardiomegaly.  Subendocardial fat deposition along the anteroseptal left ventricular  wall compatible with prior myocardial infarct. No pericardial  effusion. Dilated main pulmonary artery measuring up to 3.4 cm.  Coronary artery stents and calcification.     Lungs:  The trachea and central airways are clear. Increased peripheral  reticular opacities with slight basilar predominance and areas of  subpleural sparing. Areas of minimal peripheral traction  bronchiolectasis, most conspicuous in the lingula. No honeycombing.  Superimposed patchy groundglass opacities of the left greater than  right posterior lower lobes with associated septal thickening. Trace  left pleural effusion. No pneumothorax.    Upper abdomen: Limited evaluation of the upper abdomen. Status post  cholecystectomy.    Bones: Mild L1 superior endplate compression deformity, age  indeterminate but new since 1/3/2022. Stable chronic T11 anterior  wedge compression deformity. Moderate right glenohumeral joint  degenerative change. Postoperative changes of the chest with median  sternotomy wires intact.      Impression    IMPRESSION:   1. Patchy  groundglass opacities of the left greater than right lower  lobes superimposed on worsened peripheral and basilar predominant  subpleural reticular abnormality, suspicious for infection or  exacerbation of interstitial lung disease, such as NSIP. Consider  pulmonology consultation.  2. Dilated main pulmonary artery, which can be seen in the setting of  pulmonary hypertension.  3. Trace left pleural effusion.    I have personally reviewed the examination and initial interpretation  and I agree with the findings.    ANNA CINTRON DO         SYSTEM ID:  [id number]   US Renal Complete Non-Vascular    Narrative    EXAMINATION: US RENAL COMPLETE NON-VASCULAR, 5/12/2023 7:53 PM     COMPARISON: 1/18/2023, CT 1/3/2022 CT 1/25/2020, 2/5/2018    HISTORY: R renal lesion, BRENT    TECHNIQUE: The kidneys and bladder were scanned in the standard  fashion with specialized ultrasound transducer(s) using both gray  scale and limited color/spectral Doppler techniques.    FINDINGS:    Right kidney: Measures 10.6 cm in length. Parenchyma is of normal  thickness and echogenicity. There is a right lower pole partially  exophytic renal lesion measuring measuring 1.9 x 1.4 x 1.7 cm. There  is a small hyperechoic focus located peripherally. There is a single  thin septation. No hydronephrosis.    Left kidney: Measures 10.1 cm in length. Parenchyma is of normal  thickness and echogenicity. No focal mass. No hydronephrosis.     Bladder: Decompressed with Cifuentes in place.      Impression    IMPRESSION:  1.  1.9 cm partially exophytic right lower pole lesion which has not  substantially changed from comparison exam.  Consider 6-12 month  ultrasound follow-up versus full characterization with MRI on a  nonemergent outpatient basis.     I have personally reviewed the examination and initial interpretation  and I agree with the findings.    MARY YANG MD         SYSTEM ID:  [id number]   MR Thoracic Spine w/o & w Contrast    Narrative    MR  THORACIC SPINE W/O & W CONTRAST 5/13/2023 2:28 PM    Provided History: sub acute back pain, urinary retention  ICD-10:    Comparison: CT thoracic spine 5/12/2023    Technique:  Sagittal T1- and T2-weighted images through the thoracic spine and  axial T2-weighted images were obtained without intravenous contrast.  Following intravenous administration of gadolinium, axial and sagittal  T1-weighted images with fat saturation were also obtained.    Contrast: 7.6 mL gadavist    Findings:  The thoracic vertebrae are normally aligned.  Mild anterior wedge  compression deformity of the superior endplate of T11 without marrow  edema. Multilevel disc space loss, greatest at T6-7 and T8-9. Focal  disc bulge at T2-3 resulting in mild spinal canal narrowing. T1  vertebral body hemangioma.    Circumferential disc bulge at T6-7 resulting in flattening of the  ventral thecal sac without definite T2 hyperintense cord signal. There  is significant motion and phase artifact on the axial images which  degrades image quality and evaluation for myelopathic cord signal. 9 x  6 mm T2 hypointense nodule in the posterior left epidural space which  appears to arise from the facet joint (series 10, image 32). There is  suggestion of peripheral enhancement on the postcontrast sequences  (series 16, image 33). On the comparison CT dated 1/3/2022, this  appears similar in size.     Circumferential disc bulge at T7-8 resulting in moderate spinal canal  stenosis and subtle myelopathic cord signal changes.    Schmorl's node deformity of the superior endplate of L1 with  hyperintense marrow edema, which demonstrates enhancement on the  postcontrast series.       Impression    Impression:   1.  Acute to subacute Schmorl's node deformity of the superior  endplate of L1.  2. Circumferential disc bulges at T6-T7 and T7-T8 where there is  moderate spinal canal stenosis without definite myelopathic cord  signal; however, there is significant motion and  phase artifact which  limits evaluation for myelopathic cord signal.  3. 9 x 6 mm nodule in the posterior left epidural space at the level  of T6-T7, which appears to arise from the left articular facet joint  and appears to been present previously dating back to a CT of chest,  abdomen and pelvis on 1/3/2022 and is similar in size. There is mild  peripheral enhancement on the postcontrast sequence. Findings are  suggestive of an inflamed proteinaceous synovial cyst.    I have personally reviewed the examination and initial interpretation  and I agree with the findings.    ANDERS DAVEY MD         SYSTEM ID:  [id number]   MR Lumbar Spine w/o & w Contrast    Narrative    EXAM: MR LUMBAR SPINE W/O & W CONTRAST  5/14/2023 2:11 PM     HISTORY:  sub acute back pain, new urinary retention       COMPARISON:  CT 5/12/2023    TECHNIQUE: Sagittal T1-weighted and T2-weighted and axial T2-weighted  images of the lumbar spine were obtained without intravenous contrast.  Post intravenous contrast using gadolinium axial and sagittal  T1-weighted images were obtained with fat saturation.    FINDINGS:  There are 5 lumbar type vertebrae, used for the purposes of this  dictation. Conus tip at approximately L1-2. There is grade 1  anterolisthesis of L4 on L5. Schmorl's node deformities involving the  superior endplates of L1 and L2 with slight height loss anteriorly.  There are edematous signal changes with mild associated enhancement  peripherally surrounding these deformities. Fusion and instrumentation  changes at L4-5. Normal cauda equina. Indeterminant T1  hypointense/heterogeneously T2 hyperintense lesion of the posterior L1  vertebral body measuring up to 1.1 cm (series 8, image 7). This  exhibits enhancement on postcontrast images.    On a level by level basis, the findings are as follows:    L1-2: Broad-based disc bulge with facet arthropathy resulting in  moderate spinal canal stenosis. Moderate to severe bilateral  neural  foraminal narrowing.    L2-3: Broad-based disc bulge with facet arthropathy resulting in  moderate spinal canal narrowing. Mild to moderate bilateral neural  foraminal narrowing.    L3-4: Broad-based disc bulge resulting in mild-to-moderate spinal  canal narrowing. Severe bilateral neural foraminal narrowing.    L4-5: Fusion changes. Decompressive laminectomy. No spinal canal  stenosis. Mild bilateral neural foraminal narrowing.    L5-S1: No spinal canal stenosis or neural foraminal narrowing.    Mild paraspinal edematous changes surrounding the fusion hardware.       Impression    IMPRESSION:  1. Multilevel disc bulges with facet arthropathy resulting in moderate  spinal canal narrowing at L1-2 and L2-3. Disc bulges with facet  arthropathy also result in moderate to severe bilateral neural  foraminal narrowing at L1-2 and severe bilateral neural foraminal  narrowing at L3-4.  2. 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  3. Indeterminant 1.1 cm enhancing lesion involving posterior inferior  L1 vertebral body. Findings may also represent an acute Schmorl's node  deformity based on the appearance of the prior CT scan.    I have personally reviewed the examination and initial interpretation  and I agree with the findings.    ANDERS DAVEY MD         SYSTEM ID:  [id number]   Echo Complete     Value    LVEF  55-60%    Narrative    [id number]  [id number]  [id number]  [id number]^СВЕТЛАНА^BRANDON^LAWSON     [facility]  Echocardiography Laboratory  [address] [place] [id number]     Name: LUIS F BURDICK  MRN: [id number]  : 1940  Study Date: 2023 07:27 AM  Age: 82 yrs  Gender: Male  Patient Location: [facility]  Reason For Study: Dyspnea  Ordering Physician: BRANDON SOTOMAYOR  Performed By: Sally Eckert RDCS      BSA: 2.0 m2  Height: 74 in  Weight: 167 lb  BP: 112/72 mmHg  ______________________________________________________________________________  Procedure  Complete Portable Echo Adult. Contrast Optison. Optison (NDC #1310-2071-54)  given intravenously. Patient was given 5 ml mixture of 3 ml Optison and 6 ml  saline. 4 ml wasted.  ______________________________________________________________________________  Interpretation Summary  Global and regional left ventricular function is normal with an EF of 55-60%.  Right ventricular function, chamber size, wall motion, and thickness are  normal.  Dilated aortic root and proximal ascending aorta measuring 4.4 cm (2.2 cm/m2)  and 4 cm (2 cm/m2) respectively.  Previous study not available for comparison.  ______________________________________________________________________________  Left Ventricle  Global and regional left ventricular function is normal with an EF of 55-60%.  Left ventricular size is normal. Left ventricular diastolic function is  indeterminate. Diastolic Doppler findings (E/E' ratio and/or other parameters)  suggest left ventricular filling pressures are normal. Abnormal non-specific  septal motion is present.     Right Ventricle  Right ventricular function, chamber size, wall motion, and thickness are  normal.     Atria  Both atria appear normal.     Mitral Valve  Mild mitral annular calcification is present. Trace mitral insufficiency is  present.     Aortic Valve  Trileaflet aortic sclerosis without stenosis.     Tricuspid Valve  The tricuspid valve is normal. Trace tricuspid insufficiency is present. The  peak velocity of the tricuspid regurgitant jet is not obtainable. Pulmonary  artery systolic pressure cannot be assessed.     Pulmonic Valve  The valve leaflets are not well visualized. On Doppler interrogation, there is  no significant stenosis or regurgitation.     Vessels  The inferior vena cava was normal in size with preserved  respiratory  variability. Dilated aortic root and proximal ascending aorta measuring 4.4 cm  (2.2 cm/m2) and 4 cm (2 cm/m2) respectively. IVC diameter <2.1 cm collapsing  >50% with sniff suggests a normal RA pressure of 3 mmHg.     Compared to Previous Study  Previous study not available for comparison.     ______________________________________________________________________________  MMode/2D Measurements & Calculations  IVSd: 0.98 cm  LVIDd: 5.6 cm  LVIDs: 3.9 cm  LVPWd: 0.77 cm     FS: 30.2 %  LV mass(C)d: 184.7 grams  LV mass(C)dI: 91.8 grams/m2  Ao root diam: 4.4 cm  asc Aorta Diam: 4.0 cm  LVOT diam: 2.4 cm  LVOT area: 4.4 cm2  LA Volume (BP): 54.7 ml  LA Volume Index (BP): 27.2 ml/m2  RWT: 0.27  TAPSE: 2.3 cm     Doppler Measurements & Calculations  MV E max nehemiah: 59.1 cm/sec  MV A max nehemiah: 73.1 cm/sec  MV E/A: 0.81  MV dec time: 0.23 sec  Ao V2 max: 92.4 cm/sec  Ao max PG: 3.4 mmHg  E/E' av.5  Lateral E/e': 5.8  Medial E/e': 9.2  RV S Nehemiah: 11.9 cm/sec     ______________________________________________________________________________  Report approved by: Daksha Desai 2023 01:27 PM               Discharge Medications   Current Discharge Medication List      CONTINUE these medications which have CHANGED    Details   irbesartan (AVAPRO) 150 MG tablet Take 0.5 tablets (75 mg) by mouth At Bedtime  Qty: 100 tablet, Refills: 1    Associated Diagnoses: Type 2 diabetes mellitus without complication, without long-term current use of insulin (H)         CONTINUE these medications which have NOT CHANGED    Details   allopurinol (ZYLOPRIM) 300 MG tablet Take 1 tablet (300 mg) by mouth daily  Qty: 90 tablet, Refills: 3    Comments: Requesting 1 year supply  Associated Diagnoses: Anxiety; Type 2 diabetes mellitus without complication, without long-term current use of insulin (H); Chronic kidney disease, stage 3 (H); Hyperglycemia; Type 2 diabetes mellitus with diabetic neuropathy (H); Renal insufficiency  syndrome      aspirin 81 MG tablet Take 81 mg by mouth daily.      atorvastatin (LIPITOR) 40 MG tablet Take 1 tablet (40 mg) by mouth daily  Qty: 100 tablet, Refills: 0    Comments: Requesting 1 year supply Needs LDL for additional refills  Associated Diagnoses: Type 2 diabetes mellitus without complication, without long-term current use of insulin (H)      augmented betamethasone dipropionate (DIPROLENE-AF) 0.05 % external ointment Apply twice daily as needed for rash on the leg or back  Qty: 45 g, Refills: 11    Associated Diagnoses: Venous stasis dermatitis of left lower extremity; Atopic dermatitis, unspecified type      clobetasol (TEMOVATE) 0.05 % external cream Apply twice daily as needed for itching or rash on the back  Qty: 60 g, Refills: 5    Associated Diagnoses: Dermatitis      clopidogrel (PLAVIX) 75 MG tablet TAKE 1 TABLET BY MOUTH  DAILY  Qty: 90 tablet, Refills: 0    Comments: Requesting 1 year supply  Associated Diagnoses: Anxiety; Type 2 diabetes mellitus without complication, without long-term current use of insulin (H); Chronic kidney disease, stage 3 (H); Hyperglycemia; Type 2 diabetes mellitus with diabetic neuropathy (H); Renal insufficiency syndrome      colchicine (COLCYRS) 0.6 MG tablet Take 1 tablet (0.6 mg) by mouth daily  Qty: 90 tablet, Refills: 3    Comments: Requesting 1 year supply  Associated Diagnoses: Anxiety; Type 2 diabetes mellitus without complication, without long-term current use of insulin (H); Chronic kidney disease, stage 3 (H); Hyperglycemia; Type 2 diabetes mellitus with diabetic neuropathy (H); Renal insufficiency syndrome      Cyanocobalamin (VITAMIN B12) 500 MCG TABS Take 1 tablet by mouth daily      diclofenac (VOLTAREN) 75 MG EC tablet Take 1 tablet (75 mg) by mouth At Bedtime  Qty: 15 tablet, Refills: 0    Associated Diagnoses: Type II or unspecified type diabetes mellitus with neurological manifestations, not stated as uncontrolled(250.60) (H); Pain of left lower  extremity      finasteride (PROSCAR) 5 MG tablet Take 1 tablet (5 mg) by mouth daily  Qty: 30 tablet, Refills: 11    Comments: Requesting 1 year supply  Associated Diagnoses: BPH with urinary obstruction      fluocinolone (SYNALAR) 0.01 % solution Apply a thin layer twice daily as needed to scalp for itchiness.  Qty: 60 mL, Refills: 4    Associated Diagnoses: Prurigo nodularis      FLUoxetine (PROZAC) 20 MG capsule Take 1 capsule (20 mg) by mouth daily  Qty: 90 capsule, Refills: 3    Comments: Requesting 1 year supply  Associated Diagnoses: Anxiety      folic acid (FOLVITE) 1 MG tablet Take 1 tablet (1 mg) by mouth daily Daily on days you don't take MTX  Qty: 90 tablet, Refills: 3    Associated Diagnoses: Atopic dermatitis, unspecified type      glimepiride (AMARYL) 1 MG tablet TAKE 2 TABLETS BY MOUTH IN  THE MORNING AND 1 TABLET BY MOUTH IN THE EVENING  Qty: 270 tablet, Refills: 3    Comments: Requesting 1 year supply  Associated Diagnoses: Type 2 diabetes mellitus without complication, without long-term current use of insulin (H)      isosorbide mononitrate (IMDUR) 30 MG 24 hr tablet Take 1 tablet (30 mg) by mouth daily  Qty: 90 tablet, Refills: 3    Comments: Requesting 1 year supply  Associated Diagnoses: Anxiety; Type 2 diabetes mellitus without complication, without long-term current use of insulin (H); Chronic kidney disease, stage 3 (H); Hyperglycemia; Type 2 diabetes mellitus with diabetic neuropathy (H); Renal insufficiency syndrome      metFORMIN (GLUCOPHAGE) 1000 MG tablet Take 1 tablet (1,000 mg) by mouth 2 times daily (with meals)  Qty: 180 tablet, Refills: 1    Associated Diagnoses: Type 2 diabetes mellitus without complication, without long-term current use of insulin (H)      methotrexate 2.5 MG tablet Take 6 tablets (15 mg) by mouth every 7 days for 30 days  Qty: 24 tablet, Refills: 0    Associated Diagnoses: Atopic dermatitis, unspecified type      mupirocin (BACTROBAN) 2 % external ointment Apply  topically 2 times daily  Qty: 30 g, Refills: 0    Associated Diagnoses: Type 2 diabetes mellitus without complication, without long-term current use of insulin (H)      tamsulosin (FLOMAX) 0.4 MG capsule Take 1 capsule (0.4 mg) by mouth daily  Qty: 90 capsule, Refills: 3    Comments: Requesting 1 year supply  Associated Diagnoses: Anxiety; Type 2 diabetes mellitus without complication, without long-term current use of insulin (H); Chronic kidney disease, stage 3 (H); Hyperglycemia; Type 2 diabetes mellitus with diabetic neuropathy (H); Renal insufficiency syndrome      traZODone (DESYREL) 150 MG tablet Take 1 tablet (150 mg) by mouth At Bedtime  Qty: 90 tablet, Refills: 3    Comments: Requesting 1 year supply  Associated Diagnoses: Anxiety; Type 2 diabetes mellitus without complication, without long-term current use of insulin (H); Chronic kidney disease, stage 3 (H); Hyperglycemia; Type 2 diabetes mellitus with diabetic neuropathy (H); Renal insufficiency syndrome      Alcohol Swabs PADS 1 pad 4 times daily  Qty: 100 each, Refills: 6    Associated Diagnoses: Type 2 diabetes mellitus (H)      blood glucose (NO BRAND SPECIFIED) lancets standard Use to test blood sugar 3 times daily or as directed.  Qty: 100 each, Refills: 11    Associated Diagnoses: Type 2 diabetes mellitus (H)      blood glucose (NO BRAND SPECIFIED) test strip Use to test blood sugar 4 times daily  With meter/ strips/ lancets covered by insurance pt using accuchek  Qty: 360 each, Refills: 3    Associated Diagnoses: Type 2 diabetes mellitus (H)      Blood Glucose Monitoring Suppl (BLOOD GLUCOSE MONITOR SYSTEM) w/Device KIT Test 4 times daily with meter covered by  insurance  Qty: 1 kit, Refills: 1    Associated Diagnoses: Type 2 diabetes mellitus (H)      nitroglycerin (NITROSTAT) 0.4 MG SL tablet Place under the tongue as needed         STOP taking these medications       furosemide (LASIX) 40 MG tablet Comments:   Reason for Stopping:          spironolactone (ALDACTONE) 25 MG tablet Comments:   Reason for Stopping:             Allergies   Allergies   Allergen Reactions     Beta Adrenergic Blockers Unknown     Latex Dermatitis     Latex Rash     Tape [Adhesive Tape] Dermatitis and Rash     Electrode patches

## 2023-05-17 NOTE — PROGRESS NOTES
Care Management Discharge Note    Discharge Date: 05/17/2023       Discharge Disposition: Transitional Care    [facility] at [address] [address]  P: [phone]   F: [phone]    Discharge Services:      Discharge DME:      Discharge Transportation: wheelchair ride arranged for 11am today.    Private pay costs discussed: Not applicable    Does the patient's insurance plan have a 3 day qualifying hospital stay waiver?  No    PAS Confirmation Code: [id number]  Patient/family educated on Medicare website which has current facility and service quality ratings: yes     Education Provided on the Discharge Plan: yes   Persons Notified of Discharge Plans: patient, left message for patients son Lucio, medical team, charge nurse, bedside nurse  Patient/Family in Agreement with the Plan: yes    Handoff Referral Completed: Yes    Additional Information:    Discharge orders faxed at 9am.      ROSIE GonsalesW   [address]B    Phone: [phone]  Pager: [phone]

## 2023-05-17 NOTE — PLAN OF CARE
Goal Outcome Evaluation:      Plan of Care Reviewed With: patient    Overall Patient Progress: improving    Time:1930-0730  Neuros: Alert, disoriented at times, forgetful, bed alarm on.   Cardiac: WNL, denies chest pain.   Respiratory: Sats >95% on RA. Denies SOB.   Pain: c/o back & neck discomfort overnight, prn tylenol x1 & hot back with relief.   Nausea: Denies N/V  Diet: Regular diet, fair appetite.   GI/: Voiding in the urinal. +BS, no BM this shift.   Skin: no new deficits  Lines: R PIV SL.  Labs: Reviewed.   Activity: Activity encouraged OOB, A2 walker/gait belt. Turned & repositioned in bed q 2 hrs.   New Changes this Shift: None  Plan: WC ride set up at 11 am today to TCU. Continue POC.

## 2023-05-17 NOTE — PLAN OF CARE
Occupational Therapy Discharge Summary    Reason for therapy discharge:    Discharged to transitional care facility.    Progress towards therapy goal(s). See goals on Care Plan in [facility] electronic health record for goal details.  Goals not met.  Barriers to achieving goals:   discharge from facility.    Therapy recommendation(s):    Continued therapy is recommended.  Rationale/Recommendations:  continue OT at TCU to maximize return to PLOF with ADLs and functional mobility.

## 2023-05-17 NOTE — PLAN OF CARE
Goal Outcome Evaluation:      Plan of Care Reviewed With: patient    Overall Patient Progress: improvingOverall Patient Progress: improving       Alert and intermittent confusion.  Able to make needs known, bed and chair alarm on for safety.  Voiding adequately by urinal.  +BS, passing flatus, no BM.  Assisted of 2 with walker up in chair for meals.  PIV removed. Reports back pain is improving after oral tylenol.  Patient is stable and left with transport staff via .  All belongings sent with patient.

## 2023-05-18 NOTE — TELEPHONE ENCOUNTER
Miguelm.  informing patient to call [phone] to schedule Lab ordered by Dr. Rivera before his next visit in July. Pt is overdue for his labs per Dr. Rivera.

## 2023-05-23 NOTE — TELEPHONE ENCOUNTER
Reason for visit: Follow up     Relevant information: renal lesion    Records/imaging/labs/orders: us done in epic    Pt called: no    At Rooming: normal

## 2023-06-08 NOTE — TELEPHONE ENCOUNTER
Called [facility].  Per Dr. Marquez: No Naproxen or other NSAIDS due to potential renal and GI side effects.  Recommend acetaminophen.    RJ said per patient, patient get bilateral feet tinglinging from taking acetaminophen.  Want provider to suggest an alternative.       Jasvir Johnson CMA ([facility]) at 12:52 PM on 6/8/2023

## 2023-06-08 NOTE — TELEPHONE ENCOUNTER
No Naproxen or other NAIDS due to potential renal and GI side effects    [facility]    Phone Message    May a detailed message be left on voicemail: yes     Reason for Call: Order(s): Home Care Orders: Skilled Nursing:  [facility] RN calling about on update on 6/8/2023 enrolled in homecare for skilled nursing. PT, OT, and home health aide.  Naproxen 220 mg x2 tabs bid to be added to his list for pain control.     Action Taken: Message routed to:  [facility] ([facility]): PCC    Travel Screening: Not Applicable

## 2023-06-16 NOTE — TELEPHONE ENCOUNTER
Called [facility] and relay provider alternative.  Can try 100 mg Gabapentin at night.  Rx sent to pt pharmacy today.        Jasvir Johnson CMA ([facility]) at 12:28 PM on 6/16/2023

## 2023-06-20 NOTE — PROGRESS NOTES
Type of Form Received:     Form Received (Date) 6/19/23   Form Filled out Yes 628/23   Placed in provider folder Yes

## 2023-06-21 NOTE — TELEPHONE ENCOUNTER
metFORMIN HCl 1000 MG Oral Tablet   Take 1 tablet (1,000 mg) by mouth 2 times daily (with meals  Last Written Prescription Date:  3/15/23  Last Fill Quantity: 180,   # refills: 1 [facility] - [place], [place] - [address]  Last Office Visit : 2/18/22  Future Office visit:  1/19/24    Too early for refill> 90 day/ 1 Rf sent to [facility] 3/15/23

## 2023-06-23 NOTE — PATIENT INSTRUCTIONS
Plan:  -- Chest xray. We will notify you of the results.  -- Updated brain MRI.  -- Referral to neuropsych for formal cognitive testing.  -- Continue the B12 supplements: 1000 mcg daily.  -- Talk to your primary physician about possibly increasing the Prozac.  -- Return to neurology clinic after the neuropsych testing is completed.  Keep up the good work with your physical therapy exercises in the meantime and do your best to stay well-nourished.

## 2023-06-23 NOTE — LETTER
6/23/2023         RE: Deandre Moore  [address]  [facility] [address]        Dear Colleague,    Thank you for referring your patient, Deandre Moore, to the [facility] [place]. Please see a copy of my visit note below.    INITIAL NEUROLOGY CONSULTATION    DATE OF VISIT: 6/23/2023  MRN: [id number]  PATIENT NAME: Deandre Moore  YOB: 1940    REFERRING PROVIDER: William Marquez    Chief Complaint   Patient presents with     Memory Loss     Patient forgetful.     SUBJECTIVE:                                                      HPI:  Deandre Moore is a 82 year old male whom I have been asked by Dr. Marquez to see in consultation for cognitive concerns.    Per [facility] discharge summary (5.17.23):  Cognitive decline  Probable dementia: Noted by family. Neurology consult placed per PCP Dr. Marquez 1/2023. Per chart review, c/f Parkinsonian features. Tremor noted on exam. Currently A&O x3, able to have complex conversations. However, unable to complete concentration tasks and impaired short term memory  - Follow up with neurology 7/5/2023 as planned    Deandre has an extensive medical history including diabetes, CAD s/p CABG and stent, HLD, gout.    Brain MRI from September 2020 showed age-related changes only.    Deandre is here with his son and wife. His wife has dementia.  Deandre says that he has noticed some memory problems over the past year or so.  He describes short-term memory problems.  Son agrees that his dad has been complaining about the memory for about a year.  They sold the [email or web address] a couple of years ago and then moved to the [facility] to live with their son, which was a big change for them.  [place] was [place]. For the past 6 months they have been staying in an apartment attached to the home.  No safety concerns at home.  Son manages medications.     His gait has changed and that he does  shuffle.  He relies on a walker or holding onto things, limited mobility.  He has a lot of trouble with his back which keeps him up at night.  He gets up to go to the bathroom as well.  They do not think that he moves around much in his sleep.  He has been doing physical therapy a couple of times per week since being discharged from acute rehab after his hospitalization in May.  He has been home for a couple of weeks now.    Also over the past year, Deandre says that his appetite has been poor.  He lost his sense of taste about 15 years ago, and then it came back but more recently seems to dissipated again.  No problems with swallow.  He has lost about 100 pounds in the past year.  They do Ensure shakes for supplementation.     Son mentions that they saw Dr. Gifford who did not think that Deandre had Parkinson's disease.  In reviewing his note, it looks like there was a plan to do a trial of carbidopa/levodopa and an updated brain MRI.  They also discussed a dopamine scan.  Deandre son says that everything got kind of lost with other medical issues after that appointment.    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 Interestingly, Dr. Gifford's note says that he worked as a .  He retired about 10 years ago.  No problems with cognition at that time.    Deandre mentions that he fell and hit his face about a month ago.  No LOC per son.  He had another fall yesterday, no head injury.    He started Prozac last year and the dose has subsequently been increased to 30 mg.  His son thinks that he could use more.  He talks to his son about anxiety regarding death.  Deandre says the anxiety comes and goes.    He takes 1000mcg of cyanocobalamin daily.  B12 was 409 in May.  TSH was normal at 2.01.    Deandre's son mentions that his father has developed a runny nose again over the past couple of days and this symptom was present when he was diagnosed with pneumonia previously.  Deandre denies  difficulties with breathing today.    His father had memory loss.  Brother has a tremor.    Past Medical History:   Diagnosis Date     Diabetes mellitus (H)      Gout attack      Heart attack (H) 1984     Hypertension      Stented coronary artery     6 stents in heart     Past Surgical History:   Procedure Laterality Date     CARDIAC SURGERY  1984    4 vessel bypass     CHOLECYSTECTOMY       COLONOSCOPY       DESTRUCTION OF PARAVERTEBRAL FACET LUMBAR / SACRAL ADDITIONAL Bilateral 10/18/2021    Procedure: DESTRUCTION, NERVE, FACET JOINT, LUMBOSACRAL, ADDITIONAL NERVE AFTER DESTRUCTION OF INITIAL LUMBOSACRAL FACET JOINT NERVE;  Surgeon: Horacio Gonsalves MD;  Location: [facility] OR     DESTRUCTION OF PARAVERTEBRAL FACET LUMBAR / SACRAL SINGLE Bilateral 10/18/2021    Procedure: DESTRUCTION, NERVE, FACET JOINT, LUMBOSACRAL, 1 NERVE;  Surgeon: Horacio Gonsalves MD;  Location: [facility] OR     ESOPHAGOSCOPY, GASTROSCOPY, DUODENOSCOPY (EGD), COMBINED N/A 3/1/2018    Procedure: COMBINED ESOPHAGOSCOPY, GASTROSCOPY, DUODENOSCOPY (EGD), BIOPSY SINGLE OR MULTIPLE;  ESOPHAGOGASTRODUODENOSCOPY ;  Surgeon: Daryn Medrano MD;  Location: [facility]     INJECT BLOCK MEDIAL BRANCH CERVICAL/THORACIC/LUMBAR Bilateral 8/16/2021    Procedure: BLOCK, NERVE, FACET JOINT, MEDIAL BRANCH, DIAGNOSTIC Lumbar 2-3-4;  Surgeon: Horacio Gonsalves MD;  Location: [facility] OR     INJECT BLOCK MEDIAL BRANCH CERVICAL/THORACIC/LUMBAR Bilateral 9/27/2021    Procedure: BLOCK, NERVE, FACET JOINT, MEDIAL BRANCH, DIAGNOSTIC  L2-4, Bilateral;  Surgeon: Horacio Gonsalves MD;  Location: [facility] OR     NO HISTORY OF SURGERY  3/31/14    derm     PHACOEMULSIFICATION WITH STANDARD INTRAOCULAR LENS IMPLANT  1/21/2013    Procedure: PHACOEMULSIFICATION WITH STANDARD INTRAOCULAR LENS IMPLANT;  Phacoemulsification with standard intraocular lens implant, right eye;  Surgeon: Jay Rodriges MD;  Location: [facility] OR       Alcohol Swabs PADS, 1 pad 4 times  daily  allopurinol (ZYLOPRIM) 300 MG tablet, Take 1 tablet (300 mg) by mouth daily  aspirin 81 MG tablet, Take 81 mg by mouth daily.  atorvastatin (LIPITOR) 40 MG tablet, Take 1 tablet (40 mg) by mouth daily *Keep appointment on 7/19/23 for further refills*  augmented betamethasone dipropionate (DIPROLENE-AF) 0.05 % external ointment, Apply twice daily as needed for rash on the leg or back  blood glucose (NO BRAND SPECIFIED) lancets standard, Use to test blood sugar 3 times daily or as directed.  blood glucose (NO BRAND SPECIFIED) test strip, Use to test blood sugar 4 times daily  With meter/ strips/ lancets covered by insurance pt using accuchek  Blood Glucose Monitoring Suppl (BLOOD GLUCOSE MONITOR SYSTEM) w/Device KIT, Test 4 times daily with meter covered by  insurance  clobetasol (TEMOVATE) 0.05 % external cream, Apply twice daily as needed for itching or rash on the back  clopidogrel (PLAVIX) 75 MG tablet, TAKE 1 TABLET BY MOUTH  DAILY  colchicine (COLCYRS) 0.6 MG tablet, Take 1 tablet (0.6 mg) by mouth daily  Cyanocobalamin (VITAMIN B12) 500 MCG TABS, Take 1 tablet by mouth daily  diclofenac (VOLTAREN) 75 MG EC tablet, Take 1 tablet (75 mg) by mouth At Bedtime (Patient taking differently: Take 75 mg by mouth nightly as needed)  finasteride (PROSCAR) 5 MG tablet, Take 1 tablet (5 mg) by mouth daily  fluocinolone (SYNALAR) 0.01 % solution, Apply a thin layer twice daily as needed to scalp for itchiness.  FLUoxetine (PROZAC) 20 MG capsule, Take 1 capsule (20 mg) by mouth daily  folic acid (FOLVITE) 1 MG tablet, Take 1 tablet (1 mg) by mouth daily Daily on days you don't take MTX  gabapentin (NEURONTIN) 100 MG capsule, Take 1 capsule (100 mg) by mouth nightly as needed (pain)  glimepiride (AMARYL) 1 MG tablet, TAKE 2 TABLETS BY MOUTH IN  THE MORNING AND 1 TABLET BY MOUTH IN THE EVENING (Patient taking differently: Take 1 mg by mouth 2 times daily (before meals))  irbesartan (AVAPRO) 150 MG tablet, Take 0.5  "tablets (75 mg) by mouth At Bedtime  isosorbide mononitrate (IMDUR) 30 MG 24 hr tablet, Take 1 tablet (30 mg) by mouth daily  metFORMIN (GLUCOPHAGE) 1000 MG tablet, Take 1 tablet (1,000 mg) by mouth 2 times daily (with meals)  mupirocin (BACTROBAN) 2 % external ointment, Apply topically 2 times daily  nitroglycerin (NITROSTAT) 0.4 MG SL tablet, Place under the tongue as needed  tamsulosin (FLOMAX) 0.4 MG capsule, Take 1 capsule (0.4 mg) by mouth daily  traZODone (DESYREL) 150 MG tablet, Take 1 tablet (150 mg) by mouth At Bedtime  methotrexate 2.5 MG tablet, Take 6 tablets (15 mg) by mouth every 7 days for 30 days    triamcinolone (KENALOG-40) injection 20 mg      Allergies   Allergen Reactions     Beta Adrenergic Blockers Unknown     Latex Dermatitis     Latex Rash     Tape [Adhesive Tape] Dermatitis and Rash     Electrode patches        Problem (# of Occurrences) Relation (Name,Age of Onset)    Other - See Comments (5) Brother (erick): living in [place], Brother (katy): living in [facility], Son (rhona): [place], Son (bushra): [facility], Daughter (praneeth): [facility]    Tremor (1) Brother (erick)       Negative family history of: Cancer, Skin Cancer        Social History     Tobacco Use     Smoking status: Former     Types: Cigarettes     Quit date: 10/15/1984     Years since quittin.7     Smokeless tobacco: Never   Substance Use Topics     Alcohol use: Yes     Comment: occasional-once a year     Drug use: No       REVIEW OF SYSTEMS:                                                      10-point review of systems is negative except as mentioned above in HPI.     EXAM:                                                      Physical Exam:   Vitals: Ht 1.88 m (6' 2\")   Wt 73.5 kg (162 lb)   BMI 20.80 kg/m    BMI= Body mass index is 20.8 kg/m .  GENERAL: NAD.  HEENT: NC/AT.  Runny nose.  Near constant jaw tremor.  CV: RRR. S1, S2.   NECK: No bruits.  PULM: Possible rhonchi, base of left lung.  Neurologic:  MENTAL STATUS: " Alert, attentive. Speech is fluent, sparse. Normal comprehension. MoCA: 18/30 (normal is 26 and above). Normal concentration. Adequate fund of knowledge.   CRANIAL NERVES: Discs technically difficult to visualize. Visual fields intact to confrontation. Pupils equally, round and reactive to light. Facial sensation and movement normal. EOM full. Hearing intact to conversation. Sternocleidomastoids and trapezius strength intact. Palate moves symmetrically. Tongue midline.  MOTOR: 5/5 in proximal and distal muscle groups of upper and lower extremities. Tone is normal, bulk is thin.    DTRs: Intact and symmetric in biceps, BR, 1+ at patellae.   SENSATION: Normal light touch throughout.  COORDINATION: Normal finger nose finger. Finger tapping normal.  No appreciable hand tremor on today's exam.  STATION AND GAIT: Needs assistance to stand.  Gait deferred for safety.    Relevant Data:  MRI Brain (9.29.20):  Impression:   1. Mild to moderate cerebral/cerebellar atrophy with mild  leukoaraiosis.  2. Stable configuration of the ventricular system, when compared to  4/23/2019. Ventricular enlargement is likely from cerebral volume  loss. Normal pressure hydrocephalus is considered unlikely.    Imaging reviewed independently by me.  Agree with radiology read.    ASSESSMENT and PLAN:                                                      Assessment:     ICD-10-CM    1. Cognitive impairment  R41.89 MR Brain w/o & w Contrast     Adult Neuropsychology Referral      2. Back pain, unspecified back location, unspecified back pain laterality, unspecified chronicity  M54.9 Adult Neurology  Referral      3. Cough, unspecified type  R05.9 Adult Neurology  Referral     XR Chest 2 Views      4. Need for vaccination  Z23 Adult Neurology  Referral      5. Memory loss  R41.3 Adult Neurology  Referral     MR Brain w/o & w Contrast     Adult Neuropsychology Referral      6. Rhonchi  R09.89 XR Chest 2 Views           Mr. Moore is a pleasant 82-year-old man with multiple medical core morbidities here for evaluation regarding memory.  He has already been evaluated in movement disorders clinic for questionable Parkinson's disease.  It appears that he never did do the trial of carbidopa/levodopa that was discussed at that time.  We could consider this in the future.  From a memory standpoint, his MoCA score was below normal at 18/30.  This indicates at least moderate impairment which fits with the deficits that family describes.  Unclear etiology.  I think it would be helpful to do formal neuropsych testing and get an updated brain MRI.  I have also ordered a chest x-ray for the runny nose, possible rhonchi on exam.  We will plan to follow-up after the tests are completed.  Deandre and his wife and son all agreed with the plan.    Plan:  -- Chest xray. We will notify you of the results.  -- Updated brain MRI.  -- Referral to neuropsych for formal cognitive testing.  -- Continue the B12 supplements: 1000 mcg daily.  -- Talk to your primary physician about possibly increasing the Prozac.  -- Return to neurology clinic after the neuropsych testing is completed.  Keep up the good work with your physical therapy exercises in the meantime and do your best to stay well-nourished.    Total Time: 60 minutes were spent with the patient and in chart review/documentation (as described above in Assessment and Plan) /coordinating the care on date of service.    Milla Caban MD  Neurology    CC: William Marquez MD    Dragon software used in the dictation of this note.        Again, thank you for allowing me to participate in the care of your patient.        Sincerely,        Milla Caban MD

## 2023-06-23 NOTE — NURSING NOTE
Chief Complaint   Patient presents with     Memory Loss     Patient forgetful.     Trudy Shepherd on 6/23/2023 at 1:52 PM

## 2023-06-23 NOTE — NURSING NOTE
[facility] COGNITIVE ASSESSMENT (MOCA)  Version 7.1 Original Version  VISUOSPATIAL/EXECUTIVE               COPY CUBE      [  0  ]                                [  1  ] DRAW CLOCK (Ten past eleven)  (3 points)    [   1 ]                    [   1 ]               [  1  ]       Contour            Numbers     Hands POINTS              4     / 5   NAMING    [ 1  ]                                                                        [ 1   ]                                             [  1  ]  Lifreddy Green                                Camel                   3  / 3   MEMORY Read list of words, subject must repeat them. Do 2 trials, even if 1st trial is successful. Do a recall after 5 minutes  FACE VELVET [employer] AMY RED No Points    1st          2nd         ATTENTION Read list of digits (1 digit/sec) Subject has to repeat in the forward ord1er       [  1  ]   2  1  8  5  4                                [1    ] 7 4 2                   2       /2   Read list of letters. The subject must tap with his hand at each letter A. No points if > 2 errors.  [    ] F B A C M N A A J K L B A F A K D E A A A J A M O F A A B              /1   Serial 7 subtraction starting at 100          [   1 ] 93         [   1 ] 86          [    ] 79          [    ] 72         [    ] 65   4 or 5 correct subtractions: 3 points,  2 or 3 correct: 2 points,  1correct: 1 point,   0 correct: 0 points        2    /3   LANGUAGE Repeat: I only know that Sarbjit is the one to help today. [  1   ]                                      The cat always hid under the couch when dogs were in the room. [  1 ]             2  /2   Fluency: Name maximum number of words in one minute that begin with the letter F                                                                                                                    [   0 ] _7__ (N > 11 words)        0       /1   ABSTRACTION Similarity  between e.g. banana-orange=fruit                                                                   [   1 train-bicycle                      [1    ] watch-ruler       2       /2   DELAYED  RECALL Has to recall words  WITH NO CUE FACE  [    ] VELVET  [    ] Mosque  [    ]  AMY  [    ] RED  [    ] Points for UNCUED recall only            /5  0         OPTIONAL Category cue           Multiple choice cue          ORIENTATION  [  0  ] Date     [   1 ] Month       [  0  ] Year      [  1  ] Day      [  1  ] Place        [   0 ] City        2  /6   TOTAL  Normal > 26/30 0Add 1 point if < 12 years education 18 /30

## 2023-06-23 NOTE — PROGRESS NOTES
INITIAL NEUROLOGY CONSULTATION    DATE OF VISIT: 6/23/2023  MRN: [id number]  PATIENT NAME: Deandre Moore  YOB: 1940    REFERRING PROVIDER: William Marquez    Chief Complaint   Patient presents with     Memory Loss     Patient forgetful.     SUBJECTIVE:                                                      HPI:  Deandre Moore is a 82 year old male whom I have been asked by Dr. Marquez to see in consultation for cognitive concerns.    Per [facility] discharge summary (5.17.23):  Cognitive decline  Probable dementia: Noted by family. Neurology consult placed per PCP Dr. Marquez 1/2023. Per chart review, c/f Parkinsonian features. Tremor noted on exam. Currently A&O x3, able to have complex conversations. However, unable to complete concentration tasks and impaired short term memory  - Follow up with neurology 7/5/2023 as planned    Deandre has an extensive medical history including diabetes, CAD s/p CABG and stent, HLD, gout.    Brain MRI from September 2020 showed age-related changes only.    Deandre is here with his son and wife. His wife has dementia.  Deandre says that he has noticed some memory problems over the past year or so.  He describes short-term memory problems.  Son agrees that his dad has been complaining about the memory for about a year.  They sold the [place] a couple of years ago and then moved to the [facility] to live with their son, which was a big change for them.  [place] was [place] for [place]. For the past 6 months they have been staying in an apartment attached to the home.  No safety concerns at home.  Son manages medications.     His gait has changed and that he does shuffle.  He relies on a walker or holding onto things, limited mobility.  He has a lot of trouble with his back which keeps him up at night.  He gets up to go to the bathroom as well.  They do not think that he moves around much in his sleep.  He has been doing  physical therapy a couple of times per week since being discharged from acute rehab after his hospitalization in May.  He has been home for a couple of weeks now.    Also over the past year, Deandre says that his appetite has been poor.  He lost his sense of taste about 15 years ago, and then it came back but more recently seems to dissipated again.  No problems with swallow.  He has lost about 100 pounds in the past year.  They do Ensure shakes for supplementation.     Son mentions that they saw Dr. Gifford who did not think that Deandre had Parkinson's disease.  In reviewing his note, it looks like there was a plan to do a trial of carbidopa/levodopa and an updated brain MRI.  They also discussed a dopamine scan.  Deandre son says that everything got kind of lost with other medical issues after that appointment.    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 Interestingly, Dr. Gifford's note says that he worked as a .  He retired about 10 years ago.  No problems with cognition at that time.    Deandre mentions that he fell and hit his face about a month ago.  No LOC per son.  He had another fall yesterday, no head injury.    He started Prozac last year and the dose has subsequently been increased to 30 mg.  His son thinks that he could use more.  He talks to his son about anxiety regarding death.  Deandre says the anxiety comes and goes.    He takes 1000mcg of cyanocobalamin daily.  B12 was 409 in May.  TSH was normal at 2.01.    Deandre's son mentions that his father has developed a runny nose again over the past couple of days and this symptom was present when he was diagnosed with pneumonia previously.  Deandre denies difficulties with breathing today.    His father had memory loss.  Brother has a tremor.    Past Medical History:   Diagnosis Date     Diabetes mellitus (H)      Gout attack      Heart attack (H) 1984     Hypertension      Stented coronary artery     6 stents in  heart     Past Surgical History:   Procedure Laterality Date     CARDIAC SURGERY  1984    4 vessel bypass     CHOLECYSTECTOMY       COLONOSCOPY       DESTRUCTION OF PARAVERTEBRAL FACET LUMBAR / SACRAL ADDITIONAL Bilateral 10/18/2021    Procedure: DESTRUCTION, NERVE, FACET JOINT, LUMBOSACRAL, ADDITIONAL NERVE AFTER DESTRUCTION OF INITIAL LUMBOSACRAL FACET JOINT NERVE;  Surgeon: Horacio Gonsalves MD;  Location: [facility] OR     DESTRUCTION OF PARAVERTEBRAL FACET LUMBAR / SACRAL SINGLE Bilateral 10/18/2021    Procedure: DESTRUCTION, NERVE, FACET JOINT, LUMBOSACRAL, 1 NERVE;  Surgeon: Horacio Gonsalves MD;  Location: [facility] OR     ESOPHAGOSCOPY, GASTROSCOPY, DUODENOSCOPY (EGD), COMBINED N/A 3/1/2018    Procedure: COMBINED ESOPHAGOSCOPY, GASTROSCOPY, DUODENOSCOPY (EGD), BIOPSY SINGLE OR MULTIPLE;  ESOPHAGOGASTRODUODENOSCOPY ;  Surgeon: Daryn Medrano MD;  Location: [facility]     INJECT BLOCK MEDIAL BRANCH CERVICAL/THORACIC/LUMBAR Bilateral 8/16/2021    Procedure: BLOCK, NERVE, FACET JOINT, MEDIAL BRANCH, DIAGNOSTIC Lumbar 2-3-4;  Surgeon: Horacio Gonsalves MD;  Location: [facility] OR     INJECT BLOCK MEDIAL BRANCH CERVICAL/THORACIC/LUMBAR Bilateral 9/27/2021    Procedure: BLOCK, NERVE, FACET JOINT, MEDIAL BRANCH, DIAGNOSTIC  L2-4, Bilateral;  Surgeon: Horacio Gonsalves MD;  Location: [facility] OR     NO HISTORY OF SURGERY  3/31/14    derm     PHACOEMULSIFICATION WITH STANDARD INTRAOCULAR LENS IMPLANT  1/21/2013    Procedure: PHACOEMULSIFICATION WITH STANDARD INTRAOCULAR LENS IMPLANT;  Phacoemulsification with standard intraocular lens implant, right eye;  Surgeon: Jay Rodriges MD;  Location: [facility] OR       Alcohol Swabs PADS, 1 pad 4 times daily  allopurinol (ZYLOPRIM) 300 MG tablet, Take 1 tablet (300 mg) by mouth daily  aspirin 81 MG tablet, Take 81 mg by mouth daily.  atorvastatin (LIPITOR) 40 MG tablet, Take 1 tablet (40 mg) by mouth daily *Keep appointment on 7/19/23 for further refills*  augmented  betamethasone dipropionate (DIPROLENE-AF) 0.05 % external ointment, Apply twice daily as needed for rash on the leg or back  blood glucose (NO BRAND SPECIFIED) lancets standard, Use to test blood sugar 3 times daily or as directed.  blood glucose (NO BRAND SPECIFIED) test strip, Use to test blood sugar 4 times daily  With meter/ strips/ lancets covered by insurance pt using accuchek  Blood Glucose Monitoring Suppl (BLOOD GLUCOSE MONITOR SYSTEM) w/Device KIT, Test 4 times daily with meter covered by  insurance  clobetasol (TEMOVATE) 0.05 % external cream, Apply twice daily as needed for itching or rash on the back  clopidogrel (PLAVIX) 75 MG tablet, TAKE 1 TABLET BY MOUTH  DAILY  colchicine (COLCYRS) 0.6 MG tablet, Take 1 tablet (0.6 mg) by mouth daily  Cyanocobalamin (VITAMIN B12) 500 MCG TABS, Take 1 tablet by mouth daily  diclofenac (VOLTAREN) 75 MG EC tablet, Take 1 tablet (75 mg) by mouth At Bedtime (Patient taking differently: Take 75 mg by mouth nightly as needed)  finasteride (PROSCAR) 5 MG tablet, Take 1 tablet (5 mg) by mouth daily  fluocinolone (SYNALAR) 0.01 % solution, Apply a thin layer twice daily as needed to scalp for itchiness.  FLUoxetine (PROZAC) 20 MG capsule, Take 1 capsule (20 mg) by mouth daily  folic acid (FOLVITE) 1 MG tablet, Take 1 tablet (1 mg) by mouth daily Daily on days you don't take MTX  gabapentin (NEURONTIN) 100 MG capsule, Take 1 capsule (100 mg) by mouth nightly as needed (pain)  glimepiride (AMARYL) 1 MG tablet, TAKE 2 TABLETS BY MOUTH IN  THE MORNING AND 1 TABLET BY MOUTH IN THE EVENING (Patient taking differently: Take 1 mg by mouth 2 times daily (before meals))  irbesartan (AVAPRO) 150 MG tablet, Take 0.5 tablets (75 mg) by mouth At Bedtime  isosorbide mononitrate (IMDUR) 30 MG 24 hr tablet, Take 1 tablet (30 mg) by mouth daily  metFORMIN (GLUCOPHAGE) 1000 MG tablet, Take 1 tablet (1,000 mg) by mouth 2 times daily (with meals)  mupirocin (BACTROBAN) 2 % external ointment,  "Apply topically 2 times daily  nitroglycerin (NITROSTAT) 0.4 MG SL tablet, Place under the tongue as needed  tamsulosin (FLOMAX) 0.4 MG capsule, Take 1 capsule (0.4 mg) by mouth daily  traZODone (DESYREL) 150 MG tablet, Take 1 tablet (150 mg) by mouth At Bedtime  methotrexate 2.5 MG tablet, Take 6 tablets (15 mg) by mouth every 7 days for 30 days    triamcinolone (KENALOG-40) injection 20 mg      Allergies   Allergen Reactions     Beta Adrenergic Blockers Unknown     Latex Dermatitis     Latex Rash     Tape [Adhesive Tape] Dermatitis and Rash     Electrode patches        Problem (# of Occurrences) Relation (Name,Age of Onset)    Other - See Comments (5) Brother (erick): living in [place], Brother (katy): living in [place], Son (rhona): [place], Son (bushra): [facility], Daughter (praneeth): [facility]    Tremor (1) Brother (erick)       Negative family history of: Cancer, Skin Cancer        Social History     Tobacco Use     Smoking status: Former     Types: Cigarettes     Quit date: 10/15/1984     Years since quittin.7     Smokeless tobacco: Never   Substance Use Topics     Alcohol use: Yes     Comment: occasional-once a year     Drug use: No       REVIEW OF SYSTEMS:                                                      10-point review of systems is negative except as mentioned above in HPI.     EXAM:                                                      Physical Exam:   Vitals: Ht 1.88 m (6' 2\")   Wt 73.5 kg (162 lb)   BMI 20.80 kg/m    BMI= Body mass index is 20.8 kg/m .  GENERAL: NAD.  HEENT: NC/AT.  Runny nose.  Near constant jaw tremor.  CV: RRR. S1, S2.   NECK: No bruits.  PULM: Possible rhonchi, base of left lung.  Neurologic:  MENTAL STATUS: Alert, attentive. Speech is fluent, sparse. Normal comprehension. MoCA: 18/30 (normal is 26 and above). Normal concentration. Adequate fund of knowledge.   CRANIAL NERVES: Discs technically difficult to visualize. Visual fields intact to confrontation. Pupils equally, round " and reactive to light. Facial sensation and movement normal. EOM full. Hearing intact to conversation. Sternocleidomastoids and trapezius strength intact. Palate moves symmetrically. Tongue midline.  MOTOR: 5/5 in proximal and distal muscle groups of upper and lower extremities. Tone is normal, bulk is thin.    DTRs: Intact and symmetric in biceps, BR, 1+ at patellae.   SENSATION: Normal light touch throughout.  COORDINATION: Normal finger nose finger. Finger tapping normal.  No appreciable hand tremor on today's exam.  STATION AND GAIT: Needs assistance to stand.  Gait deferred for safety.    Relevant Data:  MRI Brain (9.29.20):  Impression:   1. Mild to moderate cerebral/cerebellar atrophy with mild  leukoaraiosis.  2. Stable configuration of the ventricular system, when compared to  4/23/2019. Ventricular enlargement is likely from cerebral volume  loss. Normal pressure hydrocephalus is considered unlikely.    Imaging reviewed independently by me.  Agree with radiology read.    ASSESSMENT and PLAN:                                                      Assessment:     ICD-10-CM    1. Cognitive impairment  R41.89 MR Brain w/o & w Contrast     Adult Neuropsychology Referral      2. Back pain, unspecified back location, unspecified back pain laterality, unspecified chronicity  M54.9 Adult Neurology  Referral      3. Cough, unspecified type  R05.9 Adult Neurology  Referral     XR Chest 2 Views      4. Need for vaccination  Z23 Adult Neurology  Referral      5. Memory loss  R41.3 Adult Neurology  Referral     MR Brain w/o & w Contrast     Adult Neuropsychology Referral      6. Rhonchi  R09.89 XR Chest 2 Views          Mr. Moore is a pleasant 82-year-old man with multiple medical core morbidities here for evaluation regarding memory.  He has already been evaluated in movement disorders clinic for questionable Parkinson's disease.  It appears that he never did do the trial of  carbidopa/levodopa that was discussed at that time.  We could consider this in the future.  From a memory standpoint, his MoCA score was below normal at 18/30.  This indicates at least moderate impairment which fits with the deficits that family describes.  Unclear etiology.  I think it would be helpful to do formal neuropsych testing and get an updated brain MRI.  I have also ordered a chest x-ray for the runny nose, possible rhonchi on exam.  We will plan to follow-up after the tests are completed.  Deandre and his wife and son all agreed with the plan.    Plan:  -- Chest xray. We will notify you of the results.  -- Updated brain MRI.  -- Referral to neuropsych for formal cognitive testing.  -- Continue the B12 supplements: 1000 mcg daily.  -- Talk to your primary physician about possibly increasing the Prozac.  -- Return to neurology clinic after the neuropsych testing is completed.  Keep up the good work with your physical therapy exercises in the meantime and do your best to stay well-nourished.    Total Time: 60 minutes were spent with the patient and in chart review/documentation (as described above in Assessment and Plan) /coordinating the care on date of service.    Milla Caban MD  Neurology    CC: William Marquez MD    Dragon software used in the dictation of this note.

## 2023-06-26 NOTE — TELEPHONE ENCOUNTER
Irbesartan 150 MG Oral Tablet    Requested:  Sig: TAKE 1 TABLET BY MOUTH AT  BEDTIME  [facility]    Current med list  irbesartan (AVAPRO) 150 MG tablet   100 tablet 1 5/16/2023  No  Sig - Route: Take 0.5 tablets (75 mg) by mouth At Bedtime - O  Prescribing Provider's NPI: [id number]  Macho Luis  [facility] - [place], [place] - [address]    Last Office Visit : 1-18-23  Future Office visit:  7-19-23    Routing refill request to provider for review/approval because:  Requested Rx direction/dose does not match current med list  Last fill by other provider  Request from other pharm  Abn lab Cr

## 2023-06-26 NOTE — TELEPHONE ENCOUNTER
Appears irbesartan dose was decreased to 75 mg daily upon discharge from the hospital 5/16/23.    Will send to provider for review.    Zhanna Peña RN ([facility])

## 2023-07-03 NOTE — TELEPHONE ENCOUNTER
Patient Contacted to schedule the following:    Appointment type: Neuropsych Eval  Provider: Any provider [facility], [place], or   Return date: First available  Specialty phone number: [phone]  Additional appointment(s) needed: N/A  Additonal Notes: N/A     Spoke with Lucio. ARY on file. Scheduled on 1/22/2024 at 12:30pm with Dr. Huang at the [facility]. Per Lucio's request added patient to wait list.     Skyler Rich on 7/3/2023 at 12:22 PM

## 2023-07-06 NOTE — TELEPHONE ENCOUNTER
" Glimepiride 1 MG Oral Tablet TAKE 2 TABLETS BY MOUTH IN  THE MORNING AND 1 TABLET BY MOUTH IN THE EVENING  Last Written Prescription Date:  9/23/22  Last Fill Quantity: 270,   # refills: 3  [facility] - [place] - [address]  Last Office Visit : 2/18/22  Future Office visit:  1/19/24 6/29/23 [facility]   CR  1.18   6/26/23  HEMOGLOBINA1 4.6       Patient past due appt, and is scheduled for 1/19/24 Dr Campa. Glimepiride held during recent hospital admission and not noted as reordered on Discharge summary  Discontinue from medication list?  Recent admit 6/26/23- 6/30/23 from [facility]   Has appt with Dr. Marquez 7/19/23, and Dr Campa 1/19/24.   Excerpted from [facility] 6/30/23 discharge summary   \"Hypoglycemia secondary to sulfonylurea, accidental or unintentional, initial encounter (Primary Dx);   BRENT (acute kidney injury) (HC);   Idiopathic gout, unspecified chronicity, unspecified site;   Type 2 diabetes mellitus with peripheral neuropathy (HC);   Assessment: Presented with AMS & glucose of 28 per EMS. PTA regimen: Glimepiride 2mg BID, metformin 1000mg BID. Pt states son sets up his medications. History somewhat unclear regarding events prior to admission.\"  BG improved with holding Glimepiride  Many medication errors found on admission, which are the most likely reasons for his admission issues.             "

## 2023-07-06 NOTE — TELEPHONE ENCOUNTER
Sulfonylurea Agents Failed 07/06/2023 11:05 AM   Protocol Details  Patient has documented A1c within the specified period of time.    Patient has a recent creatinine (normal) within the past 12 mos.    Recent (6 mo) or future (30 days) visit within the authorizing provider's specialty     RTC Jan 19, 2024 with Dr Campa

## 2023-07-06 NOTE — PROGRESS NOTES
Type of Form Received:     Form Received (Date) 7/6/23   Form Filled out Yes, 7/10/23 both   Placed in provider folder Yes

## 2023-07-10 NOTE — TELEPHONE ENCOUNTER
[facility] Call Center    Phone Message    May a detailed message be left on voicemail: yes     Reason for Call: Order(s): Home Care Orders: Other: Requesting vervbal orders for a Home Care  for an Eval, one time visit.      Action Taken: Other: PCC    Travel Screening: Not Applicable

## 2023-07-10 NOTE — TELEPHONE ENCOUNTER
Called [facility] and gave verbal orders per Dr. Marquez for Order(s): Home Care Orders: Other: Home Care  for an Eval, one time visit.        Jasvir Johnson CMA ([facility]) at 1:44 PM on 7/10/2023

## 2023-07-11 NOTE — PROGRESS NOTES
Type of Form Received:     Form Received (Date) 7/11/23   Form Filled out Yes, 7/16/23   Placed in provider folder Yes

## 2023-07-11 NOTE — PROGRESS NOTES
Type of Form Received: POC    Form Received (Date) 07/10/23   Form Filled out Yes, 7/16/23   Placed in provider folder Yes

## 2023-07-17 PROBLEM — R65.20 SEPSIS WITH ACUTE ORGAN DYSFUNCTION WITHOUT SEPTIC SHOCK, DUE TO UNSPECIFIED ORGANISM, UNSPECIFIED TYPE: Status: ACTIVE | Noted: 2023-01-01

## 2023-07-17 PROBLEM — A41.9 SEPSIS WITH ACUTE ORGAN DYSFUNCTION WITHOUT SEPTIC SHOCK, DUE TO UNSPECIFIED ORGANISM, UNSPECIFIED TYPE: Status: ACTIVE | Noted: 2023-01-01

## 2023-07-17 PROBLEM — E86.1 HYPOVOLEMIA: Status: ACTIVE | Noted: 2023-01-01

## 2023-07-17 NOTE — CARE PLAN
Transfer Type: [facility]  Transfer Triage Note    Originating unit:[facility] ED    Final intended location for transfer: [facility] surg or IMC     What services or anticipated services require transfer to the [place]? (please refer to triage job guide or discuss with PP RN if [facility] capabilities would be appropriate) Cardiology/advanced HF    Tele required:  Yes    Time of admission request: 1000    Anticipated to be boarding in ED for >4 hours? Yes    Was Hospitalist Team notified to complete H&P, admission orders, and assume care (sign into chart, collaborate with ED nurse, etc)? Yes    Patient added to Interhospital transfer list?  No    Brief case description: 81 yo M with cognitive impairment admitted with weakness, hypotension and lactic acidosis. CT imaging with findings of pulmonary hypertension and cardiology consulted in the ER. Working diagnosis on admission was sepsis of unclear etiology.     lKever Cooper MD

## 2023-07-17 NOTE — PHARMACY-VANCOMYCIN DOSING SERVICE
"Pharmacy Vancomycin Initial Note  Date of Service 2023  Patient's  1940  82 year old, male    Indication: Sepsis    Current estimated CrCl = Estimated Creatinine Clearance: 15.5 mL/min (A) (based on SCr of 3.7 mg/dL (H)).    Creatinine for last 3 days  2023:  6:14 AM Creatinine 3.21 mg/dL;  6:14 AM Creatinine 3.21 mg/dL;  7:29 AM Creatinine POCT 3.7 mg/dL    Recent Vancomycin Level(s) for last 3 days  No results found for requested labs within last 3 days.      Vancomycin IV Administrations (past 72 hours)                   vancomycin (VANCOCIN) 1,500 mg in 0.9% NaCl 250 mL intermittent infusion (mg) 1,500 mg New Bag 23 0927                Nephrotoxins and other renal medications (From now, onward)    Start     Dose/Rate Route Frequency Ordered Stop    23 1400  piperacillin-tazobactam (ZOSYN) 2.25 g vial to attach to  ml bag        Note to Pharmacy: For [facility], [facility] and [facility]: For Zosyn-naive patients, use the \"Zosyn initial dose + extended infusion\" order panel.    2.25 g  over 30 Minutes Intravenous EVERY 6 HOURS 23 1222      23 1042  vancomycin place cazares - receiving intermittent dosing         1 each Intravenous SEE ADMIN INSTRUCTIONS 23 1042            Contrast Orders - past 72 hours (72h ago, onward)    Start     Dose/Rate Route Frequency Stop    23 1030  perflutren diluted 1mL to 2mL with saline (OPTISON) diluted injection 5 mL         5 mL Intravenous ONCE 23 1025    23 0825  iopamidol (ISOVUE-370) solution 100 mL         100 mL Intravenous ONCE 23 0843        Patient's renal function is poor at this time.  Will dose vancomycin intermittently until renal function improves.        Plan:  1. Start vancomycin  1500 mg IV once.   2. Vancomycin monitoring method: Trough (Method 2 = manual dose calculation)  3. Vancomycin therapeutic monitoring goal: 15-20 mg/L  4. Pharmacy will check vancomycin levels as appropriate in 1-3 Days.  "   5. Serum creatinine levels will be ordered daily for the first week of therapy and at least twice weekly for subsequent weeks.      Devon Jain RPH

## 2023-07-17 NOTE — ED PROVIDER NOTES
ED Provider Note  [facility]      History     Chief Complaint   Patient presents with     Generalized Weakness     HPI  Deandre Moore is a 82 year old male who has a PMH significant for CAD s/p CABG, CKD, HFpEF, IDDM2 presenting to the ED with weakness and vomiting. Prehospital glucose in 140s. Patient reportedly had woken up at night and had an episode of vomiting.  The patient himself is denying any chest pain or abdominal pain, denying shortness of breath.  States he feels nauseated and fatigued.  He denies any headache neck pain or stiffness.  He denies any new skin wounds or extremity changes.  Denies any urinary or bowel complaints.  Prehospital history is quite limited.    Past Medical History  Past Medical History:   Diagnosis Date     Diabetes mellitus (H)      Gout attack      Heart attack (H) 1984     Hypertension      Stented coronary artery     6 stents in heart     Past Surgical History:   Procedure Laterality Date     CARDIAC SURGERY  1984    4 vessel bypass     CHOLECYSTECTOMY       COLONOSCOPY       DESTRUCTION OF PARAVERTEBRAL FACET LUMBAR / SACRAL ADDITIONAL Bilateral 10/18/2021    Procedure: DESTRUCTION, NERVE, FACET JOINT, LUMBOSACRAL, ADDITIONAL NERVE AFTER DESTRUCTION OF INITIAL LUMBOSACRAL FACET JOINT NERVE;  Surgeon: Horacio Gonsalves MD;  Location: [facility] OR     DESTRUCTION OF PARAVERTEBRAL FACET LUMBAR / SACRAL SINGLE Bilateral 10/18/2021    Procedure: DESTRUCTION, NERVE, FACET JOINT, LUMBOSACRAL, 1 NERVE;  Surgeon: Horacio Gonsalves MD;  Location: [facility] OR     ESOPHAGOSCOPY, GASTROSCOPY, DUODENOSCOPY (EGD), COMBINED N/A 3/1/2018    Procedure: COMBINED ESOPHAGOSCOPY, GASTROSCOPY, DUODENOSCOPY (EGD), BIOPSY SINGLE OR MULTIPLE;  ESOPHAGOGASTRODUODENOSCOPY ;  Surgeon: Daryn Medrano MD;  Location:  GI     INJECT BLOCK MEDIAL BRANCH CERVICAL/THORACIC/LUMBAR Bilateral 8/16/2021    Procedure: BLOCK, NERVE, FACET JOINT, MEDIAL BRANCH, DIAGNOSTIC  Lumbar 2-3-4;  Surgeon: Horacio Gonsalves MD;  Location: [facility] OR     INJECT BLOCK MEDIAL BRANCH CERVICAL/THORACIC/LUMBAR Bilateral 9/27/2021    Procedure: BLOCK, NERVE, FACET JOINT, MEDIAL BRANCH, DIAGNOSTIC  L2-4, Bilateral;  Surgeon: Horacio Gonsalves MD;  Location: [facility] OR     NO HISTORY OF SURGERY  3/31/14    derm     PHACOEMULSIFICATION WITH STANDARD INTRAOCULAR LENS IMPLANT  1/21/2013    Procedure: PHACOEMULSIFICATION WITH STANDARD INTRAOCULAR LENS IMPLANT;  Phacoemulsification with standard intraocular lens implant, right eye;  Surgeon: Jay Rodriges MD;  Location: [facility] OR     Alcohol Swabs PADS  allopurinol (ZYLOPRIM) 300 MG tablet  aspirin 81 MG tablet  atorvastatin (LIPITOR) 40 MG tablet  augmented betamethasone dipropionate (DIPROLENE-AF) 0.05 % external ointment  blood glucose (NO BRAND SPECIFIED) lancets standard  blood glucose (NO BRAND SPECIFIED) test strip  Blood Glucose Monitoring Suppl (BLOOD GLUCOSE MONITOR SYSTEM) w/Device KIT  clobetasol (TEMOVATE) 0.05 % external cream  clopidogrel (PLAVIX) 75 MG tablet  colchicine (COLCYRS) 0.6 MG tablet  Cyanocobalamin (VITAMIN B12) 500 MCG TABS  diclofenac (VOLTAREN) 75 MG EC tablet  finasteride (PROSCAR) 5 MG tablet  fluocinolone (SYNALAR) 0.01 % solution  FLUoxetine (PROZAC) 20 MG capsule  folic acid (FOLVITE) 1 MG tablet  gabapentin (NEURONTIN) 100 MG capsule  glimepiride (AMARYL) 1 MG tablet  irbesartan (AVAPRO) 150 MG tablet  isosorbide mononitrate (IMDUR) 30 MG 24 hr tablet  metFORMIN (GLUCOPHAGE) 1000 MG tablet  methotrexate 2.5 MG tablet  mupirocin (BACTROBAN) 2 % external ointment  nitroglycerin (NITROSTAT) 0.4 MG SL tablet  tamsulosin (FLOMAX) 0.4 MG capsule  traZODone (DESYREL) 150 MG tablet      Allergies   Allergen Reactions     Beta Adrenergic Blockers Unknown     Latex Dermatitis     Latex Rash     Tape [Adhesive Tape] Dermatitis and Rash     Electrode patches     Family History  Family History   Problem Relation Age of  Onset     Tremor Brother      Other - See Comments Brother         living in [place]     Other - See Comments Brother         living in [place]     Other - See Comments Son         [place]     Other - See Comments Son         [place]     Other - See Comments Daughter         [place]     Cancer No family hx of         no skin cancer     Skin Cancer No family hx of      Social History   Social History     Tobacco Use     Smoking status: Former     Types: Cigarettes     Quit date: 10/15/1984     Years since quittin.7     Smokeless tobacco: Never   Substance Use Topics     Alcohol use: Yes     Comment: occasional-once a year     Drug use: No      Past medical history, past surgical history, medications, allergies, family history, and social history were reviewed with the patient. No additional pertinent items.      A medically appropriate review of systems was performed with pertinent positives and negatives noted in the HPI, and all other systems negative.    Physical Exam   BP: 96/56  Pulse: 102  Temp: 97.4  F (36.3  C)  Resp: 16  SpO2: 99 %  Physical Exam  Vital Signs Reviewed  Gen: Thin appearing elderly male, resting comfortably in bed.  Nontoxic in appearance.  HEENT: NC/AT, PERRL, EOMI, MMM  Neck: Supple, FROM  CV: Regular tachycardia  Lungs/Chest: Normal Effort, CTAB  Abd: Soft, mild diffuse tenderness without rigidity rebound or guarding  MSK/Back: FROM, no visible deformity  Neuro: A&Ox3, GCS 15, CN II-XII unremarkable. Grossly nonfocal, globally weak. Moving all extremities. Sensation intact.  Skin: Warm, Dry, Intact, no visible lesions or open wounds    ED Course, Procedures, & Data     ED Course as of 23 1506   Mon 2023   0716 Notified immediately upon arrival to shift that patient had a lactate of 6.6.  Sepsis labs and work-up initiated.  Patient receiving 30 cc/kg lactated Ringer's fluid bolus.  Vancomycin and Zosyn ordered.  Patient has a slight leukocytosis, mild anemia.  Troponin is  minimally elevated.  EKG does not suggest RAMÓN.  Delta at 8:14 AM.   0717 Initial blood pressure in the room was in the 120s, suspect error as first machine pressure. Subsequent lower.   0717  now hypotensive.  Will monitor for response to fluid bolus.  Low threshold to start peripheral vasopressors.   0734 Pressure improving with fluids   0746 Discussed with radiology staff. I believe the benefits of a more sensitive contrasted study outweigh the risks of SEBASTIEN/CAN at this time, this is supposed by recent literature from [place] in patients with GFR <30 as well as [facility]. Patient agrees to proceed.   Imaging resulted without clear evidence of etiology behind his symptoms  Blood pressure is improved  Cifuentes catheter placed for urinary retention  Lactate is improving on serial checks  Cardiology consulted due to history of HFpEF, possible pulmonary hypertension on imaging  Admitted to medicine for further management    Procedures  Results for orders placed during the hospital encounter of 07/17/23    POC US ECHO LIMITED    Impression  Limited Bedside Cardiac Ultrasound, performed and interpreted by me.  Indication: Hypotension/shock.  Parasternal long axis, parasternal short axis, and apical 4 chamber views were acquired.  Image quality was satisfactory.    Findings:  Global left ventricular function appears intact.  Chambers do not appear dilated.  Hypoechoic structure around pericardium representing small effusion vs pericardial fat    IMPRESSION: Grossly normal left ventricular function and chamber size.  Small effusion vs pericardial fat.       ED Course Selections:        EKG Interpretation:      Interpreted by Massimo Winslow MD  Time reviewed: 0712  Symptoms at time of EKG: Sepsis   Rhythm: sinus tachycardia  Rate: 100-110  Axis: Left Axis Deviation  Ectopy: none  Conduction: left bundle branch block (complete)  ST Segments/ T Waves: No acute ischemic changes  Q Waves: none  Comparison to prior: No old EKG  available    Clinical Impression: Regular rhythm at 104bpm with wide QRS, suspect sinus. No clear RAMÓN.                    Echo 5/12/2023:  Interpretation Summary  Global and regional left ventricular function is normal with an EF of 55-60%.  Right ventricular function, chamber size, wall motion, and thickness are  normal.  Dilated aortic root and proximal ascending aorta measuring 4.4 cm (2.2 cm/m2)  and 4 cm (2 cm/m2) respectively.  Previous study not available for comparison.       Results for orders placed or performed during the hospital encounter of 07/17/23   XR Chest Port 1 View     Status: None    Narrative    Portable chest    INDICATION: Sepsis, hypotensive    COMPARISON: Chest CT angiogram outside study dated 7/17/2023. Chest CT  5/12/2023. Most recent available plain film 5/12/2023.    FINDINGS: Heart remains enlarged. Median sternotomy again noted.  Atherosclerotic calcifications at the aortic knob again present.  Silhouetting of the left hemidiaphragm is slightly decreased. There is  osteophyte formation on both sides of the acromioclavicular joint  bilaterally. The right humeral head is high riding. Hazy opacities in  the lungs appear similar otherwise.      Impression    IMPRESSION: Slightly improved retrocardiac aeration which may indicate  slightly decreasing consolidation or atelectasis. No new opacities.  Prominent degenerative changes of both shoulders including possible  rotator cuff degeneration best seen on the right. Atherosclerosis.    EDWARD JAMA MD         SYSTEM ID:  [id number]   POC US ECHO LIMITED     Status: None    Impression    Limited Bedside Cardiac Ultrasound, performed and interpreted by me.   Indication: Hypotension/shock.  Parasternal long axis, parasternal short axis, and apical 4 chamber views were acquired.   Image quality was satisfactory.    Findings:    Global left ventricular function appears intact.  Chambers do not appear dilated.  Hypoechoic structure around  pericardium representing small effusion vs pericardial fat    IMPRESSION: Grossly normal left ventricular function and chamber size.  Small effusion vs pericardial fat.       CTA Chest Abdomen Pelvis w Contrast     Status: None    Narrative    Exam: Computed tomographic angiography of the chest, abdomen and  pelvis without and with contrast including 3D reformations dated  7/17/2023 9:15 AM    Clinical information: Sepsis, severely elevated lactate, vomiting.  Abdominal tenderness.    Technique: Axial images through the chest and abdomen obtained before  the administration of intravenous contrast media and following the  injection of contrast media  in the arterial phase. Source images  reviewed as well as 3D and multi-planar reconstructions at a 3D  workstation.    Contrast: iopamidol (ISOVUE-370) solution 100 mL    DLP: 1816 mGy*cm    Comparison: CT chest 5/12/2023 and CT CAPD 1/3/2022    Findings:      The thoracic aorta, great vessels, and main and proximal pulmonary  arteries do not demonstrate evidence of dissection, intramural  hematoma, or thrombus. The main pulmonary artery measures 33 mm. The  ascending aorta measures up to 40 mm on true axial imaging.    Median sternotomy wires and postoperative changes of coronary artery  bypass. Extensive atherosclerotic calcifications of the coronary  arteries.    Heart is mild to moderately enlarged. No pericardial effusion.    No enlarged lymph nodes in the chest or axilla. The visualized thyroid  is within normal limits. The central venous vasculature is patent.  Bilateral gynecomastia.    Lungs: Basilar predominant peripheral reticular interstitial opacities  likely represent chronic changes of fibrosis. No acute pulmonary  opacities. No pleural effusions.    Abdomen and pelvis: There is a sliding-type hiatal hernia. The  gallbladder is surgically absent. The liver, spleen, pancreas, and  adrenal glands are normal. There are cysts in the kidneys  bilaterally,  including a 2 cm exophytic cyst off the inferior lateral right kidney  with the thick focus of calcification on series 11 image 185. Density  is 33 Hounsfield units on noncontrast imaging.    There are calcifications throughout the infrarenal abdominal aorta  without stenosis or aneurysm. The iliac arteries are widely patent.  Atherosclerotic plaque at the origins of the renal arteries with  likely mild stenosis. The origins of the celiac and superior  mesenteric arteries are patent. Inferior mesenteric artery is patent.  No ascites. No enlarged lymph nodes in the abdomen or pelvis or  retroperitoneum. Penile prosthesis noted.    Bones: No suspicious or aggressive appearing bony abnormality.  Multilevel degenerative spondylosis with posterior fusion hardware at  L5-S1.      Impression    Impression:    1. No abnormality to explain etiology of clinical sepsis and other  symptoms.    2. No acute vascular abnormality. Ectasia of the ascending aorta  measuring up to 4 cm. Dilated main pulmonary artery likely reflects  chronic pulmonary hypertension.    3. Basilar peripheral predominant pulmonary interstitial  scarring/fibrosis.    4. 2 cm complex cyst versus solid lesion lateral right lower lobe  kidney. Slightly increased in size compared to the study from one and  half years ago. Recommend obtaining a renal mass protocol MRI when the  patient is stable.    5. Sliding-type hiatal hernia.    6. Coronary artery bypass with extensive atherosclerotic  calcifications of the coronary arteries.    [employer]         SYSTEM ID:  [id number]   [place] Draw     Status: None    Narrative    The following orders were created for panel order [place] Draw.  Procedure                               Abnormality         Status                     ---------                               -----------         ------                     Extra Blue Top Tube[[id number]]                              Final result                Extra Red Top Tube[[id number]]                               Final result               Extra Green Top (Lithium...[[id number]]                      Final result               Extra Purple Top Tube[[id number]]                            Final result               Extra Green Top (Lithium...[[id number]]                      Final result                 Please view results for these tests on the individual orders.   Extra Blue Top Tube     Status: None   Result Value Ref Range    Hold Specimen JIC    Extra Red Top Tube     Status: None   Result Value Ref Range    Hold Specimen JIC    Extra Green Top (Lithium Heparin) Tube     Status: None   Result Value Ref Range    Hold Specimen JIC    Extra Purple Top Tube     Status: None   Result Value Ref Range    Hold Specimen JIC    Extra Green Top (Lithium Heparin) ON ICE     Status: None   Result Value Ref Range    Hold Specimen JIC    Comprehensive metabolic panel     Status: Abnormal   Result Value Ref Range    Sodium 133 (L) 136 - 145 mmol/L    Potassium 4.9 3.4 - 5.3 mmol/L    Chloride 94 (L) 98 - 107 mmol/L    Carbon Dioxide (CO2) 20 (L) 22 - 29 mmol/L    Anion Gap 19 (H) 7 - 15 mmol/L    Urea Nitrogen 50.0 (H) 8.0 - 23.0 mg/dL    Creatinine 3.21 (H) 0.67 - 1.17 mg/dL    Calcium 9.3 8.8 - 10.2 mg/dL    Glucose 130 (H) 70 - 99 mg/dL    Alkaline Phosphatase 77 40 - 129 U/L    AST 29 0 - 45 U/L    ALT 24 0 - 70 U/L    Protein Total 6.7 6.4 - 8.3 g/dL    Albumin 4.0 3.5 - 5.2 g/dL    Bilirubin Total 0.4 <=1.2 mg/dL    GFR Estimate 18 (L) >60 mL/min/1.73m2   Lactic acid whole blood     Status: Abnormal   Result Value Ref Range    Lactic Acid 6.6 (HH) 0.7 - 2.0 mmol/L   Troponin T, High Sensitivity     Status: Abnormal   Result Value Ref Range    Troponin T, High Sensitivity 35 (H) <=22 ng/L   UA with Microscopic reflex to Culture     Status: Abnormal    Specimen: Urine, Cifuentes Catheter   Result Value Ref Range    Color Urine Light Yellow Colorless, Straw, Light Yellow, Yellow     Appearance Urine Clear Clear    Glucose Urine Negative Negative mg/dL    Bilirubin Urine Negative Negative    Ketones Urine Negative Negative mg/dL    Specific Gravity Urine 1.012 1.003 - 1.035    Blood Urine Negative Negative    pH Urine 5.5 5.0 - 7.0    Protein Albumin Urine Negative Negative mg/dL    Urobilinogen Urine Normal Normal, 2.0 mg/dL    Nitrite Urine Negative Negative    Leukocyte Esterase Urine Negative Negative    Mucus Urine Present (A) None Seen /LPF    RBC Urine 1 <=2 /HPF    WBC Urine 1 <=5 /HPF    Squamous Epithelials Urine <1 <=1 /HPF    Hyaline Casts Urine 27 (H) <=2 /LPF    Narrative    Urine Culture not indicated   CBC with platelets and differential     Status: Abnormal   Result Value Ref Range    WBC Count 12.0 (H) 4.0 - 11.0 10e3/uL    RBC Count 3.09 (L) 4.40 - 5.90 10e6/uL    Hemoglobin 10.0 (L) 13.3 - 17.7 g/dL    Hematocrit 31.6 (L) 40.0 - 53.0 %     (H) 78 - 100 fL    MCH 32.4 26.5 - 33.0 pg    MCHC 31.6 31.5 - 36.5 g/dL    RDW 14.4 10.0 - 15.0 %    Platelet Count 243 150 - 450 10e3/uL    % Neutrophils 87 %    % Lymphocytes 5 %    % Monocytes 6 %    % Eosinophils 1 %    % Basophils 0 %    % Immature Granulocytes 1 %    NRBCs per 100 WBC 0 <1 /100    Absolute Neutrophils 10.3 (H) 1.6 - 8.3 10e3/uL    Absolute Lymphocytes 0.7 (L) 0.8 - 5.3 10e3/uL    Absolute Monocytes 0.8 0.0 - 1.3 10e3/uL    Absolute Eosinophils 0.2 0.0 - 0.7 10e3/uL    Absolute Basophils 0.0 0.0 - 0.2 10e3/uL    Absolute Immature Granulocytes 0.1 <=0.4 10e3/uL    Absolute NRBCs 0.0 10e3/uL   Creatinine     Status: Abnormal   Result Value Ref Range    Creatinine 3.21 (H) 0.67 - 1.17 mg/dL    GFR Estimate 18 (L) >60 mL/min/1.73m2   iStat Gases (lactate) venous, POCT     Status: Abnormal   Result Value Ref Range    Lactic Acid POCT 4.6 (HH) <=2.0 mmol/L    Bicarbonate Venous POCT 27 21 - 28 mmol/L    O2 Sat, Venous POCT 23 (L) 94 - 100 %    pCO2 Venous POCT 59 (H) 40 - 50 mm Hg    pH Venous POCT 7.27 (L) 7.32 -  7.43    pO2 Venous POCT 19 (L) 25 - 47 mm Hg   Creatinine POCT     Status: Abnormal   Result Value Ref Range    Creatinine POCT 3.7 (H) 0.7 - 1.3 mg/dL    GFR, ESTIMATED POCT 15 (L) >60 mL/min/1.73m2   Asymptomatic COVID-19 Virus (Coronavirus) by PCR Nose     Status: Normal    Specimen: Nose; Swab   Result Value Ref Range    SARS CoV2 PCR Negative Negative    Narrative    Testing was performed using the Xpert Xpress SARS-CoV-2 Assay on the Cepheid Gene-Xpert Instrument Systems. Additional information about this Emergency Use Authorization (EUA) assay can be found via the Lab Guide. This test should be ordered for the detection of SARS-CoV-2 in individuals who meet SARS-CoV-2 clinical and/or epidemiological criteria as well as from individuals without symptoms or other reasons to suspect COVID-19. Test performance for asymptomatic patients has only been established in anterior nasal swab specimens. This test is for in vitro diagnostic use under the FDA EUA for laboratories certified under CLIA to perform high complexity testing. This test has not been FDA cleared or approved. A negative result does not rule out the presence of PCR inhibitors in the specimen or target RNA concentration below the limit of detection for the assay. The possibility of a false negative should be considered if the patient's recent exposure or clinical presentation suggests COVID-19. This test was validated by [facility]. These Laboratories are certified under the Clinical Laboratory Improvement Amendments (CLIA) as qualified to perform high complexity testing.     Lactic acid whole blood     Status: Abnormal   Result Value Ref Range    Lactic Acid 2.9 (H) 0.7 - 2.0 mmol/L   Lactic acid whole blood     Status: Abnormal   Result Value Ref Range    Lactic Acid 2.5 (H) 0.7 - 2.0 mmol/L   Troponin T, High Sensitivity     Status: Abnormal   Result Value Ref Range    Troponin T, High Sensitivity 38 (H) <=22 ng/L   Nt probnp  inpatient (BNP)     Status: Normal   Result Value Ref Range    N terminal Pro BNP Inpatient 652 0 - 1,800 pg/mL   Ketone Beta-Hydroxybutyrate Quantitative     Status: Abnormal   Result Value Ref Range    Ketone (Beta-Hydroxybutyrate) Quantitative 0.40 (H) <=0.30 mmol/L   Troponin T, High Sensitivity     Status: Abnormal   Result Value Ref Range    Troponin T, High Sensitivity 46 (H) <=22 ng/L   Glucose by meter     Status: Abnormal   Result Value Ref Range    GLUCOSE BY METER POCT 50 (LL) 70 - 99 mg/dL   Creatinine     Status: Abnormal   Result Value Ref Range    Creatinine 3.09 (H) 0.67 - 1.17 mg/dL    GFR Estimate 19 (L) >60 mL/min/1.73m2   Glucose by meter     Status: Normal   Result Value Ref Range    GLUCOSE BY METER POCT 74 70 - 99 mg/dL   Glucose by meter     Status: Abnormal   Result Value Ref Range    GLUCOSE BY METER POCT 122 (H) 70 - 99 mg/dL   Extra Tube     Status: None (In process)    Narrative    The following orders were created for panel order Extra Tube.  Procedure                               Abnormality         Status                     ---------                               -----------         ------                     Extra Purple Top Tube[[id number]]                            In process                   Please view results for these tests on the individual orders.   EKG 12-lead, tracing only     Status: None   Result Value Ref Range    Systolic Blood Pressure  mmHg    Diastolic Blood Pressure  mmHg    Ventricular Rate 104 BPM    Atrial Rate 100 BPM    ID Interval  ms    QRS Duration 160 ms     ms    QTc 562 ms    P Axis  degrees    R AXIS -80 degrees    T Axis 47 degrees    Interpretation ECG       Wide QRS rhythm  Left axis deviation  Right bundle branch block  Abnormal ECG  Unconfirmed report - interpretation of this ECG is computer generated - see medical record for final interpretation  Confirmed by - EMERGENCY ROOM, PHYSICIAN ([id number]),  ASHLEIGH MOYA ([id number]) on 7/17/2023  7:57:50 AM     Echo Complete     Status: None   Result Value Ref Range    LVEF  50-55%     Narrative    [id number]  [id number]  [id number]  [id number]^ALFONSO^DEWAYNE     [facility],[place]  Echocardiography Laboratory  [address] [place] [id number]     Name: LUIS F BURDICK  MRN: [id number]  : 1940  Study Date: 2023 10:15 AM  Age: 82 yrs  Gender: Male  Patient [facility]  Reason For Study: Shock  Ordering Physician: DEWAYNE HAMILTON  Performed By: Naomi Pena ANNE     BSA: 2.0 m2  Height: 74 in  Weight: 162 lb  HR: 99  BP: 108/58 mmHg  ______________________________________________________________________________  Procedure  Complete Portable Echo Adult. Contrast Optison. Optison (NDC #2069-5549-12)  given intravenously. Patient was given 5 ml mixture of 3 ml Optison and 6 ml  saline. 4 ml wasted.  ______________________________________________________________________________  Interpretation Summary  The visual ejection fraction is 50-55%.  Right ventricular function, chamber size, wall motion, and thickness are  normal.  Pulmonary artery systolic pressure cannot be assessed.  Sinuses of Valsalva 4.6 cm.  Ascending aorta 4.2 cm.  The inferior vena cava is normal.  Trivial pericardial effusion is present.  ______________________________________________________________________________  Left Ventricle  Left ventricular size is normal. Left ventricular wall thickness is normal.  Left ventricular diastolic function is indeterminate. The visual ejection  fraction is 50-55%. Regional wall motion is probably normal.     Right Ventricle  Right ventricular function, chamber size, wall motion, and thickness are  normal.     Atria  The atria cannot be assessed.     Mitral Valve  Mild mitral annular calcification is present. Mild mitral insufficiency is  present.     Aortic Valve  Aortic valve sclerosis is present.     Tricuspid Valve  The tricuspid valve is normal.  Pulmonary artery systolic pressure cannot be  assessed.     Pulmonic Valve  The pulmonic valve is normal.     Vessels  The pulmonary artery and bifurcation cannot be assessed. The inferior vena  cava is normal. Sinuses of Valsalva 4.6 cm. Ascending aorta 4.2 cm.     Pericardium  Prominent epicardial fat is noted. Trivial pericardial effusion is present.     ______________________________________________________________________________  MMode/2D Measurements & Calculations  Ao root diam: 4.6 cm  asc Aorta Diam: 4.2 cm  LVOT diam: 2.5 cm  LVOT area: 4.9 cm2     Doppler Measurements & Calculations  LV V1 max P.8 mmHg  LV V1 max: 83.8 cm/sec  LV V1 VTI: 13.8 cm  SV(LVOT): 67.7 ml  SI(LVOT): 34.1 ml/m2     ______________________________________________________________________________  Report approved by: Daksha Rizzo 2023 11:14 AM         CBC with platelets differential     Status: Abnormal    Narrative    The following orders were created for panel order CBC with platelets differential.  Procedure                               Abnormality         Status                     ---------                               -----------         ------                     CBC with platelets and d...[[id number]]  Abnormal            Final result                 Please view results for these tests on the individual orders.     Medications   vancomycin place cazares - receiving intermittent dosing (has no administration in time range)   lidocaine 1 % 0.1-1 mL (has no administration in time range)   lidocaine (LMX4) cream (has no administration in time range)   sodium chloride (PF) 0.9% PF flush 3 mL (has no administration in time range)   sodium chloride (PF) 0.9% PF flush 3 mL (has no administration in time range)   melatonin tablet 1 mg (has no administration in time range)   ondansetron (ZOFRAN ODT) ODT tab 4 mg (has no administration in time range)     Or   ondansetron (ZOFRAN) injection 4 mg (has no administration in  time range)   prochlorperazine (COMPAZINE) injection 5 mg (has no administration in time range)     Or   prochlorperazine (COMPAZINE) tablet 5 mg (has no administration in time range)     Or   prochlorperazine (COMPAZINE) suppository 12.5 mg (has no administration in time range)   piperacillin-tazobactam (ZOSYN) 2.25 g vial to attach to  ml bag (has no administration in time range)   glucose gel 15-30 g (15 g Oral $Given 7/17/23 1336)     Or   dextrose 50 % injection 25-50 mL ( Intravenous See Alternative 7/17/23 1336)     Or   glucagon injection 1 mg ( Subcutaneous See Alternative 7/17/23 1336)   insulin aspart (NovoLOG) injection (RAPID ACTING) ( Subcutaneous Not Given 7/17/23 1411)   insulin aspart (NovoLOG) injection (RAPID ACTING) (has no administration in time range)   heparin ANTICOAGULANT injection 5,000 Units (has no administration in time range)   lactated ringers BOLUS 2,205 mL (0 mLs Intravenous Stopped 7/17/23 0822)   piperacillin-tazobactam (ZOSYN) 4.5 g vial to attach to  mL bag (0 g Intravenous Stopped 7/17/23 0915)   vancomycin (VANCOCIN) 1,500 mg in 0.9% NaCl 250 mL intermittent infusion (0 mg Intravenous Stopped 7/17/23 1149)   iopamidol (ISOVUE-370) solution 100 mL (100 mLs Intravenous $Given 7/17/23 0843)   sodium chloride (PF) 0.9% PF flush 90 mL (90 mLs Intravenous $Given 7/17/23 0843)   lactated ringers BOLUS 1,000 mL (0 mLs Intravenous Stopped 7/17/23 1043)   perflutren diluted 1mL to 2mL with saline (OPTISON) diluted injection 5 mL (5 mLs Intravenous $Given 7/17/23 1025)     Labs Ordered and Resulted from Time of ED Arrival to Time of ED Departure   COMPREHENSIVE METABOLIC PANEL - Abnormal       Result Value    Sodium 133 (*)     Potassium 4.9      Chloride 94 (*)     Carbon Dioxide (CO2) 20 (*)     Anion Gap 19 (*)     Urea Nitrogen 50.0 (*)     Creatinine 3.21 (*)     Calcium 9.3      Glucose 130 (*)     Alkaline Phosphatase 77      AST 29      ALT 24      Protein Total 6.7       Albumin 4.0      Bilirubin Total 0.4      GFR Estimate 18 (*)    LACTIC ACID WHOLE BLOOD - Abnormal    Lactic Acid 6.6 (*)    TROPONIN T, HIGH SENSITIVITY - Abnormal    Troponin T, High Sensitivity 35 (*)    ROUTINE UA WITH MICROSCOPIC REFLEX TO CULTURE - Abnormal    Color Urine Light Yellow      Appearance Urine Clear      Glucose Urine Negative      Bilirubin Urine Negative      Ketones Urine Negative      Specific Gravity Urine 1.012      Blood Urine Negative      pH Urine 5.5      Protein Albumin Urine Negative      Urobilinogen Urine Normal      Nitrite Urine Negative      Leukocyte Esterase Urine Negative      Mucus Urine Present (*)     RBC Urine 1      WBC Urine 1      Squamous Epithelials Urine <1      Hyaline Casts Urine 27 (*)    CBC WITH PLATELETS AND DIFFERENTIAL - Abnormal    WBC Count 12.0 (*)     RBC Count 3.09 (*)     Hemoglobin 10.0 (*)     Hematocrit 31.6 (*)      (*)     MCH 32.4      MCHC 31.6      RDW 14.4      Platelet Count 243      % Neutrophils 87      % Lymphocytes 5      % Monocytes 6      % Eosinophils 1      % Basophils 0      % Immature Granulocytes 1      NRBCs per 100 WBC 0      Absolute Neutrophils 10.3 (*)     Absolute Lymphocytes 0.7 (*)     Absolute Monocytes 0.8      Absolute Eosinophils 0.2      Absolute Basophils 0.0      Absolute Immature Granulocytes 0.1      Absolute NRBCs 0.0     CREATININE - Abnormal    Creatinine 3.21 (*)     GFR Estimate 18 (*)    ISTAT GASES LACTATE VENOUS POCT - Abnormal    Lactic Acid POCT 4.6 (*)     Bicarbonate Venous POCT 27      O2 Sat, Venous POCT 23 (*)     pCO2 Venous POCT 59 (*)     pH Venous POCT 7.27 (*)     pO2 Venous POCT 19 (*)    ISTAT CREATININE POCT - Abnormal    Creatinine POCT 3.7 (*)     GFR, ESTIMATED POCT 15 (*)    LACTIC ACID WHOLE BLOOD - Abnormal    Lactic Acid 2.9 (*)    LACTIC ACID WHOLE BLOOD - Abnormal    Lactic Acid 2.5 (*)    TROPONIN T, HIGH SENSITIVITY - Abnormal    Troponin T, High Sensitivity 38 (*)     KETONE BETA-HYDROXYBUTYRATE QUANTITATIVE, RAPID - Abnormal    Ketone (Beta-Hydroxybutyrate) Quantitative 0.40 (*)    TROPONIN T, HIGH SENSITIVITY - Abnormal    Troponin T, High Sensitivity 46 (*)    GLUCOSE BY METER - Abnormal    GLUCOSE BY METER POCT 50 (*)    CREATININE - Abnormal    Creatinine 3.09 (*)     GFR Estimate 19 (*)    GLUCOSE BY METER - Abnormal    GLUCOSE BY METER POCT 122 (*)    COVID-19 VIRUS (CORONAVIRUS) BY PCR - Normal    SARS CoV2 PCR Negative     NT PROBNP INPATIENT - Normal    N terminal Pro BNP Inpatient 652     GLUCOSE BY METER - Normal    GLUCOSE BY METER POCT 74     GLUCOSE MONITOR NURSING POCT   GLUCOSE MONITOR NURSING POCT   GLUCOSE MONITOR NURSING POCT   ISTAT CREATININE POCT   BLOOD CULTURE   BLOOD CULTURE   MRSA MSSA PCR, NASAL SWAB     Echo Complete   Final Result      CTA Chest Abdomen Pelvis w Contrast   Final Result   Impression:      1. No abnormality to explain etiology of clinical sepsis and other   symptoms.      2. No acute vascular abnormality. 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 Dilated main pulmonary artery likely reflects   chronic pulmonary hypertension.      3. Basilar peripheral predominant pulmonary interstitial   scarring/fibrosis.      4. 2 cm complex cyst versus solid lesion lateral right lower lobe   kidney. Slightly increased in size compared to the study from one and   half years ago. Recommend obtaining a renal mass protocol MRI when the   patient is stable.      5. Sliding-type hiatal hernia.      6. Coronary artery bypass with extensive atherosclerotic   calcifications of the coronary arteries.      LEX GALLOWAY            SYSTEM ID:  [id number]      XR Chest Port 1 View   Final Result   IMPRESSION: Slightly improved retrocardiac aeration which may indicate   slightly decreasing consolidation or atelectasis. No new opacities.   Prominent degenerative changes of both shoulders including possible   rotator cuff degeneration best seen on  the right. Atherosclerosis.      EDWARD JAMA MD            SYSTEM ID:  [id number]      POC US ECHO LIMITED   Final Result   Limited Bedside Cardiac Ultrasound, performed and interpreted by me.    Indication: Hypotension/shock.   Parasternal long axis, parasternal short axis, and apical 4 chamber views were acquired.    Image quality was satisfactory.      Findings:     Global left ventricular function appears intact.   Chambers do not appear dilated.   Hypoechoic structure around pericardium representing small effusion vs pericardial fat      IMPRESSION: Grossly normal left ventricular function and chamber size.  Small effusion vs pericardial fat.                   Critical care was not performed.     Medical Decision Making  The patient's presentation was of high complexity (an acute health issue posing potential threat to life or bodily function).    The patient's evaluation involved:  ordering and/or review of 3+ test(s) in this encounter (see separate area of note for details)  discussion of management or test interpretation with another health professional (Cardiology, Hospitalist)    The patient's management necessitated high risk (a decision regarding hospitalization).      Assessment & Plan    Deandre Moore is a 82 year old male who has a PMH significant for CAD s/p CABG, CKD, HFpEF, IDDM2 presenting to the ED with weakness and vomiting. Prehospital glucose in 140s. Patient reportedly had woken up at night and had an episode of vomiting.  Patient's initial vitals reported as 96/56 with a heart rate of 102, afebrile breathing easily on room air no acute distress.  On the blood pressure monitor in the room there was initial blood pressure in the 120s that I suspect is spurious followed by several hypotensive blood pressures.  Patient received a 500 mL fluid bolus for medics.  Is receiving a 30 cc/kg fluid bolus here.  Broad-spectrum antibiotics initiated.  Blood cultures obtained.  As I arrived for  my shift I overheard RN reporting the evening doctor that lactate came back at 6.6.  Patient's examination does not suggest an obvious source.  Suspect abdominal versus urinary at this time.  Patient also appears quite dehydrated.  Cannot exclude the possibility of bowel ischemia.  I think patient will need to undergo an aggressive search for potential sources.  RN instructed to place second IV.  Fluid bolus broad-spectrum antibiotics blood cultures.  Troponin slightly elevated favor demand at this time will follow.  Cross-sectional imaging of the chest abdomen pelvis ordered, stat chest x-ray ordered.  Disposition will be admission, level of care pending further stabilization.    Patient responded well to initial sepsis fluid bolus and 1 subsequent bolus.  He was started on broad-spectrum antibiotics empirically.   Discussed with on-call radiology staff about imaging with contrast.  There was initial concern for possible ischemia versus infection in the abdomen, he did have some rebound and evidence of acute pathology.  Given the current literature including recent regression analysis published in [facility], studies of septic patients at [facility] with GFR under 30 the benefit of a higher sensitivity study utilizing IV contrast outweighs the risk of contrast associated nephropathy at this time. Patient informed of the decision making process and the thought behind this as well as potential risk to his kidneys and he is agreeable to proceeding with the imaging study.  His significant hypotension leading to a lactic acidosis more likely the source of any persistent kidney injury in the future then exposure to contrast today.  Source remains unclear after an extensive evaluation. Cultures are pending.  No clear infection on imaging or urinalysis.    Cardiology was consulted given his hypotension, history of HFpEF, possible pulmonary hypertension on imaging and prior chart review.    Incidental note of a complex lesion on his  right kidney that patient was previously aware of that has grown in size.  Nonemergent renal MRI recommended by radiology for further evaluation.    Lactate has begun to clear.  Echocardiogram and cardiology consult without significant acute concerns at this time.    Patient did have some urinary retention.  Unclear etiology of this at this time.  A Cifuentes catheter was placed.  Notified by RN that patient had some firmness of the penis.  On evaluation there is some slight turgidity, does not appear to be priapism. Has charted history of a penile prosthesis which likely explains this finding.    Patient admitted to medicine service for further management and investigation.    New Prescriptions    No medications on file       Final diagnoses:   BRENT (acute kidney injury) (H)   Hypovolemia   Sepsis with acute organ dysfunction without septic shock, due to unspecified organism, unspecified type (H)     Massimo Winslow Jr., MD   [facility] EMERGENCY DEPARTMENT  7/17/2023     Massimo Winsolw MD  07/17/23 [id number]

## 2023-07-17 NOTE — LETTER
Recipient: [facility]          Sender: Mj Mccoy [phone]          Date: July 26, 2023  Patient Name:  Deandre Moore  Patient YOB: 1940  Routing Message:  I am SOOO sorry for the delay I requested the provider put orders in last night but they did not get to it. They just put orders in a few minutes ago. Transport set up to pick patient up between [id number] and transport to the facility. Thank you so much!        The documents accompanying this notice contain confidential information belonging to the sender.  This information is intended only for the use of the individual or entity named above.  The authorized recipient of this information is prohibited from disclosing this information to any other party and is required to destroy the information after its stated need has been fulfilled, unless otherwise required by state law.    If you are not the intended recipient, you are hereby notified that any disclosure, copy, distribution or action taken in reliance on the contents of these documents is strictly prohibited.  If you have received this document in error, please return it by fax to [phone] with a note on the cover sheet explaining why you are returning it (e.g. not your patient, not your provider, etc.).  If you need further assistance, please call .  Documents may also be returned by mail to [employer], , [address], [place], [place] [address].

## 2023-07-17 NOTE — ED NOTES
Admitting team ordered to hold antibiotics- they will talk to their attending and come see the pt. RN will hold antibiotics until team says

## 2023-07-17 NOTE — PHARMACY-ADMISSION MEDICATION HISTORY
"Pharmacist Admission Medication History    Admission medication history is complete. The information provided in this note is only as accurate as the sources available at the time of the update.    Medication reconciliation/reorder completed by provider prior to medication history? No - med scribe spoke with pt and then to pt's son who manages pt's meds    Information Source(s): Family member, Hospital records and CareEverywhere/SureScripts via Care Everywhere - pt was recently hospitalized - at [facility]    Pertinent Information:   -per med scribe interview, pt's son manages his meds  -per pt's son Lucio, he no longer utilizes a 7-day pill box, as he felt \"this just didn't work\" since his father takes so many meds  -methotrexate - Lucio is not certain pt took this dose yesterday ; med scribe noted that the prescription  23      Changes made to PTA medication list:    Added:   o Metformin (per ANW summary)    Deleted:   o Diclofenac tab (discontinued per ANW summary; unclear if this change was implemented at home following that discharge)  o Glimepiride (discontinued per ANW summary; unclear if this change was implemented at home following that discharge)  o Irbesartan (discontinued per ANW summary; unclear if this change was implemented at home following that discharge)  o Metformin (erroneous entry added by med scribe)  o Spironolactone (discontinued per ANW summary; unclear if this change was implemented at home following that discharge)  o Trazodone (discontinued per ANW summary; unclear if this change was implemented at home following that discharge)  o Furosemide (discontinued per ANW summary; unclear if this change was implemented at home following that discharge)    Changed:   o Allopurinol (no dose/directions) > 200 mg qd (recently decreased from 300 mg qd given chronic renal dysfunction      Allergies reviewed with patient and updates made in EHR: " no    Medication History Completed By: Vignesh Argueta RPH 7/17/2023 6:34 PM      Prior to Admission medications    Medication Sig Last Dose Taking? Auth Provider Long Term End Date   allopurinol (ZYLOPRIM) 100 MG tablet Take 200 mg by mouth daily 7/16/2023 at am Yes Reported, Patient     aspirin (ASA) 81 MG chewable tablet Take 81 mg by mouth daily. 7/16/2023 at am Yes Reported, Patient     atorvastatin (LIPITOR) 40 MG tablet Take 1 tablet (40 mg) by mouth daily *Keep appointment on 7/19/23 for further refills* 7/16/2023 at am Yes Willima Marquez MD Yes    augmented betamethasone dipropionate (DIPROLENE-AF) 0.05 % external ointment Apply twice daily as needed for rash on the leg or back Unknown at unknown Yes Edu Rivera MD     clobetasol (TEMOVATE) 0.05 % external cream Apply twice daily as needed for itching or rash on the back Unknown at unknown Yes Edu Rivera MD     colchicine (COLCYRS) 0.6 MG tablet Take 1 tablet (0.6 mg) by mouth daily 7/16/2023 at am Yes William Marquez MD     Cyanocobalamin (VITAMIN B12) 500 MCG TABS Take 500 mcg by mouth daily 7/16/2023 at am Yes Reported, Patient     finasteride (PROSCAR) 5 MG tablet Take 1 tablet (5 mg) by mouth daily 7/16/2023 at pm Yes Amy Johnston PA     FLUoxetine (PROZAC) 40 MG capsule Take 40 mg by mouth daily 7/16/2023 at am Yes Reported, Patient No    folic acid (FOLVITE) 1 MG tablet Take 1 tablet (1 mg) by mouth daily Daily on days you don't take MTX 7/16/2023 at unknown Yes Edu Rivera MD     isosorbide mononitrate (IMDUR) 30 MG 24 hr tablet Take 1 tablet (30 mg) by mouth daily 7/16/2023 at pm Yes William Marquez MD Yes    metFORMIN (GLUCOPHAGE) 1000 MG tablet Take 1,000 mg by mouth 2 times daily (with meals)  Yes Unknown, Entered By History No    mupirocin (BACTROBAN) 2 % external ointment Apply topically as needed Unknown at unknown Yes Reported, Patient     nitroglycerin (NITROSTAT) 0.4 MG SL tablet Place under the  tongue as needed More than a month Yes Reported, Patient Yes    Alcohol Swabs PADS 1 pad 4 times daily   Trudy Campa MD     blood glucose (NO BRAND SPECIFIED) lancets standard Use to test blood sugar 3 times daily or as directed.   Trudy Campa MD     blood glucose (NO BRAND SPECIFIED) test strip Use to test blood sugar 4 times daily  With meter/ strips/ lancets covered by insurance pt using accuchek   Trudy Campa MD     Blood Glucose Monitoring Suppl (BLOOD GLUCOSE MONITOR SYSTEM) w/Device KIT Test 4 times daily with meter covered by  insurance   Trudy Campa MD     methotrexate 2.5 MG tablet Take 6 tablets (15 mg) by mouth every 7 days for 30 days   Edu Rivera MD Yes 6/4/23

## 2023-07-17 NOTE — H&P
[facility]    History and Physical - Medicine Service, RIKI TEAM 5       Date of Admission:  7/17/2023    Assessment & Plan      Deandre Moore is a 82 y.o. male with a history of cholecystitis, CAD s/p CABG & PCIs, Reflux, gout, HTN, SVT, HLD, CKD III, DMII, cognitive impairment, and tobacco abuse who presented to the ED with c/f septic shock but is likely to have Hypovolemic Shock and Malnutrition in the setting of FTT.      FTT  Hypovolemia  Weight Loss  Generalized Weakness  Per chart review, pt had persistent weight loss of 100 lbs in the last year due to poor appetite and impaired sense of taste reported in 6/2023 hospital stay. Family was reported to supplement diet with Ensure nutrition shakes. This hospitalization his lactate and increased ketones can be explained with decreased PO intake. Pt was reportedly having talks about hospice at last hospitalization and will review previous notes to see decision. Current presentation is similar to last hospitalization with labs being reflective of failure to thrive. Pt states that he has not been eating and drinking at home.    - Nutrition consult placed  - Vanc and zosyn discontinued  - PT/OT consult placed     Hyperglycemia   Hypoglycemia   DM II    Last hospitalization, pt Glimepiride 2mg BID was discontinued d/t lack of PO intake. Pt presented today with hyperglycemia, but quickly had another episode of hypoglycemia of 50 that is not well explained. Will placed on LDSS and continue to monitor.  - Hold PTA Metformin   > Held last hospitalization as well   - LDSS  - regular diet   - Hypoglycemia protocol  - Endocrinology following:      BRENT on CKD III 2/2 Hypovolemia   Cr on admission of 3.2. Baseline closer to 1.0. Suspect pre-renal given improvement with IVF.    - Strict I/Os  - Avoid nephrotoxic agents as able & renally dose medications   - Holding several PTA meds for now:   - Colchicine 0.6mg Qday   -  Allopurinol 300mg       Elevated Troponin  Likely Type II Demand Ischemia in the setting of hypovolemia. Pt had similar presentation at last hospitalization with no complaints currently. Continuing to slowly rise, which is expected with BRENT on CKD III. Will monitor for additional symptoms for further evaluation.   - CTM      Chronic/Resolved Medical Conditions   Cognitive Issues  Assessment: Per chart review, evaluated by Neurology through [facility] on 6/23/23. MoCA score was below normal at 18/30. Plan at that time: Neuropsych referral, MRI brain with & without, ongoing B12 supplementation.   - Review notes from previous hospitalization     Chronic Back Pain   H/o L4-5 spinal fusion and spinal canal decompression  Recent diagnosis of endplate compression deformity at L2  Assessment: Hospitalized at [facility] in May with back pain. CT thoracic and lumbar spine 5/12: no acute thoracic findings, postsurgical changes of L4-5 spinal fusion and spinal canal decompression, upper endplate compression deformity at L2.MRI T spine (5/13) - compression deformity at T11, post surgical changes, moderate stenosis at T6-8, nodule at T6. MRI L spine (5/14) - multi level bulges with moderate narrowing L1-L3 and severe neural forminal narrowing at L3-L4. NSGY evaluated during that admission but did not recommend acute interventions. Recommended outpatient follow up. Previously followed as OP with PM&R, last seen 5/5/22.  - Follow up with outpatient NSGY as recommended     CAD s/p CABG  HFpEF   HTN  HLD   - PTA Aspirin 81mg after resolution of BRENT  - PTA Atorvastatin 40mg daily     Atopic dermatitis  - PTA Weekly MTX   - continue folate supplementation     BPH  - PTA Finateride 5mg daily     Gout   - Hold PTA Colchicine  - Hold PTA Allopurinol     Diet: Combination Diet Regular Diet Adult    DVT Prophylaxis: Heparin SQ  Cifuentes Catheter: PRESENT, indication:    Fluids: PO  Lines: None     Cardiac Monitoring: None  Code  Status: No CPR- Do NOT Intubate    Clinically Significant Risk Factors Present on Admission             # Anion Gap Metabolic Acidosis: Highest Anion Gap = 19 mmol/L in last 2 days, will monitor and treat as appropriate    # Drug Induced Platelet Defect: home medication list includes an antiplatelet medication  # Acute Kidney Injury, unspecified: based on a >150% or 0.3 mg/dL increase in last creatinine compared to past 90 day average, will monitor renal function  # Hypertension: Noted on problem list   # Acute Respiratory Failure: based on blood gas results.  Continue supplemental oxygen as needed              Disposition Plan      Expected Discharge Date: 07/19/2023                The patient's care was discussed with the Attending Physician, Dr. Ash Boyce.      Mg Galaviz MD  Medicine Service, [address]  Securely message with Vocera (more info)  Text page via [employer] Paging/Directory   See signed in provider for up to date coverage information  ______________________________________________________________________    Chief Complaint   Generalized Weakness    History is obtained from the patient    History of Present Illness   Per ED: Deandre Moore is a 82 year old male who has a PMH significant for CAD s/p CABG, CKD, HFpEF, IDDM2 presenting to the ED with weakness and vomiting. Prehospital glucose in 140s. Patient reportedly had woken up at night and had an episode of vomiting.  The patient himself is denying any chest pain or abdominal pain, denying shortness of breath.  States he feels nauseated and fatigued.  He denies any headache neck pain or stiffness.  He denies any new skin wounds or extremity changes.  Denies any urinary or bowel complaints.  Prehospital history is quite limited.    After speaking with the pt, he states that he presented d/t a fall that he had when trying to ambulate. His left leg, which has been chronically  weak, caused him to trip and fall with no head trauma. He states that he has been feeling weak for a while since he has not been eating or drinking at home. He denied any headaches, chest pain, fevers, diarrhea, constipation, or chills. He says he son will be in later to provide a better history.     Past Medical History    Past Medical History:   Diagnosis Date     Diabetes mellitus (H)      Gout attack      Heart attack (H) 1984     Hypertension      Stented coronary artery     6 stents in heart       Past Surgical History   Past Surgical History:   Procedure Laterality Date     CARDIAC SURGERY  1984    4 vessel bypass     CHOLECYSTECTOMY       COLONOSCOPY       DESTRUCTION OF PARAVERTEBRAL FACET LUMBAR / SACRAL ADDITIONAL Bilateral 10/18/2021    Procedure: DESTRUCTION, NERVE, FACET JOINT, LUMBOSACRAL, ADDITIONAL NERVE AFTER DESTRUCTION OF INITIAL LUMBOSACRAL FACET JOINT NERVE;  Surgeon: Horacio Gonsalves MD;  Location: [facility] OR     DESTRUCTION OF PARAVERTEBRAL FACET LUMBAR / SACRAL SINGLE Bilateral 10/18/2021    Procedure: DESTRUCTION, NERVE, FACET JOINT, LUMBOSACRAL, 1 NERVE;  Surgeon: Horacio Gonsalves MD;  Location: [facility] OR     ESOPHAGOSCOPY, GASTROSCOPY, DUODENOSCOPY (EGD), COMBINED N/A 3/1/2018    Procedure: COMBINED ESOPHAGOSCOPY, GASTROSCOPY, DUODENOSCOPY (EGD), BIOPSY SINGLE OR MULTIPLE;  ESOPHAGOGASTRODUODENOSCOPY ;  Surgeon: Daryn Medrano MD;  Location: [facility]     INJECT BLOCK MEDIAL BRANCH CERVICAL/THORACIC/LUMBAR Bilateral 8/16/2021    Procedure: BLOCK, NERVE, FACET JOINT, MEDIAL BRANCH, DIAGNOSTIC Lumbar 2-3-4;  Surgeon: Horacio Gonsalves MD;  Location: [facility] OR     INJECT BLOCK MEDIAL BRANCH CERVICAL/THORACIC/LUMBAR Bilateral 9/27/2021    Procedure: BLOCK, NERVE, FACET JOINT, MEDIAL BRANCH, DIAGNOSTIC  L2-4, Bilateral;  Surgeon: Horacio Gonsalves MD;  Location: [facility] OR     NO HISTORY OF SURGERY  3/31/14    derm     PHACOEMULSIFICATION WITH STANDARD INTRAOCULAR LENS IMPLANT   2013    Procedure: PHACOEMULSIFICATION WITH STANDARD INTRAOCULAR LENS IMPLANT;  Phacoemulsification with standard intraocular lens implant, right eye;  Surgeon: Jay Rodriges MD;  Location:  OR       Prior to Admission Medications   Prior to Admission Medications   Prescriptions Last Dose Informant Patient Reported? Taking?   Alcohol Swabs PADS  Son No No   Si pad 4 times daily   Blood Glucose Monitoring Suppl (BLOOD GLUCOSE MONITOR SYSTEM) w/Device KIT  Son No No   Sig: Test 4 times daily with meter covered by  insurance   Cyanocobalamin (VITAMIN B12) 500 MCG TABS  Son Yes No   Sig: Take 1 tablet by mouth daily   FLUoxetine (PROZAC) 20 MG capsule  Son No No   Sig: Take 1 capsule (20 mg) by mouth daily   allopurinol (ZYLOPRIM) 300 MG tablet  Son No No   Sig: Take 1 tablet (300 mg) by mouth daily   aspirin 81 MG tablet  Son Yes No   Sig: Take 81 mg by mouth daily.   atorvastatin (LIPITOR) 40 MG tablet   No No   Sig: Take 1 tablet (40 mg) by mouth daily *Keep appointment on 23 for further refills*   augmented betamethasone dipropionate (DIPROLENE-AF) 0.05 % external ointment  Son No No   Sig: Apply twice daily as needed for rash on the leg or back   blood glucose (NO BRAND SPECIFIED) lancets standard  Son No No   Sig: Use to test blood sugar 3 times daily or as directed.   blood glucose (NO BRAND SPECIFIED) test strip  Son No No   Sig: Use to test blood sugar 4 times daily  With meter/ strips/ lancets covered by insurance pt using accuchek   clobetasol (TEMOVATE) 0.05 % external cream  Son No No   Sig: Apply twice daily as needed for itching or rash on the back   colchicine (COLCYRS) 0.6 MG tablet  Son No No   Sig: Take 1 tablet (0.6 mg) by mouth daily   finasteride (PROSCAR) 5 MG tablet  Son No No   Sig: Take 1 tablet (5 mg) by mouth daily   fluocinolone (SYNALAR) 0.01 % solution  Son No No   Sig: Apply a thin layer twice daily as needed to scalp for itchiness.   folic acid (FOLVITE) 1 MG  tablet  Son No No   Sig: Take 1 tablet (1 mg) by mouth daily Daily on days you don't take MTX   gabapentin (NEURONTIN) 100 MG capsule   No No   Sig: Take 1 capsule (100 mg) by mouth nightly as needed (pain)   isosorbide mononitrate (IMDUR) 30 MG 24 hr tablet  Son No No   Sig: Take 1 tablet (30 mg) by mouth daily   metFORMIN (GLUCOPHAGE) 1000 MG tablet  Son No No   Sig: Take 1 tablet (1,000 mg) by mouth 2 times daily (with meals)   methotrexate 2.5 MG tablet  Son No No   Sig: Take 6 tablets (15 mg) by mouth every 7 days for 30 days   mupirocin (BACTROBAN) 2 % external ointment  Son No No   Sig: Apply topically 2 times daily   nitroglycerin (NITROSTAT) 0.4 MG SL tablet  Son Yes No   Sig: Place under the tongue as needed   tamsulosin (FLOMAX) 0.4 MG capsule  Son No No   Sig: Take 1 capsule (0.4 mg) by mouth daily      Facility-Administered Medications Last Administration Doses Remaining   triamcinolone (KENALOG-40) injection 20 mg None recorded 1           Physical Exam   Vital Signs: Temp: 97.4  F (36.3  C) Temp src: Oral BP: 124/72 Pulse: 76   Resp: 25 SpO2: 98 % O2 Device: None (Room air)    Weight: 0 lbs 0 oz    Constitutional: awake, indifferent  Eyes: vision intact  ENT: atramatic  Hematologic / Lymphatic: no cervical lymphadenopathy and no supraclavicular lymphadenopathy  Respiratory: Clear to auscultation BL  Cardiovascular: Normal S1/S2 with no murmurs, rub, or gallops   GI: Non-distended, normal bowel sounds, non-tender  Skin: no bruising or bleeding and pale hands  Musculoskeletal: No peripheral edema present   Neurologic: Awake, alert, oriented to name, place and time. LLE strength  4/5 with 5/5 else where        Medical Decision Making       Please see A&P for additional details of medical decision making.      Data     I have personally reviewed the following data over the past 24 hrs:    12.0 (H)  \   10.0 (L)   / 243     133 (L) 94 (L) 50.0 (H) /  107 (H)   4.9 20 (L) 3.09 (H) \       ALT: 24 AST: 29  AP: 77 TBILI: 0.4   ALB: 4.0 TOT PROTEIN: 6.7 LIPASE: N/A       Trop: 46 (H) BNP: 652       Procal: N/A CRP: N/A Lactic Acid: 2.5 (H)         Imaging results reviewed over the past 24 hrs:   Recent Results (from the past 24 hour(s))   POC US ECHO LIMITED    Impression    Limited Bedside Cardiac Ultrasound, performed and interpreted by me.   Indication: Hypotension/shock.  Parasternal long axis, parasternal short axis, and apical 4 chamber views were acquired.   Image quality was satisfactory.    Findings:    Global left ventricular function appears intact.  Chambers do not appear dilated.  Hypoechoic structure around pericardium representing small effusion vs pericardial fat    IMPRESSION: Grossly normal left ventricular function and chamber size.  Small effusion vs pericardial fat.       XR Chest Port 1 View    Narrative    Portable chest    INDICATION: Sepsis, hypotensive    COMPARISON: Chest CT angiogram outside study dated 7/17/2023. Chest CT  5/12/2023. Most recent available plain film 5/12/2023.    FINDINGS: Heart remains enlarged. Median sternotomy again noted.  Atherosclerotic calcifications at the aortic knob again present.  Silhouetting of the left hemidiaphragm is slightly decreased. There is  osteophyte formation on both sides of the acromioclavicular joint  bilaterally. The right humeral head is high riding. Hazy opacities in  the lungs appear similar otherwise.      Impression    IMPRESSION: Slightly improved retrocardiac aeration which may indicate  slightly decreasing consolidation or atelectasis. No new opacities.  Prominent degenerative changes of both shoulders including possible  rotator cuff degeneration best seen on the right. Atherosclerosis.    EDWARD JAMA MD         SYSTEM ID:  [id number]   CTA Chest Abdomen Pelvis w Contrast    Narrative    Exam: Computed tomographic angiography of the chest, abdomen and  pelvis without and with contrast including 3D reformations dated  7/17/2023 9:15  AM    Clinical information: Sepsis, severely elevated lactate, vomiting.  Abdominal tenderness.    Technique: Axial images through the chest and abdomen obtained before  the administration of intravenous contrast media and following the  injection of contrast media  in the arterial phase. Source images  reviewed as well as 3D and multi-planar reconstructions at a 3D  workstation.    Contrast: iopamidol (ISOVUE-370) solution 100 mL    DLP: 1816 mGy*cm    Comparison: CT chest 5/12/2023 and CT CAPD 1/3/2022    Findings:      The thoracic aorta, great vessels, and main and proximal pulmonary  arteries do not demonstrate evidence of dissection, intramural  hematoma, or thrombus. The main pulmonary artery measures 33 mm. The  ascending aorta measures up to 40 mm on true axial imaging.    Median sternotomy wires and postoperative changes of coronary artery  bypass. Extensive atherosclerotic calcifications of the coronary  arteries.    Heart is mild to moderately enlarged. No pericardial effusion.    No enlarged lymph nodes in the chest or axilla. The visualized thyroid  is within normal limits. The central venous vasculature is patent.  Bilateral gynecomastia.    Lungs: Basilar predominant peripheral reticular interstitial opacities  likely represent chronic changes of fibrosis. No acute pulmonary  opacities. No pleural effusions.    Abdomen and pelvis: There is a sliding-type hiatal hernia. The  gallbladder is surgically absent. The liver, spleen, pancreas, and  adrenal glands are normal. There are cysts in the kidneys bilaterally,  including a 2 cm exophytic cyst off the inferior lateral right kidney  with the thick focus of calcification on series 11 image 185. Density  is 33 Hounsfield units on noncontrast imaging.    There are calcifications throughout the infrarenal abdominal aorta  without stenosis or aneurysm. The iliac arteries are widely patent.  Atherosclerotic plaque at the origins of the renal arteries  with  likely mild stenosis. The origins of the celiac and superior  mesenteric arteries are patent. Inferior mesenteric artery is patent.  No ascites. No enlarged lymph nodes in the abdomen or pelvis or  retroperitoneum. Penile prosthesis noted.    Bones: No suspicious or aggressive appearing bony abnormality.  Multilevel degenerative spondylosis with posterior fusion hardware at  L5-S1.      Impression    Impression:    1. No abnormality to explain etiology of clinical sepsis and other  symptoms.    2. No acute vascular abnormality. Ectasia of the ascending aorta  measuring up to 4 cm. Dilated main pulmonary artery likely reflects  chronic pulmonary hypertension.    3. Basilar peripheral predominant pulmonary interstitial  scarring/fibrosis.    4. 2 cm complex cyst versus solid lesion lateral right lower lobe  kidney. Slightly increased in size compared to the study from one and  half years ago. Recommend obtaining a renal mass protocol MRI when the  patient is stable.    5. Sliding-type hiatal hernia.    6. Coronary artery bypass with extensive atherosclerotic  calcifications of the coronary arteries.    LEX PUGHA         SYSTEM ID:  [id number]   Echo Complete   Result Value    LVEF  50-55%    Narrative    [id number]  [id number]  [id number]  [id number]^ALFONSO^DEWAYNE     [facility],[place]  Echocardiography Laboratory  [address] [place] [id number]     Name: LUIS F BURDICK  MRN: [id number]  : 1940  Study Date: 2023 10:15 AM  Age: 82 yrs  Gender: Male  Patient Location: [facility]  Reason For Study: Shock  Ordering Physician: DEWAYNE HAMILTON  Performed By: Naomi Pena RDCS     BSA: 2.0 m2  Height: 74 in  Weight: 162 lb  HR: 99  BP: 108/58 mmHg  ______________________________________________________________________________  Procedure  Complete Portable Echo Adult. Contrast Optison. Optison (NDC #5767-5968-49)  given intravenously. Patient was given 5 ml mixture  of 3 ml Optison and 6 ml  saline. 4 ml wasted.  ______________________________________________________________________________  Interpretation Summary  The visual ejection fraction is 50-55%.  Right ventricular function, chamber size, wall motion, and thickness are  normal.  Pulmonary artery systolic pressure cannot be assessed.  Sinuses of Valsalva 4.6 cm.  Ascending aorta 4.2 cm.  The inferior vena cava is normal.  Trivial pericardial effusion is present.  ______________________________________________________________________________  Left Ventricle  Left ventricular size is normal. Left ventricular wall thickness is normal.  Left ventricular diastolic function is indeterminate. The visual ejection  fraction is 50-55%. Regional wall motion is probably normal.     Right Ventricle  Right ventricular function, chamber size, wall motion, and thickness are  normal.     Atria  The atria cannot be assessed.     Mitral Valve  Mild mitral annular calcification is present. Mild mitral insufficiency is  present.     Aortic Valve  Aortic valve sclerosis is present.     Tricuspid Valve  The tricuspid valve is normal. Pulmonary artery systolic pressure cannot be  assessed.     Pulmonic Valve  The pulmonic valve is normal.     Vessels  The pulmonary artery and bifurcation cannot be assessed. The inferior vena  cava is normal. Sinuses of Valsalva 4.6 cm. Ascending aorta 4.2 cm.     Pericardium  Prominent epicardial fat is noted. Trivial pericardial effusion is present.     ______________________________________________________________________________  MMode/2D Measurements & Calculations  Ao root diam: 4.6 cm  asc Aorta Diam: 4.2 cm  LVOT diam: 2.5 cm  LVOT area: 4.9 cm2     Doppler Measurements & Calculations  LV V1 max P.8 mmHg  LV V1 max: 83.8 cm/sec  LV V1 VTI: 13.8 cm  SV(LVOT): 67.7 ml  SI(LVOT): 34.1 ml/m2     ______________________________________________________________________________  Report approved by: FLAKITA  Daksha Ho 07/17/2023 11:14 AM

## 2023-07-17 NOTE — LETTER
Recipient: [facility]          Sender: Mj Mccoy [phone]          Date: July 26, 2023  Patient Name:  Deandre Moore  Patient YOB: 1940  Routing Message:  I am SOOO sorry for the delay I requested the provider put orders in last night but they did not get to it. They just put orders in a few minutes ago. Transport set up to pick patient up between [id number] and transport to the facility. Thank you so much!        The documents accompanying this notice contain confidential information belonging to the sender.  This information is intended only for the use of the individual or entity named above.  The authorized recipient of this information is prohibited from disclosing this information to any other party and is required to destroy the information after its stated need has been fulfilled, unless otherwise required by state law.    If you are not the intended recipient, you are hereby notified that any disclosure, copy, distribution or action taken in reliance on the contents of these documents is strictly prohibited.  If you have received this document in error, please return it by fax to [phone] with a note on the cover sheet explaining why you are returning it (e.g. not your patient, not your provider, etc.).  If you need further assistance, please call .  Documents may also be returned by mail to [employer], , [address], [place], [place] [address].

## 2023-07-17 NOTE — LETTER
Transition Communication Hand-off for Care Transitions to Next Level of Care Provider    Name: Deandre Moore  : 1940  MRN #: [id number]  Primary Care Provider: William Marquez     Primary Clinic: [address] [place] [id number]     Reason for Hospitalization:  Hypovolemia [E86.1]  Urinary retention [R33.9]  BRENT (acute kidney injury) (H) [N17.9]  Sepsis with acute organ dysfunction without septic shock, due to unspecified organism, unspecified type (H) [A41.9, R65.20]  Admit Date/Time: 2023  6:00 AM  Discharge Date: 2023  Payor Source: Payor: [employer] / Plan: [employer] MEDICARE ADVANTAGE / Product Type: HMO /     Readmission Assessment Measure (JOSSIE) Risk Score/category: 38%    Reason for Communication Hand-off Referral: Avoidable readmission within 30 days    Discharge Plan: Patient will discharge to [facility] for TCU placement       Concern for non-adherence with plan of care:   No  Discharge Needs Assessment:  Needs      Flowsheet Row Most Recent Value   Equipment Currently Used at Home grab bar, toilet, shower chair, walker, standard, walker, rolling          Follow-up specialty is recommended: No    Follow-up plan:    Future Appointments   Date Time Provider Department Center   2023  3:30 PM Miguel Anaya, PT [facility]   2024  2:30 PM Trudy Campa MD [facility]   2024 12:30 PM Reid Huang, PhD [facility]       Any outstanding tests or procedures:        Referrals       Future Labs/Procedures    Medication Therapy Management Referral     Process Instructions:        This referral will be filtered to a centralized scheduling office at [facility] and the patient will receive a call to schedule an appointment at a [facility] location most convenient for them.    Comments:    This service is designed to help you get the most from your medications.  A specially trained pharmacist will  work closely with you and your doctors  to solve any problems related to your medications and to help you get the   best results from taking them.      Pt's son Lucio machado's pts meds but is not a very reliable historian.    The Medication Therapy Management staff will call you to schedule an appointment.        Occupational Therapy Adult Consult     Comments:    Evaluate and treat as clinically indicated.    Reason: Failure to thrive    Physical Therapy Adult Consult     Comments:    Evaluate and treat as clinically indicated.    Reason:  Failure to thrive              Key Recommendations:  External handoff    Mj Mccoy    AVS/Discharge Summary is the source of truth; this is a helpful guide for improved communication of patient story

## 2023-07-17 NOTE — ED TRIAGE NOTES
Pt BIBA, felt faint when amb from [place], son had to help him, had to sit down in the floor, emesisx1,started sometime after 12am 7/17, given 4mg zofran, 5000ml bolus NS, , VSS WNL, EKG-BBB  Hx: HTN, CABG, DM, MI       Triage Assessment     Row Name 07/17/23 0600       Triage Assessment (Adult)    Airway WDL WDL       Respiratory WDL    Respiratory WDL WDL       Skin Circulation/Temperature WDL    Skin Circulation/Temperature WDL WDL       Cardiac WDL    Cardiac WDL WDL       Peripheral/Neurovascular WDL    Peripheral Neurovascular WDL WDL       Cognitive/Neuro/Behavioral WDL    Cognitive/Neuro/Behavioral WDL WDL

## 2023-07-17 NOTE — CONSULTS
[facility]    Cardiology Consult Note-      Date of Admission:  7/17/2023  Consult Requested by: ED   Reason for Consult: Concern for PAH, PA dilated on CT chest     Assessment & Plan: HVSL   Deandre Moore is a 82 year old male with history of CAD (s/p CABG 1991 LIMA-LAD, SVG- RCA, SVG- OM) s/p multiple PCU ( last in 2011. Most recent ischemic eval with MPI negative for ischemia in  2021, HFpEF ( 55%) with admissions in the past for IV diuresis ( follows at [facility], on lasix, caden, iresartan in the past, most recently discontinued) , DMII, HTN, depression, HLD, gout, BPH, chronic pain, chronic dermatitis, R renal lesion, and insomnia who was  Recently [facility] 5/12/2023 with urinary retention, BRENT, and worsening back pain and treated for sepsis. Presented to ED today with syncope.     Cardiology consulted due to concern for PH     Discussion   Patients clinical course with initial presentation of hypotension resulting in syncope and elevated lactate in the setting of recent diarrea and elevated WBC is concerting for septic vs hypovolimic shock. He is currently responding to resuscitation with IVF and Abx. TTE notable for normal BiV function without any significant RVH or features concerning for sequelae of pulmonary HTN. Patient is also satting well on RA. He did not have sufficient TR jet on TTE to estimate RVSP. While patinet could certainly have Pulmonary hypertension as noted on CT CAP. His current presentation is not most likely not as a result of this. At this point in time if the patient does have pulmonary hypertention, it is not of major clinical consequence.   Patient appeared hypovolumic on exam      Recommendations   - No further PH workup recommended at this time   - Agree with resuscitation with IVF and Abx for management of septic/hypovolimic shock      The patient's discussed and seen with the Attending Physician, Dr. Aviles  Jonel Gann MD  Cardiology fellow ([id number])  [id number]    Cardiology will sign off     Cardiac data     Echo 7/17/23     Interpretation Summary  The visual ejection fraction is 50-55%.  Right ventricular function, chamber size, wall motion, and thickness are  normal.  Pulmonary artery systolic pressure cannot be assessed.  Sinuses of Valsalva 4.6 cm.  Ascending aorta 4.2 cm.  The inferior vena cava is normal.  Trivial pericardial effusion is present.      Clinically Significant Risk Factors Present on Admission             # Anion Gap Metabolic Acidosis: Highest Anion Gap = 19 mmol/L in last 2 days, will monitor and treat as appropriate    # Drug Induced Platelet Defect: home medication list includes an antiplatelet medication  # Acute Kidney Injury, unspecified: based on a >150% or 0.3 mg/dL increase in last creatinine compared to past 90 day average, will monitor renal function  # Hypertension: Noted on problem list   # Acute Respiratory Failure: based on blood gas results.  Continue supplemental oxygen as needed           ______________________________________________________________________    Chief Complaint   Syncope       History of Present Illness   Deandre Moore is a 82 year old male with history of CAD (s/p CABG 1991 LIMA-LAD, SVG- RCA, SVG- OM) s/p multiple PCU ( last in 2011. Most recent ischemic eval with MPI negative for ischemia in  2021, HFpEF ( 55%) with admissions in the past for IV diuresis ( follows at [facility], on lasix, caden, iresartan in the past, most recently discontinued) , DMII, HTN, depression, HLD, gout, BPH, chronic pain, chronic dermatitis, R renal lesion, and insomnia who was  Recently admitted to [facility] 5/12/2023 with urinary retention, BRENT, and worsening back pain and treated for sepsis. Presented to ED today with syncope.     Cardiology consulted due to concern for PH     Patient notes first time episode of syncope. Felt lightheaded and dizzy just prior. Notes recent hx of  diarrhea. Denied CP, palpitations.  Did not hit his head. Did not lose consciousness. In the ED was found to be hypotensive with elevated WBC to 12 and elevated lactate to 6. Responded to IVF and Abx. Repeat BP in the 120's/70's. Lactate down to 2.5.    As a part of his eval he had a CT CAP to look for source of sepsis. This was notable for dialated main PA concerning for PH. Cardiology was consulted for recommendations for volume manege ment in the setting of this finding.      Past Medical History    Past Medical History:   Diagnosis Date     Diabetes mellitus (H)      Gout attack      Heart attack (H) 1984     Hypertension      Stented coronary artery     6 stents in heart       Past Surgical History   Past Surgical History:   Procedure Laterality Date     CARDIAC SURGERY  1984    4 vessel bypass     CHOLECYSTECTOMY       COLONOSCOPY       DESTRUCTION OF PARAVERTEBRAL FACET LUMBAR / SACRAL ADDITIONAL Bilateral 10/18/2021    Procedure: DESTRUCTION, NERVE, FACET JOINT, LUMBOSACRAL, ADDITIONAL NERVE AFTER DESTRUCTION OF INITIAL LUMBOSACRAL FACET JOINT NERVE;  Surgeon: Horacio Gonsalves MD;  Location: [facility] OR     DESTRUCTION OF PARAVERTEBRAL FACET LUMBAR / SACRAL SINGLE Bilateral 10/18/2021    Procedure: DESTRUCTION, NERVE, FACET JOINT, LUMBOSACRAL, 1 NERVE;  Surgeon: Horacio Gonsalves MD;  Location: [facility] OR     ESOPHAGOSCOPY, GASTROSCOPY, DUODENOSCOPY (EGD), COMBINED N/A 3/1/2018    Procedure: COMBINED ESOPHAGOSCOPY, GASTROSCOPY, DUODENOSCOPY (EGD), BIOPSY SINGLE OR MULTIPLE;  ESOPHAGOGASTRODUODENOSCOPY ;  Surgeon: Daryn Medrano MD;  Location: [facility]     INJECT BLOCK MEDIAL BRANCH CERVICAL/THORACIC/LUMBAR Bilateral 8/16/2021    Procedure: BLOCK, NERVE, FACET JOINT, MEDIAL BRANCH, DIAGNOSTIC Lumbar 2-3-4;  Surgeon: Horacio Gonsalves MD;  Location: [facility] OR     INJECT BLOCK MEDIAL BRANCH CERVICAL/THORACIC/LUMBAR Bilateral 9/27/2021    Procedure: BLOCK, NERVE, FACET JOINT, MEDIAL BRANCH,  DIAGNOSTIC  L2-4, Bilateral;  Surgeon: Horacio Gonsalves MD;  Location: [facility] OR     NO HISTORY OF SURGERY  3/31/14    derm     PHACOEMULSIFICATION WITH STANDARD INTRAOCULAR LENS IMPLANT  1/21/2013    Procedure: PHACOEMULSIFICATION WITH STANDARD INTRAOCULAR LENS IMPLANT;  Phacoemulsification with standard intraocular lens implant, right eye;  Surgeon: Jay Rodriges MD;  Location: [facility] OR       Medications   I have reviewed this patient's current medications      Physical Exam   Vital Signs: Temp: 97.4  F (36.3  C) Temp src: Oral BP: 112/71 Pulse: 96   Resp: 17 SpO2: 99 % O2 Device: None (Room air)    Weight: 0 lbs 0 oz    Gen: NAD  CV: RRR, Ns1,S2. No JVD   Pulm: Clear B/l. No wheezing   Abd: Soft. Non-distended. Non-tender to palpation   Ext:  No Periferal edema   Neuro: Non-focal.     Medical Decision Making             Data

## 2023-07-17 NOTE — MEDICATION SCRIBE - ADMISSION MEDICATION HISTORY
Medication Scribe Admission Medication History    Admission medication history is complete. The information provided in this note is only as accurate as the sources available at the time of the update.    Medication reconciliation/reorder completed by provider prior to medication history? No    Information Source(s): Patient, Family member and CareEverywhere/SureScripts via in-person and phone    Pertinent Information: Pt stated that his son knows his medications better. Contacted his son and filled in gaps    Changes made to PTA medication list:    Added: Furosemide 40 mg, Spironolactone 25 mg    Deleted: Diclofenac 75 mg (HS), Fluocinolone 0.01%, Fluoxetine 20 mg, Gabapentin 100 mg, Glimepiride 1 mg (BID), Metformin 1000 mg (BID), Mupirocin 2 % (every day), Tamsulosin 0.4 mg, Triamcinolone inj., Allopurinol 300 mg    Changed: Allopurinol 100 mg, Diclofenac 75 mg (PRN HS), Fluoxetine 40 mg, Glimepiride 1 mg (HS), Metformin 1000 mg (every 7 days, Sundays), mupirocin 2% (PRN)     Medication Affordability:  Not including over the counter (OTC) medications, was there a time in the past 3 months when you did not take your medications as prescribed because of cost?: No    Allergies reviewed with patient and updates made in EHR: yes    Medication History Completed By: Dina Abdi 7/17/2023 6:02 PM    Prior to Admission medications    Medication Sig Last Dose Taking? Auth Provider Long Term End Date   Alcohol Swabs PADS 1 pad 4 times daily  Yes Trudy Campa MD     allopurinol (ZYLOPRIM) 100 MG tablet  7/16/2023 at am Yes Reported, Patient     aspirin 81 MG tablet Take 81 mg by mouth daily. 7/16/2023 at am Yes Reported, Patient     atorvastatin (LIPITOR) 40 MG tablet Take 1 tablet (40 mg) by mouth daily *Keep appointment on 7/19/23 for further refills* 7/16/2023 at am Yes William Marquez MD Yes    augmented betamethasone dipropionate (DIPROLENE-AF) 0.05 % external ointment Apply twice daily as needed for rash on  the leg or back Unknown at unknown Yes Edu Rivera MD     blood glucose (NO BRAND SPECIFIED) lancets standard Use to test blood sugar 3 times daily or as directed.  Yes Trudy Campa MD     blood glucose (NO BRAND SPECIFIED) test strip Use to test blood sugar 4 times daily  With meter/ strips/ lancets covered by insurance pt using accuchek  Yes Trudy Campa MD     Blood Glucose Monitoring Suppl (BLOOD GLUCOSE MONITOR SYSTEM) w/Device KIT Test 4 times daily with meter covered by  insurance  Yes Trudy Campa MD     clobetasol (TEMOVATE) 0.05 % external cream Apply twice daily as needed for itching or rash on the back Unknown at unknown Yes Edu Rivera MD     colchicine (COLCYRS) 0.6 MG tablet Take 1 tablet (0.6 mg) by mouth daily 7/16/2023 at am Yes William Marquez MD     Cyanocobalamin (VITAMIN B12) 500 MCG TABS Take 1 tablet by mouth daily 7/16/2023 at am Yes Reported, Patient     diclofenac (VOLTAREN) 75 MG EC tablet Take 75 mg by mouth nightly as needed More than a month at unknown Yes Reported, Patient No    finasteride (PROSCAR) 5 MG tablet Take 1 tablet (5 mg) by mouth daily 7/16/2023 at pm Yes Amy Johnston PA     FLUoxetine (PROZAC) 40 MG capsule Take 40 mg by mouth daily 7/16/2023 at am Yes Reported, Patient No    folic acid (FOLVITE) 1 MG tablet Take 1 tablet (1 mg) by mouth daily Daily on days you don't take MTX 7/16/2023 at unknown Yes Edu Rivera MD     furosemide (LASIX) 40 MG tablet Take 40 mg by mouth daily 7/16/2023 at pm Yes Reported, Patient No    glimepiride (AMARYL) 1 MG tablet Take 1 mg by mouth daily (with dinner) 7/16/2023 at pm Yes Reported, Patient No    irbesartan (AVAPRO) 150 MG tablet Take 75 mg by mouth At Bedtime 7/16/2023 at pm Yes Reported, Patient No    isosorbide mononitrate (IMDUR) 30 MG 24 hr tablet Take 1 tablet (30 mg) by mouth daily 7/16/2023 at pm Yes William Marquez MD Yes    metFORMIN (GLUCOPHAGE) 1000 MG tablet Take  1,000 mg by mouth every 7 days Sundays 7/16/2023 at pm Yes Reported, Patient No    mupirocin (BACTROBAN) 2 % external ointment Apply topically as needed Unknown at unknown Yes Reported, Patient     nitroglycerin (NITROSTAT) 0.4 MG SL tablet Place under the tongue as needed More than a month Yes Reported, Patient Yes    spironolactone (ALDACTONE) 25 MG tablet Take 25 mg by mouth daily 7/16/2023 at pm Yes Reported, Patient No    traZODone (DESYREL) 150 MG tablet Take 150 mg by mouth At Bedtime 7/16/2023 at pm Yes Reported, Patient No    methotrexate 2.5 MG tablet Take 6 tablets (15 mg) by mouth every 7 days for 30 days   Edu Rivera MD Yes 6/4/23

## 2023-07-17 NOTE — ED NOTES
Pt says he does not feel more weak. He does not have any chest pain or shortness of breath. He has not been eating and drinking very much at home. He says he feels cold

## 2023-07-17 NOTE — LETTER
Recipient: [facility]          Sender: Mj Mccoy [phone]          Date: July 24, 2023  Patient Name:  Deandre Moore  Patient YOB: 1940  Routing Message:          The documents accompanying this notice contain confidential information belonging to the sender.  This information is intended only for the use of the individual or entity named above.  The authorized recipient of this information is prohibited from disclosing this information to any other party and is required to destroy the information after its stated need has been fulfilled, unless otherwise required by state law.    If you are not the intended recipient, you are hereby notified that any disclosure, copy, distribution or action taken in reliance on the contents of these documents is strictly prohibited.  If you have received this document in error, please return it by fax to [phone] with a note on the cover sheet explaining why you are returning it (e.g. not your patient, not your provider, etc.).  If you need further assistance, please call .  Documents may also be returned by mail to [employer], , [address], [place], [place] [address].

## 2023-07-18 NOTE — CONSULTS
Care Management Initial Consult    General Information  Assessment completed with: Patient, VM-chart review, Care Team Member,    Type of CM/SW Visit: Initial Assessment    Primary Care Provider verified and updated as needed: Yes   Readmission within the last 30 days: no previous admission in last 30 days      Reason for Consult: discharge planning (Elevated risk score)  Advance Care Planning: Advance Care Planning Reviewed: no concerns identified          Communication Assessment  Patient's communication style: spoken language (English or Bilingual)    Hearing Difficulty or Deaf: no   Wear Glasses or Blind: yes    Cognitive  Cognitive/Neuro/Behavioral: .WDL except  Level of Consciousness: alert  Arousal Level: opens eyes spontaneously  Orientation: disoriented to, time  Mood/Behavior: calm, cooperative  Best Language: 0 - No aphasia  Speech: spontaneous, clear, logical    Living Environment:   People in home: spouse     Current living Arrangements: house      Able to return to prior arrangements:  yes       Family/Social Support:  Care provided by: spouse/significant other  Provides care for: no one, unable/limited ability to care for self  Marital Status:   Wife, Children          Description of Support System: Supportive, Involved    Support Assessment: Adequate family and caregiver support    Current Resources:   Patient receiving home care services: No     Community Resources: None  Equipment currently used at home: grab bar, toilet, grab bar, tub/shower, shower chair, walker, rolling  Supplies currently used at home: None    Employment/Financial:  Employment Status: retired        Financial Concerns: No concerns identified   Referral to Financial Worker: No       Does the patient's insurance plan have a 3 day qualifying hospital stay waiver?  No    Lifestyle & Psychosocial Needs:  Social Determinants of Health     Tobacco Use: Medium Risk (6/23/2023)    Patient History      Smoking Tobacco Use: Former       Smokeless Tobacco Use: Never      Passive Exposure: Not on file   Alcohol Use: Not on file   Financial Resource Strain: Not on file   Food Insecurity: Not on file   Transportation Needs: Not on file   Physical Activity: Not on file   Stress: Not on file   Social Connections: Not on file   Intimate Partner Violence: Not on file   Depression: Not at risk (4/4/2023)    PHQ-2      PHQ-2 Score: 0   Housing Stability: Not on file       Functional Status:  Prior to admission patient needed assistance:   Dependent ADLs:: Ambulation-walker, Bathing, Dressing, Transfers, Toileting  Dependent IADLs:: Cleaning, Cooking, Laundry, Shopping, Meal Preparation, Medication Management, Money Management, Transportation       Mental Health Status:  Mental Health Status: No Current Concerns       Chemical Dependency Status:  Chemical Dependency Status: No Current Concerns             Values/Beliefs:  Spiritual, Cultural Beliefs, Tenriism Practices, Values that affect care:  Not discussed at this time               Additional Information:  Deandre Moore is a 82 y.o. male with a history of cholecystitis, CAD s/p CABG & PCIs, Reflux, gout, HTN, SVT, HLD, CKD III, DMII, cognitive impairment, and tobacco abuse who presented to the ED with c/f septic shock but is likely to have Hypovolemic Shock and Malnutrition in the setting of FTT.     SW met with the patient at bedside to complete CMA. SW introduced self and explained their role. SW verified the patient's address and PCP. Patient reported he lives in a house with his wife Beatriz. The patient's son Lucio lives nearby. The patient reported his wife assists as needed with ADLs. He stated he is unsure if his wife will be able to continue to provide assistance. The patient's son Lucio assists with IADLs. The patient has a rolling walker he uses at home. The patient is not currently receiving any home care services, however the patient stated he feels some additional supports in the  home would be helpful. The patient went to [facility] T[facility] after his last hospitalization. The patient reported he has since been discharged from there and returned home. The patient declined having any questions or concerns at this time.    SW will continue to follow for discharge needs.     CARLOS ENRIQUE Lewis  [address]   Office: [phone]  Pager: [phone]  [email or web address]

## 2023-07-18 NOTE — PROGRESS NOTES
Admission/Transfer from: ED  2 RN skin assessment completed. YES w/ Ever MAC, RN  Significant findings include: Skin is generally dry and flaky. Blanchable redness to heel bilaterally, mepilexes placed. Generalized scabbing across limbs & L foot. Surgical scar midline of chest. Cifuentes in place, BUE PIVs in place; drains & accesses WDL.  WOC Nurse Consult Ordered? NO

## 2023-07-18 NOTE — PROGRESS NOTES
[facility]    Progress Note - Medicine Service, RIKI TEAM 5       Date of Admission:  7/17/2023    Assessment & Plan   Deandre Moore is a 82 y.o. male with a history of cholecystitis, CAD s/p CABG & PCIs, Reflux, gout, HTN, SVT, HLD, CKD III, DMII, cognitive impairment, and tobacco abuse who presented to the ED with c/f septic shock but is likely to have Hypovolemic Shock and Malnutrition in the setting of FTT.      Updates:  - Palliative consult    FTT  Hypovolemia  Weight Loss  Generalized Weakness  Per chart review, pt had persistent weight loss of 100 lbs in the last year due to poor appetite and impaired sense of taste reported in 6/2023 hospital stay. Family was reported to supplement diet with Ensure nutrition shakes. This hospitalization his lactate and increased ketones can be explained with decreased PO intake. Pt was reportedly having talks about hospice at last hospitalization and will review previous notes to see decision. Current presentation is similar to last hospitalization with labs being reflective of failure to thrive. Pt states that he has not been eating and drinking at home. This was confirmed with his son during a phone call. His son also wishes to pursue living facility options given that deteriorating status of his father and feeling like he is unable to maintain him at home. Palliative has been consulted for this. Pt is weak on the floor as well requiring aid to help him eat.   - Nutrition consult placed  - Vanc and zosyn discontinued  - PT/OT consult placed  - Palliative consult placed     Hyperglycemia   Hypoglycemia   DM II    Last hospitalization, pt Glimepiride 2mg BID was discontinued d/t lack of PO intake. Pt presented today with hyperglycemia, but quickly had another episode of hypoglycemia of 50 that is not well explained. Will placed on LDSS and continue to monitor.  - Hold PTA Metformin   > Held last hospitalization as  well   - LDSS  - regular diet   - Hypoglycemia protocol  - Endocrinology following:      BRENT on CKD III 2/2 Hypovolemia   Cr on admission of 3.2. Baseline closer to 1.0. Suspect pre-renal given improvement with IVF.    - Strict I/Os  - Avoid nephrotoxic agents as able & renally dose medications   - Holding several PTA meds for now:   - Colchicine 0.6mg Qday   - Allopurinol 300mg       Elevated Troponin  Likely Type II Demand Ischemia in the setting of hypovolemia. Pt had similar presentation at last hospitalization with no complaints currently. Continuing to slowly rise, which is expected with BRENT on CKD III. Will monitor for additional symptoms for further evaluation.   - CTM      Chronic/Resolved Medical Conditions   Cognitive Issues  Assessment: Per chart review, evaluated by Neurology through [facility] on 6/23/23. MoCA score was below normal at 18/30. Plan at that time: Neuropsych referral, MRI brain with & without, ongoing B12 supplementation.   - Review notes from previous hospitalization     Chronic Back Pain   H/o L4-5 spinal fusion and spinal canal decompression  Recent diagnosis of endplate compression deformity at L2  Assessment: Hospitalized at [facility] in May with back pain. CT thoracic and lumbar spine 5/12: no acute thoracic findings, postsurgical changes of L4-5 spinal fusion and spinal canal decompression, upper endplate compression deformity at L2.MRI T spine (5/13) - compression deformity at T11, post surgical changes, moderate stenosis at T6-8, nodule at T6. MRI L spine (5/14) - multi level bulges with moderate narrowing L1-L3 and severe neural forminal narrowing at L3-L4. NSGY evaluated during that admission but did not recommend acute interventions. Recommended outpatient follow up. Previously followed as OP with PM&R, last seen 5/5/22.  - Follow up with outpatient NSGY as recommended     CAD s/p CABG  HFpEF   HTN  HLD   - PTA Aspirin 81mg after resolution of BRENT  - PTA  "Atorvastatin 40mg daily     Atopic dermatitis  - PTA Weekly MTX   - continue folate supplementation     BPH  - PTA Finateride 5mg daily     Gout   - Hold PTA Colchicine  - Hold PTA Allopurinol     Diet: Combination Diet Regular Diet Adult  Calorie Counts    DVT Prophylaxis: Heparin SQ  Cifuentes Catheter: PRESENT, indication: Retention  Fluids: PO/ LR 100mL/hr for 24hrs  Lines: None     Cardiac Monitoring: None  Code Status: No CPR- Do NOT Intubate      Clinically Significant Risk Factors             # Anion Gap Metabolic Acidosis: Highest Anion Gap = 19 mmol/L in last 2 days, will monitor and treat as appropriate      # Hypertension: Noted on problem list        # Overweight: Estimated body mass index is 27.49 kg/m  as calculated from the following:    Height as of 6/23/23: 1.88 m (6' 2\").    Weight as of this encounter: 97.1 kg (214 lb 1.6 oz)., PRESENT ON ADMISSION          Disposition Plan      Expected Discharge Date: 07/19/2023      Destination: home with family          The patient's care was discussed with the Attending Physician, Dr. Klever Key.    Mg Galaviz MD  Medicine Service, [address]  Securely message with Vocera (more info)  Text page via [employer] Paging/Directory   See signed in provider for up to date coverage information  ______________________________________________________________________    Interval History   Overnight pt was said to be hallucinating and seeing children running around floor. No hallucinations this morning. No complaints at the bedside.     Physical Exam   Vital Signs: Temp: 98.4  F (36.9  C) Temp src: Oral BP: (!) 140/67 Pulse: 83   Resp: 20 SpO2: 96 % O2 Device: None (Room air)    Weight: 214 lbs 1.6 oz    Constitutional: awake, indifferent  Eyes: vision intact  ENT: atramatic  Hematologic / Lymphatic: no cervical lymphadenopathy and no supraclavicular lymphadenopathy  Respiratory: Clear to auscultation " BL  Cardiovascular: Normal S1/S2 with no murmurs, rub, or gallops   GI: Non-distended, normal bowel sounds, non-tender  Skin: no bruising or bleeding and  Musculoskeletal: No peripheral edema present   Neurologic: Awake, alert, oriented to name and time.    Medical Decision Making       Please see A&P for additional details of medical decision making.      Data     I have personally reviewed the following data over the past 24 hrs:    7.9  \   9.1 (L)   / 219     135 (L) 101 33.4 (H) /  176 (H)   4.9 26 2.32 (H) \       Procal: N/A CRP: N/A Lactic Acid: 1.4         Imaging results reviewed over the past 24 hrs:   No results found for this or any previous visit (from the past 24 hour(s)).

## 2023-07-18 NOTE — PLAN OF CARE
Goal Outcome Evaluation:      Plan of Care Reviewed With: patient    Overall Patient Progress: improvingOverall Patient Progress: improving    Outcome Evaluation: Pt arrived to unit around 1940. A&Ox3, reoriented to time. Forgetful; bed alarm on, trialed off sitter. VSS on RA. 0200 BG check was 65; two apple juices given, w/ BG recheck at 110 mg/dL. Cifuentes in place for retention . PIVs-SL. Blanchable reddness of heels bilaterally found, mepilexes applied. Preventative mepilex on sacrum. Pt denies pain, dizziness, and SOB on shift. Pt triggered sepsis protocol during evening, lactic came back as 1.4; trending down from ED. Pt told writer that he would like to be resuscitated if need be; provider paged & came to bedside to discuss code status w/ patient, though he expressed desire to continue sleeping & was not receptive to conversation. Provider stated that she would pass on to day team. Continue w/ POC.

## 2023-07-18 NOTE — PROGRESS NOTES
"[id number], provider Karmen Marie paged:    \"Pt code status in EPIC DNR/I; pt verbally states wanting to be resuscitated if need be. Could you come verify pt preferred code status? Thx.\"    OUTCOME:  Provider Uzma Lilly came to pt bedside to assess pt's wishes regarding code status; pt did not wish to participate in conversation per provider, and stated he would \"rather just sleep.\" Current advanced directive states pt as DNR/I, and was signed 7/10/2023; plan going forward is to honor the current scanned document, and the provider will pass on to day team to have conversation about resuscitation wishes w/ patient once he is more awake and receptive to conversation.      "

## 2023-07-18 NOTE — CONSULTS
Palliative Care Consultation Note  [facility]      Patient: Deandre Moore  Date of Admission:  7/17/2023    Requesting Clinician / Team: Hospitalist  Reason for consult: Goals of care     Recommendations & Counseling     GOALS OF CARE:     Life-prolonging with limits (DNR/DNI)     Deandre has only partial capacity to participate in goals of care conversations due to cognitive decline (recent MOCA 18/30). He is able to state values and preferences but needs family support for medical decision making.    Spoke with Lucio (son/HCA) over the phone today. Lucio shared concerns about Deandre's cognitive and functional decline over the past 1.5 years. This is his 3rd admission in 3 months related to poor oral intake and failure to thrive. Deandre has lost about 90lbs over 18 months.    Lucio is most concerned with (1) Deandre's lack of appetite/weight loss without clear etiology and (2) how to best support Deandre at home or at a facility upon discharge.     Lucio is aware that Deandre would likely qualify for hospice services as this was discussed during his last admission. They were referred for an informational meeting with home hospice but have not yet met with anyone. Today we discussed hospice philosophy and how hospice could support Deandre and family at home or at a facility. No plans for hospice referral at this time.    Lucio is also considering placement at [facility] for Deandre and his wife (who has Alzheimer's). In the interim, they are interested in TCU placement for Deandre as this was helpful in the past. Will request SW support.    Lucio asked about starting an appetite stimulant for Deandre. Explained that this may be of limited benefit given the extent of Deandre's weight loss, but will look into options for him.    ADVANCE CARE PLANNING:    Patient has an advance directive dated 6/29/2023.  Primary Health Care Agent Lucio (son).  Surrogates are Nemesio  "(son) and Deisy (daughter).    Per [facility] Palliative notes, has POLST completed at same time as updated HCD 6/29/23.    Code status: No CPR- Do NOT Intubate    MEDICAL MANAGEMENT:   #Anorexia, weight loss, malnutrition  #Anxiety and depression    Reported weight loss of about 90lbs over 1.5 years. Possibly due to cognitive decline and/or depression, but could consider further medical workup given significant weight loss.    Undergoing NeuroPsych workup outpatient    Nutrition consult    Continue home fluoxetine 40mg    Consider starting mirtazapine 7.5mg at bedtime for appetite and mood. Deandre was on this previously, will discuss with family tomorrow.    PSYCHOSOCIAL/SPIRITUAL SUPPORT:    Family - strong support from 3 adult children and spouse. Deandre and his wife (who has dementia) live in \"in-law\" suite at Deandre's home.     Sahra - strong Voodoo sahra    Palliative Care will continue to follow. Thank you for the consult and allowing us to aid in the care of Deandre Moore.    Marianela Macias PA-C  Securely message with [employer] (more info)  Text page via [facility] Paging/Directory       Palliative Summary/HPI     Deandre is an 81 y/o man with cognitive impairment undergoing outpatient Neuro/Psych workup, CAD s/p CABG and PCIs, CKD3, DM2, HTN, GERD and gout, admitted 71/7 for hypovolemic shock concerning for sepsis. No signs of infectious etiology, attributed to poor po intake in setting of ongoing failure to thrive.    Chart review:    Admitted 5/12-5/17/23 at [facility] for BRENT, back pain, and mechanical falls in setting of FTT.    Seen by outpatient Neuro 6/23/23 for evaluation of cognitive decline/concern for dementia. MOCA 18/30. Planned for MR brain and Neuropsych testing.    Admitted 6/26-6/30 at [facility] for hypoglycemia and hypotension in setting of FTT. Palliative Care consulted for goals of care. Met with Lucio (son/HCA) who shared history of functional decline over past 1.5 years and 100lb weight loss. " "HCA from 2020 updated to reflect DNR/DNI code status, POLST also completed with Lucio. Arranged for informational hospice visit.    Today, the patient was seen for:  Introductory visit, goals of care    Palliative Care Summary:   Met with Deandre at bedside today, no family present. Deandre is aware he is at [facility] and it is July 2023, but believes he has been here for 3 days and does not recall events leading up to admission. He reports he lives at home with his wife and is concerned she is not here at the hospital visiting him.    When I introduced myself as a member of the Palliative Care team, Deandre replied \"How long do I have?\" I clarified that he is asking how much time he has left to live, and that he is worried he is approaching the end of his life. He has noticed he is eating less and losing weight, having more trouble getting around. Deandre says he is \"not afraid of dying\" because he believes \"[facility]\" will take care of him.    Deandre denied loss of appetite today, enlisted my help in ordering a meal. He denies pain, nausea or other symptoms at this time.    Called sariah Valdes. Lucio is familiar with Palliative Care from Deandre's recent admission at [facility].     Prognosis, Goals, & Planning:      Functional Status just prior to this current hospitalization:    has been hospitalized 3 times in the past 3 months for symptoms related to FTT (poor oral intake, BRENT, falls, hypoglycemia).    Family reports weight loss of 90lbs over past 1.5 years. Lucio notes it is very difficult to get Deandre to eat at home and they are unsure why.      Prognosis, Goals, and/or Advance Care Planning:    Has HCD on file naming son Lucio as HCA, son Nemesio and daughter Deisy as surrogates.      Code Status was addressed today:      Yes. Confirmed DNR/DNI with Lucio.      Patient's decision making preferences: not assessed          Patient has decision-making capacity today for complex decisions:Questionable        "     Coping, Meaning, & Spirituality:     Mood, coping, and/or meaning in the context of serious illness were addressed today: Yes     Active [employer] alessandra    Social:   Living situation:lives with family  Important relationships/caregivers:wife and 3 adult children  Occupation: agricultural research at [employer]  Areas of fulfillment/kortney: collecting/dissecting plant specimens, gardening    Medications:  I have reviewed this patient's medication profile and medications from this hospitalization. Notable medications:  Prozac 40mg  IV LR 100mL/hr  Gabapentin 100mg at bedtime prn    Physical Exam   Vital Signs with Ranges  Temp:  [97.6  F (36.4  C)-98.7  F (37.1  C)] 98.4  F (36.9  C)  Pulse:  [70-90] 83  Resp:  [15-25] 20  BP: ()/(59-81) 140/67  SpO2:  [95 %-100 %] 96 %  214 lbs 1.6 oz    PHYSICAL EXAM:  General: Laying in bed, NAD  Lungs: non-labored  Abd: NTND  Extremities: no gross abnormalities  Neuro: A&Ox3, +mild, intermittent tremors of lips and fingers  Psych: calm    Data reviewed:  CMPRecent Labs   Lab 07/18/23  1112 07/18/23  0748 07/18/23  0642 07/17/23  1529 07/17/23  1416 07/17/23  1329 07/17/23  1001 07/17/23  0729 07/17/23  0614   NA  --   --  135*  --   --   --   --   --  133*   POTASSIUM  --   --  4.9  --   --   --   --   --  4.9   CHLORIDE  --   --  101  --   --   --   --   --  94*   CO2  --   --  26  --   --   --   --   --  20*   ANIONGAP  --   --  8  --   --   --   --   --  19*   * 87 77   < >  --    < >  --   --  130*   BUN  --   --  33.4*  --   --   --   --   --  50.0*   CR  --   --  2.32*  --   --   --  3.09*   < > 3.21*  3.21*   GFRESTIMATED  --   --  27*  --   --   --  19*   < > 18*  18*   ADDI  --   --  9.0  --   --   --   --   --  9.3   PHOS  --   --   --   --  4.7*  --   --   --   --    PROTTOTAL  --   --   --   --   --   --   --   --  6.7   ALBUMIN  --   --   --   --   --   --   --   --  4.0   BILITOTAL  --   --   --   --   --   --   --   --  0.4   ALKPHOS  --   --   --   --   --    --   --   --  77   AST  --   --   --   --   --   --   --   --  29   ALT  --   --   --   --   --   --   --   --  24    < > = values in this interval not displayed.     CBC  Recent Labs   Lab 07/18/23  0642 07/17/23  0614   WBC 7.9 12.0*   RBC 2.81* 3.09*   HGB 9.1* 10.0*   HCT 27.9* 31.6*   MCV 99 102*   MCH 32.4 32.4   MCHC 32.6 31.6   RDW 14.4 14.4    243     CT C/A/P  Impression:  1. No abnormality to explain etiology of clinical sepsis and other  symptoms.  2. No acute vascular abnormality. Ectasia of the ascending aorta  measuring up to 4 cm. Dilated main pulmonary artery likely reflects  chronic pulmonary hypertension.  3. Basilar peripheral predominant pulmonary interstitial  scarring/fibrosis.  4. 2 cm complex cyst versus solid lesion lateral right lower lobe  kidney. Slightly increased in size compared to the study from one and  half years ago. Recommend obtaining a renal mass protocol MRI when the  patient is stable.  5. Sliding-type hiatal hernia.  6. Coronary artery bypass with extensive atherosclerotic  calcifications of the coronary arteries.    Medical Decision Making       MANAGEMENT DISCUSSED with the following over the past 24 hours: hospitalist   NOTE(S)/MEDICAL RECORDS REVIEWED over the past 24 hours: outpatient Neuro, previous hospital admissions  Medical complexity over the past 24 hours:  - Prescription DRUG MANAGEMENT performed

## 2023-07-19 NOTE — PROGRESS NOTES
[facility]    Progress Note - Medicine Service, RIKI TEAM 5       Date of Admission:  7/17/2023    Assessment & Plan   Deandre Moore is a 82 y.o. male with a history of cholecystitis, CAD s/p CABG & PCIs, Reflux, gout, HTN, SVT, HLD, CKD III, DMII, cognitive impairment, and tobacco abuse who presented to the ED with c/f septic shock but is likely to have Hypovolemic Shock and Malnutrition in the setting of FTT.      Updates:  - Psych consult to evaluate for MDD  - Palliative following     FTT  Hypovolemia  Weight Loss  Generalized Weakness  Per chart review, pt had persistent weight loss of 100 lbs in the last year due to poor appetite and impaired sense of taste reported in 6/2023 hospital stay. Family was reported to supplement diet with Ensure nutrition shakes. This hospitalization his lactate and increased ketones can be explained with decreased PO intake. Pt was reportedly having talks about hospice at last hospitalization and will review previous notes to see decision. Current presentation is similar to last hospitalization with labs being reflective of failure to thrive. Pt states that he has not been eating and drinking at home. This was confirmed with his son during a phone call. His son also wishes to pursue living facility options given that deteriorating status of his father and feeling like he is unable to maintain him at home. Palliative has been consulted for this. Pt is weak on the floor as well requiring aid to help him eat wanting to work with PT to increase strength.   - Nutrition consult placed  - Palliative following   > Son desiring long term in transition for preparing home for father's needs. See Palliative note for more detail      Hyperglycemia-- Resolved  Hypoglycemia-- Resolved   DM II    Last hospitalization, pt Glimepiride 2mg BID was discontinued d/t lack of PO intake. Pt presented today with hyperglycemia, but quickly had another  episode of hypoglycemia of 50 that is not well explained. Will placed on LDSS and continue to monitor.  - Hold PTA Metformin   > Held last hospitalization as well   - LDSS  - regular diet   - Hypoglycemia protocol  - Endocrinology following:      BRENT on CKD III 2/2 Hypovolemia-- Improving  Cr on admission of 3.2. Baseline closer to 1.0. Suspect pre-renal given improvement with IVF.    - Strict I/Os  - Avoid nephrotoxic agents as able & renally dose medications   - Holding several PTA meds for now:   - Colchicine 0.6mg Qday   - Allopurinol 300mg       Elevated Troponin-- Resolved  Likely Type II Demand Ischemia in the setting of hypovolemia. Pt had similar presentation at last hospitalization with no complaints currently. Continuing to slowly rise, which is expected with BRENT on CKD III. Will monitor for additional symptoms for further evaluation.   - CTM      Chronic/Resolved Medical Conditions   Cognitive Issues  Assessment: Per chart review, evaluated by Neurology through [facility] on 6/23/23. MoCA score was below normal at 18/30. Plan at that time: Neuropsych referral, MRI brain with & without, ongoing B12 supplementation.   - Review notes from previous hospitalization     Chronic Back Pain   H/o L4-5 spinal fusion and spinal canal decompression  Recent diagnosis of endplate compression deformity at L2  Assessment: Hospitalized at [facility] in May with back pain. CT thoracic and lumbar spine 5/12: no acute thoracic findings, postsurgical changes of L4-5 spinal fusion and spinal canal decompression, upper endplate compression deformity at L2.MRI T spine (5/13) - compression deformity at T11, post surgical changes, moderate stenosis at T6-8, nodule at T6. MRI L spine (5/14) - multi level bulges with moderate narrowing L1-L3 and severe neural forminal narrowing at L3-L4. NSGY evaluated during that admission but did not recommend acute interventions. Recommended outpatient follow up. Previously followed  as OP with PM&R, last seen 5/5/22.  - Follow up with outpatient NSGY as recommended     CAD s/p CABG  HFpEF   HTN  HLD   - PTA Aspirin 81mg after resolution of BRENT  - PTA Atorvastatin 40mg daily     Atopic dermatitis  - PTA Weekly MTX   - continue folate supplementation     BPH  - PTA Finateride 5mg daily     Gout   - Hold PTA Colchicine  - Hold PTA Allopurinol     Diet: Combination Diet Regular Diet Adult  Calorie Counts    DVT Prophylaxis: Heparin SQ  Cifuentes Catheter: PRESENT, indication: Retention  Fluids: PO/ LR 100mL/hr for 24hrs  Lines: None     Cardiac Monitoring: None  Code Status: No CPR- Do NOT Intubate      Clinically Significant Risk Factors                  # Hypertension: Noted on problem list                 Disposition Plan      Expected Discharge Date: 07/21/2023      Destination: home with family          The patient's care was discussed with the Attending Physician, Dr. Klever Key.    Mg Galaviz MD  Medicine Service, [facility] TEAM [address] [place] [address]  Securely message with [employer] (more info)  Text page via [employer] Paging/Directory   See signed in provider for up to date coverage information  ______________________________________________________________________    Interval History   NAEON. No complaints at bedside. Wishing to work with PT.    Physical Exam   Vital Signs: Temp: 98.4  F (36.9  C) Temp src: Oral BP: 124/49 Pulse: 57   Resp: 20 SpO2: 96 % O2 Device: None (Room air)    Weight: 163 lbs 3.2 oz    Constitutional: awake, indifferent  Eyes: vision intact  ENT: atramatic  Hematologic / Lymphatic: no cervical lymphadenopathy and no supraclavicular lymphadenopathy  Respiratory: Clear to auscultation BL  Cardiovascular: Normal S1/S2 with no murmurs, rub, or gallops   GI: Non-distended, normal bowel sounds, non-tender  Skin: no bruising or bleeding and  Musculoskeletal: No peripheral edema present   Neurologic: Awake, alert, oriented to name and  time.    Medical Decision Making       Please see A&P for additional details of medical decision making.      Data     I have personally reviewed the following data over the past 24 hrs:    6.6  \   9.4 (L)   / 217     134 (L) 102 21.1 /  98   5.3 25 1.56 (H) \       Imaging results reviewed over the past 24 hrs:   No results found for this or any previous visit (from the past 24 hour(s)).

## 2023-07-19 NOTE — PROGRESS NOTES
[facility]  Palliative Care Daily Progress Note       Recommendations & Counseling       Concern that many of Deandre's issues stem from depression in setting of cognitive decline. He has been on Fluoxetine (dose increased to 40mg in recent months) and previously Mirtazapine. Appreciate Psych input, plan to resume Mirtazapine.    Mirtazapine may also help with appetite and weight gain. Other appetite stimulants such as megestrol acetate, dronabinol and corticosteroids carry significant risk in the elderly.    Deandre lives with his wife who has Alzheimer's and is unable to assist him with ADLs. Lucio (son) and his family live nearby but are not home during the day. Given Deandre's increased weakness and functional decline, would benefit from PT/OT evaluation. He previously did well at TCU per family.    Family is looking into long term options including [facility]. They are not ready for hospice care at this time but aware it is an option in the future.    Palliative Care will sign off at this time as goals are clear. Please do not hesitate to reach out with any further questions or concerns.    These recommendations have been discussed with primary team.    Marianela Macias PA-C  Securely message with Vocera (more info)  Text page via [facility] Paging/Directory       Assessments          Deandre is an 83 y/o man with cognitive impairment undergoing outpatient Neuro/Psych workup, CAD s/p CABG and PCIs, CKD3, DM2, HTN, GERD and gout, admitted 71/7 for hypovolemic shock concerning for sepsis. No signs of infectious etiology, attributed to poor po intake in setting of ongoing failure to thrive. This is Deandre's 3rd admission in 3 months with similar presentation. Palliative Care consulted for goals of care and discussion of appetite stimulants.    Today, the patient was seen for:  Mood, appetite            Interval History:     Chart review/discussion with unit or clinical team members:   - Discussed with primary  "team. Plan for Psych consult and PT/OT evals    Per patient or family/caregivers today:  Deandre is sitting up in wheelchair in hallway during my visit. He reports he is very weak, could not ambulate well when he was assisted to chair this morning. He is unsure why. Deandre reports he ate breakfast. He endorses reduced appetite at home, says he does not like the food. He knows he has lost weight due to poor intake. He denies any nausea or vomiting. He says his mood is \"ok\" today, unable to provide much history about mood at home.    Called son Lucio but no answer.          Medications:     Notable for:  Prozac 40mg  IV LR 100mL/hr  Gabapentin 100mg at bedtime prn           Physical Exam:   Vital Signs: Blood pressure 124/49, pulse 57, temperature 98.4  F (36.9  C), temperature source Oral, resp. rate 20, weight 74 kg (163 lb 3.2 oz), SpO2 96 %.   General: sitting up in chair, alert and oriented  Lungs: non-labored  Abd: NTND  Extremities: no gross abnormalities  Neuro: A&Ox3, +mild, intermittent tremors of lips and fingers  Psych: calm           Data Reviewed:     Reviewed recent pertinent imaging:   No new imaging    Reviewed recent labs:   CMPRecent Labs   Lab 07/19/23  1300 07/19/23  1210 07/19/23  0729 07/19/23  0536 07/18/23  0748 07/18/23  0642 07/17/23  1529 07/17/23  1416 07/17/23  0729 07/17/23  0614   NA  --   --   --  134*  --  135*  --   --   --  133*   POTASSIUM  --   --   --  5.3  --  4.9  --   --   --  4.9   CHLORIDE  --   --   --  102  --  101  --   --   --  94*   CO2  --   --   --  25  --  26  --   --   --  20*   ANIONGAP  --   --   --  7  --  8  --   --   --  19*   *  --  98 98   < > 77   < >  --    < > 130*   BUN  --   --   --  21.1  --  33.4*  --   --   --  50.0*   CR  --   --   --  1.56*  --  2.32*  --   --    < > 3.21*  3.21*   GFRESTIMATED  --   --   --  44*  --  27*  --   --    < > 18*  18*   ADDI  --   --   --  9.0  --  9.0  --   --   --  9.3   MAG  --  1.3*  --   --   --   --   --  "  --   --   --    PHOS  --   --   --   --   --   --   --  4.7*  --   --    PROTTOTAL  --   --   --   --   --   --   --   --   --  6.7   ALBUMIN  --   --   --   --   --   --   --   --   --  4.0   BILITOTAL  --   --   --   --   --   --   --   --   --  0.4   ALKPHOS  --   --   --   --   --   --   --   --   --  77   AST  --   --   --   --   --   --   --   --   --  29   ALT  --   --   --   --   --   --   --   --   --  24    < > = values in this interval not displayed.     CBC  Recent Labs   Lab 07/19/23  0536 07/18/23  0642   WBC 6.6 7.9   RBC 2.93* 2.81*   HGB 9.4* 9.1*   HCT 29.2* 27.9*    99   MCH 32.1 32.4   MCHC 32.2 32.6   RDW 14.5 14.4    219          Medical Decision Making       MANAGEMENT DISCUSSED with the following over the past 24 hours: Hospitalist   NOTE(S)/MEDICAL RECORDS REVIEWED over the past 24 hours: Medicine, Psych, Nutrition  Medical complexity over the past 24 hours:  - Prescription DRUG MANAGEMENT performed

## 2023-07-19 NOTE — PLAN OF CARE
Goal Outcome Evaluation:      Plan of Care Reviewed With: patient    Overall Patient Progress: no changeOverall Patient Progress: no change    Temp: 98.4  F (36.9  C) Temp src: Oral BP: 124/49 Pulse: 57   Resp: 20 SpO2: 96 % O2 Device: None (Room air)    NEURO: Alert, confused, disoriented to time, using call light frequently, denies n/t  RESPIRATORY: WNL on RA, denies SOB  CARDIAC: WNL, denies chest pain  GI/: Voiding via jose for retention w/ AUOP, incontinent of bowels, LBM 7/18, denies nausea, denies abd discomfort  SKIN: New mepilex applied over area of blanchable redness L-heel, preventative sacral mepilex CDI  DIET: Regular w/ calorie counts, BG ACHS, no sliding scale insulin needed this shift, assistance w/ feeding  PAIN/NAUSEA: Denies, no PRNs given  IV ACCESS: R-PIV infusing LR @100mL/hr  ACTIVITY: Assist x2 w/ gait belt & walker  LAB: Reviewed, IV Mg replacement ordered with recheck tomorrow A.M. for Mg 1.3, K 5.3 no replacement needed  PLAN: Continue w/ POC, psych consulted today to evaluate for major depressive disorder, PT/OT following, palliative following, discharge plan pending

## 2023-07-19 NOTE — PLAN OF CARE
Goal Outcome Evaluation:      Plan of Care Reviewed With: patient    Overall Patient Progress: no changeOverall Patient Progress: no change    Outcome Evaluation: Axox3, disoriented to time and forgetful. VSS on RA. Patient c/o mild lower back pain, resolved with gabapentin before bed. Cifuentes in place with good output. Patient afebrile, denied nausea. IVF running at 100ml/hr overnight. Continue POC.

## 2023-07-19 NOTE — PROGRESS NOTES
"   07/19/23 1500   Appointment Info   Signing Clinician's Name / Credentials (PT) LITTLE Torres   Student Supervision Direct Patient Contact Provided;Therapy services provided with the co-signing licensed therapist guiding and directing the services, and providing the skilled judgement and assessment throughout the session   Living Environment   People in Home spouse   Current Living Arrangements house   Home Accessibility stairs within home   Number of Stairs, Within Home, Primary other (see comments)  (pt cannot estimate stairs but reports he does not need to use them)   Transportation Anticipated family or friend will provide   Living Environment Comments Pt reports lives in house with wife, wife has memory issues. Stairs within home but states does not need to do them.   Self-Care   Usual Activity Tolerance moderate   Current Activity Tolerance fair   Regular Exercise No   Equipment Currently Used at Home walker, rolling  (4WW)   Fall history within last six months yes   Number of times patient has fallen within last six months   (pt reports 40 falls in the last year)   Activity/Exercise/Self-Care Comment Reports uses 4WW for ambulation within home at baseline. Many falls this year, when asked about circumstances, states his 4WW \"gets away from him\". States walks fast but does not believe that is a factor or that a different type of walker would help. Pt demonstrating some confusion/poor historian, states wife helps him with ADLs (dressing, showers, chores etc) but another time states wife cannot help him (may be referencing physically).   General Information   Onset of Illness/Injury or Date of Surgery 07/17/23   Referring Physician Mg Galaviz MD   Patient/Family Therapy Goals Statement (PT) \"to walk again\"   Pertinent History of Current Problem (include personal factors and/or comorbidities that impact the POC) Per chart review, \"Deandre Moore is a 82 y.o. male with a history of " "cholecystitis, CAD s/p CABG & PCIs, Reflux, gout, HTN, SVT, HLD, CKD III, DMII, cognitive impairment, and tobacco abuse who presented to the ED with c/f septic shock but is likely to have Hypovolemic Shock and Malnutrition in the setting of FTT.  \"   Existing Precautions/Restrictions fall;spinal  (spinal for comfort per chart review)   Weight-Bearing Status - LUE partial weight-bearing (% in comments)  (spinal precautions for comfort per chart review (<10#))   Weight-Bearing Status - RUE partial weight-bearing (% in comments)  (spinal precautions for comfort per chart review (<10#))   Weight-Bearing Status - LLE full weight-bearing   Weight-Bearing Status - RLE full weight-bearing   General Observations Activity: up ad cari   Cognition   Affect/Mental Status (Cognition) confused   Orientation Status (Cognition) oriented to;person;place   Cognitive Status Comments Some conflicting statements regarding assistance at home. Oriented to person and place, not date or day of week, states here because of \"a fall\" - not accurate per chart review.   Pain Assessment   Patient Currently in Pain No   Integumentary/Edema   Integumentary/Edema no deficits were identifed   Posture    Posture Protracted shoulders;Forward head position   Range of Motion (ROM)   Range of Motion ROM deficits secondary to pain;Shoulder, Left (Group);Shoulder, Right (Group)   ROM Comment Unable to lift arms >90 degrees 2/2 pain   Strength (Manual Muscle Testing)   Strength (Manual Muscle Testing) strength is WFL   Strength Comments able to perform B SLR   Bed Mobility   Bed Mobility no deficits identified   Assistive Device (Bed Mobility) bed rails   Comment, (Bed Mobility) Kevan rolling R and sup>sit EOB   Transfers   Transfers sit-stand transfer   Sit-Stand Transfer   Sit-Stand [place] (Transfers) contact guard   Assistive Device (Sit-Stand Transfers) walker, front-wheeled   Comment, (Sit-Stand Transfer) CGAx2 STS, cues for hand placement "   Gait/Stairs (Locomotion)   [place] Level (Gait) minimum assist (75% patient effort);2 person assist   Assistive Device (Gait) walker, front-wheeled   Distance in Feet 10   Comment, (Gait/Stairs) parkinsonian-like features with gait- small step length and limited foot clearance, narrow DENISSE, difficulty initiating, dependent for walker management, walker positioned far forward from pt   Balance   Balance other (describe)   Balance Comments seated balance EOB intact, FWW and Ax2 for gait, unsteady on feet   Sensory Examination   Sensory Perception WNL   Sensory Perception Comments light touch sensation intact BLE   Muscle Tone   Muscle Tone Comments jaw tremor present throughout session - reports has been present for ~1 year   Clinical Impression   Criteria for Skilled Therapeutic Intervention Yes, treatment indicated   PT Diagnosis (PT) impaired functional mobility   Influenced by the following impairments decreased strength, decreased activity tolerance, balance impairment   Functional limitations due to impairments transfers, ambulation, stairs   Clinical Presentation (PT Evaluation Complexity) Stable/Uncomplicated   Clinical Presentation Rationale chart review, pt presentation, clinical reasoning   Clinical Decision Making (Complexity) low complexity   Planned Therapy Interventions (PT) balance training;bed mobility training;gait training;home exercise program;motor coordination training;neuromuscular re-education;patient/family education;postural re-education;ROM (range of motion);stair training;strengthening;stretching;transfer training;progressive activity/exercise;home program guidelines   Anticipated Equipment Needs at Discharge (PT)   (tbd)   Risk & Benefits of therapy have been explained evaluation/treatment results reviewed;care plan/treatment goals reviewed;risks/benefits reviewed;current/potential barriers reviewed;participants voiced agreement with care plan;participants included;patient   Clinical  Impression Comments Pt presents with impaired strength and functional mobility 2/2 poor activity tolerance and PO intake over the past few months. Significant fall risk, reports 40 falls in the last year, but pt may be poor historian. Will benefit from skilled PT services to improve strength and ambulation to return to Kevan baseline. High risk for readmission, has been in and out of hospital 3x in last 3 months per chart review.   PT Total Evaluation Time   PT Eval, Low Complexity Minutes (89449) 9   Physical Therapy Goals   PT Frequency 6x/week   PT Predicted Duration/Target Date for Goal Attainment 07/27/23   PT Goals Transfers;Gait   PT: Transfers Supervision/stand-by assist;Assistive device;Sit to/from stand;Bed to/from chair  (FWW)   PT: Gait Greater than 200 feet;Rolling walker;Supervision/stand-by assist  (FWW)   PT Discharge Planning   PT Plan gait training with FWW, proximal strengthening (standing/seated/repeated STS)   PT Discharge Recommendation (DC Rec) Transitional Care Facility   PT Rationale for DC Rec Pt presents below reported Kevan baseline, with significant weakness and impaired functional mobility. Would benefit from TCU to continue strengthening and improve safety with mobility to decrease caregiver burden. Pt currently hesitant about TCU, did not feel he got much therapy at one he was at before. Per pt, lives with wife, unable to confirm at this time if wife can provide assistance.   PT Brief overview of current status Ax2 with FWW for transfers, recommend use of commode, encourage time up in chair   Total Session Time   Total Session Time (sum of timed and untimed services) 9

## 2023-07-19 NOTE — PROGRESS NOTES
[id number]    /49 (BP Location: Left arm)   Pulse 57   Temp 98.4  F (36.9  C) (Oral)   Resp 20   Wt 74 kg (163 lb 3.2 oz)   SpO2 96%   BMI 20.95 kg/m       VSS on RA. Complains of back & urethral pain- PRN tylenol given and MD paged. Up w/ A2 W+GB, bed alarm in place for pt safety. A&O x3, disoriented to time. Forgetful. No BM. Regular diet, poor appetite. Cifuentes in place w/ pink tinge urine. IVMF running @ 100ml/hr. Mag replaced. Son at bedside this shift.

## 2023-07-19 NOTE — CONSULTS
"Triage and Transition - Consult and Liaison     Deandre Moore  July 19, 2023    Session start: 12:56PM  Session end: 1:16PM  Session duration in minutes: 20  CPT utilized: 06961 - Brief diagnostic assessment (modifier 52)  Patient was seen virtually ([employer] cart or other teleconferencing device).    Diagnosis:   296.22 (F32.1)  Major Depressive Disorder, Single Episode, Moderate _ and With anxious distress, Present;    Per Chart Review, cognitive decline, recent MOCA 18/30.       Plan/Recommendations:     This writers role is to address sx, hx, dx, before psychiatry provider sees pt.    Will ask psychiatry to meet the patient to assess medication needs, left message.     Next steps include: Psychiatry to follow up with patient. Additional interventions may involve medication adjustment, scheduling therapy services upon discharge from this encounter.     Please enter another psychiatry consult if further visits are needed. Patients are not followed by Psychiatry C&L Service unless otherwise indicated.       Reason for consult: Psychiatry consult was requested due to \"weakness with decreased intake with possible MDD\" per consult order. Patient was seen by Triage and Transition Consult & Liaison team.     Identifying information: Deandre is a 82 year old male. Followed related to Weakness with decreased intake with possible MDD, per consult reason.     Brief Psychosocial History    Patient describes his spiritual influence as Jainism. Patient endorses feeling supported by his family, including his son, Lucio and wife, Beatriz. Per chart review, pt lives with wife in a house in [place] and his son lives near by, and manages the patient's IADLs and medications.      Summary of Patient Situation     Deandre was laying in bed during this interview and was agreeable to talk with this writer. The patient states he came to the hospital because \"I fell\". Patient was oriented to situation, self, place. When inquiring " "how the patient was feeling today, the patient stated he was \"cold, and I want to walk but I can't walk, its a little depressing\". Regarding decreased intake, patient reports poor appetite, citing \"I don't like the food\" in both hospital and at home. Patient wishes to eat \"fried and soft\" foods.      Patient is working with palliative care team, per chart review. Chart noting indicates patient has had several falls and may need a higher level of care, and patient's son is attempting to secure [facility]/TCU placement as it had been helpful to patient in the past.     Patient today endorses symptoms/feelings of depression, low appetite, sadness and low mood every day. Patient endorses anxiety and excessive worrying, and difficulty controlling the worry. Patient denies SI/HI/VH/AH. Patient endorses feeling hopeful that he will get better and is looking forward to going to next LOC     Patient not currently prescribed any psychiatric medications. Patient has not seen a therapist for mental health. Patient reports willingness to engage in outpatient therapy services.  Patient is amenable to meet with psychiatry while in the hospital, stating \"I want anti-anxiety pills\" and is also amenable to scheduling outpatient MH therapy when he is medically stable to discharge.       Significant Clinical History  Per chart review, patient does not have a notable hx of mental health concerns with what is available in this EHR. No previous psych hospitalizations per chart review. No hx of SI/HI, per chart review.        Current Providers    Psychiatrist: No   Therapist: No       Collateral information:   Reviewed chart and coordinated with patient care team and psych provider Dr. Hernandez.     Mental Status Exam   Affect: Flat  Appearance: Appropriate   Attention Span/Concentration: Attentive    Eye Contact: Engaged  Fund of Knowledge: Delayed   Language /Speech Content: Fluent  Language /Speech Volume: Normal   Language /Speech " "Rate/Productions: Pressured and Slow   Recent Memory: Intact  Remote Memory: Variable  Mood: Depressed   Orientation:   Person: Yes   Place: Yes  Time of Day: Yes   Date: Answer: Did not assess   Situation (Do they understand why they are here?): Yes and Answer: \"I fell\"   Psychomotor Behavior: Normal   Thought Content: Clear  Thought Form: Intact    Current medications:   Current Facility-Administered Medications   Medication     [Held by provider] allopurinol (ZYLOPRIM) tablet 200 mg     aspirin (ASA) chewable tablet 81 mg     atorvastatin (LIPITOR) tablet 40 mg     [Held by provider] colchicine (COLCRYS) tablet 0.6 mg     cyanocobalamin (VITAMIN B-12) tablet 500 mcg     glucose gel 15-30 g    Or     dextrose 50 % injection 25-50 mL    Or     glucagon injection 1 mg     finasteride (PROSCAR) tablet 5 mg     FLUoxetine (PROzac) capsule 40 mg     folic acid (FOLVITE) tablet 1 mg     gabapentin (NEURONTIN) capsule 100 mg     heparin ANTICOAGULANT injection 5,000 Units     insulin aspart (NovoLOG) injection (RAPID ACTING)     insulin aspart (NovoLOG) injection (RAPID ACTING)     lactated ringers infusion     lidocaine (LMX4) cream     lidocaine 1 % 0.1-1 mL     melatonin tablet 1 mg     ondansetron (ZOFRAN ODT) ODT tab 4 mg    Or     ondansetron (ZOFRAN) injection 4 mg     prochlorperazine (COMPAZINE) injection 5 mg    Or     prochlorperazine (COMPAZINE) tablet 5 mg    Or     prochlorperazine (COMPAZINE) suppository 12.5 mg     sodium chloride (PF) 0.9% PF flush 3 mL     sodium chloride (PF) 0.9% PF flush 3 mL     thiamine (B-1) 500 mg in sodium chloride 0.9 % 50 mL intermittent infusion    Followed by     [START ON 7/22/2023] thiamine (B-1) 250 mg in sodium chloride 0.9 % 50 mL intermittent infusion    Followed by     [START ON 7/27/2023] thiamine (B-1) tablet 100 mg        Therapeutic intervention and progress:  Therapeutic intervention consisted of building therapeutic rapport, active listening, validation and " active problem solving. Patient is making progress towards treatment goals as evidenced by engaging in discharge planning and addressing mental health concerns. .      Dileep Wiggins   Triage and Transition - Consult and Liaison   [phone]

## 2023-07-19 NOTE — CONSULTS
"      Initial Psychiatric Consult   Consult date: July 19, 2023         Reason for Consult, requesting source:      Reason for consult: evaluate for MDD    Requesting source: [place]    This note is being entered to supplement the psychiatry consultation note that was completed on July 19, 2023 by the licensed mental health professional Dileep Wiggins. They have reviewed with me the pertinent clinical details related to their encounter. I am being consulted to offer additional guidance on psychiatric pharmacological interventions.     Total time spent in chart review, patient interview and coordination of care; 60 min         HPI:     Per progress note:  \"Deandre Moore is a 82 y.o. male with a history of cholecystitis, CAD s/p CABG & PCIs, Reflux, gout, HTN, SVT, HLD, CKD III, DMII, cognitive impairment, and tobacco abuse who presented to the ED with c/f septic shock but is likely to have Hypovolemic Shock and Malnutrition in the setting of FTT. \"    Per our interview:  Deandre reports that his mood has been fine lately but he is feeling anxious. He has claustrophobia and feels anxious whenever the nursing staff close the door to his room or turn off the lights. He has this sensation at home as well because his bedroom is small. When he feels anxious it is usually feels like he needs to get up and leave whatever space he's in. He's not sure if the fluoxetine has been helpful because his son manages his medications and he isn't sure what he takes or how long he has been taking it. His sleep is poor, mostly over recent months it has gotten much harder to fall asleep and stay asleep. His appetite is also poor and he finds it hard to eat enough.          Physical ROS:   The 10 point Review of Systems is negative other than noted in the HPI or here.         Medications:       [Held by provider] allopurinol  200 mg Oral Daily     aspirin  81 mg Oral Daily     atorvastatin  40 mg Oral Daily     [Held by " provider] colchicine  0.6 mg Oral Daily     cyanocobalamin  500 mcg Oral Daily     finasteride  5 mg Oral Daily     FLUoxetine  40 mg Oral Daily     folic acid  1 mg Oral Daily     heparin ANTICOAGULANT  5,000 Units Subcutaneous Q12H     insulin aspart  1-3 Units Subcutaneous TID AC     insulin aspart  1-3 Units Subcutaneous At Bedtime     sodium chloride (PF)  3 mL Intracatheter Q8H     thiamine  500 mg Intravenous TID    Followed by     [START ON 7/22/2023] thiamine  250 mg Intravenous Daily    Followed by     [START ON 7/27/2023] thiamine  100 mg Oral Daily            Physical and Psychiatric Examination:     /49 (BP Location: Left arm)   Pulse 57   Temp 98.4  F (36.9  C) (Oral)   Resp 20   Wt 74 kg (163 lb 3.2 oz)   SpO2 96%   BMI 20.95 kg/m    Weight is 163 lbs 3.2 oz  Body mass index is 20.95 kg/m .    Physical Exam:  I have reviewed the physical exam as documented by by the medical team and agree with findings and assessment and have no additional findings to add at this time.    Mental Status Exam:  Appearance: awake, alert  Attitude:  cooperative  Eye Contact:  good  Mood:  good  Affect:  appropriate and in normal range  Speech:  dysarthria  Psychomotor Behavior: tremor in chin, also slight tremor in hands. Otherwise unremarkable  Throught Process:  logical and linear  Associations:  no loose associations  Thought Content:  no evidence of suicidal ideation or homicidal ideation and no evidence of psychotic thought  Insight:  good  Judgement:  intact  Oriented to:  time, person, and place  Attention Span and Concentration:  Fair   Recent and Remote Memory: not formally assessed.(fatigue)   Language: able to name/identify objects without impairment             DSM-5 Diagnosis:   311 (F32.9) Unspecified Depressive Disorder   300.29 300.29 Specific Phobia (40.298) Other, claustrophobia  300.02 (F41.1) Generalized Anxiety Disorder   unspecified neurocognitive disorder            Assessment:      Deandre Moore is an 82-year old man with a history of depression and anxiety who is currently hospitalized due to FTT. He reports his mood is fine but he has a lot of anxiety, particularly in enclosed spaces such as the hospital room and his bedroom at home. His appetite is diminished and he has been losing weight and having a lot of trouble getting sufficient nutrition.     He has been taking fluoxetine which was increased from 20mg to 40mg during his last hospitalization from 6/26 through 6/30/23.          Summary of Recommendations:     - continue fluoxetine 40mg daily  - initiate mirtazapine at 15mg at bedtime. Grogginess the next day should hopefully resolve within a few days    - consider increasing gabapentin for anxiety (currently prescribed 100mg PRN for pain), would recommend at least 300mg TID    Page me or re-consult psychiatry as needed (psychiatry is signing off).     William Boyce M.D.   Consult liaison psychiatry   [facility]   Contact information available via [facility] Paging/Directory.  If I am not available, then [facility] intake ([phone]) should know who   Is on call

## 2023-07-19 NOTE — PROGRESS NOTES
"CLINICAL NUTRITION SERVICES - ASSESSMENT NOTE     Nutrition Prescription    RECOMMENDATIONS FOR MDs/PROVIDERS TO ORDER:  Calorie count goal is >1440 kcals and >58 g protein. If pt's 3-day average intake does not meet this goal, would recommend placement of FT to meet nutrition needs and prevent further weight loss pending GOC.    Malnutrition Status:    Severe malnutrition in the context of chronic illness    Recommendations already ordered by Registered Dietitian (RD):  Nepro (vanilla) with breakfast and lunch    Future/Additional Recommendations:  1. Monitor calorie count data/intake including intake of supplements  2. Monitor K+/Phos labs, ability to transition to Ensure vs Nepro supplement  3. Monitor weight trends  4. If pt intake is inadequate following calorie counts, would recommend placement of FT and initiation of nutrition support pending GOC.     REASON FOR ASSESSMENT  Deandre Moore is a/an 82 year old male assessed by the dietitian for Admission Nutrition Risk Screen for wt loss of 34+ lbs and decreased appetite, and Provider Order - \"Malnutrition with poor PO intake with recent hospitalization of decreased intake\"    CLINICAL HISTORY  PMH of cholecystitis, CAD s/p CABG & PCIs, Reflux, gout, HTN, SVT, HLD, CKD III, DMII, cognitive impairment, and tobacco abuse who presented to the ED with c/f septic shock but is likely to have Hypovolemic Shock and Malnutrition in the setting of FTT.    NUTRITION HISTORY  Pt reports an 80 lb wt loss in the past year d/t decreased appetite. Reports he eats meals TID, however they are in very reduced quantities, maybe 2-4 tablespoons of food at a time. Reported liking things like scrambled eggs and almonds. Pt is open to trying Nepro BID to help with intake.    Per H&P, pt reported 100 lb wt loss in the last year d/t poor appetite and impaired sense of taste. Pt reported he has not been eating and drinking at home. Disoriented to time and forgetful per RN note " "today. Per medicine note, family supplemented with Ensure shakes at home. Palliative care saw pt on 7/18, discussed hospice however no plans for hospice at this time.    GI: 1x BM 7/18, pt reports constipation.    CURRENT NUTRITION ORDERS  Diet: Regular  Intake/Tolerance: No intakes documented since admission, however has ordered an adequately sized meal at every mealtime since admission. 7/18 ordered a total of 2852 kcals and 82 g protein per [facility] records.    LABS  Na 134 (L)  K 5.3 (WNL), Phos 4.7 (H, 7/17)  BUN 21.1 (WNL), Cr 1.56 (H)  Glucose POCT  (L-H)    MEDICATIONS  Vitamin B12 500 mcg  Folic acid 1 mg  Novolog    ANTHROPOMETRICS  Height: 188 cm (6' 2\")  Most Recent Weight: 74 kg (163 lb 3.2 oz)    IBW: 86.4 kg  BMI: Normal BMI   Weight History:   Wt Readings from Last 15 Encounters:   07/19/23 74 kg (163 lb 3.2 oz)   06/23/23 73.5 kg (162 lb)   05/16/23 73.6 kg (162 lb 4.8 oz)   11/17/22 89.8 kg (198 lb)   06/20/22 90.1 kg (198 lb 11.2 oz)   03/21/22 91.2 kg (201 lb)   03/10/22 90.7 kg (200 lb)   02/14/22 93.9 kg (207 lb 1.6 oz)   12/22/21 98.5 kg (217 lb 3.2 oz)   09/27/21 101.6 kg (224 lb)   09/21/21 101.7 kg (224 lb 1.6 oz)   08/25/21 99.8 kg (220 lb)   17.6% wt loss in ~8 months. Wt stable since 5/16/23    Dosing Weight: 74 kg (actual weight)    ASSESSED NUTRITION NEEDS  Estimated Energy Needs: 2220 - 2590 kcals/day (30 - 35 kcals/kg)  Justification: Maintenance  Estimated Protein Needs: 89 - 111 grams protein/day (1.2 - 1.5 grams of pro/kg)  Justification: Repletion, towards lower end pending renal status  Estimated Fluid Needs: 1850 - 2220 mL/day (25 - 30 mL/kg)   Justification: Maintenance or Per provider pending fluid status    PHYSICAL FINDINGS  See malnutrition section below.    MALNUTRITION  % Intake: </=75% for >/= 1 month (severe)  % Weight Loss: > 10% in 6 months (severe)  Subcutaneous Fat Loss: Facial region:  mild  Muscle Loss: Temporal:  Moderate-severe, Thoracic region " (clavicle, acromium bone, deltoid, trapezius, pectoral):  Moderate-severe, Upper arm (bicep, tricep):  moderate and Lower arm  (forearm):  moderate  Fluid Accumulation/Edema: None noted  Malnutrition Diagnosis: Severe malnutrition in the context of chronic illness    NUTRITION DIAGNOSIS  Inadequate oral intake related to decreased appetite as evidenced by pt report of decreased intake, 17.6% wt loss in 8 months, and physical signs of fat and muscle wasting.      INTERVENTIONS  Implementation  Nutrition Education: protein for muscle mass maintenance, fiber and fluids for constipation   Encouraged small/frequent meals, intake of ONS and snacks  Medical food supplement therapy- Nepro BID    Goals  Patient to consume % of nutritionally adequate meal trays TID, or the equivalent with supplements/snacks.     Monitoring/Evaluation  Progress toward goals will be monitored and evaluated per protocol.    Zhanna Pierre MPS, RD, LD  6C/5A(beds 0832 - 6342) RD pgr: [phone]  Weekend/Holiday RD pager: [phone]

## 2023-07-19 NOTE — PROGRESS NOTES
Pt is alert but mostly forgetful. He is moderately decondition but able to participate in transfer activities with significant assist of 2. He is able to self feed. Has IV currently infusing at 100 with adequate urine output. He is on RA. Able to call for assistance. Incontinent of BM at least once during the shift. Please continue monitor closely due to fall risk.

## 2023-07-19 NOTE — PLAN OF CARE
Goal Outcome Evaluation:      Plan of Care Reviewed With: patient    Overall Patient Progress: no changeOverall Patient Progress: no change    Outcome Evaluation: Please see RD note 7/19 for full assessment.    Zhanna Pierre MPS, RD, LD  6C/5A(beds 5201 - 5209) RD pgr: [phone]  Weekend/Holiday RD pager: [phone]

## 2023-07-20 NOTE — PLAN OF CARE
Goal Outcome Evaluation:                 Outcome Evaluation: Pt disoriented by 3 days, oriented to self, hospital and largely situation. Calls repeatedly frequently reporting anxiety. Ambulated the baeza 4 times with assist of 2 walker and GB. Coaching pt through leg exercises when he reports feeling bored. Encouraging PO intake. Dann counts in progress. Pt reports feeling constipated. Team notified and bowel regimen started. Cifuentes removed. Pt voiding spontaneously 100-200 mL at a time. Bed alarm is on.

## 2023-07-20 NOTE — PROGRESS NOTES
Calorie Count  Intake recorded for: 7/19  Total Kcals: 0 Total Protein: 0g  Kcals from Hospital Food: 0   Protein: 0g  Kcals from Outside Food (average):0 Protein: 0g  # Meals Ordered from Kitchen: 3 meals  # Meals Recorded: No intake recorded  # Supplements Recorded: No intake recorded    NOTE: Per Epic, pt consumed 25% at 6pm, but not enough information to calculate calories and protein

## 2023-07-20 NOTE — PROGRESS NOTES
"   07/20/23 1133   Appointment Info   Signing Clinician's Name / Credentials (OT) Adri Recio, SRIDEVIR/L   Rehab Comments (OT) chair/bed alarm on   Living Environment   People in Home spouse   Current Living Arrangements house   Home Accessibility stairs within home   Number of Stairs, Within Home, Primary   (at least a flight)   Transportation Anticipated family or friend will provide   Living Environment Comments Pt reports he has a jacuzzi tub with a bench, no GB. Reports GB by toilet.   Self-Care   Usual Activity Tolerance moderate   Current Activity Tolerance fair   Regular Exercise No   Equipment Currently Used at Home grab bar, toilet;shower chair;walker, standard;walker, rolling   Fall history within last six months yes   Number of times patient has fallen within last six months   (\"several\")   Activity/Exercise/Self-Care Comment Providing conflicting information from PT eval yesterday -- reports he has FWW and 4WW at home, was using 4WW more often but not all the time. Many falls this year, when asked about circumstances, states his 4WW \"gets away from him\" -- consistent with what he told PT. Reports he does all of his own ADLs (stated to PT that wife assists) and that son supports IADLs. Wife has dementia. States he sees his son daily.   Instrumental Activities of Daily Living (IADL)   IADL Comments Appears to have assist with all IADLs at baseline. Son took over cooking which pt reports has been a problem because he doesn't like what his son makes and he hasn't been eating well for the last year. Pt does not drive.   General Information   Onset of Illness/Injury or Date of Surgery 07/17/23   Referring Physician Mg Galaviz MD   Patient/Family Therapy Goal Statement (OT) Not specifically stated   Additional Occupational Profile Info/Pertinent History of Current Problem Deandre Moore is a 82 y.o. male with a history of cholecystitis, CAD s/p CABG & PCIs, Reflux, gout, HTN, SVT, HLD, CKD III, DMII, " "cognitive impairment, and tobacco abuse who presented to the ED with c/f septic shock but is likely to have Hypovolemic Shock and Malnutrition in the setting of FTT currently showing improvement.   Existing Precautions/Restrictions fall   Limitations/Impairments safety/cognitive   Cognitive Status Examination   Orientation Status orientation to person, place and time  (likely waxes and wanes, per chart)   Affect/Mental Status (Cognitive) WNL   Follows Commands follows one-step commands   Memory Deficit moderate deficit   Cognitive Status Comments Per chart review, MoCA score was 18/30 (unclear date of MoCA). Chair and bed alarms in use. Pt is alert and oriented to time, place, and situation when assessed by writer but appearing confused at times -- e.g., agreeable to ADLs but once set up, then reporting he would do them \"later.\" Forgetful, requiring cognitive assist for ordering lunch. Will continue to assess cognition.   Visual Perception   Impact of Vision Impairment on Function (Vision) wears glasses for reading   Sensory   Sensory Comments denies n/t   Posture   Posture Comments mildly flexed posturing, tremors noted   Range of Motion Comprehensive   Comment, General Range of Motion Appears to have L rotator cuff injury but ROM this date is WFL   Strength Comprehensive (MMT)   Comment, General Manual Muscle Testing (MMT) Assessment L UE slightly < 3/5 due to mild end of range deficits with shoulder flexion/abduction. R UE is functional.   Coordination   Coordination Comments B UE opposition is WNL   Bed Mobility   Comment (Bed Mobility) SBA with HOB elevated   Transfers   Transfer Comments FWW + CGA for pivots, would require A x 2 for longer distance ambulation   Balance   Balance Comments Unsteady on feet   Bathing Assessment/Intervention   Comment, (Bathing) A x 1-2 per clinical reasoning with use of shower chair   Lower Body Dressing Assessment/Training   Comment, (Lower Body Dressing) Max A x 1 LBD at this " time   Grooming Assessment/Training   Comment, (Grooming) Set up/SBA while seated for oral cares, would require A x 1 if standing at sink   Toileting   Comment, (Toileting) A x 1-2 per clinical reasoning for balance and stephania cares   Clinical Impression   Criteria for Skilled Therapeutic Interventions Met (OT) Yes, treatment indicated   OT Diagnosis Decreased ADL I   OT Problem List-Impairments impacting ADL problems related to;activity tolerance impaired;balance;cognition;mobility;strength   Assessment of Occupational Performance 5 or more Performance Deficits   Identified Performance Deficits dressing, bathing, functional mobility, transfers, g/h   Planned Therapy Interventions (OT) ADL retraining;bed mobility training;cognition;ROM;strengthening;transfer training;home program guidelines;progressive activity/exercise   Clinical Decision Making Complexity (OT) low complexity   Risk & Benefits of therapy have been explained evaluation/treatment results reviewed;care plan/treatment goals reviewed   Clinical Impression Comments Pt to benefit from skilled OT to address above deficits. See daily flowsheet for details regarding tx provided.   OT Total Evaluation Time   OT Eval, Low Complexity Minutes (40357) 6   OT Goals   Therapy Frequency (OT) 5 times/wk   OT Predicted Duration/Target Date for Goal Attainment 08/10/23   OT: Hygiene/Grooming while standing;modified independent   OT: Lower Body Dressing Modified independent   OT: Lower Body Bathing Supervision/stand-by assist   OT: Toilet Transfer/Toileting Modified independent;toilet transfer;cleaning and garment management   OT: Cognitive Patient/caregiver will verbalize understanding of cognitive assessment results/recommendations as needed for safe discharge planning   OT Discharge Planning   OT Plan OT: monitor cog/cog screen? (hx MoCA 18), ADLs sitting in chair, transfers, STS   OT Discharge Recommendation (DC Rec) Transitional Care Facility   OT Rationale for DC  Rec Pt below functional baseline with self cares and mobility, increased falls risk. Unsafe to return home at this time, family aware that pt may need higher level of care per chart.   OT Brief overview of current status Up with FWW with A x 1 for pivots   Total Session Time   Total Session Time (sum of timed and untimed services) 6

## 2023-07-20 NOTE — PLAN OF CARE
Goal Outcome Evaluation:      Plan of Care Reviewed With: patient    Overall Patient Progress: no changeOverall Patient Progress: no change    Outcome Evaluation: Patient disoriented to time. Calls very frequently for staff. Forgetful but pleasant. VSS on RA. C/o mild lower back pain. Frequent repositioning/tylenol utilized. Cifuentes intact with pink tinged output. No BM overnight. No acute changed. Awaiting placement.

## 2023-07-20 NOTE — PROGRESS NOTES
SPIRITUAL HEALTH SERVICES Progress Note  [facility] ([place] 5A    Summary:  Deandre working with phys. therapy this morning    Plan:   Follow-up per referral.     Simran Joseph   Intern  Pager [phone]      * [facility] remains available 24/7 for emergent requests/referrals, either by having the switchboard page the on-call  or by entering an ASAP/STAT consult in Epic (this will also page the on-call ). Routine Epic consults receive an initial response within 24 hours.*

## 2023-07-20 NOTE — PROGRESS NOTES
Type of Form Received:     Form Received (Date) 7/20/23   Form Filled out Yes, 7/20/23   Placed in provider folder Yes

## 2023-07-20 NOTE — PROGRESS NOTES
[facility]    Progress Note - Medicine Service, RIKI TEAM 5       Date of Admission:  7/17/2023    Assessment & Plan   Deandre Moore is a 82 y.o. male with a history of cholecystitis, CAD s/p CABG & PCIs, Reflux, gout, HTN, SVT, HLD, CKD III, DMII, cognitive impairment, and tobacco abuse who presented to the ED with c/f septic shock but is likely to have Hypovolemic Shock and Malnutrition in the setting of FTT currently showing improvement.      Updates:  - Mirtazapine 15mg at bedtime for mood and appetite   - Gabapentin 300mg TID for anxiety  - Palliative signed off. Last note with recs 7/19/23    FTT-- Improving   Hypovolemia-- Improving   Weight Loss-- Improving   Generalized Weakness-- Improving   Mentation Difficulties (MOCA: 18)  Per chart review, pt had persistent weight loss of 100 lbs in the last year due to poor appetite and impaired sense of taste reported in 6/2023 hospital stay. Family was reported to supplement diet with Ensure nutrition shakes. This hospitalization his lactate and increased ketones can be explained with decreased PO intake. Pt was reportedly having talks about hospice at last hospitalization and will review previous notes to see decision. Current presentation is similar to last hospitalization with labs being reflective of failure to thrive. Pt states that he has not been eating and drinking at home. This was confirmed with his son during a phone call. His son also wishes to pursue living facility options given that deteriorating status of his father and feeling like he is unable to maintain him at home. The pt has a history of improving during his hospital stay but then starts to deteriorate while at home prompting another hospitalization. Palliative has been consulted for this with note available on 7/19/23. Pt is improving in appetite, mood, and bodily functions while inpatient. He wishes to go to [facility] with wife, but has to  constantly revisit. Hard to determine if forgetful or not as understanding. Discussed plan of TCU to increase strength and ultimately [facility], which he agrees with every time we revisit.   - Mirtazapine 15mg at bedtime for mood and appetite   - Gabapentin 300mg TID for anxiety  - Palliative signed off. Last note with recs 7/19/23     Hyperglycemia-- Resolved  Hypoglycemia-- Resolved   DM II    Last hospitalization, pt Glimepiride 2mg BID was discontinued d/t lack of PO intake. Pt presented today with hyperglycemia, but quickly had another episode of hypoglycemia of 50 that is not well explained. Will placed on LDSS and continue to monitor.  - Hold PTA Metformin   > Held last hospitalization as well   - LDSS  - regular diet   - Hypoglycemia protocol  - Endocrinology following:      BRENT on CKD III 2/2 Hypovolemia-- Improving  Cr on admission of 3.2. Baseline closer to 1.0. Suspect pre-renal given improvement with IVF.    - Strict I/Os  - Avoid nephrotoxic agents as able & renally dose medications   - Holding several PTA meds for now:   - Colchicine 0.6mg Qday   - Allopurinol 300mg         Malnutrition:    - Level of malnutrition: Severe   - Nutrition following     Chronic/Resolved Medical Conditions   Cognitive Issues  Assessment: Per chart review, evaluated by Neurology through [facility] on 6/23/23. MoCA score was below normal at 18/30. Plan at that time: Neuropsych referral, MRI brain with & without, ongoing B12 supplementation.   - Review notes from previous hospitalization     Chronic Back Pain   H/o L4-5 spinal fusion and spinal canal decompression  Recent diagnosis of endplate compression deformity at L2  Assessment: Hospitalized at [facility] in May with back pain. CT thoracic and lumbar spine 5/12: no acute thoracic findings, postsurgical changes of L4-5 spinal fusion and spinal canal decompression, upper endplate compression deformity at L2.MRI T spine (5/13) - compression deformity at T11, post  surgical changes, moderate stenosis at T6-8, nodule at T6. MRI L spine (5/14) - multi level bulges with moderate narrowing L1-L3 and severe neural forminal narrowing at L3-L4. NSGY evaluated during that admission but did not recommend acute interventions. Recommended outpatient follow up. Previously followed as OP with PM&R, last seen 5/5/22.  - Follow up with outpatient NSGY as recommended     CAD s/p CABG  HFpEF   HTN  HLD   - PTA Aspirin 81mg after resolution of BRENT  - PTA Atorvastatin 40mg daily     Atopic dermatitis  - PTA Weekly MTX   - continue folate supplementation     BPH  - PTA Finateride 5mg daily     Gout   - Hold PTA Colchicine  - Hold PTA Allopurinol     Diet: Combination Diet Regular Diet Adult  Calorie Counts  Snacks/Supplements Adult: Nepro Oral Supplement; With Meals    DVT Prophylaxis: Heparin SQ  Cifuentes Catheter: Not present  Fluids: PO/ LR 100mL/hr for 24hrs  Lines: None     Cardiac Monitoring: None  Code Status: No CPR- Do NOT Intubate      Clinically Significant Risk Factors            # Hypomagnesemia: Lowest Mg = 1.3 mg/dL in last 2 days, will replace as needed       # Hypertension: Noted on problem list         # Severe Malnutrition: based on nutrition assessment, PRESENT ON ADMISSION        Disposition Plan     Expected Discharge Date: 07/21/2023      Destination: home with family          The patient's care was discussed with the Attending Physician, Dr. Klever Key.    Mg Galaviz MD  Medicine Service, [address]  Securely message with [employer] (more info)  Text page via [employer] Paging/Directory   See signed in provider for up to date coverage information  ______________________________________________________________________    Interval History   NAEON. No complaints at bedside. Explained multiple times that he does not need groceries or menu for at home since he will be [facility] TCU then [facility]. Will likely need to  revisit again in the AM.     Physical Exam   Vital Signs: Temp: 98.2  F (36.8  C) Temp src: Oral BP: 116/63 Pulse: 55   Resp: 22 SpO2: 96 % O2 Device: None (Room air)    Weight: 164 lbs 8 oz    Constitutional: awake, indifferent  Eyes: vision intact  ENT: atramatic  Hematologic / Lymphatic: no cervical lymphadenopathy and no supraclavicular lymphadenopathy  Respiratory: Clear to auscultation BL  Cardiovascular: Normal S1/S2 with no murmurs, rub, or gallops   GI: Non-distended, normal bowel sounds, non-tender  Skin: no bruising or bleeding and  Musculoskeletal: No peripheral edema present   Neurologic: Awake, alert, oriented to name and time.    Medical Decision Making       Please see A&P for additional details of medical decision making.      Data     I have personally reviewed the following data over the past 24 hrs:    6.1  \   9.2 (L)   / 201     136 104 12.6 /  136 (H)   4.5 25 1.28 (H) \       Imaging results reviewed over the past 24 hrs:   No results found for this or any previous visit (from the past 24 hour(s)).

## 2023-07-21 NOTE — CARE PLAN
Pt is alert, disorientated to time and situation at times, forgetful, VSS on RA, up with A2 to chair for meals, fair po intake on calorie counts.

## 2023-07-21 NOTE — PROGRESS NOTES
SPIRITUAL HEALTH SERVICES Progress Note  [facility] 5A    Summary:  Deandre meeting with occupational therapy.    Plan:  Follow-up per referral     Simran Joseph   Intern  Pager [phone]      * [facility] remains available 24/7 for emergent requests/referrals, either by having the switchboard page the on-call  or by entering an ASAP/STAT consult in Epic (this will also page the on-call ). Routine Epic consults receive an initial response within 24 hours.*

## 2023-07-21 NOTE — PROGRESS NOTES
[facility]    Progress Note - Medicine Service, RIKI TEAM 5       Date of Admission:  7/17/2023    Assessment & Plan   Deandre Moore is a 82 y.o. male with a history of cholecystitis, CAD s/p CABG & PCIs, Reflux, gout, HTN, SVT, HLD, CKD III, DMII, cognitive impairment, and tobacco abuse who presented to the ED with c/f septic shock but ultimately was found to be profoundly hypovolemic from poor PO intake withe severe BRENT    Updates:  - Stable for TCU, awaiting placement  - Oral intake improving    FTT-- Improving   Hypovolemia-- Improving   Weight Loss-- Improving   Generalized Weakness-- Improving   Mentation Difficulties (MOCA: 18)  Per chart review, pt had persistent weight loss of 100 lbs in the last year due to poor appetite and impaired sense of taste reported in 6/2023 hospital stay. Family was reported to supplement diet with Ensure nutrition shakes. This hospitalization his lactate and increased ketones can be explained with decreased PO intake. Pt was reportedly having talks about hospice at last hospitalization and will review previous notes to see decision. Current presentation is similar to last hospitalization with labs being reflective of failure to thrive. Pt states that he has not been eating and drinking at home. This was confirmed with his son during a phone call. His son also wishes to pursue living facility options given that deteriorating status of his father and feeling like he is unable to maintain him at home. The pt has a history of improving during his hospital stay but then starts to deteriorate while at home prompting another hospitalization. Palliative has been consulted for this with note available on 7/19/23. Pt is improving in appetite, mood, and bodily functions while inpatient. He wishes to go to [facility] with wife, but has to constantly revisit. Hard to determine if forgetful or not as understanding. Discussed plan of TCU to  increase strength and ultimately half-way, which he agrees with every time we revisit.   - Mirtazapine 15mg at bedtime for mood and appetite   - Gabapentin 300mg TID for anxiety  - Palliative signed off. Last note with recs 7/19/23     Hyperglycemia-- Resolved  Hypoglycemia-- Resolved   DM II    Last hospitalization, pt Glimepiride 2mg BID was discontinued d/t lack of PO intake. Pt presented today with hyperglycemia, but quickly had another episode of hypoglycemia of 50 that is not well explained. Will placed on LDSS and continue to monitor.  - Hold PTA Metformin   > Held last hospitalization as well   - LDSS  - regular diet   - Hypoglycemia protocol  - Endocrinology following:      BRENT on CKD III 2/2 Hypovolemia-- Improved to baseline  Cr on admission of 3.2. Baseline closer to 1.0. Suspect pre-renal given improvement with IVF.    - Strict I/Os  - Avoid nephrotoxic agents as able & renally dose medications   - Holding several PTA meds for now:   - Colchicine 0.6mg Qday   - Allopurinol 300mg         Malnutrition:    - Level of malnutrition: Severe   - Nutrition following     Chronic/Resolved Medical Conditions   Cognitive Issues  Assessment: Per chart review, evaluated by Neurology through [facility] on 6/23/23. MoCA score was below normal at 18/30. Plan at that time: Neuropsych referral, MRI brain with & without, ongoing B12 supplementation.   - Review notes from previous hospitalization     Chronic Back Pain   H/o L4-5 spinal fusion and spinal canal decompression  Recent diagnosis of endplate compression deformity at L2  Assessment: Hospitalized at [facility] in May with back pain. CT thoracic and lumbar spine 5/12: no acute thoracic findings, postsurgical changes of L4-5 spinal fusion and spinal canal decompression, upper endplate compression deformity at L2.MRI T spine (5/13) - compression deformity at T11, post surgical changes, moderate stenosis at T6-8, nodule at T6. MRI L spine (5/14) - multi level  bulges with moderate narrowing L1-L3 and severe neural forminal narrowing at L3-L4. NSGY evaluated during that admission but did not recommend acute interventions. Recommended outpatient follow up. Previously followed as OP with PM&R, last seen 5/5/22.  - Follow up with outpatient NSGY as recommended     CAD s/p CABG  HFpEF   HTN  HLD   - PTA Aspirin 81mg after resolution of BRENT  - PTA Atorvastatin 40mg daily     Atopic dermatitis  - PTA Weekly MTX   - continue folate supplementation     BPH  - PTA Finateride 5mg daily     Gout   - Hold PTA Colchicine  - Hold PTA Allopurinol     Diet: Combination Diet Regular Diet Adult  Calorie Counts  Snacks/Supplements Adult: Nepro Oral Supplement; With Meals    DVT Prophylaxis: Heparin SQ  Cifuentes Catheter: Not present  Fluids: PO/ LR 100mL/hr for 24hrs  Lines: None     Cardiac Monitoring: None  Code Status: No CPR- Do NOT Intubate      Clinically Significant Risk Factors            # Hypomagnesemia: Lowest Mg = 1.3 mg/dL in last 2 days, will replace as needed       # Hypertension: Noted on problem list         # Severe Malnutrition: based on nutrition assessment, PRESENT ON ADMISSION        Disposition Plan     Expected Discharge Date: 07/21/2023      Destination: home with family            Klever Key MD  Medicine Service, MAROON TEAM 5  [facility]  Securely message with [employer] (more info)  Text page via [employer] Paging/Directory   See signed in provider for up to date coverage information  ______________________________________________________________________    Interval History   NAEON. Sitting in bedside chair, eating and drinking well. Alert and interactive. Mild persist confusion. Otherwise stable    Physical Exam   Vital Signs: Temp: 97.6  F (36.4  C) Temp src: Oral BP: 135/50 Pulse: 51   Resp: 13 SpO2: 98 % O2 Device: None (Room air)    Weight: 164 lbs 8 oz    Constitutional: awake, indifferent, and stting in bedside  chair  Respiratory: Clear to auscultation BL  Cardiovascular: Normal S1/S2 with no murmurs, rub, or gallops   GI: Non-distended, normal bowel sounds, non-tender  Skin: no bruising or bleeding and  Musculoskeletal: No peripheral edema present   Neurologic: Awake, alert, oriented to name and time.    Medical Decision Making       Please see A&P for additional details of medical decision making.      Data     I have personally reviewed the following data over the past 24 hrs:    7.1  \   9.4 (L)   / 208     140 107 12.3 /  103 (H)   4.4 23 1.33 (H) \       Imaging results reviewed over the past 24 hrs:   No results found for this or any previous visit (from the past 24 hour(s)).

## 2023-07-21 NOTE — PROGRESS NOTES
Calorie Count  Intake recorded for: 7/20  Total Kcals: 1,615 Total Protein: 57g  Kcals from Hospital Food: 1,615   Protein: 57g  Kcals from Outside Food (average):0 Protein: 0g  # Meals Ordered from Kitchen: 4 meals      # Meals Recorded: 4 meals (First: 100% 2 hard boiled eggs, 1 wheat toast, 1 strawberry jelly, 1 8oz orange juice)     (Second: 30% egg omelet w/ brenner, mushrooms, green peppers, onions, 1 chicken noodle soup, 1 white rice)      (Third: 100% chocolate pudding, 8 oz orange juice)     (Fourth: 100% banana, 4 oz apple juice (from nursing unit))  # Supplements Recorded: 1 100% Magic Cup chocolate, 1 100% Nepro vanilla

## 2023-07-21 NOTE — PLAN OF CARE
Goal Outcome Evaluation:      Plan of Care Reviewed With: patient    Overall Patient Progress: no changeOverall Patient Progress: no change    Outcome Evaluation: Disoriented to time intermittently. otherwise AxoX4. Patient VSS on RA. Denied pain this shift. Patient slept much improved tongith v last night most likely due to first dose of HS mirtazapine. Patient continent of urine in urinal, good output. No BM overnight. Patient awaiting placement.

## 2023-07-21 NOTE — CONSULTS
Care Management Follow Up    Length of Stay (days): 4    Expected Discharge Date: 07/21/2023     Concerns to be Addressed: discharge planning     Patient plan of care discussed at interdisciplinary rounds: Yes    Anticipated Discharge Disposition:  Transitional care     Anticipated Discharge Services: None  Anticipated Discharge DME: None    Patient/family educated on Medicare website which has current facility and service quality ratings:  Offered  Education Provided on the Discharge Plan:  yes  Patient/Family in Agreement with the Plan: yes    Referrals Placed by CM/SW:      [facility]  [address] [facility] [address]  P: [phone]  F: [phone]  7/21: [facility]  [address]  [facility] [address]  P: [phone]  F: [phone]  7/21: Initial SNF referral sent via [facility]  [address] [place] [address]  P: [phone]  F: [phone]  7/21: Initial SNF referral sent via [facility]   [address]  [facility] [address]  P: [phone]  F: [phone]  7/21: Initial SNF referral sent via communications    [facility]  [address] [facility] [address]  P: [phone]  F: [phone]  7/21: Initial SNF referral sent via communications    [facility]  [address] [facility] [address]  P: [phone]  F: [phone]  7/21: Initial SNF referral sent via communications     [facility]  [address] [facility] [address]  P: [phone]  F: [phone]  - Not a TCU    [facility]  [address] [facility] [address]  P: [phone]  F: [phone]  - Bed not available    [facility]  [address] [facility] [address]  P: [phone]  F: [phone]  - Needs higher level of care    Private  pay costs discussed: Not applicable    Additional Information:  @0941 ELEAZAR called the patient's son Lucio [phone] to discuss discharge planning and the recommendation of TCU. Lucio stated he agrees that the patient would benefit from TCU prior to returning home. Lucio reported they live in [place] and would prefer a location closer to their home. Lucio stated SW can send referrals to facilities in the [facility] area. He requested [facility] be a last resort since the patient did not like it at that facility. ELEAZAR stated understanding.    @1000 SW attempted to meet with the patient at bedside to discuss TCU placement, however the patient was sleeping soundly. SW will attempt to meet with the patient at a later time in the day.     @1941 SW met with the patient at bedside to provide an update. ELEAZAR reported the conversation SW had with the patient's son Lucio. SW explained TCU to the patient. Patient stated he was agreeable to TCU as long as he was close to home and Lucio was agreeable as well.    ELEAZAR will continue to follow for discharge needs and placement     CARLOS ENRIQUE Lewis  [address]   Office: [phone]  Pager: [phone]  [email or web address]

## 2023-07-22 NOTE — CARE PLAN
Pt is alert, disorientated to time and situation at times, forgetful, VSS on RA, up with A2 to chair for meals, fair po intake on calorie counts, voiding in urinal, no BM today, oral bowel meds

## 2023-07-22 NOTE — PROGRESS NOTES
Calorie Count  Intake recorded for: 7/21  Total Kcals: 1337 Total Protein: 40g  Kcals from Hospital Food: 1337   Protein: 40g  Kcals from Outside Food (average):0 Protein: 0g  # Meals Ordered from Kitchen: 3   # Meals Recorded: 3: Meal 1: 100% 1 white toast w/jelly, 2 hard boiled eggs, 1 banana, 1 8oz orange juice     Meal 2: 100% 1 chicken noodle soup, 2 chocolate chip cookies, 8 oz orange juice; 50% 1 side white rice, 1 coffee     Meal 3: 100% 1 chicken noodle soup, 1 4 oz milk, 1 4 oz orange juice; 50% white rice  # Supplements Recorded: No supplement intake recorded

## 2023-07-22 NOTE — PLAN OF CARE
Goal Outcome Evaluation:      Plan of Care Reviewed With: patient    Overall Patient Progress: no changeOverall Patient Progress: no change    Outcome Evaluation: Disoriented to situation, forgetful. Calls out for staff often. VSS on RA. Denies pain. Up to commode x1 with 3 assist + gait belt, unsteady. Urinal within reach. No BM, passing gas. Melatonin and gabapentin given @ HS. Slept well overnight, respirations even and unlabored. Repo as needed. No acute changes. Awaiting TCU placement.

## 2023-07-22 NOTE — PROGRESS NOTES
[facility]    Progress Note - Medicine Service, RIKI TEAM 5       Date of Admission:  7/17/2023    Assessment & Plan   Deandre Moore is a 82 y.o. male with a history of cholecystitis, CAD s/p CABG & PCIs, Reflux, gout, HTN, SVT, HLD, CKD III, DMII, cognitive impairment, and tobacco abuse who presented to the ED with c/f septic shock but ultimately was found to be profoundly hypovolemic from poor PO intake withe severe BRENT    Updates:  - Cont IV thiamine, can be transitioned to orals if TCU found before compeletion  - Con PT/OT  - Awaiting TCU placement, likely early this upcoming week    FTT-- Improving   Hypovolemia-- Resovled  Weight Loss  Generalized Weakness-- Improving   Mentation Difficulties, likely dementia (MOCA: 18)  Per chart review, pt had persistent weight loss of 100 lbs in the last year due to poor appetite and impaired sense of taste reported in 6/2023 hospital stay. Family was reported to supplement diet with Ensure nutrition shakes. This hospitalization his lactate and increased ketones can be explained with decreased PO intake. Pt was reportedly having talks about hospice at last hospitalization and will review previous notes to see decision. Current presentation is similar to last hospitalization with labs being reflective of failure to thrive. Pt states that he has not been eating and drinking at home. This was confirmed with his son during a phone call. His son also wishes to pursue living facility options given that deteriorating status of his father and feeling like he is unable to maintain him at home. The pt has a history of improving during his hospital stay but then starts to deteriorate while at home prompting another hospitalization. Palliative has been consulted for this with note available on 7/19/23. Pt is improving in appetite, mood, and bodily functions while inpatient. He wishes to go to [facility] with wife, but has to constantly  revisit. Hard to determine if forgetful or not as understanding. Discussed plan of TCU to increase strength and ultimately prison, which he agrees with every time we revisit.   - Mirtazapine 15mg at bedtime for mood and appetite   - Resume home fluoxetine at 40 mg  - Palliative signed off. Last note with recs 7/19/23     Hyperglycemia-- Resolved  Hypoglycemia-- Resolved   DM II    Last hospitalization, pt Glimepiride 2mg BID was discontinued d/t lack of PO intake. Pt presented today with hyperglycemia, but quickly had another episode of hypoglycemia of 50 that is not well explained. Will placed on LDSS and continue to monitor.  - Hold PTA Metformin   > Held last hospitalization as well   - LDSS  - regular diet   - Hypoglycemia protocol  - Endocrinology following:      BRENT on CKD III 2/2 Hypovolemia-- Improved to baseline  Cr on admission of 3.2. Baseline closer to 1.0. Suspect pre-renal given improvement with IVF.    - Strict I/Os  - Avoid nephrotoxic agents as able & renally dose medications   - Holding several PTA meds for now, consider resuming soon with stablizing renal function and clinical status  - Colchicine 0.6mg Qday   - Allopurinol 300mg         Malnutrition:    - Level of malnutrition: Severe   - Nutrition following     Chronic/Resolved Medical Conditions   Cognitive Issues  Assessment: Per chart review, evaluated by Neurology through [facility] on 6/23/23. MoCA score was below normal at 18/30. Plan at that time: Neuropsych referral, MRI brain with & without, ongoing B12 supplementation.   - Strongly suspected some compontent of underlyiung dementia  - Some depression/mood DO liekly also present  - Psych rec high dose thiamine replacement which is ongoing     Chronic Back Pain   H/o L4-5 spinal fusion and spinal canal decompression  Recent diagnosis of endplate compression deformity at L2  Assessment: Hospitalized at [facility] in May with back pain. CT thoracic and lumbar spine 5/12: no acute  thoracic findings, postsurgical changes of L4-5 spinal fusion and spinal canal decompression, upper endplate compression deformity at L2.MRI T spine (5/13) - compression deformity at T11, post surgical changes, moderate stenosis at T6-8, nodule at T6. MRI L spine (5/14) - multi level bulges with moderate narrowing L1-L3 and severe neural forminal narrowing at L3-L4. NSGY evaluated during that admission but did not recommend acute interventions. Recommended outpatient follow up. Previously followed as OP with PM&R, last seen 5/5/22.  - Follow up with outpatient NSGY as recommended     CAD s/p CABG  HFpEF   HTN  HLD   - PTA Aspirin 81mg after resolution of BRENT  - PTA Atorvastatin 40mg daily     Atopic dermatitis  - PTA Weekly MTX   - continue folate supplementation     BPH  - PTA Finateride 5mg daily     Gout   - Hold PTA Colchicine  - Hold PTA Allopurinol     Diet: Combination Diet Regular Diet Adult  Snacks/Supplements Adult: Nepro Oral Supplement; With Meals    DVT Prophylaxis: Heparin SQ  Cifuentes Catheter: Not present  Fluids: PO/ LR 100mL/hr for 24hrs  Lines: None     Cardiac Monitoring: None  Code Status: No CPR- Do NOT Intubate      Clinically Significant Risk Factors            # Hypomagnesemia: Lowest Mg = 1.5 mg/dL in last 2 days, will replace as needed       # Hypertension: Noted on problem list         # Severe Malnutrition: based on nutrition assessment         Disposition Plan     Expected Discharge Date: 07/22/2023      Destination: home with family            Klever Key MD  Medicine Service, MAROON TEAM 5  [facility]  Securely message with [employer] (more info)  Text page via [facility] Paging/Directory   See signed in provider for up to date coverage information  ______________________________________________________________________    Interval History   NAEON. Sitting in bedside chair, alert and interactive. Forgetful but easily redirectable      Physical Exam   Vital Signs: Temp: 97.6  F (36.4  C) Temp src: Oral BP: 116/58 Pulse: 54   Resp: 22 SpO2: 99 % O2 Device: None (Room air)    Weight: 164 lbs 8 oz    Constitutional: awake, indifferent, and stting in bedside chair  Respiratory: Clear to auscultation BL  Cardiovascular: Normal S1/S2 with no murmurs, rub, or gallops   GI: Non-distended, normal bowel sounds, non-tender  Skin: no bruising or bleeding and  Musculoskeletal: No peripheral edema present   Neurologic: Awake, alert, oriented to name and time.    Medical Decision Making       Please see A&P for additional details of medical decision making.      Data     I have personally reviewed the following data over the past 24 hrs:    6.8  \   8.8 (L)   / 180     137 107 11.1 /  104 (H)   4.4 21 (L) 1.29 (H) \       Imaging results reviewed over the past 24 hrs:   No results found for this or any previous visit (from the past 24 hour(s)).

## 2023-07-23 NOTE — PLAN OF CARE
Pt is alert, disorientated to time and situation at times, forgetful, VSS on RA, up with A2 to chair for meals, voiding in urinal, small BM today, oral bowel meds, bed and chair alarm

## 2023-07-23 NOTE — PLAN OF CARE
Goal Outcome Evaluation:      Plan of Care Reviewed With: patient    Overall Patient Progress: no changeOverall Patient Progress: no change    Outcome Evaluation: Disoriented to situation, forgetful. Calls for staff often. VSS on RA. Denies pain. Urinal within reach. No acute changes. Awaiting TCU placement.

## 2023-07-23 NOTE — PROGRESS NOTES
[facility]    Progress Note - Medicine Service, RIKI TEAM 5       Date of Admission:  7/17/2023    Assessment & Plan   Deandre Moore is a 82 y.o. male with a history of cholecystitis, CAD s/p CABG & PCIs, Reflux, gout, HTN, SVT, HLD, CKD III, DMII, cognitive impairment, and tobacco abuse who presented to the ED with c/f septic shock but ultimately was found to be profoundly hypovolemic from poor PO intake withe severe BRENT    Updates:  - Strength improving subjectively. Continue PT/OT  - Awaiting TCU placement  - Unheld colchicine and allopurinol     FTT-- Improving   Hypovolemia-- Resovled  Weight Loss-- Improving  Generalized Weakness-- Improving   Mentation Difficulties, likely dementia (MOCA: 18)  Per chart review, pt had persistent weight loss of 100 lbs in the last year due to poor appetite and impaired sense of taste reported in 6/2023 hospital stay. Family was reported to supplement diet with Ensure nutrition shakes. This hospitalization his lactate and increased ketones can be explained with decreased PO intake. Pt was reportedly having talks about hospice at last hospitalization and will review previous notes to see decision. Current presentation is similar to last hospitalization with labs being reflective of failure to thrive. Pt states that he has not been eating and drinking at home. This was confirmed with his son during a phone call. His son also wishes to pursue living facility options given that deteriorating status of his father and feeling like he is unable to maintain him at home. The pt has a history of improving during his hospital stay but then starts to deteriorate while at home prompting another hospitalization. Palliative has been consulted for this with note available on 7/19/23. Pt is improving in appetite, mood, and bodily functions while inpatient. He wishes to go to [facility] with wife, but has to constantly revisit. Hard to determine  if forgetful or not as understanding. Discussed plan of TCU to increase strength and ultimately intermediate, which he agrees with every time we revisit. Overall he is eating more and seems to have a more positive disposition.   - Mirtazapine 15mg at bedtime for mood and appetite is working well.    > Pt eating 100% of meals  - Resume home fluoxetine at 40 mg  - Palliative signed off. Last note with recs 7/19/23      Malnutrition:    Improving.   - Level of malnutrition: Severe   - Nutrition following       Chronic/Resolved Medical Conditions   Cognitive Issues  Assessment: Per chart review, evaluated by Neurology through [facility] on 6/23/23. MoCA score was below normal at 18/30. Plan at that time: Neuropsych referral, MRI brain with & without, ongoing B12 supplementation.   - Strongly suspected some compontent of underlyiung dementia  - Some depression/mood DO liekly also present  - Psych rec high dose thiamine replacement which is ongoing     Chronic Back Pain   H/o L4-5 spinal fusion and spinal canal decompression  Recent diagnosis of endplate compression deformity at L2  Assessment: Hospitalized at [facility] in May with back pain. CT thoracic and lumbar spine 5/12: no acute thoracic findings, postsurgical changes of L4-5 spinal fusion and spinal canal decompression, upper endplate compression deformity at L2.MRI T spine (5/13) - compression deformity at T11, post surgical changes, moderate stenosis at T6-8, nodule at T6. MRI L spine (5/14) - multi level bulges with moderate narrowing L1-L3 and severe neural forminal narrowing at L3-L4. NSGY evaluated during that admission but did not recommend acute interventions. Recommended outpatient follow up. Previously followed as OP with PM&R, last seen 5/5/22.  - Follow up with outpatient NSGY as recommended       DM II    Last hospitalization, pt Glimepiride 2mg BID was discontinued d/t lack of PO intake. Pt presented today with hyperglycemia, but quickly had  another episode of hypoglycemia of 50 that is not well explained. Will placed on LDSS and continue to monitor.  - Hold PTA Metformin   > Held last hospitalization as well   - LDSS  - regular diet   - Hypoglycemia protocol    CAD s/p CABG  HFpEF   HTN  HLD   - PTA Aspirin 81mg after resolution of BRENT  - PTA Atorvastatin 40mg daily     Atopic dermatitis  - PTA Weekly MTX   - continue folate supplementation     BPH  - PTA Finateride 5mg daily     Gout   - Hold PTA Colchicine  - Hold PTA Allopurinol     Diet: Combination Diet Regular Diet Adult  Snacks/Supplements Adult: Nepro Oral Supplement; With Meals  Room Service    DVT Prophylaxis: Heparin SQ  Cifuentes Catheter: Not present  Fluids: PO/ LR 100mL/hr for 24hrs  Lines: None     Cardiac Monitoring: None  Code Status: No CPR- Do NOT Intubate      Clinically Significant Risk Factors            # Hypomagnesemia: Lowest Mg = 1.6 mg/dL in last 2 days, will replace as needed       # Hypertension: Noted on problem list         # Severe Malnutrition: based on nutrition assessment         Disposition Plan           Pt seen and staffed with Dr. Klever Key.     Mg Galaviz MD  Medicine Service, [facility] TEAM [address] [place] [address]  Securely message with [employer] (more info)  Text page via [facility] Paging/Directory   See signed in provider for up to date coverage information  ______________________________________________________________________    Interval History   NAEON. Laying in bed. Stating he wishes to work with PT to get stronger. No complaints.     Physical Exam   Vital Signs: Temp: 97.7  F (36.5  C) Temp src: Oral BP: 137/62 Pulse: 73   Resp: 27 SpO2: 97 % O2 Device: None (Room air)    Weight: 164 lbs 8 oz    Constitutional: awake, indifferent, and stting in bedside chair  Respiratory: Clear to auscultation BL  Cardiovascular: Normal S1/S2 with no murmurs, rub, or gallops   GI: Non-distended, normal bowel sounds,  non-tender  Skin: no bruising or bleeding and  Musculoskeletal: No peripheral edema present   Neurologic: Awake, alert, oriented to name and time.    Medical Decision Making       Please see A&P for additional details of medical decision making.      Data     I have personally reviewed the following data over the past 24 hrs:    6.1  \   8.8 (L)   / 171     140 109 (H) 13.4 /  345 (H)   4.6 22 1.33 (H) \       Imaging results reviewed over the past 24 hrs:   No results found for this or any previous visit (from the past 24 hour(s)).

## 2023-07-24 NOTE — PROGRESS NOTES
[facility]    Progress Note - Medicine Service, RIKI TEAM 5       Date of Admission:  7/17/2023    Assessment & Plan   Deandre Moore is a 82 y.o. male with a history of cholecystitis, CAD s/p CABG & PCIs, Reflux, gout, HTN, SVT, HLD, CKD III, DMII, cognitive impairment, and tobacco abuse who presented to the ED with c/f septic shock but ultimately was found to be profoundly hypovolemic from poor PO intake withe severe BRENT.    Updates:  - Strength improving subjectively. Continue PT/OT  - Awaiting TCU placement    FTT-- Improving   Hypovolemia-- Resovled  Weight Loss-- Improving  Generalized Weakness-- Improving   Mentation Difficulties, likely dementia (MOCA: 18)  Per chart review, pt had persistent weight loss of 100 lbs in the last year due to poor appetite and impaired sense of taste reported in 6/2023 hospital stay. Family was reported to supplement diet with Ensure nutrition shakes. This hospitalization his lactate and increased ketones can be explained with decreased PO intake. Pt was reportedly having talks about hospice at last hospitalization and will review previous notes to see decision. Current presentation is similar to last hospitalization with labs being reflective of failure to thrive. Pt states that he has not been eating and drinking at home. This was confirmed with his son during a phone call. His son also wishes to pursue living facility options given that deteriorating status of his father and feeling like he is unable to maintain him at home. The pt has a history of improving during his hospital stay but then starts to deteriorate while at home prompting another hospitalization. Palliative has been consulted for this with note available on 7/19/23. Pt is improving in appetite, mood, and bodily functions while inpatient. He wishes to go to [facility] with wife, but has to constantly revisit. Hard to determine if forgetful or not as  understanding. Discussed plan of TCU to increase strength and ultimately intermediate, which he agrees with every time we revisit. Overall he is eating more and seems to have a more positive disposition.   - Mirtazapine 15mg at bedtime for mood and appetite is working well.    > Pt eating 100% of meals  - Resume home fluoxetine at 40 mg  - Awaiting TCU placement  - Palliative signed off. Last note with recs 7/19/23      Malnutrition:    Improving.   - Level of malnutrition: Severe   - Nutrition following       Chronic/Resolved Medical Conditions   Cognitive Issues  Assessment: Per chart review, evaluated by Neurology through [facility] on 6/23/23. MoCA score was below normal at 18/30. Plan at that time: Neuropsych referral, MRI brain with & without, ongoing B12 supplementation.   - Strongly suspected some compontent of underlying dementia  - Some depression/mood disorder likely also present  - Psych rec high dose thiamine replacement which is ongoing     Chronic Back Pain   H/o L4-5 spinal fusion and spinal canal decompression  Recent diagnosis of endplate compression deformity at L2  Assessment: Hospitalized at [facility] in May with back pain. CT thoracic and lumbar spine 5/12: no acute thoracic findings, postsurgical changes of L4-5 spinal fusion and spinal canal decompression, upper endplate compression deformity at L2.MRI T spine (5/13) - compression deformity at T11, post surgical changes, moderate stenosis at T6-8, nodule at T6. MRI L spine (5/14) - multi level bulges with moderate narrowing L1-L3 and severe neural forminal narrowing at L3-L4. NSGY evaluated during that admission but did not recommend acute interventions. Recommended outpatient follow up. Previously followed as outpatient with PM&R, last seen 5/5/22.  - Follow up with outpatient NSGY as recommended       DM II    Last hospitalization, pt Glimepiride 2mg BID was discontinued d/t lack of PO intake. Pt presented today with hyperglycemia, but  quickly had another episode of hypoglycemia of 50 that is not well explained. Will placed on LDSS and continue to monitor.  - Hold PTA Metformin   > Held last hospitalization as well   - LDSS  - regular diet   - Hypoglycemia protocol    CAD s/p CABG  HFpEF   HTN  HLD   - PTA Aspirin 81mg after resolution of BRENT  - PTA Atorvastatin 40mg daily     Atopic dermatitis  - PTA Weekly MTX   - continue folate supplementation     BPH  - PTA Finateride 5mg daily     Gout   - Hold PTA Colchicine  - Hold PTA Allopurinol     Diet: Combination Diet Regular Diet Adult  Snacks/Supplements Adult: Nepro Oral Supplement; With Meals  Room Service    DVT Prophylaxis: Heparin SQ  Cifuentes Catheter: Not present  Fluids: Oral  Lines: None     Cardiac Monitoring: None  Code Status: No CPR- Do NOT Intubate      Clinically Significant Risk Factors            # Hypomagnesemia: Lowest Mg = 1.4 mg/dL in last 2 days, will replace as needed       # Hypertension: Noted on problem list           # Severe Malnutrition: based on nutrition assessment           Disposition Plan      Expected Discharge Date: 07/25/2023      Destination: home with family          Pt seen and staffed with Dr. Klever Key.     Sarbjit Bobo MD  Medicine Service, [facility] TEAM [address] [place] [address]  Securely message with Vocera (more info)  Text page via [employer] Paging/Directory   See signed in provider for up to date coverage information  ______________________________________________________________________    Interval History     Nursing notes reviewed. No acute events overnight. Patient reports he didn't sleep well. He is hoping to work with physical therapy today and go on a walk. He also reports his back is itching. He has no further concerns or questions at this time.     Physical Exam   Vital Signs: Temp: 99.2  F (37.3  C) Temp src: Oral BP: 124/69 Pulse: 77   Resp: 22 SpO2: 98 % O2 Device: None (Room air)    Weight: 164 lbs 8  oz    General: Alert and oriented without distress  HEENT: Normocephalic/atraumatic  Respiratory: CTAB without increased respiratory effort or accessory muscle use  Cardiovascular: RRR without murmurs, rubs or gallop. S1, S2 intact.  Skin: No overt lesions, bruises, rashes or bleeding on exposed skin surfaces.  Neuro: Alert & oriented x3.     Medical Decision Making       Please see A&P for additional details of medical decision making.      Data     I have personally reviewed the following data over the past 24 hrs:    5.1  \   8.5 (L)   / 150     N/A N/A N/A /  145 (H)   4.1 N/A N/A \       Imaging results reviewed over the past 24 hrs:   No results found for this or any previous visit (from the past 24 hour(s)).

## 2023-07-24 NOTE — PLAN OF CARE
Pt confused to date/time, forgetful. Frequent use of call light, seems to forget at times he has called recently, also verbalizes being bored. Easily reoriented by staff upon checking on him in person. Frequent reposition requests, denies pain. Up to chair to dinner, ate well after tray set up, able to feed self. Tremulous jaw at times, per report this is baseline. Voiding in urinal. FSBG, no sliding scale coverage needed. Continue plan of care, assess for changes.     Goal Outcome Evaluation: progressing

## 2023-07-24 NOTE — PROGRESS NOTES
CLINICAL NUTRITION SERVICES - BRIEF NOTE     Nutrition Prescription    RECOMMENDATIONS FOR MDs/PROVIDERS TO ORDER:  If pt unable to maintain current PO intake + additional of daily supplement. Would recommend supplemental nutrition support via TF.    Recommendations already ordered by Registered Dietitian (RD):  Continue Nepro daily     Future/Additional Recommendations:  Monitor PO intake/adequacy, wt trends   For last full RD assessment, see note dated 7/19/23    NEW FINDINGS   Nutrition: Pt reports enjoying Nepro supplement daily. RD encouraged pt to continue supplement at least 1 per day to help meet minimum requirement.    Calorie Counts 7/19-7/21:  7/21       Total Kcals: 1, 337     Total Protein: 40g (3 of 3 meals recorded)   7/20       Total Kcals: 1,615    Total Protein: 57g (4 of 4 meals recorded)   7/19       Total Kcals: 0           Total Protein: 0g (0 of 3 meals recorded --> inaccurate)     2-day avg intake of 1476 kcal/d, 48g protein/d.     INTERVENTIONS  Implementation  Medical food supplement therapy    Monitoring/Evaluation  Will continue to monitor and evaluate per protocol.    Yane Miller RD, LD  6C/5A(beds 5201 - 5209) RD pgr: [phone]  Weekend/Holiday RD pager: [phone]

## 2023-07-24 NOTE — PLAN OF CARE
Goal Outcome Evaluation:    Disoriented to situation. Forgetful. RA. Denies pain. Denies nausea. , 137. Potassium WNL, recheck tomorrow AM. Magnesium 1.4, IV replacement given, recheck at 1500. Uses urinal at bedside. L foot wound on bottom of feet, below big toe, cleansed and mepilex applied.    Waiting for TCU placement.

## 2023-07-24 NOTE — PLAN OF CARE
Goal Outcome Evaluation:      Plan of Care Reviewed With: patient    Overall Patient Progress: no changeOverall Patient Progress: no change    Outcome Evaluation: No acute changes overnight. Alert, disoriented to situation; forgetful. VSS on RA. Denies pain. Urinal within reach. Calls frequently. Awaiting TCU placement.

## 2023-07-25 PROBLEM — F19.982 DRUG-INDUCED INSOMNIA (H): Status: ACTIVE | Noted: 2023-01-01

## 2023-07-25 PROBLEM — G47.09 OTHER INSOMNIA: Status: ACTIVE | Noted: 2023-01-01

## 2023-07-25 NOTE — PLAN OF CARE
Goal Outcome Evaluation:      Plan of Care Reviewed With: patient    Overall Patient Progress: improvingOverall Patient Progress: improving    Outcome Evaluation: Monitor stooling patterns and potential need for more aggressive bowel regimen.Rec continue current diet, as ordered, liberalized to help encourage oral intake. Encourage small, frequent meals and use of oral supplements.

## 2023-07-25 NOTE — PLAN OF CARE
Goal Outcome Evaluation: [id number]      Plan of Care Reviewed With: patient    Overall Patient Progress: no changeOverall Patient Progress: no change    Outcome Evaluation: Disoriented to situation. No acute changes overnight, denies pain. Voiding in bedside urinal. No BM overnight. VSS on RA. L foot wound dressing CDI. Awaiting TCU placement.

## 2023-07-25 NOTE — PLAN OF CARE
Goal Outcome Evaluation:       Pt has been alert and oriented X4, VSS, denied any pain, tolerated regular diet well. Electrolytes were within acceptable range, no replacement needed for today. Dressing change done on L foot. Bed alarm on for safety measure although pt called for help appropriately. Pt remained stable, awaiting TCU placement.                   Never

## 2023-07-25 NOTE — PROGRESS NOTES
"CLINICAL NUTRITION SERVICES - REASSESSMENT NOTE     Nutrition Prescription    RECOMMENDATIONS FOR MDs/PROVIDERS TO ORDER:  Monitor stooling patterns and potential need for more aggressive bowel regimen.      Malnutrition Status:    Severe malnutrition in the context of chronic illness    Recommendations already ordered by Registered Dietitian (RD):  None additionally at this time    Future/Additional Recommendations:  -Rec continue current diet, as ordered, liberalized to help encourage oral intake. Encourage small, frequent meals and use of oral supplements. Pt would benefit from increasing protein intake (encouraged two oral supplements daily)  -Monitor BG control. Hgb A1c of 5.5.  on 7/25.  -Check vitamin D status. Pt's vitamin D lab was 39 on 8/16/19. History of vitamin D deficiency.   -Check methylmalonic acid. Pt is ordered to take vitamin B12.   -Continue folic acid, thiamine supplementation as per provider  -Continue remeron which may help stimulate appetite.   -Order a multivitamin with minerals to help meet micronutrient needs, if inadequate intake.     EVALUATION OF THE PROGRESS TOWARD GOALS   Diet: Regular since 7/17. Vanilla Nepro at breakfast and lunch. Room service appropriate with assist.   Intake/Tolerance: Tolerating diet. Per nursing flowsheets (% intake), pt consuming 25% on 7/19, % 7/20, 75% on 7/22, 50% with a good appetite 7/23, and % with a fair appetite on 7/24. RN on 7/22 noted pt has a fair appetite. RD note 7/24 noted, \"Pt reports enjoying Nepro supplement daily. RD encouraged pt to continue supplement at least 1 per day to help meet minimum requirement.\" Per pt, his appetite is \"good.\" States he is usually pretty active and being less active in the hospital has affected his appetite. Pt reports drinking one oral supplement daily.   Calorie Counts 7/19-7/21:  7/21       Total Kcals: 1, 337     Total Protein: 40g (3 of 3 meals recorded)   7/20       Total Kcals: 1,615    " Total Protein: 57g (4 of 4 meals recorded,  1 100% Magic Cup chocolate, 1 100% Nepro vanilla)   7/19       Total Kcals: 0           Total Protein: 0g (0 of 3 meals recorded --> inaccurate)   * Pt consumed a two-day average of 1476 kcals and 48 g protein daily. Estimated needs are 2220 - 2590 kcals/day (30 - 35 kcals/kg) and 89 - 111 grams protein/day (1.2 - 1.5 grams of pro/kg).      NEW FINDINGS   Neuro: Cognitive impairment, confusion/forgetful  Resp: No O2  WOC:  Not following at this time  GI: Having zero stools daily on average. Last Bowel Movement: 07/24 (pt report). On scheduled miralax, prn senna.   Wt Hx: 90.1 kg (6/20/2022), 89.8 kg (11/17/2022), 73.6 kg (5/16/23), 73.5 kg (6/23/23), 74.6 kg (7/20)-  Pt has lost 17% of body wt over the last eight months.     MALNUTRITION  % Intake: < 75% for > 7 days (moderate)  % Weight Loss: > 10% in 6 months (severe)  Subcutaneous Fat Loss: Facial region:  mild  Muscle Loss: Temporal:  Moderate-severe, Thoracic region (clavicle, acromium bone, deltoid, trapezius, pectoral):  Moderate-severe, Upper arm (bicep, tricep):  moderate and Lower arm  (forearm):  moderate  Fluid Accumulation/Edema: None noted  Malnutrition Diagnosis: Severe malnutrition in the context of chronic illness    Previous Goals   Patient to consume % of nutritionally adequate meal trays TID, or the equivalent with supplements/snacks.  Evaluation: Not met    Previous Nutrition Diagnosis  Inadequate oral intake related to decreased appetite as evidenced by pt report of decreased intake, 17.6% wt loss in 8 months, and physical signs of fat and muscle wasting.    Evaluation: Improving but unresolved.    CURRENT NUTRITION DIAGNOSIS  Inadequate oral intake related to decreased appetite and activity level as evidenced by pt consumed a two-day average of 1476 kcals and 48 g protein daily while estimated needs are 2220 - 2590 kcals/day (30 - 35 kcals/kg) and 89 - 111 grams protein/day (1.2 - 1.5 grams of  pro/kg).    INTERVENTIONS  Implementation  Medical food supplement therapy: Discussed oral supplements. Will continue these, as ordered (pt likes vanilla rather than berry). Will not modify Nepro at this time (K+ was 5.3 previously; K+ and phos currently WNL) to Ensure Enlive as pt does like Nepro.   Modify composition of meals/snacks: Added options (per pt preference) to already-ordered lunch.   Nutrition education for recommended modifications: Encouraged oral intake, including high protein sources (provided examples). Also, encouraged intake of two oral supplements daily.    Goals  Patient to consume % of nutritionally adequate meal trays TID, or the equivalent with supplements/snacks.    Monitoring/Evaluation  Progress toward goals will be monitored and evaluated per protocol.     Nutrition will continue to follow.      Renetta Alatorre, MS, RD, LD, [facility]   6C/5A(beds 8367 - 3252) RD pgr: [phone]

## 2023-07-25 NOTE — PLAN OF CARE
Goal Outcome Evaluation:      Plan of Care Reviewed With: patient    Overall Patient Progress: no changeOverall Patient Progress: no change    Outcome Evaluation: Pt. Disoriented to situation, and time. Uses call light frequently and is forgetful. Magnesium recheck 2.2. Requires assist of 2 w/ repositioning, and needs assistance with ordering meals.  Using urinal at bedside. Pt constipated, PRN senna given. L foot wound CDI. Currently awaiting TCU placement.

## 2023-07-25 NOTE — PROGRESS NOTES
Care Management Follow Up    Length of Stay (days): 8    Expected Discharge Date: 07/25/2023     Concerns to be Addressed: discharge planning     Patient plan of care discussed at interdisciplinary rounds: Yes    Anticipated Discharge Disposition:  Transitional Care - [facility]     Anticipated Discharge Services: Transportation   Anticipated Discharge DME: None    Patient/family educated on Medicare website which has current facility and service quality ratings:  Offered  Education Provided on the Discharge Plan:  yes  Patient/Family in Agreement with the Plan: yes    Referrals Placed by CM/SW:      Accepted  [facility]  [address] [place] [address]  P: [phone]  F: [phone]    Pending  [facility]  [address] [facility] [address]  P: [phone]  F: [phone]  7/21: Initial SNF referral sent via [facility]  [address] [facility] [address]  P: [phone]  F: [phone]  7/21: Initial SNF referral sent via [employer]     [facility]   [address]  [facility] [address]  P: [phone]  F: [phone]  7/21: Initial SNF referral sent via communications     [facility]  [address] [facility] [address]  P: [phone]  F: [phone]  7/21: Initial SNF referral sent via communications     [facility]  [address] [facility] [address]  P: [phone]  F: [phone]  7/21: Initial SNF referral sent via communications      [facility]  [address] [facility] [address]  P: [phone]  F: [phone]  - Not a TCU     [facility]  [address] [facility]  [place] [address]  P: [phone]  F: [phone]  - Bed not available     [facility]  [address] [facility] [address]  P: [phone]  F: [phone]  - Needs higher level of  care  Private pay costs discussed: Not applicable    Additional Information:  [facility] admissions called ELEAZAR asking if the patient is a smoker. SW stated not from their knowledge. SW reported that the patient may have been a former smoker, however the patient has not smoked while being in the hospital. Admissions reported that the patient was originally declined due to being a smoker, however since the patient is reportedly not a smoke the facility will accept the patient for TCU placement. Admissions stated they still have to do the insurance auth and have SW complete PAS.     ELEAZAR arranged wheelchair transportation to [facility] tomorrow 7/26 with a pickup window of [phone].    ELEAZAR called the patient's son Lucio. ELEAZAR reported to Lucio that the patient was accepted to [facility]. ELEAZAR stated that they spoke with the team and it was decided that it was safest for the patient to go by wheelchair. Lucio stated understanding and was in agreement. ELEAZAR reported the patient will discharge from the hospital between 6645-6594 and will go directly to the TCU. ELEAZAR provided Lucio the address of the facility. Lucio declined having any questions or concerns at this time.    ELEAZAR called Gege in admissions at [facility]. ELEAZAR reported the patient will admit to the facility tomorrow morning and the patient will discharge from the hospital between 8479-5910. ELEAZAR reported to Gege that the PAS has been completed.ELEAZAR confirmed the facilities fax number.    ELEAZAR will continue to follow for discharge needs    [phone]    CARLOS ENRIQUE Lewis  [address]   Office: [phone]  Pager: [phone]  [email or web address]

## 2023-07-25 NOTE — PROGRESS NOTES
[facility]    Progress Note - Medicine Service, RIKI TEAM 5       Date of Admission:  7/17/2023    Assessment & Plan   Deandre Moore is a 82 y.o. male with a history of cholecystitis, CAD s/p CABG & PCIs, Reflux, gout, HTN, SVT, HLD, CKD III, DMII, cognitive impairment, and tobacco abuse who presented to the ED with c/f septic shock but ultimately was found to be profoundly hypovolemic from poor PO intake with severe BRENT.    Updates:  - Continue PT/OT  - Awaiting TCU placement, plan for discharge tomorrow  - Will obtain further infectious work up with ESR, CRP, chest x-ray, X-ray L foot, urinalysis and repeat lactic acid    FTT-- Improving   Hypovolemia-- Resovled  Weight Loss-- Improving  Generalized Weakness-- Improving   Mentation Difficulties, likely dementia (MOCA: 18)  Per chart review, pt had persistent weight loss of 100 lbs in the last year due to poor appetite and impaired sense of taste reported in 6/2023 hospital stay. Family was reported to supplement diet with Ensure nutrition shakes. This hospitalization his lactate and increased ketones can be explained with decreased PO intake. Pt was reportedly having talks about hospice at last hospitalization and will review previous notes to see decision. Current presentation is similar to last hospitalization with labs being reflective of failure to thrive. Pt states that he has not been eating and drinking at home. This was confirmed with his son during a phone call. His son also wishes to pursue living facility options given that deteriorating status of his father and feeling like he is unable to maintain him at home. The pt has a history of improving during his hospital stay but then starts to deteriorate while at home prompting another hospitalization. Palliative has been consulted for this with note available on 7/19/23. Pt is improving in appetite, mood, and bodily functions while inpatient. He  wishes to go to [place] with wife, but has to constantly revisit. Hard to determine if forgetful or not as understanding. Discussed plan of TCU to increase strength and ultimately jail, which he agrees with every time we revisit. Overall he is eating more and seems to have a more positive disposition.   - Mirtazapine 15mg at bedtime for mood and appetite is working well.    > Pt eating 100% of meals  - Resume home fluoxetine at 40 mg  - Awaiting TCU placement  - Palliative signed off. Last note with recs 7/19/23      Malnutrition:    Improving.   - Level of malnutrition: Severe   - Nutrition following     Hypothermia and tachycardia  History of left foot ulcer  Patient hypothermic to 96.7 and tachycardic to 105.   - Will obtain further infectious work up with ESR, CRP, chest x-ray, X-ray L foot, urinalysis and repeat lactic acid    Chronic/Resolved Medical Conditions   Cognitive Issues  Assessment: Per chart review, evaluated by Neurology through [facility] on 6/23/23. MoCA score was below normal at 18/30. Plan at that time: Neuropsych referral, MRI brain with & without, ongoing B12 supplementation.   - Strongly suspected some compontent of underlying dementia  - Some depression/mood disorder likely also present  - Psych rec high dose thiamine replacement which is ongoing     Chronic Back Pain   H/o L4-5 spinal fusion and spinal canal decompression  Recent diagnosis of endplate compression deformity at L2  Assessment: Hospitalized at [facility] in May with back pain. CT thoracic and lumbar spine 5/12: no acute thoracic findings, postsurgical changes of L4-5 spinal fusion and spinal canal decompression, upper endplate compression deformity at L2.MRI T spine (5/13) - compression deformity at T11, post surgical changes, moderate stenosis at T6-8, nodule at T6. MRI L spine (5/14) - multi level bulges with moderate narrowing L1-L3 and severe neural forminal narrowing at L3-L4. NSGY evaluated during that admission  but did not recommend acute interventions. Recommended outpatient follow up. Previously followed as outpatient with PM&R, last seen 5/5/22.  - Follow up with outpatient NSGY as recommended       DM II    Last hospitalization, pt Glimepiride 2mg BID was discontinued d/t lack of PO intake. Pt presented today with hyperglycemia, but quickly had another episode of hypoglycemia of 50 that is not well explained. Will placed on LDSS and continue to monitor.  - Hold PTA Metformin   > Held last hospitalization as well   - LDSS  - regular diet   - Hypoglycemia protocol    CAD s/p CABG  HFpEF   HTN  HLD   - PTA Aspirin 81mg after resolution of BRENT  - PTA Atorvastatin 40mg daily     Atopic dermatitis  - PTA Weekly MTX   - continue folate supplementation     BPH  - PTA Finateride 5mg daily     Gout   - Hold PTA Colchicine  - Hold PTA Allopurinol     Diet: Combination Diet Regular Diet Adult  Snacks/Supplements Adult: Nepro Oral Supplement; With Meals  Room Service    DVT Prophylaxis: Heparin SQ  Cifuentes Catheter: Not present  Fluids: Oral  Lines: None     Cardiac Monitoring: None  Code Status: No CPR- Do NOT Intubate      Clinically Significant Risk Factors            # Hypomagnesemia: Lowest Mg = 1.4 mg/dL in last 2 days, will replace as needed       # Hypertension: Noted on problem list           # Severe Malnutrition: based on nutrition assessment           Disposition Plan      Expected Discharge Date: 07/25/2023      Destination: home with family          Patient seen and case discussed with Dr. Boyd who agrees with the above assessment and plan.       Sarbjit Bobo MD  Medicine Service, [facility] TEAM [address] [place] [address]  Securely message with [employer] (more info)  Text page via [facility] Paging/Directory   See signed in provider for up to date coverage information  ______________________________________________________________________    Interval History     Nursing notes reviewed. No acute  events overnight. Patient denies any pain, dyspnea or other concerns. Nursing staff report the patient will be moving to  later today. The patient has no further concerns or questions at this time.     Physical Exam   Vital Signs: Temp: 97.6  F (36.4  C) Temp src: Oral BP: 131/74 Pulse: 68   Resp: 16 SpO2: 100 % O2 Device: None (Room air)    Weight: 164 lbs 8 oz    General: Alert and oriented without distress  HEENT: Normocephalic/atraumatic  Respiratory: Patient breathing comfortably on room air without increased respiratory effort or accessory muscle use  Cardiovascular: Appears well perfused  Skin: No overt lesions, bruises, rashes or bleeding on exposed skin surfaces.  Neuro: CN II-XII grossly intact.    Medical Decision Making       Please see A&P for additional details of medical decision making.      Data     I have personally reviewed the following data over the past 24 hrs:    N/A  \   N/A   / N/A     N/A N/A N/A /  110 (H)   4.1 N/A N/A \     Procal: N/A CRP: N/A Lactic Acid: 1.8         Imaging results reviewed over the past 24 hrs:   No results found for this or any previous visit (from the past 24 hour(s)).

## 2023-07-26 NOTE — PLAN OF CARE
Physical Therapy Discharge Summary    Reason for therapy discharge:    Discharged to transitional care facility.    Progress towards therapy goal(s). See goals on Care Plan in [facility] electronic health record for goal details.  Goals partially met.  Barriers to achieving goals:   discharge from facility.    Therapy recommendation(s):    Continued therapy is recommended.  Rationale/Recommendations:  continue progressing strength and balance for safe functional mobility.

## 2023-07-26 NOTE — PLAN OF CARE
Goal Outcome Evaluation:      Plan of Care Reviewed With: patient    Overall Patient Progress: no changeOverall Patient Progress: no change    Outcome Evaluation: Pt intermittently disoriented to situation. Pt requires assist of 2 w/ repositioning.  Voiding small amounts in urinal. Had very poor appetite this evening. Wife was at bedside briefly this evening. PRN PO tylenol and gabapentin given for neck and back pain with stated decrease in pain. Dressing to L foot CDI.  Bed alarm currently on.  Urine sent to lab for UA. Calling appropriately for assistance. Plan for pt to discharge to [facility] tomorrow between 4224-7622.

## 2023-07-26 NOTE — PROGRESS NOTES
[id number]    Cognitive: A&Ox person/ place and time. Disoriented to situation but easily re-oriented.     Tele/cardiac: Apical pulse regular. Denied and offered no c/o CP.    Respiratory: LS: CTA, absent of adventitious sounds. SpO2: 97% on RA. Denied SOB    Activity: Pt able to make moderate position changes on own in bed, but requires A2 w/ more extensive turning.    Pain: Denied and offered no c/o pain this shift. Received PRN Tylenol and Gabapentin on previous shift, which was effective.     IV: PIV x1: SL. Dressing CDI    GI/: BS: normoactive x4Q. Per charting, reports last BM on 7/24. Voids spontaneously into bedside urinal. Urine is clear yellow.     Skin: Dressing to L foot remained CDI.     Diet: Tolerating a regular diet w/ Nepro supplements. Denied nausea.      Medication: Takes pills whole w/ water, one at a time.     Other: Continued on Mag/K+ protocols, recheck in the AM.

## 2023-07-26 NOTE — PLAN OF CARE
Occupational Therapy Discharge Summary    Reason for therapy discharge:    Discharged to transitional care facility.    Progress towards therapy goal(s). See goals on Care Plan in [facility] electronic health record for goal details.  Goals partially met.  Barriers to achieving goals:   discharge from facility.    Therapy recommendation(s):    Continued therapy is recommended.  Rationale/Recommendations:  to progress independence and safety with ADLs.

## 2023-07-26 NOTE — PROGRESS NOTES
Care Management Discharge Note    Discharge Date: 07/26/2023       Discharge Disposition: Transitional Care    Discharge Services: Transportation Services    Discharge DME: None    Discharge Transportation: Agency    Private pay costs discussed: transportation costs    Does the patient's insurance plan have a 3 day qualifying hospital stay waiver?  No    PAS Confirmation Code: [id number]  Patient/family educated on Medicare website which has current facility and service quality ratings:  yes    Education Provided on the Discharge Plan:    Persons Notified of Discharge Plans: Patient, patient's son [facility] admissions, provider, nursing staff, SW  Patient/Family in Agreement with the Plan: yes    Handoff Referral Completed: Yes    Additional Information:  On 7/25 ELEAZAR messaged the provider via [employer] at 1202 requesting orders be put in by 1600 so SW can fax the orders prior to SW leaving for the day. SW reported that transportation to the facility was set up with a pickup window of [phone] Provider stated understanding and stated they were swamped and would not likely be able to have the orders in by then. ELEAZAR stated understanding and requested that at [facility] latest provider have discharge orders in by 0800 . SW informed provider that facilities usually require discharge orders bve faxed over at least 2 hours prior to the patient arriving, however SW stated they will make it work. Provider stated they will do their best    On 7/26 SW messaged the provider via [employer] at 0851 and added the Attending marking the message high priority SW stated that the orders need to be put in ASAP. SW informed the provider that facilities usually require orders be sent at least 2 hours prior to the patient arriving. ELEAZAR stated the patient is scheduled to leave in less than an hour. SW stated that if this is not done the patient may be unable to discharge to the facility. Provide replied to ELEAZAR stating the  team still has to see the patient and sign the orders. ELEAZAR re informed the provider that transportation is arranged with a pickup window of [phone] and SW needs signed orders to send to the facility.    @0921 ELEAZAR faxed discharge orders to [facility] [address] [place] [address]  P: [phone]  F: [phone]    CARLOS ENRIQUE Lewis  [address]   Office: [phone]  Pager: [phone]  [email or web address]

## 2023-07-26 NOTE — DISCHARGE SUMMARY
[facility]  Discharge Summary - Medicine & Pediatrics       Date of Admission:  7/17/2023  Date of Discharge:  7/26/2023  Discharging Provider: Dr. Boyd  Discharge Service: Medicine Service, RIKI TEAM 5    Discharge Diagnoses     Failure to thrive  Hypokalemia  Weight loss  Generalized weakness  Mentation difficulties, likely dementia (MoCA: 18)  Malnutrition  Hypothermia and tachycardia  History of left foot ulcer  Cognitive issues  Chronic back pain  History of L4-5 spinal fusion and spinal canal decompression  Recent diagnosis of endplate compression deformity at L2  Diabetes mellitus type 2  Coronary artery disease status post CABG  Heart failure preserved ejection fraction  Hypertension  Hyperlipidemia  Atopic dermatitis  BPH  Gout    Clinically Significant Risk Factors     # Severe Malnutrition: based on nutrition assessment      Follow-ups Needed After Discharge   Further coordination of social work assistance, palliative care and monitoring for complications related to failure to thrive    - Follow up Vitamin D, folate and methylmalonic acid levels    Discharge Disposition   Discharged to rehabilitation facility  Condition at discharge: Fair    Hospital Course      Deandre Moore is a 82 y.o. male with a history of cholecystitis, CAD s/p CABG & PCIs, Reflux, gout, HTN, SVT, HLD, CKD III, DMII, cognitive impairment, and tobacco abuse who presented to the ED with concern for septic shock but ultimately was found to be profoundly hypovolemic from poor PO intake with severe BRENT.    Failure to thrive-- Improving   Hypovolemia--resolved  Weight Loss-- Improving  Generalized Weakness-- Improving   Mentation Difficulties, likely dementia (MOCA: 18)  Patient was admitted with concern for shock related to sepsis ultimately found to be related to poor nutritional intake related to failure to thrive. Patient has lost 100 pounds in the last year due to poor appetite  and impaired sense of taste. The patient's symptoms, appetite, mood and bodily functions improved while inpatient. The patient will be discharging to a transitional care unit with the ultimate goal to likely discharge to an assisted living facility.  - Mirtazapine 15mg at bedtime for mood and appetite  - Continue prior to arrival fluoxetine 40 mg  - Discharging to TCU  - Consider coordinating follow up with palliative care         Hypothermia and tachycardia  History of left foot ulcer  Patient hypothermic to 96.7 and tachycardic to 105 the day before discharge. Work up was unremarkable for evidence of an acute active infection. Chest x-ray did show some increase in pulmonary opacities likely from edema/and or atelectasis and he does not have other signs or symptoms to suggest a need for treatment of pneumonia at this time.  - Continue to monitor respiratory status  - Continue to monitor left foot ulcer    Severe Malnutrition-- improving  - Continue to monitor as an outpatient  - Consider outpatient dietitian follow up        Chronic/Resolved Medical Conditions   Cognitive Issues  Assessment: Per chart review, evaluated by Neurology through [facility] on 6/23/23. MoCA score was below normal at 18/30. Plan at that time: Neuropsych referral, MRI brain with & without, ongoing B12 supplementation.   - Strongly suspect some component of underlying dementia  - Some depression/mood disorder likely also present  - Psych recommended high dose thiamine replacement which is ongoing  - Follow up with primary care provider     Chronic Back Pain   H/o L4-5 spinal fusion and spinal canal decompression  Recent diagnosis of endplate compression deformity at L2  Assessment: Hospitalized at [facility] in May with back pain. CT thoracic and lumbar spine 5/12: no acute thoracic findings, postsurgical changes of L4-5 spinal fusion and spinal canal decompression, upper endplate compression deformity at L2.MRI T spine (5/13) -  compression deformity at T11, post surgical changes, moderate stenosis at T6-8, nodule at T6. MRI L spine (5/14) - multi level bulges with moderate narrowing L1-L3 and severe neural forminal narrowing at L3-L4. NSGY evaluated during that admission but did not recommend acute interventions. Recommended outpatient follow up. Previously followed as outpatient with PM&R, last seen 5/5/22.  - Follow up with outpatient NSGY as recommended         DM II    Last hospitalization, patient's Glimepiride 2mg BID was discontinued due to lack of PO intake. Patient presented initially with hyperglycemia, but quickly had another episode of hypoglycemia of 50 that was not well explained.   - Restart prior to arrival Metformin at discharge  - Continue to monitor glucose                CAD s/p CABG  HFpEF   HTN  HLD   Patient's last echocardiogram was on 7/17/23. His ejection fraction was 50-55%.  - PTA Aspirin 81mg after resolution of BRENT  - PTA Atorvastatin 40mg daily     Atopic dermatitis  - PTA weekly methotrexate   - continue folate supplementation     BPH  - PTA Finateride 5mg daily     Gout   - Continue prior to arrival Colchicine  - Continue prior to arrival Allopurinol      Consultations This Hospital Stay   PHARMACY TO DOSE VANCO  CARDIOLOGY HEART FAILURE (HF) IP CONSULT  PHARMACY TO DOSE VANCO  NUTRITION SERVICES ADULT IP CONSULT  PALLIATIVE CARE ADULT IP CONSULT  CARE MANAGEMENT / SOCIAL WORK IP CONSULT  SOCIAL WORK IP CONSULT  NURSING TO CONSULT FOR VASCULAR ACCESS CARE IP CONSULT  OCCUPATIONAL THERAPY ADULT IP CONSULT  PHYSICAL THERAPY ADULT IP CONSULT  PSYCHIATRY IP CONSULT  PSYCHIATRY IP CONSULT  PHYSICAL THERAPY ADULT IP CONSULT  OCCUPATIONAL THERAPY ADULT IP CONSULT    Code Status   No CPR- Do NOT Intubate       Patient seen and case discussed with Dr. Boyd who agrees with the above assessment and plan.     Sarbjit Bobo,   PGY-3, Internal Medicine  [facility]  Center  ______________________________________________________________________    Physical Exam   Vital Signs: Temp: 98.3  F (36.8  C) Temp src: Oral BP: 118/57 Pulse: 56   Resp: 16 SpO2: 96 % O2 Device: None (Room air)    Weight: 164 lbs 8 oz    General: Alert and oriented without distress  HEENT: Normocephalic/atraumatic  Respiratory: Coarse breath sounds bilaterally on room air without increased respiratory effort or accessory muscle use  Cardiovascular: RRR, no murmurs rubs or gallop. S1, S2 intact.   Abdomen: Soft, non-distended without tenderness to palpation or rebound.  Skin: No overt lesions, bruises, rashes or bleeding on exposed skin surfaces.  Neuro: CN II-XII grossly intact.      Primary Care Physician   William Marquez    Discharge Orders     Significant Results and Procedures   Most Recent 3 CBC's:  Recent Labs   Lab Test 07/26/23  0558 07/25/23  0646 07/24/23  0723   WBC 6.5 6.6 5.1   HGB 8.4* 8.7* 8.5*   * 100 101*    147* 150     Most Recent 3 BMP's:  Recent Labs   Lab Test 07/26/23  0752 07/26/23  0558 07/26/23  0203 07/25/23  0738 07/25/23  0646 07/24/23  0757 07/24/23  0723 07/23/23  0748 07/23/23  0557 07/22/23  0910 07/22/23  0610   NA  --  140  --   --   --   --   --   --  140  --  137   POTASSIUM  --  4.2  --   --  4.1  --  4.1  --  4.6  --  4.4   CHLORIDE  --  108*  --   --   --   --   --   --  109*  --  107   CO2  --  25  --   --   --   --   --   --  22  --  21*   BUN  --  14.9  --   --   --   --   --   --  13.4  --  11.1   CR  --  1.36*  --   --   --   --   --   --  1.33*  --  1.29*   ANIONGAP  --  7  --   --   --   --   --   --  9  --  9   ADDI  --  8.6*  --   --   --   --   --   --  8.9  --  8.7*   * 98 113*   < >  --    < >  --    < > 99   < > 104*    < > = values in this interval not displayed.     Most Recent 2 LFT's:  Recent Labs   Lab Test 07/17/23  0614 05/12/23  0808   AST 29 28   ALT 24 15   ALKPHOS 77 64   BILITOTAL 0.4 0.7     7-Day Micro Results       No  results found for the last 168 hours.          Most Recent ESR & CRP:  Recent Labs   Lab Test 07/25/23  1403 07/25/23  0646   SED 46*  --    CRPI  --  5.53*   ,   Results for orders placed or performed during the hospital encounter of 07/17/23   XR Chest Port 1 View    Narrative    Portable chest    INDICATION: Sepsis, hypotensive    COMPARISON: Chest CT angiogram outside study dated 7/17/2023. Chest CT  5/12/2023. Most recent available plain film 5/12/2023.    FINDINGS: Heart remains enlarged. Median sternotomy again noted.  Atherosclerotic calcifications at the aortic knob again present.  Silhouetting of the left hemidiaphragm is slightly decreased. There is  osteophyte formation on both sides of the acromioclavicular joint  bilaterally. The right humeral head is high riding. Hazy opacities in  the lungs appear similar otherwise.      Impression    IMPRESSION: Slightly improved retrocardiac aeration which may indicate  slightly decreasing consolidation or atelectasis. No new opacities.  Prominent degenerative changes of both shoulders including possible  rotator cuff degeneration best seen on the right. Atherosclerosis.    EDWARD JAMA MD         SYSTEM ID:  [id number]   POC US ECHO LIMITED    Impression    Limited Bedside Cardiac Ultrasound, performed and interpreted by me.   Indication: Hypotension/shock.  Parasternal long axis, parasternal short axis, and apical 4 chamber views were acquired.   Image quality was satisfactory.    Findings:    Global left ventricular function appears intact.  Chambers do not appear dilated.  Hypoechoic structure around pericardium representing small effusion vs pericardial fat    IMPRESSION: Grossly normal left ventricular function and chamber size.  Small effusion vs pericardial fat.       CTA Chest Abdomen Pelvis w Contrast    Narrative    Exam: Computed tomographic angiography of the chest, abdomen and  pelvis without and with contrast including 3D reformations  dated  7/17/2023 9:15 AM    Clinical information: Sepsis, severely elevated lactate, vomiting.  Abdominal tenderness.    Technique: Axial images through the chest and abdomen obtained before  the administration of intravenous contrast media and following the  injection of contrast media  in the arterial phase. Source images  reviewed as well as 3D and multi-planar reconstructions at a 3D  workstation.    Contrast: iopamidol (ISOVUE-370) solution 100 mL    DLP: 1816 mGy*cm    Comparison: CT chest 5/12/2023 and CT CAPD 1/3/2022    Findings:      The thoracic aorta, great vessels, and main and proximal pulmonary  arteries do not demonstrate evidence of dissection, intramural  hematoma, or thrombus. The main pulmonary artery measures 33 mm. The  ascending aorta measures up to 40 mm on true axial imaging.    Median sternotomy wires and postoperative changes of coronary artery  bypass. Extensive atherosclerotic calcifications of the coronary  arteries.    Heart is mild to moderately enlarged. No pericardial effusion.    No enlarged lymph nodes in the chest or axilla. The visualized thyroid  is within normal limits. The central venous vasculature is patent.  Bilateral gynecomastia.    Lungs: Basilar predominant peripheral reticular interstitial opacities  likely represent chronic changes of fibrosis. No acute pulmonary  opacities. No pleural effusions.    Abdomen and pelvis: There is a sliding-type hiatal hernia. The  gallbladder is surgically absent. The liver, spleen, pancreas, and  adrenal glands are normal. There are cysts in the kidneys bilaterally,  including a 2 cm exophytic cyst off the inferior lateral right kidney  with the thick focus of calcification on series 11 image 185. Density  is 33 Hounsfield units on noncontrast imaging.    There are calcifications throughout the infrarenal abdominal aorta  without stenosis or aneurysm. The iliac arteries are widely patent.  Atherosclerotic plaque at the origins of the  renal arteries with  likely mild stenosis. The origins of the celiac and superior  mesenteric arteries are patent. Inferior mesenteric artery is patent.  No ascites. No enlarged lymph nodes in the abdomen or pelvis or  retroperitoneum. Penile prosthesis noted.    Bones: No suspicious or aggressive appearing bony abnormality.  Multilevel degenerative spondylosis with posterior fusion hardware at  L5-S1.      Impression    Impression:    1. No abnormality to explain etiology of clinical sepsis and other  symptoms.    2. No acute vascular abnormality. Ectasia of the ascending aorta  measuring up to 4 cm. Dilated main pulmonary artery likely reflects  chronic pulmonary hypertension.    3. Basilar peripheral predominant pulmonary interstitial  scarring/fibrosis.    4. 2 cm complex cyst versus solid lesion lateral right lower lobe  kidney. Slightly increased in size compared to the study from one and  half years ago. Recommend obtaining a renal mass protocol MRI when the  patient is stable.    5. Sliding-type hiatal hernia.    6. Coronary artery bypass with extensive atherosclerotic  calcifications of the coronary arteries.    [email or web address]         SYSTEM ID:  [id number]   XR Foot Left G/E 3 Views    Narrative    EXAM: XR FOOT LEFT G/E 3 VIEWS  LOCATION: [facility]  DATE: 2023    INDICATION: Ulceration. Assess for osteomyelitis.  COMPARISON: None.      Impression    IMPRESSION: There is no cortical destructive change or periosteal reaction to suggest osteomyelitis. No acute fracture. Mild scattered arthritic changes. Atherosclerotic vascular calcifications.   Echo Complete     Value    LVEF  50-55%    Narrative    [id number]  [id number]  [id number]  [id number]^ALFONSO^DEWAYNE     [facility]  Echocardiography Laboratory  [address] [place] [id number]     Name: LUIS F BURDICK  MRN: [id number]  : 1940  Study Date:  07/17/2023 10:15 AM  Age: 82 yrs  Gender: Male  Patient Location: [facility]  Reason For Study: Shock  Ordering Physician: DEWAYNE HAMILTON  Performed By: Naomi Pena RDCS     BSA: 2.0 m2  Height: 74 in  Weight: 162 lb  HR: 99  BP: 108/58 mmHg  ______________________________________________________________________________  Procedure  Complete Portable Echo Adult. Contrast Optison. Optison (NDC #4222-5271-03)  given intravenously. Patient was given 5 ml mixture of 3 ml Optison and 6 ml  saline. 4 ml wasted.  ______________________________________________________________________________  Interpretation Summary  The visual ejection fraction is 50-55%.  Right ventricular function, chamber size, wall motion, and thickness are  normal.  Pulmonary artery systolic pressure cannot be assessed.  Sinuses of Valsalva 4.6 cm.  Ascending aorta 4.2 cm.  The inferior vena cava is normal.  Trivial pericardial effusion is present.  ______________________________________________________________________________  Left Ventricle  Left ventricular size is normal. Left ventricular wall thickness is normal.  Left ventricular diastolic function is indeterminate. The visual ejection  fraction is 50-55%. Regional wall motion is probably normal.     Right Ventricle  Right ventricular function, chamber size, wall motion, and thickness are  normal.     Atria  The atria cannot be assessed.     Mitral Valve  Mild mitral annular calcification is present. Mild mitral insufficiency is  present.     Aortic Valve  Aortic valve sclerosis is present.     Tricuspid Valve  The tricuspid valve is normal. Pulmonary artery systolic pressure cannot be  assessed.     Pulmonic Valve  The pulmonic valve is normal.     Vessels  The pulmonary artery and bifurcation cannot be assessed. The inferior vena  cava is normal. Sinuses of Valsalva 4.6 cm. Ascending aorta 4.2 cm.     Pericardium  Prominent epicardial fat is noted. Trivial pericardial effusion is present.      ______________________________________________________________________________  MMode/2D Measurements & Calculations  Ao root diam: 4.6 cm  asc Aorta Diam: 4.2 cm  LVOT diam: 2.5 cm  LVOT area: 4.9 cm2     Doppler Measurements & Calculations  LV V1 max P.8 mmHg  LV V1 max: 83.8 cm/sec  LV V1 VTI: 13.8 cm  SV(LVOT): 67.7 ml  SI(LVOT): 34.1 ml/m2     ______________________________________________________________________________  Report approved by: Daksha Rizzo 2023 11:14 AM               Discharge Medications   Current Discharge Medication List        START taking these medications    Details   melatonin 1 MG TABS tablet Take 1 tablet (1 mg) by mouth nightly as needed for sleep    Associated Diagnoses: Other insomnia      mirtazapine (REMERON) 15 MG tablet Take 1 tablet (15 mg) by mouth At Bedtime    Associated Diagnoses: Other insomnia; Physical deconditioning      polyethylene glycol (MIRALAX) 17 GM/Dose powder Take 17 g by mouth daily  Qty: 510 g    Associated Diagnoses: Physical deconditioning      thiamine (B-1) 100 MG tablet Take 1 tablet (100 mg) by mouth daily    Associated Diagnoses: Physical deconditioning           CONTINUE these medications which have CHANGED    Details   gabapentin (NEURONTIN) 300 MG capsule Take 1 capsule (300 mg) by mouth nightly as needed (pain)    Associated Diagnoses: Other insomnia           CONTINUE these medications which have NOT CHANGED    Details   allopurinol (ZYLOPRIM) 100 MG tablet Take 200 mg by mouth daily      aspirin (ASA) 81 MG chewable tablet Take 81 mg by mouth daily.      atorvastatin (LIPITOR) 40 MG tablet Take 1 tablet (40 mg) by mouth daily *Keep appointment on 23 for further refills*  Qty: 100 tablet, Refills: 0    Comments: Requesting 1 year supply  Associated Diagnoses: Type 2 diabetes mellitus without complication, without long-term current use of insulin (H)      augmented betamethasone dipropionate (DIPROLENE-AF) 0.05 % external  ointment Apply twice daily as needed for rash on the leg or back  Qty: 45 g, Refills: 11    Associated Diagnoses: Venous stasis dermatitis of left lower extremity; Atopic dermatitis, unspecified type      clobetasol (TEMOVATE) 0.05 % external cream Apply twice daily as needed for itching or rash on the back  Qty: 60 g, Refills: 5    Associated Diagnoses: Dermatitis      colchicine (COLCYRS) 0.6 MG tablet Take 1 tablet (0.6 mg) by mouth daily  Qty: 90 tablet, Refills: 3    Comments: Requesting 1 year supply  Associated Diagnoses: Anxiety; Type 2 diabetes mellitus without complication, without long-term current use of insulin (H); Chronic kidney disease, stage 3 (H); Hyperglycemia; Type 2 diabetes mellitus with diabetic neuropathy (H); Renal insufficiency syndrome      Cyanocobalamin (VITAMIN B12) 500 MCG TABS Take 500 mcg by mouth daily      finasteride (PROSCAR) 5 MG tablet Take 1 tablet (5 mg) by mouth daily  Qty: 30 tablet, Refills: 11    Comments: Requesting 1 year supply  Associated Diagnoses: BPH with urinary obstruction      FLUoxetine (PROZAC) 40 MG capsule Take 40 mg by mouth daily      folic acid (FOLVITE) 1 MG tablet Take 1 tablet (1 mg) by mouth daily Daily on days you don't take MTX  Qty: 90 tablet, Refills: 3    Associated Diagnoses: Atopic dermatitis, unspecified type      metFORMIN (GLUCOPHAGE) 1000 MG tablet Take 1,000 mg by mouth 2 times daily (with meals)      mupirocin (BACTROBAN) 2 % external ointment Apply topically as needed      nitroglycerin (NITROSTAT) 0.4 MG SL tablet Place under the tongue as needed      Alcohol Swabs PADS 1 pad 4 times daily  Qty: 100 each, Refills: 6    Associated Diagnoses: Type 2 diabetes mellitus (H)      blood glucose (NO BRAND SPECIFIED) lancets standard Use to test blood sugar 3 times daily or as directed.  Qty: 100 each, Refills: 11    Associated Diagnoses: Type 2 diabetes mellitus (H)      blood glucose (NO BRAND SPECIFIED) test strip Use to test blood sugar 4  times daily  With meter/ strips/ lancets covered by insurance pt using accuchek  Qty: 360 each, Refills: 3    Associated Diagnoses: Type 2 diabetes mellitus (H)      Blood Glucose Monitoring Suppl (BLOOD GLUCOSE MONITOR SYSTEM) w/Device KIT Test 4 times daily with meter covered by  insurance  Qty: 1 kit, Refills: 1    Associated Diagnoses: Type 2 diabetes mellitus (H)      methotrexate 2.5 MG tablet Take 6 tablets (15 mg) by mouth every 7 days for 30 days  Qty: 24 tablet, Refills: 0    Associated Diagnoses: Atopic dermatitis, unspecified type           STOP taking these medications       isosorbide mononitrate (IMDUR) 30 MG 24 hr tablet Comments:   Reason for Stopping:             Allergies   Allergies   Allergen Reactions    Beta Adrenergic Blockers Unknown    Latex Dermatitis    Latex Rash    Tape [Adhesive Tape] Dermatitis and Rash     Electrode patches

## 2023-07-26 NOTE — PROGRESS NOTES
Pt discharged to [facility] at 1000 via wheelchair ride. AVS printed, Faxed, and copy sent with Pt. IV removed. Belongings collected and sent with. Attempted to call facility for nurse to nurse report multiple times, no response. Will try again shortly. Pt A&O x4, VSS on RA. Up with assist of one with GB/W. Elviras pain. Tolerating diet well ate 75% of breakfast before leaving. BM in the morning, voiding spontaneously. Will try again to call for report soon

## 2023-07-28 NOTE — PROGRESS NOTES
Type of Form Received:     Form Received (Date) 7/28/23   Form Filled out Yes, 7/30/23   Placed in provider folder Yes

## 2023-08-11 DIAGNOSIS — E11.9 TYPE 2 DIABETES MELLITUS WITHOUT COMPLICATION, WITHOUT LONG-TERM CURRENT USE OF INSULIN (H): ICD-10-CM

## 2023-08-16 RX ORDER — ATORVASTATIN CALCIUM 40 MG/1
TABLET, FILM COATED ORAL
Qty: 90 TABLET | Refills: 0 | Status: SHIPPED | OUTPATIENT
Start: 2023-08-16

## 2023-08-16 NOTE — TELEPHONE ENCOUNTER
atorvastatin (LIPITOR) 40 MG tablet   100 tablet 0 6/7/2023     Last Office Visit : 1-  Future Office visit:  none      Statins Protocol [id number]/11/2023 10:05 PM   Protocol Details LDL on file in past 12 months     Lab Test 06/19/17  1325   LDL 40

## 2023-10-04 DIAGNOSIS — E11.9 TYPE 2 DIABETES MELLITUS WITHOUT COMPLICATION, WITHOUT LONG-TERM CURRENT USE OF INSULIN (H): ICD-10-CM

## 2023-10-04 DIAGNOSIS — F41.9 ANXIETY: ICD-10-CM

## 2023-10-04 DIAGNOSIS — R73.9 HYPERGLYCEMIA: ICD-10-CM

## 2023-10-04 DIAGNOSIS — N28.9 RENAL INSUFFICIENCY SYNDROME: ICD-10-CM

## 2023-10-04 DIAGNOSIS — E11.40 TYPE 2 DIABETES MELLITUS WITH DIABETIC NEUROPATHY (H): ICD-10-CM

## 2023-10-04 DIAGNOSIS — N13.8 BPH WITH URINARY OBSTRUCTION: ICD-10-CM

## 2023-10-04 DIAGNOSIS — N18.30 CHRONIC KIDNEY DISEASE, STAGE 3 (H): ICD-10-CM

## 2023-10-04 DIAGNOSIS — N40.1 BPH WITH URINARY OBSTRUCTION: ICD-10-CM

## 2023-10-04 RX ORDER — FINASTERIDE 5 MG/1
1 TABLET, FILM COATED ORAL DAILY
Qty: 100 TABLET | Refills: 2 | OUTPATIENT
Start: 2023-10-04

## 2023-10-04 RX ORDER — ALLOPURINOL 300 MG/1
1 TABLET ORAL DAILY
Qty: 100 TABLET | Refills: 2 | OUTPATIENT
Start: 2023-10-04

## 2023-10-10 DIAGNOSIS — E11.9 TYPE 2 DIABETES MELLITUS WITHOUT COMPLICATION, WITHOUT LONG-TERM CURRENT USE OF INSULIN (H): ICD-10-CM

## 2023-10-10 RX ORDER — ATORVASTATIN CALCIUM 40 MG/1
TABLET, FILM COATED ORAL
Qty: 90 TABLET | Refills: 3 | OUTPATIENT
Start: 2023-10-10

## 2025-04-30 NOTE — TELEPHONE ENCOUNTER
Patient states that he has a large white band aide on his right calf. The band aide covers the entire posterior calf from his ankle to knee. He Denies pain, redness, warmth, and or drainage from the area. Claims dressing is dry and intact. Is wondering if he can remove it.    Rx Refill Note  Requested Prescriptions     Pending Prescriptions Disp Refills    ALPRAZolam (XANAX) 2 MG tablet [Pharmacy Med Name: ALPRAZOLAM 2MG TABLETS] 90 tablet      Sig: TAKE 1 TABLET BY MOUTH THREE TIMES DAILY AS NEEDED FOR ANXIETY        Last office visit with prescribing clinician: 12/31/2024     Next office visit with prescribing clinician: Visit date not found     Letter from Milka Pavon APRN (04/25/2025)     Office Visit with Milka Pavon APRN (12/31/2024)     [facility] Full Urine Drug Screen - (05/09/2023)     BEHAVIORAL HEALTH - SCAN - Inspect report, Chayo, 4/30/25 (04/30/2025)     Inspect Fill     Sofia Feliciano  04/30/25, 08:54 EDT

## (undated) DEVICE — TRAY PAIN INJECTION 97A 640

## (undated) DEVICE — GLOVE ESTEEM POWDER FREE 7.0  2D72PL70

## (undated) DEVICE — CANNULA MONOPOLAR CVD 100ML 20GA 0406-630-125

## (undated) DEVICE — PREP CHLORAPREP W/ORANGE TINT 10.5ML 260715

## (undated) DEVICE — GLOVE PROTEXIS POWDER FREE SMT 8.0  2D72PT80X

## (undated) DEVICE — DRSG PRIMAPORE 03 1/8X6" 66000318

## (undated) DEVICE — NDL SPINAL 25GA 3.5" QUINCKE 405180

## (undated) RX ORDER — FENTANYL CITRATE 50 UG/ML
INJECTION, SOLUTION INTRAMUSCULAR; INTRAVENOUS
Status: DISPENSED
Start: 2018-03-01

## (undated) RX ORDER — FENTANYL CITRATE 50 UG/ML
INJECTION, SOLUTION INTRAMUSCULAR; INTRAVENOUS
Status: DISPENSED
Start: 2021-10-18

## (undated) RX ORDER — TRIAMCINOLONE ACETONIDE 40 MG/ML
INJECTION, SUSPENSION INTRA-ARTICULAR; INTRAMUSCULAR
Status: DISPENSED
Start: 2021-05-06